# Patient Record
Sex: FEMALE | Race: WHITE | NOT HISPANIC OR LATINO | Employment: UNEMPLOYED | URBAN - METROPOLITAN AREA
[De-identification: names, ages, dates, MRNs, and addresses within clinical notes are randomized per-mention and may not be internally consistent; named-entity substitution may affect disease eponyms.]

---

## 2021-07-24 ENCOUNTER — APPOINTMENT (EMERGENCY)
Dept: CT IMAGING | Facility: HOSPITAL | Age: 56
DRG: 281 | End: 2021-07-24
Payer: COMMERCIAL

## 2021-07-24 ENCOUNTER — HOSPITAL ENCOUNTER (INPATIENT)
Facility: HOSPITAL | Age: 56
LOS: 5 days | Discharge: HOME | DRG: 281 | End: 2021-07-29
Attending: EMERGENCY MEDICINE | Admitting: INTERNAL MEDICINE
Payer: COMMERCIAL

## 2021-07-24 DIAGNOSIS — G89.3 CANCER ASSOCIATED PAIN: ICD-10-CM

## 2021-07-24 DIAGNOSIS — C79.9 METASTATIC NEOPLASM (HCC): ICD-10-CM

## 2021-07-24 DIAGNOSIS — J90 PLEURAL EFFUSION: ICD-10-CM

## 2021-07-24 DIAGNOSIS — R05.9 COUGH: Primary | ICD-10-CM

## 2021-07-24 DIAGNOSIS — R06.02 SHORTNESS OF BREATH: ICD-10-CM

## 2021-07-24 PROBLEM — Z72.0 TOBACCO ABUSE: Status: ACTIVE | Noted: 2021-07-24

## 2021-07-24 LAB
ANION GAP SERPL CALCULATED.3IONS-SCNC: 10 MMOL/L (ref 4–13)
ATRIAL RATE: 84 BPM
BASOPHILS # BLD AUTO: 0.06 THOUSANDS/ΜL (ref 0–0.1)
BASOPHILS NFR BLD AUTO: 1 % (ref 0–1)
BUN SERPL-MCNC: 17 MG/DL (ref 5–25)
CALCIUM SERPL-MCNC: 9 MG/DL (ref 8.3–10.1)
CHLORIDE SERPL-SCNC: 102 MMOL/L (ref 100–108)
CO2 SERPL-SCNC: 25 MMOL/L (ref 21–32)
CREAT SERPL-MCNC: 1.02 MG/DL (ref 0.6–1.3)
D DIMER PPP FEU-MCNC: 0.79 UG/ML FEU
EOSINOPHIL # BLD AUTO: 0.07 THOUSAND/ΜL (ref 0–0.61)
EOSINOPHIL NFR BLD AUTO: 1 % (ref 0–6)
ERYTHROCYTE [DISTWIDTH] IN BLOOD BY AUTOMATED COUNT: 13.7 % (ref 11.6–15.1)
GFR SERPL CREATININE-BSD FRML MDRD: 63 ML/MIN/1.73SQ M
GLUCOSE SERPL-MCNC: 103 MG/DL (ref 65–140)
HCT VFR BLD AUTO: 46.8 % (ref 34.8–46.1)
HGB BLD-MCNC: 16.2 G/DL (ref 11.5–15.4)
IMM GRANULOCYTES # BLD AUTO: 0.05 THOUSAND/UL (ref 0–0.2)
IMM GRANULOCYTES NFR BLD AUTO: 0 % (ref 0–2)
LYMPHOCYTES # BLD AUTO: 0.49 THOUSANDS/ΜL (ref 0.6–4.47)
LYMPHOCYTES NFR BLD AUTO: 4 % (ref 14–44)
MCH RBC QN AUTO: 31.6 PG (ref 26.8–34.3)
MCHC RBC AUTO-ENTMCNC: 34.6 G/DL (ref 31.4–37.4)
MCV RBC AUTO: 91 FL (ref 82–98)
MONOCYTES # BLD AUTO: 0.6 THOUSAND/ΜL (ref 0.17–1.22)
MONOCYTES NFR BLD AUTO: 5 % (ref 4–12)
NEUTROPHILS # BLD AUTO: 10.02 THOUSANDS/ΜL (ref 1.85–7.62)
NEUTS SEG NFR BLD AUTO: 89 % (ref 43–75)
NRBC BLD AUTO-RTO: 0 /100 WBCS
NT-PROBNP SERPL-MCNC: 31 PG/ML
P AXIS: 76 DEGREES
PLATELET # BLD AUTO: 222 THOUSANDS/UL (ref 149–390)
PLATELET # BLD AUTO: 281 THOUSANDS/UL (ref 149–390)
PMV BLD AUTO: 12.1 FL (ref 8.9–12.7)
PMV BLD AUTO: 12.6 FL (ref 8.9–12.7)
POTASSIUM SERPL-SCNC: 4.3 MMOL/L (ref 3.5–5.3)
PR INTERVAL: 156 MS
QRS AXIS: 92 DEGREES
QRSD INTERVAL: 60 MS
QT INTERVAL: 368 MS
QTC INTERVAL: 434 MS
RBC # BLD AUTO: 5.12 MILLION/UL (ref 3.81–5.12)
SODIUM SERPL-SCNC: 137 MMOL/L (ref 136–145)
T WAVE AXIS: 86 DEGREES
TROPONIN I SERPL-MCNC: <0.02 NG/ML
VENTRICULAR RATE: 84 BPM
WBC # BLD AUTO: 11.29 THOUSAND/UL (ref 4.31–10.16)

## 2021-07-24 PROCEDURE — 36415 COLL VENOUS BLD VENIPUNCTURE: CPT | Performed by: EMERGENCY MEDICINE

## 2021-07-24 PROCEDURE — 80048 BASIC METABOLIC PNL TOTAL CA: CPT | Performed by: EMERGENCY MEDICINE

## 2021-07-24 PROCEDURE — 99285 EMERGENCY DEPT VISIT HI MDM: CPT

## 2021-07-24 PROCEDURE — 94640 AIRWAY INHALATION TREATMENT: CPT

## 2021-07-24 PROCEDURE — G1004 CDSM NDSC: HCPCS

## 2021-07-24 PROCEDURE — 85379 FIBRIN DEGRADATION QUANT: CPT | Performed by: EMERGENCY MEDICINE

## 2021-07-24 PROCEDURE — 94664 DEMO&/EVAL PT USE INHALER: CPT

## 2021-07-24 PROCEDURE — 93010 ELECTROCARDIOGRAM REPORT: CPT | Performed by: INTERNAL MEDICINE

## 2021-07-24 PROCEDURE — 85049 AUTOMATED PLATELET COUNT: CPT | Performed by: FAMILY MEDICINE

## 2021-07-24 PROCEDURE — 93005 ELECTROCARDIOGRAM TRACING: CPT

## 2021-07-24 PROCEDURE — 83880 ASSAY OF NATRIURETIC PEPTIDE: CPT | Performed by: EMERGENCY MEDICINE

## 2021-07-24 PROCEDURE — 85025 COMPLETE CBC W/AUTO DIFF WBC: CPT | Performed by: EMERGENCY MEDICINE

## 2021-07-24 PROCEDURE — 71275 CT ANGIOGRAPHY CHEST: CPT

## 2021-07-24 PROCEDURE — 99223 1ST HOSP IP/OBS HIGH 75: CPT | Performed by: FAMILY MEDICINE

## 2021-07-24 PROCEDURE — 99285 EMERGENCY DEPT VISIT HI MDM: CPT | Performed by: EMERGENCY MEDICINE

## 2021-07-24 PROCEDURE — 84484 ASSAY OF TROPONIN QUANT: CPT | Performed by: EMERGENCY MEDICINE

## 2021-07-24 RX ORDER — IPRATROPIUM BROMIDE AND ALBUTEROL SULFATE 2.5; .5 MG/3ML; MG/3ML
3 SOLUTION RESPIRATORY (INHALATION) ONCE
Status: COMPLETED | OUTPATIENT
Start: 2021-07-24 | End: 2021-07-24

## 2021-07-24 RX ORDER — ALBUTEROL SULFATE 90 UG/1
2 AEROSOL, METERED RESPIRATORY (INHALATION) EVERY 4 HOURS PRN
Status: DISCONTINUED | OUTPATIENT
Start: 2021-07-24 | End: 2021-07-29 | Stop reason: HOSPADM

## 2021-07-24 RX ORDER — ONDANSETRON 2 MG/ML
4 INJECTION INTRAMUSCULAR; INTRAVENOUS EVERY 6 HOURS PRN
Status: DISCONTINUED | OUTPATIENT
Start: 2021-07-24 | End: 2021-07-29 | Stop reason: HOSPADM

## 2021-07-24 RX ORDER — ACETAMINOPHEN 325 MG/1
650 TABLET ORAL EVERY 6 HOURS PRN
Status: DISCONTINUED | OUTPATIENT
Start: 2021-07-24 | End: 2021-07-29 | Stop reason: HOSPADM

## 2021-07-24 RX ORDER — GUAIFENESIN/DEXTROMETHORPHAN 100-10MG/5
10 SYRUP ORAL EVERY 4 HOURS PRN
Status: DISCONTINUED | OUTPATIENT
Start: 2021-07-24 | End: 2021-07-29 | Stop reason: HOSPADM

## 2021-07-24 RX ORDER — KETOROLAC TROMETHAMINE 30 MG/ML
15 INJECTION, SOLUTION INTRAMUSCULAR; INTRAVENOUS ONCE
Status: COMPLETED | OUTPATIENT
Start: 2021-07-24 | End: 2021-07-24

## 2021-07-24 RX ORDER — BENZONATATE 100 MG/1
200 CAPSULE ORAL 3 TIMES DAILY PRN
Status: DISCONTINUED | OUTPATIENT
Start: 2021-07-24 | End: 2021-07-29 | Stop reason: HOSPADM

## 2021-07-24 RX ORDER — SODIUM CHLORIDE 9 MG/ML
75 INJECTION, SOLUTION INTRAVENOUS CONTINUOUS
Status: DISCONTINUED | OUTPATIENT
Start: 2021-07-24 | End: 2021-07-25

## 2021-07-24 RX ADMIN — GUAIFENESIN AND DEXTROMETHORPHAN 10 ML: 100; 10 SYRUP ORAL at 19:56

## 2021-07-24 RX ADMIN — SODIUM CHLORIDE 75 ML/HR: 0.9 INJECTION, SOLUTION INTRAVENOUS at 15:49

## 2021-07-24 RX ADMIN — KETOROLAC TROMETHAMINE 15 MG: 30 INJECTION, SOLUTION INTRAMUSCULAR at 14:23

## 2021-07-24 RX ADMIN — IPRATROPIUM BROMIDE AND ALBUTEROL SULFATE 3 ML: 2.5; .5 SOLUTION RESPIRATORY (INHALATION) at 09:59

## 2021-07-24 RX ADMIN — IOHEXOL 85 ML: 350 INJECTION, SOLUTION INTRAVENOUS at 11:29

## 2021-07-24 RX ADMIN — ACETAMINOPHEN 650 MG: 325 TABLET, FILM COATED ORAL at 17:20

## 2021-07-24 RX ADMIN — ENOXAPARIN SODIUM 40 MG: 40 INJECTION SUBCUTANEOUS at 17:20

## 2021-07-24 NOTE — H&P
Sergio 1965, 50 y o  female MRN: [id number]  Unit/Bed#: ED 26 Encounter: [id number]  Primary Care Provider: No primary care provider on file  Date and time admitted to hospital: 7/24/2021  8:57 AM    Tobacco abuse  Assessment & Plan  Offered nicotine patch  Shortness of breath  Assessment & Plan  Secondary to the above and the pleural effusion  May benefit form a thoracentesis    * Cough  Assessment & Plan  Patient with cough and shortness of breath  Patient found to have abnormal findings on the CT scan  Probable liver metastasis as well  We discussed this with the patient and the family at the bedside  Likely will require thoracentesis on Monday for the pleural effusion secondary to being symptomatic  Patient may also need IR consultation for biopsy versus pulmonary for bronchoscopy  Will consult pulmonology and decide from there  Patient's last mammogram was about 2 years ago  VTE Prophylaxis: Enoxaparin (Lovenox)  / sequential compression device   Code Status:  Full code  POLST: POLST is not applicable to this patient  Discussion with family:     Anticipated Length of Stay:  Patient will be admitted on an Inpatient basis with an anticipated length of stay of  greater than 2 midnights  Justification for Hospital Stay:  Patient is going to require greater than 2 midnights secondary to being short of breath with concern for metastatic lung cancer in need of a thoracentesis and pulmonology evaluation and possible bronchoscopy    Total Time for Visit, including Counseling / Coordination of Care: 45 minutes  Greater than 50% of this total time spent on direct patient counseling and coordination of care  Chief Complaint:   Shortness of breath and cough    History of Present Illness:    Angela Smith is a 50 y o  female who presents with shortness of breath and cough    The patient has been having shortness of breath and cough for the past couple weeks  She finished a course of azithromycin  Yesterday she got a prescription for Augmentin  After she took her 1st dose she had a coughing fit     Patient has little bit of weight loss  The patient has some shortness of breath which is worse when she is lying down  Patient has been coughing quite a bit as well  Patient has been having some night sweats  She attributed some of her symptoms with night sweats and chills to possible menopause  Patient has cut down smoking the past couple of days    Review of Systems:    Review of Systems   Constitutional: Positive for activity change, appetite change and chills  Respiratory: Positive for cough and shortness of breath  Neurological: Positive for weakness  All other systems reviewed and are negative  Past Medical and Surgical History:     History reviewed  No pertinent past medical history  Past Surgical History:   Procedure Laterality Date    HIP SURGERY         Meds/Allergies:    Prior to Admission medications    Not on File     I have reviewed home medications with patient personally  Allergies:    Allergies   Allergen Reactions    Codeine Anaphylaxis    Sulfa Antibiotics Hives       Social History:     Marital Status: /Civil Union     Patient Pre-hospital Living Situation:  Lives with her   Patient Pre-hospital Level of Mobility:  Independent  Patient Pre-hospital Diet Restrictions:  None  Substance Use History:   Social History     Substance and Sexual Activity   Alcohol Use Not Currently     Social History     Tobacco Use   Smoking Status Current Every Day Smoker    Packs/day: 0 25    Types: Cigarettes   Smokeless Tobacco Never Used     Social History     Substance and Sexual Activity   Drug Use Not Currently       Family History:    None    Physical Exam:     Vitals:   Blood Pressure: 130/72 (07/24/21 1200)  Pulse: 69 (07/24/21 1200)  Temperature: (!) 97 4 °F (36 3 °C) (07/24/21 0858)  Temp Source: Oral (07/24/21 4283)  Respirations: 21 (07/24/21 1200)  Height: 5' 4" (162 6 cm) (07/24/21 0858)  Weight - Scale: 71 2 kg (157 lb) (07/24/21 0858)  SpO2: 96 % (07/24/21 1200)    Physical Exam    (   General Appearance:    Alert, cooperative, no distress, appears stated age   Head:    Normocephalic, without obvious abnormality, atraumatic   Eyes:    PERRL, conjunctiva/corneas clear, EOM's intact,             Nose:   Nares normal, septum midline, mucosa normal   Throat:   Lips, mucosa, and tongue normal; teeth and gums normal   Neck:   Supple, symmetrical, no adenopathy;        thyroid:  No enlargement/tenderness/nodules; no carotid    bruit or JVD   Back:     Symmetric, no curvature, ROM normal, no CVA tenderness   Lungs:   Decreased breath sounds at the bases       Heart:    Regular rate and rhythm, S1 and S2 normal, no murmur, rub    or gallop   Abdomen:     Soft, non-tender, bowel sounds active all four quadrants,     no masses, no organomegaly           Extremities:   Extremities normal, atraumatic, no cyanosis or edema   Pulses:   2+ and symmetric all extremities   Skin:   Skin color, texture, turgor normal, no rashes or lesions   Lymph nodes:   Cervical, supraclavicular, and axillary nodes normal   Neurologic:   CNII-XII intact  Normal strength, sensation and reflexes       throughout       Additional Data:     Lab Results: I have personally reviewed pertinent reports  Results from last 7 days   Lab Units 07/24/21  1000   WBC Thousand/uL 11 29*   HEMOGLOBIN g/dL 16 2*   HEMATOCRIT % 46 8*   PLATELETS Thousands/uL 281   NEUTROS PCT % 89*   LYMPHS PCT % 4*   MONOS PCT % 5   EOS PCT % 1     Results from last 7 days   Lab Units 07/24/21  0945   SODIUM mmol/L 137   POTASSIUM mmol/L 4 3   CHLORIDE mmol/L 102   CO2 mmol/L 25   BUN mg/dL 17   CREATININE mg/dL 1 02   ANION GAP mmol/L 10   CALCIUM mg/dL 9 0   GLUCOSE RANDOM mg/dL 103                       Imaging: I have personally reviewed pertinent reports        CTA ED chest PE study   Final Result by Demarco Boland MD (07/24 1207)      No pulmonary embolus  Septal thickening and bronchial wall thickening in the right lung due to lymphangitic spread of tumor, question lung primary or metastatic breast cancer  Multiple hepatic metastases and lytic sternal manubrial metastasis  Large right pleural effusion and small pericardial effusion, likely malignant  A few small bilateral indeterminate lung nodules  I personally discussed this study with Morris Harris on 7/24/2021 at 12:06 PM                             Workstation performed: [id number]             EKG, Pathology, and Other Studies Reviewed on Admission:   · CT scan reviewed    Allscripts / Epic Records Reviewed: Yes     ** Please Note: This note has been constructed using a voice recognition system   **

## 2021-07-24 NOTE — RESPIRATORY THERAPY NOTE
RT Protocol Note  Chloe Gonsalez 50 y o  female MRN: [id number]  Unit/Bed#: -01 Encounter: [id number]    Assessment    Principal Problem:    Cough  Active Problems:    Shortness of breath    Tobacco abuse      Home Pulmonary Medications:  Albuterol mdi prn  Home Devices/Therapy:  (albuterol mdi prn)    History reviewed  No pertinent past medical history  Social History     Socioeconomic History    Marital status: /Civil Union     Spouse name: None    Number of children: None    Years of education: None    Highest education level: None   Occupational History    None   Tobacco Use    Smoking status: Current Every Day Smoker     Packs/day: 0 25     Types: Cigarettes    Smokeless tobacco: Never Used   Vaping Use    Vaping Use: Never used   Substance and Sexual Activity    Alcohol use: Not Currently    Drug use: Not Currently    Sexual activity: Not Currently   Other Topics Concern    None   Social History Narrative    None     Social Determinants of Health     Financial Resource Strain:     Difficulty of Paying Living Expenses:    Food Insecurity:     Worried About Running Out of Food in the Last Year:     Ran Out of Food in the Last Year:    Transportation Needs:     Lack of Transportation (Medical):      Lack of Transportation (Non-Medical):    Physical Activity:     Days of Exercise per Week:     Minutes of Exercise per Session:    Stress:     Feeling of Stress :    Social Connections:     Frequency of Communication with Friends and Family:     Frequency of Social Gatherings with Friends and Family:     Attends [employer] Services:     Active Member of Clubs or Organizations:     Attends Club or Organization Meetings:     Marital Status:    Intimate Partner Violence:     Fear of Current or Ex-Partner:     Emotionally Abused:     Physically Abused:     Sexually Abused:        Subjective         Objective    Physical Exam:   Assessment Type: Pre-treatment  General Appearance: Awake, Alert  Respiratory Pattern: Normal  Chest Assessment: Chest expansion symmetrical  Bilateral Breath Sounds: Clear, Diminished  Cough: Non-productive  O2 Device: room air    Vitals:  Blood pressure (!) 154/102, pulse 78, temperature 97 8 °F (36 6 °C), resp  rate 21, height 5' 4" (1 626 m), weight 71 2 kg (157 lb), SpO2 95 %  Imaging and other studies: I have personally reviewed pertinent reports        O2 Device: room air     Plan    Respiratory Plan: Home Bronchodilator Patient pathway        Resp Comments: patient not in distress , takes prn inhaler at home, patient does not have home oxygen

## 2021-07-24 NOTE — ED PROVIDER NOTES
History  Chief Complaint   Patient presents with    Cough     Patient presents to ED with co cough that worsen last night  Patient recently dx with bronchitis and has started her 2nd round of antibiotics  History provided by:  Patient  Cough  Cough characteristics:  Dry and hacking  Sputum characteristics:  Clear  Severity:  Moderate  Onset quality:  Gradual  Duration:  2 weeks  Timing:  Constant  Progression:  Unchanged  Chronicity:  New  Smoker: yes    Context: upper respiratory infection (Went to urgent care and was treated for bronchitis with zpack and prednisone  now started on augmentin by PCP and not getting better)    Relieved by:  Nothing  Worsened by:  Nothing  Ineffective treatments:  Beta-agonist inhaler  Associated symptoms: chest pain and shortness of breath    Associated symptoms: no fever        None       History reviewed  No pertinent past medical history  Past Surgical History:   Procedure Laterality Date    HIP SURGERY         History reviewed  No pertinent family history  I have reviewed and agree with the history as documented  E-Cigarette/Vaping    E-Cigarette Use Never User      E-Cigarette/Vaping Substances     Social History     Tobacco Use    Smoking status: Current Every Day Smoker     Packs/day: 0 25     Types: Cigarettes    Smokeless tobacco: Never Used   Vaping Use    Vaping Use: Never used   Substance Use Topics    Alcohol use: Not Currently    Drug use: Not Currently       Review of Systems   Constitutional: Negative for fever  Respiratory: Positive for cough and shortness of breath  Cardiovascular: Positive for chest pain  All other systems reviewed and are negative  Physical Exam  Physical Exam  Vitals and nursing note reviewed  Constitutional:       General: She is not in acute distress  Appearance: Normal appearance  She is well-developed  She is not diaphoretic  HENT:      Head: Normocephalic and atraumatic        Nose: Nose normal  Eyes:      Conjunctiva/sclera: Conjunctivae normal    Cardiovascular:      Rate and Rhythm: Normal rate and regular rhythm  Heart sounds: Normal heart sounds  Pulmonary:      [place]: Pulmonary effort is normal  No respiratory distress  Breath sounds: Normal breath sounds  Abdominal:      General: There is no distension  Palpations: Abdomen is soft  Tenderness: There is no abdominal tenderness  Musculoskeletal:         General: Normal range of motion  Cervical back: Normal range of motion and neck supple  Skin:     General: Skin is warm and dry  Neurological:      Mental Status: She is alert and oriented to person, place, and time     Psychiatric:         Mood and Affect: Mood normal          Vital Signs  ED Triage Vitals   Temperature Pulse Respirations Blood Pressure SpO2   07/24/21 0858 07/24/21 0858 07/24/21 0858 07/24/21 0858 07/24/21 0858   (!) 97 4 °F (36 3 °C) 104 20 155/99 96 %      Temp Source Heart Rate Source Patient Position - Orthostatic VS BP Location FiO2 (%)   07/24/21 0858 07/24/21 0858 07/24/21 0858 07/24/21 0858 --   Oral Monitor Sitting Right arm       Pain Score       07/24/21 1423       7           Vitals:    07/24/21 1030 07/24/21 1200 07/24/21 1458 07/24/21 1506   BP: 134/80 130/72 (!) 154/102    Pulse: 70 69 88 78   Patient Position - Orthostatic VS: Sitting Sitting           Visual Acuity      ED Medications  Medications   benzonatate (TESSALON PERLES) capsule 200 mg (has no administration in time range)   dextromethorphan-guaiFENesin (ROBITUSSIN DM) oral syrup 10 mL (has no administration in time range)   sodium chloride 0 9 % infusion (75 mL/hr Intravenous New Bag 7/24/21 1549)   ondansetron (ZOFRAN) injection 4 mg (has no administration in time range)   acetaminophen (TYLENOL) tablet 650 mg (650 mg Oral Given 7/24/21 1720)   enoxaparin (LOVENOX) subcutaneous injection 40 mg (40 mg Subcutaneous Given 7/24/21 1720)   influenza vaccine, recombinant, quadrivalent (FLUBLOK) IM injection 0 5 mL (0 5 mL Intramuscular Not Given 7/24/21 1717)   ipratropium-albuterol (DUO-NEB) 0 5-2 5 mg/3 mL inhalation solution 3 mL (3 mL Nebulization Given 7/24/21 0959)   iohexol (OMNIPAQUE) 350 MG/ML injection (SINGLE-DOSE) 85 mL (85 mL Intravenous Given 7/24/21 1129)   ketorolac (TORADOL) injection 15 mg (15 mg Intravenous Given 7/24/21 1423)       Diagnostic Studies  Results Reviewed     Procedure Component Value Units Date/Time    D-Dimer [[id number]]  (Abnormal) Collected: 07/24/21 1004    Lab Status: Final result Specimen: Blood from Arm, Left Updated: 07/24/21 1029     D-Dimer, Quant 0 79 ug/ml FEU     Basic metabolic panel [[id number]] Collected: 07/24/21 0945    Lab Status: Final result Specimen: Blood from Arm, Left Updated: 07/24/21 1015     Sodium 137 mmol/L      Potassium 4 3 mmol/L      Chloride 102 mmol/L      CO2 25 mmol/L      ANION GAP 10 mmol/L      BUN 17 mg/dL      Creatinine 1 02 mg/dL      Glucose 103 mg/dL      Calcium 9 0 mg/dL      eGFR 63 ml/min/1 73sq m     Narrative:      [facility] guidelines for Chronic Kidney Disease (CKD):     Stage 1 with normal or high GFR (GFR > 90 mL/min/1 73 square meters)    Stage 2 Mild CKD (GFR = 60-89 mL/min/1 73 square meters)    Stage 3A Moderate CKD (GFR = 45-59 mL/min/1 73 square meters)    Stage 3B Moderate CKD (GFR = 30-44 mL/min/1 73 square meters)    Stage 4 Severe CKD (GFR = 15-29 mL/min/1 73 square meters)    Stage 5 End Stage CKD (GFR <15 mL/min/1 73 square meters)  Note: GFR calculation is accurate only with a steady state creatinine    NT-BNP PRO [[id number]]  (Normal) Collected: 07/24/21 0945    Lab Status: Final result Specimen: Blood from Arm, Left Updated: 07/24/21 1015     NT-proBNP 31 pg/mL     Troponin I [[id number]]  (Normal) Collected: 07/24/21 0945    Lab Status: Final result Specimen: Blood from Arm, Left Updated: 07/24/21 1012     Troponin I <0 02 ng/mL     CBC and differential [[id number]]  (Abnormal) Collected: 07/24/21 1000    Lab Status: Final result Specimen: Blood from Arm, Left Updated: 07/24/21 1011     WBC 11 29 Thousand/uL      RBC 5 12 Million/uL      Hemoglobin 16 2 g/dL      Hematocrit 46 8 %      MCV 91 fL      MCH 31 6 pg      MCHC 34 6 g/dL      RDW 13 7 %      MPV 12 6 fL      Platelets 894 Thousands/uL      nRBC 0 /100 WBCs      Neutrophils Relative 89 %      Immat GRANS % 0 %      Lymphocytes Relative 4 %      Monocytes Relative 5 %      Eosinophils Relative 1 %      Basophils Relative 1 %      Neutrophils Absolute 10 02 Thousands/µL      Immature Grans Absolute 0 05 Thousand/uL      Lymphocytes Absolute 0 49 Thousands/µL      Monocytes Absolute 0 60 Thousand/µL      Eosinophils Absolute 0 07 Thousand/µL      Basophils Absolute 0 06 Thousands/µL                  CTA ED chest PE study   Final Result by Sam Ramirez MD (07/24 1207)      No pulmonary embolus  Septal thickening and bronchial wall thickening in the right lung due to lymphangitic spread of tumor, question lung primary or metastatic breast cancer  Multiple hepatic metastases and lytic sternal manubrial metastasis  Large right pleural effusion and small pericardial effusion, likely malignant  A few small bilateral indeterminate lung nodules        I personally discussed this study with Lelia Espinosa on 7/24/2021 at 12:06 PM                             Workstation performed: [id number]                    Procedures  Procedures         ED Course             HEART Risk Score      Most Recent Value   Heart Score Risk Calculator   History  0 Filed at: 07/24/2021 1751   ECG  0 Filed at: 07/24/2021 1751   Age  1 Filed at: 07/24/2021 1751   Risk Factors  1 Filed at: 07/24/2021 1751   Troponin  0 Filed at: 07/24/2021 1751   HEART Score  2 Filed at: 07/24/2021 1751              PERC Rule for PE      Most Recent Value   PERC Rule for PE   Age >=50  1 Filed at: 07/24/2021 0935   HR >=100  0 Filed at: 07/24/2021 0935   O2 Sat on room air < 95%  0 Filed at: 07/24/2021 0935   History of PE or DVT  0 Filed at: 07/24/2021 2017   Recent trauma or surgery  0 Filed at: 07/24/2021 0935   Hemoptysis  0 Filed at: 07/24/2021 0935   Exogenous estrogen  0 Filed at: 07/24/2021 0935   Unilateral leg swelling  0 Filed at: 07/24/2021 0935   PERC Rule for PE Results  1 Filed at: 07/24/2021 0935              SBIRT 20yo+      Most Recent Value   SBIRT (25 yo +)   In order to provide better care to our patients, we are screening all of our patients for alcohol and drug use  Would it be okay to ask you these screening questions? Yes Filed at: 07/24/2021 0907   Initial Alcohol Screen: US AUDIT-C    1  How often do you have a drink containing alcohol?  0 Filed at: 07/24/2021 0907   2  How many drinks containing alcohol do you have on a typical day you are drinking? 0 Filed at: 07/24/2021 0907   3b  FEMALE Any Age, or MALE 65+: How often do you have 4 or more drinks on one occassion? 0 Filed at: 07/24/2021 5144   Audit-C Score  0 Filed at: 07/24/2021 4783   MIRIAN: How many times in the past year have you    Used an illegal drug or used a prescription medication for non-medical reasons?   Never Filed at: 07/24/2021 0907                    MDM  Number of Diagnoses or Management Options  Cough: new and requires workup  Pleural effusion: new and requires workup     Amount and/or Complexity of Data Reviewed  Clinical lab tests: ordered and reviewed  Tests in the radiology section of CPT®: ordered and reviewed    Risk of Complications, Morbidity, and/or Mortality  Presenting problems: high    Patient Progress  Patient progress: stable      Disposition  Final diagnoses:   Cough   Pleural effusion     Time reflects when diagnosis was documented in both MDM as applicable and the Disposition within this note     Time User Action Codes Description Comment    7/24/2021  1:08 PM Mandy Potter Add [R05] Cough     7/24/2021  1:09 PM Fidel CEDENO Add [J90] Pleural effusion     7/24/2021  2:14 PM Bia Carranza Add [R06 02] Shortness of breath       ED Disposition     ED Disposition Condition Date/Time Comment    Admit Stable Sat Jul 24, 2021  1:10 PM Case was discussed with Maribel and the patient's admission status was agreed to be Admission Status: inpatient status to the service of Dr Bhupinder Guaman   Follow-up Information    None         There are no discharge medications for this patient  No discharge procedures on file      PDMP Review     None          ED Provider  Electronically Signed by           Nathalie Bird MD  07/24/21 3289

## 2021-07-24 NOTE — ASSESSMENT & PLAN NOTE
Patient with cough and shortness of breath  Patient found to have abnormal findings on the CT scan  Probable liver metastasis as well  We discussed this with the patient and the family at the bedside  Likely will require thoracentesis on Monday for the pleural effusion secondary to being symptomatic  Patient may also need IR consultation for biopsy versus pulmonary for bronchoscopy  Will consult pulmonology and decide from there  Patient's last mammogram was about 2 years ago

## 2021-07-25 PROBLEM — R06.02 SHORTNESS OF BREATH: Status: RESOLVED | Noted: 2021-07-24 | Resolved: 2021-07-25

## 2021-07-25 PROBLEM — J90 PLEURAL EFFUSION: Status: ACTIVE | Noted: 2021-07-24

## 2021-07-25 PROBLEM — C79.9 METASTATIC NEOPLASM (HCC): Status: ACTIVE | Noted: 2021-07-25

## 2021-07-25 LAB
ALBUMIN SERPL BCP-MCNC: 2.8 G/DL (ref 3.5–5)
ALP SERPL-CCNC: 126 U/L (ref 46–116)
ALT SERPL W P-5'-P-CCNC: 18 U/L (ref 12–78)
ANION GAP SERPL CALCULATED.3IONS-SCNC: 11 MMOL/L (ref 4–13)
AST SERPL W P-5'-P-CCNC: 23 U/L (ref 5–45)
BILIRUB DIRECT SERPL-MCNC: 0.12 MG/DL (ref 0–0.2)
BILIRUB SERPL-MCNC: 0.42 MG/DL (ref 0.2–1)
BUN SERPL-MCNC: 11 MG/DL (ref 5–25)
CALCIUM SERPL-MCNC: 8.1 MG/DL (ref 8.3–10.1)
CHLORIDE SERPL-SCNC: 108 MMOL/L (ref 100–108)
CO2 SERPL-SCNC: 21 MMOL/L (ref 21–32)
CREAT SERPL-MCNC: 0.99 MG/DL (ref 0.6–1.3)
ERYTHROCYTE [DISTWIDTH] IN BLOOD BY AUTOMATED COUNT: 13.7 % (ref 11.6–15.1)
GFR SERPL CREATININE-BSD FRML MDRD: 65 ML/MIN/1.73SQ M
GLUCOSE SERPL-MCNC: 112 MG/DL (ref 65–140)
HCT VFR BLD AUTO: 40.7 % (ref 34.8–46.1)
HGB BLD-MCNC: 13.4 G/DL (ref 11.5–15.4)
INR PPP: 0.99 (ref 0.84–1.19)
LIPASE SERPL-CCNC: 136 U/L (ref 73–393)
MCH RBC QN AUTO: 30.5 PG (ref 26.8–34.3)
MCHC RBC AUTO-ENTMCNC: 32.9 G/DL (ref 31.4–37.4)
MCV RBC AUTO: 93 FL (ref 82–98)
PLATELET # BLD AUTO: 204 THOUSANDS/UL (ref 149–390)
PMV BLD AUTO: 12.2 FL (ref 8.9–12.7)
POTASSIUM SERPL-SCNC: 3.6 MMOL/L (ref 3.5–5.3)
PROT SERPL-MCNC: 6.2 G/DL (ref 6.4–8.2)
PROTHROMBIN TIME: 13.3 SECONDS (ref 11.6–14.5)
RBC # BLD AUTO: 4.4 MILLION/UL (ref 3.81–5.12)
SODIUM SERPL-SCNC: 140 MMOL/L (ref 136–145)
WBC # BLD AUTO: 5.43 THOUSAND/UL (ref 4.31–10.16)

## 2021-07-25 PROCEDURE — 99233 SBSQ HOSP IP/OBS HIGH 50: CPT | Performed by: INTERNAL MEDICINE

## 2021-07-25 PROCEDURE — 80048 BASIC METABOLIC PNL TOTAL CA: CPT | Performed by: FAMILY MEDICINE

## 2021-07-25 PROCEDURE — 83690 ASSAY OF LIPASE: CPT | Performed by: INTERNAL MEDICINE

## 2021-07-25 PROCEDURE — 0FB03ZX EXCISION OF LIVER, PERCUTANEOUS APPROACH, DIAGNOSTIC: ICD-10-PCS | Performed by: RADIOLOGY

## 2021-07-25 PROCEDURE — 99254 IP/OBS CNSLTJ NEW/EST MOD 60: CPT | Performed by: PHYSICIAN ASSISTANT

## 2021-07-25 PROCEDURE — 0W993ZX DRAINAGE OF RIGHT PLEURAL CAVITY, PERCUTANEOUS APPROACH, DIAGNOSTIC: ICD-10-PCS | Performed by: RADIOLOGY

## 2021-07-25 PROCEDURE — 85027 COMPLETE CBC AUTOMATED: CPT | Performed by: FAMILY MEDICINE

## 2021-07-25 PROCEDURE — 80076 HEPATIC FUNCTION PANEL: CPT | Performed by: INTERNAL MEDICINE

## 2021-07-25 PROCEDURE — 94664 DEMO&/EVAL PT USE INHALER: CPT

## 2021-07-25 PROCEDURE — 85610 PROTHROMBIN TIME: CPT | Performed by: RADIOLOGY

## 2021-07-25 RX ORDER — LISINOPRIL 5 MG/1
5 TABLET ORAL DAILY
Status: DISCONTINUED | OUTPATIENT
Start: 2021-07-25 | End: 2021-07-28

## 2021-07-25 RX ORDER — LANOLIN ALCOHOL/MO/W.PET/CERES
6 CREAM (GRAM) TOPICAL
Status: DISCONTINUED | OUTPATIENT
Start: 2021-07-25 | End: 2021-07-29 | Stop reason: HOSPADM

## 2021-07-25 RX ORDER — DIPHENHYDRAMINE HCL 25 MG
50 TABLET ORAL
Status: DISCONTINUED | OUTPATIENT
Start: 2021-07-25 | End: 2021-07-29 | Stop reason: HOSPADM

## 2021-07-25 RX ORDER — HYDRALAZINE HYDROCHLORIDE 20 MG/ML
5 INJECTION INTRAMUSCULAR; INTRAVENOUS EVERY 6 HOURS PRN
Status: DISCONTINUED | OUTPATIENT
Start: 2021-07-25 | End: 2021-07-29 | Stop reason: HOSPADM

## 2021-07-25 RX ORDER — KETOROLAC TROMETHAMINE 30 MG/ML
15 INJECTION, SOLUTION INTRAMUSCULAR; INTRAVENOUS EVERY 6 HOURS PRN
Status: DISCONTINUED | OUTPATIENT
Start: 2021-07-25 | End: 2021-07-26

## 2021-07-25 RX ORDER — PANTOPRAZOLE SODIUM 40 MG/1
40 TABLET, DELAYED RELEASE ORAL
Status: DISCONTINUED | OUTPATIENT
Start: 2021-07-25 | End: 2021-07-29 | Stop reason: HOSPADM

## 2021-07-25 RX ORDER — CALCIUM CARBONATE 200(500)MG
500 TABLET,CHEWABLE ORAL 3 TIMES DAILY PRN
Status: DISCONTINUED | OUTPATIENT
Start: 2021-07-25 | End: 2021-07-29 | Stop reason: HOSPADM

## 2021-07-25 RX ADMIN — GUAIFENESIN AND DEXTROMETHORPHAN 10 ML: 100; 10 SYRUP ORAL at 11:19

## 2021-07-25 RX ADMIN — LISINOPRIL 5 MG: 5 TABLET ORAL at 16:34

## 2021-07-25 RX ADMIN — ENOXAPARIN SODIUM 40 MG: 40 INJECTION SUBCUTANEOUS at 08:33

## 2021-07-25 RX ADMIN — PANTOPRAZOLE SODIUM 40 MG: 40 TABLET, DELAYED RELEASE ORAL at 16:34

## 2021-07-25 RX ADMIN — KETOROLAC TROMETHAMINE 15 MG: 30 INJECTION, SOLUTION INTRAMUSCULAR at 16:34

## 2021-07-25 RX ADMIN — BENZONATATE 200 MG: 100 CAPSULE ORAL at 23:32

## 2021-07-25 RX ADMIN — DIPHENHYDRAMINE HYDROCHLORIDE 50 MG: 25 TABLET ORAL at 23:32

## 2021-07-25 RX ADMIN — ANTACID TABLETS 500 MG: 500 TABLET, CHEWABLE ORAL at 23:32

## 2021-07-25 RX ADMIN — GUAIFENESIN AND DEXTROMETHORPHAN 10 ML: 100; 10 SYRUP ORAL at 03:46

## 2021-07-25 RX ADMIN — ACETAMINOPHEN 650 MG: 325 TABLET, FILM COATED ORAL at 03:46

## 2021-07-25 RX ADMIN — ACETAMINOPHEN 650 MG: 325 TABLET, FILM COATED ORAL at 15:22

## 2021-07-25 RX ADMIN — BENZONATATE 200 MG: 100 CAPSULE ORAL at 16:36

## 2021-07-25 RX ADMIN — MELATONIN 6 MG: 3 TAB ORAL at 21:58

## 2021-07-25 NOTE — CASE MANAGEMENT
CM attempted to meet with patient at bedside to complete CM Assessment however another provider was with patient at this time  CM will continue to follow to assist with discharge coordination

## 2021-07-25 NOTE — ASSESSMENT & PLAN NOTE
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  Last mammogram was in March 2019   -Normal    CTA scan 7/25 shows septal thickening and bronchial wall thickening in the right lung due to lymphangitic spread of tumor or metastatic breast cancer  Multiple hepatic metastasis and lytic sternal manubrial metastasis  Large right pleural effusion and small pericardial effusion likely malignant  A few small bilateral lung nodules    Outpatient chest x-ray on 07/10  showed right diaphragm elevation however with no obvious pleural effusion    -  Follow pulmonology and IR consult for biopsy  -  Consult Heme-Onc after biopsy  is  back

## 2021-07-25 NOTE — ASSESSMENT & PLAN NOTE
Patient has large pleural effusions  on the right    -  Pulmonary on board  -  IR  on board  -  Thoracocentesis scheduled for tomorrow  -  Follow BF analysis

## 2021-07-25 NOTE — CASE MANAGEMENT
LOS: 1  RISK OF UNPLANNED READMISSION SCORE: 9  30 DAY READMISSION: NO  BUNDLE: NO    CM met with patient at bedside  Patient is alert and oriented  CM name and role reviewed  Patient lives at home with her , son and brother in a 1 story home with 4 ONELIA with handrail  Patient is independent with ADL's  Patient owns a cane but does not currently use  Patient has no HX of VNA or STR  Patient has RX coverage and uses the [facility] in [place] with no issues getting or affording her medications  Patient denies any HX of any mental health or substance abuse issues  Patient's PCP is Jose Mota MD   Patient does not have a documented POA but reports her  would make decisions if she was ever unable to  No AD/LW and is not interested in information at this time  Michelle Everett does not work she collects SSD and is on disability  Patient drives and will have a ride home from family at DC  Discussed with patient plan of care and discharge planning, when asked to update friends or family members patient politely declined  No CM DC needs were discussed or identified at this time  CM reviewed discharge planning process including the following: identifying caregivers at home, preference for d/c planning needs, availability of treatment team to discuss questions or concerns patient and/or family may have regarding diagnosis, plan of care, old or new medications and discharge planning  CM will continue to follow for care coordination and update assessment as necessary

## 2021-07-25 NOTE — QUICK NOTE
Patient was complaining of RUQ pain, 6/10 radiating to right flank and back    Epigastric and RUQ tenderness noted on PE, Delong positive   LFT , lipase, RUQ US ordered

## 2021-07-25 NOTE — CONSULTS
Consultation - Pulmonary Medicine   Alana Bangura 50 y o  female MRN: [id number]  Unit/Bed#: -01 Encounter: [id number]      Assessment:  1  Shortness of breath  2  Right sided pleural effusion  3  Liver lesions  4  History of breast cancer  5  Tobacco abuse    Plan:   Patient presented with cough and shortness of breath  CTA on admission shows no PE, shows septal thickening and bronchial wall thickening in the right lung due to lymphangitic spread of tumor  Multiple hepatic Mets and lytic sternal manubrial Mets  Also a large right pleural effusion and small pericardial effusion, few small bilateral indeterminate lung nodules    Patient with history of right sided breast cancer in 2012  Findings likely related to recurrence/new breast cancer  No discrete lung nodule and minimal smoking history to suggest primary lung cancer  Would plan for thoracentesis for diagnostic and therapeutic purposes as well as a liver biopsy with IR  She would like to establish with Oncology here at [place] [address], had been following at [facility] but this is far to travel  Smoking cessation - patient had been smoking sometimes less than 1/2 PPD, has smoked on and off over the years  Will continue to follow  History of Present Illness   Physician Requesting Consult: Alejandra Pereira MD  Reason for Consult / Principal Problem: Cough, shortness of breath, pleural effusion  Hx and PE limited by: None  HPI: Alana Bangura is a 50y o  year old female current smoker less than half a pack per day with past medical history of breast cancer in 2012 who presents with complaint of worsening shortness of breath and cough over the last few weeks  She was initially treated with antibiotics at urgent care, symptoms not improving her PCP gave her additional antibiotics  She began to have a coughing fit and felt she was not getting any better and decided to come to the ED    She has noted over the past several months some slight worsening shortness of breath/dyspnea exertion  She has a history of breast cancer in 2012  She underwent lumpectomy as well as radiation  She was on tamoxifen  She does have a significant family history of breast cancer including her sister, mother and grandmother  Her BRCA testing is reportedly negative  She does report that her sister also have recurrence of her cancer at about the 10 year kavon as well  Inpatient consult to Pulmonology  Consult performed by: Lucrecia Duncan PA-C  Consult ordered by: Oriana Gomez MD          Review of Systems   Constitutional: Positive for fatigue  HENT: Negative  Respiratory: Positive for cough and shortness of breath  Cardiovascular: Negative  Gastrointestinal: Negative  Genitourinary: Negative  Musculoskeletal: Negative  Skin: Negative  Allergic/Immunologic: Negative  Neurological: Negative  Psychiatric/Behavioral: Negative  Historical Information   History reviewed  No pertinent past medical history    Past Surgical History:   Procedure Laterality Date    HIP SURGERY       Social History   Social History     Substance and Sexual Activity   Alcohol Use Not Currently     Social History     Substance and Sexual Activity   Drug Use Not Currently     E-Cigarette/Vaping    E-Cigarette Use Never User      E-Cigarette/Vaping Substances     Social History     Tobacco Use   Smoking Status Current Every Day Smoker    Packs/day: 0 25    Types: Cigarettes   Smokeless Tobacco Never Used     Occupational History:     Family History:   Family History   Problem Relation Age of Onset   [facility] Breast cancer Mother    [facility] Breast cancer Sister     Breast cancer Maternal Grandmother        Meds/Allergies   all current active meds have been reviewed, pertinent pulmonary meds have been reviewed and current meds:   Current Facility-Administered Medications   Medication Dose Route Frequency    acetaminophen (TYLENOL) tablet 650 mg  650 mg Oral Q6H PRN    albuterol (PROVENTIL HFA,VENTOLIN HFA) inhaler 2 puff  2 puff Inhalation Q4H PRN    benzonatate (TESSALON PERLES) capsule 200 mg  200 mg Oral TID PRN    dextromethorphan-guaiFENesin (ROBITUSSIN DM) oral syrup 10 mL  10 mL Oral Q4H PRN    enoxaparin (LOVENOX) subcutaneous injection 40 mg  40 mg Subcutaneous Daily    influenza vaccine, recombinant, quadrivalent (FLUBLOK) IM injection 0 5 mL  0 5 mL Intramuscular Once    ondansetron (ZOFRAN) injection 4 mg  4 mg Intravenous Q6H PRN    sodium chloride 0 9 % infusion  75 mL/hr Intravenous Continuous       Allergies   Allergen Reactions    Codeine Anaphylaxis    Sulfa Antibiotics Hives       Objective   Vitals: Blood pressure 139/95, pulse 79, temperature 97 7 °F (36 5 °C), resp  rate 17, height 5' 4" (1 626 m), weight 71 2 kg (157 lb), SpO2 93 %  ,Body mass index is 26 95 kg/m²  No intake or output data in the 24 hours ending 07/25/21 1131  Invasive Devices     Peripheral Intravenous Line            Peripheral IV 07/24/21 Left Antecubital 1 day                Physical Exam  Vitals reviewed  Constitutional:       Appearance: Normal appearance  HENT:      Head: Normocephalic and atraumatic  Eyes:      Conjunctiva/sclera: Conjunctivae normal    Cardiovascular:      Rate and Rhythm: Normal rate and regular rhythm  Pulmonary:      Effort: Pulmonary effort is normal  No respiratory distress  Breath sounds: Examination of the right-lower field reveals decreased breath sounds  Decreased breath sounds present  No wheezing, rhonchi or rales  Abdominal:      General: Abdomen is flat  There is no distension  Musculoskeletal:         General: Normal range of motion  Cervical back: Normal range of motion  Right lower leg: No edema  Left lower leg: No edema  Skin:     General: Skin is warm and dry  Neurological:      Mental Status: She is alert and oriented to person, place, and time     Psychiatric:         Mood and Affect: Mood normal  Behavior: Behavior normal          Lab Results:   I have personally reviewed pertinent lab results  , CBC:   Lab Results   Component Value Date    WBC 5 43 07/25/2021    HGB 13 4 07/25/2021    HCT 40 7 07/25/2021    MCV 93 07/25/2021     07/25/2021    MCH 30 5 07/25/2021    MCHC 32 9 07/25/2021    RDW 13 7 07/25/2021    MPV 12 2 07/25/2021   , CMP:   Lab Results   Component Value Date    SODIUM 140 07/25/2021    K 3 6 07/25/2021     07/25/2021    CO2 21 07/25/2021    BUN 11 07/25/2021    CREATININE 0 99 07/25/2021    CALCIUM 8 1 (L) 07/25/2021    EGFR 65 07/25/2021     Imaging Studies: I have personally reviewed pertinent reports  and I have personally reviewed pertinent films in PACS  EKG, Pathology, and Other Studies: I have personally reviewed pertinent reports      VTE Prophylaxis: Sequential compression device (Venodyne)  and Enoxaparin (Lovenox)    Code Status: Level 1 - Full Code  Advance Directive and Living Will:      Power of :    POLST:

## 2021-07-25 NOTE — PROGRESS NOTES
[facility]  Progress Note Elane Dose 1965, 50 y o  female MRN: [id number]  Unit/Bed#: -01 Encounter: [id number]  Primary Care Provider: No primary care provider on file  Date and time admitted to hospital: 7/24/2021  8:57 AM    Metastatic neoplasm [facility])  Assessment & Plan  Patient with a history of right sided breath cancer in 2012 status post lumpectomy,  axillary lymph node dissection and local radiation  Patient reports family history of breast cancer in her sister at age of 48 and her mother  Lost follow-up with [facility]  Last mammogram was in March 2019   -Normal    CTA scan 7/25 shows septal thickening and bronchial wall thickening in the right lung due to lymphangitic spread of tumor or metastatic breast cancer  Multiple hepatic metastasis and lytic sternal manubrial metastasis  Large right pleural effusion and small pericardial effusion likely malignant  A few small bilateral lung nodules  Outpatient chest x-ray on 07/10 was recent right diaphragm elevation however with no obvious pleural effusion    -  Follow pulmonology and IR consult for biopsy  -  Consult Heme-Onc after biopsy  is  back      Tobacco abuse  Assessment & Plan  Offered nicotine patch  Pleural effusion  Assessment & Plan   Patient has large pleural effusions  on the right    -  Pulmonary on board  -  IR  on board  -  Thoracocentesis scheduled for tomorrow  -  Follow BF analysis    * Cough  Assessment & Plan    Continues with intermittent cough  -  Continue  Tessalon Perles        VTE Pharmacologic Prophylaxis:   VTE Score: 4 Moderate Risk (Score 3-4) - Pharmacological DVT Prophylaxis Contraindicated  Sequential Compression Devices Ordered  Mechanical VTE Prophylaxis in Place: Yes    Patient Centered Rounds: I have performed bedside rounds with nursing staff today      Discussions with Specialists or Other Care Team Provider:pulmonology    Education and Discussions with Family / Patient:  Patient herself    Current Length of Stay: 1 day(s)    Current Patient Status: Inpatient     Discharge Plan / Estimated Discharge Date:  Kan Grijalva is following this patient on consult  They are not yet medically stable for discharge  Code Status: Level 1 - Full Code      Subjective:   Examined patient at bedside  Patient reports cough  She denies chest pain, shortness of breath, palpitations, lightheadedness, abdominal pain, nausea, vomiting  Patient is aware of   Diagnostic findings she is agreeable with  workupplan  Objective:     Vitals:   Temp (24hrs), Av °F (36 7 °C), Min:97 7 °F (36 5 °C), Max:98 5 °F (36 9 °C)    Temp:  [97 7 °F (36 5 °C)-98 5 °F (36 9 °C)] 97 7 °F (36 5 °C)  HR:  [68-88] 79  Resp:  [17-21] 17  BP: (110-154)/() 139/95  SpO2:  [93 %-96 %] 93 %  Body mass index is 26 95 kg/m²  Input and Output Summary (last 24 hours):     No intake or output data in the 24 hours ending 21 1155    Physical Exam:     Physical Exam  Vitals and nursing note reviewed  Constitutional:       General: She is not in acute distress  Appearance: She is well-developed  She is not ill-appearing or toxic-appearing  HENT:      Head: Normocephalic and atraumatic  Nose: Nose normal    Eyes:      Extraocular Movements: Extraocular movements intact  Conjunctiva/sclera: Conjunctivae normal    Neck:      Vascular: No carotid bruit  Cardiovascular:      Rate and Rhythm: Normal rate and regular rhythm  Heart sounds: No murmur heard  Pulmonary:      Effort: Pulmonary effort is normal  No respiratory distress  Breath sounds: Normal breath sounds  Abdominal:      Palpations: Abdomen is soft  Tenderness: There is no abdominal tenderness  There is no right CVA tenderness, left CVA tenderness or rebound  Musculoskeletal:      Cervical back: Neck supple  No rigidity or tenderness  Right lower leg: No edema  Left lower leg: No edema     Skin: General: Skin is warm and dry  Findings: No rash  Neurological:      Mental Status: She is alert and oriented to person, place, and time  Additional Data:     Labs:  Results from last 7 days   Lab Units 07/25/21  0448 07/24/21  1000   WBC Thousand/uL 5 43 11 29*   HEMOGLOBIN g/dL 13 4 16 2*   HEMATOCRIT % 40 7 46 8*   PLATELETS Thousands/uL 204 281   NEUTROS PCT %  --  89*   LYMPHS PCT %  --  4*   MONOS PCT %  --  5   EOS PCT %  --  1     Results from last 7 days   Lab Units 07/25/21  0448   SODIUM mmol/L 140   POTASSIUM mmol/L 3 6   CHLORIDE mmol/L 108   CO2 mmol/L 21   BUN mg/dL 11   CREATININE mg/dL 0 99   ANION GAP mmol/L 11   CALCIUM mg/dL 8 1*   GLUCOSE RANDOM mg/dL 112                       Imaging: Reviewed radiology reports from this admission including: chest CT scan  Personally reviewed the following imaging: chest CT scan    Recent Cultures (last 7 days):           Lines/Drains:  Invasive Devices     Peripheral Intravenous Line            Peripheral IV 07/24/21 Left Antecubital 1 day                Telemetry:        Last 24 Hours Medication List:   Current Facility-Administered Medications   Medication Dose Route Frequency Provider Last Rate    acetaminophen  650 mg Oral Q6H PRN Phil Plata MD      albuterol  2 puff Inhalation Q4H PRN Lane López MD      benzonatate  200 mg Oral TID PRN Phil Plata MD      dextromethorphan-guaiFENesin  10 mL Oral Q4H PRN Phil Plata MD      enoxaparin  40 mg Subcutaneous Daily Phil Plata MD      influenza vaccine  0 5 mL Intramuscular Once Lane López MD      ondansetron  4 mg Intravenous Q6H PRN Phil Plata MD      sodium chloride  75 mL/hr Intravenous Continuous Phil Plata MD 75 mL/hr (07/24/21 7332)        Today, Patient Was Seen By: Kulwinder Padilla MD    ** Please Note: This note has been constructed using a voice recognition system   **

## 2021-07-25 NOTE — UTILIZATION REVIEW
Initial Clinical Review    Admission: Date/Time/Statement:   Admission Orders (From admission, onward)     Ordered        07/24/21 1310  Inpatient Admission  Once                   Orders Placed This Encounter   Procedures    Inpatient Admission     Standing Status:   Standing     Number of Occurrences:   1     Order Specific Question:   Level of Care     Answer:   Med Surg [16]     Order Specific Question:   Estimated length of stay     Answer:   More than 2 Midnights     Order Specific Question:   Certification     Answer:   I certify that inpatient services are medically necessary for this patient for a duration of greater than two midnights  See H&P and MD Progress Notes for additional information about the patient's course of treatment  ED Arrival Information     Expected Arrival Acuity    - 7/24/2021 08:52 Urgent         Means of arrival Escorted by Service Admission type    [facility] Urgent         Arrival complaint    SOB        Chief Complaint   Patient presents with    Cough     Patient presents to ED with co cough that worsen last night  Patient recently dx with bronchitis and has started her 2nd round of antibiotics  Initial Presentation: 51 yo female to ED from home w/ SOB and cough for the past couple weeks   Finished a course of azithromycin   yest got prescription for Augmentin   After 1st dose had a coughing fit   Little bit of weight loss  Night sweats   CT findings abnormal w/ probable liver mets as well   Will likely need thoracentesis on Monday , consult IR , consult pulm   Date: 7/25  Day 2: Plan for thoracentesis tomorrow , cont w/ intermittent cough ,c ont tessalon shayy Sanabria and IR consult for bx , consult heme-onc after bx is back        ED Triage Vitals   Temperature Pulse Respirations Blood Pressure SpO2   07/24/21 0858 07/24/21 0858 07/24/21 0858 07/24/21 0858 07/24/21 0858   (!) 97 4 °F (36 3 °C) 104 20 155/99 96 %      Temp Source Heart Rate Source Patient Position - Orthostatic VS BP Location FiO2 (%)   07/24/21 0858 07/24/21 0858 07/24/21 0858 07/24/21 0858 --   Oral Monitor Sitting Right arm       Pain Score       07/24/21 1423       7          Wt Readings from Last 1 Encounters:   07/24/21 71 2 kg (157 lb)     Additional Vital Signs:   07/25/21 08:13:12  --  79  --  139/95  110  93 %  --  --   07/25/21 07:43:25  97 7 °F (36 5 °C)  68  --  --  --  94 %  --  --   07/24/21 22:55:12  98 5 °F (36 9 °C)  88  17  110/73  85  93 %  --  Lying   07/24/21 1956  --  --  --  --  --  93 %  None (Room air)  --   07/24/21 1803  --  --  --  --  --  95 %  --  --   07/24/21 1507  --  --  --  --  --  94 %  None (Room air)  --   07/24/21 15:06:30  97 8 °F (36 6 °C)  78  --  --  --  95 %  --  --   07/24/21 14:58:26  --  88  --  154/102Abnormal   119  96 %  --  --   07/24/21 1200  --  69  21  130/72  96  96 %  None (Room air)  Sitting   07/24/21 1030  --  70  21  134/80  104  97 %  None (Room air)  Sitting   07/24/21 0915  --  78  16  146/80  105  96 %  None (Room air)         Pertinent Labs/Diagnostic Test Results:   7/24 CTA chest   Septal thickening and bronchial wall thickening in the right lung due to lymphangitic spread of tumor, question lung primary or metastatic breast cancer        Multiple hepatic metastases and lytic sternal manubrial metastasis        Large right pleural effusion and small pericardial effusion, likely malignant        A few small bilateral indeterminate lung nodules          7/24 EKG NSR     Results from last 7 days   Lab Units 07/25/21  0448 07/24/21  1532 07/24/21  1000   WBC Thousand/uL 5 43  --  11 29*   HEMOGLOBIN g/dL 13 4  --  16 2*   HEMATOCRIT % 40 7  --  46 8*   PLATELETS Thousands/uL 204 222 281   NEUTROS ABS Thousands/µL  --   --  10 02*     Results from last 7 days   Lab Units 07/25/21  0448 07/24/21  0945   SODIUM mmol/L 140 137   POTASSIUM mmol/L 3 6 4 3   CHLORIDE mmol/L 108 102   CO2 mmol/L 21 25   ANION GAP mmol/L 11 10 BUN mg/dL 11 17   CREATININE mg/dL 0 99 1 02   EGFR ml/min/1 73sq m 65 63   CALCIUM mg/dL 8 1* 9 0     Results from last 7 days   Lab Units 07/25/21  0448 07/24/21  0945   GLUCOSE RANDOM mg/dL 112 103     Results from last 7 days   Lab Units 07/24/21  0945   TROPONIN I ng/mL <0 02     Results from last 7 days   Lab Units 07/24/21  1004   D-DIMER QUANTITATIVE ug/ml FEU 0 79*       Results from last 7 days   Lab Units 07/24/21  0945   NT-PRO BNP pg/mL 31     ED Treatment:   Medication Administration from 07/24/2021 0852 to 07/24/2021 1440       Date/Time Order Dose Route Action     07/24/2021 0959 ipratropium-albuterol (DUO-NEB) 0 5-2 5 mg/3 mL inhalation solution 3 mL 3 mL Nebulization Given     07/24/2021 1423 ketorolac (TORADOL) injection 15 mg 15 mg Intravenous Given        History reviewed  No pertinent past medical history  Present on Admission:   Tobacco abuse      Admitting Diagnosis: Shortness of breath [R06 02]  Cough [R05]  SOB (shortness of breath) [R06 02]  Pleural effusion [J90]  Age/Sex: 50 y o  female  Admission Orders:  Scheduled Medications:  enoxaparin, 40 mg, Subcutaneous, Daily  influenza vaccine, 0 5 mL, Intramuscular, Once      Continuous IV Infusions:  sodium chloride, 75 mL/hr, Intravenous, Continuous      PRN Meds:  acetaminophen, 650 mg, Oral, Q6H PRN  albuterol, 2 puff, Inhalation, Q4H PRN  benzonatate, 200 mg, Oral, TID PRN  dextromethorphan-guaiFENesin, 10 mL, Oral, Q4H PRN  ondansetron, 4 mg, Intravenous, Q6H PRN        IP CONSULT TO PULMONOLOGY    Network Utilization Review Department  ATTENTION: Please call with any questions or concerns to [phone] and carefully listen to the prompts so that you are directed to the right person  All voicemails are confidential   Pedro Luis Fernandeze all requests for admission clinical reviews, approved or denied determinations and any other requests to dedicated fax number below belonging to the campus where the patient is receiving treatment   List of dedicated fax numbers for the Facilities:  [address] [place] [address] DENIALS (Administrative/Medical Necessity) [phone]   [address] (Maternity/NICU/Pediatrics) [phone] [address] Natty Agarwal 4339 [id number] [place] [address] [address] Mary Jane Cyr [address] [place] [address] [phone]

## 2021-07-26 ENCOUNTER — APPOINTMENT (INPATIENT)
Dept: CT IMAGING | Facility: HOSPITAL | Age: 56
DRG: 281 | End: 2021-07-26
Payer: COMMERCIAL

## 2021-07-26 ENCOUNTER — APPOINTMENT (INPATIENT)
Dept: ULTRASOUND IMAGING | Facility: HOSPITAL | Age: 56
DRG: 281 | End: 2021-07-26
Payer: COMMERCIAL

## 2021-07-26 ENCOUNTER — APPOINTMENT (INPATIENT)
Dept: INTERVENTIONAL RADIOLOGY/VASCULAR | Facility: HOSPITAL | Age: 56
DRG: 281 | End: 2021-07-26
Payer: COMMERCIAL

## 2021-07-26 ENCOUNTER — APPOINTMENT (INPATIENT)
Dept: INTERVENTIONAL RADIOLOGY/VASCULAR | Facility: HOSPITAL | Age: 56
DRG: 281 | End: 2021-07-26
Attending: RADIOLOGY
Payer: COMMERCIAL

## 2021-07-26 PROBLEM — I10 HYPERTENSION: Status: ACTIVE | Noted: 2021-07-26

## 2021-07-26 LAB
APPEARANCE FLD: ABNORMAL
BASOPHILS # BLD AUTO: 0.04 THOUSANDS/ΜL (ref 0–0.1)
BASOPHILS NFR BLD AUTO: 1 % (ref 0–1)
COLOR FLD: YELLOW
EOSINOPHIL # BLD AUTO: 0.2 THOUSAND/ΜL (ref 0–0.61)
EOSINOPHIL NFR BLD AUTO: 3 % (ref 0–6)
ERYTHROCYTE [DISTWIDTH] IN BLOOD BY AUTOMATED COUNT: 13.8 % (ref 11.6–15.1)
GLUCOSE FLD-MCNC: 69 MG/DL
HCT VFR BLD AUTO: 38.4 % (ref 34.8–46.1)
HGB BLD-MCNC: 12.7 G/DL (ref 11.5–15.4)
IMM GRANULOCYTES # BLD AUTO: 0.03 THOUSAND/UL (ref 0–0.2)
IMM GRANULOCYTES NFR BLD AUTO: 1 % (ref 0–2)
LDH FLD L TO P-CCNC: 314 U/L
LYMPHOCYTES # BLD AUTO: 0.75 THOUSANDS/ΜL (ref 0.6–4.47)
LYMPHOCYTES NFR BLD AUTO: 12 % (ref 14–44)
LYMPHOCYTES NFR BLD AUTO: 68 %
MCH RBC QN AUTO: 30.6 PG (ref 26.8–34.3)
MCHC RBC AUTO-ENTMCNC: 33.1 G/DL (ref 31.4–37.4)
MCV RBC AUTO: 93 FL (ref 82–98)
MONO+MESO NFR FLD MANUAL: 12 %
MONOCYTES # BLD AUTO: 0.48 THOUSAND/ΜL (ref 0.17–1.22)
MONOCYTES NFR BLD AUTO: 12 %
MONOCYTES NFR BLD AUTO: 8 % (ref 4–12)
NEUTROPHILS # BLD AUTO: 4.65 THOUSANDS/ΜL (ref 1.85–7.62)
NEUTS SEG NFR BLD AUTO: 75 % (ref 43–75)
NEUTS SEG NFR BLD AUTO: 8 %
NRBC BLD AUTO-RTO: 0 /100 WBCS
PH BODY FLUID: 7.6
PLATELET # BLD AUTO: 211 THOUSANDS/UL (ref 149–390)
PMV BLD AUTO: 11.9 FL (ref 8.9–12.7)
PROT FLD-MCNC: 3.8 G/DL
RBC # BLD AUTO: 4.15 MILLION/UL (ref 3.81–5.12)
SITE: ABNORMAL
TOTAL CELLS COUNTED SPEC: 100
WBC # BLD AUTO: 6.15 THOUSAND/UL (ref 4.31–10.16)
WBC # FLD MANUAL: 1264 /UL

## 2021-07-26 PROCEDURE — 99232 SBSQ HOSP IP/OBS MODERATE 35: CPT | Performed by: INTERNAL MEDICINE

## 2021-07-26 PROCEDURE — 87205 SMEAR GRAM STAIN: CPT | Performed by: INTERNAL MEDICINE

## 2021-07-26 PROCEDURE — 88361 TUMOR IMMUNOHISTOCHEM/COMPUT: CPT | Performed by: PATHOLOGY

## 2021-07-26 PROCEDURE — 88305 TISSUE EXAM BY PATHOLOGIST: CPT | Performed by: PATHOLOGY

## 2021-07-26 PROCEDURE — G1004 CDSM NDSC: HCPCS

## 2021-07-26 PROCEDURE — 88342 IMHCHEM/IMCYTCHM 1ST ANTB: CPT | Performed by: PATHOLOGY

## 2021-07-26 PROCEDURE — 94664 DEMO&/EVAL PT USE INHALER: CPT

## 2021-07-26 PROCEDURE — 99233 SBSQ HOSP IP/OBS HIGH 50: CPT | Performed by: INTERNAL MEDICINE

## 2021-07-26 PROCEDURE — 88112 CYTOPATH CELL ENHANCE TECH: CPT | Performed by: PATHOLOGY

## 2021-07-26 PROCEDURE — 76705 ECHO EXAM OF ABDOMEN: CPT

## 2021-07-26 PROCEDURE — 89051 BODY FLUID CELL COUNT: CPT | Performed by: INTERNAL MEDICINE

## 2021-07-26 PROCEDURE — 83986 ASSAY PH BODY FLUID NOS: CPT | Performed by: INTERNAL MEDICINE

## 2021-07-26 PROCEDURE — 88363 XM ARCHIVE TISSUE MOLEC ANAL: CPT | Performed by: PATHOLOGY

## 2021-07-26 PROCEDURE — 99254 IP/OBS CNSLTJ NEW/EST MOD 60: CPT | Performed by: INTERNAL MEDICINE

## 2021-07-26 PROCEDURE — 88333 PATH CONSLTJ SURG CYTO XM 1: CPT | Performed by: PATHOLOGY

## 2021-07-26 PROCEDURE — 74177 CT ABD & PELVIS W/CONTRAST: CPT

## 2021-07-26 PROCEDURE — 88341 IMHCHEM/IMCYTCHM EA ADD ANTB: CPT | Performed by: PATHOLOGY

## 2021-07-26 PROCEDURE — 76942 ECHO GUIDE FOR BIOPSY: CPT | Performed by: RADIOLOGY

## 2021-07-26 PROCEDURE — 47000 NEEDLE BIOPSY OF LIVER PERQ: CPT

## 2021-07-26 PROCEDURE — 99152 MOD SED SAME PHYS/QHP 5/>YRS: CPT | Performed by: RADIOLOGY

## 2021-07-26 PROCEDURE — 99152 MOD SED SAME PHYS/QHP 5/>YRS: CPT

## 2021-07-26 PROCEDURE — 88307 TISSUE EXAM BY PATHOLOGIST: CPT | Performed by: PATHOLOGY

## 2021-07-26 PROCEDURE — 84157 ASSAY OF PROTEIN OTHER: CPT | Performed by: INTERNAL MEDICINE

## 2021-07-26 PROCEDURE — 82945 GLUCOSE OTHER FLUID: CPT | Performed by: INTERNAL MEDICINE

## 2021-07-26 PROCEDURE — 85025 COMPLETE CBC W/AUTO DIFF WBC: CPT | Performed by: INTERNAL MEDICINE

## 2021-07-26 PROCEDURE — 32555 ASPIRATE PLEURA W/ IMAGING: CPT | Performed by: RADIOLOGY

## 2021-07-26 PROCEDURE — 47000 NEEDLE BIOPSY OF LIVER PERQ: CPT | Performed by: RADIOLOGY

## 2021-07-26 PROCEDURE — 83615 LACTATE (LD) (LDH) ENZYME: CPT | Performed by: INTERNAL MEDICINE

## 2021-07-26 PROCEDURE — 32555 ASPIRATE PLEURA W/ IMAGING: CPT

## 2021-07-26 PROCEDURE — 87070 CULTURE OTHR SPECIMN AEROBIC: CPT | Performed by: INTERNAL MEDICINE

## 2021-07-26 RX ORDER — KETOROLAC TROMETHAMINE 30 MG/ML
15 INJECTION, SOLUTION INTRAMUSCULAR; INTRAVENOUS EVERY 6 HOURS PRN
Status: DISPENSED | OUTPATIENT
Start: 2021-07-26 | End: 2021-07-27

## 2021-07-26 RX ORDER — OXYCODONE HYDROCHLORIDE 10 MG/1
10 TABLET ORAL EVERY 4 HOURS PRN
Status: DISCONTINUED | OUTPATIENT
Start: 2021-07-26 | End: 2021-07-29 | Stop reason: HOSPADM

## 2021-07-26 RX ORDER — FENTANYL CITRATE 50 UG/ML
INJECTION, SOLUTION INTRAMUSCULAR; INTRAVENOUS CODE/TRAUMA/SEDATION MEDICATION
Status: COMPLETED | OUTPATIENT
Start: 2021-07-26 | End: 2021-07-26

## 2021-07-26 RX ORDER — LIDOCAINE WITH 8.4% SOD BICARB 0.9%(10ML)
SYRINGE (ML) INJECTION CODE/TRAUMA/SEDATION MEDICATION
Status: COMPLETED | OUTPATIENT
Start: 2021-07-26 | End: 2021-07-26

## 2021-07-26 RX ORDER — MIDAZOLAM HYDROCHLORIDE 2 MG/2ML
INJECTION, SOLUTION INTRAMUSCULAR; INTRAVENOUS CODE/TRAUMA/SEDATION MEDICATION
Status: COMPLETED | OUTPATIENT
Start: 2021-07-26 | End: 2021-07-26

## 2021-07-26 RX ORDER — OXYCODONE HYDROCHLORIDE 5 MG/1
5 TABLET ORAL EVERY 4 HOURS PRN
Status: DISCONTINUED | OUTPATIENT
Start: 2021-07-26 | End: 2021-07-29 | Stop reason: HOSPADM

## 2021-07-26 RX ADMIN — GUAIFENESIN AND DEXTROMETHORPHAN 10 ML: 100; 10 SYRUP ORAL at 13:42

## 2021-07-26 RX ADMIN — MIDAZOLAM HYDROCHLORIDE 1 MG: 1 INJECTION, SOLUTION INTRAMUSCULAR; INTRAVENOUS at 09:02

## 2021-07-26 RX ADMIN — BENZONATATE 200 MG: 100 CAPSULE ORAL at 18:44

## 2021-07-26 RX ADMIN — KETOROLAC TROMETHAMINE 15 MG: 30 INJECTION, SOLUTION INTRAMUSCULAR at 13:23

## 2021-07-26 RX ADMIN — IOHEXOL 50 ML: 240 INJECTION, SOLUTION INTRATHECAL; INTRAVASCULAR; INTRAVENOUS; ORAL at 20:00

## 2021-07-26 RX ADMIN — OXYCODONE HYDROCHLORIDE 10 MG: 10 TABLET ORAL at 23:17

## 2021-07-26 RX ADMIN — LISINOPRIL 5 MG: 5 TABLET ORAL at 13:23

## 2021-07-26 RX ADMIN — IOHEXOL 100 ML: 350 INJECTION, SOLUTION INTRAVENOUS at 20:00

## 2021-07-26 RX ADMIN — MELATONIN 6 MG: 3 TAB ORAL at 23:17

## 2021-07-26 RX ADMIN — BENZONATATE 200 MG: 100 CAPSULE ORAL at 13:42

## 2021-07-26 RX ADMIN — OXYCODONE HYDROCHLORIDE 10 MG: 10 TABLET ORAL at 18:44

## 2021-07-26 RX ADMIN — PANTOPRAZOLE SODIUM 40 MG: 40 TABLET, DELAYED RELEASE ORAL at 05:29

## 2021-07-26 RX ADMIN — Medication 10 ML: at 09:02

## 2021-07-26 RX ADMIN — Medication 10 ML: at 08:42

## 2021-07-26 RX ADMIN — MORPHINE SULFATE 1 MG: 2 INJECTION, SOLUTION INTRAMUSCULAR; INTRAVENOUS at 12:28

## 2021-07-26 RX ADMIN — FENTANYL CITRATE 50 MCG: 50 INJECTION, SOLUTION INTRAMUSCULAR; INTRAVENOUS at 09:02

## 2021-07-26 RX ADMIN — GUAIFENESIN AND DEXTROMETHORPHAN 10 ML: 100; 10 SYRUP ORAL at 18:44

## 2021-07-26 NOTE — ASSESSMENT & PLAN NOTE
Patient with a history of right sided breath cancer in 2012 status post lumpectomy,  axillary lymph node dissection and local radiation  Patient reports family history of breast cancer in her sister at age of 48 and her mother  Lost follow-up with [facility]  Last mammogram was in March 2019   -Normal    CTA scan 7/25 shows septal thickening and bronchial wall thickening in the right lung due to lymphangitic spread of tumor or metastatic breast cancer  Multiple hepatic metastasis and lytic sternal manubrial metastasis  Large right pleural effusion and small pericardial effusion likely malignant  A few small bilateral lung nodules  Outpatient chest x-ray on 07/10  showed right diaphragm elevation however with no obvious pleural effusion    Follow pulmonology and IR consult for biopsy  Status post thoracentesis and 1200 cc fluid removed  Follow-up cytology  Also status post liver biopsy today  Follow-up the biopsy result  Patient complaining about pain  Allergic to codeine however she can not tolerate oxycodone and morphine  Will start on morphine p r n     Consult palliative care for pain management  Also consult Oncology to establish the care    Discussed with patient and  bedside in details

## 2021-07-26 NOTE — UTILIZATION REVIEW
Inpatient Admission Authorization Request   NOTIFICATION OF INPATIENT ADMISSION/INPATIENT AUTHORIZATION REQUEST   SERVICING FACILITY:   [address] [place] [address]  Tax ID: [id number]  NPI: [id number]  Place of Service: Inpatient [facility]  [address]  Place of Service Code: 24     ATTENDING PROVIDER:  Attending Name and NPI#: Mike Kashif, Kuldip Smith Darlinecrescencio [[id number]]  Address: [address] [place] [address]  Phone: [phone]     UTILIZATION REVIEW CONTACT:  Grace Raygoza, Utilization   Network Utilization Review Department  Phone: [phone]  Fax [phone]  Email: [email or web address]     PHYSICIAN ADVISORY SERVICES:  FOR [facility] REVIEW - MEDICAL NECESSITY DENIAL  Phone: [phone]  Fax: [phone]  Email: [email or web address]     TYPE OF REQUEST:  Inpatient Status     ADMISSION INFORMATION:  ADMISSION DATE/TIME: 7/24/21  1:10 PM  PATIENT DIAGNOSIS CODE/DESCRIPTION:  Shortness of breath [R06 02]  Cough [R05]  SOB (shortness of breath) [R06 02]  Pleural effusion [J90]  DISCHARGE DATE/TIME: No discharge date for patient encounter  DISCHARGE DISPOSITION (IF DISCHARGED): Final discharge disposition not confirmed     IMPORTANT INFORMATION:  Please contact the Grace Raygoza directly with any questions or concerns regarding this request  Department voicemails are confidential     Send requests for admission clinical reviews, concurrent reviews, approvals, and administrative denials due to lack of clinical to fax [phone]

## 2021-07-26 NOTE — CONSULTS
Medical Oncology/Hematology Consult Note  Josh Cedillo, female, 50 y o , 1965,  /-[birth year], [id number]     Reason for admission: SOB, cough x few weeks  Reason for consultation: Metastatic Neoplasm       ASSESSMENT AND PLAN:     1  Metastatic Neoplasm of unclear primary   Presented with SOB, cough x past few weeks   7/24/21: CTA  showed no definite primary tumor but with septal thickening throughout the right lung and bronchial wall thickening due to lymphangitic spread of tumor  Indeterminate lung nodules measuring 5mm in right upper lobe, right middle lobe and 7mm in left upper lobe and left lower lobe   Large right pleural effusion, small pericardial effusion   Multiple liver mets measuring up to 4cm   Lytic mets to sternal manubrium    7/25/21: CBC WNL, hepatic functions: AST 23, ALT 18, Alk phos 126  BMP unremarkable   7/26/21: S/P IR thoracentesis (1 2L fluid) & IR biopsy liver mass    · Recommendations:  · Will follow results from thoracentesis & liver mass  · Will order CMP   · Will order CT a/p  · She mentioned having more headaches in last few months  Will order MRI of brain  · Will obtain labs from [facility] prior to follow up with [facility] in outpatient setting  · Will message [facility] to arrange outpatient follow up    2  History of right sided breast cancer    Diagnosed in 2012, S/P R lumpectomy & SLNB + RT     Did not need adjuvant chemotherapy   Completed Tamoxifen x5 years    Management was completed at [facility]  Limited documents from care everywhere      Last mammogram was 3/2019   CT scan c/a/p from 7/18/2019 mentioned mildly prominent retroperitoneal lymph nodes which were being watched    Lost to follow up after losing insurance [id number]      3  Family History of Breast Cancer    · MGM, mother, sister all had breast cancer   · Per patient, she is BRCA negative & no one in family is BRCA positive     Patient understands and is in agreement with this plan  Thank you for the opportunity to participate in this patient's care  Interval History: Patient with  Steve Pro  She feels fatigued, has mild hot flashes she attributes to menopause  Has had insomnia last few weeks as well as headache which has worsened last few months  No AMS  SOB still present, but mildly better after thoracentesis  No lumps of bumps she noticed, fevers, sudden weight loss  Appetite is good  She has been smoking on/off since 14years old  Currently smokes 1-2 cigarettes a day  Has never had a colonoscopy  Not uptodate on cancer screenings  ECO    History of present illness: 50year old female with PMH of breast cancer presenting for SOB, Cough x past few weeks  Finished course of azithromycin without relief  Patient is an active smoker as above  CT as above showed metastatic neoplasm findings  Review of Systems:   Review of Systems   Constitutional: Positive for activity change, chills, diaphoresis and fatigue  Negative for appetite change, fever and unexpected weight change  HENT: Negative for mouth sores and nosebleeds  Eyes: Negative for visual disturbance  Respiratory: Negative for apnea, cough, choking, chest tightness, shortness of breath, wheezing and stridor  Cardiovascular: Negative for chest pain, palpitations and leg swelling  Gastrointestinal: Positive for abdominal pain  Negative for anal bleeding, blood in stool, constipation, diarrhea, nausea and vomiting  Endocrine: Positive for cold intolerance and heat intolerance  Genitourinary: Negative for hematuria, menstrual problem and vaginal bleeding  Musculoskeletal: Negative for arthralgias  Skin: Negative for color change, pallor and rash  Neurological: Negative for dizziness, syncope, light-headedness and headaches  Hematological: Negative for adenopathy  Does not bruise/bleed easily  Psychiatric/Behavioral: Negative for sleep disturbance           PHYSICAL EXAM:    BP 133/66   Pulse 64   Temp 98 3 °F (36 8 °C)   Resp 16   Ht 5' 4" (1 626 m)   Wt 71 2 kg (157 lb)   SpO2 98%   BMI 26 95 kg/m²     Physical Exam  Constitutional:       General: She is not in acute distress  Appearance: Normal appearance  She is ill-appearing and diaphoretic  She is not toxic-appearing  HENT:      Head: Normocephalic and atraumatic  Eyes:      General: No scleral icterus  Extraocular Movements: Extraocular movements intact  Conjunctiva/sclera: Conjunctivae normal       Pupils: Pupils are equal, round, and reactive to light  Cardiovascular:      Rate and Rhythm: Normal rate and regular rhythm  Heart sounds: Normal heart sounds  No murmur heard  Pulmonary:      Effort: Pulmonary effort is normal  No respiratory distress  Breath sounds: Normal breath sounds  No stridor  No wheezing, rhonchi or rales  Chest:      Chest wall: No mass, lacerations, deformity (scar present), swelling, tenderness, crepitus or edema  Breasts:         Right: Normal  No swelling, bleeding, inverted nipple, mass, nipple discharge, skin change or tenderness  Left: Normal  No swelling, bleeding, inverted nipple, mass, nipple discharge, skin change or tenderness  Abdominal:      Palpations: Abdomen is soft  Tenderness: There is no abdominal tenderness  Musculoskeletal:         General: No tenderness  Normal range of motion  Cervical back: Normal range of motion and neck supple  Right lower leg: No edema  Left lower leg: No edema  Lymphadenopathy:      Cervical: No cervical adenopathy  Upper Body:      Right upper body: No supraclavicular, axillary or pectoral adenopathy  Left upper body: No supraclavicular, axillary or pectoral adenopathy  Skin:     General: Skin is warm  Coloration: Skin is not jaundiced or pale  Findings: No bruising, erythema or rash  Neurological:      General: No focal deficit present        Mental Status: She is alert and oriented to person, place, and time  Mental status is at baseline  Cranial Nerves: No cranial nerve deficit  Motor: No weakness  Psychiatric:         Mood and Affect: Mood normal          Behavior: Behavior normal          Thought Content:  Thought content normal          Judgment: Judgment normal          LABS:     Recent Results (from the past 48 hour(s))   Platelet count    Collection Time: 07/24/21  3:32 PM   Result Value Ref Range    Platelets 569 776 - 199 Thousands/uL    MPV 12 1 8 9 - 12 7 fL   Basic metabolic panel    Collection Time: 07/25/21  4:48 AM   Result Value Ref Range    Sodium 140 136 - 145 mmol/L    Potassium 3 6 3 5 - 5 3 mmol/L    Chloride 108 100 - 108 mmol/L    CO2 21 21 - 32 mmol/L    ANION GAP 11 4 - 13 mmol/L    BUN 11 5 - 25 mg/dL    Creatinine 0 99 0 60 - 1 30 mg/dL    Glucose 112 65 - 140 mg/dL    Calcium 8 1 (L) 8 3 - 10 1 mg/dL    eGFR 65 ml/min/1 73sq m   CBC (With Platelets)    Collection Time: 07/25/21  4:48 AM   Result Value Ref Range    WBC 5 43 4 31 - 10 16 Thousand/uL    RBC 4 40 3 81 - 5 12 Million/uL    Hemoglobin 13 4 11 5 - 15 4 g/dL    Hematocrit 40 7 34 8 - 46 1 %    MCV 93 82 - 98 fL    MCH 30 5 26 8 - 34 3 pg    MCHC 32 9 31 4 - 37 4 g/dL    RDW 13 7 11 6 - 15 1 %    Platelets 865 987 - 365 Thousands/uL    MPV 12 2 8 9 - 12 7 fL   Hepatic function panel    Collection Time: 07/25/21  4:48 AM   Result Value Ref Range    Total Bilirubin 0 42 0 20 - 1 00 mg/dL    Bilirubin, Direct 0 12 0 00 - 0 20 mg/dL    Alkaline Phosphatase 126 (H) 46 - 116 U/L    AST 23 5 - 45 U/L    ALT 18 12 - 78 U/L    Total Protein 6 2 (L) 6 4 - 8 2 g/dL    Albumin 2 8 (L) 3 5 - 5 0 g/dL   Lipase    Collection Time: 07/25/21  4:48 AM   Result Value Ref Range    Lipase 136 73 - 393 u/L   Protime-INR    Collection Time: 07/25/21  8:53 PM   Result Value Ref Range    Protime 13 3 11 6 - 14 5 seconds    INR 0 99 0 84 - 1 19   CBC and differential    Collection Time: 07/26/21 5: 23 AM   Result Value Ref Range    WBC 6 15 4 31 - 10 16 Thousand/uL    RBC 4 15 3 81 - 5 12 Million/uL    Hemoglobin 12 7 11 5 - 15 4 g/dL    Hematocrit 38 4 34 8 - 46 1 %    MCV 93 82 - 98 fL    MCH 30 6 26 8 - 34 3 pg    MCHC 33 1 31 4 - 37 4 g/dL    RDW 13 8 11 6 - 15 1 %    MPV 11 9 8 9 - 12 7 fL    Platelets 212 854 - 619 Thousands/uL    nRBC 0 /100 WBCs    Neutrophils Relative 75 43 - 75 %    Immat GRANS % 1 0 - 2 %    Lymphocytes Relative 12 (L) 14 - 44 %    Monocytes Relative 8 4 - 12 %    Eosinophils Relative 3 0 - 6 %    Basophils Relative 1 0 - 1 %    Neutrophils Absolute 4 65 1 85 - 7 62 Thousands/µL    Immature Grans Absolute 0 03 0 00 - 0 20 Thousand/uL    Lymphocytes Absolute 0 75 0 60 - 4 47 Thousands/µL    Monocytes Absolute 0 48 0 17 - 1 22 Thousand/µL    Eosinophils Absolute 0 20 0 00 - 0 61 Thousand/µL    Basophils Absolute 0 04 0 00 - 0 10 Thousands/µL   Body fluid white cell count with differential    Collection Time: 07/26/21  9:09 AM   Result Value Ref Range    Site Pleural, Right     Color, Fluid Yellow Clear, Colorless,Yellow    Clarity, Fluid Hazy (A) Clear    WBC, Fluid 1,264 /ul   Body Fluid Diff    Collection Time: 07/26/21  9:09 AM   Result Value Ref Range    Total Counted 100     Neutrophils % (Fluid) 8 %    Lymphs % (Fluid) 68 %    Mesothelial % (Fluid) 12 %    Monocytes % (Fluid) 12 %       IR IN-Patient Thoracentesis    Result Date: 7/26/2021  Narrative: PROCEDURE: Ultrasound-guided right thoracentesis Procedural Personnel Attending physician(s): Dr Celso Dodge Pre-procedure diagnosis: Breast cancer Post-procedure diagnosis: Same Indication: Patient with history of breast cancer found to have right pleural effusion Complications: No immediate complications  Impression: Ultrasound-guided thoracentesis with drainage of 1200 mL of joanne pleural fluid   Plan: Resume care by clinical team  _______________________________________________________________ PROCEDURE SUMMARY: - Limited thoracic ultrasound - Ultrasound-guided right thoracentesis PROCEDURE DETAILS: Pre-procedure Consent: Informed consent for the procedure including risks, benefits and alternatives was obtained and time-out was performed prior to the procedure  Preparation: The site was prepared and draped using maximal sterile barrier technique including cutaneous antisepsis  Limited thoracic ultrasound Limited thoracic ultrasound was performed using a curved transducer  A safe window for thoracentesis was identified  Right hemithorax findings: Moderate pleural effusion Thoracentesis Local anesthesia was administered  The pleural space was accessed under real-time ultrasound guidance  and fluid return confirmed position  The fluid was drained  The catheter was removed, and a sterile bandage was applied  Catheter placed: 5F Juan Jose Post-drainage hemithorax findings: No visible pleural effusion Additional Details Specimens removed: Pleural fluid Estimated blood loss (mL): None Standardized report: [email or web address] Attestation Signer name: [facility] I attest that I was present for the entire procedure  I reviewed the stored images and agree with the report as written  Workstation performed: [id number]     IR biopsy liver mass    Result Date: 7/26/2021  Narrative: PROCEDURE: Ultrasound-guided liver biopsy Procedural Personnel Attending physician(s): Dr Justa Piedra Pre-procedure diagnosis: Breast cancer Post-procedure diagnosis: Same Indication: Patient with history of breast cancer found to have multiple liver lesions concerning for metastatic disease Complications: No immediate complications  Impression: Ultrasound-guided biopsy of liver lesion near vanessa hepatis  Plan: Specimen(s) sent for evaluation   _______________________________________________________________ PROCEDURE SUMMARY: - Percutaneous US-guided liver biopsy PROCEDURE DETAILS: Pre-procedure Reference imaging for biopsy target: CTA chest 7/24/2021 Consent: Informed consent for the procedure including risks, benefits and alternatives was obtained and time-out was performed prior to the procedure  Preparation: The site was prepared and draped using maximal sterile barrier technique including cutaneous antisepsis  Anesthesia/sedation Level of anesthesia/sedation: Moderate sedation (conscious sedation) Anesthesia/sedation administered by: IR nurse under attending supervision with continuous monitoring of the patient's level of consciousness and physiologic status Total intra-service sedation time (minutes): 50 Imaging prior to biopsy The patient was positioned supine  Initial ultrasound was performed  Biopsy target: - Maximal diameter (cm): 1 8 - Location: Liver near vanessa hepatis Biopsy Local anesthesia was administered  Under US guidance, the biopsy needle was advanced to the target and biopsy was performed  Coaxial needle: 17 gauge Core needle biopsy device: [employer] Core needle size: 18 gauge Number of core specimens: 4 On-site biopsy touch preparation: Yes  Preliminary assessment of sample adequacy: Adequate Needle removal The biopsy needle was removed and a sterile dressing was applied  Tract embolization: D-Stat hemostatic slurry Imaging following biopsy Immediate post-biopsy ultrasound was performed  Post-biopsy imaging findings: No hematoma Additional Details Specimens removed: 4 core needle samples placed into formalin Estimated blood loss (mL): 1 Standardized report: [email or web address] Attestation Signer name: [facility] I attest that I was present for the entire procedure  I reviewed the stored images and agree with the report as written  Workstation performed: Henrry Fowler abdomen limited    Result Date: 7/26/2021  Narrative: RIGHT UPPER QUADRANT ULTRASOUND INDICATION:    abdominal pain, positive Delong's sign  COMPARISON:  None   TECHNIQUE:   Real-time ultrasound of the right upper quadrant was performed with a curvilinear transducer with both volumetric sweeps and still imaging techniques  FINDINGS: PANCREAS:  Visualized portions of the pancreas are within normal limits  AORTA AND IVC:  Visualized portions are normal for patient age  LIVER: Size:  Within normal range  The liver measures 15 8 cm in the midclavicular line  Contour:  Surface contour is smooth  Parenchyma:  Echogenicity and echotexture are within normal limits  4 3 x 3 6 x 4 1 cm hypoechoic mass is seen within the left lobe of the liver  3 3 x 2 5 x 3 0 cm subcapsular right lobe lesion is noted  2 3 x 1 8 x 1 7 cm right lobe lesion is present  Lateral right lobe 3 4 x 4 0 x 3 7 cm lesion is present  Lesion within the dome of the right lobe measuring 2 9 x 2 3 x 3 2 cm is noted  Limited imaging of the main portal vein shows it to be patent and hepatopetal   BILIARY: No gallbladder findings  No intrahepatic biliary dilatation  CBD measures 5 mm  No choledocholithiasis  KIDNEY: Right kidney measures 10 2 x 4 4 x 4 3 cm  Subcentimeter simple cyst noted, right kidney is otherwise unremarkable  ASCITES:   None  Impression: Multiple hepatic metastases are identified, see results of recent biopsy  Workstation performed: [id number]     CTA ED chest PE study    Result Date: 7/24/2021  Narrative: CTA - CHEST WITH IV CONTRAST - PULMONARY ANGIOGRAM INDICATION:   "PE suspected, intermediate prob, positive Cough, dyspnea, ? prior right infiltrate elevated ddimer   " Recently diagnosed with bronchitis, started 2nd round of antibiotics  Current every day smoker  History of breast cancer  COMPARISON: Chest radiograph from 9/20/2014  TECHNIQUE: CT angiogram timed for optimal opacification of the pulmonary arteries  Axial, sagittal, and coronal 2D reformats created from source data  Coronal 3D MIP postprocessing on the acquisition scanner  Radiation dose length product (DLP):  298 mGy-cm   Radiation dose exposure minimized using iterative reconstruction and automated exposure control   IV Contrast:  85 mL of iohexol (OMNIPAQUE)  FINDINGS: PULMONARY ARTERIES:  No pulmonary embolus  LUNGS:  No definite primary tumor but with septal thickening throughout the right lung and bronchial wall thickening due to lymphangitic spread of tumor  Indeterminate lung nodules measuring 5 mm in the right upper lobe (3/36) and right middle lobe (3/67), and 7 mm in the left upper lobe (3/38), and left lower lobe (3/70)  AIRWAYS: No significant filling defects  PLEURA:  Large right pleural effusion  HEART/GREAT VESSELS:  Normal heart size  Trace pericardial effusion  MEDIASTINUM AND KIA:  Unremarkable  CHEST WALL AND LOWER NECK: Right axillary lymph node dissection  UPPER ABDOMEN:  Multiple liver metastases, measuring up to 4 cm  OSSEOUS STRUCTURES:  Lytic metastasis to the sternal manubrium (3/38; 603/100)  Impression: No pulmonary embolus  Septal thickening and bronchial wall thickening in the right lung due to lymphangitic spread of tumor, question lung primary or metastatic breast cancer  Multiple hepatic metastases and lytic sternal manubrial metastasis  Large right pleural effusion and small pericardial effusion, likely malignant  A few small bilateral indeterminate lung nodules   I personally discussed this study with Joelle Rodriguez on 7/24/2021 at 12:06 PM   Workstation performed: [id number]         HISTORY:    Past Medical History:   Diagnosis Date    Malignant neoplasm of right female breast [facility])        Past Surgical History:   Procedure Laterality Date    HIP SURGERY      IR BIOPSY LIVER MASS  7/26/2021    IR THORACENTESIS  7/26/2021       Family History   Problem Relation Age of Onset    Breast cancer Mother     Breast cancer Sister     Breast cancer Maternal Grandmother        Social History     Socioeconomic History    Marital status: /Civil Union     Spouse name: None    Number of children: None    Years of education: None    Highest education level: None   Occupational History    None   Tobacco Use    Smoking status: Current Every Day Smoker     Packs/day: 0 25     Types: Cigarettes    Smokeless tobacco: Never Used   Vaping Use    Vaping Use: Never used   Substance and Sexual Activity    Alcohol use: Not Currently    Drug use: Not Currently    Sexual activity: Not Currently   Other Topics Concern    None   Social History Narrative    None     Social Determinants of Health     Financial Resource Strain:     Difficulty of Paying Living Expenses:    Food Insecurity:     Worried About Running Out of Food in the Last Year:     Ran Out of Food in the Last Year:    Transportation Needs:     Lack of Transportation (Medical):      Lack of Transportation (Non-Medical):    Physical Activity:     Days of Exercise per Week:     Minutes of Exercise per Session:    Stress:     Feeling of Stress :    Social Connections:     Frequency of Communication with Friends and Family:     Frequency of Social Gatherings with Friends and Family:     Attends Confucianism Services:     Active Member of Clubs or Organizations:     Attends Club or Organization Meetings:     Marital Status:    Intimate Partner Violence:     Fear of Current or Ex-Partner:     Emotionally Abused:     Physically Abused:     Sexually Abused:          Current Facility-Administered Medications:     acetaminophen (TYLENOL) tablet 650 mg, 650 mg, Oral, Q6H PRN, Omer Kowalski MD, 650 mg at 07/25/21 1522    albuterol (PROVENTIL HFA,VENTOLIN HFA) inhaler 2 puff, 2 puff, Inhalation, Q4H PRN, Lane López MD    benzonatate (TESSALON PERLES) capsule 200 mg, 200 mg, Oral, TID PRN, Omer Kowalski MD, 200 mg at 07/25/21 2332    calcium carbonate (TUMS) chewable tablet 500 mg, 500 mg, Oral, TID PRN, DEBRA Cruz, 500 mg at 07/25/21 2332    dextromethorphan-guaiFENesin (ROBITUSSIN DM) oral syrup 10 mL, 10 mL, Oral, Q4H PRN, Omer Kowalski MD, 10 mL at 07/25/21 1119    diphenhydrAMINE (BENADRYL) tablet 50 mg, 50 mg, Oral, HS PRN, Seretha Linker Clotilde Barbara, 50 mg at 07/25/21 2332    enoxaparin (LOVENOX) subcutaneous injection 40 mg, 40 mg, Subcutaneous, Daily, Sheron Amin MD, 40 mg at 07/25/21 2003    hydrALAZINE (APRESOLINE) injection 5 mg, 5 mg, Intravenous, Q6H PRN, Tyree Moncada MD    influenza vaccine, recombinant, quadrivalent (FLUBLOK) IM injection 0 5 mL, 0 5 mL, Intramuscular, Once, Lane López MD    ketorolac (TORADOL) injection 15 mg, 15 mg, Intravenous, Q6H PRN, Tyree Moncada MD    lisinopril (ZESTRIL) tablet 5 mg, 5 mg, Oral, Daily, Tyree Moncada MD, 5 mg at 07/25/21 1634    melatonin tablet 6 mg, 6 mg, Oral, HS, Lord Rocha, RICHIENP, 6 mg at 07/25/21 2158    morphine injection 1 mg, 1 mg, Intravenous, Q4H PRN, Tyree Moncada MD    ondansetron (ZOFRAN) injection 4 mg, 4 mg, Intravenous, Q6H PRN, Sheron Amin MD    pantoprazole (PROTONIX) EC tablet 40 mg, 40 mg, Oral, Early Morning, Tyree Moncada MD, 40 mg at 07/26/21 5689    No medications prior to admission  Allergies   Allergen Reactions    Codeine Anaphylaxis    Sulfa Antibiotics Hives       Labs and pertinent reports reviewed  This note has been generated by voice recognition software system  Therefore, there may be spelling, grammar, and or syntax errors  Please contact if questions arise

## 2021-07-26 NOTE — BRIEF OP NOTE (RAD/CATH)
INTERVENTIONAL RADIOLOGY PROCEDURE NOTE    Date: 7/26/2021    Procedure:   1  Right thoracentesis  2  Liver lesion biopsy    Preoperative diagnosis:   1  Cough    2  Pleural effusion    3  Shortness of breath         Postoperative diagnosis: Same  Surgeon: Claudean Alderman, MD     Assistant: None  No qualified resident was available  Blood loss: 1 mL    Specimens:   1  1200 mL joanne pleural effusion removed with sample sent to lab  2  4 core needle samples from liver lesion near vanessa hepatis sent to lab  Findings:   1  Successful right thoracentesis  2  Successful liver lesion biopsy near vanessa hepatis  Complications: None immediate      Anesthesia: conscious sedation and local

## 2021-07-26 NOTE — CONSULTS
Consultation - Palliative and Supportive Care   Serge Li 50 y o  female [id number]    Assessment:  Metastatic neoplasm  Shortness of breath  Right sided pleural effusion  Liver lesions  HTN  Cough  Pain  Difficulty sleeping  H/O breast cancer (2012)  Current everyday smoker  Goals of care counseling    Goals:  Level 1 code status  · Disease focused care  Will continue discussions regarding [address] as patient's clinical presentation evolves  · Has been followed by [facility] in the past however plans to establish a relatioship with [facility] now  Plan:  Symptom management:  Difficulty sleeping  · Melatonin 6mg PO once daily    Pain    · Acetaminophen 650mg Q6h PRN  · Oxycodone 5mg q4h PRN/Ketorolac 15mg Q6h for moderate pain  · Oxycodone 10mg q4h PRN for severe pain  · Morphine increase to 2mg Q4h PRN for breakthrough pain  · Tesslon pearles, mucinex for cough  · Monitor for constipation  ·        Social support:   Time spent providing supportive listening   Patient is finding comfort with family              I have reviewed the patient's controlled substance dispensing history in the Prescription Drug Monitoring Program in compliance with the [facility] regulations before prescribing any controlled substances  Last refills  No opioid or benzo refills in [place] over past year  Decisional apparatus:  Patient is competent on my exam today  If competence is lost, patient's substitute decision maker would default to spouse [place]  Advance Directive / Living Will / POLST: None on file    We appreciate the invitation to be involved in this patient's care  We will continue to follow throughout this hospitalization  Please do not hesitate to reach our on call provider through our clinic answering service at  should you have acute symptom control concerns      Sandy Akhtar, MSN, CRNP, [facility]  Palliative and Supportive Care  Clinic/Answering Service: [phone]  You can find me on TigerConnect! IDENTIFICATION:  Inpatient consult to Palliative Care  Consult performed by: Rajinder Rod MD  Consult ordered by: Kar Henley MD        Physician Requesting Consult: Manolo Partida MD  Reason for Consult / Principal Problem: GOC counseling and SM secondary to metastatic disease and pain management  History of Present Illness:  Enedina Lujan is a 50 y o  female who presents with a palliative diagnosis of suspected recurrent breast cancer versus new diagnosis of lung cancer  Work up pending  She was originally diagnosed with breast cancer in 2012 at which time she underwent to lumpectomy, radiation and was on tamoxifen  Her last mammogram was in 2019and was negative  She was brought into the ED at [facility] on 7/24/21 secondary to cough and shortness and breath  CTA on admission revealed septal thickening and bronchial wall thickening in the right lung due to lymphangitic spread of tumor, multiple hepatic lesions and lytic sternal manubrial mets  She also has a large right pleural effusion and small pericardial effusion, few small bilateral indeterminate lung nodules  Biopsy and thoracentesis completed today  Palliative Medicine has been consulted to assist with symptom management during this admission  Patient reports combination of morphine, toradol has "taken the edge off" but still endorses abdominal pain  Primarily in the site of her biopsy but also around her abdomen  Reviewed her pain hx in detail which includes chronic pain for which she was on oxycontin/oxycodone until about 2 years ago when she moved to [place] state  She denies constipation  Denies nausea  Reports poor sleep  Review of Systems   Constitutional: Positive for malaise/fatigue  Negative for decreased appetite  Cardiovascular: Negative for chest pain  Respiratory: Positive for cough  Musculoskeletal: Positive for back pain  Gastrointestinal: Positive for abdominal pain   Negative for constipation, diarrhea, nausea and vomiting  Neurological: Positive for weakness  Psychiatric/Behavioral: The patient has insomnia  All other systems reviewed and are negative  All other systems are negative    Past Medical History:   Diagnosis Date    Malignant neoplasm of right female breast [facility])      Past Surgical History:   Procedure Laterality Date    HIP SURGERY      IR BIOPSY LIVER MASS  7/26/2021    IR THORACENTESIS  7/26/2021     Social History     Socioeconomic History    Marital status: /Civil Union     Spouse name: Not on file    Number of children: Not on file    Years of education: Not on file    Highest education level: Not on file   Occupational History    Not on file   Tobacco Use    Smoking status: Current Every Day Smoker     Packs/day: 0 25     Types: Cigarettes    Smokeless tobacco: Never Used   Vaping Use    Vaping Use: Never used   Substance and Sexual Activity    Alcohol use: Not Currently    Drug use: Not Currently    Sexual activity: Not Currently   Other Topics Concern    Not on file   Social History Narrative    Not on file     Social Determinants of Health     Financial Resource Strain:     Difficulty of Paying Living Expenses:    Food Insecurity:     Worried About Running Out of Food in the Last Year:     Ran Out of Food in the Last Year:    Transportation Needs:     Lack of Transportation (Medical):      Lack of Transportation (Non-Medical):    Physical Activity:     Days of Exercise per Week:     Minutes of Exercise per Session:    Stress:     Feeling of Stress :    Social Connections:     Frequency of Communication with Friends and Family:     Frequency of Social Gatherings with Friends and Family:     Attends [employer] Services:     Active Member of Clubs or Organizations:     Attends Club or Organization Meetings:     Marital Status:    Intimate Partner Violence:     Fear of Current or Ex-Partner:     Emotionally Abused:     Physically Abused:     Sexually Abused:      Family History   Problem Relation Age of Onset   Ileana Day Breast cancer Mother     Breast cancer Sister     Breast cancer Maternal Grandmother      Medications:  all current active meds have been reviewed    Allergies   Allergen Reactions    Codeine Anaphylaxis    Sulfa Antibiotics Hives       Objective:  /66   Pulse 64   Temp 98 3 °F (36 8 °C)   Resp 16   Ht 5' 4" (1 626 m)   Wt 71 2 kg (157 lb)   SpO2 98%   BMI 26 95 kg/m²   Physical Exam:  Constitutional: Appears well-developed and well-nourished  In no acute distress  Head: Normocephalic and atraumatic  Eyes: EOM are normal  No ocular discharge  No scleral icterus  Neck: no visible adenopathy or masses  Respiratory: Effort normal  No stridor  No respiratory distress  Gastrointestinal: No abdominal distension  Musculoskeletal: No edema  Neurological: Alert, oriented and appropriately conversant  Skin: Dry, no diaphoresis  Psychiatric: Displays a normal mood and affect  Behavior, judgement and thought content appear normal      Lab Results:   I have personally reviewed pertinent labs  , CBC:   Lab Results   Component Value Date    WBC 6 15 07/26/2021    HGB 12 7 07/26/2021    HCT 38 4 07/26/2021    MCV 93 07/26/2021     07/26/2021    MCH 30 6 07/26/2021    MCHC 33 1 07/26/2021    RDW 13 8 07/26/2021    MPV 11 9 07/26/2021    NRBC 0 07/26/2021     Counseling / Coordination of Care  Total floor / unit time spent today 40+ minutes  Greater than 50% of total time was spent with the patient and / or family counseling and / or coordination of care  A description of the counseling / coordination of care: chart review, provided medical updates, determined goals of care, discussed palliative care and symptom management, discussed hospice care, determined competency and POA/HCA, determined social/family support, provided psychosocial support  Reviewed with SLIM, ICU team, RN and ELIDIA Gamez Ramon Villareal MD  Palliative & Supportive Care

## 2021-07-26 NOTE — PROGRESS NOTES
Progress Note - Pulmonary   Ofe Jorgensen 50 y o  female MRN: [id number]  Unit/Bed#: -01 Encounter: [id number]    Assessment:  1  Shortness of breath  2  Right sided pleural effusion  3  Liver lesions  4  History of breast cancer  5  Tobacco abuse    Plan:  Patient presented with cough and shortness of breath, found to have large right-sided pleural effusion, liver mets and lytic sternal manubrial mets as well as bronchial wall thickening in the right lung  Patient with history of breast cancer in 2012 status post lumpectomy and radiation  Suspect findings are recurrence of her breast cancer versus less likely a lung primary    She is status post thoracentesis this morning of1 2 L fluid as well as liver biopsy  Feeling sore and tired from the biopsy  Will need to follow up pathology results  She will need to establish with Oncology, would like to establish here at [facility]  Had been following at [facility]  Smoking cessation - has been an on and off smoker, smokes less than 1/2 PPD  Anticipate discharge home in next 24 hours, will need Oncology follow up, follow up for pathology results  Subjective:   Patient lying in bed, just returned from IR  Feeling sore from biopsy and tired from sedation  Cannot tell yet if any improvement in her breathing  Objective:     Vitals: Blood pressure 133/66, pulse 64, temperature 98 3 °F (36 8 °C), resp  rate 16, height 5' 4" (1 626 m), weight 71 2 kg (157 lb), SpO2 98 %  ,Body mass index is 26 95 kg/m²        Intake/Output Summary (Last 24 hours) at 7/26/2021 1206  Last data filed at 7/26/2021 0846  Gross per 24 hour   Intake 610 ml   Output 1200 ml   Net -590 ml       Invasive Devices     Peripheral Intravenous Line            Peripheral IV 07/24/21 Left Antecubital 2 days                Physical Exam: /66   Pulse 64   Temp 98 3 °F (36 8 °C)   Resp 16   Ht 5' 4" (1 626 m)   Wt 71 2 kg (157 lb)   SpO2 98%   BMI 26 95 kg/m²   General appearance: alert and oriented, in no acute distress  Head: Normocephalic, without obvious abnormality, atraumatic  Eyes: negative findings: conjunctivae and sclerae normal  Lungs: Diminished right base  Heart: regular rate and rhythm and S1, S2 normal  Abdomen: normal findings: soft, non-tender  Extremities: No edema  Skin: Warm and dry  Neurologic: Mental status: Alert, oriented, thought content appropriate     Labs: I have personally reviewed pertinent lab results  , CBC:   Lab Results   Component Value Date    WBC 6 15 07/26/2021    HGB 12 7 07/26/2021    HCT 38 4 07/26/2021    MCV 93 07/26/2021     07/26/2021    MCH 30 6 07/26/2021    MCHC 33 1 07/26/2021    RDW 13 8 07/26/2021    MPV 11 9 07/26/2021    NRBC 0 07/26/2021   , CMP: No results found for: SODIUM, K, CL, CO2, ANIONGAP, BUN, CREATININE, GLUCOSE, CALCIUM, AST, ALT, ALKPHOS, PROT, BILITOT, EGFR  Imaging and other studies: I have personally reviewed pertinent reports     and I have personally reviewed pertinent films in PACS

## 2021-07-26 NOTE — PLAN OF CARE
Problem: PAIN - ADULT  Goal: Verbalizes/displays adequate comfort level or baseline comfort level  Description: Interventions:  - Encourage patient to monitor pain and request assistance  - Assess pain using appropriate pain scale  - Administer analgesics based on type and severity of pain and evaluate response  - Implement non-pharmacological measures as appropriate and evaluate response  - Consider cultural and social influences on pain and pain management  - Notify physician/advanced practitioner if interventions unsuccessful or patient reports new pain  Outcome: Progressing     Problem: INFECTION - ADULT  Goal: Absence or prevention of progression during hospitalization  Description: INTERVENTIONS:  - Assess and monitor for signs and symptoms of infection  - Monitor lab/diagnostic results  - Monitor all insertion sites, i e  indwelling lines, tubes, and drains  - Monitor endotracheal if appropriate and nasal secretions for changes in amount and color  - [place] appropriate cooling/warming therapies per order  - Administer medications as ordered  - Instruct and encourage patient and family to use good hand hygiene technique  - Identify and instruct in appropriate isolation precautions for identified infection/condition  Outcome: Progressing  Goal: Absence of fever/infection during neutropenic period  Description: INTERVENTIONS:  - Monitor WBC    Outcome: Progressing     Problem: SAFETY ADULT  Goal: Patient will remain free of falls  Description: INTERVENTIONS:  - Educate patient/family on patient safety including physical limitations  - Instruct patient to call for assistance with activity   - Consult OT/PT to assist with strengthening/mobility   - Keep Call bell within reach  - Keep bed low and locked with side rails adjusted as appropriate  - Keep care items and personal belongings within reach  - Initiate and maintain comfort rounds  - Make Fall Risk Sign visible to staff  - Offer Toileting every  Hours, in advance of need  - Initiate/Maintain alarm  - Obtain necessary fall risk management equipment:   - Apply yellow socks and bracelet for high fall risk patients  - Consider moving patient to room near nurses station  Outcome: Progressing  Goal: Maintain or return to baseline ADL function  Description: INTERVENTIONS:  -  Assess patient's ability to carry out ADLs; assess patient's baseline for ADL function and identify physical deficits which impact ability to perform ADLs (bathing, care of mouth/teeth, toileting, grooming, dressing, etc )  - Assess/evaluate cause of self-care deficits   - Assess range of motion  - Assess patient's mobility; develop plan if impaired  - Assess patient's need for assistive devices and provide as appropriate  - Encourage maximum independence but intervene and supervise when necessary  - Involve family in performance of ADLs  - Assess for home care needs following discharge   - Consider OT consult to assist with ADL evaluation and planning for discharge  - Provide patient education as appropriate  Outcome: Progressing  Goal: Maintains/Returns to pre admission functional level  Description: INTERVENTIONS:  - Perform BMAT or MOVE assessment daily    - Set and communicate daily mobility goal to care team and patient/family/caregiver  - Collaborate with rehabilitation services on mobility goals if consulted  - Perform Range of Motion  times a day  - Reposition patient every  hours    - Dangle patient  times a day  - Stand patient  times a day  - Ambulate patient  times a day  - Out of bed to chair  times a day   - Out of bed for meals times a day  - Out of bed for toileting  - Record patient progress and toleration of activity level   Outcome: Progressing     Problem: DISCHARGE PLANNING  Goal: Discharge to home or other facility with appropriate resources  Description: INTERVENTIONS:  - Identify barriers to discharge w/patient and caregiver  - Arrange for needed discharge resources and transportation as appropriate  - Identify discharge learning needs (meds, wound care, etc )  - Arrange for interpretive services to assist at discharge as needed  - Refer to Case Management Department for coordinating discharge planning if the patient needs post-hospital services based on physician/advanced practitioner order or complex needs related to functional status, cognitive ability, or social support system  Outcome: Progressing     Problem: Knowledge Deficit  Goal: Patient/family/caregiver demonstrates understanding of disease process, treatment plan, medications, and discharge instructions  Description: Complete learning assessment and assess knowledge base    Interventions:  - Provide teaching at level of understanding  - Provide teaching via preferred learning methods  Outcome: Progressing

## 2021-07-26 NOTE — TELEMEDICINE
e-Consult (IPC)  - Interventional Radiology  Sarah Calderon 50 y o  female MRN: [id number]  Unit/Bed#: -01 Encounter: [id number]    IR has been consulted to evaluate the patient, determine the appropriate procedure, and whether or not a procedure can and should be performed regarding the care of Sarah Calderon  IP Consult to IR  Consult performed by: Yung Osuna MD  Consult ordered by: Billie Gimenez MD        07/25/21      Assessment/Recommendation:   Consult for biopsy  Hx of breast cancer  Presented with cough and SOB found to have right effusion with evidence of metastatic dz on PE CTA  Abdomen not fully imaged  US abd ordered for RUQ pain pending  Likely will have thoracentesis tomorrow  Biopsy to be scheduled when IR schedule permits either as IP or OP  Total time spent in review of data, discussion with requesting provider and rendering advice was 15min  Thank you for allowing Interventional Radiology to participate in the care of Sarah Calderon  Please don't hesitate to call or TigerText us with any questions       Angel Garcia MD

## 2021-07-26 NOTE — SOCIAL WORK
Palliative LSW saw patient at the bedside today  LSW appreciates the opportunity to provide patient/family with inpatient emotional support and guidance while patient continues to receive medical attention from the medical team     Topics discussed: Introduction of Palliative Care to both patient and spouse, Jazmine Peña discussed the importance of continuity of care post d/c  Reported that patient is in a stress free environment currently but when she returns home, increased stressors  Reported that they have two children, Ciarra Tovar 12 yr old with Behavioral issues and ADHD and 23 yr old Alexandra who has Down's Syndrome  They reported that Alexandra was receiving waiver services in the past and had a TSS and BSC in the [place] area  Reported that housing has been unstable the last 2 years, were living in hotels, currently now staying with patient's brother  Discussed AD, LSW will provide 5 Wishes and offered to assist in completion or to answer any questions patient may have  Spouse reported that he and the rest of the family would benefit from counseling services  Requested that resources be emailed to them [email or web address]    Patient does report pain in stomach area and site where the biopsy took place  Denies nausea, vomiting, diarrhea, constipation  Does report restlessness and anxiety which at times keeps her awake  Patient was recently started on cymbalta 20mg from PCP but it hasn't been continued in hospital  Patient/spouse reported that patient had past success with this medication  Patient has previously been on alprazolam and diazepam low dose which was helpful for her  Spouse reported that he is both a medical professional and mental health profession but didn't elaborate role  Spouse was intubated in [place] with COVID and wasn't expected to survive but is here  Spouse reported distrust of [facility] and would like to take things slowly  Areas that need follow-up: Update provided to Dr Barrera Lynn regarding symptoms  LSW will look up list of therapists under patient/family health insurance  LSW will provide copy of 5 wishes  Resources given: Contact information for Palliative Care provided and left on dry erase board  Others present:  Spouse, Zara Lynn       LSW will continue to follow as requested by the medical team, patient, or family

## 2021-07-26 NOTE — PROGRESS NOTES
[facility]  Progress Note Macy Owen 1965, 50 y o  female MRN: [id number]  Unit/Bed#: -01 Encounter: [id number]  Primary Care Provider: No primary care provider on file  Date and time admitted to hospital: 7/24/2021  8:57 AM    Hypertension  Assessment & Plan  Started on low-dose lisinopril  Monitor    Metastatic neoplasm [facility])  Assessment & Plan  Patient with a history of right sided breath cancer in 2012 status post lumpectomy,  axillary lymph node dissection and local radiation  Patient reports family history of breast cancer in her sister at age of 48 and her mother  Lost follow-up with [email or web address]  Last mammogram was in March 2019   -Normal    CTA scan 7/25 shows septal thickening and bronchial wall thickening in the right lung due to lymphangitic spread of tumor or metastatic breast cancer  Multiple hepatic metastasis and lytic sternal manubrial metastasis  Large right pleural effusion and small pericardial effusion likely malignant  A few small bilateral lung nodules  Outpatient chest x-ray on 07/10  showed right diaphragm elevation however with no obvious pleural effusion    Follow pulmonology and IR consult for biopsy  Status post thoracentesis and 1200 cc fluid removed  Follow-up cytology  Also status post liver biopsy today  Follow-up the biopsy result  Patient complaining about pain  Allergic to codeine however she can not tolerate oxycodone and morphine  Will start on morphine p r n     Consult palliative care for pain management  Also consult Oncology to establish the care  Discussed with patient and  bedside in details        Tobacco abuse  Assessment & Plan  Offered nicotine patch  Pleural effusion  Assessment & Plan  Patient has large malignant pleural effusions on the right  Pulmonary on board  Status post IR thoracentesis and 1200 cc fluid removed    Follow fluid study and cytology    * Cough  Assessment & Plan  Secondary to above  Status post thoracentesis    VTE Pharmacologic Prophylaxis:   Pharmacologic: Enoxaparin (Lovenox)  Mechanical VTE Prophylaxis in Place: Yes    Patient Centered Rounds: I have performed bedside rounds with nursing staff today  Discussions with Specialists or Other Care Team Provider:  Case Management, palliative care    Education and Discussions with Family / Patient:  Patient    Time Spent for Care: More than 50% of total time spent on counseling and coordination of care as described above  Current Length of Stay: 2 day(s)    Current Patient Status: Inpatient   Certification Statement: The patient will continue to require additional inpatient hospital stay due to See above    Discharge Plan:  24 hours    Code Status: Level 1 - Full Code      Subjective:   I have seen and examined the patient bedside this morning  Patient had liver biopsy and thoracentesis this morning  Complaining about pain  Afebrile  Saturating well on room air    Objective:     Vitals:   Temp (24hrs), Av 2 °F (36 8 °C), Min:98 °F (36 7 °C), Max:98 3 °F (36 8 °C)    Temp:  [98 °F (36 7 °C)-98 3 °F (36 8 °C)] 98 3 °F (36 8 °C)  HR:  [61-84] 64  Resp:  [16-20] 16  BP: (121-166)/() 133/66  SpO2:  [94 %-99 %] 98 %  Body mass index is 26 95 kg/m²  Input and Output Summary (last 24 hours):        Intake/Output Summary (Last 24 hours) at 2021 1307  Last data filed at 2021 0846  Gross per 24 hour   Intake 610 ml   Output 1200 ml   Net -590 ml       Physical Exam:     Physical Exam  General- Awake, alert and oriented x 3, looks in pain  HEENT- Normocephalic, atraumatic  CVS- Normal S1/ S2, Regular rate and rhythm  Respiratory system- right sided decreased air entry  Abdomen- Soft, Non distended, tenderness present on palpation, Bowel sound- present  CNS- No acute focal neurologic deficit noted    Additional Data:     Labs:    Results from last 7 days   Lab Units 21  0523   WBC Thousand/uL 6 15 HEMOGLOBIN g/dL 12 7   HEMATOCRIT % 38 4   PLATELETS Thousands/uL 211   NEUTROS PCT % 75   LYMPHS PCT % 12*   MONOS PCT % 8   EOS PCT % 3     Results from last 7 days   Lab Units 07/25/21  0448   SODIUM mmol/L 140   POTASSIUM mmol/L 3 6   CHLORIDE mmol/L 108   CO2 mmol/L 21   BUN mg/dL 11   CREATININE mg/dL 0 99   ANION GAP mmol/L 11   CALCIUM mg/dL 8 1*   ALBUMIN g/dL 2 8*   TOTAL BILIRUBIN mg/dL 0 42   ALK PHOS U/L 126*   ALT U/L 18   AST U/L 23   GLUCOSE RANDOM mg/dL 112     Results from last 7 days   Lab Units 07/25/21  2053   INR  0 99                       * I Have Reviewed All Lab Data Listed Above  * Additional Pertinent Lab Tests Reviewed: All Labs Within Last 24 Hours Reviewed    Imaging:    Imaging Reports Reviewed Today Include:   Us abdomen-  IMPRESSION:     Multiple hepatic metastases are identified, see results of recent biopsy        Imaging Personally Reviewed by Myself Includes:      Recent Cultures (last 7 days):           Last 24 Hours Medication List:   Current Facility-Administered Medications   Medication Dose Route Frequency Provider Last Rate    acetaminophen  650 mg Oral Q6H PRN Laurent Cross MD      albuterol  2 puff Inhalation Q4H PRN Carolyn Olivo MD      benzonatate  200 mg Oral TID PRN Laurent Cross MD      calcium carbonate  500 mg Oral TID PRN DEBRA Fajardo      dextromethorphan-guaiFENesin  10 mL Oral Q4H PRN Laurent Cross MD      diphenhydrAMINE  50 mg Oral HS PRN DEBRA Fajardo      enoxaparin  40 mg Subcutaneous Daily Luarent Cross MD      hydrALAZINE  5 mg Intravenous Q6H PRN Marylou Almazan MD      influenza vaccine  0 5 mL Intramuscular Once Lane López MD      ketorolac  15 mg Intravenous Q6H PRN Marylou Almazan MD      lisinopril  5 mg Oral Daily Marylou Almazan MD      melatonin  6 mg Oral HS DEBRA Fajardo      morphine injection  1 mg Intravenous Q4H PRN Marylou Almazan MD      ondansetron  4 mg Intravenous Q6H PRN Sarah Rod MD      pantoprazole  40 mg Oral Early Morning Beto Gamez MD          Today, Patient Was Seen By: Denisha Blank MD    ** Please Note: Dictation voice to text software may have been used in the creation of this document   **

## 2021-07-26 NOTE — ASSESSMENT & PLAN NOTE
Patient has large malignant pleural effusions on the right  Pulmonary on board  Status post IR thoracentesis and 1200 cc fluid removed    Follow fluid study and cytology

## 2021-07-27 ENCOUNTER — APPOINTMENT (INPATIENT)
Dept: MRI IMAGING | Facility: HOSPITAL | Age: 56
DRG: 281 | End: 2021-07-27
Payer: COMMERCIAL

## 2021-07-27 LAB
ALBUMIN SERPL BCP-MCNC: 2.6 G/DL (ref 3.5–5)
ALP SERPL-CCNC: 113 U/L (ref 46–116)
ALT SERPL W P-5'-P-CCNC: 21 U/L (ref 12–78)
ANION GAP SERPL CALCULATED.3IONS-SCNC: 9 MMOL/L (ref 4–13)
AST SERPL W P-5'-P-CCNC: 25 U/L (ref 5–45)
BASOPHILS # BLD AUTO: 0.05 THOUSANDS/ΜL (ref 0–0.1)
BASOPHILS NFR BLD AUTO: 1 % (ref 0–1)
BILIRUB SERPL-MCNC: 0.28 MG/DL (ref 0.2–1)
BUN SERPL-MCNC: 11 MG/DL (ref 5–25)
CALCIUM ALBUM COR SERPL-MCNC: 9.4 MG/DL (ref 8.3–10.1)
CALCIUM SERPL-MCNC: 8.3 MG/DL (ref 8.3–10.1)
CHLORIDE SERPL-SCNC: 104 MMOL/L (ref 100–108)
CO2 SERPL-SCNC: 24 MMOL/L (ref 21–32)
CREAT SERPL-MCNC: 0.92 MG/DL (ref 0.6–1.3)
EOSINOPHIL # BLD AUTO: 0.19 THOUSAND/ΜL (ref 0–0.61)
EOSINOPHIL NFR BLD AUTO: 3 % (ref 0–6)
ERYTHROCYTE [DISTWIDTH] IN BLOOD BY AUTOMATED COUNT: 13.9 % (ref 11.6–15.1)
GFR SERPL CREATININE-BSD FRML MDRD: 71 ML/MIN/1.73SQ M
GLUCOSE SERPL-MCNC: 98 MG/DL (ref 65–140)
HCT VFR BLD AUTO: 36.4 % (ref 34.8–46.1)
HGB BLD-MCNC: 12.1 G/DL (ref 11.5–15.4)
IMM GRANULOCYTES # BLD AUTO: 0.02 THOUSAND/UL (ref 0–0.2)
IMM GRANULOCYTES NFR BLD AUTO: 0 % (ref 0–2)
LDH SERPL-CCNC: 198 U/L (ref 81–234)
LYMPHOCYTES # BLD AUTO: 0.84 THOUSANDS/ΜL (ref 0.6–4.47)
LYMPHOCYTES NFR BLD AUTO: 12 % (ref 14–44)
MCH RBC QN AUTO: 30.6 PG (ref 26.8–34.3)
MCHC RBC AUTO-ENTMCNC: 33.2 G/DL (ref 31.4–37.4)
MCV RBC AUTO: 92 FL (ref 82–98)
MONOCYTES # BLD AUTO: 0.59 THOUSAND/ΜL (ref 0.17–1.22)
MONOCYTES NFR BLD AUTO: 8 % (ref 4–12)
NEUTROPHILS # BLD AUTO: 5.45 THOUSANDS/ΜL (ref 1.85–7.62)
NEUTS SEG NFR BLD AUTO: 76 % (ref 43–75)
NRBC BLD AUTO-RTO: 0 /100 WBCS
PLATELET # BLD AUTO: 207 THOUSANDS/UL (ref 149–390)
PMV BLD AUTO: 12.8 FL (ref 8.9–12.7)
POTASSIUM SERPL-SCNC: 3.7 MMOL/L (ref 3.5–5.3)
PROT SERPL-MCNC: 5.8 G/DL (ref 6.4–8.2)
RBC # BLD AUTO: 3.96 MILLION/UL (ref 3.81–5.12)
SODIUM SERPL-SCNC: 137 MMOL/L (ref 136–145)
WBC # BLD AUTO: 7.14 THOUSAND/UL (ref 4.31–10.16)

## 2021-07-27 PROCEDURE — A9585 GADOBUTROL INJECTION: HCPCS | Performed by: INTERNAL MEDICINE

## 2021-07-27 PROCEDURE — 99232 SBSQ HOSP IP/OBS MODERATE 35: CPT | Performed by: STUDENT IN AN ORGANIZED HEALTH CARE EDUCATION/TRAINING PROGRAM

## 2021-07-27 PROCEDURE — 85025 COMPLETE CBC W/AUTO DIFF WBC: CPT | Performed by: INTERNAL MEDICINE

## 2021-07-27 PROCEDURE — 94664 DEMO&/EVAL PT USE INHALER: CPT

## 2021-07-27 PROCEDURE — 94760 N-INVAS EAR/PLS OXIMETRY 1: CPT

## 2021-07-27 PROCEDURE — 99232 SBSQ HOSP IP/OBS MODERATE 35: CPT | Performed by: INTERNAL MEDICINE

## 2021-07-27 PROCEDURE — G1004 CDSM NDSC: HCPCS

## 2021-07-27 PROCEDURE — 83615 LACTATE (LD) (LDH) ENZYME: CPT | Performed by: INTERNAL MEDICINE

## 2021-07-27 PROCEDURE — 70553 MRI BRAIN STEM W/O & W/DYE: CPT

## 2021-07-27 PROCEDURE — 80053 COMPREHEN METABOLIC PANEL: CPT | Performed by: INTERNAL MEDICINE

## 2021-07-27 RX ORDER — OXYCODONE HYDROCHLORIDE 10 MG/1
10 TABLET ORAL EVERY 6 HOURS PRN
Qty: 30 TABLET | Refills: 0 | Status: SHIPPED | OUTPATIENT
Start: 2021-07-27 | End: 2021-08-06

## 2021-07-27 RX ADMIN — PANTOPRAZOLE SODIUM 40 MG: 40 TABLET, DELAYED RELEASE ORAL at 05:26

## 2021-07-27 RX ADMIN — GUAIFENESIN AND DEXTROMETHORPHAN 10 ML: 100; 10 SYRUP ORAL at 21:20

## 2021-07-27 RX ADMIN — ENOXAPARIN SODIUM 40 MG: 40 INJECTION SUBCUTANEOUS at 10:18

## 2021-07-27 RX ADMIN — KETOROLAC TROMETHAMINE 15 MG: 30 INJECTION, SOLUTION INTRAMUSCULAR at 13:06

## 2021-07-27 RX ADMIN — GADOBUTROL 7 ML: 604.72 INJECTION INTRAVENOUS at 14:33

## 2021-07-27 RX ADMIN — BENZONATATE 200 MG: 100 CAPSULE ORAL at 13:05

## 2021-07-27 RX ADMIN — MELATONIN 6 MG: 3 TAB ORAL at 22:13

## 2021-07-27 RX ADMIN — OXYCODONE HYDROCHLORIDE 10 MG: 10 TABLET ORAL at 19:17

## 2021-07-27 RX ADMIN — BENZONATATE 200 MG: 100 CAPSULE ORAL at 22:11

## 2021-07-27 RX ADMIN — MORPHINE SULFATE 2 MG: 2 INJECTION, SOLUTION INTRAMUSCULAR; INTRAVENOUS at 02:14

## 2021-07-27 NOTE — PROGRESS NOTES
Progress Note - Pulmonary   Jose Braun 50 y o  female MRN: [id number]  Unit/Bed#: -01 Encounter: [id number]    Assessment:  Shortness of breath  Right-sided pleural effusion  Liver lesions  History of breast cancer  Tobacco abuse    Plan:  Patient is stable on room air at rest however would recommend desaturation screen prior to discharge to ensure there are no supplemental oxygen needs  Patient initially presented with cough and shortness of breath found to have large right-sided effusion status post IR thoracentesis  Pleural fluid is consistent with exudate  Cytology pending  Suspect likely secondary to recurrence of breast cancer  Discussed with patient should she have recurrence of the effusion would likely benefit from asept catheter placement  Palliative Care following  Complete smoking cessation is recommended  Will need close Oncology follow-up    Subjective:   Patient says that her breathing is better following the procedure  She is still having some cough  Objective:     Vitals: Blood pressure 105/70, pulse 92, temperature 98 2 °F (36 8 °C), resp  rate 16, height 5' 4" (1 626 m), weight 71 2 kg (157 lb), SpO2 94 %  ,Body mass index is 26 95 kg/m²  Intake/Output Summary (Last 24 hours) at 7/27/2021 1219  Last data filed at 7/27/2021 0300  Gross per 24 hour   Intake 720 ml   Output --   Net 720 ml       Invasive Devices     Peripheral Intravenous Line            Peripheral IV 07/24/21 Left Antecubital 3 days                Physical Exam:   General appearance: Alert and oriented, in no acute distress  Head: Normocephalic, without obvious abnormality, atraumatic  Eyes: EOMI  No discharge bilaterally  No scleral icterus     Neck: Supple, symmetrical, trachea midline  Lungs:  Decreased breath sounds on the right  Heart: Regular rate and rhythm, S1, S2 normal, no murmur  Abdomen:  No appreciable distension or tenderness  Extremities:  No edema or tenderness  Skin: Warm and dry  Neurologic: No acute focal deficits are noted    Labs: I have personally reviewed pertinent lab results  Imaging and other studies: I have personally reviewed pertinent reports

## 2021-07-27 NOTE — ASSESSMENT & PLAN NOTE
· Patient has large malignant pleural effusions on the right     · Status post thoracentesis with 1200 cc fluid removed  · Fluid studies do appear to be indicated of exudative origin  · Follow-up fluid cytology

## 2021-07-27 NOTE — PROGRESS NOTES
[address] [facility]  Progress Note Hayden Donnelly 1965, 50 y o  female MRN: [id number]  Unit/Bed#: -01 Encounter: [id number]  Primary Care Provider: No primary care provider on file  Date and time admitted to hospital: 7/24/2021  8:57 AM    * Metastatic neoplasm [facility])  Assessment & Plan  · Patient with a history of right sided breast cancer in 2012 status post lumpectomy, axillary lymph node dissection and local radiation  · Patient reports family history of breast cancer in her sister at age of 48 and her mother  · Lost follow-up with [facility]  Last mammogram was in March 2019   -Normal    · CTA scan 7/25 shows septal thickening and bronchial wall thickening in the right lung due to lymphangitic spread of tumor or metastatic breast cancer  Multiple hepatic metastasis and lytic sternal manubrial metastasis  Large right pleural effusion and small pericardial effusion likely malignant  A few small bilateral lung nodules  · Outpatient chest x-ray on 07/10  showed right diaphragm elevation however with no obvious pleural effusion  · Status post right thoracentesis and liver biopsy on 07/26/2021-1200 mL of fluid removed; fluid studies consistent with exudative effusion likely secondary to malignancy    · Follow-up fluid cytology and liver biopsy  · Continuing current pain medication regimen with oxycodone Q 4 p r n  For moderate and severe pain; morphine 2 mg Q 4 p r n  For breakthrough  · Palliative on board; outpatient follow-up at discharge  · Outpatient follow-up with [facility]  · Outpatient follow-up with pulmonology  · Patient deferred any phone call to spouse        Pleural effusion  Assessment & Plan  · Patient has large malignant pleural effusions on the right  · Status post thoracentesis with 1200 cc fluid removed  · Fluid studies do appear to be indicated of exudative origin  · Follow-up fluid cytology    Cough  Assessment & Plan  Secondary to above    Status post thoracentesis on 2021    Hypertension  Assessment & Plan  Started on low-dose lisinopril 5 mg OD      Tobacco abuse  Assessment & Plan  Offered nicotine patch  VTE Pharmacologic Prophylaxis:   VTE Score: 4 Moderate Risk (Score 3-4) - Pharmacological DVT Prophylaxis Ordered: Enoxaparin (Lovenox)  Mechanical VTE Prophylaxis in Place: Yes    Patient Centered Rounds: I have performed bedside rounds with nursing staff today  Discussions with Specialists or Other Care Team Provider:  Nurse, palliative    Education and Discussions with Family / Patient: Patient declined call to   Alerted by nurse, the patient's spouse reached out to nurse stating that he was concerned his wife was receiving inadequate care  Attempted to address this with patient herself, patient denied any complaints regarding her care, reports that she is unsure of why her  would call with a complaint  Patient also refusing any calls to her spouse, states that she would contact him herself  Current Length of Stay: 3 day(s)    Current Patient Status: Inpatient     Discharge Plan / Estimated Discharge Date: Anticipate discharge tomorrow to home  Code Status: Level 1 - Full Code      Subjective:   Patient was seen and examined by me at bedside  Communicated clearly  No particular overnight event reported  Hemodynamically stable and afebrile  Patient states improvement in in her dyspnea  Denies any reoccurrence of her presenting respiratory distress  Denies any overnight fevers, chills  Has no new complaints or concerns  Objective:     Vitals:   Temp (24hrs), Av 2 °F (36 8 °C), Min:98 2 °F (36 8 °C), Max:98 2 °F (36 8 °C)    Temp:  [98 2 °F (36 8 °C)] 98 2 °F (36 8 °C)  HR:  [73-92] 92  BP: (105-120)/(70-76) 105/70  SpO2:  [92 %-95 %] 94 %  Body mass index is 26 95 kg/m²  Input and Output Summary (last 24 hours):        Intake/Output Summary (Last 24 hours) at 2021 1106  Last data filed at 7/27/2021 0300  Gross per 24 hour   Intake 720 ml   Output --   Net 720 ml       Physical Exam:     Physical Exam  Constitutional:       General: She is not in acute distress  Appearance: Normal appearance  HENT:      Head: Normocephalic and atraumatic  Eyes:      General: No scleral icterus  Right eye: No discharge  Left eye: No discharge  Cardiovascular:      Rate and Rhythm: Normal rate and regular rhythm  Pulses: Normal pulses  Heart sounds: Normal heart sounds  Pulmonary:      [place]: Pulmonary effort is normal  No respiratory distress  Breath sounds: Rhonchi present  No wheezing or rales  Abdominal:      General: There is no distension  Palpations: Abdomen is soft  Musculoskeletal:         General: No swelling or tenderness  Normal range of motion  Cervical back: Normal range of motion  No muscular tenderness  Skin:     General: Skin is warm and dry  Neurological:      Mental Status: She is alert and oriented to person, place, and time  Mental status is at baseline     Psychiatric:         Mood and Affect: Mood normal          Behavior: Behavior normal           Additional Data:     Labs:  Results from last 7 days   Lab Units 07/27/21  0521   WBC Thousand/uL 7 14   HEMOGLOBIN g/dL 12 1   HEMATOCRIT % 36 4   PLATELETS Thousands/uL 207   NEUTROS PCT % 76*   LYMPHS PCT % 12*   MONOS PCT % 8   EOS PCT % 3     Results from last 7 days   Lab Units 07/27/21  0521   SODIUM mmol/L 137   POTASSIUM mmol/L 3 7   CHLORIDE mmol/L 104   CO2 mmol/L 24   BUN mg/dL 11   CREATININE mg/dL 0 92   ANION GAP mmol/L 9   CALCIUM mg/dL 8 3   ALBUMIN g/dL 2 6*   TOTAL BILIRUBIN mg/dL 0 28   ALK PHOS U/L 113   ALT U/L 21   AST U/L 25   GLUCOSE RANDOM mg/dL 98     Results from last 7 days   Lab Units 07/25/21  2053   INR  0 99                   Imaging: Reviewed radiology reports from this admission including: abdominal/pelvic CT scan    Recent Cultures (last 7 days): Results from last 7 days   Lab Units 07/26/21  0909   GRAM STAIN RESULT  2+ Polys  No bacteria seen       Lines/Drains:  Invasive Devices     Peripheral Intravenous Line            Peripheral IV 07/24/21 Left Antecubital 3 days                Telemetry:        Last 24 Hours Medication List:   Current Facility-Administered Medications   Medication Dose Route Frequency Provider Last Rate    acetaminophen  650 mg Oral Q6H PRN Lelia Hernandez MD      albuterol  2 puff Inhalation Q4H PRN Sherri Oreilly MD      benzonatate  200 mg Oral TID PRN Lelia Hernandez MD      calcium carbonate  500 mg Oral TID PRN [place] [facility], CRNP      dextromethorphan-guaiFENesin  10 mL Oral Q4H PRN Lelia Hernandez MD      diphenhydrAMINE  50 mg Oral HS PRN [place] [facility], CRNP      enoxaparin  40 mg Subcutaneous Daily Lelia Hernandez MD      hydrALAZINE  5 mg Intravenous Q6H PRN Naveed Garcia MD      influenza vaccine  0 5 mL Intramuscular Once Lane López MD      ketorolac  15 mg Intravenous Q6H PRN Naveed Garcia MD      lisinopril  5 mg Oral Daily Naveed Garcia MD      melatonin  6 mg Oral HS [place] [facility], CRNP      morphine injection  2 mg Intravenous Q4H PRN Rossi Glasgow MD      ondansetron  4 mg Intravenous Q6H PRN Lelia Hernandez MD     [employer] oxyCODONE  10 mg Oral Q4H PRN Rossi Glasgow MD      oxyCODONE  5 mg Oral Q4H PRN Rossi Glasgow MD      pantoprazole  40 mg Oral Early Morning Naveed Garcia MD          Today, Patient Was Seen By: Rissa Gaitan MD    ** Please Note: This note has been constructed using a voice recognition system   **

## 2021-07-27 NOTE — PROGRESS NOTES
Progress note - Palliative and Supportive Care   [facility] Sofia 50 y o  female [id number]    Patient Active Problem List   Diagnosis    Cough    Pleural effusion    Tobacco abuse    Metastatic neoplasm ([address] )    Hypertension     Active issues specifically addressed today include:   Metastatic cancer  Cough  Cancer-associated pain      Plan:  1  Symptom management -    -  Continue oxycodone 5mg/10mg for moderate/severe pain PRN   -  IV morphine for breakthrough   -  Patient expresses previous use of flexeril, mobic  Consider NSAID therapy or possibly even steroids in the future as an adjunct  - Will provide d/c script of 30 oxycodone to [facility] [place]  Patient will follow up with Palliative medicine in 2-3 weeks  Discussed medication safety including use of a lockbox  2  Goals - treatment focused  Interval history:       Patient endorses good relief from oxycodone 10mg, less relief from morphine  Slept reasonably well  States her mood is "ok "  No nausea  Still having cough  Appetite intact         MEDICATIONS / ALLERGIES:     all current active meds have been reviewed and current meds:   Current Facility-Administered Medications   Medication Dose Route Frequency    acetaminophen (TYLENOL) tablet 650 mg  650 mg Oral Q6H PRN    albuterol (PROVENTIL HFA,VENTOLIN HFA) inhaler 2 puff  2 puff Inhalation Q4H PRN    benzonatate (TESSALON PERLES) capsule 200 mg  200 mg Oral TID PRN    calcium carbonate (TUMS) chewable tablet 500 mg  500 mg Oral TID PRN    dextromethorphan-guaiFENesin (ROBITUSSIN DM) oral syrup 10 mL  10 mL Oral Q4H PRN    diphenhydrAMINE (BENADRYL) tablet 50 mg  50 mg Oral HS PRN    enoxaparin (LOVENOX) subcutaneous injection 40 mg  40 mg Subcutaneous Daily    hydrALAZINE (APRESOLINE) injection 5 mg  5 mg Intravenous Q6H PRN    influenza vaccine, recombinant, quadrivalent (FLUBLOK) IM injection 0 5 mL  0 5 mL Intramuscular Once    ketorolac (TORADOL) injection 15 mg  15 mg Intravenous Q6H PRN    lisinopril (ZESTRIL) tablet 5 mg  5 mg Oral Daily    melatonin tablet 6 mg  6 mg Oral HS    morphine injection 2 mg  2 mg Intravenous Q4H PRN    ondansetron (ZOFRAN) injection 4 mg  4 mg Intravenous Q6H PRN    oxyCODONE (ROXICODONE) immediate release tablet 10 mg  10 mg Oral Q4H PRN    oxyCODONE (ROXICODONE) IR tablet 5 mg  5 mg Oral Q4H PRN    pantoprazole (PROTONIX) EC tablet 40 mg  40 mg Oral Early Morning       Allergies   Allergen Reactions    Codeine Anaphylaxis    Sulfa Antibiotics Hives       OBJECTIVE:    Physical Exam  Physical Exam  Vitals and nursing note reviewed  Constitutional:       General: She is not in acute distress  Appearance: She is not toxic-appearing or diaphoretic  HENT:      Head: Atraumatic  Right Ear: External ear normal       Left Ear: External ear normal       Nose: Nose normal    Eyes:      General:         Right eye: No discharge  Left eye: No discharge  Cardiovascular:      Rate and Rhythm: Normal rate  Pulmonary:      [place]: No respiratory distress  Abdominal:      General: There is no distension  Skin:     General: Skin is dry  Neurological:      General: No focal deficit present  Mental Status: She is alert  Cranial Nerves: No cranial nerve deficit  Psychiatric:         Mood and Affect: Mood normal          Behavior: Behavior normal          Lab Results:   I have personally reviewed pertinent labs  , CBC:   Lab Results   Component Value Date    WBC 7 14 07/27/2021    HGB 12 1 07/27/2021    HCT 36 4 07/27/2021    MCV 92 07/27/2021     07/27/2021    MCH 30 6 07/27/2021    MCHC 33 2 07/27/2021    RDW 13 9 07/27/2021    MPV 12 8 (H) 07/27/2021    NRBC 0 07/27/2021   , CMP:   Lab Results   Component Value Date    SODIUM 137 07/27/2021    K 3 7 07/27/2021     07/27/2021    CO2 24 07/27/2021    BUN 11 07/27/2021    CREATININE 0 92 07/27/2021    CALCIUM 8 3 07/27/2021    AST 25 07/27/2021    ALT 21 07/27/2021    ALKPHOS 113 07/27/2021    EGFR 71 07/27/2021   , BMP:  Lab Results   Component Value Date    SODIUM 137 07/27/2021    K 3 7 07/27/2021     07/27/2021    CO2 24 07/27/2021    BUN 11 07/27/2021    CREATININE 0 92 07/27/2021    GLUC 98 07/27/2021    CALCIUM 8 3 07/27/2021    AGAP 9 07/27/2021    EGFR 71 07/27/2021         Counseling / Coordination of Care    Total floor / unit time spent today 25+ minutes  Greater than 50% of total time was spent with the patient and / or family counseling and / or coordination of care   A description of the counseling / coordination of care: patient assessment, med review, opioid mgmt, d/c opioid prescriptions, support, goals, symptiom mgmt

## 2021-07-27 NOTE — ASSESSMENT & PLAN NOTE
· Patient with a history of right sided breast cancer in 2012 status post lumpectomy, axillary lymph node dissection and local radiation  · Patient reports family history of breast cancer in her sister at age of 48 and her mother  · Lost follow-up with [facility]  Last mammogram was in March 2019   -Normal    · CTA scan 7/25 shows septal thickening and bronchial wall thickening in the right lung due to lymphangitic spread of tumor or metastatic breast cancer  Multiple hepatic metastasis and lytic sternal manubrial metastasis  Large right pleural effusion and small pericardial effusion likely malignant  A few small bilateral lung nodules  · Outpatient chest x-ray on 07/10  showed right diaphragm elevation however with no obvious pleural effusion  · Status post right thoracentesis and liver biopsy on 07/26/2021-1200 mL of fluid removed; fluid studies consistent with exudative effusion likely secondary to malignancy    · Follow-up fluid cytology and liver biopsy  · Continuing current pain medication regimen with oxycodone Q 4 p r n  For moderate and severe pain; morphine 2 mg Q 4 p r n   For breakthrough  · Palliative on board; outpatient follow-up at discharge  · Outpatient follow-up with [facility]  · Outpatient follow-up with pulmonology  · Patient deferred any phone call to spouse

## 2021-07-28 ENCOUNTER — TELEPHONE (OUTPATIENT)
Dept: PULMONOLOGY | Facility: CLINIC | Age: 56
End: 2021-07-28

## 2021-07-28 ENCOUNTER — APPOINTMENT (INPATIENT)
Dept: RADIOLOGY | Facility: HOSPITAL | Age: 56
DRG: 281 | End: 2021-07-28
Payer: COMMERCIAL

## 2021-07-28 PROBLEM — J90 PLEURAL EFFUSION: Status: RESOLVED | Noted: 2021-07-24 | Resolved: 2021-07-28

## 2021-07-28 PROCEDURE — 99232 SBSQ HOSP IP/OBS MODERATE 35: CPT | Performed by: STUDENT IN AN ORGANIZED HEALTH CARE EDUCATION/TRAINING PROGRAM

## 2021-07-28 PROCEDURE — 99232 SBSQ HOSP IP/OBS MODERATE 35: CPT | Performed by: INTERNAL MEDICINE

## 2021-07-28 PROCEDURE — 71045 X-RAY EXAM CHEST 1 VIEW: CPT

## 2021-07-28 RX ADMIN — MELATONIN 6 MG: 3 TAB ORAL at 23:03

## 2021-07-28 RX ADMIN — BENZONATATE 200 MG: 100 CAPSULE ORAL at 23:03

## 2021-07-28 RX ADMIN — PANTOPRAZOLE SODIUM 40 MG: 40 TABLET, DELAYED RELEASE ORAL at 09:07

## 2021-07-28 RX ADMIN — BENZONATATE 200 MG: 100 CAPSULE ORAL at 08:59

## 2021-07-28 RX ADMIN — ENOXAPARIN SODIUM 40 MG: 40 INJECTION SUBCUTANEOUS at 09:00

## 2021-07-28 RX ADMIN — GUAIFENESIN AND DEXTROMETHORPHAN 10 ML: 100; 10 SYRUP ORAL at 23:03

## 2021-07-28 RX ADMIN — ALBUTEROL SULFATE 2 PUFF: 90 AEROSOL, METERED RESPIRATORY (INHALATION) at 09:28

## 2021-07-28 RX ADMIN — OXYCODONE HYDROCHLORIDE 5 MG: 5 TABLET ORAL at 09:27

## 2021-07-28 NOTE — PROGRESS NOTES
Patient ambulated in room for 3 minutes with this nurse                           1 min                       2 min                    3 min      O2         94-95% RA             91-92% RA            92-93% RA    HR          120                           114                         106

## 2021-07-28 NOTE — ASSESSMENT & PLAN NOTE
Patient has large malignant pleural effusions on the right     · Status post thoracentesis with 1200 cc fluid removed, fluid studies indicate exudative  · Follow-up fluid cytology Detail Level: Zone

## 2021-07-28 NOTE — PROGRESS NOTES
[address] [facility]  Progress Note Jeri Brian 1965, 50 y o  female MRN: [id number]  Unit/Bed#: -01 Encounter: [id number]  Primary Care Provider: No primary care provider on file  Date and time admitted to hospital: 7/24/2021  8:57 AM    * Metastatic neoplasm [facility])  Assessment & Plan  · Patient with a history of right sided breast cancer in 2012 status post lumpectomy, axillary lymph node dissection and local radiation  · Patient reports family history of breast cancer in her sister at age of 48 and her mother  · Lost follow-up with [facility]  Last mammogram was in March 2019   -Normal    · CTA scan 7/25 shows septal thickening and bronchial wall thickening in the right lung due to lymphangitic spread of tumor or metastatic breast cancer  Multiple hepatic metastasis and lytic sternal manubrial metastasis  Large right pleural effusion and small pericardial effusion likely malignant  A few small bilateral lung nodules  · Outpatient chest x-ray on 07/10  showed right diaphragm elevation however with no obvious pleural effusion  · Status post right thoracentesis and liver biopsy on 07/26/2021-1200 mL of fluid removed; fluid studies consistent with exudative effusion likely secondary to malignancy  · Repeat x-ray on 07/28-appearing to show small right-sided pleural effusion, patient also complaining of dyspnea and requiring 2 L via nasal cannula    · Liver biopsy-preliminary result of non-small cell carcinoma, follow-up official result  · Follow-up fluid cytology  · Continuing current pain medication regimen with oxycodone Q 4 p r n  For moderate and severe pain; morphine 2 mg Q 4 p r n   For breakthrough  · Nursing O2 eval while ambulating; home O2 eval by respiratory based upon results  · Palliative on board; outpatient follow-up at discharge  · Outpatient follow-up with Heme-Onc  · Outpatient follow-up with pulmonology  · Patient deferred any phone call to spouse        Pleural effusion-resolved as of 2021  Assessment & Plan  · Patient has large malignant pleural effusions on the right  · Status post thoracentesis with 1200 cc fluid removed  · Fluid studies do appear to be indicated of exudative origin  · Follow-up fluid cytology    Cough  Assessment & Plan  Secondary to above  Status post thoracentesis on 2021    Hypertension  Assessment & Plan  · Patient's blood pressures have been on the softer side with SBP below 751, diastolic in 68P    · Discontinuing lisinopril    Tobacco abuse  Assessment & Plan  Offered nicotine patch  VTE Pharmacologic Prophylaxis:   VTE Score: 4 Moderate Risk (Score 3-4) - Pharmacological DVT Prophylaxis Ordered: Enoxaparin (Lovenox)  Mechanical VTE Prophylaxis in Place: Yes    Patient Centered Rounds: I have performed bedside rounds with nursing staff today  Discussions with Specialists or Other Care Team Provider:  Nurse, palliative    Education and Discussions with Family / Patient: Patient declined call to   Current Length of Stay: 4 day(s)    Current Patient Status: Inpatient     Discharge Plan / Estimated Discharge Date: Anticipate discharge tomorrow to home  Code Status: Level 1 - Full Code      Subjective:   Patient was seen and examined by me at bedside  Communicated clearly  No particular overnight event reported  Hemodynamically stable and afebrile  Patient states increasing dyspnea as compared to yesterday  Now requiring 2 L of O2 via nasal cannula, with saturations in mid 90s  Continues to have productive cough, associated with musculoskeletal pain while coughing  Denies any fever overnight           Objective:     Vitals:   Temp (24hrs), Av 3 °F (36 8 °C), Min:97 9 °F (36 6 °C), Max:98 8 °F (37 1 °C)    Temp:  [97 9 °F (36 6 °C)-98 8 °F (37 1 °C)] 98 8 °F (37 1 °C)  HR:  [] 103  Resp:  [20] 20  BP: (96-98)/(62-64) 98/64  SpO2:  [92 %-96 %] 96 %  Body mass index is 26 95 kg/m²  Input and Output Summary (last 24 hours): Intake/Output Summary (Last 24 hours) at 7/28/2021 1401  Last data filed at 7/28/2021 0909  Gross per 24 hour   Intake 560 ml   Output --   Net 560 ml       Physical Exam:     Physical Exam  Constitutional:       General: She is not in acute distress  Appearance: Normal appearance  HENT:      Head: Normocephalic and atraumatic  Eyes:      General: No scleral icterus  Right eye: No discharge  Left eye: No discharge  Cardiovascular:      Rate and Rhythm: Normal rate and regular rhythm  Pulses: Normal pulses  Heart sounds: Normal heart sounds  Pulmonary:      Effort: Pulmonary effort is normal  No respiratory distress  Breath sounds: Rhonchi present  No wheezing or rales  Abdominal:      General: There is no distension  Palpations: Abdomen is soft  Musculoskeletal:         General: No swelling or tenderness  Normal range of motion  Cervical back: Normal range of motion  No muscular tenderness  Skin:     General: Skin is warm and dry  Neurological:      Mental Status: She is alert and oriented to person, place, and time  Mental status is at baseline     Psychiatric:         Mood and Affect: Mood normal          Behavior: Behavior normal           Additional Data:     Labs:  Results from last 7 days   Lab Units 07/27/21  0521   WBC Thousand/uL 7 14   HEMOGLOBIN g/dL 12 1   HEMATOCRIT % 36 4   PLATELETS Thousands/uL 207   NEUTROS PCT % 76*   LYMPHS PCT % 12*   MONOS PCT % 8   EOS PCT % 3     Results from last 7 days   Lab Units 07/27/21  0521   SODIUM mmol/L 137   POTASSIUM mmol/L 3 7   CHLORIDE mmol/L 104   CO2 mmol/L 24   BUN mg/dL 11   CREATININE mg/dL 0 92   ANION GAP mmol/L 9   CALCIUM mg/dL 8 3   ALBUMIN g/dL 2 6*   TOTAL BILIRUBIN mg/dL 0 28   ALK PHOS U/L 113   ALT U/L 21   AST U/L 25   GLUCOSE RANDOM mg/dL 98     Results from last 7 days   Lab Units 07/25/21  2053   INR  0 99 Imaging: Personally reviewed the following imaging: chest xray    Recent Cultures (last 7 days):     Results from last 7 days   Lab Units 07/26/21  0909   GRAM STAIN RESULT  2+ Polys  No bacteria seen   BODY FLUID CULTURE, STERILE  No growth       Lines/Drains:  Invasive Devices     Peripheral Intravenous Line            Peripheral IV 07/24/21 Left Antecubital 4 days                Telemetry:        Last 24 Hours Medication List:   Current Facility-Administered Medications   Medication Dose Route Frequency Provider Last Rate    acetaminophen  650 mg Oral Q6H PRN Jeff Gil MD      albuterol  2 puff Inhalation Q4H PRN Lane López MD      benzonatate  200 mg Oral TID PRN Jeff Gil MD      calcium carbonate  500 mg Oral TID PRN Tab Jackson, CRNP      dextromethorphan-guaiFENesin  10 mL Oral Q4H PRN Jeff Gil MD      diphenhydrAMINE  50 mg Oral HS PRN Tab Jackson, CRNP      enoxaparin  40 mg Subcutaneous Daily Jeff Gil MD      hydrALAZINE  5 mg Intravenous Q6H PRN Omer Thomas MD      influenza vaccine  0 5 mL Intramuscular Once Laen López MD      melatonin  6 mg Oral HS Tab Jackson, CRNP      morphine injection  2 mg Intravenous Q4H PRN Sha Nolan MD      ondansetron  4 mg Intravenous Q6H PRN Jeff Gil MD     Dillon Roles oxyCODONE  10 mg Oral Q4H PRN Sha Nolan MD      oxyCODONE  5 mg Oral Q4H PRN Sha Nolan MD      pantoprazole  40 mg Oral Early Morning Omer Thomas MD          Today, Patient Was Seen By: Elizabeth Davila MD    ** Please Note: This note has been constructed using a voice recognition system   **

## 2021-07-28 NOTE — ASSESSMENT & PLAN NOTE
· Non-small cell carcinoma  · Patient with a history of right sided breast cancer in 2012 status post lumpectomy, axillary lymph node dissection and local radiation  · Active smoker  · CTA scan 7/25 shows septal thickening and bronchial wall thickening in the right lung due to lymphangitic spread of tumor or metastatic breast cancer  Multiple hepatic metastasis and lytic sternal manubrial metastasis  Large right pleural effusion and small pericardial effusion likely malignant  A few small bilateral lung nodules  · Status post right thoracentesis and liver biopsy on 07/26/2021-1200 mL of fluid removed; fluid studies consistent with exudative effusion likely secondary to malignancy;  Liver biopsy-preliminary result of non-small cell carcinoma  · Repeat x-ray on 07/28-appearing to show small right-sided pleural effusion, however patient maintaining O2 saturations on room air at rest and during ambulation    · Per nursing O2 eval-no desaturations during ambulation; patient will not require home O2 at this time  · Palliative on board; outpatient follow-up at discharge  · Outpatient follow-up with Heme-Onc, also for results of fluids cytology  · Outpatient follow-up with pulmonology; patient also being prescribed standing order of outpatient x-ray chest  · Patient deferred any phone call to spouse

## 2021-07-28 NOTE — CASE MANAGEMENT
Per SLIM patient requires inpatient due appearing to show small right-sided pleural effusion, patient also complaining of dyspnea and requiring 2 L via nasal cannula  Patient's discharge plan is home and family to transport  Patient will have outpatient services

## 2021-07-28 NOTE — PROGRESS NOTES
Progress Note - Pulmonary   Sarah Calderon 50 y o  female MRN: [id number]  Unit/Bed#: -01 Encounter: [id number]    Assessment/Plan:  · Shortness of breath  · Right-sided pleural effusion  · Liver lesions  · History of breast cancer  · Tobacco abuse    Home O2 eval pending  Patient initially presented with cough and shortness of breath found to have large right-sided effusion status post IR thoracentesis  Pleural fluid is consistent with exudate  Cytology still pending  Suspect likely secondary to recurrence of breast cancer  Discussed with patient should she have recurrence of the effusion would likely benefit from asept catheter placement  CXR this AM does show small effusion, as long as O2 sats are stable would hold off on further interventions if possible (until cyto comes back)   Palliative Care following  Complete smoking cessation is recommended      Will need close Oncology follow-up    Discussed with [facility]    Subjective:   Patient says she is still coughing  Breathing is about the same  12 point review of systems otherwise negative  Objective:     Vitals: Blood pressure 98/64, pulse 103, temperature 98 8 °F (37 1 °C), temperature source Oral, resp  rate 20, height 5' 4" (1 626 m), weight 71 2 kg (157 lb), SpO2 96 %  ,Body mass index is 26 95 kg/m²  Intake/Output Summary (Last 24 hours) at 7/28/2021 1010  Last data filed at 7/28/2021 9214  Gross per 24 hour   Intake 560 ml   Output --   Net 560 ml       Invasive Devices     Peripheral Intravenous Line            Peripheral IV 07/24/21 Left Antecubital 4 days                Physical Exam:   General appearance: Alert and oriented, in no acute distress  Head: Normocephalic, without obvious abnormality, atraumatic  Eyes: EOMI  No discharge bilaterally  No scleral icterus     Neck: Supple, symmetrical, trachea midline  Lungs: Decreased on the R  Heart: Regular rate and rhythm, S1, S2 normal, no murmur  Abdomen:  No appreciable distension or tenderness  Extremities: No LE edema  Skin: Warm and dry  Neurologic: No acute focal deficits are noted    Labs: I have personally reviewed pertinent lab results  Imaging and other studies: I have personally reviewed pertinent reports     and I have personally reviewed pertinent films in PACS

## 2021-07-29 VITALS
RESPIRATION RATE: 16 BRPM | SYSTOLIC BLOOD PRESSURE: 121 MMHG | WEIGHT: 157 LBS | HEIGHT: 64 IN | DIASTOLIC BLOOD PRESSURE: 72 MMHG | HEART RATE: 71 BPM | TEMPERATURE: 98.6 F | OXYGEN SATURATION: 94 % | BODY MASS INDEX: 26.8 KG/M2

## 2021-07-29 LAB
BACTERIA SPEC BFLD CULT: NO GROWTH
BASOPHILS # BLD AUTO: 0.06 THOUSANDS/ΜL (ref 0–0.1)
BASOPHILS NFR BLD AUTO: 1 % (ref 0–1)
EOSINOPHIL # BLD AUTO: 0.21 THOUSAND/ΜL (ref 0–0.61)
EOSINOPHIL NFR BLD AUTO: 4 % (ref 0–6)
ERYTHROCYTE [DISTWIDTH] IN BLOOD BY AUTOMATED COUNT: 14 % (ref 11.6–15.1)
GRAM STN SPEC: NORMAL
GRAM STN SPEC: NORMAL
HCT VFR BLD AUTO: 36.7 % (ref 34.8–46.1)
HGB BLD-MCNC: 12.1 G/DL (ref 11.5–15.4)
IMM GRANULOCYTES # BLD AUTO: 0.02 THOUSAND/UL (ref 0–0.2)
IMM GRANULOCYTES NFR BLD AUTO: 0 % (ref 0–2)
LYMPHOCYTES # BLD AUTO: 0.81 THOUSANDS/ΜL (ref 0.6–4.47)
LYMPHOCYTES NFR BLD AUTO: 16 % (ref 14–44)
MCH RBC QN AUTO: 30.6 PG (ref 26.8–34.3)
MCHC RBC AUTO-ENTMCNC: 33 G/DL (ref 31.4–37.4)
MCV RBC AUTO: 93 FL (ref 82–98)
MONOCYTES # BLD AUTO: 0.44 THOUSAND/ΜL (ref 0.17–1.22)
MONOCYTES NFR BLD AUTO: 9 % (ref 4–12)
NEUTROPHILS # BLD AUTO: 3.65 THOUSANDS/ΜL (ref 1.85–7.62)
NEUTS SEG NFR BLD AUTO: 70 % (ref 43–75)
NRBC BLD AUTO-RTO: 0 /100 WBCS
PLATELET # BLD AUTO: 199 THOUSANDS/UL (ref 149–390)
PMV BLD AUTO: 12.1 FL (ref 8.9–12.7)
RBC # BLD AUTO: 3.95 MILLION/UL (ref 3.81–5.12)
WBC # BLD AUTO: 5.19 THOUSAND/UL (ref 4.31–10.16)

## 2021-07-29 PROCEDURE — 99232 SBSQ HOSP IP/OBS MODERATE 35: CPT | Performed by: INTERNAL MEDICINE

## 2021-07-29 PROCEDURE — 99239 HOSP IP/OBS DSCHRG MGMT >30: CPT | Performed by: STUDENT IN AN ORGANIZED HEALTH CARE EDUCATION/TRAINING PROGRAM

## 2021-07-29 PROCEDURE — 85025 COMPLETE CBC W/AUTO DIFF WBC: CPT | Performed by: INTERNAL MEDICINE

## 2021-07-29 RX ORDER — CALCIUM CARBONATE 200(500)MG
500 TABLET,CHEWABLE ORAL 3 TIMES DAILY PRN
Qty: 30 TABLET | Refills: 0 | Status: SHIPPED | OUTPATIENT
Start: 2021-07-29

## 2021-07-29 RX ORDER — LANOLIN ALCOHOL/MO/W.PET/CERES
6 CREAM (GRAM) TOPICAL
Qty: 15 TABLET | Refills: 0 | Status: SHIPPED | OUTPATIENT
Start: 2021-07-29 | End: 2021-10-12 | Stop reason: SDUPTHER

## 2021-07-29 RX ORDER — GUAIFENESIN/DEXTROMETHORPHAN 100-10MG/5
10 SYRUP ORAL EVERY 4 HOURS PRN
Qty: 236 ML | Refills: 0 | Status: SHIPPED | OUTPATIENT
Start: 2021-07-29

## 2021-07-29 RX ORDER — BENZONATATE 200 MG/1
200 CAPSULE ORAL 3 TIMES DAILY PRN
Qty: 20 CAPSULE | Refills: 0 | Status: SHIPPED | OUTPATIENT
Start: 2021-07-29 | End: 2021-11-23 | Stop reason: SDUPTHER

## 2021-07-29 RX ORDER — ACETAMINOPHEN 325 MG/1
650 TABLET ORAL EVERY 6 HOURS PRN
Qty: 30 TABLET | Refills: 0 | Status: SHIPPED | OUTPATIENT
Start: 2021-07-29 | End: 2022-04-07 | Stop reason: SDUPTHER

## 2021-07-29 RX ORDER — ALBUTEROL SULFATE 90 UG/1
2 AEROSOL, METERED RESPIRATORY (INHALATION) EVERY 4 HOURS PRN
Qty: 8 G | Refills: 0 | Status: SHIPPED | OUTPATIENT
Start: 2021-07-29

## 2021-07-29 RX ADMIN — OXYCODONE HYDROCHLORIDE 10 MG: 10 TABLET ORAL at 00:02

## 2021-07-29 RX ADMIN — PANTOPRAZOLE SODIUM 40 MG: 40 TABLET, DELAYED RELEASE ORAL at 06:40

## 2021-07-29 RX ADMIN — ENOXAPARIN SODIUM 40 MG: 40 INJECTION SUBCUTANEOUS at 08:48

## 2021-07-29 NOTE — CASE MANAGEMENT
Informed by Domenic Burkitt that patient is ready for discharge  Chart reviewed Pulse Ox 96%   No needs noted

## 2021-07-29 NOTE — PROGRESS NOTES
Progress Note - Pulmonary   Fadumo Form 50 y o  female MRN: [id number]  Unit/Bed#: -01 Encounter: [id number]    Assessment:  · Shortness of breath  · Right-sided pleural effusion  · Liver lesions  · History of breast cancer  · Tobacco abuse    Plan:  She does not require any home oxygen  Breathing has improved  Patient initially presented with cough and shortness of breath found to have large right-sided effusion status post IR thoracentesis   Pleural fluid is consistent with exudate   Cytology still pending  Also had liver biopsy which is positive for non small cell cancer  Discussed with patient in detail there is high chance of recurrence of the effusion, educated her regarding signs and symptoms that would suggest this  If she starts having increased dyspnea/cough she will either go to the ER or call our office and go for CXR  Palliative Care following  Complete smoking cessation is recommended      Will need close Oncology follow-up  Also will arrange for follow-up in our pulmonary office next week  Patient will call to schedule if she does not receive a call from our office before she leaves today      Discussed with FARHAN & Dr Merritt Eddy    Subjective:   She has is her shortness of breath has improved  She is also coughing less today  She feels ready to go home  Objective:     Vitals: Blood pressure 121/72, pulse 71, temperature 98 6 °F (37 °C), resp  rate 16, height 5' 4" (1 626 m), weight 71 2 kg (157 lb), SpO2 94 %  ,Body mass index is 26 95 kg/m²  Intake/Output Summary (Last 24 hours) at 7/29/2021 0957  Last data filed at 7/29/2021 9663  Gross per 24 hour   Intake 1200 ml   Output --   Net 1200 ml       Invasive Devices     Peripheral Intravenous Line            Peripheral IV 07/24/21 Left Antecubital 5 days                Physical Exam:   General appearance: Alert and oriented, in no acute distress  Head: Normocephalic, without obvious abnormality, atraumatic  Eyes: EOMI   No discharge bilaterally  No scleral icterus  Neck: Supple, symmetrical, trachea midline  Lungs:  Decreased on right side  Heart: Regular rate and rhythm, S1, S2 normal, no murmur  Abdomen:  No appreciable distension or tenderness  Extremities:  No edema or tenderness  Skin: Warm and dry  Neurologic: No acute focal deficits are noted    Labs: I have personally reviewed pertinent lab results  Imaging and other studies: I have personally reviewed pertinent reports     and I have personally reviewed pertinent films in PACS

## 2021-07-29 NOTE — DISCHARGE SUMMARY
[facility]  Discharge- Sarah Prey 1965, 50 y o  female MRN: [id number]  Unit/Bed#: -01 Encounter: [id number]  Primary Care Provider: No primary care provider on file  Date and time admitted to hospital: 7/24/2021  8:57 AM    * Metastatic neoplasm [facility])  Assessment & Plan  · Non-small cell carcinoma  · Patient with a history of right sided breast cancer in 2012 status post lumpectomy, axillary lymph node dissection and local radiation  · Active smoker  · CTA scan 7/25 shows septal thickening and bronchial wall thickening in the right lung due to lymphangitic spread of tumor or metastatic breast cancer  Multiple hepatic metastasis and lytic sternal manubrial metastasis  Large right pleural effusion and small pericardial effusion likely malignant  A few small bilateral lung nodules  · Status post right thoracentesis and liver biopsy on 07/26/2021-1200 mL of fluid removed; fluid studies consistent with exudative effusion likely secondary to malignancy; Liver biopsy-preliminary result of non-small cell carcinoma  · Repeat x-ray on 07/28-appearing to show small right-sided pleural effusion, however patient maintaining O2 saturations on room air at rest and during ambulation    · Per nursing O2 eval-no desaturations during ambulation; patient will not require home O2 at this time  · Palliative on board; outpatient follow-up at discharge  · Outpatient follow-up with Heme-Onc, also for results of fluids cytology  · Outpatient follow-up with pulmonology; patient also being prescribed standing order of outpatient x-ray chest  · Patient deferred any phone call to spouse        Pleural effusion-resolved as of 7/28/2021  Assessment & Plan   Patient has large malignant pleural effusions on the right  · Status post thoracentesis with 1200 cc fluid removed, fluid studies indicate exudative  · Follow-up fluid cytology    Cough  Assessment & Plan  Secondary to above    Status post admission  No inpatient recommendations from heme Onc, will follow patient as outpatient  Patient had no drop in O2 saturations while ambulating, and will not require home O2 at discharge  At discharge, she was advised of the following: Outpatient follow-up with pulmonology, Heme-Onc, palliative, internal Medicine  Significant Findings, Care, Treatment and Services Provided:  Thoracentesis, liver biopsy    Complications:  None    Condition at Discharge: fair         Discharge instructions/Information to patient and family:   See after visit summary for information provided to patient and family  Provisions for Follow-Up Care:  See after visit summary for information related to follow-up care and any pertinent home health orders  PCP: No primary care provider on file  Disposition: Home    Planned Readmission: No    Discharge Statement   I spent 35 minutes discharging the patient  This time was spent on the day of discharge  I had direct contact with the patient on the day of discharge  Additional documentation is required if more than 30 minutes were spent on discharge  Discharge Medications:  See after visit summary for reconciled discharge medications provided to patient and family

## 2021-07-29 NOTE — DISCHARGE INSTR - AVS FIRST PAGE
Recommended close follow-up with primary care provider within 1 week of discharge  Recommended close follow-up with Hematology Oncology and Pulmonology

## 2021-07-29 NOTE — CASE MANAGEMENT
TCM appointment made for [facility] Internal Medicine ( new patient appt) for 7/30/21 @340 pm  Added to AVS and nursing made aware

## 2021-07-30 ENCOUNTER — TELEPHONE (OUTPATIENT)
Dept: PALLIATIVE MEDICINE | Facility: CLINIC | Age: 56
End: 2021-07-30

## 2021-07-30 ENCOUNTER — TELEPHONE (OUTPATIENT)
Dept: SURGICAL ONCOLOGY | Facility: CLINIC | Age: 56
End: 2021-07-30

## 2021-07-30 ENCOUNTER — OFFICE VISIT (OUTPATIENT)
Dept: INTERNAL MEDICINE CLINIC | Facility: CLINIC | Age: 56
End: 2021-07-30
Payer: COMMERCIAL

## 2021-07-30 VITALS
TEMPERATURE: 97.7 F | DIASTOLIC BLOOD PRESSURE: 80 MMHG | HEART RATE: 113 BPM | OXYGEN SATURATION: 95 % | HEIGHT: 64 IN | BODY MASS INDEX: 27.49 KG/M2 | SYSTOLIC BLOOD PRESSURE: 124 MMHG | WEIGHT: 161 LBS

## 2021-07-30 DIAGNOSIS — C78.7 MALIGNANT NEOPLASM METASTATIC TO LIVER (HCC): Primary | ICD-10-CM

## 2021-07-30 DIAGNOSIS — Z13.6 ENCOUNTER FOR LIPID SCREENING FOR CARDIOVASCULAR DISEASE: ICD-10-CM

## 2021-07-30 DIAGNOSIS — Z12.11 SCREENING FOR COLON CANCER: ICD-10-CM

## 2021-07-30 DIAGNOSIS — E55.9 VITAMIN D DEFICIENCY: ICD-10-CM

## 2021-07-30 DIAGNOSIS — Z12.31 ENCOUNTER FOR SCREENING MAMMOGRAM FOR MALIGNANT NEOPLASM OF BREAST: ICD-10-CM

## 2021-07-30 DIAGNOSIS — R39.81 FUNCTIONAL URINARY INCONTINENCE: ICD-10-CM

## 2021-07-30 DIAGNOSIS — Z72.0 TOBACCO ABUSE: ICD-10-CM

## 2021-07-30 DIAGNOSIS — Z13.220 ENCOUNTER FOR LIPID SCREENING FOR CARDIOVASCULAR DISEASE: ICD-10-CM

## 2021-07-30 DIAGNOSIS — R53.0 NEOPLASTIC MALIGNANT RELATED FATIGUE: ICD-10-CM

## 2021-07-30 DIAGNOSIS — I10 ESSENTIAL HYPERTENSION: ICD-10-CM

## 2021-07-30 PROCEDURE — 99214 OFFICE O/P EST MOD 30 MIN: CPT | Performed by: INTERNAL MEDICINE

## 2021-07-30 PROCEDURE — 1111F DSCHRG MED/CURRENT MED MERGE: CPT | Performed by: INTERNAL MEDICINE

## 2021-07-30 PROCEDURE — 3725F SCREEN DEPRESSION PERFORMED: CPT | Performed by: INTERNAL MEDICINE

## 2021-07-30 RX ORDER — ERGOCALCIFEROL 1.25 MG/1
50000 CAPSULE ORAL WEEKLY
Qty: 4 CAPSULE | Refills: 2 | Status: SHIPPED | OUTPATIENT
Start: 2021-07-30 | End: 2021-08-25 | Stop reason: SDUPTHER

## 2021-07-30 RX ORDER — OXYCODONE HYDROCHLORIDE AND ACETAMINOPHEN 5; 325 MG/1; MG/1
2 TABLET ORAL EVERY 4 HOURS PRN
Qty: 14 TABLET | Refills: 0 | Status: SHIPPED | OUTPATIENT
Start: 2021-07-30 | End: 2021-08-16 | Stop reason: ALTCHOICE

## 2021-07-30 RX ORDER — OXYBUTYNIN CHLORIDE 5 MG/1
10 TABLET, EXTENDED RELEASE ORAL DAILY
Qty: 90 TABLET | Refills: 3 | Status: SHIPPED | OUTPATIENT
Start: 2021-07-30 | End: 2021-08-02

## 2021-07-30 NOTE — TELEPHONE ENCOUNTER
New Patient Encounter    New Patient Intake Form   Patient Details:  Pearl Espino  1965  [id number]    Background Information:  [address] starts by opening a telephone encounter and gathering the following information   Who is calling to schedule? If not self, relationship to patient? Rodrick Maldonado   Referring Provider Theresa mccullough   What is the diagnosis? Metastatic breast cancer   Is this Cancer or Non-Cancer? Cancer   Is this diagnosis confirmed? Yes   When was the diagnosis? 7/2021   Is there a confirmed diagnosis from a biopsy/tissue reviewed by pathology? Yes   Were outside slides requested? No   Is patient aware of diagnosis? Yes   Is there a personal history and what kind? Yes   Is there a family history and what kind? Breast cancer   Reason for visit? New Diagnosis   Have you had any imaging or labs done? If so: when, where? yes  [facility]   Are records in [employer]? yes   If patient has a prior history of cancer were old records obtained? No called [employer] and they had nothing on file from 2012  Was the patient told to bring a disk? No   Does the patient smoke or Vape? Did Not Speak to Patient / Office Scheduled   If yes, how many packs or cartridges per day? Scheduling Information:   Preferred [place]:  [facility]     Are there any dates/time the patient cannot be seen? Miscellaneous:    After completing the above information, please route to Financial Counselor and the appropriate Nurse Navigator for review

## 2021-07-30 NOTE — TELEPHONE ENCOUNTER
This patient is on hfu list  I left her a message to call office to schedule appt in [place] for [facility]

## 2021-07-30 NOTE — PROGRESS NOTES
BMI Counseling: Body mass index is 27 64 kg/m²  The BMI is above normal  Nutrition recommendations include encouraging healthy choices of fruits and vegetables  BMI Counseling: Body mass index is 27 64 kg/m²  Follow-up plan was not completed due to patient receiving palliative care  Depression Screening and Follow-up Plan: Patient's depression screening was positive with a PHQ-2 score of 1  Clincally patient does not have depression  No treatment is required  Assessment/Plan:    Patient is here to establish care with [facility] HTN, breast cancer s/p CT/TR and 5 years of tamoxifen  She was recently hospitalized for SOB and found to have large pleural effusion with fluid analysis revealing malignancy; imaging revealing multiple hepatic lesions  She will be following up with hem/on next week and palliative care the week after  attempting to schedule an appointment with pulmonary  Preventative screenings ordered today including Mammo and colonoscopy  Will f/u in 6 weeks  Labs reviewed and ordered at today's visit  Incontinence pads to be ordered  No problem-specific Assessment & Plan notes found for this encounter  Diagnoses and all orders for this visit:    Malignant neoplasm metastatic to liver ([facility] [address] )  -     CBC and differential; Future  -     Comprehensive metabolic panel; Future  -     oxyCODONE-acetaminophen (PERCOCET) 5-325 mg per tablet; Take 2 tablets by mouth every 4 (four) hours as needed for moderate painMax Daily Amount: 12 tablets    Essential hypertension    Tobacco abuse    Encounter for screening mammogram for malignant neoplasm of breast  -     Mammo screening bilateral w 3d & cad; Future    Screening for colon cancer  -     Ambulatory referral to Gastroenterology; Future    Functional urinary incontinence  -     oxybutynin (DITROPAN-XL) 5 mg 24 hr tablet; Take 2 tablets (10 mg total) by mouth daily    Vitamin D deficiency  -     ergocalciferol (VITAMIN D2) 50,000 units;  Take 1 capsule (50,000 Units total) by mouth once a week for 12 doses    Neoplastic malignant related fatigue  -     TSH, 3rd generation with Free T4 reflex; Future    Encounter for lipid screening for cardiovascular disease  -     Lipid panel; Future    Other orders  -     BLACK COHOSH PO; Take by mouth          Subjective:      Patient ID: Marbella Hernandez is a 50 y o  female  Patient is a 50year old female with past medical history of right-sided invasive breast cancer diagnosed in 2012 status post lumpectomy, adjuvant radiation and chemotherapy with 5 year of tamoxifen, hypertension, tobacco use who is here to establish care  She was recently admitted to the hospital after presenting with c/o cough, shortness of breath  CTA report noted with septal thickening and bronchial wall thickening the right lung due to lymphogenic spread of tumor or metastatic breast cancer, multiple hepatic metastatic and lytic sternal manubrial metastases, large right pleural effusion and small pericardial effusion likely malignant  S/P IR guided thoracentesis and liver biopsy revealing non-small cell carcinoma  While hospitalized Gabriella had seen Palliative care and Hem/Onc  She has appointments set up for both disciplines next week  Attempting appointment with pulmonary  The following portions of the patient's history were reviewed and updated as appropriate:   She  has a past medical history of Malignant neoplasm of right female breast ([address] )  She   Patient Active Problem List    Diagnosis Date Noted    Hypertension 07/26/2021    Metastatic neoplasm ([address] ) 07/25/2021    Cough 07/24/2021    Tobacco abuse 07/24/2021     She  has a past surgical history that includes Hip surgery; IR thoracentesis (7/26/2021); and IR biopsy liver mass (7/26/2021)  Her family history includes Breast cancer in her maternal grandmother, mother, and sister  She  reports that she has been smoking cigarettes   She has been smoking about 0 25 packs per day  She has never used smokeless tobacco  She reports previous alcohol use  She reports previous drug use  Current Outpatient Medications   Medication Sig Dispense Refill    acetaminophen (TYLENOL) 325 mg tablet Take 2 tablets (650 mg total) by mouth every 6 (six) hours as needed for mild pain 30 tablet 0    albuterol (PROVENTIL HFA,VENTOLIN HFA) 90 mcg/act inhaler Inhale 2 puffs every 4 (four) hours as needed for wheezing 8 g 0    benzonatate (TESSALON) 200 MG capsule Take 1 capsule (200 mg total) by mouth 3 (three) times a day as needed for cough 20 capsule 0    BLACK COHOSH PO Take by mouth      calcium carbonate (TUMS) 500 mg chewable tablet Chew 1 tablet (500 mg total) 3 (three) times a day as needed for indigestion or heartburn 30 tablet 0    dextromethorphan-guaiFENesin (ROBITUSSIN DM)  mg/5 mL syrup Take 10 mL by mouth every 4 (four) hours as needed for cough 236 mL 0    oxyCODONE (ROXICODONE) 10 MG TABS Take 1 tablet (10 mg total) by mouth every 6 (six) hours as needed for severe pain for up to 10 daysMax Daily Amount: 40 mg 30 tablet 0    diphenhydrAMINE (BENADRYL) 50 MG tablet Take 1 tablet (50 mg total) by mouth daily at bedtime as needed for itching or sleep (Patient not taking: Reported on 7/30/2021) 30 tablet 0    ergocalciferol (VITAMIN D2) 50,000 units Take 1 capsule (50,000 Units total) by mouth once a week for 12 doses 4 capsule 2    melatonin 3 mg Take 2 tablets (6 mg total) by mouth daily at bedtime (Patient not taking: Reported on 7/30/2021) 15 tablet 0    oxybutynin (DITROPAN-XL) 5 mg 24 hr tablet Take 2 tablets (10 mg total) by mouth daily 90 tablet 3    oxyCODONE-acetaminophen (PERCOCET) 5-325 mg per tablet Take 2 tablets by mouth every 4 (four) hours as needed for moderate painMax Daily Amount: 12 tablets 14 tablet 0     No current facility-administered medications for this visit       Current Outpatient Medications on File Prior to Visit   Medication Sig    acetaminophen (TYLENOL) 325 mg tablet Take 2 tablets (650 mg total) by mouth every 6 (six) hours as needed for mild pain    albuterol (PROVENTIL HFA,VENTOLIN HFA) 90 mcg/act inhaler Inhale 2 puffs every 4 (four) hours as needed for wheezing    benzonatate (TESSALON) 200 MG capsule Take 1 capsule (200 mg total) by mouth 3 (three) times a day as needed for cough    BLACK COHOSH PO Take by mouth    calcium carbonate (TUMS) 500 mg chewable tablet Chew 1 tablet (500 mg total) 3 (three) times a day as needed for indigestion or heartburn    dextromethorphan-guaiFENesin (ROBITUSSIN DM)  mg/5 mL syrup Take 10 mL by mouth every 4 (four) hours as needed for cough    oxyCODONE (ROXICODONE) 10 MG TABS Take 1 tablet (10 mg total) by mouth every 6 (six) hours as needed for severe pain for up to 10 daysMax Daily Amount: 40 mg    diphenhydrAMINE (BENADRYL) 50 MG tablet Take 1 tablet (50 mg total) by mouth daily at bedtime as needed for itching or sleep (Patient not taking: Reported on 7/30/2021)    melatonin 3 mg Take 2 tablets (6 mg total) by mouth daily at bedtime (Patient not taking: Reported on 7/30/2021)     Current Facility-Administered Medications on File Prior to Visit   Medication    [DISCONTINUED] acetaminophen (TYLENOL) tablet 650 mg    [DISCONTINUED] albuterol (PROVENTIL HFA,VENTOLIN HFA) inhaler 2 puff    [DISCONTINUED] benzonatate (TESSALON PERLES) capsule 200 mg    [DISCONTINUED] calcium carbonate (TUMS) chewable tablet 500 mg    [DISCONTINUED] dextromethorphan-guaiFENesin (ROBITUSSIN DM) oral syrup 10 mL    [DISCONTINUED] diphenhydrAMINE (BENADRYL) tablet 50 mg    [DISCONTINUED] enoxaparin (LOVENOX) subcutaneous injection 40 mg    [DISCONTINUED] hydrALAZINE (APRESOLINE) injection 5 mg    [DISCONTINUED] influenza vaccine, recombinant, quadrivalent (FLUBLOK) IM injection 0 5 mL    [DISCONTINUED] melatonin tablet 6 mg    [DISCONTINUED] morphine injection 2 mg    [DISCONTINUED] ondansetron [facility] injection 4 mg    [DISCONTINUED] oxyCODONE (ROXICODONE) immediate release tablet 10 mg    [DISCONTINUED] oxyCODONE (ROXICODONE) IR tablet 5 mg    [DISCONTINUED] pantoprazole (PROTONIX) EC tablet 40 mg     She is allergic to codeine and sulfa antibiotics       Review of Systems   Constitutional: Positive for appetite change and fatigue  Respiratory: Positive for cough, chest tightness and shortness of breath  Cardiovascular: Positive for chest pain  Genitourinary:        Urinary incontinence     All other systems reviewed and are negative  Objective:      /80 (BP Location: Left arm, Patient Position: Sitting, Cuff Size: Adult)   Pulse (!) 113   Temp 97 7 °F (36 5 °C) (Temporal)   Ht 5' 4" (1 626 m)   Wt 73 kg (161 lb)   SpO2 95%   BMI 27 64 kg/m²          Physical Exam  Vitals and nursing note reviewed  Constitutional:       Appearance: Normal appearance  She is overweight  HENT:      Head: Normocephalic and atraumatic  Mouth/Throat:      Mouth: Mucous membranes are moist    Neck:      Vascular: No carotid bruit  Cardiovascular:      Rate and Rhythm: Normal rate and regular rhythm  Heart sounds: Normal heart sounds  Pulmonary:      Effort: Pulmonary effort is normal       Breath sounds: Normal breath sounds  Abdominal:      General: Abdomen is flat  Bowel sounds are normal       Palpations: Abdomen is soft  Tenderness: There is no abdominal tenderness  Musculoskeletal:         General: Normal range of motion  Cervical back: Normal range of motion  Right lower leg: No edema  Left lower leg: No edema  Skin:     General: Skin is warm and dry  Neurological:      General: No focal deficit present  Mental Status: She is alert and oriented to person, place, and time  Psychiatric:         Mood and Affect: Mood normal          Behavior: Behavior normal          Thought Content:  Thought content normal          Judgment: Judgment normal

## 2021-07-31 ENCOUNTER — TELEPHONE (OUTPATIENT)
Dept: OTHER | Facility: OTHER | Age: 56
End: 2021-07-31

## 2021-08-02 ENCOUNTER — TELEPHONE (OUTPATIENT)
Dept: GASTROENTEROLOGY | Facility: CLINIC | Age: 56
End: 2021-08-02

## 2021-08-02 ENCOUNTER — LAB (OUTPATIENT)
Dept: LAB | Facility: CLINIC | Age: 56
End: 2021-08-02
Payer: COMMERCIAL

## 2021-08-02 ENCOUNTER — TELEPHONE (OUTPATIENT)
Dept: PULMONOLOGY | Facility: CLINIC | Age: 56
End: 2021-08-02

## 2021-08-02 DIAGNOSIS — C78.7 MALIGNANT NEOPLASM METASTATIC TO LIVER (HCC): ICD-10-CM

## 2021-08-02 DIAGNOSIS — Z13.6 ENCOUNTER FOR LIPID SCREENING FOR CARDIOVASCULAR DISEASE: ICD-10-CM

## 2021-08-02 DIAGNOSIS — R53.0 NEOPLASTIC MALIGNANT RELATED FATIGUE: ICD-10-CM

## 2021-08-02 DIAGNOSIS — Z13.220 ENCOUNTER FOR LIPID SCREENING FOR CARDIOVASCULAR DISEASE: ICD-10-CM

## 2021-08-02 LAB
ALBUMIN SERPL BCP-MCNC: 3.1 G/DL (ref 3.5–5)
ALP SERPL-CCNC: 143 U/L (ref 46–116)
ALT SERPL W P-5'-P-CCNC: 29 U/L (ref 12–78)
ANION GAP SERPL CALCULATED.3IONS-SCNC: 7 MMOL/L (ref 4–13)
AST SERPL W P-5'-P-CCNC: 32 U/L (ref 5–45)
BASOPHILS # BLD AUTO: 0.07 THOUSANDS/ΜL (ref 0–0.1)
BASOPHILS NFR BLD AUTO: 1 % (ref 0–1)
BILIRUB SERPL-MCNC: 0.51 MG/DL (ref 0.2–1)
BUN SERPL-MCNC: 11 MG/DL (ref 5–25)
CALCIUM ALBUM COR SERPL-MCNC: 9.7 MG/DL (ref 8.3–10.1)
CALCIUM SERPL-MCNC: 9 MG/DL (ref 8.3–10.1)
CHLORIDE SERPL-SCNC: 106 MMOL/L (ref 100–108)
CHOLEST SERPL-MCNC: 236 MG/DL (ref 50–200)
CO2 SERPL-SCNC: 25 MMOL/L (ref 21–32)
CREAT SERPL-MCNC: 0.84 MG/DL (ref 0.6–1.3)
EOSINOPHIL # BLD AUTO: 0.19 THOUSAND/ΜL (ref 0–0.61)
EOSINOPHIL NFR BLD AUTO: 4 % (ref 0–6)
ERYTHROCYTE [DISTWIDTH] IN BLOOD BY AUTOMATED COUNT: 14.3 % (ref 11.6–15.1)
GFR SERPL CREATININE-BSD FRML MDRD: 80 ML/MIN/1.73SQ M
GLUCOSE P FAST SERPL-MCNC: 94 MG/DL (ref 65–99)
HCT VFR BLD AUTO: 45.3 % (ref 34.8–46.1)
HDLC SERPL-MCNC: 38 MG/DL
HGB BLD-MCNC: 14.6 G/DL (ref 11.5–15.4)
IMM GRANULOCYTES # BLD AUTO: 0.02 THOUSAND/UL (ref 0–0.2)
IMM GRANULOCYTES NFR BLD AUTO: 0 % (ref 0–2)
LDLC SERPL CALC-MCNC: 163 MG/DL (ref 0–100)
LYMPHOCYTES # BLD AUTO: 0.71 THOUSANDS/ΜL (ref 0.6–4.47)
LYMPHOCYTES NFR BLD AUTO: 13 % (ref 14–44)
MCH RBC QN AUTO: 30.7 PG (ref 26.8–34.3)
MCHC RBC AUTO-ENTMCNC: 32.2 G/DL (ref 31.4–37.4)
MCV RBC AUTO: 95 FL (ref 82–98)
MONOCYTES # BLD AUTO: 0.43 THOUSAND/ΜL (ref 0.17–1.22)
MONOCYTES NFR BLD AUTO: 8 % (ref 4–12)
NEUTROPHILS # BLD AUTO: 3.93 THOUSANDS/ΜL (ref 1.85–7.62)
NEUTS SEG NFR BLD AUTO: 74 % (ref 43–75)
NONHDLC SERPL-MCNC: 198 MG/DL
NRBC BLD AUTO-RTO: 0 /100 WBCS
PLATELET # BLD AUTO: 263 THOUSANDS/UL (ref 149–390)
PMV BLD AUTO: 12.1 FL (ref 8.9–12.7)
POTASSIUM SERPL-SCNC: 4 MMOL/L (ref 3.5–5.3)
PROT SERPL-MCNC: 6.9 G/DL (ref 6.4–8.2)
RBC # BLD AUTO: 4.75 MILLION/UL (ref 3.81–5.12)
SODIUM SERPL-SCNC: 138 MMOL/L (ref 136–145)
TRIGL SERPL-MCNC: 174 MG/DL
TSH SERPL DL<=0.05 MIU/L-ACNC: 3.05 UIU/ML (ref 0.36–3.74)
WBC # BLD AUTO: 5.35 THOUSAND/UL (ref 4.31–10.16)

## 2021-08-02 PROCEDURE — 36415 COLL VENOUS BLD VENIPUNCTURE: CPT

## 2021-08-02 PROCEDURE — 85025 COMPLETE CBC W/AUTO DIFF WBC: CPT

## 2021-08-02 PROCEDURE — 80053 COMPREHEN METABOLIC PANEL: CPT

## 2021-08-02 PROCEDURE — 80061 LIPID PANEL: CPT

## 2021-08-02 PROCEDURE — 84443 ASSAY THYROID STIM HORMONE: CPT

## 2021-08-02 RX ORDER — OXYBUTYNIN CHLORIDE 5 MG/1
TABLET, EXTENDED RELEASE ORAL
Qty: 90 TABLET | Refills: 3 | Status: SHIPPED | OUTPATIENT
Start: 2021-08-02 | End: 2021-08-03

## 2021-08-02 NOTE — TELEPHONE ENCOUNTER
Daljit Navarro patient - [facility] for patient to RTRN call to [phone], option 1 to attempt to schedule OV    Thx

## 2021-08-02 NOTE — TELEPHONE ENCOUNTER
Called pt and advised that she needs to go get an xray before her appt on 8/10  She will go to [facility] end of week

## 2021-08-02 NOTE — TELEPHONE ENCOUNTER
----- Message from Miguel Null MD sent at 7/30/2021 11:08 AM EDT -----  yes  ----- Message -----  From: Erika Torres  Sent: 7/28/2021   2:02 PM EDT  To: Miguel Null MD, Vasile Morelos,     Are you ok to squeeze this patient in next week at a 8:20 spot? Please advise  Thank you    ----- Message -----  From: Anabell Dougherty PA-C  Sent: 7/28/2021   1:29 PM EDT  To: Vasile Hunt    Can one of you please schedule patient for hospital follow-up next week?  Thank you

## 2021-08-03 DIAGNOSIS — R39.81 FUNCTIONAL URINARY INCONTINENCE: ICD-10-CM

## 2021-08-03 RX ORDER — OXYBUTYNIN CHLORIDE 5 MG/1
TABLET, EXTENDED RELEASE ORAL
Qty: 90 TABLET | Refills: 3 | Status: SHIPPED | OUTPATIENT
Start: 2021-08-03 | End: 2021-08-03 | Stop reason: SDUPTHER

## 2021-08-03 RX ORDER — OXYBUTYNIN CHLORIDE 5 MG/1
5 TABLET, EXTENDED RELEASE ORAL DAILY
Qty: 90 TABLET | Refills: 3 | Status: SHIPPED | OUTPATIENT
Start: 2021-08-03

## 2021-08-04 ENCOUNTER — TELEPHONE (OUTPATIENT)
Dept: INTERNAL MEDICINE CLINIC | Facility: CLINIC | Age: 56
End: 2021-08-04

## 2021-08-04 NOTE — TELEPHONE ENCOUNTER
----- Message from Golden Quinteros MD sent at 8/4/2021 10:00 AM EDT -----  Referral placed for gyn  ----- Message -----  From: Rossy Alvarez  Sent: 8/4/2021   9:32 AM EDT  To: Golden Quinteros MD    Patient aware and agreed with the plan   Additionally she would like a referral for a GYN, she had a pap smear and came back abnormal ty

## 2021-08-05 ENCOUNTER — OFFICE VISIT (OUTPATIENT)
Dept: HEMATOLOGY ONCOLOGY | Facility: CLINIC | Age: 56
End: 2021-08-05
Payer: COMMERCIAL

## 2021-08-05 VITALS
BODY MASS INDEX: 27.31 KG/M2 | DIASTOLIC BLOOD PRESSURE: 74 MMHG | SYSTOLIC BLOOD PRESSURE: 108 MMHG | RESPIRATION RATE: 16 BRPM | HEIGHT: 64 IN | WEIGHT: 160 LBS | HEART RATE: 102 BPM | OXYGEN SATURATION: 96 % | TEMPERATURE: 98.8 F

## 2021-08-05 DIAGNOSIS — Z17.0 MALIGNANT NEOPLASM OF BREAST IN FEMALE, ESTROGEN RECEPTOR POSITIVE, UNSPECIFIED LATERALITY, UNSPECIFIED SITE OF BREAST (HCC): Primary | ICD-10-CM

## 2021-08-05 DIAGNOSIS — C78.7 MALIGNANT NEOPLASM METASTATIC TO LIVER (HCC): ICD-10-CM

## 2021-08-05 DIAGNOSIS — C50.919 MALIGNANT NEOPLASM OF BREAST IN FEMALE, ESTROGEN RECEPTOR POSITIVE, UNSPECIFIED LATERALITY, UNSPECIFIED SITE OF BREAST (HCC): Primary | ICD-10-CM

## 2021-08-05 PROCEDURE — 1036F TOBACCO NON-USER: CPT | Performed by: INTERNAL MEDICINE

## 2021-08-05 PROCEDURE — 99215 OFFICE O/P EST HI 40 MIN: CPT | Performed by: INTERNAL MEDICINE

## 2021-08-05 NOTE — PROGRESS NOTES
Hematology/Oncology Consult Note    Date of Service: 8/5/2021    [facility] [address] [address] [phone]    Reason for Consultation:   Metastatic right-sided breast cancer, ER and IA positive, HER2 negative  Cancer Stage: stage IV    Referral Physician: Pedro Griffin NP    Oncology/Hematology History:   History of right-sided breast cancer, initially diagnosed in 2012, status post lumpectomy and axillary lymph node dissection, status post adjuvant radiation therapy  Patient did not receive any adjuvant endocrine therapy or chemotherapy   July 24, 2021, patient was admitted to hospital because of shortness of breath and cough   July 24, 2021 CT chest PE protocol showed septal thickening throughout the right lung and bronchial wall thickening due to lymphangitic spread of tumor  Indeterminate lung nodules measuring 5 millimeter in the right upper lobe, right middle lobe and 7 millimeter in the left upper lobe and left lower lobe  Large right pleural effusion  Multiple liver metastatic disease measuring up to 4 centimeter  Lytic metastatic to sternal manubrium   July 26, 2021, ultrasound abdomen shows multiple hepatic metastatic disease  Status post thoracentesis with 1200 mL of joanne fluid removed  Cytology showed adenoma carcinoma, ER and IA positive  40-50%, HER2 negative   July 26, 2021 biopsy of the lung liver showed metastatic breast cancer, ER and % positive, HER2 negative  CT abdomen pelvis with contrast showed extensive hepatic metastatic disease   July 27, 2021 MRI brain showed no evidence of intracranial metastatic disease       Assessment and Recommendations:     I personally reviewed the lab results,  the image studies,  pathology, other specialty/physicians consult notes and recommendations, and outside medical records   I had a lengthy discussion with the patient and shared the work-up findings  We discussed the diagnosis and management plan as below  1   Metastatic breast cancer, with liver, bone, lymph node and pleura involvement  ER and IA positive, HER2 negative  I will get a bone scan to finish the   Staging workup  Last period was 4-5  Months ago  Patient will benefit from endocrine therapy and CDK4/6 inhibitor until disease progression as per NCCN guideline  Patient will start Darzalex 3 6 mg monthly+ letrozole 1 mg once daily and Abemeciclib  150mg bid and disease progression  2  Bony metastatic disease  A follow-up bone scan result  Likely patient will start Xgeva monthly for bone health  3   history of right breast cancer, status post lumpectomy and axillary lymph node dissection, status post adjuvant radiation therapy  4  Right-sided pleural effusion, status post thoracentesis, cytology positive for adenocarcinoma, ER/IA positive, HER2 negative  Will continue to treat the underlying disease  Thoracentesis as needed  5  Follow-up: Return to clinic in 2-3 weeks  Thank you very much for your consultation and making us part of this nice patient's care  I will continue to follow closely with you  Please contact me with any additional questions  Disclaimer: This document was prepared using [employer] Fluency Direct technology  If a word or phrase is confusing, or does not make sense, this is likely due to recognition error which was not discovered during the providers review  If you believe an error has occurred, please contact me through [employer] service for roula? cation  HPI:   Yoana Wolf is a 50 y o  female with a past medical history of  right-sided breast cancer, ER and IA positive, HER2 negative, initially diagnosed in 2012, status post lumpectomy and axillary lymph node dissection, status post adjuvant radiation therapy  Patient did not receive any adjuvant endocrine therapy or chemotherapy as per patient  Patient has been doing fine  She was admitted to  Hospital in July 24, 2021 because of shortness of breath and cough  CT scan showed septal thickening throughout the right lung and bronchial wall thickening,  Suspicious for lymphangitic spread of tumor  Indeterminate small lung nodules in the right lung and large right pleural effusion  There are multiple liver metastatic disease measuring up to 4 cm  Lytic metastatic to sternal manubrium is also noticed  Status post thoracentesis  Cytology showed adenocarcinoma with breast origin  ER and NM positive, HER2 negative  Biopsy from the liver showed a strongly positive ER and NM and HER2 negative adenocarcinoma  MRI brain negative for intracranial metastatic disease  The patient is referred to Medical Oncology for management of metastatic breast cancer  Patient feels better  Her shortness of breath significantly improved after thoracentesis  Patient denied fever or chills  No cough or hemoptysis  Denies exertional chest pain or diaphoresis  No palpitation  No headache or blurred vision  Patient denies GI or  symptoms  Appetite is fair  No significant weight loss or weight gain  The patient denies bleeding anywhere      Review of system:  12-point review of system was performed, pertinent positive and negative were detailed as above    Past Medical History:   Diagnosis Date    History of radiation exposure     Malignant neoplasm of right female breast ([facility] [address] [facility] 2012    right       Past Surgical History:   Procedure Laterality Date    BREAST CYST EXCISION      BREAST LUMPECTOMY Right 2012    HIP SURGERY      IR BIOPSY LIVER MASS  7/26/2021    IR THORACENTESIS  7/26/2021       Family History   Problem Relation Age of Onset    Breast cancer Mother         in her 63's    Breast cancer Sister         inher 45s and 48    Colon cancer Maternal Grandmother     No Known Problems Paternal Grandmother     Breast cancer Other     No Known Problems Paternal Aunt        Social History     Socioeconomic History    Marital status: /Civil Union     Spouse name: Not on file    Number of children: Not on file    Years of education: Not on file    Highest education level: Not on file   Occupational History    Not on file   Tobacco Use    Smoking status: Former Smoker     Packs/day: 0 25     Types: Cigarettes    Smokeless tobacco: Never Used   Vaping Use    Vaping Use: Never used   Substance and Sexual Activity    Alcohol use: Not Currently    Drug use: Not Currently    Sexual activity: Not Currently   Other Topics Concern    Not on file   Social History Narrative    Not on file     Social Determinants of Health     Financial Resource Strain:     Difficulty of Paying Living Expenses:    Food Insecurity:     Worried About Running Out of Food in the Last Year:     [address] in the Last Year:    Transportation Needs:     Lack of Transportation (Medical):  Lack of Transportation (Non-Medical):    Physical Activity:     Days of Exercise per Week:     Minutes of Exercise per Session:    Stress:     Feeling of Stress :    Social Connections:     Frequency of Communication with Friends and Family:     Frequency of Social Gatherings with Friends and Family:     Attends Sikh Services:     Active Member of Clubs or Organizations:     Attends Club or Organization Meetings:     Marital Status:    Intimate Partner Violence:     Fear of Current or Ex-Partner:     Emotionally Abused:     Physically Abused:     Sexually Abused:         Allergies   Allergen Reactions    Codeine Anaphylaxis    Sulfa Antibiotics Hives       Current Outpatient Medications   Medication Sig Dispense Refill    acetaminophen (TYLENOL) 325 mg tablet Take 2 tablets (650 mg total) by mouth every 6 (six) hours as needed for mild pain 30 tablet 0    albuterol (PROVENTIL HFA,VENTOLIN HFA) 90 mcg/act inhaler Inhale 2 puffs every 4 (four) hours as needed for wheezing 8 g 0    benzonatate (TESSALON) 200 MG capsule Take 1 capsule (200 mg total) by mouth 3 (three) times a day as needed for cough 20 capsule 0    BLACK COHOSH PO Take by mouth      calcium carbonate (TUMS) 500 mg chewable tablet Chew 1 tablet (500 mg total) 3 (three) times a day as needed for indigestion or heartburn 30 tablet 0    ergocalciferol (VITAMIN D2) 50,000 units Take 1 capsule (50,000 Units total) by mouth once a week for 12 doses 4 capsule 2    Incontinence Supply Disposable ([facility] health Incontinence Pads) MISC Use 150 pad 3 (three) times a day 150 each 3    oxybutynin (DITROPAN-XL) 5 mg 24 hr tablet Take 1 tablet (5 mg total) by mouth daily Take 1 tablet daily 90 tablet 3    oxyCODONE-acetaminophen (PERCOCET) 5-325 mg per tablet Take 2 tablets by mouth every 4 (four) hours as needed for moderate painMax Daily Amount: 12 tablets 14 tablet 0    dextromethorphan-guaiFENesin (ROBITUSSIN DM)  mg/5 mL syrup Take 10 mL by mouth every 4 (four) hours as needed for cough (Patient not taking: Reported on 8/5/2021) 236 mL 0    diphenhydrAMINE (BENADRYL) 50 MG tablet Take 1 tablet (50 mg total) by mouth daily at bedtime as needed for itching or sleep (Patient not taking: Reported on 7/30/2021) 30 tablet 0    melatonin 3 mg Take 2 tablets (6 mg total) by mouth daily at bedtime (Patient not taking: Reported on 7/30/2021) 15 tablet 0    oxyCODONE (ROXICODONE) 10 MG TABS Take 1 tablet (10 mg total) by mouth every 6 (six) hours as needed for severe pain for up to 10 daysMax Daily Amount: 40 mg (Patient not taking: Reported on 8/5/2021) 30 tablet 0     No current facility-administered medications for this visit  Objective:     24 Hour Vitals Assessment:     Vitals:    08/05/21 1309   BP: 108/74   Pulse: 102   Resp: 16   Temp: 98 8 °F (37 1 °C)   SpO2: 96%       PHYSICIAN EXAM:    General: Appearance: alert, cooperative, no distress  HEENT: Normocephalic, atraumatic  No scleral icterus  conjunctivae clear  PERRL, EOM's intact  No sinus drainage or tenderness  Chest: No tenderness  Breasts:  Bilateral breast symmetrical  No skin change  No nipple retraction or abnormal discharge  No palpable mass  No palpable axillary LN enlargement  Lungs: Clear to auscultation bilaterally, Respirations unlabored  Cardiac: Regular rate and rhythm, S1and S2 are normal, no murmur, click, rubs or gallops  Abdomen: Soft, non-tender, non-distended  Bowel sounds are normal  No masses, no organomegaly  Pelvic: deferred  Extremities:  No edema, cyanosis, clubbing  Skin: Skin color, turgor are normal  No rashes  Lymphatics: no palpable adenopathy  Neurologic: Alert and oriented X 3, Cranial nerve 2-12 grosely intact  Normal strength and sensation  DATA REVIEW:    Pathology Result:    Final Diagnosis   Date Value Ref Range Status   07/26/2021   Final    A  B  Thoracentesis, Right (ThinPrep and cell block preparations):  Positive for malignancy  Metastatic carcinoma, compatible with breast primary  -  Immunohistochemical stains performed with appropriate controls show the tumor cells to be positive for Matthew-EP4, MOC31, BARRY-3 and ER with scattered background mesothelial cells staining for WT1 and calretinin, supporting the diagnosis  Satisfactory for evaluation  07/26/2021   Final    A  Liver (core biopsy):     - Poorly-differentiated carcinoma, most compatible with metastasis from the patient's reported breast primary  Comment:  ER / AL / Her-2 pending  Comment: This is an appended report  These results have been appended to a previously preliminary verified report  Image Results:   Image result are reviewed and documented in Hematology/Oncology history    XR chest portable  Narrative: CHEST     INDICATION:   r/o recurrence of Pleural effusion; s/p 1200 ml removal via thoracentesis      COMPARISON:  7/26/2021 ultrasound; 9/20/2014 chest x-ray    EXAM PERFORMED/VIEWS:  XR CHEST PORTABLE  Single view    FINDINGS:  Diminished small right pleural effusion status post thoracentesis  Subsegmental atelectasis or infiltrate right lung base    Cardiomediastinal silhouette appears unremarkable  No pneumothorax    Osseous structures appear within normal limits for patient age  Impression: Diminished small right pleural effusion status post thoracentesis    Subsegmental atelectasis/infiltrate right lung base    Workstation performed: [id number]      LABS:  Lab data are reviewed and documented in HemOnc history  No results found for this or any previous visit (from the past 72 hour(s))      By:  Key Muñoz MD, 8/6/2021, 2:02 PM                                  Primary Care Physician:  Clyde Renteria MD

## 2021-08-06 ENCOUNTER — HOSPITAL ENCOUNTER (OUTPATIENT)
Dept: MAMMOGRAPHY | Facility: HOSPITAL | Age: 56
Discharge: HOME | End: 2021-08-06
Payer: COMMERCIAL

## 2021-08-06 VITALS — WEIGHT: 160 LBS | HEIGHT: 64 IN | BODY MASS INDEX: 27.31 KG/M2

## 2021-08-06 DIAGNOSIS — Z12.31 ENCOUNTER FOR SCREENING MAMMOGRAM FOR MALIGNANT NEOPLASM OF BREAST: ICD-10-CM

## 2021-08-06 PROCEDURE — 77067 SCR MAMMO BI INCL CAD: CPT

## 2021-08-06 PROCEDURE — 77063 BREAST TOMOSYNTHESIS BI: CPT

## 2021-08-09 ENCOUNTER — DOCUMENTATION (OUTPATIENT)
Dept: HEMATOLOGY ONCOLOGY | Facility: CLINIC | Age: 56
End: 2021-08-09

## 2021-08-09 ENCOUNTER — HOSPITAL ENCOUNTER (OUTPATIENT)
Dept: RADIOLOGY | Facility: HOSPITAL | Age: 56
Discharge: HOME | End: 2021-08-09
Payer: COMMERCIAL

## 2021-08-09 DIAGNOSIS — J90 PLEURAL EFFUSION: ICD-10-CM

## 2021-08-09 PROCEDURE — 71046 X-RAY EXAM CHEST 2 VIEWS: CPT

## 2021-08-09 NOTE — PROGRESS NOTES
Received request from clinical for patient to start on Letrozole 2 5 mg daily   Abemaciclib 150mg twice a day     Help Desk- [phone]    ID- [id number]  BIN: [id number] / N: [id number]  Group- [id number]      Auth has been submitted and pending via cover my meds    (Key: [id number])  (Letrozole) THIS HAS BEEN APPROVED        (Key: [id number])  (Verzenio)    This has also been approved for 12 months from 8/10/2021-8/10/2022

## 2021-08-10 ENCOUNTER — TELEPHONE (OUTPATIENT)
Dept: GASTROENTEROLOGY | Facility: CLINIC | Age: 56
End: 2021-08-10

## 2021-08-10 ENCOUNTER — OFFICE VISIT (OUTPATIENT)
Dept: PULMONOLOGY | Facility: CLINIC | Age: 56
End: 2021-08-10
Payer: COMMERCIAL

## 2021-08-10 VITALS
WEIGHT: 160 LBS | OXYGEN SATURATION: 94 % | HEART RATE: 76 BPM | TEMPERATURE: 97.5 F | DIASTOLIC BLOOD PRESSURE: 68 MMHG | BODY MASS INDEX: 27.31 KG/M2 | SYSTOLIC BLOOD PRESSURE: 102 MMHG | HEIGHT: 64 IN

## 2021-08-10 DIAGNOSIS — C50.919 MALIGNANT NEOPLASM OF BREAST IN FEMALE, ESTROGEN RECEPTOR POSITIVE, UNSPECIFIED LATERALITY, UNSPECIFIED SITE OF BREAST (HCC): ICD-10-CM

## 2021-08-10 DIAGNOSIS — Z17.0 MALIGNANT NEOPLASM OF BREAST IN FEMALE, ESTROGEN RECEPTOR POSITIVE, UNSPECIFIED LATERALITY, UNSPECIFIED SITE OF BREAST (HCC): ICD-10-CM

## 2021-08-10 DIAGNOSIS — J91.0 MALIGNANT PLEURAL EFFUSION: Primary | ICD-10-CM

## 2021-08-10 PROCEDURE — 99214 OFFICE O/P EST MOD 30 MIN: CPT | Performed by: PHYSICIAN ASSISTANT

## 2021-08-10 PROCEDURE — 3008F BODY MASS INDEX DOCD: CPT | Performed by: INTERNAL MEDICINE

## 2021-08-10 NOTE — TELEPHONE ENCOUNTER
Dr Ruby Kirkland - patient called referred to have colon screening  Called lmom for patient to call the office

## 2021-08-10 NOTE — PROGRESS NOTES
Assessment/Plan:   Diagnoses and all orders for this visit:    Malignant pleural effusion  -     Ambulatory referral to Interventional Radiology; Future    Malignant neoplasm of breast in female, estrogen receptor positive, unspecified laterality, unspecified site of breast [facility])       Patient is here today for hospital follow-up  Recent hospitalization for increasing shortness of and cough done to have large right-sided pleural effusion, lymphangitis spread cancer, liver metastases  Lung biopsy and cytology from pleural fluid were both consistent with her prior breast cancer  She is due to begin chemotherapy tomorrow  She has had increasing shortness of breath and coughing fits likely related to reaccumulation of right-sided pleural fluid  Discussed with her placement a PleurX catheter given rapid accumulation and worsening symptoms  Catheter would be less risky than repeated thoracentesis given risk of pneumothorax  Will put order in for IR consult for PleurX catheter  Her  is willing to learn how to drain the catheter himself  She will follow up with us in 4 weeks or sooner if necessary  Return in about 4 weeks (around 9/7/2021)  All questions are answered to the patient's satisfaction and understanding  She verbalizes understanding  She is encouraged to call with any further questions or concerns  Portions of the record may have been created with voice recognition software  Occasional wrong word or "sound a like" substitutions may have occurred due to the inherent limitations of voice recognition software  Read the chart carefully and recognize, using context, where substitutions have occurred  Electronically Signed by Xavi Lr PA-C    ______________________________________________________________________    Chief Complaint: No chief complaint on file        Patient ID: Marisel Alan is a 50 y o  y o  female has a past medical history of History of radiation exposure and Malignant neoplasm of right female breast ([facility] [address] ) (2012)  8/10/2021  Patient presents today for follow-up visit  Patient is a 51-year-old female with past medical history of breast cancer initially diagnosed in 2012 status post lumpectomy and radiation as well as tamoxifen  She was recently hospitalized with shortness of breath and cough  She was found to have a pleural effusion as well as liver mets,  Lymphangitic spread of cancer  She had a thoracentesis done and also underwent liver biopsy  She is here today for hospital follow-up  She did initially feel better with the thoracentesis  Has noted over the last several days that her shortness of breath has returned and has been having increasing coughing fits as well  Also complaining of pleuritic pain  She begins chemotherapy tomorrow  Review of Systems   Constitutional: Positive for fatigue  HENT: Negative  Respiratory: Positive for cough and shortness of breath  Cardiovascular: Negative  Gastrointestinal: Positive for abdominal pain  Genitourinary: Negative  Musculoskeletal: Positive for back pain  Skin: Negative  Allergic/Immunologic: Negative  Neurological: Negative  Psychiatric/Behavioral: Negative  Smoking history: She reports that she has quit smoking  Her smoking use included cigarettes  She smoked 0 25 packs per day   She has never used smokeless tobacco     The following portions of the patient's history were reviewed and updated as appropriate: allergies, current medications, past family history, past medical history, past social history, past surgical history and problem list     Immunization History   Administered Date(s) Administered    Influenza, seasonal, injectable 11/19/2013     Current Outpatient Medications   Medication Sig Dispense Refill    acetaminophen (TYLENOL) 325 mg tablet Take 2 tablets (650 mg total) by mouth every 6 (six) hours as needed for mild pain 30 tablet 0    albuterol (PROVENTIL HFA,VENTOLIN HFA) 90 mcg/act inhaler Inhale 2 puffs every 4 (four) hours as needed for wheezing 8 g 0    benzonatate (TESSALON) 200 MG capsule Take 1 capsule (200 mg total) by mouth 3 (three) times a day as needed for cough 20 capsule 0    BLACK COHOSH PO Take by mouth      calcium carbonate (TUMS) 500 mg chewable tablet Chew 1 tablet (500 mg total) 3 (three) times a day as needed for indigestion or heartburn 30 tablet 0    ergocalciferol (VITAMIN D2) 50,000 units Take 1 capsule (50,000 Units total) by mouth once a week for 12 doses 4 capsule 2    Incontinence Supply Disposable ([employer] health Incontinence Pads) MISC Use 150 pad 3 (three) times a day 150 each 3    melatonin 3 mg Take 2 tablets (6 mg total) by mouth daily at bedtime 15 tablet 0    oxybutynin (DITROPAN-XL) 5 mg 24 hr tablet Take 1 tablet (5 mg total) by mouth daily Take 1 tablet daily 90 tablet 3    oxyCODONE-acetaminophen (PERCOCET) 5-325 mg per tablet Take 2 tablets by mouth every 4 (four) hours as needed for moderate painMax Daily Amount: 12 tablets 14 tablet 0    dextromethorphan-guaiFENesin (ROBITUSSIN DM)  mg/5 mL syrup Take 10 mL by mouth every 4 (four) hours as needed for cough (Patient not taking: Reported on 8/5/2021) 236 mL 0    diphenhydrAMINE (BENADRYL) 50 MG tablet Take 1 tablet (50 mg total) by mouth daily at bedtime as needed for itching or sleep (Patient not taking: Reported on 7/30/2021) 30 tablet 0     No current facility-administered medications for this visit  Allergies: Codeine and Sulfa antibiotics    Objective:  Vitals:    08/10/21 1308   BP: 102/68   Pulse: 76   Temp: 97 5 °F (36 4 °C)   SpO2: 94%   Weight: 72 6 kg (160 lb)   Height: 5' 4" (1 626 m)   Oxygen Therapy  SpO2: 94 %    Wt Readings from Last 3 Encounters:   08/10/21 72 6 kg (160 lb)   08/06/21 72 6 kg (160 lb)   08/05/21 72 6 kg (160 lb)     Body mass index is 27 46 kg/m²  Physical Exam  Vitals reviewed     Constitutional: General: She is not in acute distress  Appearance: Normal appearance  She is not ill-appearing  HENT:      Head: Normocephalic and atraumatic  Mouth/Throat:      Pharynx: Oropharynx is clear  Eyes:      Conjunctiva/sclera: Conjunctivae normal       Pupils: Pupils are equal, round, and reactive to light  Cardiovascular:      Rate and Rhythm: Normal rate and regular rhythm  Pulmonary:      Effort: Pulmonary effort is normal  No respiratory distress  Breath sounds: Examination of the right-lower field reveals decreased breath sounds  Decreased breath sounds present  No wheezing, rhonchi or rales  Abdominal:      General: Abdomen is flat  There is no distension  Palpations: Abdomen is soft  Musculoskeletal:         General: Normal range of motion  Cervical back: Normal range of motion  Right lower leg: No edema  Left lower leg: No edema  Skin:     General: Skin is warm and dry  Neurological:      Mental Status: She is alert and oriented to person, place, and time  Psychiatric:         Mood and Affect: Mood normal          Behavior: Behavior normal          Lab Review:   Lab Results   Component Value Date     04/02/2014    K 4 0 08/02/2021    K 3 7 04/02/2014     08/02/2021     04/02/2014    CO2 25 08/02/2021    CO2 29 04/02/2014    BUN 11 08/02/2021    BUN 12 04/02/2014    CREATININE 0 84 08/02/2021    CREATININE 0 80 04/02/2014    GLUCOSE 82 04/02/2014    CALCIUM 9 0 08/02/2021    CALCIUM 8 4 04/02/2014     Lab Results   Component Value Date    WBC 5 35 08/02/2021    HGB 14 6 08/02/2021    HCT 45 3 08/02/2021    MCV 95 08/02/2021     08/02/2021       Diagnostics:  I have personally reviewed pertinent reports     and I have personally reviewed pertinent films in PACS    Reviewed hospital imaging  Office Spirometry Results:     ESS:    XR chest portable    Result Date: 7/28/2021  Narrative: CHEST INDICATION:   r/o recurrence of Pleural effusion; s/p 1200 ml removal via thoracentesis  COMPARISON:  7/26/2021 ultrasound; 9/20/2014 chest x-ray EXAM PERFORMED/VIEWS:  XR CHEST PORTABLE Single view FINDINGS: Diminished small right pleural effusion status post thoracentesis Subsegmental atelectasis or infiltrate right lung base Cardiomediastinal silhouette appears unremarkable  No pneumothorax Osseous structures appear within normal limits for patient age  Impression: Diminished small right pleural effusion status post thoracentesis Subsegmental atelectasis/infiltrate right lung base Workstation performed: [id number]     MRI brain w wo contrast    Result Date: 7/27/2021  Narrative: MRI BRAIN WITH AND WITHOUT CONTRAST INDICATION: above  Headaches, metastatic disease COMPARISON:  None  TECHNIQUE: Sagittal T1, axial T2, axial FLAIR, axial T1, axial [place], axial diffusion  Sagittal, axial T1 postcontrast   Axial bravo postcontrast with coronal reconstructions  IV Contrast:  7 mL of Gadobutrol injection (SINGLE-DOSE)  IMAGE QUALITY:   Diagnostic  FINDINGS: BRAIN PARENCHYMA:  There is no discrete mass, mass effect or midline shift  There is no intracranial hemorrhage  Normal posterior fossa  Diffusion imaging is unremarkable  There are no white matter changes in the cerebral hemispheres  Postcontrast imaging of the brain demonstrates no abnormal enhancement  VENTRICLES:  Normal for the patient's age  SELLA AND PITUITARY GLAND:  Normal  ORBITS:  Normal  PARANASAL SINUSES:  Normal  VASCULATURE:  Evaluation of the major intracranial vasculature demonstrates appropriate flow voids  CALVARIUM AND SKULL BASE:  Moderately advanced left TMJ arthrosis  EXTRACRANIAL SOFT TISSUES:  Normal      Impression: No indication of metastatic disease   Workstation performed: [id number]     IR IN-Patient Thoracentesis    Result Date: 7/26/2021  Narrative: PROCEDURE: Ultrasound-guided right thoracentesis Procedural Personnel Attending physician(s): Dr Faustino Lynn Pre-procedure diagnosis: Breast cancer Post-procedure diagnosis: Same Indication: Patient with history of breast cancer found to have right pleural effusion Complications: No immediate complications  Impression: Ultrasound-guided thoracentesis with drainage of 1200 mL of joanne pleural fluid  Plan: Resume care by clinical team  _______________________________________________________________ PROCEDURE SUMMARY: - Limited thoracic ultrasound - Ultrasound-guided right thoracentesis PROCEDURE DETAILS: Pre-procedure Consent: Informed consent for the procedure including risks, benefits and alternatives was obtained and time-out was performed prior to the procedure  Preparation: The site was prepared and draped using maximal sterile barrier technique including cutaneous antisepsis  Limited thoracic ultrasound Limited thoracic ultrasound was performed using a curved transducer  A safe window for thoracentesis was identified  Right hemithorax findings: Moderate pleural effusion Thoracentesis Local anesthesia was administered  The pleural space was accessed under real-time ultrasound guidance  and fluid return confirmed position  The fluid was drained  The catheter was removed, and a sterile bandage was applied  Catheter placed: 5F Juan Jose Post-drainage hemithorax findings: No visible pleural effusion Additional Details Specimens removed: Pleural fluid Estimated blood loss (mL): None Standardized report: [email or web address] Attestation Signer name: [facility] I attest that I was present for the entire procedure  I reviewed the stored images and agree with the report as written   Workstation performed: [id number]     IR biopsy liver mass    Result Date: 7/26/2021  Narrative: PROCEDURE: Ultrasound-guided liver biopsy Procedural Personnel Attending physician(s): Dr Mary Kate Kearney Pre-procedure diagnosis: Breast cancer Post-procedure diagnosis: Same Indication: Patient with history of breast cancer found to have multiple liver lesions concerning for metastatic disease Complications: No immediate complications  Impression: Ultrasound-guided biopsy of liver lesion near vanessa hepatis  Plan: Specimen(s) sent for evaluation  _______________________________________________________________ PROCEDURE SUMMARY: - Percutaneous US-guided liver biopsy PROCEDURE DETAILS: Pre-procedure Reference imaging for biopsy target: CTA chest 7/24/2021 Consent: Informed consent for the procedure including risks, benefits and alternatives was obtained and time-out was performed prior to the procedure  Preparation: The site was prepared and draped using maximal sterile barrier technique including cutaneous antisepsis  Anesthesia/sedation Level of anesthesia/sedation: Moderate sedation (conscious sedation) Anesthesia/sedation administered by: IR nurse under attending supervision with continuous monitoring of the patient's level of consciousness and physiologic status Total intra-service sedation time (minutes): 50 Imaging prior to biopsy The patient was positioned supine  Initial ultrasound was performed  Biopsy target: - Maximal diameter (cm): 1 8 - Location: Liver near vanessa hepatis Biopsy Local anesthesia was administered  Under US guidance, the biopsy needle was advanced to the target and biopsy was performed  Coaxial needle: 17 gauge Core needle biopsy device: Argon BioPince Core needle size: 18 gauge Number of core specimens: 4 On-site biopsy touch preparation: Yes  Preliminary assessment of sample adequacy: Adequate Needle removal The biopsy needle was removed and a sterile dressing was applied  Tract embolization: D-Stat hemostatic slurry Imaging following biopsy Immediate post-biopsy ultrasound was performed  Post-biopsy imaging findings: No hematoma Additional Details Specimens removed: 4 core needle samples placed into formalin Estimated blood loss (mL): 1 Standardized report: [email or web address] Attestation Signer name: [facility] I attest that I was present for the entire procedure   I reviewed the stored images and agree with the report as written  Workstation performed: Debby Lackey abdomen limited    Result Date: 7/26/2021  Narrative: RIGHT UPPER QUADRANT ULTRASOUND INDICATION:    abdominal pain, positive Delong's sign  COMPARISON:  None  TECHNIQUE:   Real-time ultrasound of the right upper quadrant was performed with a curvilinear transducer with both volumetric sweeps and still imaging techniques  FINDINGS: PANCREAS:  Visualized portions of the pancreas are within normal limits  AORTA AND IVC:  Visualized portions are normal for patient age  LIVER: Size:  Within normal range  The liver measures 15 8 cm in the midclavicular line  Contour:  Surface contour is smooth  Parenchyma:  Echogenicity and echotexture are within normal limits  4 3 x 3 6 x 4 1 cm hypoechoic mass is seen within the left lobe of the liver  3 3 x 2 5 x 3 0 cm subcapsular right lobe lesion is noted  2 3 x 1 8 x 1 7 cm right lobe lesion is present  Lateral right lobe 3 4 x 4 0 x 3 7 cm lesion is present  Lesion within the dome of the right lobe measuring 2 9 x 2 3 x 3 2 cm is noted  Limited imaging of the main portal vein shows it to be patent and hepatopetal   BILIARY: No gallbladder findings  No intrahepatic biliary dilatation  CBD measures 5 mm  No choledocholithiasis  KIDNEY: Right kidney measures 10 2 x 4 4 x 4 3 cm  Subcentimeter simple cyst noted, right kidney is otherwise unremarkable  ASCITES:   None  Impression: Multiple hepatic metastases are identified, see results of recent biopsy  Workstation performed: [id number]     CTA ED chest PE study    Result Date: 7/24/2021  Narrative: CTA - CHEST WITH IV CONTRAST - PULMONARY ANGIOGRAM INDICATION:   "PE suspected, intermediate prob, positive Cough, dyspnea, ? prior right infiltrate elevated ddimer   " Recently diagnosed with bronchitis, started 2nd round of antibiotics  Current every day smoker  History of breast cancer   COMPARISON: Chest radiograph from 9/20/2014  TECHNIQUE: CT angiogram timed for optimal opacification of the pulmonary arteries  Axial, sagittal, and coronal 2D reformats created from source data  Coronal 3D MIP postprocessing on the acquisition scanner  Radiation dose length product (DLP):  298 mGy-cm   Radiation dose exposure minimized using iterative reconstruction and automated exposure control  IV Contrast:  85 mL of iohexol (OMNIPAQUE)  FINDINGS: PULMONARY ARTERIES:  No pulmonary embolus  LUNGS:  No definite primary tumor but with septal thickening throughout the right lung and bronchial wall thickening due to lymphangitic spread of tumor  Indeterminate lung nodules measuring 5 mm in the right upper lobe (3/36) and right middle lobe (3/67), and 7 mm in the left upper lobe (3/38), and left lower lobe (3/70)  AIRWAYS: No significant filling defects  PLEURA:  Large right pleural effusion  HEART/GREAT VESSELS:  Normal heart size  Trace pericardial effusion  MEDIASTINUM AND KIA:  Unremarkable  CHEST WALL AND LOWER NECK: Right axillary lymph node dissection  UPPER ABDOMEN:  Multiple liver metastases, measuring up to 4 cm  OSSEOUS STRUCTURES:  Lytic metastasis to the sternal manubrium (3/38; 603/100)  Impression: No pulmonary embolus  Septal thickening and bronchial wall thickening in the right lung due to lymphangitic spread of tumor, question lung primary or metastatic breast cancer  Multiple hepatic metastases and lytic sternal manubrial metastasis  Large right pleural effusion and small pericardial effusion, likely malignant  A few small bilateral indeterminate lung nodules  I personally discussed this study with Max Butts on 7/24/2021 at 12:06 PM   Workstation performed: [id number]     CT abdomen pelvis w contrast    Result Date: 7/27/2021  Narrative: CT ABDOMEN AND PELVIS WITH IV CONTRAST INDICATION:   Recent finding neoplasm  COMPARISON:  7/26/2021 TECHNIQUE:  CT examination of the abdomen and pelvis was performed   Axial, sagittal, and coronal 2D reformatted images were created from the source data and submitted for interpretation  Radiation dose length product (DLP) for this visit:  656 33 mGy-cm   This examination, like all CT scans performed in the [facility], was performed utilizing techniques to minimize radiation dose exposure, including the use of iterative  reconstruction and automated exposure control  IV Contrast:  100 mL of iohexol (OMNIPAQUE) 50 mL of iohexol (OMNIPAQUE) Enteric Contrast:  Enteric contrast was not administered  FINDINGS: ABDOMEN LOWER CHEST:  Small right pleural effusion is present  LIVER/BILIARY TREE:  Fairly widespread hepatic metastatic disease is identified, largest lesion is within the left lobe measuring 5 0 x 3 3 cm series 2 image 22  Multiple smaller lesions are present  GALLBLADDER:  No calcified gallstones  No pericholecystic inflammatory change  SPLEEN:  Unremarkable  PANCREAS:  Unremarkable  ADRENAL GLANDS:  Unremarkable  KIDNEYS/URETERS:  No hydronephrosis or urinary tract calculus  One or more sharply circumscribed subcentimeter renal hypodensities are present, too small to accurately characterize, and statistically most likely benign findings  According to recent literature (Radiology 2019) no further workup of these findings is recommended  STOMACH AND BOWEL:  Unremarkable  APPENDIX:  A normal appendix was visualized  ABDOMINOPELVIC CAVITY:  No ascites  No pneumoperitoneum  Subcentimeter retroperitoneal lymph nodes are present  VESSELS:  Unremarkable for patient's age  PELVIS REPRODUCTIVE ORGANS:  Unremarkable for patient's age  URINARY BLADDER:  Unremarkable  ABDOMINAL WALL/INGUINAL REGIONS:  Unremarkable  OSSEOUS STRUCTURES:  No acute fracture or destructive osseous lesion  Impression: Extensive hepatic metastatic disease   Workstation performed: [id number]

## 2021-08-11 ENCOUNTER — APPOINTMENT (OUTPATIENT)
Dept: LAB | Facility: CLINIC | Age: 56
End: 2021-08-11
Payer: COMMERCIAL

## 2021-08-11 ENCOUNTER — HOSPITAL ENCOUNTER (OUTPATIENT)
Dept: NUCLEAR MEDICINE | Facility: HOSPITAL | Age: 56
Discharge: HOME | End: 2021-08-11
Attending: INTERNAL MEDICINE
Payer: COMMERCIAL

## 2021-08-11 DIAGNOSIS — Z17.0 MALIGNANT NEOPLASM OF BREAST IN FEMALE, ESTROGEN RECEPTOR POSITIVE, UNSPECIFIED LATERALITY, UNSPECIFIED SITE OF BREAST (HCC): ICD-10-CM

## 2021-08-11 DIAGNOSIS — C50.919 MALIGNANT NEOPLASM OF BREAST IN FEMALE, ESTROGEN RECEPTOR POSITIVE, UNSPECIFIED LATERALITY, UNSPECIFIED SITE OF BREAST (HCC): ICD-10-CM

## 2021-08-11 DIAGNOSIS — Z17.0 MALIGNANT NEOPLASM OF BREAST IN FEMALE, ESTROGEN RECEPTOR POSITIVE, UNSPECIFIED LATERALITY, UNSPECIFIED SITE OF BREAST (HCC): Primary | ICD-10-CM

## 2021-08-11 DIAGNOSIS — C50.919 MALIGNANT NEOPLASM OF BREAST IN FEMALE, ESTROGEN RECEPTOR POSITIVE, UNSPECIFIED LATERALITY, UNSPECIFIED SITE OF BREAST (HCC): Primary | ICD-10-CM

## 2021-08-11 LAB
ALBUMIN SERPL BCP-MCNC: 3.3 G/DL (ref 3.5–5)
ALP SERPL-CCNC: 159 U/L (ref 46–116)
ALT SERPL W P-5'-P-CCNC: 17 U/L (ref 12–78)
ANION GAP SERPL CALCULATED.3IONS-SCNC: 12 MMOL/L (ref 4–13)
AST SERPL W P-5'-P-CCNC: 35 U/L (ref 5–45)
BASOPHILS # BLD AUTO: 0.07 THOUSANDS/ΜL (ref 0–0.1)
BASOPHILS NFR BLD AUTO: 1 % (ref 0–1)
BILIRUB SERPL-MCNC: 0.44 MG/DL (ref 0.2–1)
BUN SERPL-MCNC: 9 MG/DL (ref 5–25)
CALCIUM ALBUM COR SERPL-MCNC: 9.7 MG/DL (ref 8.3–10.1)
CALCIUM SERPL-MCNC: 9.1 MG/DL (ref 8.3–10.1)
CHLORIDE SERPL-SCNC: 104 MMOL/L (ref 100–108)
CO2 SERPL-SCNC: 23 MMOL/L (ref 21–32)
CREAT SERPL-MCNC: 0.97 MG/DL (ref 0.6–1.3)
EOSINOPHIL # BLD AUTO: 0.16 THOUSAND/ΜL (ref 0–0.61)
EOSINOPHIL NFR BLD AUTO: 3 % (ref 0–6)
ERYTHROCYTE [DISTWIDTH] IN BLOOD BY AUTOMATED COUNT: 14.2 % (ref 11.6–15.1)
GFR SERPL CREATININE-BSD FRML MDRD: 67 ML/MIN/1.73SQ M
GLUCOSE P FAST SERPL-MCNC: 96 MG/DL (ref 65–99)
HCT VFR BLD AUTO: 44.6 % (ref 34.8–46.1)
HGB BLD-MCNC: 14.5 G/DL (ref 11.5–15.4)
IMM GRANULOCYTES # BLD AUTO: 0.01 THOUSAND/UL (ref 0–0.2)
IMM GRANULOCYTES NFR BLD AUTO: 0 % (ref 0–2)
LYMPHOCYTES # BLD AUTO: 0.78 THOUSANDS/ΜL (ref 0.6–4.47)
LYMPHOCYTES NFR BLD AUTO: 15 % (ref 14–44)
MCH RBC QN AUTO: 30.8 PG (ref 26.8–34.3)
MCHC RBC AUTO-ENTMCNC: 32.5 G/DL (ref 31.4–37.4)
MCV RBC AUTO: 95 FL (ref 82–98)
MONOCYTES # BLD AUTO: 0.45 THOUSAND/ΜL (ref 0.17–1.22)
MONOCYTES NFR BLD AUTO: 9 % (ref 4–12)
NEUTROPHILS # BLD AUTO: 3.62 THOUSANDS/ΜL (ref 1.85–7.62)
NEUTS SEG NFR BLD AUTO: 72 % (ref 43–75)
NRBC BLD AUTO-RTO: 0 /100 WBCS
PLATELET # BLD AUTO: 340 THOUSANDS/UL (ref 149–390)
PMV BLD AUTO: 11.9 FL (ref 8.9–12.7)
POTASSIUM SERPL-SCNC: 4 MMOL/L (ref 3.5–5.3)
PROT SERPL-MCNC: 7.4 G/DL (ref 6.4–8.2)
RBC # BLD AUTO: 4.71 MILLION/UL (ref 3.81–5.12)
SODIUM SERPL-SCNC: 139 MMOL/L (ref 136–145)
WBC # BLD AUTO: 5.09 THOUSAND/UL (ref 4.31–10.16)

## 2021-08-11 PROCEDURE — 36415 COLL VENOUS BLD VENIPUNCTURE: CPT

## 2021-08-11 PROCEDURE — A9503 TC99M MEDRONATE: HCPCS

## 2021-08-11 PROCEDURE — 80053 COMPREHEN METABOLIC PANEL: CPT

## 2021-08-11 PROCEDURE — G1004 CDSM NDSC: HCPCS

## 2021-08-11 PROCEDURE — 85025 COMPLETE CBC W/AUTO DIFF WBC: CPT

## 2021-08-11 PROCEDURE — 78306 BONE IMAGING WHOLE BODY: CPT

## 2021-08-11 RX ORDER — LETROZOLE 2.5 MG/1
2.5 TABLET, FILM COATED ORAL DAILY
Qty: 90 TABLET | Refills: 1 | Status: SHIPPED | OUTPATIENT
Start: 2021-08-11 | End: 2022-02-15

## 2021-08-12 ENCOUNTER — HOSPITAL ENCOUNTER (OUTPATIENT)
Dept: INFUSION CENTER | Facility: CLINIC | Age: 56
Discharge: HOME | End: 2021-08-12
Payer: COMMERCIAL

## 2021-08-12 ENCOUNTER — TELEPHONE (OUTPATIENT)
Dept: PULMONOLOGY | Facility: CLINIC | Age: 56
End: 2021-08-12

## 2021-08-12 ENCOUNTER — DOCUMENTATION (OUTPATIENT)
Dept: PULMONOLOGY | Facility: CLINIC | Age: 56
End: 2021-08-12

## 2021-08-12 DIAGNOSIS — Z17.0 MALIGNANT NEOPLASM OF BREAST IN FEMALE, ESTROGEN RECEPTOR POSITIVE, UNSPECIFIED LATERALITY, UNSPECIFIED SITE OF BREAST (HCC): ICD-10-CM

## 2021-08-12 DIAGNOSIS — C78.7 MALIGNANT NEOPLASM METASTATIC TO LIVER (HCC): Primary | ICD-10-CM

## 2021-08-12 DIAGNOSIS — C50.919 MALIGNANT NEOPLASM OF BREAST IN FEMALE, ESTROGEN RECEPTOR POSITIVE, UNSPECIFIED LATERALITY, UNSPECIFIED SITE OF BREAST (HCC): ICD-10-CM

## 2021-08-12 PROCEDURE — 96402 CHEMO HORMON ANTINEOPL SQ/IM: CPT

## 2021-08-12 RX ADMIN — GOSERELIN ACETATE 3.6 MG: 3.6 IMPLANT SUBCUTANEOUS at 13:43

## 2021-08-12 NOTE — TELEPHONE ENCOUNTER
----- Message from Soy Nick PA-C sent at 8/11/2021  3:20 PM EDT -----  Can you follow up with IR to see if they are going to call her to set up Pleurx? I do not yet see an appt in Epic for her  Thanks

## 2021-08-12 NOTE — PROGRESS NOTES
Pt presents for Zoladex injection  Clarified Dr Axel Denis note with Rodrick Crane RN that pt is to receive Zoladex monthly as ordered

## 2021-08-12 NOTE — PROGRESS NOTES
Zoladex injection administered as ordered in pt's LLQ without complication  AVS provided  Pt discharged in stable condition

## 2021-08-14 ENCOUNTER — NURSE TRIAGE (OUTPATIENT)
Dept: OTHER | Facility: OTHER | Age: 56
End: 2021-08-14

## 2021-08-14 NOTE — TELEPHONE ENCOUNTER
Forwarded info to on call palliative via TC  Provider will call  directly  Patient currently stable and not having dyspnea

## 2021-08-14 NOTE — TELEPHONE ENCOUNTER
Regarding: Lung Cancer, Pain  ----- Message from Amber Chowdhury sent at 8/14/2021 12:46 PM EDT -----  :" My wife has Lung Cancer, She is having Pain and her Right Lung has been filling with Fluid   I need to talk to a Doctor for her care "

## 2021-08-14 NOTE — TELEPHONE ENCOUNTER
Spoke w/  who feels patient's R lung is filling back up with fluid; they spoke w/ Pulmonology and the plan is to move up drainage from catheter  They were able to  the oxycodone prescription yesterday and the medication is helping her w/o adverse effects  Patient will try to keep planned [facility] appointment on Monday 8/16/21 at 3:20pm     Family aware of when to present to ED

## 2021-08-16 ENCOUNTER — HOSPITAL ENCOUNTER (OUTPATIENT)
Facility: HOSPITAL | Age: 56
Setting detail: OBSERVATION
Discharge: HOME HEALTH | End: 2021-08-19
Attending: EMERGENCY MEDICINE | Admitting: STUDENT IN AN ORGANIZED HEALTH CARE EDUCATION/TRAINING PROGRAM
Payer: COMMERCIAL

## 2021-08-16 ENCOUNTER — APPOINTMENT (EMERGENCY)
Dept: RADIOLOGY | Facility: HOSPITAL | Age: 56
End: 2021-08-16
Payer: COMMERCIAL

## 2021-08-16 ENCOUNTER — TELEPHONE (OUTPATIENT)
Dept: PULMONOLOGY | Facility: CLINIC | Age: 56
End: 2021-08-16

## 2021-08-16 ENCOUNTER — OFFICE VISIT (OUTPATIENT)
Dept: PALLIATIVE MEDICINE | Facility: CLINIC | Age: 56
End: 2021-08-16
Payer: COMMERCIAL

## 2021-08-16 VITALS
HEART RATE: 84 BPM | OXYGEN SATURATION: 96 % | RESPIRATION RATE: 20 BRPM | BODY MASS INDEX: 27.12 KG/M2 | DIASTOLIC BLOOD PRESSURE: 70 MMHG | SYSTOLIC BLOOD PRESSURE: 110 MMHG | TEMPERATURE: 98.1 F | WEIGHT: 158 LBS

## 2021-08-16 DIAGNOSIS — Z51.5 PALLIATIVE CARE PATIENT: ICD-10-CM

## 2021-08-16 DIAGNOSIS — K59.00 CONSTIPATION: ICD-10-CM

## 2021-08-16 DIAGNOSIS — J91.0 MALIGNANT PLEURAL EFFUSION: ICD-10-CM

## 2021-08-16 DIAGNOSIS — C50.919 MALIGNANT NEOPLASM OF BREAST IN FEMALE, ESTROGEN RECEPTOR POSITIVE, UNSPECIFIED LATERALITY, UNSPECIFIED SITE OF BREAST (HCC): Primary | ICD-10-CM

## 2021-08-16 DIAGNOSIS — C78.7 MALIGNANT NEOPLASM METASTATIC TO LIVER (HCC): ICD-10-CM

## 2021-08-16 DIAGNOSIS — R06.02 SHORTNESS OF BREATH: Primary | ICD-10-CM

## 2021-08-16 DIAGNOSIS — J90 PLEURAL EFFUSION: ICD-10-CM

## 2021-08-16 DIAGNOSIS — Z17.0 MALIGNANT NEOPLASM OF BREAST IN FEMALE, ESTROGEN RECEPTOR POSITIVE, UNSPECIFIED LATERALITY, UNSPECIFIED SITE OF BREAST (HCC): Primary | ICD-10-CM

## 2021-08-16 PROBLEM — C50.911: Status: ACTIVE | Noted: 2021-08-16

## 2021-08-16 LAB
ALBUMIN SERPL BCP-MCNC: 3.5 G/DL (ref 3.5–5)
ALP SERPL-CCNC: 148 U/L (ref 46–116)
ALT SERPL W P-5'-P-CCNC: 21 U/L (ref 12–78)
ANION GAP SERPL CALCULATED.3IONS-SCNC: 9 MMOL/L (ref 4–13)
AST SERPL W P-5'-P-CCNC: 28 U/L (ref 5–45)
BASOPHILS # BLD AUTO: 0.08 THOUSANDS/ΜL (ref 0–0.1)
BASOPHILS NFR BLD AUTO: 1 % (ref 0–1)
BILIRUB SERPL-MCNC: 0.34 MG/DL (ref 0.2–1)
BUN SERPL-MCNC: 11 MG/DL (ref 5–25)
CALCIUM SERPL-MCNC: 9.3 MG/DL (ref 8.3–10.1)
CHLORIDE SERPL-SCNC: 102 MMOL/L (ref 100–108)
CO2 SERPL-SCNC: 24 MMOL/L (ref 21–32)
CREAT SERPL-MCNC: 1.01 MG/DL (ref 0.6–1.3)
EOSINOPHIL # BLD AUTO: 0.13 THOUSAND/ΜL (ref 0–0.61)
EOSINOPHIL NFR BLD AUTO: 2 % (ref 0–6)
ERYTHROCYTE [DISTWIDTH] IN BLOOD BY AUTOMATED COUNT: 14.4 % (ref 11.6–15.1)
GFR SERPL CREATININE-BSD FRML MDRD: 64 ML/MIN/1.73SQ M
GLUCOSE SERPL-MCNC: 95 MG/DL (ref 65–140)
HCT VFR BLD AUTO: 47.6 % (ref 34.8–46.1)
HGB BLD-MCNC: 14.6 G/DL (ref 11.5–15.4)
IMM GRANULOCYTES # BLD AUTO: 0.02 THOUSAND/UL (ref 0–0.2)
IMM GRANULOCYTES NFR BLD AUTO: 0 % (ref 0–2)
LYMPHOCYTES # BLD AUTO: 0.8 THOUSANDS/ΜL (ref 0.6–4.47)
LYMPHOCYTES NFR BLD AUTO: 14 % (ref 14–44)
MCH RBC QN AUTO: 29.9 PG (ref 26.8–34.3)
MCHC RBC AUTO-ENTMCNC: 30.7 G/DL (ref 31.4–37.4)
MCV RBC AUTO: 98 FL (ref 82–98)
MONOCYTES # BLD AUTO: 0.57 THOUSAND/ΜL (ref 0.17–1.22)
MONOCYTES NFR BLD AUTO: 10 % (ref 4–12)
NEUTROPHILS # BLD AUTO: 4.26 THOUSANDS/ΜL (ref 1.85–7.62)
NEUTS SEG NFR BLD AUTO: 73 % (ref 43–75)
NRBC BLD AUTO-RTO: 0 /100 WBCS
PLATELET # BLD AUTO: 272 THOUSANDS/UL (ref 149–390)
PMV BLD AUTO: 12 FL (ref 8.9–12.7)
POTASSIUM SERPL-SCNC: 4.3 MMOL/L (ref 3.5–5.3)
PROT SERPL-MCNC: 7.4 G/DL (ref 6.4–8.2)
RBC # BLD AUTO: 4.88 MILLION/UL (ref 3.81–5.12)
SODIUM SERPL-SCNC: 135 MMOL/L (ref 136–145)
TROPONIN I SERPL-MCNC: <0.02 NG/ML
WBC # BLD AUTO: 5.86 THOUSAND/UL (ref 4.31–10.16)

## 2021-08-16 PROCEDURE — 93005 ELECTROCARDIOGRAM TRACING: CPT

## 2021-08-16 PROCEDURE — 85025 COMPLETE CBC W/AUTO DIFF WBC: CPT | Performed by: EMERGENCY MEDICINE

## 2021-08-16 PROCEDURE — 36415 COLL VENOUS BLD VENIPUNCTURE: CPT

## 2021-08-16 PROCEDURE — 99285 EMERGENCY DEPT VISIT HI MDM: CPT | Performed by: EMERGENCY MEDICINE

## 2021-08-16 PROCEDURE — 71045 X-RAY EXAM CHEST 1 VIEW: CPT

## 2021-08-16 PROCEDURE — 99215 OFFICE O/P EST HI 40 MIN: CPT | Performed by: STUDENT IN AN ORGANIZED HEALTH CARE EDUCATION/TRAINING PROGRAM

## 2021-08-16 PROCEDURE — 99285 EMERGENCY DEPT VISIT HI MDM: CPT

## 2021-08-16 PROCEDURE — 84484 ASSAY OF TROPONIN QUANT: CPT | Performed by: EMERGENCY MEDICINE

## 2021-08-16 PROCEDURE — 99220 PR INITIAL OBSERVATION CARE/DAY 70 MINUTES: CPT | Performed by: PHYSICIAN ASSISTANT

## 2021-08-16 PROCEDURE — 80053 COMPREHEN METABOLIC PANEL: CPT | Performed by: EMERGENCY MEDICINE

## 2021-08-16 RX ORDER — OXYCODONE HYDROCHLORIDE 5 MG/1
5 TABLET ORAL EVERY 4 HOURS PRN
Status: DISCONTINUED | OUTPATIENT
Start: 2021-08-16 | End: 2021-08-19 | Stop reason: HOSPADM

## 2021-08-16 RX ORDER — LETROZOLE 2.5 MG/1
2.5 TABLET, FILM COATED ORAL DAILY
Status: DISCONTINUED | OUTPATIENT
Start: 2021-08-17 | End: 2021-08-17

## 2021-08-16 RX ORDER — HYDROCODONE POLISTIREX AND CHLORPHENIRAMINE POLISTIREX 10; 8 MG/5ML; MG/5ML
5 SUSPENSION, EXTENDED RELEASE ORAL EVERY 12 HOURS SCHEDULED
Status: DISCONTINUED | OUTPATIENT
Start: 2021-08-16 | End: 2021-08-19 | Stop reason: HOSPADM

## 2021-08-16 RX ORDER — ALBUTEROL SULFATE 90 UG/1
2 AEROSOL, METERED RESPIRATORY (INHALATION) EVERY 4 HOURS PRN
Status: DISCONTINUED | OUTPATIENT
Start: 2021-08-16 | End: 2021-08-19 | Stop reason: HOSPADM

## 2021-08-16 RX ORDER — GUAIFENESIN/DEXTROMETHORPHAN 100-10MG/5
10 SYRUP ORAL EVERY 4 HOURS PRN
Status: DISCONTINUED | OUTPATIENT
Start: 2021-08-16 | End: 2021-08-19 | Stop reason: HOSPADM

## 2021-08-16 RX ORDER — ACETAMINOPHEN 325 MG/1
975 TABLET ORAL EVERY 6 HOURS PRN
Status: DISCONTINUED | OUTPATIENT
Start: 2021-08-16 | End: 2021-08-19 | Stop reason: HOSPADM

## 2021-08-16 RX ORDER — HYDROMORPHONE HCL/PF 1 MG/ML
0.5 SYRINGE (ML) INJECTION
Status: DISCONTINUED | OUTPATIENT
Start: 2021-08-16 | End: 2021-08-17

## 2021-08-16 RX ORDER — OXYCODONE HYDROCHLORIDE 10 MG/1
10 TABLET ORAL EVERY 4 HOURS PRN
Status: DISCONTINUED | OUTPATIENT
Start: 2021-08-16 | End: 2021-08-19 | Stop reason: HOSPADM

## 2021-08-16 RX ORDER — ONDANSETRON 2 MG/ML
4 INJECTION INTRAMUSCULAR; INTRAVENOUS EVERY 6 HOURS PRN
Status: DISCONTINUED | OUTPATIENT
Start: 2021-08-16 | End: 2021-08-19 | Stop reason: HOSPADM

## 2021-08-16 RX ORDER — LANOLIN ALCOHOL/MO/W.PET/CERES
6 CREAM (GRAM) TOPICAL
Status: DISCONTINUED | OUTPATIENT
Start: 2021-08-16 | End: 2021-08-19 | Stop reason: HOSPADM

## 2021-08-16 RX ORDER — HYDROMORPHONE HCL/PF 1 MG/ML
1 SYRINGE (ML) INJECTION ONCE
Status: COMPLETED | OUTPATIENT
Start: 2021-08-16 | End: 2021-08-16

## 2021-08-16 RX ORDER — OXYBUTYNIN CHLORIDE 5 MG/1
5 TABLET, EXTENDED RELEASE ORAL DAILY
Status: DISCONTINUED | OUTPATIENT
Start: 2021-08-17 | End: 2021-08-19 | Stop reason: HOSPADM

## 2021-08-16 RX ORDER — CALCIUM CARBONATE 200(500)MG
1000 TABLET,CHEWABLE ORAL DAILY PRN
Status: DISCONTINUED | OUTPATIENT
Start: 2021-08-16 | End: 2021-08-19 | Stop reason: HOSPADM

## 2021-08-16 RX ADMIN — HYDROCODONE POLISTIREX AND CHLORPHENIRAMINE POLISTIREX 5 ML: 10; 8 SUSPENSION, EXTENDED RELEASE ORAL at 20:46

## 2021-08-16 RX ADMIN — ALBUTEROL SULFATE 2 PUFF: 90 AEROSOL, METERED RESPIRATORY (INHALATION) at 23:44

## 2021-08-16 RX ADMIN — CALCIUM CARBONATE (ANTACID) CHEW TAB 500 MG 1000 MG: 500 CHEW TAB at 23:05

## 2021-08-16 RX ADMIN — OXYCODONE HYDROCHLORIDE 10 MG: 10 TABLET ORAL at 23:05

## 2021-08-16 RX ADMIN — GUAIFENESIN AND DEXTROMETHORPHAN 10 ML: 100; 10 SYRUP ORAL at 23:36

## 2021-08-16 RX ADMIN — MELATONIN TAB 3 MG 6 MG: 3 TAB at 23:05

## 2021-08-16 RX ADMIN — HYDROMORPHONE HYDROCHLORIDE 1 MG: 1 INJECTION, SOLUTION INTRAMUSCULAR; INTRAVENOUS; SUBCUTANEOUS at 20:14

## 2021-08-16 NOTE — ED PROVIDER NOTES
History  Chief Complaint   Patient presents with    Shortness of Breath     Pt reports feeling SOB x 1 5week  Pt has lung, liver, and bone cancer  History provided by:  Patient   used: No    Shortness of Breath  Severity:  Moderate  Onset quality:  Gradual  Duration:  2 weeks  Timing:  Constant  Progression:  Worsening  Chronicity:  Recurrent  Relieved by:  Nothing  Worsened by:  Exertion  Ineffective treatments:  None tried  Associated symptoms: no abdominal pain, no chest pain, no cough, no ear pain, no fever, no rash, no sore throat and no vomiting        Prior to Admission Medications   Prescriptions Last Dose Informant Patient Reported? Taking?    Abemaciclib 150 MG TABS   No No   Sig: Take 150 mg by mouth 2 (two) times a day   BLACK COHOSH PO  Self Yes No   Sig: Take by mouth   Incontinence Supply Disposable (ITM Software Incontinence Pads) MISC  Self No No   Sig: Use 150 pad 3 (three) times a day   acetaminophen (TYLENOL) 325 mg tablet  Self No No   Sig: Take 2 tablets (650 mg total) by mouth every 6 (six) hours as needed for mild pain   albuterol (PROVENTIL HFA,VENTOLIN HFA) 90 mcg/act inhaler  Self No No   Sig: Inhale 2 puffs every 4 (four) hours as needed for wheezing   benzonatate (TESSALON) 200 MG capsule  Self No No   Sig: Take 1 capsule (200 mg total) by mouth 3 (three) times a day as needed for cough   calcium carbonate (TUMS) 500 mg chewable tablet  Self No No   Sig: Chew 1 tablet (500 mg total) 3 (three) times a day as needed for indigestion or heartburn   dextromethorphan-guaiFENesin (ROBITUSSIN DM)  mg/5 mL syrup  Self No No   Sig: Take 10 mL by mouth every 4 (four) hours as needed for cough   diphenhydrAMINE (BENADRYL) 50 MG tablet  Self No No   Sig: Take 1 tablet (50 mg total) by mouth daily at bedtime as needed for itching or sleep   ergocalciferol (VITAMIN D2) 50,000 units  Self No No   Sig: Take 1 capsule (50,000 Units total) by mouth once a week for 12 doses letrozole (FEMARA) 2 5 mg tablet   No No   Sig: Take 1 tablet (2 5 mg total) by mouth daily   melatonin 3 mg  Self No No   Sig: Take 2 tablets (6 mg total) by mouth daily at bedtime   oxybutynin (DITROPAN-XL) 5 mg 24 hr tablet  Self No No   Sig: Take 1 tablet (5 mg total) by mouth daily Take 1 tablet daily      Facility-Administered Medications: None       Past Medical History:   Diagnosis Date    History of radiation exposure     Malignant neoplasm of right female breast ([facility] [address] ) 2012    right       Past Surgical History:   Procedure Laterality Date    BREAST CYST EXCISION      BREAST LUMPECTOMY Right 2012    HIP SURGERY      IR BIOPSY LIVER MASS  7/26/2021    IR THORACENTESIS  7/26/2021       Family History   Problem Relation Age of Onset    Breast cancer Mother         in her 63's    Breast cancer Sister         inher 45s and 48    Colon cancer Maternal Grandmother     No Known Problems Paternal Grandmother     Breast cancer Other     No Known Problems Paternal Aunt      I have reviewed and agree with the history as documented  E-Cigarette/Vaping    E-Cigarette Use Never User      E-Cigarette/Vaping Substances    Nicotine No     THC No     CBD No     Flavoring No      Social History     Tobacco Use    Smoking status: Former Smoker     Packs/day: 0 25     Types: Cigarettes    Smokeless tobacco: Never Used   Vaping Use    Vaping Use: Never used   Substance Use Topics    Alcohol use: Not Currently    Drug use: Not Currently       Review of Systems   Constitutional: Negative for chills and fever  HENT: Negative for ear pain and sore throat  Eyes: Negative for pain and visual disturbance  Respiratory: Positive for shortness of breath  Negative for cough  Cardiovascular: Negative for chest pain and palpitations  Gastrointestinal: Negative for abdominal pain and vomiting  Genitourinary: Negative for dysuria and hematuria  Musculoskeletal: Negative for arthralgias and back pain  Skin: Negative for color change and rash  Neurological: Negative for seizures and syncope  All other systems reviewed and are negative  Physical Exam  Physical Exam  Vitals and nursing note reviewed  Constitutional:       General: She is not in acute distress  Appearance: She is well-developed  HENT:      Head: Normocephalic and atraumatic  Eyes:      Conjunctiva/sclera: Conjunctivae normal    Cardiovascular:      Rate and Rhythm: Normal rate and regular rhythm  Heart sounds: No murmur heard  Pulmonary:      Effort: Pulmonary effort is normal  No respiratory distress  Breath sounds: Normal breath sounds  Abdominal:      Palpations: Abdomen is soft  Tenderness: There is no abdominal tenderness  Musculoskeletal:      Cervical back: Neck supple  Skin:     General: Skin is warm and dry  Capillary Refill: Capillary refill takes less than 2 seconds  Neurological:      General: No focal deficit present  Mental Status: She is alert and oriented to person, place, and time           Vital Signs  ED Triage Vitals [08/16/21 1714]   Temperature Pulse Respirations Blood Pressure SpO2   98 1 °F (36 7 °C) 65 20 132/91 96 %      Temp src Heart Rate Source Patient Position - Orthostatic VS BP Location FiO2 (%)   -- Monitor -- Left arm --      Pain Score       --           Vitals:    08/16/21 1714   BP: 132/91   Pulse: 65         Visual Acuity      ED Medications  Medications - No data to display    Diagnostic Studies  Results Reviewed     Procedure Component Value Units Date/Time    Comprehensive metabolic panel [[id number]]  (Abnormal) Collected: 08/16/21 1725    Lab Status: Final result Specimen: Blood from Arm, Right Updated: 08/16/21 1746     Sodium 135 mmol/L      Potassium 4 3 mmol/L      Chloride 102 mmol/L      CO2 24 mmol/L      ANION GAP 9 mmol/L      BUN 11 mg/dL      Creatinine 1 01 mg/dL      Glucose 95 mg/dL      Calcium 9 3 mg/dL      AST 28 U/L      ALT 21 U/L Alkaline Phosphatase 148 U/L      Total Protein 7 4 g/dL      Albumin 3 5 g/dL      Total Bilirubin 0 34 mg/dL      eGFR 64 ml/min/1 73sq m     Narrative:      [place] guidelines for Chronic Kidney Disease (CKD):     Stage 1 with normal or high GFR (GFR > 90 mL/min/1 73 square meters)    Stage 2 Mild CKD (GFR = 60-89 mL/min/1 73 square meters)    Stage 3A Moderate CKD (GFR = 45-59 mL/min/1 73 square meters)    Stage 3B Moderate CKD (GFR = 30-44 mL/min/1 73 square meters)    Stage 4 Severe CKD (GFR = 15-29 mL/min/1 73 square meters)    Stage 5 End Stage CKD (GFR <15 mL/min/1 73 square meters)  Note: GFR calculation is accurate only with a steady state creatinine    Troponin I [[id number]] Updated: 08/16/21 1734    Lab Status: No result Specimen: Blood from Arm, Right     CBC and differential [[id number]]  (Abnormal) Collected: 08/16/21 1725    Lab Status: Final result Specimen: Blood from Arm, Right Updated: 08/16/21 1732     WBC 5 86 Thousand/uL      RBC 4 88 Million/uL      Hemoglobin 14 6 g/dL      Hematocrit 47 6 %      MCV 98 fL      MCH 29 9 pg      MCHC 30 7 g/dL      RDW 14 4 %      MPV 12 0 fL      Platelets 585 Thousands/uL      nRBC 0 /100 WBCs      Neutrophils Relative 73 %      Immat GRANS % 0 %      Lymphocytes Relative 14 %      Monocytes Relative 10 %      Eosinophils Relative 2 %      Basophils Relative 1 %      Neutrophils Absolute 4 26 Thousands/µL      Immature Grans Absolute 0 02 Thousand/uL      Lymphocytes Absolute 0 80 Thousands/µL      Monocytes Absolute 0 57 Thousand/µL      Eosinophils Absolute 0 13 Thousand/µL      Basophils Absolute 0 08 Thousands/µL                  XR chest 1 view portable    (Results Pending)              Procedures  Procedures         ED Course                                           MDM  Number of Diagnoses or Management Options  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  Shortness of breath: new and requires workup  Diagnosis management comments: 79-year-old female with history of metastatic breast cancer presents for gradually worsening shortness of breath over the past 1 to 2 weeks  Interval chest x-rays since her most recent admission do show a left-sided pleural effusion that is somewhat worse than previous  Discussed patient's case with pulmonology in Interventional Radiology  We will plan to admit the patient for observation for IR consult and likely thoracentesis or PleurX placement tomorrow  Patient is currently stable at rest in the emergency department  Amount and/or Complexity of Data Reviewed  Clinical lab tests: reviewed and ordered  Tests in the radiology section of CPT®: reviewed and ordered  Review and summarize past medical records: yes  Discuss the patient with other providers: yes  Independent visualization of images, tracings, or specimens: yes        Disposition  Final diagnoses:   None     ED Disposition     None      Follow-up Information    None         Patient's Medications   Discharge Prescriptions    No medications on file     No discharge procedures on file      PDMP Review       Value Time User    PDMP Reviewed  Yes 8/16/2021  5:29 PM Emma Marley MD          ED Provider  Electronically Signed by           Brandi Blackburn MD  08/16/21 1487

## 2021-08-16 NOTE — TELEPHONE ENCOUNTER
Called IR dept to fup on ref for pt to see IR, they stated pt had psc appt today and it will be contingent based on that a;ppt

## 2021-08-16 NOTE — PROGRESS NOTES
Outpatient Follow-Up - Palliative and Supportive Care   Grupo Harding 50 y o  female [id number]    Assessment & Plan  Problem List Items Addressed This Visit        Respiratory    Pleural effusion       Other    Metastatic neoplasm [facility])    Relevant Orders    Ambulatory referral to Nutrition Services for Oncology    Malignant neoplasm of breast in female, estrogen receptor positive ([facility] [address] ) - Primary    Relevant Orders    Ambulatory referral to Nutrition Services for Oncology    Palliative care patient    Relevant Orders    Ambulatory referral to Nutrition Services for Oncology          #symptom management  #cancer-related pain   - continue APAP 650 mg PO Q6H PRN   - max daily 4000 mg   - oxy-IR 10 mg PO Q4H PRN   - just filled two days ago, no refill today, awaiting patient to notify of remaining Rx   - recent fill of oxy/APAP 5/325 on 07/27 x 14 tabs, completed therapy    #R pleural effusion + worsening dyspnea + worsening associated pain   - previous OP plan for IR consult for PleurX catheter placement   - patient to go to ED for evaluation   - symptomatic management v discussion with Pulm/IR for PleurX catheter   - attempted to call ED to notify, unable to get connected    #decreased appetite   - referral to oncology Nutrition placed    #waiver-related questions   - LSW support provided; see documentation    #goals of care   - not addressed during today's visit, focus on symptomatic dyspnea    #psychosocial support   - emotional support provided      [facility] [address] [employer] Follow pending evaluation today  Controlled Substance [place]    [place] or [place]  reviewed: No red flags were identified; safe to proceed with prescription  Alana Arguello     PDMP Review       Value Time User    PDMP Reviewed  Yes 8/16/2021  3:34 PM Fidel Christine MD        Prescriptions  Total Prescriptions: 2    Total Private Pay: 1    Fill Date ID   Written Drug Qty Days Prescriber Rx # Pharmacy Refill   Daily Dose* Pymt Type      08/13/2021  1 07/27/2021  Oxycodone Hcl 10 MG Tablet  30 00  7 [facility]   [id number]   Pen (7435)   0  64 29 MME  [place]   [place]   07/30/2021  1   07/30/2021  Oxycodone-Acetaminophen 5-325  14 00  1 [facility]   [id number]   Pen (0276)   0  105 00 MME  [place]         Medications adjusted this encounter:  Requested Prescriptions      No prescriptions requested or ordered in this encounter     Orders Placed This Encounter   Procedures    Ambulatory referral to Nutrition Services for Oncology     Medications Discontinued During This Encounter   Medication Reason    oxyCODONE-acetaminophen (PERCOCET) 5-325 mg per tablet Therapy completed         Luis E Godinez was seen today for symptoms and planning cares related to above illnesses  I have reviewed the patient's controlled substance dispensing history in the Prescription Drug Monitoring Program in compliance with the [facility] regulations before prescribing any controlled substances  They are invited to continue to follow with us  If there are questions or concerns, please contact us through our clinic/answering service 24 hours a day, seven days a week  Vishal Oro MD  [facility]  [phone]      Visit Information    Accompanied By: Spouse    Source of History: Self, Spouse, Medical record    History Limitations: None      History of Present Illness    Luis E Godinez is a 50 y o  female who presents in follow up of symptoms related to metastatic breast CA, recurrent pleural effusions  Pertinent issues include: symptom management, pain, neoplasm related, breathlessness    Patient reports worsening dyspnea  Awaiting follow up discussion with IR and Pulm for OP evaluation for PleurX catheter  Patient feels that increased WOB, decreased exercise tolerance, increasing associated pain, and persistent cough are attributed to known R-sided pleural effusion  Denies fever/chills  No LOC   Symptoms worse in the AM after awakening [mostly supine overnight]  Use of oxy-IR 10 mg with moderate relief initially, now minimal relief; use of 1-3 tabs/day  Denies nausea, vomiting  Decreased appetite, request for meal replacement therapy  Agreeable to referral to Oncology Nutrition  1-2 lb weight loss over the past month  Spouse also brought up questions regarding waiver program to identify self as Aide  Request for medical equipment [BP cuff, stethoscope, pulse ox, and gloves]  Patient reports that previous use of marijuana caused "anaphylaxis"    Past medical, surgical, social, and family histories are reviewed and pertinent updates are made  Review of Systems   Constitutional: Positive for decreased appetite, malaise/fatigue and weight loss  Negative for fever  HENT: Negative for congestion  Eyes: Negative for visual disturbance  Cardiovascular: Positive for dyspnea on exertion  Negative for syncope  Respiratory: Positive for cough and shortness of breath  Musculoskeletal: Negative for falls  R chest wall pain   Gastrointestinal: Negative for abdominal pain, nausea and vomiting  Genitourinary: Negative for frequency  Neurological: Negative for headaches  Psychiatric/Behavioral: Negative for hallucinations  All other systems reviewed and are negative  Vital Signs    /70 (BP Location: Left arm, Cuff Size: Standard)   Pulse 84   Temp 98 1 °F (36 7 °C) (Temporal)   Resp 20   Wt 71 7 kg (158 lb)   SpO2 96%   BMI 27 12 kg/m²     Physical Exam and Objective Data  Physical Exam  Vitals and nursing note reviewed  Constitutional:       General: She is awake  Appearance: She is not diaphoretic  Comments: Sitting up in chair in NAD  Non-toxic appearing   HENT:      Head: Normocephalic and atraumatic  Right Ear: External ear normal       Left Ear: External ear normal       Nose: No rhinorrhea  Eyes:      Comments: No gaze preference   Cardiovascular:      Rate and Rhythm: Normal rate     Pulmonary: Effort: No tachypnea, accessory muscle usage or respiratory distress  Comments: Occasional episodes of increased WOB  Abdominal:      Palpations: Abdomen is not rigid  Musculoskeletal:      Cervical back: Normal range of motion  Neurological:      General: No focal deficit present  Mental Status: She is alert and oriented to person, place, and time  Psychiatric:         Attention and Perception: Attention normal          Mood and Affect: Mood and affect normal          Speech: Speech normal          Cognition and Memory: Cognition and memory normal            Radiology and Laboratory:  I personally reviewed and interpreted the following results:    Most Recent COVID-19 Results:  Lab Results   Component Value Date/Time    SARSCOV2 Positive (A) 12/20/2020 12:25 PM       Most Recent Lab Work:  Lab Results   Component Value Date/Time    SODIUM 139 08/11/2021 10:27 AM    K 4 0 08/11/2021 10:27 AM    K 3 7 04/02/2014 01:15 PM    BUN 9 08/11/2021 10:27 AM    BUN 12 04/02/2014 01:15 PM    CREATININE 0 97 08/11/2021 10:27 AM    CREATININE 0 80 04/02/2014 01:15 PM    GLUC 98 07/27/2021 05:21 AM     Lab Results   Component Value Date/Time    AST 35 08/11/2021 10:27 AM    AST 23 04/02/2014 01:15 PM    ALT 17 08/11/2021 10:27 AM    ALT 15 04/02/2014 01:15 PM    ALB 3 3 (L) 08/11/2021 10:27 AM    ALB 3 4 (L) 04/02/2014 01:15 PM     Lab Results   Component Value Date/Time    HGB 14 5 08/11/2021 10:27 AM    WBC 5 09 08/11/2021 10:27 AM     08/11/2021 10:27 AM    INR 0 99 07/25/2021 08:53 PM       Most Recent Imaging [last 30 days]:  Procedure: XR chest portable    Result Date: 7/28/2021  Narrative: CHEST INDICATION:   r/o recurrence of Pleural effusion; s/p 1200 ml removal via thoracentesis   COMPARISON:  7/26/2021 ultrasound; 9/20/2014 chest x-ray EXAM PERFORMED/VIEWS:  XR CHEST PORTABLE Single view FINDINGS: Diminished small right pleural effusion status post thoracentesis Subsegmental atelectasis or infiltrate right lung base Cardiomediastinal silhouette appears unremarkable  No pneumothorax Osseous structures appear within normal limits for patient age  Impression: Diminished small right pleural effusion status post thoracentesis Subsegmental atelectasis/infiltrate right lung base Workstation performed: [id number]     Procedure: MRI brain w wo contrast    Result Date: 7/27/2021  Narrative: MRI BRAIN WITH AND WITHOUT CONTRAST INDICATION: above  Headaches, metastatic disease COMPARISON:  None  TECHNIQUE: Sagittal T1, axial T2, axial FLAIR, axial T1, axial [place], axial diffusion  Sagittal, axial T1 postcontrast   Axial bravo postcontrast with coronal reconstructions  IV Contrast:  7 mL of Gadobutrol injection (SINGLE-DOSE)  IMAGE QUALITY:   Diagnostic  FINDINGS: BRAIN PARENCHYMA:  There is no discrete mass, mass effect or midline shift  There is no intracranial hemorrhage  Normal posterior fossa  Diffusion imaging is unremarkable  There are no white matter changes in the cerebral hemispheres  Postcontrast imaging of the brain demonstrates no abnormal enhancement  VENTRICLES:  Normal for the patient's age  SELLA AND PITUITARY GLAND:  Normal  ORBITS:  Normal  PARANASAL SINUSES:  Normal  VASCULATURE:  Evaluation of the major intracranial vasculature demonstrates appropriate flow voids  CALVARIUM AND SKULL BASE:  Moderately advanced left TMJ arthrosis  EXTRACRANIAL SOFT TISSUES:  Normal      Impression: No indication of metastatic disease  Workstation performed: [id number]     Procedure: IR IN-Patient Thoracentesis    Result Date: 7/26/2021  Narrative: PROCEDURE: Ultrasound-guided right thoracentesis Procedural Personnel Attending physician(s): Dr Yenifer Duncan Pre-procedure diagnosis: Breast cancer Post-procedure diagnosis: Same Indication: Patient with history of breast cancer found to have right pleural effusion Complications: No immediate complications       Impression: Ultrasound-guided thoracentesis with drainage of 1200 mL of joanne pleural fluid  Plan: Resume care by clinical team  _______________________________________________________________ PROCEDURE SUMMARY: - Limited thoracic ultrasound - Ultrasound-guided right thoracentesis PROCEDURE DETAILS: Pre-procedure Consent: Informed consent for the procedure including risks, benefits and alternatives was obtained and time-out was performed prior to the procedure  Preparation: The site was prepared and draped using maximal sterile barrier technique including cutaneous antisepsis  Limited thoracic ultrasound Limited thoracic ultrasound was performed using a curved transducer  A safe window for thoracentesis was identified  Right hemithorax findings: Moderate pleural effusion Thoracentesis Local anesthesia was administered  The pleural space was accessed under real-time ultrasound guidance  and fluid return confirmed position  The fluid was drained  The catheter was removed, and a sterile bandage was applied  Catheter placed: 5F Juan Jose Post-drainage hemithorax findings: No visible pleural effusion Additional Details Specimens removed: Pleural fluid Estimated blood loss (mL): None Standardized report: [email or web address] Attestation Signer name: [facility] I attest that I was present for the entire procedure  I reviewed the stored images and agree with the report as written  Workstation performed: [id number]     Procedure: IR biopsy liver mass    Result Date: 7/26/2021  Narrative: PROCEDURE: Ultrasound-guided liver biopsy Procedural Personnel Attending physician(s): Dr Jian Perry Pre-procedure diagnosis: Breast cancer Post-procedure diagnosis: Same Indication: Patient with history of breast cancer found to have multiple liver lesions concerning for metastatic disease Complications: No immediate complications  Impression: Ultrasound-guided biopsy of liver lesion near vanessa hepatis  Plan: Specimen(s) sent for evaluation  _______________________________________________________________ PROCEDURE SUMMARY: - Percutaneous US-guided liver biopsy PROCEDURE DETAILS: Pre-procedure Reference imaging for biopsy target: CTA chest 7/24/2021 Consent: Informed consent for the procedure including risks, benefits and alternatives was obtained and time-out was performed prior to the procedure  Preparation: The site was prepared and draped using maximal sterile barrier technique including cutaneous antisepsis  Anesthesia/sedation Level of anesthesia/sedation: Moderate sedation (conscious sedation) Anesthesia/sedation administered by: IR nurse under attending supervision with continuous monitoring of the patient's level of consciousness and physiologic status Total intra-service sedation time (minutes): 50 Imaging prior to biopsy The patient was positioned supine  Initial ultrasound was performed  Biopsy target: - Maximal diameter (cm): 1 8 - Location: Liver near vanessa hepatis Biopsy Local anesthesia was administered  Under US guidance, the biopsy needle was advanced to the target and biopsy was performed  Coaxial needle: 17 gauge Core needle biopsy device: Eyelationince Core needle size: 18 gauge Number of core specimens: 4 On-site biopsy touch preparation: Yes  Preliminary assessment of sample adequacy: Adequate Needle removal The biopsy needle was removed and a sterile dressing was applied  Tract embolization: D-Stat hemostatic slurry Imaging following biopsy Immediate post-biopsy ultrasound was performed  Post-biopsy imaging findings: No hematoma Additional Details Specimens removed: 4 core needle samples placed into formalin Estimated blood loss (mL): 1 Standardized report: [email or web address] Attestation Signer name: [facility] I attest that I was present for the entire procedure  I reviewed the stored images and agree with the report as written   Workstation performed: [id number]     Procedure: US abdomen limited    Result Date: 7/26/2021  Narrative: RIGHT UPPER QUADRANT ULTRASOUND INDICATION:    abdominal pain, positive Delong's sign  COMPARISON:  None  TECHNIQUE:   Real-time ultrasound of the right upper quadrant was performed with a curvilinear transducer with both volumetric sweeps and still imaging techniques  FINDINGS: PANCREAS:  Visualized portions of the pancreas are within normal limits  AORTA AND IVC:  Visualized portions are normal for patient age  LIVER: Size:  Within normal range  The liver measures 15 8 cm in the midclavicular line  Contour:  Surface contour is smooth  Parenchyma:  Echogenicity and echotexture are within normal limits  4 3 x 3 6 x 4 1 cm hypoechoic mass is seen within the left lobe of the liver  3 3 x 2 5 x 3 0 cm subcapsular right lobe lesion is noted  2 3 x 1 8 x 1 7 cm right lobe lesion is present  Lateral right lobe 3 4 x 4 0 x 3 7 cm lesion is present  Lesion within the dome of the right lobe measuring 2 9 x 2 3 x 3 2 cm is noted  Limited imaging of the main portal vein shows it to be patent and hepatopetal   BILIARY: No gallbladder findings  No intrahepatic biliary dilatation  CBD measures 5 mm  No choledocholithiasis  KIDNEY: Right kidney measures 10 2 x 4 4 x 4 3 cm  Subcentimeter simple cyst noted, right kidney is otherwise unremarkable  ASCITES:   None  Impression: Multiple hepatic metastases are identified, see results of recent biopsy  Workstation performed: [id number]     Procedure: CTA ED chest PE study    Result Date: 7/24/2021  Narrative: CTA - CHEST WITH IV CONTRAST - PULMONARY ANGIOGRAM INDICATION:   "PE suspected, intermediate prob, positive Cough, dyspnea, ? prior right infiltrate elevated ddimer   " Recently diagnosed with bronchitis, started 2nd round of antibiotics  Current every day smoker  History of breast cancer  COMPARISON: Chest radiograph from 9/20/2014  TECHNIQUE: CT angiogram timed for optimal opacification of the pulmonary arteries    Axial, sagittal, and coronal 2D reformats created from source data  Coronal 3D MIP postprocessing on the acquisition scanner  Radiation dose length product (DLP):  298 mGy-cm   Radiation dose exposure minimized using iterative reconstruction and automated exposure control  IV Contrast:  85 mL of iohexol (OMNIPAQUE)  FINDINGS: PULMONARY ARTERIES:  No pulmonary embolus  LUNGS:  No definite primary tumor but with septal thickening throughout the right lung and bronchial wall thickening due to lymphangitic spread of tumor  Indeterminate lung nodules measuring 5 mm in the right upper lobe (3/36) and right middle lobe (3/67), and 7 mm in the left upper lobe (3/38), and left lower lobe (3/70)  AIRWAYS: No significant filling defects  PLEURA:  Large right pleural effusion  HEART/GREAT VESSELS:  Normal heart size  Trace pericardial effusion  MEDIASTINUM AND KIA:  Unremarkable  CHEST WALL AND LOWER NECK: Right axillary lymph node dissection  UPPER ABDOMEN:  Multiple liver metastases, measuring up to 4 cm  OSSEOUS STRUCTURES:  Lytic metastasis to the sternal manubrium (3/38; 603/100)  Impression: No pulmonary embolus  Septal thickening and bronchial wall thickening in the right lung due to lymphangitic spread of tumor, question lung primary or metastatic breast cancer  Multiple hepatic metastases and lytic sternal manubrial metastasis  Large right pleural effusion and small pericardial effusion, likely malignant  A few small bilateral indeterminate lung nodules  I personally discussed this study with Kaitlin Macias on 7/24/2021 at 12:06 PM   Workstation performed: [id number]     Procedure: CT abdomen pelvis w contrast    Result Date: 7/27/2021  Narrative: CT ABDOMEN AND PELVIS WITH IV CONTRAST INDICATION:   Recent finding neoplasm  COMPARISON:  7/26/2021 TECHNIQUE:  CT examination of the abdomen and pelvis was performed  Axial, sagittal, and coronal 2D reformatted images were created from the source data and submitted for interpretation   Radiation dose length product (DLP) for this visit:  656 33 mGy-cm   This examination, like all CT scans performed in the [facility], was performed utilizing techniques to minimize radiation dose exposure, including the use of iterative  reconstruction and automated exposure control  IV Contrast:  100 mL of iohexol (OMNIPAQUE) 50 mL of iohexol (OMNIPAQUE) Enteric Contrast:  Enteric contrast was not administered  FINDINGS: ABDOMEN LOWER CHEST:  Small right pleural effusion is present  LIVER/BILIARY TREE:  Fairly widespread hepatic metastatic disease is identified, largest lesion is within the left lobe measuring 5 0 x 3 3 cm series 2 image 22  Multiple smaller lesions are present  GALLBLADDER:  No calcified gallstones  No pericholecystic inflammatory change  SPLEEN:  Unremarkable  PANCREAS:  Unremarkable  ADRENAL GLANDS:  Unremarkable  KIDNEYS/URETERS:  No hydronephrosis or urinary tract calculus  One or more sharply circumscribed subcentimeter renal hypodensities are present, too small to accurately characterize, and statistically most likely benign findings  According to recent literature (Radiology 2019) no further workup of these findings is recommended  STOMACH AND BOWEL:  Unremarkable  APPENDIX:  A normal appendix was visualized  ABDOMINOPELVIC CAVITY:  No ascites  No pneumoperitoneum  Subcentimeter retroperitoneal lymph nodes are present  VESSELS:  Unremarkable for patient's age  PELVIS REPRODUCTIVE ORGANS:  Unremarkable for patient's age  URINARY BLADDER:  Unremarkable  ABDOMINAL WALL/INGUINAL REGIONS:  Unremarkable  OSSEOUS STRUCTURES:  No acute fracture or destructive osseous lesion  Impression: Extensive hepatic metastatic disease   Workstation performed: [id number]       60 minutes was spent face to face with Milla Arboleda and her spouse with greater than 50% of the time spent in counseling or coordination of care including discussions of chart review, symptoms assessment, emotional support  LSW support provided  Evaluation for worsening dyspnea with patient wish to go to ED for further evaluation  Additional time was also spent in discussing coronavirus vaccine indications, availability, and logistical challenges  All of the patient's or agent's questions were answered during this discussion

## 2021-08-16 NOTE — PATIENT INSTRUCTIONS
It was a pleasure to see you again today  Thank you for coming in  · Patient to head to the Emergency Department for evaluation for thoracentesis v PleurX catheter placement for symptomatic relief in the setting of recurrent pleural effusion  · Call us for refills on medications that we supply, as needed  · If something changes and you need to come in sooner, please call our office  PRESCRIPTION REFILL REMINDER:  All medication refills should be requested prior to [facility] on Friday  Any refill requests after noon on Friday would be addressed the following Monday  Please protect yourself from the novel Coronavirus (COVID-19)! The numbers of cases of Coronavirus are spiking in [address]  This is not a more dangerous virus, but a sign that more people in a community are spreading the virus  Please check the local disease reports near you if you consider travelling this summer  We do not advise travel to any community or State with a rising viral caseload   Wash your hands! Soap and water, or hand  with at least 60% alcohol, are both effective at killing the virus   Wear a mask! This will help protect others from any virus particles you might spread  Your mouth and nose BOTH need to be covered   Keep the distance! Keep 6 feet of distance from others people, even if they seem healthy  Keeping distance protects you from the other person's virus spread   Get a vaccine! [employer] and [employer] are approved for emergency use in the [place]  These vaccines have been shown to be 90+% effective at preventing severe infections when combined with masking, hand-washing, and distancing    As of 1/26/2021, the following priority groups may get either vaccine:  o nursing home residents and staff  o front-line health workers  o ALL persons over age 72 (ages 76 and up can be seen at [facility])  o ANY person over age 12 that has a qualifying risk factor, such as diabetes, lung disease, or obesity    Please use  the following website  to check your eligibility in [place]:   [employer]    If you are under age 72, but still qualify, you may get a shot from many other locations outside [address] [place], [place] [address]vd: [facility], [place] [employer] [place] [facility], [place]    We are recommending that ALL our patients get two shots of either vaccine, as early as it is available to them  Please keep an eye on the [employer], [employer], or call 0-550-WNKUAWU to see when you will become eligible  If you are eligible by the criteria above, please ask our team to get you a shot! Numbers of Coronavirus cases are spiking in many [place] States  This is not a more dangerous virus, but a sign that more people in a community are spreading the virus  Please check the local disease reports near you if you consider travelling  As of 1/25/21, we do NOT advise travel outside the [facility] or [place])  Check out [employer] for Julio data that are updated daily:   [employer] salma   Global Epidemics from [facility] (OUTPATIENT CAMP[facility]), will give you dcftpy-zp-xnlter information on virus cases:   https://globalepidemics  org/

## 2021-08-16 NOTE — TELEPHONE ENCOUNTER
----- Message from Nakia Drew PA-C sent at 8/16/2021  3:04 PM EDT -----  Pleural fluid still present  No significant change  Can you please help patient get scheduled with IR?  Thank you

## 2021-08-17 ENCOUNTER — APPOINTMENT (OUTPATIENT)
Dept: INTERVENTIONAL RADIOLOGY/VASCULAR | Facility: HOSPITAL | Age: 56
End: 2021-08-17
Attending: RADIOLOGY
Payer: COMMERCIAL

## 2021-08-17 LAB
ANION GAP SERPL CALCULATED.3IONS-SCNC: 12 MMOL/L (ref 4–13)
ATRIAL RATE: 60 BPM
ATRIAL RATE: 69 BPM
BUN SERPL-MCNC: 12 MG/DL (ref 5–25)
CALCIUM SERPL-MCNC: 9.2 MG/DL (ref 8.3–10.1)
CHLORIDE SERPL-SCNC: 103 MMOL/L (ref 100–108)
CO2 SERPL-SCNC: 22 MMOL/L (ref 21–32)
CREAT SERPL-MCNC: 0.94 MG/DL (ref 0.6–1.3)
ERYTHROCYTE [DISTWIDTH] IN BLOOD BY AUTOMATED COUNT: 14.1 % (ref 11.6–15.1)
GFR SERPL CREATININE-BSD FRML MDRD: 69 ML/MIN/1.73SQ M
GLUCOSE P FAST SERPL-MCNC: 97 MG/DL (ref 65–99)
GLUCOSE SERPL-MCNC: 97 MG/DL (ref 65–140)
HCT VFR BLD AUTO: 40.2 % (ref 34.8–46.1)
HGB BLD-MCNC: 13.1 G/DL (ref 11.5–15.4)
INR PPP: 1.07 (ref 0.84–1.19)
MAGNESIUM SERPL-MCNC: 2 MG/DL (ref 1.6–2.6)
MCH RBC QN AUTO: 30.3 PG (ref 26.8–34.3)
MCHC RBC AUTO-ENTMCNC: 32.6 G/DL (ref 31.4–37.4)
MCV RBC AUTO: 93 FL (ref 82–98)
P AXIS: 52 DEGREES
P AXIS: 57 DEGREES
PLATELET # BLD AUTO: 272 THOUSANDS/UL (ref 149–390)
PMV BLD AUTO: 11.8 FL (ref 8.9–12.7)
POTASSIUM SERPL-SCNC: 4 MMOL/L (ref 3.5–5.3)
PR INTERVAL: 152 MS
PR INTERVAL: 162 MS
PROTHROMBIN TIME: 13.4 SECONDS (ref 11.6–14.5)
QRS AXIS: 57 DEGREES
QRS AXIS: 87 DEGREES
QRSD INTERVAL: 70 MS
QRSD INTERVAL: 80 MS
QT INTERVAL: 384 MS
QT INTERVAL: 416 MS
QTC INTERVAL: 411 MS
QTC INTERVAL: 416 MS
RBC # BLD AUTO: 4.33 MILLION/UL (ref 3.81–5.12)
SODIUM SERPL-SCNC: 137 MMOL/L (ref 136–145)
T WAVE AXIS: 63 DEGREES
T WAVE AXIS: 77 DEGREES
VENTRICULAR RATE: 60 BPM
VENTRICULAR RATE: 69 BPM
WBC # BLD AUTO: 5.26 THOUSAND/UL (ref 4.31–10.16)

## 2021-08-17 PROCEDURE — 93010 ELECTROCARDIOGRAM REPORT: CPT | Performed by: INTERNAL MEDICINE

## 2021-08-17 PROCEDURE — 85027 COMPLETE CBC AUTOMATED: CPT | Performed by: PHYSICIAN ASSISTANT

## 2021-08-17 PROCEDURE — NC001 PR NO CHARGE: Performed by: RADIOLOGY

## 2021-08-17 PROCEDURE — 76942 ECHO GUIDE FOR BIOPSY: CPT

## 2021-08-17 PROCEDURE — 99152 MOD SED SAME PHYS/QHP 5/>YRS: CPT

## 2021-08-17 PROCEDURE — 75989 ABSCESS DRAINAGE UNDER X-RAY: CPT | Performed by: RADIOLOGY

## 2021-08-17 PROCEDURE — 80048 BASIC METABOLIC PNL TOTAL CA: CPT | Performed by: PHYSICIAN ASSISTANT

## 2021-08-17 PROCEDURE — 99152 MOD SED SAME PHYS/QHP 5/>YRS: CPT | Performed by: RADIOLOGY

## 2021-08-17 PROCEDURE — 99225 PR SBSQ OBSERVATION CARE/DAY 25 MINUTES: CPT | Performed by: INTERNAL MEDICINE

## 2021-08-17 PROCEDURE — 32550 INSERT PLEURAL CATH: CPT

## 2021-08-17 PROCEDURE — 83735 ASSAY OF MAGNESIUM: CPT | Performed by: PHYSICIAN ASSISTANT

## 2021-08-17 PROCEDURE — 93005 ELECTROCARDIOGRAM TRACING: CPT

## 2021-08-17 PROCEDURE — 32550 INSERT PLEURAL CATH: CPT | Performed by: RADIOLOGY

## 2021-08-17 PROCEDURE — 85610 PROTHROMBIN TIME: CPT | Performed by: PHYSICIAN ASSISTANT

## 2021-08-17 PROCEDURE — C1729 CATH, DRAINAGE: HCPCS

## 2021-08-17 RX ORDER — HYDROMORPHONE HCL/PF 1 MG/ML
1 SYRINGE (ML) INJECTION
Status: DISCONTINUED | OUTPATIENT
Start: 2021-08-17 | End: 2021-08-19 | Stop reason: HOSPADM

## 2021-08-17 RX ORDER — FENTANYL CITRATE 50 UG/ML
INJECTION, SOLUTION INTRAMUSCULAR; INTRAVENOUS CODE/TRAUMA/SEDATION MEDICATION
Status: COMPLETED | OUTPATIENT
Start: 2021-08-17 | End: 2021-08-17

## 2021-08-17 RX ORDER — LIDOCAINE HYDROCHLORIDE AND EPINEPHRINE 10; 10 MG/ML; UG/ML
INJECTION, SOLUTION INFILTRATION; PERINEURAL CODE/TRAUMA/SEDATION MEDICATION
Status: COMPLETED | OUTPATIENT
Start: 2021-08-17 | End: 2021-08-17

## 2021-08-17 RX ORDER — MIDAZOLAM HYDROCHLORIDE 2 MG/2ML
INJECTION, SOLUTION INTRAMUSCULAR; INTRAVENOUS CODE/TRAUMA/SEDATION MEDICATION
Status: COMPLETED | OUTPATIENT
Start: 2021-08-17 | End: 2021-08-17

## 2021-08-17 RX ORDER — HYDROMORPHONE HCL/PF 1 MG/ML
0.5 SYRINGE (ML) INJECTION ONCE
Status: COMPLETED | OUTPATIENT
Start: 2021-08-17 | End: 2021-08-17

## 2021-08-17 RX ADMIN — FENTANYL CITRATE 50 MCG: 50 INJECTION, SOLUTION INTRAMUSCULAR; INTRAVENOUS at 10:25

## 2021-08-17 RX ADMIN — HYDROCODONE POLISTIREX AND CHLORPHENIRAMINE POLISTIREX 5 ML: 10; 8 SUSPENSION, EXTENDED RELEASE ORAL at 09:35

## 2021-08-17 RX ADMIN — MIDAZOLAM HYDROCHLORIDE 0.5 MG: 1 INJECTION, SOLUTION INTRAMUSCULAR; INTRAVENOUS at 10:25

## 2021-08-17 RX ADMIN — HYDROMORPHONE HYDROCHLORIDE 0.5 MG: 1 INJECTION, SOLUTION INTRAMUSCULAR; INTRAVENOUS; SUBCUTANEOUS at 00:05

## 2021-08-17 RX ADMIN — OXYBUTYNIN CHLORIDE 5 MG: 5 TABLET, EXTENDED RELEASE ORAL at 09:34

## 2021-08-17 RX ADMIN — LIDOCAINE HYDROCHLORIDE,EPINEPHRINE BITARTRATE 10 ML: 10; .01 INJECTION, SOLUTION INFILTRATION; PERINEURAL at 10:33

## 2021-08-17 RX ADMIN — OXYCODONE HYDROCHLORIDE 10 MG: 10 TABLET ORAL at 16:15

## 2021-08-17 RX ADMIN — HYDROMORPHONE HYDROCHLORIDE 0.5 MG: 1 INJECTION, SOLUTION INTRAMUSCULAR; INTRAVENOUS; SUBCUTANEOUS at 12:11

## 2021-08-17 RX ADMIN — HYDROMORPHONE HYDROCHLORIDE 1 MG: 1 INJECTION, SOLUTION INTRAMUSCULAR; INTRAVENOUS; SUBCUTANEOUS at 19:25

## 2021-08-17 RX ADMIN — MIDAZOLAM HYDROCHLORIDE 0.5 MG: 1 INJECTION, SOLUTION INTRAMUSCULAR; INTRAVENOUS at 10:29

## 2021-08-17 RX ADMIN — OXYCODONE HYDROCHLORIDE 10 MG: 10 TABLET ORAL at 22:32

## 2021-08-17 RX ADMIN — OXYCODONE HYDROCHLORIDE 10 MG: 10 TABLET ORAL at 09:34

## 2021-08-17 RX ADMIN — MELATONIN TAB 3 MG 6 MG: 3 TAB at 21:43

## 2021-08-17 RX ADMIN — HYDROCODONE POLISTIREX AND CHLORPHENIRAMINE POLISTIREX 5 ML: 10; 8 SUSPENSION, EXTENDED RELEASE ORAL at 21:43

## 2021-08-17 RX ADMIN — CALCIUM CARBONATE (ANTACID) CHEW TAB 500 MG 1000 MG: 500 CHEW TAB at 19:30

## 2021-08-17 RX ADMIN — FENTANYL CITRATE 25 MCG: 50 INJECTION, SOLUTION INTRAMUSCULAR; INTRAVENOUS at 10:33

## 2021-08-17 RX ADMIN — LIDOCAINE HYDROCHLORIDE,EPINEPHRINE BITARTRATE 10 ML: 10; .01 INJECTION, SOLUTION INFILTRATION; PERINEURAL at 10:29

## 2021-08-17 RX ADMIN — HYDROMORPHONE HYDROCHLORIDE 0.5 MG: 1 INJECTION, SOLUTION INTRAMUSCULAR; INTRAVENOUS; SUBCUTANEOUS at 10:58

## 2021-08-17 NOTE — CASE MANAGEMENT
Case Management Assessment & Discharge Planning Note    Patient name Marbella Hernandez  Location /-30 MRN [id number]  : 1965 Date 2021       Current Admission Date: 2021  Current Admission Diagnosis:  Malignant pleural effusion   Patient Active Problem List   Diagnosis    Cough    Pleural effusion    Tobacco abuse    Metastatic neoplasm ([facility] )    Hypertension    Malignant neoplasm of breast in female, estrogen receptor positive ([facility] )    Palliative care patient    Malignant pleural effusion    Primary malignant neoplasm of right breast with metastasis to other site [facility])    Previous Admission - Discharge Date:21   LOS (days): 0  Geometric Mean LOS (GMLOS) (days):   Days to GMLOS: Previous Discharge Diagnosis:  There are no discharge diagnoses documented for the most recent discharge  Risk of Unplanned Readmission Score  Predictive Model Details   No score data available for Risk of Unplanned Readmission        BUNDLE:      Reason for Referral:    OBJECTIVE:  Pt is a 50y o  year old /Civil Union, white or  [1], female with Alevism preference of Cheondo[place] admitted on 2021  5:54 PM  Pt is admitted to [place] [id number] at [address] [place] [address] with complaints of Malignant pleural effusion     Current admission status: Observation       Preferred Pharmacy:   [facility]/pharmacy #[id number]- [place], [place] - [facility] [address]  Phone: [phone] Fax: [phone]    [address]  [address] [facility] [address]  Phone: [phone] Fax: [phone]    [facility], [id number] [place] [address] [address] [place] [address]  Phone: [phone] Fax: [phone]    [id number] [address] [place] [address]  [address] [place] [address]  Phone: [phone] Fax: [phone]    Primary Care Provider: Todd George MD    Primary Insurance: Ritchie Mejias  Secondary Insurance:     ASSESSMENT:  [address] [facility] Representative - Spouse   Primary Phone: [phone] [facility]  Home Phone: [phone]               Advance Directives  Does patient have a [address]?: No (Patient identified her  as her HCR and her next of kin )  Was patient offered paperwork?: No (Patient refused paperwork )  Does patient currently have a Health Care decision maker?: Yes, please see Health Care Proxy section  Does patient have Advance Directives?: No  Was patient offered paperwork?: No (Patient refused paperwork )    [facility]: [facility]    Readmission Root Cause  30 Day Readmission: No    Patient Information  Mental Status: Alert  During Assessment patient was accompanied by: Not accompanied during assessment  Assessment information provided by[de-identified] Patient  Primary Caregiver: Self  Support Systems: Spouse/significant other, Other- Comment Ex  Voodoo, community, neighbor, etc  ([address])  What city do you live in[place] [address] entry access options   Select all that apply : Stairs  Number of steps to enter home : 4 (with railings; patient is able to navigate stairs with no difficulty )  Type of Current Residence: [facility]  Living Arrangements: Lives w/ Spouse/significant other, Other (Comment) (lives with children)  Is patient a ?: No    Activities of Daily Living Prior to Admission  Functional Status: Independent  Completes ADLs independently?: Yes  Ambulates independently?: Yes  Does patient use assisted devices?: No  Does patient currently own DME?: Yes  What DME does the patient currently own?: Straight Cane  Does patient have a history of Outpatient Therapy (PT/OT)?: No  Does the patient have a history of Short-Term Rehab?: No  Does patient currently have [facility]?: No    Patient Information Continued  Income Source: SSI/SSD  Does patient have prescription coverage?: Yes (Patient denied any barriers to obtaining or affording prescriptions )  Does patient receive dialysis treatments?: No  Does patient have a history of substance abuse?: No  Does patient have a history of Mental Health Diagnosis?: No    Means of Transportation  Means of Transport to Appts[de-identified] Family transport  In the past 12 months, has lack of transportation kept you from medical appointments or from getting medications?: No  In the past 12 months, has lack of transportation kept you from meetings, work, or from getting things needed for daily living?: No  Was application for public transport provided?: No (Patient has transportation )    DISCHARGE DETAILS:    Discharge planning discussed with[de-identified] Patient  Freedom of Choice: Yes (CM reviewed [address] recommendation  Patient agreeable and denied any preferences )    Requested [address]         Is the patient interested in [address] at discharge?: Yes  [address] requested[de-identified] [address] Name[de-identified] Other (please enter name in comment) (Referrals sent )  [facility] Aime Denis Provider[de-identified] PCP  Home Health Services Needed[de-identified] Post-Op Care and Assessment, Wound/Ostomy Care (new Pleurx drainage system)  Homebound Criteria Met[de-identified] Requires the Assistance of Another Person for Safe Ambulation or to Leave the Home, Uses an Assist Device (i e  cane, walker, etc)  Supporting Clincal Findings[de-identified] Fatigues Easliy in [employer], Limited Endurance    DME Referral Provided  Referral made for DME?: No    Other Referral/Resources/Interventions Provided:  Referrals Provided[de-identified] Other (Specify) (In basket message sent to Mendy for OP SW from oncology )    We would like to be able to fill any required prescriptions on discharge at our [facility] and have them delivered to you at discharge in your room    Would you like to participate in this program? : No - Declined    Discharge Destination Plan[de-identified] Home (Home with [facility])  Transportation at Discharge?: Yes  Type of Transport: Automobile (Patient's  will provide transportation at discharge )

## 2021-08-17 NOTE — ASSESSMENT & PLAN NOTE
· Reports increasing SOB, cough and right-sided chest pain over the past 4-5 days  Was recently admitted from 7/24-7/29/21 for SOB and was found to have right-sided pleural effusion  Thoracentesis was performed on 7/26/21 and pleural fluid revealed adenoma carcinoma  Er and MS (+)  HER2 (-)  (+) hx of right breast cancer in 2012 s/p lumpectomy and radiation  · Following with Dr Marlee Norris (oncology) as outpt  Received initial dose of Darzalex on 8/12/21 at [facility] center  States just filled Abamaciclib and Letrozole but has not started yet  Will initiate in am   · Follows with Dr Mariluz Green (palliative care) as outpt for symptom management  Attempted to get outpt thoracentesis and PleurX placement, but sent to ED to expedite 2/2 worsening symptoms  · CXR revealed right sided pleural effusion  · No respiratory distress noted  O2 sat 97% on RA in ED  · IR-PleurX placement today and thoracentesis  · Continue home dose oxy IR 10mg PRN  · IV dilaudid PRN breakthrough pain

## 2021-08-17 NOTE — DISCHARGE INSTRUCTIONS
How to Care for Your Chest or Abdominal Catheter                                    Post ASEPT Catheter Placement                 WHAT YOU NEED TO KNOW:                 A chest catheter helps remove fluid from your chest   This may decrease symptoms such as shortness of breath, pain,  One end of the catheter sits inside your  chest  The other end sits outside of your body  Your healthcare provider will show you or your caregiver how to drain fluid through the catheter  Your supplies will be shipped to your home  DISCHARGE INSTRUCTIONS:   How often you should drain fluid:   After the initial insertion drain every other day x 3 days then for comfort of SOB) Resume your normal diet  Small sips of flat soda will help with nausea  Resume taking your medications  You may have some initial  discomfort at the insertion site  You can take your normal pain medication  Marcos Mulligan  Patients Contact Interventional Radiology at [phone] PATIENTS: Contact Interventional Radiology at [phone])       [facility] PATIENTS: Contact Interventional Radiology at [phone]) if any of the following occur:                              Contact your healthcare provider if:   · Your symptoms, such as pain or shortness of breath, do not get better after you drain fluid  · You have redness, pain, or pus where the catheter is located  · You have a fever  · Persistent nausea or vomiting  · You cannot drain fluid  · You see a change in the color of fluid that you drain  · You see fluid leaking from around your catheter  · You drain foul-smelling fluid or bloody fluid from your catheter  · You see a hole or tear in your catheter and need to use the emergency clip  · You have questions or concerns about your condition or care    ·   How often you should drain fluid:  Your healthcare provider will tell you how often to drain fluid  He may tell you to follow a schedule, such as draining once a day or once every other day  He may instead tell you to drain fluid when you have symptoms such as SOB pain  Before you drain fluid from your catheter:   · Wash your hands  Remove all rings  Use soap and water or an alcohol-based hand rub  This will help prevent an infection  · Gather your supplies  Get a drainage kit and place it on a firm, clean surface  Drainage kits contain either a 500 mL or 1000 mL bottle and a procedure kit  Your healthcare provider will tell you which bottle to use  · Gently remove the old bandage  Do not pull on the drain when you remove the bandage  Throw the bandage away  · Wash your hands a second time  Use soap and water or an alcohol-based hand rub  · Open the drainage kit and remove the procedure kit  Remove the bandage and place it on your clean surface  Leave the bottle with drainage tubing in the package  Remove the procedure kit and place it on your clean surface with the folded side facing up  Carefully unfold the corners of the procedure kit  Do not touch anything inside of the procedure kit  Everything inside of the procedure kit is sterile  · Prepare the drainage tubing and bottle  Uncoil the drainage tubing that is connected to the bottle  Do not touch the end of the drainage tubing  Do not remove the cover from the end of the drainage tubing  Lay the drainage tubing on the procedure kit  · Put on gloves  Both gloves fit either hand   each glove up by the folded part and put them on  Do not touch any part of the outside of the gloves with your bare hand  Do not let them touch anything that is not sterile  · Open the smaller packages inside of the procedure kit  Open the package that contains the new catheter cap  Let the cap gently fall onto the procedure kit  Tear open the alcohol pads, but do not remove them from their pouches  Squeeze or roll the clamp closed on the drainage tubing  If the clamp rolls, roll it towards the karo    · Clean the end of the catheter  Use 1 alcohol pad from the procedure kit  Wipe around the end of the catheter in circles  Do not put anything into the end of the catheter to clean it  This could damage the catheter  How to drain fluid from your catheter:   · Connect the end of your catheter to the drainage tubing  Remove the cover from the end of the drainage tubing  Hold the end of your catheter in one hand and the drainage tubing in your other hand  Insert the drainage tubing into your catheter until you hear or feel a click  Remove the lock clip on the bottle  Twist and pull gently to remove     · Open the clamp on the drainage tubing  This will start the flow of fluid  · Drain as much fluid as directed by your doctor  To make the fluid drain slower, roll the clamp toward the bottle or gently squeeze the clamp  To make the fluid drain faster, slide the clamp away from the bottle or slowly release the clamp  It is normal to feel pain or cough when fluid is drained  To decrease pain or coughing, slow down the flow of fluid  You can also close the clamp and take a break  If your symptoms do not get better when you stop draining, do not continue to drain fluid  · Change the bottle when it is full  If you need to use a second bottle, close the clamp on the drainage tubing  This will stop fluid from draining  Hold the end of your catheter  Pull out the end of the drainage tubing from your catheter  Do not let the end of your catheter touch anything  Remove the cap on the end of the new drainage tubing  Insert the drainage tubing into your catheter  Remove the support clip on the bottle  Push down on the bottle plunger  Open the clamp on the drainage tubing  Continue to remove as much fluid as directed  · Close the clamp on the drainage tubing to stop fluid from draining    Check that the clamp is completely closed  · Pull out the end of the drainage tubing from your catheter  Do not let the end of your catheter touch anything  Clean the end of your catheter with a new alcohol pad  This will help prevent infection  After you drain fluid from your catheter:   · Clean the skin around your catheter with a new alcohol pad  Start closest to where the catheter is inserted in your skin  Move the alcohol pad in circles away from your catheter  · Place the foam pad around your catheter  The foam pad should have a small cut in it  This cut lets you fit the foam pad around your catheter  · Loop the end of the catheter  Place the looped catheter on top of the foam pad  Hold it on top of the foam pad  Place the gauze from your procedure kit over the catheter  Make sure the catheter is completely covered by the gauze  · Remove your gloves  This will make it easier to handle the clear sticky bandage  · Place the sticky bandage over the gauze  There are 3 layers you need to remove from the bandage  Remove the larger white layer from the bandage  Place the bandage over your gauze so the gauze is in the center of the bandage  Hold one corner of the bandage and remove the larger clear layer of the bandage  Remove the last white layer of the bandage  Press gently on all corners of the bandage to help it stick to your skin  Write down the amount of fluid you drained  There are measurements on the side of the bottle  Also write down the color of the fluid, the date, and the time  Bring this record with you to your follow-up visits  Other important information:   · If your catheter comes out, cover the opening in your skin with sterile gauze and a bandage  Use the sterile gauze and bandage from your drainage kit  Do not take a bath or swim in hot tubs or pools  This can cause an infection  · You can shower or take a sponge bath   Make sure your bandage covers your catheter before you bathe  The edges of the bandage should make contact with your skin on all sides  This will prevent water from getting into your drain  If your bandage gets wet, remove the bandage  Clean your skin around the catheter and replace the bandage as directed  Follow up with your healthcare provider as directed:  Write down your questions so you remember to ask them during your visits  © 2017 [employer] [employer] is for End User's use only and may not be sold, redistributed or otherwise used for commercial purposes  All illustrations and images included in CareNotes® are the copyrighted property of [employer]  or [employer]  The above information is an  only  It is not intended as medical advice for individual conditions or treatments  Talk to your doctor, nurse or pharmacist before following any medical regimen to see if it is safe and effective for you  How to Care for Your Chest or Abdominal Catheter                                    Post ASEPT Catheter Placement                 WHAT YOU NEED TO KNOW:                 A chest catheter helps remove fluid from your chest   This may decrease symptoms such as shortness of breath, pain,  One end of the catheter sits inside your  chest  The other end sits outside of your body  Your healthcare provider will show you or your caregiver how to drain fluid through the catheter  Your supplies will be shipped to your home  DISCHARGE INSTRUCTIONS:   How often you should drain fluid:   After the initial insertion drain every other day x 3 days then for comfort of SOB) Resume your normal diet  Small sips of flat soda will help with nausea  Resume taking your medications  You may have some initial  discomfort at the insertion site  You can take your normal pain medication       Ibeth Patiño Patients Contact Interventional Radiology at [phone] PATIENTS: Contact Interventional Radiology at [phone])       [facility] PATIENTS: Contact Interventional Radiology at [phone]) if any of the following occur:                              Contact your healthcare provider if:   · Your symptoms, such as pain or shortness of breath, do not get better after you drain fluid  · You have redness, pain, or pus where the catheter is located  · You have a fever  · Persistent nausea or vomiting  · You cannot drain fluid  · You see a change in the color of fluid that you drain  · You see fluid leaking from around your catheter  · You drain foul-smelling fluid or bloody fluid from your catheter  · You see a hole or tear in your catheter and need to use the emergency clip  · You have questions or concerns about your condition or care  ·   How often you should drain fluid:  Your healthcare provider will tell you how often to drain fluid  He may tell you to follow a schedule, such as draining once a day or once every other day  He may instead tell you to drain fluid when you have symptoms such as SOB pain  Before you drain fluid from your catheter:   · Wash your hands  Remove all rings  Use soap and water or an alcohol-based hand rub  This will help prevent an infection  · Gather your supplies  Get a drainage kit and place it on a firm, clean surface  Drainage kits contain either a 500 mL or 1000 mL bottle and a procedure kit  Your healthcare provider will tell you which bottle to use  · Gently remove the old bandage  Do not pull on the drain when you remove the bandage  Throw the bandage away  · Wash your hands a second time  Use soap and water or an alcohol-based hand rub  · Open the drainage kit and remove the procedure kit  Remove the bandage and place it on your clean surface  Leave the bottle with drainage tubing in the package   Remove the procedure kit and place it on your clean surface with the folded side facing up  Carefully unfold the corners of the procedure kit  Do not touch anything inside of the procedure kit  Everything inside of the procedure kit is sterile  · Prepare the drainage tubing and bottle  Uncoil the drainage tubing that is connected to the bottle  Do not touch the end of the drainage tubing  Do not remove the cover from the end of the drainage tubing  Lay the drainage tubing on the procedure kit  · Put on gloves  Both gloves fit either hand   each glove up by the folded part and put them on  Do not touch any part of the outside of the gloves with your bare hand  Do not let them touch anything that is not sterile  · Open the smaller packages inside of the procedure kit  Open the package that contains the new catheter cap  Let the cap gently fall onto the procedure kit  Tear open the alcohol pads, but do not remove them from their pouches  Squeeze or roll the clamp closed on the drainage tubing  If the clamp rolls, roll it towards the karo    · Clean the end of the catheter  Use 1 alcohol pad from the procedure kit  Wipe around the end of the catheter in circles  Do not put anything into the end of the catheter to clean it  This could damage the catheter  How to drain fluid from your catheter:   · Connect the end of your catheter to the drainage tubing  Remove the cover from the end of the drainage tubing  Hold the end of your catheter in one hand and the drainage tubing in your other hand  Insert the drainage tubing into your catheter until you hear or feel a click  Remove the lock clip on the bottle  Twist and pull gently to remove     · Open the clamp on the drainage tubing  This will start the flow of fluid  · Drain as much fluid as directed by your doctor  To make the fluid drain slower, roll the clamp toward the bottle or gently squeeze the clamp   To make the fluid drain faster, slide the clamp away from the bottle or slowly release the clamp  It is normal to feel pain or cough when fluid is drained  To decrease pain or coughing, slow down the flow of fluid  You can also close the clamp and take a break  If your symptoms do not get better when you stop draining, do not continue to drain fluid  · Change the bottle when it is full  If you need to use a second bottle, close the clamp on the drainage tubing  This will stop fluid from draining  Hold the end of your catheter  Pull out the end of the drainage tubing from your catheter  Do not let the end of your catheter touch anything  Remove the cap on the end of the new drainage tubing  Insert the drainage tubing into your catheter  Remove the support clip on the bottle  Push down on the bottle plunger  Open the clamp on the drainage tubing  Continue to remove as much fluid as directed  · Close the clamp on the drainage tubing to stop fluid from draining  Check that the clamp is completely closed  · Pull out the end of the drainage tubing from your catheter  Do not let the end of your catheter touch anything  Clean the end of your catheter with a new alcohol pad  This will help prevent infection  After you drain fluid from your catheter:   · Clean the skin around your catheter with a new alcohol pad  Start closest to where the catheter is inserted in your skin  Move the alcohol pad in circles away from your catheter  · Place the foam pad around your catheter  The foam pad should have a small cut in it  This cut lets you fit the foam pad around your catheter  · Loop the end of the catheter  Place the looped catheter on top of the foam pad  Hold it on top of the foam pad  Place the gauze from your procedure kit over the catheter  Make sure the catheter is completely covered by the gauze  · Remove your gloves  This will make it easier to handle the clear sticky bandage  · Place the sticky bandage over the gauze    There are 3 layers you need to remove from the bandage  Remove the larger white layer from the bandage  Place the bandage over your gauze so the gauze is in the center of the bandage  Hold one corner of the bandage and remove the larger clear layer of the bandage  Remove the last white layer of the bandage  Press gently on all corners of the bandage to help it stick to your skin  Write down the amount of fluid you drained  There are measurements on the side of the bottle  Also write down the color of the fluid, the date, and the time  Bring this record with you to your follow-up visits  Other important information:   · If your catheter comes out, cover the opening in your skin with sterile gauze and a bandage  Use the sterile gauze and bandage from your drainage kit  Do not take a bath or swim in hot tubs or pools  This can cause an infection  · You can shower or take a sponge bath  Make sure your bandage covers your catheter before you bathe  The edges of the bandage should make contact with your skin on all sides  This will prevent water from getting into your drain  If your bandage gets wet, remove the bandage  Clean your skin around the catheter and replace the bandage as directed  Follow up with your healthcare provider as directed:  Write down your questions so you remember to ask them during your visits  © 2017 [employer] is for End User's use only and may not be sold, redistributed or otherwise used for commercial purposes  All illustrations and images included in CareNotes® are the copyrighted property of [employer]  or [employer]  The above information is an  only  It is not intended as medical advice for individual conditions or treatments  Talk to your doctor, nurse or pharmacist before following any medical regimen to see if it is safe and effective for you

## 2021-08-17 NOTE — H&P
Sergio 1965, 50 y o  female MRN: [id number]  Unit/Bed#: -01 Encounter: [id number]  Primary Care Provider: Clyde Renteria MD   Date and time admitted to hospital: 8/16/2021  5:54 PM    * Malignant pleural effusion  Assessment & Plan  · Reports increasing SOB, cough and right-sided chest pain over the past 4-5 days  Was recently admitted from 7/24-7/29/21 for SOB and was found to have right-sided pleural effusion  Thoracentesis was performed on 7/26/21 and pleural fluid revealed adenoma carcinoma  Er and [place] (+)  HER2 (-)  (+) hx of right breast cancer in 2012 s/p lumpectomy and radiation  · Following with Dr Cortez Mohs (oncology) as outpt  Received initial dose of Darzalex on 8/12/21 at infusion center  States just filled Abamaciclib and Letrozole but has not started yet  Will initiate in am   · Follows with Dr Amrik Solano (palliative care) as outpt for symptom management  Attempted to get outpt thoracentesis and PleurX placement, but sent to ED to expedite 2/2 worsening symptoms  · CXR revealed right sided pleural effusion  · No respiratory distress noted  O2 sat 97% on RA in ED  · Consult IR for therapeutic thoracentesis and PleurX placement  · Continue home dose oxy IR 10mg PRN  · IV dilaudid PRN breakthrough pain  Primary malignant neoplasm of right breast with metastasis to other site [facility])  Assessment & Plan  · Primary breast cancer with liver, bone, lymph and pleura involvement per oncology notes  · See AP above    Palliative care patient  Assessment & Plan  · Follows with Dr Amrik Solano as outpt for cancer related pain and symptom management  Sent to ED to expedite therapeutic thoracentesis and PleurX placement as above  · Continue home dose Oxy IR with IV dilaudid PRN for breakthrough pain        VTE Prophylaxis: Pharmacologic VTE Prophylaxis contraindicated due to pre-procedure  / sequential compression device   Code Status: Level 1 Full Code  POLST: POLST form is not discussed and not completed at this time  Discussion with family:  at bedside    Anticipated Length of Stay:  Patient will be admitted on an Observation basis with an anticipated length of stay of  less than 2 midnights  Justification for Hospital Stay: See AP above    Total Time for Visit, including Counseling / Coordination of Care: 45 minutes  Greater than 50% of this total time spent on direct patient counseling and coordination of care  Chief Complaint:   Cough, chest pain and SOB    History of Present Illness:    Angela Smith is a 50 y o  female who presents with increasing SOB, cough and right-sided chest pain over the past 4-5 days  Was recently admitted from 7/24-7/29/21 for SOB and was found to have right-sided pleural effusion  Thoracentesis was performed on 7/26/21 and pleural fluid revealed adenoma carcinoma  [place] and [place] (+)  HER2 (-)  (+) hx of right breast cancer in 2012 s/p lumpectomy and radiation  Following with Dr Fredy Carey (oncology) as outpt  Received initial dose of Darzalex on 8/12/21 at [facility] center  States just filled Abamaciclib and Letrozole but has not started yet  Will initiate in am   Follows with Dr Casey Nunez (palliative care) as outpt for symptom management  Attempted to get outpt thoracentesis and PleurX placement, but sent to ED to expedite 2/2 worsening symptoms  Review of Systems:    Review of Systems   Constitutional: Positive for appetite change (decreased)  Negative for chills and fever  HENT: Negative for congestion, ear pain, sinus pressure and sore throat  Eyes: Negative for visual disturbance  Respiratory: Positive for cough and shortness of breath  Cardiovascular: Positive for chest pain  Gastrointestinal: Positive for abdominal pain and diarrhea (now resolved)  Negative for constipation, nausea and vomiting  Genitourinary: Negative for difficulty urinating, dysuria, frequency and urgency     Musculoskeletal: Negative for neck pain and neck stiffness  Allergic/Immunologic: Positive for immunocompromised state  Neurological: Negative for syncope, light-headedness and headaches  All other systems reviewed and are negative  Past Medical and Surgical History:     Past Medical History:   Diagnosis Date    History of radiation exposure     Malignant neoplasm of right female breast ([address] ) 2012    right       Past Surgical History:   Procedure Laterality Date    BREAST CYST EXCISION      BREAST LUMPECTOMY Right 2012    HIP SURGERY      IR BIOPSY LIVER MASS  7/26/2021    IR THORACENTESIS  7/26/2021       Meds/Allergies:    Prior to Admission medications    Medication Sig Start Date End Date Taking?  Authorizing Provider   Abemaciclib 150 MG TABS Take 150 mg by mouth 2 (two) times a day 8/13/21   Ina Perez MD   acetaminophen (TYLENOL) 325 mg tablet Take 2 tablets (650 mg total) by mouth every 6 (six) hours as needed for mild pain 7/29/21   Fang Paniagua MD   albuterol (PROVENTIL HFA,VENTOLIN HFA) 90 mcg/act inhaler Inhale 2 puffs every 4 (four) hours as needed for wheezing 7/29/21   Fang Paniagua MD   benzonatate (TESSALON) 200 MG capsule Take 1 capsule (200 mg total) by mouth 3 (three) times a day as needed for cough 7/29/21   Fang Paniagua MD   BLACK COHOSH PO Take by mouth    Historical Provider, MD   calcium carbonate (TUMS) 500 mg chewable tablet Chew 1 tablet (500 mg total) 3 (three) times a day as needed for indigestion or heartburn 7/29/21   Fang Paniagua MD   dextromethorphan-guaiFENesin (ROBITUSSIN DM)  mg/5 mL syrup Take 10 mL by mouth every 4 (four) hours as needed for cough 7/29/21   Nitish CORRAL MD   diphenhydrAMINE (BENADRYL) 50 MG tablet Take 1 tablet (50 mg total) by mouth daily at bedtime as needed for itching or sleep 7/29/21   Fang Paniagua MD   ergocalciferol (VITAMIN D2) 50,000 units Take 1 capsule (50,000 Units total) by mouth once a week for 12 doses 7/30/21 10/16/21  Lewis CEDENO Elsi Martin MD   Incontinence Supply Disposable ([facility] Incontinence Pads) MISC Use 150 pad 3 (three) times a day 8/3/21   Popeye Chapman MD   letrozole [facility]) 2 5 mg tablet Take 1 tablet (2 5 mg total) by mouth daily 8/11/21   Dung Navas MD   melatonin 3 mg Take 2 tablets (6 mg total) by mouth daily at bedtime 7/29/21   Aurora Saunders MD   oxybutynin (DITROPAN-XL) 5 mg 24 hr tablet Take 1 tablet (5 mg total) by mouth daily Take 1 tablet daily 8/3/21   Popeye Chapman MD   oxyCODONE-acetaminophen (PERCOCET) 5-325 mg per tablet Take 2 tablets by mouth every 4 (four) hours as needed for moderate painMax Daily Amount: 12 tablets 7/30/21 8/16/21  Popeye Chapman MD     I have reviewed home medications with patient personally  Allergies:    Allergies   Allergen Reactions    Codeine Anaphylaxis    Sulfa Antibiotics Hives       Social History:     Marital Status: /Civil Union   Patient Pre-hospital Living Situation: Lives w   Patient Pre-hospital Level of Mobility: Ambulatory w cane  Patient Pre-hospital Diet Restrictions: None  Substance Use History:   Social History     Substance and Sexual Activity   Alcohol Use Not Currently     Social History     Tobacco Use   Smoking Status Former Smoker    Packs/day: 0 25    Types: Cigarettes   Smokeless Tobacco Never Used     Social History     Substance and Sexual Activity   Drug Use Not Currently       Family History:    Family History   Problem Relation Age of Onset    Breast cancer Mother         in her 63's    Breast cancer Sister         inher 45s and 48    Colon cancer Maternal Grandmother     No Known Problems Paternal Grandmother     Breast cancer Other     No Known Problems Paternal Aunt        Physical Exam:     Vitals:   Blood Pressure: (!) 142/102 (08/16/21 2049)  Pulse: 78 (08/16/21 2049)  Temperature: 97 6 °F (36 4 °C) (08/16/21 2049)  Respirations: 20 (08/16/21 2049)  Weight - Scale: 71 6 kg (157 lb 13 6 oz) (08/16/21 1714)  SpO2: 96 % (08/16/21 2049)    Physical Exam  Vitals reviewed  Constitutional:       General: She is not in acute distress  HENT:      Head: Normocephalic and atraumatic  Mouth/Throat:      Comments: deferred  Eyes:      Extraocular Movements: Extraocular movements intact  Cardiovascular:      Rate and Rhythm: Normal rate  Pulses: Normal pulses  Heart sounds: Normal heart sounds  No murmur heard  No friction rub  No gallop  Pulmonary:      Effort: Pulmonary effort is normal  No respiratory distress  Breath sounds: Normal breath sounds  No wheezing or rhonchi  Comments: Diminished BLL  Abdominal:      Palpations: Abdomen is soft  Tenderness: There is no abdominal tenderness  There is no guarding or rebound  Musculoskeletal:         General: No swelling or tenderness  Normal range of motion  Cervical back: Normal range of motion and neck supple  Skin:     General: Skin is warm and dry  Neurological:      General: No focal deficit present  Mental Status: She is alert and oriented to person, place, and time  Psychiatric:         Mood and Affect: Mood normal          Behavior: Behavior normal          Thought Content: Thought content normal          Additional Data:     Lab Results: I have personally reviewed pertinent reports        Results from last 7 days   Lab Units 08/16/21  1725   WBC Thousand/uL 5 86   HEMOGLOBIN g/dL 14 6   HEMATOCRIT % 47 6*   PLATELETS Thousands/uL 272   NEUTROS PCT % 73   LYMPHS PCT % 14   MONOS PCT % 10   EOS PCT % 2     Results from last 7 days   Lab Units 08/16/21  1725   SODIUM mmol/L 135*   POTASSIUM mmol/L 4 3   CHLORIDE mmol/L 102   CO2 mmol/L 24   BUN mg/dL 11   CREATININE mg/dL 1 01   ANION GAP mmol/L 9   CALCIUM mg/dL 9 3   ALBUMIN g/dL 3 5   TOTAL BILIRUBIN mg/dL 0 34   ALK PHOS U/L 148*   ALT U/L 21   AST U/L 28   GLUCOSE RANDOM mg/dL 95                       Imaging: I have personally reviewed pertinent films in PACS    XR chest 1 view portable    (Results Pending)   IR IN-Patient Thoracentesis    (Results Pending)       EKG, Pathology, and Other Studies Reviewed on Admission:   · EKG:     Allscripts / Epic Records Reviewed: Yes     ** Please Note: This note has been constructed using a voice recognition system   **

## 2021-08-17 NOTE — ASSESSMENT & PLAN NOTE
· Follows with  [facility] [place] as outpt for cancer related pain and symptom management  Sent to ED to expedite therapeutic thoracentesis and PleurX placement as above  · Continue home dose Oxy IR with IV dilaudid PRN for breakthrough pain

## 2021-08-17 NOTE — PLAN OF CARE
Problem: DISCHARGE PLANNING  Goal: Discharge to home or other facility with appropriate resources  Description: INTERVENTIONS:  - Identify barriers to discharge w/patient and caregiver  - Arrange for needed discharge resources and transportation as appropriate  - Identify discharge learning needs (meds, wound care, etc )  - Arrange for interpretive services to assist at discharge as needed  - Refer to Case Management Department for coordinating discharge planning if the patient needs post-hospital services based on physician/advanced practitioner order or complex needs related to functional status, cognitive ability, or social support system  Outcome: Progressing     Problem: Knowledge Deficit  Goal: Patient/family/caregiver demonstrates understanding of disease process, treatment plan, medications, and discharge instructions  Description: Complete learning assessment and assess knowledge base    Interventions:  - Provide teaching at level of understanding  - Provide teaching via preferred learning methods  Outcome: Progressing     Problem: PAIN - ADULT  Goal: Verbalizes/displays adequate comfort level or baseline comfort level  Description: Interventions:  - Encourage patient to monitor pain and request assistance  - Assess pain using appropriate pain scale  - Administer analgesics based on type and severity of pain and evaluate response  - Implement non-pharmacological measures as appropriate and evaluate response  - Consider cultural and social influences on pain and pain management  - Notify physician/advanced practitioner if interventions unsuccessful or patient reports new pain  Outcome: Not Progressing

## 2021-08-17 NOTE — ASSESSMENT & PLAN NOTE
· Primary breast cancer with liver, bone, lymph and pleura involvement per oncology notes    · See AP above

## 2021-08-17 NOTE — UTILIZATION REVIEW
Initial Clinical Review    Admission: Date/Time/Statement:   Admission Orders (From admission, onward)     Ordered        08/16/21 1922  Place in Observation  Once                   Orders Placed This Encounter   Procedures    Place in Observation     Standing Status:   Standing     Number of Occurrences:   1     Order Specific Question:   Level of Care     Answer:   Med Surg [16]     ED Arrival Information     Expected Arrival Acuity    - 8/16/2021 16:58 Emergent         Means of arrival Escorted by Service Admission type    [facility] Emergency         Arrival complaint    SOB        Chief Complaint   Patient presents with    Shortness of Breath     Pt reports feeling SOB x 1 5week  Pt has lung, liver, and bone cancer  Initial Presentation: 51 yo female to ED from home w/ inc SOB , cough and R sided CP over the past 4-5 days   Recent admit 7/24-7/29 found to have R sided pleural effusion   Thoracentesis done revealing adenoma carcinoma  PMHX of breast ca 2012  Attempted to get outpt thoracentesis and PleurX placement, but sent to ED to expedite 2/2 worsening symptoms  Admitted OBS status plan to consult IR for therapeutic thoracentesis and PleurX placement, cont pain mngt   8/17 IR Consult   Plan for tunneled pleural drainage catheter placement today  Keep NPO     8/17 IR OP Note   Right tunneled pleural drainage catheter placement  1400 mL joanne pleural effusion removed at end of case     8/17 IM Note   IR-PleurX placement today and thoracentesis   Cont oxy IR 10 mg prn , IV dilaudid breakthrough pain      ED Triage Vitals   Temperature Pulse Respirations Blood Pressure SpO2   08/16/21 1714 08/16/21 1714 08/16/21 1714 08/16/21 1714 08/16/21 1714   98 1 °F (36 7 °C) 65 20 132/91 96 %      Temp src Heart Rate Source Patient Position - Orthostatic VS BP Location FiO2 (%)   -- 08/16/21 1714 08/16/21 1845 08/16/21 1714 --    Monitor Lying Left arm       Pain Score       08/16/21 2014 Worst Possible Pain          Wt Readings from Last 1 Encounters:   08/16/21 71 6 kg (157 lb 13 6 oz)     Additional Vital Signs:   08/17/21 07:36:52  98 4 °F (36 9 °C)  80  18  103/67  79  91 %  --  --   08/16/21 23:31:57  97 9 °F (36 6 °C)  73  --  113/63  80  92 %  --  --   08/16/21 2100  --  --  --  --  --  93 %  None (Room air)  --   08/16/21 20:49:47  97 6 °F (36 4 °C)  78  20  142/102Abnormal   115  96 %  --  --   08/16/21 1845  --  64  23Abnormal   143/90  112  97 %  None (Room air         Pertinent Labs/Diagnostic Test Results:   8/16 PCXR Persistent small right pleural effusion  Results from last 7 days   Lab Units 08/17/21  0614 08/16/21  1725 08/11/21  1027   WBC Thousand/uL 5 26 5 86 5 09   HEMOGLOBIN g/dL 13 1 14 6 14 5   HEMATOCRIT % 40 2 47 6* 44 6   PLATELETS Thousands/uL 272 272 340   NEUTROS ABS Thousands/µL  --  4 26 3 62     Results from last 7 days   Lab Units 08/17/21  0614 08/16/21  1725 08/11/21  1027   SODIUM mmol/L 137 135* 139   POTASSIUM mmol/L 4 0 4 3 4 0   CHLORIDE mmol/L 103 102 104   CO2 mmol/L 22 24 23   ANION GAP mmol/L 12 9 12   BUN mg/dL 12 11 9   CREATININE mg/dL 0 94 1 01 0 97   EGFR ml/min/1 73sq m 69 64 67   CALCIUM mg/dL 9 2 9 3 9 1   MAGNESIUM mg/dL 2 0  --   --      Results from last 7 days   Lab Units 08/16/21  1725 08/11/21  1027   AST U/L 28 35   ALT U/L 21 17   ALK PHOS U/L 148* 159*   TOTAL PROTEIN g/dL 7 4 7 4   ALBUMIN g/dL 3 5 3 3*   TOTAL BILIRUBIN mg/dL 0 34 0 44     Results from last 7 days   Lab Units 08/17/21  0614 08/16/21  1725   GLUCOSE RANDOM mg/dL 97 95     Results from last 7 days   Lab Units 08/16/21  1839   TROPONIN I ng/mL <0 02     Results from last 7 days   Lab Units 08/17/21  0614   PROTIME seconds 13 4   INR  1 07   ED Treatment:   Medication Administration from 08/16/2021 1658 to 08/16/2021 2039       Date/Time Order Dose Route Action     08/16/2021 2014 HYDROmorphone (DILAUDID) injection 1 mg 1 mg Intravenous Given        Past Medical History:   Diagnosis Date    History of radiation exposure     Malignant neoplasm of right female breast ([address] ) 2012    right     Present on Admission:   Malignant pleural effusion   Primary malignant neoplasm of right breast with metastasis to other site [facility])      Admitting Diagnosis: Shortness of breath [R06 02]  SOB (shortness of breath) [R06 02]  Malignant pleural effusion [J91 0]  Age/Sex: 50 y o  female  Admission Orders:  Scheduled Medications:  Abemaciclib, 150 mg, Oral, BID  hydrocodone-chlorpheniramine polistirex, 5 mL, Oral, Q12H [facility]  letrozole, 2 5 mg, Oral, Daily  oxybutynin, 5 mg, Oral, Daily      Continuous IV Infusions:     PRN Meds:  acetaminophen, 975 mg, Oral, Q6H PRN  albuterol, 2 puff, Inhalation, Q4H PRN  calcium carbonate, 1,000 mg, Oral, Daily PRN  dextromethorphan-guaiFENesin, 10 mL, Oral, Q4H PRN  HYDROmorphone, 0 5 mg, Intravenous, Q3H PRN  8/17  x1  melatonin, 6 mg, Oral, HS PRN  ondansetron, 4 mg, Intravenous, Q6H PRN  oxyCODONE, 10 mg, Oral, Q4H PRN  oxyCODONE, 5 mg, Oral, Q4H PRN    NPO       Network Utilization Review Department  ATTENTION: Please call with any questions or concerns to [phone] and carefully listen to the prompts so that you are directed to the right person  All voicemails are confidential   Dustin Jarvis all requests for admission clinical reviews, approved or denied determinations and any other requests to dedicated fax number below belonging to the campus where the patient is receiving treatment   List of dedicated fax numbers for the Facilities:  [address] DENIALS (Administrative/Medical Necessity) [phone]   [address] (Maternity/NICU/Pediatrics) [phone]   [address] Natty Agarwal 1277 [phone] [address] [address] [address] [phone]

## 2021-08-17 NOTE — CASE MANAGEMENT
Case Management Progress Note    Patient name Priyank Ferrer  Location /-94 MRN [id number]  : 1965 Date 2021       LOS (days): 0  Geometric Mean LOS (GMLOS) (days):   Days to GMLOS:        BUNDLE:      OBJECTIVE:  Pt is a 50y o  year old /Civil Union, white or  [1], female with [facility] preference of [facility] admitted on 2021  5:54 PM  Pt is admitted to [facility] [id number] at [address] [place] [address] with complaints of Malignant pleural effusion   Current admission status: Observation  Preferred Pharmacy:   [facility]pharmacy #[id number]- [place], [place] - [facility] [address]  Phone: [phone] Fax: [phone]    [address], [address]  [address] [place] [address],[address] & [address] [facility] [address]  Phone: [phone] Fax: [phone]    [facility], [address] [place] [address] [address] [place] [place] [address]  Phone: [phone] Fax: [phone]    [facility] ([facility] #[id number]  [place], [place] - [address] [place] [address]  Phone: [phone] Fax: [phone]    Primary Care Provider: Sariah Renteria MD    Primary Insurance: Daysi Noguera  Secondary Insurance:     PROGRESS NOTE:    CM attempted to meet with patient at bedside to complete general assessment and discharge planning  Patient not present in room  Per RN, patient went down to the IR for an ultrasound  CM to come back later

## 2021-08-17 NOTE — PROGRESS NOTES
[facility]  Progress Note Viveca Diver 1965, 50 y o  female MRN: [id number]  Unit/Bed#: -01 Encounter: [id number]  Primary Care Provider: Roosevelt Eller MD   Date and time admitted to hospital: 8/16/2021  5:54 PM    * Malignant pleural effusion  Assessment & Plan  · Reports increasing SOB, cough and right-sided chest pain over the past 4-5 days  Was recently admitted from 7/24-7/29/21 for SOB and was found to have right-sided pleural effusion  Thoracentesis was performed on 7/26/21 and pleural fluid revealed adenoma carcinoma  Er and [place] (+)  HER2 (-)  (+) hx of right breast cancer in 2012 s/p lumpectomy and radiation  · Following with Dr Edu Cruz (oncology) as outpt  Received initial dose of Darzalex on 8/12/21 at [facility] center  States just filled Abamaciclib and Letrozole but has not started yet  Will initiate in am   · Follows with Dr Leeann Bustamante (palliative care) as outpt for symptom management  Attempted to get outpt thoracentesis and PleurX placement, but sent to ED to expedite 2/2 worsening symptoms  · CXR revealed right sided pleural effusion  · No respiratory distress noted  O2 sat 97% on RA in ED  · IR-PleurX placement today and thoracentesis  · Continue home dose oxy IR 10mg PRN  · IV dilaudid PRN breakthrough pain  Primary malignant neoplasm of right breast with metastasis to other site [facility])  Assessment & Plan  · Primary breast cancer with liver, bone, lymph and pleura involvement per oncology notes  · See AP above    Palliative care patient  Assessment & Plan  · Follows with Dr Leeann Bustamante as outpt for cancer related pain and symptom management  Sent to ED to expedite therapeutic thoracentesis and PleurX placement as above  · Continue home dose Oxy IR with IV dilaudid PRN for breakthrough pain  VTE Pharmacologic Prophylaxis: VTE Score: 4 Moderate Risk (Score 3-4) - Pharmacological DVT Prophylaxis Contraindicated   Sequential Compression Devices Ordered  Patient Centered Rounds: I performed bedside rounds with nursing staff today  Discussions with Specialists or Other Care Team Provider: IR    Education and Discussions with Family / Patient: Updated  () at bedside  Time Spent for Care: 30 minutes  More than 50% of total time spent on counseling and coordination of care as described above  Current Length of Stay: 0 day(s)  Current Patient Status: Observation   Certification Statement: The patient will continue to require additional inpatient hospital stay due to Pain control  Discharge Plan: Anticipate discharge tomorrow to home  Code Status: Level 1 - Full Code    Subjective:   Patient in slight discomfort on examination secondary to PleurX catheter being placed  Patient had no other complaints on exam    Objective:     Vitals:   Temp (24hrs), Av °F (36 7 °C), Min:97 6 °F (36 4 °C), Max:98 4 °F (36 9 °C)    Temp:  [97 6 °F (36 4 °C)-98 4 °F (36 9 °C)] 97 8 °F (36 6 °C)  HR:  [64-84] 68  Resp:  [16-23] 19  BP: ()/() 98/65  SpO2:  [91 %-98 %] 96 %  Body mass index is 27 09 kg/m²  Input and Output Summary (last 24 hours): Intake/Output Summary (Last 24 hours) at 2021 1436  Last data filed at 2021 1041  Gross per 24 hour   Intake 0 ml   Output 2800 ml   Net -2800 ml       Physical Exam:   Physical Exam  Vitals and nursing note reviewed  Constitutional:       General: She is not in acute distress  Appearance: She is well-developed  HENT:      Head: Normocephalic and atraumatic  Eyes:      General: No scleral icterus  Conjunctiva/sclera: Conjunctivae normal    Cardiovascular:      Rate and Rhythm: Normal rate and regular rhythm  Heart sounds: Normal heart sounds  No murmur heard  No friction rub  No gallop  Pulmonary:      Effort: Pulmonary effort is normal  No respiratory distress  Breath sounds: Normal breath sounds  No wheezing or rales        Comments: PleurX catheter in place  Abdominal:      General: Bowel sounds are normal  There is no distension  Palpations: Abdomen is soft  Tenderness: There is no abdominal tenderness  Musculoskeletal:         General: Normal range of motion  Skin:     General: Skin is warm  Findings: No rash  Neurological:      Mental Status: She is alert and oriented to person, place, and time          Additional Data:     Labs:  Results from last 7 days   Lab Units 08/17/21  0614 08/16/21  1725   WBC Thousand/uL 5 26 5 86   HEMOGLOBIN g/dL 13 1 14 6   HEMATOCRIT % 40 2 47 6*   PLATELETS Thousands/uL 272 272   NEUTROS PCT %  --  73   LYMPHS PCT %  --  14   MONOS PCT %  --  10   EOS PCT %  --  2     Results from last 7 days   Lab Units 08/17/21  0614 08/16/21  1725   SODIUM mmol/L 137 135*   POTASSIUM mmol/L 4 0 4 3   CHLORIDE mmol/L 103 102   CO2 mmol/L 22 24   BUN mg/dL 12 11   CREATININE mg/dL 0 94 1 01   ANION GAP mmol/L 12 9   CALCIUM mg/dL 9 2 9 3   ALBUMIN g/dL  --  3 5   TOTAL BILIRUBIN mg/dL  --  0 34   ALK PHOS U/L  --  148*   ALT U/L  --  21   AST U/L  --  28   GLUCOSE RANDOM mg/dL 97 95     Results from last 7 days   Lab Units 08/17/21  0614   INR  1 07                   Lines/Drains:  Invasive Devices     Peripheral Intravenous Line            Peripheral IV 08/16/21 Distal;Left;Upper;Ventral (anterior) Arm <1 day          Drain            Closed/Suction Drain Right Back Other (Comment) <1 day                      Imaging: Reviewed radiology reports from this admission including: chest xray    Recent Cultures (last 7 days):         Last 24 Hours Medication List:   Current Facility-Administered Medications   Medication Dose Route Frequency Provider Last Rate    Abemaciclib  150 mg Oral BID Richard Soler PA-C      acetaminophen  975 mg Oral Q6H PRN Richard Soler PA-C      albuterol  2 puff Inhalation Q4H PRN Richard Soler PA-C      calcium carbonate  1,000 mg Oral Daily PRN Richard Soler PA-C      dextromethorphan-guaiFENesin  10 mL Oral Q4H PRN Chema Riley PA-C      hydrocodone-chlorpheniramine polistirex  5 mL Oral Q12H [facility] [place], [place]      HYDROmorphone  0 5 mg Intravenous Q3H PRN Chema Riley PA-C      letrozole  2 5 mg Oral Daily [facility] [place]      melatonin  6 mg Oral HS PRN Chema Riley PA-C      ondansetron  4 mg Intravenous Q6H PRN Chema Riley, YESENIA      oxybutynin  5 mg Oral Daily [place], [place]      oxyCODONE  10 mg Oral Q4H PRN Chema Riley, YESENIA      oxyCODONE  5 mg Oral Q4H PRN Chema Riley PA-C          Today, Patient Was Seen By: Robin Carrero DO    **Please Note: This note may have been constructed using a voice recognition system  **

## 2021-08-17 NOTE — CONSULTS
e-Consult (IPC)  - Interventional Radiology  Kole Horta 50 y o  female MRN: [id number]  Unit/Bed#: -01 Encounter: [id number]    IR has been consulted to evaluate the patient, determine the appropriate procedure, and whether or not a procedure can and should be performed regarding the care of Kole Horta  We were consulted by Hospitalist concerning patient's recurrent malignant pleural effusion, and to possibly perform a pleural drainage catheter placement if medically appropriate for the patient  Consults  08/17/21      Assessment/Recommendation:   82-year-old female with metastatic breast cancer and recurrent malignant right pleural effusion presenting with worsening shortness of breath  Chest x-ray shows recurrent right pleural effusion  Patient prefers tunneled pleural drainage catheter so that repeat thoracentesis can be avoided  - Plan for tunneled pleural drainage catheter placement today  - please keep patient NPO      Total time spent in review of data, discussion with requesting provider and rendering advice was 10 min  Thank you for allowing Interventional Radiology to participate in the care of Kole Horta  Please don't hesitate to call or TigerText us with any questions       Fatou Edwards MD

## 2021-08-17 NOTE — ASSESSMENT & PLAN NOTE
· Reports increasing SOB, cough and right-sided chest pain over the past 4-5 days  Was recently admitted from 7/24-7/29/21 for SOB and was found to have right-sided pleural effusion  Thoracentesis was performed on 7/26/21 and pleural fluid revealed adenoma carcinoma  Er and [place] (+)  HER2 (-)  (+) hx of right breast cancer in 2012 s/p lumpectomy and radiation  · Following with Dr Emma Antoine (oncology) as outpt  Received initial dose of Darzalex on 8/12/21 at [facility] center  States just filled Abamaciclib and Letrozole but has not started yet  Will initiate in am   · Follows with Dr Nichelle Uribe (palliative care) as outpt for symptom management  Attempted to get outpt thoracentesis and PleurX placement, but sent to ED to expedite 2/2 worsening symptoms  · CXR revealed right sided pleural effusion  · No respiratory distress noted  O2 sat 97% on RA in ED  · Consult IR for therapeutic thoracentesis and PleurX placement  · Continue home dose oxy IR 10mg PRN  · IV dilaudid PRN breakthrough pain

## 2021-08-17 NOTE — BRIEF OP NOTE (RAD/CATH)
INTERVENTIONAL RADIOLOGY PROCEDURE NOTE    Date: 8/17/2021    Procedure: Right tunneled pleural drainage catheter placement    Preoperative diagnosis:   1  Shortness of breath    2  Malignant pleural effusion         Postoperative diagnosis: Same  Surgeon: Artur Bourgeois MD     Assistant: None  No qualified resident was available  Blood loss: 1 mL    Specimens: 1400 mL joanne pleural effusion removed at end of case     Findings: Successful right tunneled pleural drainage catheter placement  Complications: None immediate      Anesthesia: conscious sedation and local

## 2021-08-17 NOTE — ASSESSMENT & PLAN NOTE
· Follows with Dr Lucrecia Contreras as outpt for cancer related pain and symptom management  Sent to ED to expedite therapeutic thoracentesis and PleurX placement as above  · Continue home dose Oxy IR with IV dilaudid PRN for breakthrough pain

## 2021-08-18 PROCEDURE — 99225 PR SBSQ OBSERVATION CARE/DAY 25 MINUTES: CPT | Performed by: INTERNAL MEDICINE

## 2021-08-18 RX ORDER — DOCUSATE SODIUM 100 MG/1
100 CAPSULE, LIQUID FILLED ORAL 2 TIMES DAILY
Status: DISCONTINUED | OUTPATIENT
Start: 2021-08-18 | End: 2021-08-19 | Stop reason: HOSPADM

## 2021-08-18 RX ADMIN — HYDROMORPHONE HYDROCHLORIDE 1 MG: 1 INJECTION, SOLUTION INTRAMUSCULAR; INTRAVENOUS; SUBCUTANEOUS at 07:44

## 2021-08-18 RX ADMIN — MELATONIN TAB 3 MG 6 MG: 3 TAB at 20:02

## 2021-08-18 RX ADMIN — HYDROMORPHONE HYDROCHLORIDE 1 MG: 1 INJECTION, SOLUTION INTRAMUSCULAR; INTRAVENOUS; SUBCUTANEOUS at 20:02

## 2021-08-18 RX ADMIN — HYDROCODONE POLISTIREX AND CHLORPHENIRAMINE POLISTIREX 5 ML: 10; 8 SUSPENSION, EXTENDED RELEASE ORAL at 20:02

## 2021-08-18 RX ADMIN — OXYBUTYNIN CHLORIDE 5 MG: 5 TABLET, EXTENDED RELEASE ORAL at 09:36

## 2021-08-18 RX ADMIN — OXYCODONE HYDROCHLORIDE 5 MG: 5 TABLET ORAL at 15:19

## 2021-08-18 RX ADMIN — OXYCODONE HYDROCHLORIDE 10 MG: 10 TABLET ORAL at 09:36

## 2021-08-18 RX ADMIN — DOCUSATE SODIUM 100 MG: 100 CAPSULE, LIQUID FILLED ORAL at 17:11

## 2021-08-18 RX ADMIN — CALCIUM CARBONATE (ANTACID) CHEW TAB 500 MG 1000 MG: 500 CHEW TAB at 15:19

## 2021-08-18 RX ADMIN — DOCUSATE SODIUM 100 MG: 100 CAPSULE, LIQUID FILLED ORAL at 09:37

## 2021-08-18 RX ADMIN — HYDROCODONE POLISTIREX AND CHLORPHENIRAMINE POLISTIREX 5 ML: 10; 8 SUSPENSION, EXTENDED RELEASE ORAL at 09:36

## 2021-08-18 NOTE — PROGRESS NOTES
[address] [facility]  Progress Note Chuyita Lot 1965, 50 y o  female MRN: [id number]  Unit/Bed#: -01 Encounter: [id number]  Primary Care Provider: Prosper Toussaint MD   Date and time admitted to hospital: 8/16/2021  5:54 PM    * Malignant pleural effusion  Assessment & Plan  · Reports increasing SOB, cough and right-sided chest pain over the past 4-5 days  Was recently admitted from 7/24-7/29/21 for SOB and was found to have right-sided pleural effusion  Thoracentesis was performed on 7/26/21 and pleural fluid revealed adenoma carcinoma  Er and [place] (+)  HER2 (-)  (+) hx of right breast cancer in 2012 s/p lumpectomy and radiation  · Following with Dr Gigi Mackey (oncology) as outpt  Received initial dose of Darzalex on 8/12/21 at [facility] center  States just filled Abamaciclib and Letrozole but has not started yet  Will initiate in am   · Follows with Dr Juan Harding (palliative care) as outpt for symptom management  Attempted to get outpt thoracentesis and PleurX placement, but sent to ED to expedite 2/2 worsening symptoms  · CXR revealed right sided pleural effusion  · IR-PleurX placement today and thoracentesis  · Continue home dose oxy IR 10mg PRN  · IV dilaudid PRN breakthrough pain  · Pain improved today compared to yesterday but still present and patient is still requiring IV pain meds    Primary malignant neoplasm of right breast with metastasis to other site [facility])  Assessment & Plan  · Primary breast cancer with liver, bone, lymph and pleura involvement per oncology notes  · See AP above    Palliative care patient  Assessment & Plan  · Follows with Dr Juan Harding as outpt for cancer related pain and symptom management  Sent to ED to expedite therapeutic thoracentesis and PleurX placement as above  · Continue home dose Oxy IR with IV dilaudid PRN for breakthrough pain            VTE Pharmacologic Prophylaxis: VTE Score: 4 Moderate Risk (Score 3-4) - Pharmacological DVT Prophylaxis Contraindicated  Sequential Compression Devices Ordered  Patient Centered Rounds: I performed bedside rounds with nursing staff today  Discussions with Specialists or Other Care Team Provider: none    Education and Discussions with Family / Patient: Updated  () at bedside  Time Spent for Care: 30 minutes  More than 50% of total time spent on counseling and coordination of care as described above  Current Length of Stay: 0 day(s)  Current Patient Status: Observation   Certification Statement: The patient will continue to require additional inpatient hospital stay due to Uncontrolled pain  Discharge Plan: Anticipate discharge tomorrow to home  Code Status: Level 1 - Full Code    Subjective:   Patient resting comfortably on exam   Patient states her pain has improved today but still present and still requiring IV pain meds  Patient had no other complaints on exam    Objective:     Vitals:   Temp (24hrs), Av 9 °F (36 6 °C), Min:97 5 °F (36 4 °C), Max:98 4 °F (36 9 °C)    Temp:  [97 5 °F (36 4 °C)-98 4 °F (36 9 °C)] 98 4 °F (36 9 °C)  HR:  [72-82] 79  Resp:  [17-18] 17  BP: (116-122)/(72-77) 121/76  SpO2:  [90 %-92 %] 91 %  Body mass index is 27 09 kg/m²  Input and Output Summary (last 24 hours): Intake/Output Summary (Last 24 hours) at 2021 1426  Last data filed at 2021 1300  Gross per 24 hour   Intake 480 ml   Output --   Net 480 ml       Physical Exam:   Physical Exam  Vitals and nursing note reviewed  Constitutional:       General: She is not in acute distress  Appearance: She is well-developed  HENT:      Head: Normocephalic and atraumatic  Eyes:      General: No scleral icterus  Conjunctiva/sclera: Conjunctivae normal    Cardiovascular:      Rate and Rhythm: Normal rate and regular rhythm  Heart sounds: Normal heart sounds  No murmur heard  No friction rub  No gallop      Pulmonary:      Effort: Pulmonary effort is normal  No respiratory distress  Breath sounds: Normal breath sounds  No wheezing or rales  Comments: Right chest wall pain  PleurX in place and area shows no erythema or warmth  Abdominal:      General: Bowel sounds are normal  There is no distension  Palpations: Abdomen is soft  Tenderness: There is no abdominal tenderness  Musculoskeletal:         General: Normal range of motion  Skin:     General: Skin is warm  Findings: No rash  Neurological:      Mental Status: She is alert and oriented to person, place, and time  Additional Data:     Labs:  Results from last 7 days   Lab Units 08/17/21  0614 08/16/21  1725   WBC Thousand/uL 5 26 5 86   HEMOGLOBIN g/dL 13 1 14 6   HEMATOCRIT % 40 2 47 6*   PLATELETS Thousands/uL 272 272   NEUTROS PCT %  --  73   LYMPHS PCT %  --  14   MONOS PCT %  --  10   EOS PCT %  --  2     Results from last 7 days   Lab Units 08/17/21  0614 08/16/21  1725   SODIUM mmol/L 137 135*   POTASSIUM mmol/L 4 0 4 3   CHLORIDE mmol/L 103 102   CO2 mmol/L 22 24   BUN mg/dL 12 11   CREATININE mg/dL 0 94 1 01   ANION GAP mmol/L 12 9   CALCIUM mg/dL 9 2 9 3   ALBUMIN g/dL  --  3 5   TOTAL BILIRUBIN mg/dL  --  0 34   ALK PHOS U/L  --  148*   ALT U/L  --  21   AST U/L  --  28   GLUCOSE RANDOM mg/dL 97 95     Results from last 7 days   Lab Units 08/17/21  0614   INR  1 07                   Lines/Drains:  Invasive Devices     Peripheral Intravenous Line            Peripheral IV 08/16/21 Distal;Left;Upper;Ventral (anterior) Arm 1 day          Drain            Closed/Suction Drain Right Back Other (Comment) 1 day                      Imaging: No pertinent imaging reviewed      Recent Cultures (last 7 days):         Last 24 Hours Medication List:   Current Facility-Administered Medications   Medication Dose Route Frequency Provider Last Rate    acetaminophen  975 mg Oral Q6H PRN Ramos Singh PA-C      albuterol  2 puff Inhalation Q4H PRN Ramos Singh PA-C      calcium carbonate 1,000 mg Oral Daily PRN Regina Beltran      dextromethorphan-guaiFENesin  10 mL Oral Q4H PRN Mally Andrade PA-C      docusate sodium  100 mg Oral BID Satish Srivastava DO      hydrocodone-chlorpheniramine polistirex  5 mL Oral Q12H [address] Regina Beltran      HYDROmorphone  1 mg Intravenous Q3H PRN Satish Srivastava DO      melatonin  6 mg Oral HS PRN Mally Andrade PA-C      ondansetron  4 mg Intravenous Q6H PRN Mally Andrade PA-C      oxybutynin  5 mg Oral Daily Regina Beltran      oxyCODONE  10 mg Oral Q4H PRN Mlaly Andrade PA-C      oxyCODONE  5 mg Oral Q4H PRN Mally Andrade PA-C          Today, Patient Was Seen By: Satish Srivastava DO    **Please Note: This note may have been constructed using a voice recognition system  **

## 2021-08-18 NOTE — ASSESSMENT & PLAN NOTE
· Reports increasing SOB, cough and right-sided chest pain over the past 4-5 days  Was recently admitted from 7/24-7/29/21 for SOB and was found to have right-sided pleural effusion  Thoracentesis was performed on 7/26/21 and pleural fluid revealed adenoma carcinoma  Er and [place] (+)  HER2 (-)  (+) hx of right breast cancer in 2012 s/p lumpectomy and radiation  · Following with Dr Basil Garcia (oncology) as outpt  Received initial dose of Darzalex on 8/12/21 at [facility]  States just filled Abamaciclib and Letrozole but has not started yet  Will initiate in am   · Follows with Dr King Castillo (palliative care) as outpt for symptom management  Attempted to get outpt thoracentesis and PleurX placement, but sent to ED to expedite 2/2 worsening symptoms  · CXR revealed right sided pleural effusion  · IR-PleurX placement today and thoracentesis  · Continue home dose oxy IR 10mg PRN  · IV dilaudid PRN breakthrough pain    · Pain improved today compared to yesterday but still present and patient is still requiring IV pain meds

## 2021-08-18 NOTE — UTILIZATION REVIEW
Continued Stay Review    Date: 8/18                    Current Patient Class: OBS   Current Level of Care: MS     HPI:55 y o  female initially admitted on 8/16 for malignant pleural effusion , Pleurex catheter placed and s/p thoracentesis     Assessment/Plan: slight discomfort on examination secondary to PleurX catheter being placed      Vital Signs:   08/18/21 07:25:58  98 4 °F (36 9 °C)  79  17  121/76  91  91 %          Pertinent Labs/Diagnostic Results:     Results from last 7 days   Lab Units 08/17/21  0614 08/16/21  1725 08/11/21  1027   WBC Thousand/uL 5 26 5 86 5 09   HEMOGLOBIN g/dL 13 1 14 6 14 5   HEMATOCRIT % 40 2 47 6* 44 6   PLATELETS Thousands/uL 272 272 340   NEUTROS ABS Thousands/µL  --  4 26 3 62     Results from last 7 days   Lab Units 08/17/21  0614 08/16/21  1725 08/11/21  1027   SODIUM mmol/L 137 135* 139   POTASSIUM mmol/L 4 0 4 3 4 0   CHLORIDE mmol/L 103 102 104   CO2 mmol/L 22 24 23   ANION GAP mmol/L 12 9 12   BUN mg/dL 12 11 9   CREATININE mg/dL 0 94 1 01 0 97   EGFR ml/min/1 73sq m 69 64 67   CALCIUM mg/dL 9 2 9 3 9 1   MAGNESIUM mg/dL 2 0  --   --      Results from last 7 days   Lab Units 08/16/21  1725 08/11/21  1027   AST U/L 28 35   ALT U/L 21 17   ALK PHOS U/L 148* 159*   TOTAL PROTEIN g/dL 7 4 7 4   ALBUMIN g/dL 3 5 3 3*   TOTAL BILIRUBIN mg/dL 0 34 0 44     Results from last 7 days   Lab Units 08/17/21  0614 08/16/21  1725   GLUCOSE RANDOM mg/dL 97 95       Results from last 7 days   Lab Units 08/16/21  1839   TROPONIN I ng/mL <0 02     Results from last 7 days   Lab Units 08/17/21  0614   PROTIME seconds 13 4   INR  1 07   Medications:   Scheduled Medications:  hydrocodone-chlorpheniramine polistirex, 5 mL, Oral, Q12H [facility]OLDO  oxybutynin, 5 mg, Oral, Daily      Continuous IV Infusions:     PRN Meds:  acetaminophen, 975 mg, Oral, Q6H PRN  albuterol, 2 puff, Inhalation, Q4H PRN  calcium carbonate, 1,000 mg, Oral, Daily PRN  dextromethorphan-guaiFENesin, 10 mL, Oral, Q4H PRN  HYDROmorphone, 1 mg, Intravenous, Q3H PRN  melatonin, 6 mg, Oral, HS PRN  ondansetron, 4 mg, Intravenous, Q6H PRN  oxyCODONE, 10 mg, Oral, Q4H PRN  oxyCODONE, 5 mg, Oral, Q4H PRN        Discharge Plan: D     Network Utilization Review Department  ATTENTION: Please call with any questions or concerns to [phone] and carefully listen to the prompts so that you are directed to the right person  All voicemails are confidential   Darryn Doe all requests for admission clinical reviews, approved or denied determinations and any other requests to dedicated fax number below belonging to the campus where the patient is receiving treatment   List of dedicated fax numbers for the Facilities:  [address] DENIALS (Administrative/Medical Necessity) [phone]   [address] (Maternity/NICU/Pediatrics) [phone]   [address] [place] [address] [address] [place] [address] [address] [phone]

## 2021-08-18 NOTE — CASE MANAGEMENT
Case Management Discharge Planning Note    Patient name Ofe Jorgensen  Location /-73 MRN [id number]  : 1965 Date 2021       Current Admission Date: 2021  Current Admission Diagnosis:  Malignant pleural effusion   Patient Active Problem List   Diagnosis    Cough    Pleural effusion    Tobacco abuse    [facility]oplasm ([facility] )    Hypertension    Malignant neoplasm of breast in female, estrogen receptor positive ([facility] )    Palliative care patient    Malignant pleural effusion    Primary malignant neoplasm of right breast with metastasis to other site [facility])    Previous Admission - Discharge Date:21   LOS (days): 0  Geometric Mean LOS (GMLOS) (days):   Days to GMLOS: Previous Discharge Diagnosis:  There are no discharge diagnoses documented for the most recent discharge  Risk of Unplanned Readmission Score  Predictive Model Details   No score data available for Risk of Unplanned Readmission        BUNDLE:      OBJECTIVE:  Pt is a 50y o  year old /Civil Union, white or  [1], female with Protestant preference of Baptism admitted on 2021  5:54 PM  Pt is admitted to [place] [id number] at [address] [place] [address] with complaints of Malignant pleural effusion     Current admission status: Observation  Preferred Pharmacy:   [facility]pharmacy #[id number]- [place], [place] - [place] [address]  Phone: [phone] Fax: [phone]    [address]  [address] [place] [id number]  Phone: [phone] Fax: [phone]    [facility], [address] [address] [place] [address]  Phone: [phone] Fax: [phone]    [facility] ([facility]) #[id number] - [place], [place] - [address]  [address] [place] [address]  Phone: [phone] Fax: [phone]    Primary Care Provider: Kathleen Tate MD    Primary Insurance: Ree Bishop  Secondary Insurance:     DISCHARGE DETAILS:    Discharge planning discussed with[de-identified] Patient  Freedom of Choice: Yes (Per [facility], [facility] is the only accepting agency due to insurance and location )    [address]         Is the patient interested in Jennifer Stauffer at discharge?: Yes  [address] requested[de-identified] [address] Name[de-identified] Other (please enter name in comment) ([address] [facility] H[facility])  [address] Provider[de-identified] PCP  Home Health Services Needed[de-identified] Post-Op Care and Assessment, Wound/Ostomy Care (new pleurx drainage system)  Homebound Criteria Met[de-identified] Uses an Assist Device (i e  cane, walker, etc), Requires the Assistance of Another Person for Safe Ambulation or to Leave the Home  Supporting Clincal Findings[de-identified] Fatigues Easliy in [employer], Limited Endurance    DME Referral Provided  Referral made for DME?: No    Other Referral/Resources/Interventions Provided:  Referrals Provided[de-identified] Other (Specify) (In basket message sent to [facility] for OP SW from oncology )    We would like to be able to fill any required prescriptions on discharge at our [address] [place] [address] and have them delivered to you at discharge in your room    Would you like to participate in this program? : No - Declined    Discharge Destination Plan[de-identified] Home ([place] [address])  Transportation at Discharge?: Yes  Type of Transport: Automobile (Patient's  will provide transportation at discharge )

## 2021-08-18 NOTE — ASSESSMENT & PLAN NOTE
· Follows with Dr Lawyer Goldman as outpt for cancer related pain and symptom management  Sent to ED to expedite therapeutic thoracentesis and PleurX placement as above  · Continue home dose Oxy IR with IV dilaudid PRN for breakthrough pain

## 2021-08-19 ENCOUNTER — TRANSITIONAL CARE MANAGEMENT (OUTPATIENT)
Dept: INTERNAL MEDICINE CLINIC | Facility: CLINIC | Age: 56
End: 2021-08-19

## 2021-08-19 VITALS
RESPIRATION RATE: 16 BRPM | OXYGEN SATURATION: 93 % | SYSTOLIC BLOOD PRESSURE: 129 MMHG | TEMPERATURE: 98.6 F | HEIGHT: 64 IN | WEIGHT: 157.85 LBS | DIASTOLIC BLOOD PRESSURE: 67 MMHG | HEART RATE: 64 BPM | BODY MASS INDEX: 26.95 KG/M2

## 2021-08-19 PROBLEM — K59.00 CONSTIPATION: Status: ACTIVE | Noted: 2021-08-19

## 2021-08-19 PROCEDURE — 99217 PR OBSERVATION CARE DISCHARGE MANAGEMENT: CPT | Performed by: INTERNAL MEDICINE

## 2021-08-19 RX ORDER — DOCUSATE SODIUM 100 MG/1
100 CAPSULE, LIQUID FILLED ORAL 2 TIMES DAILY
Qty: 30 CAPSULE | Refills: 0 | Status: SHIPPED | OUTPATIENT
Start: 2021-08-19 | End: 2021-09-14

## 2021-08-19 RX ADMIN — HYDROCODONE POLISTIREX AND CHLORPHENIRAMINE POLISTIREX 5 ML: 10; 8 SUSPENSION, EXTENDED RELEASE ORAL at 08:54

## 2021-08-19 RX ADMIN — OXYBUTYNIN CHLORIDE 5 MG: 5 TABLET, EXTENDED RELEASE ORAL at 08:54

## 2021-08-19 RX ADMIN — DOCUSATE SODIUM 100 MG: 100 CAPSULE, LIQUID FILLED ORAL at 08:54

## 2021-08-19 RX ADMIN — OXYCODONE HYDROCHLORIDE 5 MG: 5 TABLET ORAL at 08:54

## 2021-08-19 NOTE — DISCHARGE SUMMARY
[facility]  Discharge- Gianna Patel 1965, 50 y o  female MRN: [id number]  Unit/Bed#: -01 Encounter: [id number]  Primary Care Provider: Lewis Bo MD   Date and time admitted to hospital: 8/16/2021  5:54 PM    * Malignant pleural effusion  Assessment & Plan  · Reports increasing SOB, cough and right-sided chest pain over the past 4-5 days  Was recently admitted from 7/24-7/29/21 for SOB and was found to have right-sided pleural effusion  Thoracentesis was performed on 7/26/21 and pleural fluid revealed adenoma carcinoma  Er and CT (+)  HER2 (-)  (+) hx of right breast cancer in 2012 s/p lumpectomy and radiation  · Following with Dr Cherie Blake (oncology) as outpt  Received initial dose of Darzalex on 8/12/21 at infusion center  · Follows with Dr Bobbi Harris (palliative care) as outpt for symptom management  · CXR revealed right sided pleural effusion  · IR- PleurX catheter placement and thoracentesis  · Patient has oxycodone 10 mg IR at home which she receives through palliative care  · Home health set up for PleurX catheter    Primary malignant neoplasm of right breast with metastasis to other site [facility])  Assessment & Plan  · Primary breast cancer with liver, bone, lymph and pleura involvement per oncology notes  · See AP above    Palliative care patient  Assessment & Plan  · Follows with Dr Bobbi Harris as outpt for cancer related pain and symptom management     · Continue home dose Oxy IR on discharge       Medical Problems     Resolved Problems  Date Reviewed: 8/16/2021    None              Discharging Physician / Practitioner: Heather Martinez DO  PCP: Lewis Bo MD  Admission Date:   Admission Orders (From admission, onward)     Ordered        08/16/21 1922  Place in Observation  Once                   Discharge Date: 08/19/21    Consultations During Hospital Stay:  Interventional radiology    Procedures Performed:   · PleurX catheter placement and thoracentesis    Significant Findings / Test Results:   · none    Incidental Findings:   · none     Test Results Pending at Discharge (will require follow up):   · none     Outpatient Tests Requested:  · none    Complications:  none    Reason for Admission:  Shortness of breath    Hospital Course:   Jaylin Hernandez is a 50 y o  female patient who originally presented to the hospital on 8/16/2021 due to shortness of breath  Patient was scheduled to get a PleurX catheter but was brought to the hospital prior to outpatient appointment given increasing shortness of breath  Once getting PleurX catheter placed patient shortness of breath improved, but she was kept in the hospital secondary to pain management  Upon examination this morning patient states that pain is markedly improved and she is eager to go home today  Patient is stable for discharge  Patient is receiving pain medications through palliative care and home health services are set up for PleurX catheter  Please see above list of diagnoses and related plan for additional information  Condition at Discharge: stable    Discharge Day Visit / Exam:   Subjective:  Patient resting comfortably without any complaints  Patient states that pain is better controlled today and would like to go home  Vitals: Blood Pressure: 129/67 (08/19/21 0738)  Pulse: 64 (08/19/21 0738)  Temperature: 98 6 °F (37 °C) (08/19/21 0738)  Respirations: 16 (08/19/21 0738)  Height: 5' 4" (162 6 cm) (08/17/21 0100)  Weight - Scale: 71 6 kg (157 lb 13 6 oz) (08/16/21 1714)  SpO2: 93 % (08/19/21 0738)  Exam:   Physical Exam  Vitals and nursing note reviewed  Constitutional:       General: She is not in acute distress  Appearance: She is well-developed  HENT:      Head: Normocephalic and atraumatic  Eyes:      General: No scleral icterus  Conjunctiva/sclera: Conjunctivae normal    Cardiovascular:      Rate and Rhythm: Normal rate and regular rhythm        Heart sounds: Normal heart sounds  No murmur heard  No friction rub  No gallop  Pulmonary:      [place]: Pulmonary effort is normal  No respiratory distress  Breath sounds: Normal breath sounds  No wheezing or rales  Comments: PleurX catheter area shows no signs of erythema, warmth or purulence  Abdominal:      General: Bowel sounds are normal  There is no distension  Palpations: Abdomen is soft  Tenderness: There is no abdominal tenderness  Musculoskeletal:         General: Normal range of motion  Skin:     General: Skin is warm  Findings: No rash  Neurological:      Mental Status: She is alert and oriented to person, place, and time  Discussion with Family: Updated  () at bedside  Discharge instructions/Information to patient and family:   See after visit summary for information provided to patient and family  Provisions for Follow-Up Care:  See after visit summary for information related to follow-up care and any pertinent home health orders  Disposition:   Home with [facility] (Reminder: Complete face to face encounter)    Planned Readmission: no     Discharge Statement:  I spent 35 minutes discharging the patient  This time was spent on the day of discharge  I had direct contact with the patient on the day of discharge  Greater than 50% of the total time was spent examining patient, answering all patient questions, arranging and discussing plan of care with patient as well as directly providing post-discharge instructions  Additional time then spent on discharge activities  Discharge Medications:  See after visit summary for reconciled discharge medications provided to patient and/or family        **Please Note: This note may have been constructed using a voice recognition system**

## 2021-08-19 NOTE — ASSESSMENT & PLAN NOTE
· Reports increasing SOB, cough and right-sided chest pain over the past 4-5 days  Was recently admitted from 7/24-7/29/21 for SOB and was found to have right-sided pleural effusion  Thoracentesis was performed on 7/26/21 and pleural fluid revealed adenoma carcinoma  [place] and [place] (+)  HER2 (-)  (+) hx of right breast cancer in 2012 s/p lumpectomy and radiation  · Following with Dr Yakelin Rivero (oncology) as outpt  Received initial dose of Darzalex on 8/12/21 at [facility] center  · Follows with Dr Lawyer Goldman (palliative care) as outpt for symptom management    · CXR revealed right sided pleural effusion  · IR- PleurX catheter placement and thoracentesis  · Patient has oxycodone 10 mg IR at home which she receives through palliative care  · Home health set up for PleurX catheter

## 2021-08-19 NOTE — ASSESSMENT & PLAN NOTE
· Follows with Dr Addi Rivera as outpt for cancer related pain and symptom management     · Continue home dose Oxy IR on discharge

## 2021-08-23 ENCOUNTER — TELEPHONE (OUTPATIENT)
Dept: PALLIATIVE MEDICINE | Facility: CLINIC | Age: 56
End: 2021-08-23

## 2021-08-23 DIAGNOSIS — Z17.0 MALIGNANT NEOPLASM OF BREAST IN FEMALE, ESTROGEN RECEPTOR POSITIVE, UNSPECIFIED LATERALITY, UNSPECIFIED SITE OF BREAST (HCC): Primary | ICD-10-CM

## 2021-08-23 DIAGNOSIS — C50.919 MALIGNANT NEOPLASM OF BREAST IN FEMALE, ESTROGEN RECEPTOR POSITIVE, UNSPECIFIED LATERALITY, UNSPECIFIED SITE OF BREAST (HCC): Primary | ICD-10-CM

## 2021-08-23 DIAGNOSIS — G89.3 CANCER RELATED PAIN: ICD-10-CM

## 2021-08-23 RX ORDER — OXYCODONE HYDROCHLORIDE 10 MG/1
10 TABLET ORAL EVERY 6 HOURS PRN
Qty: 30 TABLET | Refills: 0 | Status: SHIPPED | OUTPATIENT
Start: 2021-08-23 | End: 2021-08-25 | Stop reason: CLARIF

## 2021-08-23 NOTE — TELEPHONE ENCOUNTER
Primary palliative medicine provider: Dr Antonio Reynolds    Medication requested:oxycodone 10mg IR  If for pain, how has the patient been taking their pain medicine? Attempted to call back to gain this information but unable to leave message  Last appointment: 8/16/21    Next scheduled appointment: Titi Sal desk staff patient will need a hospital follow up appt  scheduled  PDMP review:    08/13/2021 1 07/27/2021   OXYCODONE HCL 10 MG TABLET  30 0 7 [place] INT   [id number]  PENNS [phone] MME [employer] Ins [place]      Noted patient was recently D/C from hospital and D/C summary states patient has oxycodone at home, so was not sent home with a script

## 2021-08-25 ENCOUNTER — TELEPHONE (OUTPATIENT)
Dept: PALLIATIVE MEDICINE | Facility: CLINIC | Age: 56
End: 2021-08-25

## 2021-08-25 ENCOUNTER — TELEPHONE (OUTPATIENT)
Dept: PALLIATIVE MEDICINE | Facility: HOSPITAL | Age: 56
End: 2021-08-25

## 2021-08-25 DIAGNOSIS — E55.9 VITAMIN D DEFICIENCY: ICD-10-CM

## 2021-08-25 RX ORDER — ERGOCALCIFEROL 1.25 MG/1
50000 CAPSULE ORAL WEEKLY
Qty: 4 CAPSULE | Refills: 2 | Status: SHIPPED | OUTPATIENT
Start: 2021-08-25 | End: 2022-02-10

## 2021-08-25 RX ORDER — OXYCODONE HYDROCHLORIDE 5 MG/1
TABLET ORAL
Qty: 60 TABLET | Refills: 0 | Status: SHIPPED | OUTPATIENT
Start: 2021-08-25 | End: 2021-09-16 | Stop reason: CLARIF

## 2021-08-25 NOTE — TELEPHONE ENCOUNTER
Received call from [facility] ([phone])  [facility] received order for a blood pressure cuff and pulse oximeter  She states blood pressure cuff is not covered under patient's insurance  Patient's diagnosis does not state hypertension  A new note would need to state patient has hypertension and needs to track blood pressure daily  Pulse oximeter is not covered under medicaid  [facility] suggested patient purchase items over counter at a pharmacy or [employer] (powering [employer])

## 2021-08-25 NOTE — TELEPHONE ENCOUNTER
Pharmacist reached out to office stating that there is no oxycodone 10mg in stock and dosen't  know when they will receive it , per pharmacist patient's  was very upset and is asking for provider to please send oxycodone 5 mg that are in stock for patient to be able to take two tablets  Please advise

## 2021-08-25 NOTE — TELEPHONE ENCOUNTER
[facility] LSW placed call to [employer] to follow up on referral sent over yesterday   Was informed that they hadn't received it, new fax # provided, [phone]     Information refaxed

## 2021-08-28 ENCOUNTER — TELEPHONE (OUTPATIENT)
Dept: OTHER | Facility: OTHER | Age: 56
End: 2021-08-28

## 2021-08-28 NOTE — TELEPHONE ENCOUNTER
Patient has diarrhea on set 24 hours ago, it has not stopped, she also has nausea, abdominal discomfort, vomiting and no appetite  Hot and cold flashes   She is on chemo and they had some questions before giving medication     Paged Dr Gil Outhouse

## 2021-08-30 ENCOUNTER — OFFICE VISIT (OUTPATIENT)
Dept: HEMATOLOGY ONCOLOGY | Facility: CLINIC | Age: 56
End: 2021-08-30
Payer: COMMERCIAL

## 2021-08-30 VITALS
HEIGHT: 64 IN | RESPIRATION RATE: 16 BRPM | HEART RATE: 100 BPM | TEMPERATURE: 96.3 F | DIASTOLIC BLOOD PRESSURE: 68 MMHG | OXYGEN SATURATION: 92 % | WEIGHT: 146 LBS | SYSTOLIC BLOOD PRESSURE: 90 MMHG | BODY MASS INDEX: 24.92 KG/M2

## 2021-08-30 DIAGNOSIS — Z17.0 MALIGNANT NEOPLASM OF BREAST IN FEMALE, ESTROGEN RECEPTOR POSITIVE, UNSPECIFIED LATERALITY, UNSPECIFIED SITE OF BREAST (HCC): Primary | ICD-10-CM

## 2021-08-30 DIAGNOSIS — C50.919 MALIGNANT NEOPLASM OF BREAST IN FEMALE, ESTROGEN RECEPTOR POSITIVE, UNSPECIFIED LATERALITY, UNSPECIFIED SITE OF BREAST (HCC): Primary | ICD-10-CM

## 2021-08-30 DIAGNOSIS — C79.51 BONE METASTASES (HCC): ICD-10-CM

## 2021-08-30 DIAGNOSIS — E86.0 DEHYDRATION: ICD-10-CM

## 2021-08-30 PROCEDURE — 99214 OFFICE O/P EST MOD 30 MIN: CPT | Performed by: INTERNAL MEDICINE

## 2021-08-30 RX ORDER — DIPHENOXYLATE HYDROCHLORIDE AND ATROPINE SULFATE 2.5; .025 MG/1; MG/1
1 TABLET ORAL 4 TIMES DAILY PRN
Qty: 30 TABLET | Refills: 0 | Status: SHIPPED | OUTPATIENT
Start: 2021-08-30

## 2021-08-30 NOTE — PROGRESS NOTES
Hematology/Oncology Progress Note    Date of Service: 8/30/2021    [facility] [place] [place]   [address] [address]    Hem/Onc Problem List:   1  Metastatic right-sided breast cancer, ER and PA positive, HER2 negative  2  Bone metastatic disease  3  History of right-sided pleural effusion  Chief Complaint:    Routine follow-up for management of stage IV breast cancer    Assessment/Plan:     I personally reviewed the lab results, image studies results and other specialties/physicians consult notes and recommendations  I shared the findings with patient, discussed the diagnosis and management plan as below  1   Metastatic breast cancer, with liver, bone, lymph node and pleura involvement  ER and PA positive, HER2 negative  Bone scan showed bony metastatic disease  Last period was 4-5  Months ago  Patient started monthly Darzalex, daily letrozole and CDK4/6 inhibitor Abemeciclib last week  Unfortunately, patient developed significant watery diarrhea from Abemaciclib  I will change Abemaciclib to Ibrance 125 mg daily for 3 weeks on,  followed by 1 week off until disease progression  I will monitor the side effect  Patient will hold Abemaciclib until the [facility] is available  2  Bony metastatic disease  Bone scan showed bony metastatic disease  Patient will start Xgeva monthly for bone health  3  Watery diarrhea due to Abemaciclib  Patient feels extremely tired and fatigued  I will check CBC and CMP  Give patient IV fluid D5 normal saline x 1 L   4   History of right breast cancer, status post lumpectomy and axillary lymph node dissection, status post adjuvant radiation therapy  5  Right-sided pleural effusion, status post thoracentesis, cytology positive for adenocarcinoma, ER/PA positive, HER2 negative  Status post PleurX catheter placement  Patient drained 300 cc fluid yesterday    Will continue to treat the underlying disease  6  Follow-up: Return to clinic in 4 weeks  Disclaimer: This document was prepared using [employer] technology  If a word or phrase is confusing, or does not make sense, this is likely due to recognition error which was not discovered during the providers review  If you believe an error has occurred, please Contact me through [employer] service for roula? cation  AJCC 8th Edition Cancer Stage :      Cancer Staging  No matching staging information was found for the patient  Hematology/Oncology History:   · History of right-sided breast cancer, initially diagnosed in 2012, status post lumpectomy and axillary lymph node dissection, status post adjuvant radiation therapy  Patient did not receive any adjuvant endocrine therapy or chemotherapy  · July 24, 2021, patient was admitted to hospital because of shortness of breath and cough  · July 24, 2021 CT chest PE protocol showed septal thickening throughout the right lung and bronchial wall thickening due to lymphangitic spread of tumor  Indeterminate lung nodules measuring 5 millimeter in the right upper lobe, right middle lobe and 7 millimeter in the left upper lobe and left lower lobe  Large right pleural effusion  Multiple liver metastatic disease measuring up to 4 centimeter  Lytic metastatic to sternal manubrium  · July 26, 2021, ultrasound abdomen shows multiple hepatic metastatic disease  Status post thoracentesis with 1200 mL of joanne fluid removed  Cytology showed adenoma carcinoma, ER and CA positive  40-50%, HER2 negative  · July 26, 2021 biopsy of the lung liver showed metastatic breast cancer, ER and % positive, HER2 negative  CT abdomen pelvis with contrast showed extensive hepatic metastatic disease  · July 27, 2021 MRI brain showed no evidence of intracranial metastatic disease  · August 6, 2021, mammogram showed no mammographic evidence of malignancy    · August 11, 2021 bone scan showed bony metastatic disease involving left iliac crest medially and adjacent sacrum  · August 12, 2021, Zoladex was given  Letrozole started  · August 16, 2022, patient started Abemaciclib  · August 30, 2021, DC abemaciclib because of diarrhea  Start Ibrance 125 mg daily 3 weeks on 1 week off  History of Present Illiness:   Toribio Black is a 50 y o  female with the above-noted HemOnc history who is here for routine follow-up  Patient with metastatic breast cancer with bony metastatic disease  Zoladex and letrozole started on August 11-12, 2021  Abmaciclib started last week  Unfortunately, patient developed significant watery diarrhea  Imodium helped some, but still has significant watery diarrhea  It is associated with abdomen discomfort  No fever or chills  No chest pain or shortness breast   Patient had the pleural catheter placed for malignant pleural effusion  About 300 cc fluid was removed yesterday  Otherwise patient feels extremely tired and fatigued  She feels dehydrated  No bleeding anywhere  ROS: A 12-point of review of systems is obtained and other than the above is noncontributory  Objective:   VITALS:   BP 90/68 (BP Location: Left arm, Patient Position: Sitting, Cuff Size: Large)   Pulse 100   Temp (!) 96 3 °F (35 7 °C) (Tympanic)   Resp 16   Ht 5' 4" (1 626 m)   Wt 66 2 kg (146 lb)   SpO2 92%   BMI 25 06 kg/m²     Physical EXAM:  General:  Alert, cooperative, no distress, appears stated age  Head:  Normocephalic, without obvious abnormality  Eyes:  Conjunctivae/corneas clear  PERRL, EOMs intact  No evidence of conjunctivitis     Throat: Lips, mucosa, and tongue normal  No lesions in the oropharynx   Neck: Supple, symmetrical, trachea midline, no adenopathy    Lungs:   Clear to auscultation bilaterally  Respiratory effort easy, nonlabored    Heart:  Regular rate and rhythm, S1, S2 normal, no murmur, click, rub or the gallop     Abdomen:   Soft, non-tender,nondistended  Bowel sounds normal  No masses,  No organomegaly  Extremities: Extremities normal, atraumatic, no cyanosis or edema  No axillary or inguinal adenopathy   Skin: Skin color, texture, turgor normal  No rashes or lesions    Neurologic: A&Ox4   No focal neuro deficits       Allergies   Allergen Reactions    Codeine Anaphylaxis    Sulfa Antibiotics Hives       Past Medical History:   Diagnosis Date    History of radiation exposure     Malignant neoplasm of right female breast ([address] ) 2012    right       Past Surgical History:   Procedure Laterality Date    BREAST CYST EXCISION      BREAST LUMPECTOMY Right 2012    HIP SURGERY      IR BIOPSY LIVER MASS  7/26/2021    IR PLEURAL EFFUSION LONG-TERM CATHETER PLACEMENT  8/17/2021    IR THORACENTESIS  7/26/2021       Family History   Problem Relation Age of Onset    Breast cancer Mother         in her 63's    Breast cancer Sister         inher 45s and 48    Colon cancer Maternal Grandmother     No Known Problems Paternal Grandmother     Breast cancer Other     No Known Problems Paternal Aunt        Social History     Socioeconomic History    Marital status: /Civil Union     Spouse name: Not on file    Number of children: Not on file    Years of education: Not on file    Highest education level: Not on file   Occupational History    Not on file   Tobacco Use    Smoking status: Former Smoker     Packs/day: 0 25     Types: Cigarettes    Smokeless tobacco: Never Used   Vaping Use    Vaping Use: Never used   Substance and Sexual Activity    Alcohol use: Not Currently    Drug use: Not Currently    Sexual activity: Not Currently   Other Topics Concern    Not on file   Social History Narrative    Not on file     Social Determinants of Health     Financial Resource Strain:     Difficulty of Paying Living Expenses:    Food Insecurity:     Worried About Running Out of Food in the Last Year:     [address] in the Last Year:    Transportation Needs: No Transportation Needs    Lack of Transportation (Medical): No    Lack of Transportation (Non-Medical):  No   Physical Activity:     Days of Exercise per Week:     Minutes of Exercise per Session:    Stress:     Feeling of Stress :    Social Connections:     Frequency of Communication with Friends and Family:     Frequency of Social Gatherings with Friends and Family:     Attends Oriental orthodox Services:     Active Member of Clubs or Organizations:     Attends Club or Organization Meetings:     Marital Status:    Intimate Partner Violence:     Fear of Current or Ex-Partner:     Emotionally Abused:     Physically Abused:     Sexually Abused:        Current Outpatient Medications   Medication Sig Dispense Refill    Abemaciclib 150 MG TABS Take 150 mg by mouth 2 (two) times a day 60 tablet 3    acetaminophen (TYLENOL) 325 mg tablet Take 2 tablets (650 mg total) by mouth every 6 (six) hours as needed for mild pain 30 tablet 0    albuterol (PROVENTIL HFA,VENTOLIN HFA) 90 mcg/act inhaler Inhale 2 puffs every 4 (four) hours as needed for wheezing 8 g 0    benzonatate (TESSALON) 200 MG capsule Take 1 capsule (200 mg total) by mouth 3 (three) times a day as needed for cough 20 capsule 0    BLACK COHOSH PO Take by mouth      calcium carbonate (TUMS) 500 mg chewable tablet Chew 1 tablet (500 mg total) 3 (three) times a day as needed for indigestion or heartburn 30 tablet 0    dextromethorphan-guaiFENesin (ROBITUSSIN DM)  mg/5 mL syrup Take 10 mL by mouth every 4 (four) hours as needed for cough 236 mL 0    diphenhydrAMINE (BENADRYL) 50 MG tablet Take 1 tablet (50 mg total) by mouth daily at bedtime as needed for itching or sleep 30 tablet 0    docusate sodium (COLACE) 100 mg capsule Take 1 capsule (100 mg total) by mouth 2 (two) times a day 30 capsule 0    ergocalciferol (VITAMIN D2) 50,000 units Take 1 capsule (50,000 Units total) by mouth once a week for 12 doses 4 capsule 2    Incontinence Supply Disposable (SAPS health Incontinence Pads) MISC Use 150 pad 3 (three) times a day 150 each 3    letrozole (FEMARA) 2 5 mg tablet Take 1 tablet (2 5 mg total) by mouth daily 90 tablet 1    melatonin 3 mg Take 2 tablets (6 mg total) by mouth daily at bedtime 15 tablet 0    oxybutynin (DITROPAN-XL) 5 mg 24 hr tablet Take 1 tablet (5 mg total) by mouth daily Take 1 tablet daily 90 tablet 3    oxyCODONE (ROXICODONE) 5 mg immediate release tablet Take 5-10 mg [1-2 tabs] PO Q6H PRN for moderate/severe pain  Max Daily 40 mg  60 tablet 0     No current facility-administered medications for this visit  (Not in a hospital admission)      DATA REVIEW:    Pathology Result:    Final Diagnosis   Date Value Ref Range Status   07/26/2021   Final    A  B  Thoracentesis, Right (ThinPrep and cell block preparations):  Positive for malignancy  Metastatic carcinoma, compatible with breast primary  -  Immunohistochemical stains performed with appropriate controls show the tumor cells to be positive for Matthew-EP4, MOC31, BARRY-3 and ER with scattered background mesothelial cells staining for WT1 and calretinin, supporting the diagnosis  Satisfactory for evaluation  07/26/2021   Final    A  Liver (core biopsy):     - Poorly-differentiated carcinoma, most compatible with metastasis from the patient's reported breast primary  Comment:  ER / CO / Her-2 pending  Comment: This is an appended report  These results have been appended to a previously preliminary verified report  Image Results: They are reviewed and documented in Hematology/Oncology history    MRI lumbar spine wo contrast  1 2 [id number]        LABS:  Lab data are reviewed and documented in HemOnc history  No results found for this or any previous visit (from the past 48 hour(s))            Shanon Núñez MD  8/30/2021, 2:28 PM

## 2021-08-31 ENCOUNTER — HOSPITAL ENCOUNTER (OUTPATIENT)
Dept: INFUSION CENTER | Facility: CLINIC | Age: 56
Discharge: HOME | End: 2021-08-31
Payer: COMMERCIAL

## 2021-08-31 ENCOUNTER — DOCUMENTATION (OUTPATIENT)
Dept: HEMATOLOGY ONCOLOGY | Facility: CLINIC | Age: 56
End: 2021-08-31

## 2021-08-31 VITALS
RESPIRATION RATE: 16 BRPM | SYSTOLIC BLOOD PRESSURE: 98 MMHG | HEART RATE: 85 BPM | TEMPERATURE: 96.2 F | DIASTOLIC BLOOD PRESSURE: 78 MMHG

## 2021-08-31 DIAGNOSIS — E86.0 DEHYDRATION: Primary | ICD-10-CM

## 2021-08-31 PROCEDURE — 96360 HYDRATION IV INFUSION INIT: CPT

## 2021-08-31 RX ADMIN — DEXTROSE 1000 ML: 5 SOLUTION INTRAVENOUS at 10:21

## 2021-08-31 NOTE — PROGRESS NOTES
Pt presents for IV hydration offering no complaints  Pt tolerated entire infusion without incident  PIV removed,, pt declined AVS and was discharged in stable condition

## 2021-08-31 NOTE — PROGRESS NOTES
Received request from clinical for patient to start on Ibrance 125 mg for 3 weeks on and 1 week off    Auth has been submitted and pending via cover my meds (Key: [id number])     Help Desk- [phone]     ID- [id number]  Dorian Runner: [id number] / PCN: [id number]  Group- [id number]

## 2021-08-31 NOTE — PROGRESS NOTES
Time out completed via phone with Elena Forde is not authorized by insurance  Pt will receive hydration only today  Zometa per insurance preference will be added & administered with scheduled zoladex appt

## 2021-09-01 ENCOUNTER — TELEPHONE (OUTPATIENT)
Dept: PALLIATIVE MEDICINE | Facility: CLINIC | Age: 56
End: 2021-09-01

## 2021-09-01 DIAGNOSIS — Z17.0 MALIGNANT NEOPLASM OF BREAST IN FEMALE, ESTROGEN RECEPTOR POSITIVE, UNSPECIFIED LATERALITY, UNSPECIFIED SITE OF BREAST (HCC): Primary | ICD-10-CM

## 2021-09-01 DIAGNOSIS — C50.919 MALIGNANT NEOPLASM OF BREAST IN FEMALE, ESTROGEN RECEPTOR POSITIVE, UNSPECIFIED LATERALITY, UNSPECIFIED SITE OF BREAST (HCC): Primary | ICD-10-CM

## 2021-09-01 NOTE — TELEPHONE ENCOUNTER
Patient's office called office stating " we have problems at home, more like mental health, we need counseling can some one help us, I'm not home but I will like a call about where I can get this services  "       Please advise

## 2021-09-01 NOTE — TELEPHONE ENCOUNTER
Attempted call to patient, goes straight to voicemail but voicemail is full and LSW not able to leave a message

## 2021-09-02 ENCOUNTER — APPOINTMENT (OUTPATIENT)
Dept: LAB | Facility: CLINIC | Age: 56
End: 2021-09-02
Payer: COMMERCIAL

## 2021-09-02 ENCOUNTER — PATIENT OUTREACH (OUTPATIENT)
Dept: CASE MANAGEMENT | Facility: HOSPITAL | Age: 56
End: 2021-09-02

## 2021-09-02 DIAGNOSIS — C50.919 MALIGNANT NEOPLASM OF BREAST IN FEMALE, ESTROGEN RECEPTOR POSITIVE, UNSPECIFIED LATERALITY, UNSPECIFIED SITE OF BREAST (HCC): ICD-10-CM

## 2021-09-02 DIAGNOSIS — Z17.0 MALIGNANT NEOPLASM OF BREAST IN FEMALE, ESTROGEN RECEPTOR POSITIVE, UNSPECIFIED LATERALITY, UNSPECIFIED SITE OF BREAST (HCC): ICD-10-CM

## 2021-09-02 LAB
ALBUMIN SERPL BCP-MCNC: 3.1 G/DL (ref 3.5–5)
ALP SERPL-CCNC: 128 U/L (ref 46–116)
ALT SERPL W P-5'-P-CCNC: 14 U/L (ref 12–78)
ANION GAP SERPL CALCULATED.3IONS-SCNC: 6 MMOL/L (ref 4–13)
AST SERPL W P-5'-P-CCNC: 22 U/L (ref 5–45)
BASOPHILS # BLD AUTO: 0.04 THOUSANDS/ΜL (ref 0–0.1)
BASOPHILS NFR BLD AUTO: 1 % (ref 0–1)
BILIRUB SERPL-MCNC: 0.39 MG/DL (ref 0.2–1)
BUN SERPL-MCNC: 17 MG/DL (ref 5–25)
CALCIUM ALBUM COR SERPL-MCNC: 10 MG/DL (ref 8.3–10.1)
CALCIUM SERPL-MCNC: 9.3 MG/DL (ref 8.3–10.1)
CHLORIDE SERPL-SCNC: 103 MMOL/L (ref 100–108)
CO2 SERPL-SCNC: 25 MMOL/L (ref 21–32)
CREAT SERPL-MCNC: 1.27 MG/DL (ref 0.6–1.3)
EOSINOPHIL # BLD AUTO: 0.12 THOUSAND/ΜL (ref 0–0.61)
EOSINOPHIL NFR BLD AUTO: 4 % (ref 0–6)
ERYTHROCYTE [DISTWIDTH] IN BLOOD BY AUTOMATED COUNT: 14.2 % (ref 11.6–15.1)
GFR SERPL CREATININE-BSD FRML MDRD: 48 ML/MIN/1.73SQ M
GLUCOSE SERPL-MCNC: 77 MG/DL (ref 65–140)
HCT VFR BLD AUTO: 43.4 % (ref 34.8–46.1)
HGB BLD-MCNC: 13.8 G/DL (ref 11.5–15.4)
IMM GRANULOCYTES # BLD AUTO: 0.01 THOUSAND/UL (ref 0–0.2)
IMM GRANULOCYTES NFR BLD AUTO: 0 % (ref 0–2)
LYMPHOCYTES # BLD AUTO: 0.68 THOUSANDS/ΜL (ref 0.6–4.47)
LYMPHOCYTES NFR BLD AUTO: 23 % (ref 14–44)
MCH RBC QN AUTO: 30 PG (ref 26.8–34.3)
MCHC RBC AUTO-ENTMCNC: 31.8 G/DL (ref 31.4–37.4)
MCV RBC AUTO: 94 FL (ref 82–98)
MONOCYTES # BLD AUTO: 0.17 THOUSAND/ΜL (ref 0.17–1.22)
MONOCYTES NFR BLD AUTO: 6 % (ref 4–12)
NEUTROPHILS # BLD AUTO: 1.98 THOUSANDS/ΜL (ref 1.85–7.62)
NEUTS SEG NFR BLD AUTO: 66 % (ref 43–75)
NRBC BLD AUTO-RTO: 0 /100 WBCS
PLATELET # BLD AUTO: 148 THOUSANDS/UL (ref 149–390)
PMV BLD AUTO: 13 FL (ref 8.9–12.7)
POTASSIUM SERPL-SCNC: 3.7 MMOL/L (ref 3.5–5.3)
PROT SERPL-MCNC: 6.8 G/DL (ref 6.4–8.2)
RBC # BLD AUTO: 4.6 MILLION/UL (ref 3.81–5.12)
SODIUM SERPL-SCNC: 134 MMOL/L (ref 136–145)
WBC # BLD AUTO: 3 THOUSAND/UL (ref 4.31–10.16)

## 2021-09-02 PROCEDURE — 36415 COLL VENOUS BLD VENIPUNCTURE: CPT

## 2021-09-02 PROCEDURE — 80053 COMPREHEN METABOLIC PANEL: CPT

## 2021-09-02 PROCEDURE — 85025 COMPLETE CBC W/AUTO DIFF WBC: CPT

## 2021-09-10 ENCOUNTER — HOSPITAL ENCOUNTER (OUTPATIENT)
Dept: INFUSION CENTER | Facility: CLINIC | Age: 56
Discharge: HOME | End: 2021-09-10
Payer: COMMERCIAL

## 2021-09-10 VITALS
TEMPERATURE: 96.7 F | SYSTOLIC BLOOD PRESSURE: 112 MMHG | HEART RATE: 58 BPM | WEIGHT: 153 LBS | DIASTOLIC BLOOD PRESSURE: 73 MMHG | RESPIRATION RATE: 17 BRPM | BODY MASS INDEX: 26.12 KG/M2 | HEIGHT: 64 IN

## 2021-09-10 DIAGNOSIS — C79.51 BONE METASTASES (HCC): ICD-10-CM

## 2021-09-10 DIAGNOSIS — C50.919 MALIGNANT NEOPLASM OF BREAST IN FEMALE, ESTROGEN RECEPTOR POSITIVE, UNSPECIFIED LATERALITY, UNSPECIFIED SITE OF BREAST (HCC): Primary | ICD-10-CM

## 2021-09-10 DIAGNOSIS — C78.7 MALIGNANT NEOPLASM METASTATIC TO LIVER (HCC): Primary | ICD-10-CM

## 2021-09-10 DIAGNOSIS — Z17.0 MALIGNANT NEOPLASM OF BREAST IN FEMALE, ESTROGEN RECEPTOR POSITIVE, UNSPECIFIED LATERALITY, UNSPECIFIED SITE OF BREAST (HCC): Primary | ICD-10-CM

## 2021-09-10 DIAGNOSIS — Z17.0 MALIGNANT NEOPLASM OF BREAST IN FEMALE, ESTROGEN RECEPTOR POSITIVE, UNSPECIFIED LATERALITY, UNSPECIFIED SITE OF BREAST (HCC): ICD-10-CM

## 2021-09-10 DIAGNOSIS — C50.919 MALIGNANT NEOPLASM OF BREAST IN FEMALE, ESTROGEN RECEPTOR POSITIVE, UNSPECIFIED LATERALITY, UNSPECIFIED SITE OF BREAST (HCC): ICD-10-CM

## 2021-09-10 LAB
ALBUMIN SERPL BCP-MCNC: 2.9 G/DL (ref 3.5–5)
ALP SERPL-CCNC: 108 U/L (ref 46–116)
ALT SERPL W P-5'-P-CCNC: 17 U/L (ref 12–78)
ANION GAP SERPL CALCULATED.3IONS-SCNC: 9 MMOL/L (ref 4–13)
AST SERPL W P-5'-P-CCNC: 27 U/L (ref 5–45)
BILIRUB SERPL-MCNC: 0.28 MG/DL (ref 0.2–1)
BUN SERPL-MCNC: 14 MG/DL (ref 5–25)
CALCIUM ALBUM COR SERPL-MCNC: 9.4 MG/DL (ref 8.3–10.1)
CALCIUM SERPL-MCNC: 8.5 MG/DL (ref 8.3–10.1)
CHLORIDE SERPL-SCNC: 107 MMOL/L (ref 100–108)
CO2 SERPL-SCNC: 26 MMOL/L (ref 21–32)
CREAT SERPL-MCNC: 1.01 MG/DL (ref 0.6–1.3)
GFR SERPL CREATININE-BSD FRML MDRD: 64 ML/MIN/1.73SQ M
GLUCOSE P FAST SERPL-MCNC: 84 MG/DL (ref 65–99)
GLUCOSE SERPL-MCNC: 84 MG/DL (ref 65–140)
POTASSIUM SERPL-SCNC: 4 MMOL/L (ref 3.5–5.3)
PROT SERPL-MCNC: 6.5 G/DL (ref 6.4–8.2)
SODIUM SERPL-SCNC: 142 MMOL/L (ref 136–145)

## 2021-09-10 PROCEDURE — 96365 THER/PROPH/DIAG IV INF INIT: CPT

## 2021-09-10 PROCEDURE — 96402 CHEMO HORMON ANTINEOPL SQ/IM: CPT

## 2021-09-10 PROCEDURE — 80053 COMPREHEN METABOLIC PANEL: CPT | Performed by: INTERNAL MEDICINE

## 2021-09-10 RX ORDER — SODIUM CHLORIDE 9 MG/ML
20 INJECTION, SOLUTION INTRAVENOUS ONCE
Status: COMPLETED | OUTPATIENT
Start: 2021-09-10 | End: 2021-09-10

## 2021-09-10 RX ORDER — SODIUM CHLORIDE 9 MG/ML
20 INJECTION, SOLUTION INTRAVENOUS ONCE
Status: CANCELLED | OUTPATIENT
Start: 2021-12-03

## 2021-09-10 RX ADMIN — SODIUM CHLORIDE 20 ML/HR: 9 INJECTION, SOLUTION INTRAVENOUS at 12:40

## 2021-09-10 RX ADMIN — ZOLEDRONIC ACID 3.5 MG: 0.8 INJECTION, SOLUTION, CONCENTRATE INTRAVENOUS at 12:41

## 2021-09-10 RX ADMIN — GOSERELIN ACETATE 3.6 MG: 3.6 IMPLANT SUBCUTANEOUS at 12:10

## 2021-09-10 NOTE — PROGRESS NOTES
Patient presents for Zometa infusion and Zoladex injection offering no complaints, patient tolerated treatment without incident  Zoladex injection placed in left abdomen  AVS declined  Next appointment reviewed

## 2021-09-14 ENCOUNTER — TELEPHONE (OUTPATIENT)
Dept: OTHER | Facility: OTHER | Age: 56
End: 2021-09-14

## 2021-09-14 ENCOUNTER — PATIENT OUTREACH (OUTPATIENT)
Dept: CASE MANAGEMENT | Facility: HOSPITAL | Age: 56
End: 2021-09-14

## 2021-09-14 ENCOUNTER — OFFICE VISIT (OUTPATIENT)
Dept: INTERNAL MEDICINE CLINIC | Facility: CLINIC | Age: 56
End: 2021-09-14
Payer: COMMERCIAL

## 2021-09-14 VITALS
HEART RATE: 98 BPM | BODY MASS INDEX: 26.12 KG/M2 | HEIGHT: 64 IN | WEIGHT: 153 LBS | DIASTOLIC BLOOD PRESSURE: 66 MMHG | TEMPERATURE: 97.8 F | RESPIRATION RATE: 16 BRPM | SYSTOLIC BLOOD PRESSURE: 108 MMHG | OXYGEN SATURATION: 96 %

## 2021-09-14 DIAGNOSIS — C50.919 MALIGNANT NEOPLASM OF BREAST IN FEMALE, ESTROGEN RECEPTOR POSITIVE, UNSPECIFIED LATERALITY, UNSPECIFIED SITE OF BREAST (HCC): Primary | ICD-10-CM

## 2021-09-14 DIAGNOSIS — F32.A ANXIETY AND DEPRESSION: ICD-10-CM

## 2021-09-14 DIAGNOSIS — C79.51 BONE METASTASES (HCC): ICD-10-CM

## 2021-09-14 DIAGNOSIS — Z17.0 MALIGNANT NEOPLASM OF BREAST IN FEMALE, ESTROGEN RECEPTOR POSITIVE, UNSPECIFIED LATERALITY, UNSPECIFIED SITE OF BREAST (HCC): Primary | ICD-10-CM

## 2021-09-14 DIAGNOSIS — F41.9 ANXIETY AND DEPRESSION: ICD-10-CM

## 2021-09-14 PROCEDURE — 99214 OFFICE O/P EST MOD 30 MIN: CPT | Performed by: INTERNAL MEDICINE

## 2021-09-14 RX ORDER — DOCUSATE SODIUM 100 MG/1
1 CAPSULE, LIQUID FILLED ORAL 2 TIMES DAILY
COMMUNITY
Start: 2013-11-12 | End: 2021-09-14

## 2021-09-14 RX ORDER — FOLIC ACID 20 MG
1 CAPSULE ORAL
COMMUNITY
End: 2021-09-14

## 2021-09-14 RX ORDER — IBUPROFEN 200 MG
1200 CAPSULE ORAL
COMMUNITY
End: 2021-10-15 | Stop reason: ALTCHOICE

## 2021-09-14 RX ORDER — FLUTICASONE PROPIONATE 50 MCG
SPRAY, SUSPENSION (ML) NASAL
COMMUNITY
Start: 2021-07-10 | End: 2021-09-14

## 2021-09-14 RX ORDER — DULOXETIN HYDROCHLORIDE 30 MG/1
30 CAPSULE, DELAYED RELEASE ORAL DAILY
COMMUNITY
Start: 2021-07-16 | End: 2021-09-14 | Stop reason: SDUPTHER

## 2021-09-14 RX ORDER — OXYBUTYNIN CHLORIDE 5 MG/1
TABLET ORAL
COMMUNITY
Start: 2013-11-12 | End: 2021-10-15 | Stop reason: ALTCHOICE

## 2021-09-14 RX ORDER — DULOXETIN HYDROCHLORIDE 30 MG/1
30 CAPSULE, DELAYED RELEASE ORAL DAILY
Qty: 90 CAPSULE | Refills: 3 | Status: SHIPPED | OUTPATIENT
Start: 2021-09-14

## 2021-09-14 RX ORDER — ZINC GLUCONATE 50 MG
50 TABLET ORAL
COMMUNITY
End: 2021-09-14

## 2021-09-14 RX ORDER — DEXTROMETHORPHAN HYDROBROMIDE AND PROMETHAZINE HYDROCHLORIDE 15; 6.25 MG/5ML; MG/5ML
SYRUP ORAL
COMMUNITY
Start: 2021-07-10 | End: 2021-09-16

## 2021-09-14 RX ORDER — PREDNISONE 20 MG/1
TABLET ORAL
COMMUNITY
Start: 2021-07-10 | End: 2021-10-15 | Stop reason: ALTCHOICE

## 2021-09-14 RX ORDER — ALBUTEROL SULFATE 90 UG/1
2 AEROSOL, METERED RESPIRATORY (INHALATION) EVERY 6 HOURS PRN
COMMUNITY
Start: 2021-07-10 | End: 2021-10-15 | Stop reason: ALTCHOICE

## 2021-09-14 RX ORDER — CHOLECALCIFEROL (VITAMIN D3) 1250 MCG
CAPSULE ORAL
COMMUNITY
Start: 2021-07-23 | End: 2021-10-15 | Stop reason: ALTCHOICE

## 2021-09-14 RX ORDER — DIPHENOXYLATE HYDROCHLORIDE AND ATROPINE SULFATE 2.5; .025 MG/1; MG/1
1 TABLET ORAL
COMMUNITY

## 2021-09-14 RX ORDER — TAMOXIFEN CITRATE 20 MG/1
1 TABLET ORAL DAILY
COMMUNITY
Start: 2013-11-12 | End: 2021-10-15

## 2021-09-14 RX ORDER — DEXTROMETHORPHAN HYDROBROMIDE AND PROMETHAZINE HYDROCHLORIDE 15; 6.25 MG/5ML; MG/5ML
5 SYRUP ORAL 4 TIMES DAILY PRN
COMMUNITY
Start: 2021-07-10 | End: 2021-09-16

## 2021-09-14 RX ORDER — DULOXETIN HYDROCHLORIDE 60 MG/1
1 CAPSULE, DELAYED RELEASE ORAL DAILY
COMMUNITY
Start: 2013-11-12 | End: 2021-09-14

## 2021-09-14 RX ORDER — FLUTICASONE PROPIONATE 50 MCG
2 SPRAY, SUSPENSION (ML) NASAL DAILY
COMMUNITY
Start: 2021-07-10 | End: 2022-07-10

## 2021-09-14 RX ORDER — MELOXICAM 7.5 MG/1
TABLET ORAL
COMMUNITY
Start: 2013-11-12 | End: 2021-10-15

## 2021-09-14 NOTE — PROGRESS NOTES
BMI Counseling: Body mass index is 26 26 kg/m²  The BMI is above normal  Nutrition recommendations include encouraging healthy choices of fruits and vegetables  Rationale for BMI follow-up plan is due to patient being overweight or obese  Depression Screening and Follow-up Plan:   Continue regular follow-up with their mental health provider who is managing their mental health condition(s)  Assessment/Plan:    Alessia Davila she is here for her 2 month follow-up  She is currently in severe pain related to her right-sided breast cancer with bony Mets  She states that the pain is continuous  She has experienced pain to her bilateral hips  She has a prescription for oxycodone but does not like to take it because she does not want to get addicted  Pain is okay right now she has   taken oxycodone in the morning  She states the pain usually gets worse around 3-4 p m  Callum Meehan She will be seeing palliative care this Thursday  She does have visiting nurse come to her apartment for drainage of her PleurX catheter   Which was placed for recurrent pleural effusion  She did reveal during our visit that she is having some family trouble  She was able to speak with the  from Hematology during this visit  She states that her  is an alcoholic and there has been a lot of friction at the home  She does feel safe but she is already overwhelmed with her current diagnoses and will be asking him to leave the home if he cannot stop drinking  She reports that he just got out   Of detoxing  We will follow-up in 4 weeks  Will restart her Cymbalta  No problem-specific Assessment & Plan notes found for this encounter  Diagnoses and all orders for this visit:    Malignant neoplasm of breast in female, estrogen receptor positive, unspecified laterality, unspecified site of breast ([facility] [address] )    Bone metastases ([facility] [address] )    Other orders  -     Calcium 600 MG tablet;  Take 1,200 mg by mouth (Patient not taking: Reported on 9/14/2021)  -     Cholecalciferol (Vitamin D3) 1 25 MG (34195 UT) CAPS; TAKE 1 CAPSULE BY MOUTH WEEKLY FOR 12 WEEKS FOR VITAMIN D DEFICIENCY  -     DULoxetine (CYMBALTA) 30 mg delayed release capsule; Take 30 mg by mouth daily (Patient not taking: Reported on 9/14/2021)  -     DULoxetine (CYMBALTA) 60 mg delayed release capsule; Take 1 capsule by mouth daily (Patient not taking: Reported on 9/14/2021)  -     fluticasone (FLONASE) 50 mcg/act nasal spray; 2 sprays into each nostril daily  -     fluticasone (FLONASE) 50 mcg/act nasal spray; SPRAY 2 SPRAYS INTO EACH NOSTRIL EVERY DAY (Patient not taking: Reported on 0/35/8903)  -     Folic Acid 20 MG CAPS; Take 1 capsule by mouth (Patient not taking: Reported on 9/14/2021)  -     Magnesium 100 MG CAPS; Take 100 mg by mouth (Patient not taking: Reported on 9/14/2021)  -     meloxicam (MOBIC) 7 5 mg tablet; Take by mouth (Patient not taking: Reported on 9/14/2021)  -     multivitamin (THERAGRAN) TABS; Take 1 tablet by mouth  -     predniSONE 20 mg tablet; TAKE 1 TABLET BY MOUTH TWICE A DAY FOR 5 DAYS (Patient not taking: Reported on 9/14/2021)  -     promethazine-dextromethorphan (PHENERGAN-DM) 6 25-15 mg/5 mL oral syrup; Take 5 mL by mouth 4 (four) times a day as needed (Patient not taking: Reported on 9/14/2021)  -     promethazine-dextromethorphan (PHENERGAN-DM) 6 25-15 mg/5 mL oral syrup; TAKE 5 ML BY MOUTH 4 (FOUR) TIMES A DAY AS NEEDED FOR COUGH  (Patient not taking: Reported on 9/14/2021)  -     tamoxifen (NOLVADEX) 20 mg tablet; Take 1 tablet by mouth daily (Patient not taking: Reported on 9/14/2021)  -     zinc gluconate 50 mg tablet; Take 50 mg by mouth (Patient not taking: Reported on 9/14/2021)  -     albuterol (PROVENTIL HFA,VENTOLIN HFA) 90 mcg/act inhaler; Inhale 2 puffs every 6 (six) hours as needed (Patient not taking: Reported on 9/14/2021)  -     docusate sodium (Colace) 100 mg capsule;  Take 1 capsule by mouth 2 (two) times a day (Patient not taking: Reported on 9/14/2021)  -     oxybutynin (DITROPAN) 5 mg tablet; Take by mouth (Patient not taking: Reported on 9/14/2021)          Subjective:      Patient ID: Grace Lovelace is a 50 y o  female  Patient is here for follow-up  She is revealing today that she is going through a lot of family problems right now  She is waiting to get in contact with   The  from Hematology  She will be seeing palliative care on Thursday  The following portions of the patient's history were reviewed and updated as appropriate:   She  has a past medical history of History of radiation exposure and Malignant neoplasm of right female breast ([facility] [address] ) (2012)  She   Patient Active Problem List    Diagnosis Date Noted    Dehydration 08/30/2021    Bone metastases ([facility] [address] ) 08/30/2021    Constipation 08/19/2021    Palliative care patient 08/16/2021    Malignant pleural effusion 08/16/2021    Primary malignant neoplasm of right breast with metastasis to other site [facility]) 08/16/2021    Malignant neoplasm of breast in female, estrogen receptor positive ([facility] [address] ) 08/05/2021    Hypertension 07/26/2021    Metastatic neoplasm ([facility] [address] ) 07/25/2021    Cough 07/24/2021    Pleural effusion 07/24/2021    Tobacco abuse 07/24/2021     She  has a past surgical history that includes Hip surgery; IR thoracentesis (7/26/2021); IR biopsy liver mass (7/26/2021); Breast cyst excision; Breast lumpectomy (Right, 2012); and IR pleural effusion long-term catheter placement (8/17/2021)  Her family history includes Breast cancer in her mother, other, and sister; Colon cancer in her maternal grandmother; No Known Problems in her paternal aunt and paternal grandmother  She  reports that she has quit smoking  Her smoking use included cigarettes  She smoked 0 25 packs per day  She has never used smokeless tobacco  She reports previous alcohol use  She reports previous drug use    Current Outpatient Medications   Medication Sig Dispense Refill    acetaminophen (TYLENOL) 325 mg tablet Take 2 tablets (650 mg total) by mouth every 6 (six) hours as needed for mild pain 30 tablet 0    albuterol (PROVENTIL HFA,VENTOLIN HFA) 90 mcg/act inhaler Inhale 2 puffs every 4 (four) hours as needed for wheezing 8 g 0    calcium carbonate (TUMS) 500 mg chewable tablet Chew 1 tablet (500 mg total) 3 (three) times a day as needed for indigestion or heartburn 30 tablet 0    Cholecalciferol (Vitamin D3) 1 25 MG (20384 UT) CAPS TAKE 1 CAPSULE BY MOUTH WEEKLY FOR 12 WEEKS FOR VITAMIN D DEFICIENCY      dextromethorphan-guaiFENesin (ROBITUSSIN DM)  mg/5 mL syrup Take 10 mL by mouth every 4 (four) hours as needed for cough 236 mL 0    diphenhydrAMINE (BENADRYL) 50 MG tablet Take 1 tablet (50 mg total) by mouth daily at bedtime as needed for itching or sleep 30 tablet 0    diphenoxylate-atropine (LOMOTIL) 2 5-0 025 mg per tablet Take 1 tablet by mouth 4 (four) times a day as needed for diarrhea 30 tablet 0    fluticasone (FLONASE) 50 mcg/act nasal spray 2 sprays into each nostril daily      Incontinence Supply Disposable (BemDireto Incontinence Pads) MISC Use 150 pad 3 (three) times a day 150 each 3    letrozole (FEMARA) 2 5 mg tablet Take 1 tablet (2 5 mg total) by mouth daily 90 tablet 1    melatonin 3 mg Take 2 tablets (6 mg total) by mouth daily at bedtime 15 tablet 0    multivitamin (THERAGRAN) TABS Take 1 tablet by mouth      oxybutynin (DITROPAN-XL) 5 mg 24 hr tablet Take 1 tablet (5 mg total) by mouth daily Take 1 tablet daily 90 tablet 3    oxyCODONE (ROXICODONE) 5 mg immediate release tablet Take 5-10 mg [1-2 tabs] PO Q6H PRN for moderate/severe pain  Max Daily 40 mg  60 tablet 0    palbociclib (Ibrance) 125 MG tablet Take 1 tablet by mouth daily for 21 days followed by 7 days off  Repeat every 28 days   21 tablet 0    albuterol (PROVENTIL HFA,VENTOLIN HFA) 90 mcg/act inhaler Inhale 2 puffs every 6 (six) hours as needed (Patient not taking: Reported on 9/14/2021)      benzonatate (TESSALON) 200 MG capsule Take 1 capsule (200 mg total) by mouth 3 (three) times a day as needed for cough (Patient not taking: Reported on 9/14/2021) 20 capsule 0    BLACK COHOSH PO Take by mouth (Patient not taking: Reported on 9/14/2021)      Calcium 600 MG tablet Take 1,200 mg by mouth (Patient not taking: Reported on 9/14/2021)      docusate sodium (COLACE) 100 mg capsule Take 1 capsule (100 mg total) by mouth 2 (two) times a day (Patient not taking: Reported on 9/14/2021) 30 capsule 0    docusate sodium (Colace) 100 mg capsule Take 1 capsule by mouth 2 (two) times a day (Patient not taking: Reported on 9/14/2021)      DULoxetine (CYMBALTA) 30 mg delayed release capsule Take 30 mg by mouth daily (Patient not taking: Reported on 9/14/2021)      DULoxetine (CYMBALTA) 60 mg delayed release capsule Take 1 capsule by mouth daily (Patient not taking: Reported on 9/14/2021)      ergocalciferol (VITAMIN D2) 50,000 units Take 1 capsule (50,000 Units total) by mouth once a week for 12 doses (Patient not taking: Reported on 9/14/2021) 4 capsule 2    fluticasone (FLONASE) 50 mcg/act nasal spray SPRAY 2 SPRAYS INTO EACH NOSTRIL EVERY DAY (Patient not taking: Reported on 4/63/0115)      Folic Acid 20 MG CAPS Take 1 capsule by mouth (Patient not taking: Reported on 9/14/2021)      Magnesium 100 MG CAPS Take 100 mg by mouth (Patient not taking: Reported on 9/14/2021)      meloxicam (MOBIC) 7 5 mg tablet Take by mouth (Patient not taking: Reported on 9/14/2021)      oxybutynin (DITROPAN) 5 mg tablet Take by mouth (Patient not taking: Reported on 9/14/2021)      predniSONE 20 mg tablet TAKE 1 TABLET BY MOUTH TWICE A DAY FOR 5 DAYS (Patient not taking: Reported on 9/14/2021)      promethazine-dextromethorphan (PHENERGAN-DM) 6 25-15 mg/5 mL oral syrup Take 5 mL by mouth 4 (four) times a day as needed (Patient not taking: Reported on 9/14/2021)      promethazine-dextromethorphan (PHENERGAN-DM) 6 25-15 mg/5 mL oral syrup TAKE 5 ML BY MOUTH 4 (FOUR) TIMES A DAY AS NEEDED FOR COUGH  (Patient not taking: Reported on 9/14/2021)      tamoxifen (NOLVADEX) 20 mg tablet Take 1 tablet by mouth daily (Patient not taking: Reported on 9/14/2021)      zinc gluconate 50 mg tablet Take 50 mg by mouth (Patient not taking: Reported on 9/14/2021)       No current facility-administered medications for this visit       Current Outpatient Medications on File Prior to Visit   Medication Sig    acetaminophen (TYLENOL) 325 mg tablet Take 2 tablets (650 mg total) by mouth every 6 (six) hours as needed for mild pain    albuterol (PROVENTIL HFA,VENTOLIN HFA) 90 mcg/act inhaler Inhale 2 puffs every 4 (four) hours as needed for wheezing    calcium carbonate (TUMS) 500 mg chewable tablet Chew 1 tablet (500 mg total) 3 (three) times a day as needed for indigestion or heartburn    Cholecalciferol (Vitamin D3) 1 25 MG (87668 UT) CAPS TAKE 1 CAPSULE BY MOUTH WEEKLY FOR 12 WEEKS FOR VITAMIN D DEFICIENCY    dextromethorphan-guaiFENesin (ROBITUSSIN DM)  mg/5 mL syrup Take 10 mL by mouth every 4 (four) hours as needed for cough    diphenhydrAMINE (BENADRYL) 50 MG tablet Take 1 tablet (50 mg total) by mouth daily at bedtime as needed for itching or sleep    diphenoxylate-atropine (LOMOTIL) 2 5-0 025 mg per tablet Take 1 tablet by mouth 4 (four) times a day as needed for diarrhea    fluticasone (FLONASE) 50 mcg/act nasal spray 2 sprays into each nostril daily    Incontinence Supply Disposable (TapulousS AMEC Incontinence Pads) MISC Use 150 pad 3 (three) times a day    letrozole (FEMARA) 2 5 mg tablet Take 1 tablet (2 5 mg total) by mouth daily    melatonin 3 mg Take 2 tablets (6 mg total) by mouth daily at bedtime    multivitamin (THERAGRAN) TABS Take 1 tablet by mouth    oxybutynin (DITROPAN-XL) 5 mg 24 hr tablet Take 1 tablet (5 mg total) by mouth daily Take 1 tablet daily    oxyCODONE (ROXICODONE) 5 mg immediate release tablet Take 5-10 mg [1-2 tabs] PO Q6H PRN for moderate/severe pain  Max Daily 40 mg   palbociclib (Ibrance) 125 MG tablet Take 1 tablet by mouth daily for 21 days followed by 7 days off  Repeat every 28 days      albuterol (PROVENTIL HFA,VENTOLIN HFA) 90 mcg/act inhaler Inhale 2 puffs every 6 (six) hours as needed (Patient not taking: Reported on 9/14/2021)    benzonatate (TESSALON) 200 MG capsule Take 1 capsule (200 mg total) by mouth 3 (three) times a day as needed for cough (Patient not taking: Reported on 9/14/2021)    BLACK COHOSH PO Take by mouth (Patient not taking: Reported on 9/14/2021)    Calcium 600 MG tablet Take 1,200 mg by mouth (Patient not taking: Reported on 9/14/2021)    docusate sodium (COLACE) 100 mg capsule Take 1 capsule (100 mg total) by mouth 2 (two) times a day (Patient not taking: Reported on 9/14/2021)    docusate sodium (Colace) 100 mg capsule Take 1 capsule by mouth 2 (two) times a day (Patient not taking: Reported on 9/14/2021)    DULoxetine (CYMBALTA) 30 mg delayed release capsule Take 30 mg by mouth daily (Patient not taking: Reported on 9/14/2021)    DULoxetine (CYMBALTA) 60 mg delayed release capsule Take 1 capsule by mouth daily (Patient not taking: Reported on 9/14/2021)    ergocalciferol (VITAMIN D2) 50,000 units Take 1 capsule (50,000 Units total) by mouth once a week for 12 doses (Patient not taking: Reported on 9/14/2021)    fluticasone (FLONASE) 50 mcg/act nasal spray SPRAY 2 SPRAYS INTO EACH NOSTRIL EVERY DAY (Patient not taking: Reported on 1/38/1453)    Folic Acid 20 MG CAPS Take 1 capsule by mouth (Patient not taking: Reported on 9/14/2021)    Magnesium 100 MG CAPS Take 100 mg by mouth (Patient not taking: Reported on 9/14/2021)    meloxicam (MOBIC) 7 5 mg tablet Take by mouth (Patient not taking: Reported on 9/14/2021)    oxybutynin (DITROPAN) 5 mg tablet Take by mouth (Patient not taking: Reported on 9/14/2021)    predniSONE 20 mg tablet TAKE 1 TABLET BY MOUTH TWICE A DAY FOR 5 DAYS (Patient not taking: Reported on 9/14/2021)    promethazine-dextromethorphan (PHENERGAN-DM) 6 25-15 mg/5 mL oral syrup Take 5 mL by mouth 4 (four) times a day as needed (Patient not taking: Reported on 9/14/2021)    promethazine-dextromethorphan (PHENERGAN-DM) 6 25-15 mg/5 mL oral syrup TAKE 5 ML BY MOUTH 4 (FOUR) TIMES A DAY AS NEEDED FOR COUGH  (Patient not taking: Reported on 9/14/2021)    tamoxifen (NOLVADEX) 20 mg tablet Take 1 tablet by mouth daily (Patient not taking: Reported on 9/14/2021)    zinc gluconate 50 mg tablet Take 50 mg by mouth (Patient not taking: Reported on 9/14/2021)     No current facility-administered medications on file prior to visit  She is allergic to codeine, morphine, and sulfa antibiotics       Review of Systems   Constitutional: Positive for fatigue  Musculoskeletal: Positive for arthralgias and back pain  Bilateral hip pain   Psychiatric/Behavioral: Positive for decreased concentration, dysphoric mood and sleep disturbance  The patient is nervous/anxious  All other systems reviewed and are negative  Objective:      Pulse 98   Temp 97 8 °F (36 6 °C) (Tympanic)   Resp 16   Ht 5' 4" (1 626 m)   Wt 69 4 kg (153 lb)   SpO2 96%   BMI 26 26 kg/m²          Physical Exam  Vitals and nursing note reviewed  Constitutional:       Appearance: Normal appearance  HENT:      Head: Normocephalic and atraumatic  Cardiovascular:      Rate and Rhythm: Normal rate  Pulmonary:      Effort: Pulmonary effort is normal    Musculoskeletal:         General: Normal range of motion  Cervical back: Normal range of motion  Right lower leg: Edema present  Skin:     General: Skin is warm and dry  Neurological:      General: No focal deficit present  Mental Status: She is alert and oriented to person, place, and time  Mental status is at baseline     Psychiatric:         Mood and Affect: Mood is anxious and depressed  Affect is tearful  Behavior: Behavior normal          Thought Content:  Thought content normal          Judgment: Judgment normal

## 2021-09-14 NOTE — PROGRESS NOTES
Pt returned my call this morning and shared that she is going through some hard times at home right now, so she was appreciative of the outreach  She explained that her  is a long time alcoholic, he recently completed a detox program, however he came home and immediately started to drink again  According to her, this leads to him starting arguments with her and their 12year old son gets involved to defend her, which just causes her to be the referee  On top of that, pt has a 23year old son who has Down's syndrome and is non verbal, she is his caregiver and he communicates with her via sign language, which her  has not learned  So this leaves her entirely responsible for him and his needs  Pt is also dealing with metastatic cancer, managing all of her appointments, and dealing with managing her pain  This has all left her understandably upset and anxious  She says that she knows she cannot count on her  at this point to be helpful or supportive to her, however this makes her angry as she says that he has been to treatment for his drinking numerous times and has broken every promise that he has made about getting sober  At this point, she says that he is insisting that they do family counseling, however she feels that counseling is pointless if he's still drinking  She shared that he is on probation for a DUI and has had numerous legal issues because of his drinking, and that last night he called the police to the home after an argument with their son  At this point, pt is living with her brother  She says that this is a safe environment for her and her children, and her brother is supportive of her  She agrees that she needs counseling regardless, and agrees that the family counseling will be helpful even if her  does not participate, as she and her sons need to talk things out    She says that she is planning on calling his  today to see how last night's police visit impacts anything, and expects that her  has already reached out to him per the requirements of his probation  I suggested as well that she call [facility], to talk about counseling and assistance moving forward if she decides not to return home  She agrees that this is a good idea and says that she will reach out today  We agreed to reconnect on Thursday to see how these conversations went and how I can best help her moving forward  She was appreciative of the time spent talking and the support today  [facility] will remain available to pt moving forward for support and assistance as needed

## 2021-09-14 NOTE — TELEPHONE ENCOUNTER
Ryanne Mccollum from [employer] is calling to leave a message for Dr Fadi Gavin for tomorrow regarding the patient  She was offered to be paged out to the on call but says it is nor emergent and declined  Her concern is the pt has a pluerax catheter in the rt lung that drained 450 ml fluid that is pink tinge  With some of the fluid remaining in the tubing she wants a call back tomorrow regarding to follow up  She says the caregivers are aware when it is emergent or not

## 2021-09-16 ENCOUNTER — OFFICE VISIT (OUTPATIENT)
Dept: PALLIATIVE MEDICINE | Facility: CLINIC | Age: 56
End: 2021-09-16
Payer: COMMERCIAL

## 2021-09-16 VITALS
BODY MASS INDEX: 26.02 KG/M2 | HEART RATE: 100 BPM | TEMPERATURE: 98.1 F | WEIGHT: 151.6 LBS | RESPIRATION RATE: 20 BRPM | OXYGEN SATURATION: 97 % | DIASTOLIC BLOOD PRESSURE: 68 MMHG | SYSTOLIC BLOOD PRESSURE: 120 MMHG

## 2021-09-16 DIAGNOSIS — J91.0 MALIGNANT PLEURAL EFFUSION: ICD-10-CM

## 2021-09-16 DIAGNOSIS — C50.911 PRIMARY MALIGNANT NEOPLASM OF RIGHT BREAST WITH METASTASIS TO OTHER SITE (HCC): Primary | ICD-10-CM

## 2021-09-16 DIAGNOSIS — R11.0 NAUSEA: ICD-10-CM

## 2021-09-16 DIAGNOSIS — C79.51 BONE METASTASES (HCC): ICD-10-CM

## 2021-09-16 DIAGNOSIS — Z51.5 PALLIATIVE CARE PATIENT: ICD-10-CM

## 2021-09-16 DIAGNOSIS — K59.00 CONSTIPATION, UNSPECIFIED CONSTIPATION TYPE: ICD-10-CM

## 2021-09-16 DIAGNOSIS — G89.3 CANCER RELATED PAIN: ICD-10-CM

## 2021-09-16 PROCEDURE — 99214 OFFICE O/P EST MOD 30 MIN: CPT | Performed by: STUDENT IN AN ORGANIZED HEALTH CARE EDUCATION/TRAINING PROGRAM

## 2021-09-16 RX ORDER — HYDROMORPHONE HYDROCHLORIDE 2 MG/1
TABLET ORAL
Qty: 42 TABLET | Refills: 0 | Status: SHIPPED | OUTPATIENT
Start: 2021-09-16 | End: 2021-10-12 | Stop reason: SDUPTHER

## 2021-09-16 RX ORDER — METOCLOPRAMIDE 10 MG/1
10 TABLET ORAL 4 TIMES DAILY
Qty: 28 TABLET | Refills: 0 | Status: SHIPPED | OUTPATIENT
Start: 2021-09-16

## 2021-09-16 RX ORDER — ONDANSETRON 4 MG/1
4 TABLET, FILM COATED ORAL EVERY 8 HOURS PRN
Qty: 20 TABLET | Refills: 0 | Status: SHIPPED | OUTPATIENT
Start: 2021-09-16

## 2021-09-16 NOTE — PROGRESS NOTES
Outpatient Follow-Up - Palliative and Supportive Care   Sarah Calderon 50 y o  female [id number]    Assessment & Plan  Problem List Items Addressed This Visit        Respiratory    Malignant pleural effusion       Musculoskeletal and Integument    Bone metastases (HCC)    Relevant Medications    HYDROmorphone (DILAUDID) 2 mg tablet       Other    Palliative care patient    Relevant Medications    HYDROmorphone (DILAUDID) 2 mg tablet    metoclopramide (REGLAN) 10 mg tablet    ondansetron (ZOFRAN) 4 mg tablet    Primary malignant neoplasm of right breast with metastasis to other site [facility]) - Primary    Relevant Medications    HYDROmorphone (DILAUDID) 2 mg tablet    metoclopramide (REGLAN) 10 mg tablet    ondansetron (ZOFRAN) 4 mg tablet    Constipation      Other Visit Diagnoses     Cancer related pain        Relevant Medications    HYDROmorphone (DILAUDID) 2 mg tablet    Nausea        Relevant Medications    metoclopramide (REGLAN) 10 mg tablet    ondansetron (ZOFRAN) 4 mg tablet        #symptom management  #cancer-related pain   - continue APAP 650 mg PO Q6H PRN              - max daily 4000 mg   - opioid rotation hydromorphone 2-4 mg PO Q4H PRN   - patient recently taking oxy-IR 10 mg Q8H PRN [OME/dose 15 mg] with dose reduction, hydromorphone [OME 8 mg, if needed, may take hydromorphone 4 mg -> OME 16 mg]   - documented allergy to morphine/codeine, patient states previously tolerated IV hydromorphone without reaction   - trial Rx of 7-day script to ensure low cost trial, family on fixed income; if efficacious, plan to call on Monday to complete script for the month   - discontinue oxycodone    #nausea   - trial metoclopramide 10 mg PO QID   - described symptoms of slow transit, eating smaller volumes of food, occasional abdominal cramping   - start ondansetron 4 mg PO Q8H PRN      #decreased appetite   - follow up with dietary    #recent diarrhea   - one dose of lomotil with resolution of diarrhea   - may require bowel regimen to prevent OIC    #goals of care   - visiting RN three times weekly for Pleur-X drainage   - held Abemaciclib due to significant diarrhea   - plan to continue Ibrance therapy   - plan to continue Xgeva monthly    #psychosocial support   - emotional support provided   - Abi David accompanied today's appointment      Next [facility] follow up in 4 weeks  Controlled Substance Review    [place] or [place]  reviewed: No red flags were identified; safe to proceed with prescription  [place] PDMP Review       Value Time User    PDMP Reviewed  Yes (Pended)  9/16/2021  8:06 AM Emma Marley MD        Prescriptions  Total Prescriptions: 4    Total Private Pay: 1    Fill Date ID   Written Drug Qty Days Prescriber Rx # Pharmacy Refill   Daily Dose* Pymt Type      08/30/2021  1   08/30/2021  Diphenoxylate-Atrop 2 5-0 025  30 00  8 We Zha   [id number]   Pen (6820)   0  0 00 [place]   08/25/2021  1   08/25/2021  Oxycodone Hcl 5 MG Tablet  60 00  7 Ch Smi   [id number]   Pen (6820)   0  64 29 [place]   08/13/2021  1   07/27/2021  Oxycodone Hcl 10 MG Tablet  30 00  7 Emma Int   [id number]   Pen (6820)   0  64 29 [place]   07/30/2021  1   07/30/2021  Oxycodone-Acetaminophen 5-325  14 00  1 Th Afa   [id number]   Pen (6820)   0  105 00 MME  [place]       Medications adjusted this encounter:  Requested Prescriptions     Signed Prescriptions Disp Refills    HYDROmorphone (DILAUDID) 2 mg tablet 42 tablet 0     Sig: Take 2-4 mg [1-2 tabs] PO Q4H PRN  Max Daily 24 mg     metoclopramide (REGLAN) 10 mg tablet 28 tablet 0     Sig: Take 1 tablet (10 mg total) by mouth 4 (four) times a day    ondansetron (ZOFRAN) 4 mg tablet 20 tablet 0     Sig: Take 1 tablet (4 mg total) by mouth every 8 (eight) hours as needed for nausea or vomiting     No orders of the defined types were placed in this encounter      Medications Discontinued During This Encounter   Medication Reason    promethazine-dextromethorphan (PHENERGAN-DM) 6 25-15 mg/5 mL oral syrup     promethazine-dextromethorphan (PHENERGAN-DM) 6 25-15 mg/5 mL oral syrup     oxyCODONE (ROXICODONE) 5 mg immediate release tablet Formulary change         Kimberlyn Connolly was seen today for symptoms and planning cares related to above illnesses  I have reviewed the patient's controlled substance dispensing history in the Prescription Drug Monitoring Program in compliance with the [facility] regulations before prescribing any controlled substances  They are invited to continue to follow with us  If there are questions or concerns, please contact us through our clinic/answering service 24 hours a day, seven days a week  Prieto Orellana MD  [facility] Palliative and Supportive Care        Visit Information    Accompanied By: Spouse    Source of History: Self, Spouse, Medical record    History Limitations: None      History of Present Illness    Kimberlyn Connolly is a 50 y o  female who presents in follow up of symptoms related to metastatic breast cancer, recurrent malignant pleural effusions  Pertinent issues include: symptom management, pain, neoplasm related, depression or anxiety, assessment of goals of care  Patient reports uncontrolled pain, constant, noticed worsening after recent Ibrance initiation, since onset, subsiding  Use of oxy-IR 10 mg [2-5 mg tabs] no longer efficacious  Use of 6 tabs/day, but notes that no relief with any administration of medication over the past 1-2 weeks  Also reports nausea without vomiting, a/w intermittent abdominal cramping  Eats small volume PO intake, multiple snacks throughout day, variable eating pattern day-to-day  7 lb unintentional weight loss over the past 1 month  Sleeping 7-9 hours/night  Recent significant diarrhea reported after Abemacicib therapy, with discontinuation  Use of lomotil x 1 with resolution  Reports 1-2 BM/week now   Reports diffuse bone pain s/p Xgeva dose last week, starting to improve  Past medical, surgical, social, and family histories are reviewed and pertinent updates are made  Review of Systems   Constitutional: Positive for decreased appetite, malaise/fatigue and weight loss  Negative for chills and fever  HENT: Negative for congestion  Eyes: Negative for visual disturbance  Cardiovascular: Negative for chest pain  Respiratory: Negative for shortness of breath  Musculoskeletal: Positive for joint pain  Gastrointestinal: Positive for nausea  Negative for abdominal pain, constipation, diarrhea and vomiting  Genitourinary: Negative for frequency  Neurological: Positive for difficulty with concentration  Negative for headaches  Psychiatric/Behavioral: The patient does not have insomnia  All other systems reviewed and are negative  Vital Signs    /68 (BP Location: Left arm, Cuff Size: Standard)   Pulse 100   Temp 98 1 °F (36 7 °C) (Temporal)   Resp 20   Wt 68 8 kg (151 lb 9 6 oz)   SpO2 97%   BMI 26 02 kg/m²     Physical Exam and Objective Data  Physical Exam  Vitals and nursing note reviewed  Constitutional:       General: She is awake  Appearance: She is not diaphoretic  Comments: Chronically ill appearing in NAD  Non-toxic appearing   HENT:      Head: Normocephalic and atraumatic  Right Ear: External ear normal       Left Ear: External ear normal       Nose: No rhinorrhea  Eyes:      Comments: No gaze preference   Pulmonary:      Effort: No tachypnea, accessory muscle usage or respiratory distress  Comments: Completes full sentences without difficulty  Musculoskeletal:      Cervical back: Normal range of motion  Neurological:      General: No focal deficit present  Mental Status: She is alert and oriented to person, place, and time     Psychiatric:         Attention and Perception: Attention normal          Mood and Affect: Mood and affect normal          Speech: Speech normal  Cognition and Memory: Cognition and memory normal            Radiology and Laboratory:  I personally reviewed and interpreted the following results:    Most Recent COVID-19 Results:  Lab Results   Component Value Date/Time    SARSCOV2 Positive (A) 12/20/2020 12:25 PM       Most Recent Lab Work:  Lab Results   Component Value Date/Time    SODIUM 142 09/10/2021 11:18 AM    K 4 0 09/10/2021 11:18 AM    K 3 7 04/02/2014 01:15 PM    BUN 14 09/10/2021 11:18 AM    BUN 12 04/02/2014 01:15 PM    CREATININE 1 01 09/10/2021 11:18 AM    CREATININE 0 80 04/02/2014 01:15 PM    GLUC 84 09/10/2021 11:18 AM     Lab Results   Component Value Date/Time    AST 27 09/10/2021 11:18 AM    AST 23 04/02/2014 01:15 PM    ALT 17 09/10/2021 11:18 AM    ALT 15 04/02/2014 01:15 PM    ALB 2 9 (L) 09/10/2021 11:18 AM    ALB 3 4 (L) 04/02/2014 01:15 PM     Lab Results   Component Value Date/Time    HGB 13 8 09/02/2021 11:30 AM    WBC 3 00 (L) 09/02/2021 11:30 AM     (L) 09/02/2021 11:30 AM    INR 1 07 08/17/2021 06:14 AM       Most Recent Imaging [last 30 days]:  Procedure: XR chest 1 view portable    Result Date: 8/17/2021  Narrative: CHEST INDICATION:   sob  COMPARISON:  Chest x-ray 8/9/2021 EXAM PERFORMED/VIEWS:  XR CHEST PORTABLE  Image: 1 FINDINGS:  Cardiomediastinal silhouette appears unremarkable  There remains small right pleural effusion with right basilar atelectasis  Scarring of right mid lung  Left lung is clear without pleural effusion  No pneumothorax  Osseous structures appear within normal limits for patient age  Impression: Persistent small right pleural effusion   Workstation performed: [id number]     Procedure: IR pleural effusion long-term catheter placement    Result Date: 8/17/2021  Narrative: PROCEDURE: Ultrasound-guided right tunneled pleural drainage catheter placement Procedural Personnel Attending physician(s): Dr Kait Quiñonez Pre-procedure diagnosis: Malignant pleural effusion Post-procedure diagnosis: Same Indication: Patient with history of metastatic breast cancer and recurrent malignant right pleural effusion presents for tunneled pleural drainage catheter placement  Complications: No immediate complications  Impression: 1  Right tunneled pleural drainage catheter placement  2   1400 mL of joanne pleural fluid was removed through the catheter at the end of case  Plan: Resume care by clinical team  _______________________________________________________________ PROCEDURE SUMMARY: - Limited thoracic ultrasound - Right tunneled pleural drainage catheter placement under ultrasound guidance PROCEDURE DETAILS: Pre-procedure Consent: Informed consent for the procedure including risks, benefits and alternatives was obtained and time-out was performed prior to the procedure  Preparation: The site was prepared and draped using maximal sterile barrier technique including cutaneous antisepsis  Anesthesia/sedation Level of anesthesia/sedation: Moderate sedation (conscious sedation) Anesthesia/sedation administered by: IR nurse under attending supervision with continuous monitoring of the patient's level of consciousness and physiologic status Total intra-service sedation time (minutes): 15 Limited thoracic ultrasound Limited thoracic ultrasound was performed using a curved transducer  A safe window for tunneled pleural drainage catheter was identified  Right hemithorax findings: Moderate pleural effusion Right tunneled pleural drainage catheter placement Local anesthesia was administered  The right pleural space was accessed using 19-gauge needle  A 0 035 inch wire was advanced through the needle to maintain access  Pleural drainage catheter was tunneled in the subcutaneous tissues and brought out at the access site  The access was dilated using serial dilators and a peel-away sheath placed  Pleural drainage catheter was advanced through the peel-away sheath    Proper positioning of the catheter was verified using ultrasound  The access site was closed using 3-0 Vicryl suture and skin adhesive  Catheter was secured to skin using 2-0 Prolene suture  1400 mL joanne pleural fluid was removed through the catheter at the end of case  Additional Details Specimens removed: 1400 mL of joanne pleural fluid Estimated blood loss (mL): 1 Standardized report: [email or web address] Attestation Signer name: [facility] I attest that I was present for the entire procedure  I reviewed the stored images and agree with the report as written  Workstation performed: [id number]       40 minutes was spent face to face with Jaylin Hernandez and her spouse with greater than 50% of the time spent in counseling or coordination of care including discussions of chart review, symptoms assessment, emotional support  Opioid rotation  Nausea regimen adjustment  PDMP Reviewed  All of the patient's or agent's questions were answered during this discussion

## 2021-09-16 NOTE — TELEPHONE ENCOUNTER
Spoke to the patient, she states she has does not have any shortness of breath currently, she is being drained 300-500 ml each time , 3 times a week, mild pink fluid yesterday drained 450, will get CXR and follow up

## 2021-09-17 DIAGNOSIS — Z17.0 MALIGNANT NEOPLASM OF BREAST IN FEMALE, ESTROGEN RECEPTOR POSITIVE, UNSPECIFIED LATERALITY, UNSPECIFIED SITE OF BREAST (HCC): ICD-10-CM

## 2021-09-17 DIAGNOSIS — C50.919 MALIGNANT NEOPLASM OF BREAST IN FEMALE, ESTROGEN RECEPTOR POSITIVE, UNSPECIFIED LATERALITY, UNSPECIFIED SITE OF BREAST (HCC): ICD-10-CM

## 2021-09-17 NOTE — TELEPHONE ENCOUNTER
List of mental health provider's created and provided to [facility] Provider to give during visit

## 2021-09-20 ENCOUNTER — TELEPHONE (OUTPATIENT)
Dept: PALLIATIVE MEDICINE | Facility: CLINIC | Age: 56
End: 2021-09-20

## 2021-09-20 NOTE — TELEPHONE ENCOUNTER
Pt's spouse, Mica Claros, called office concerned about pt's wellbeing  He states she is experiencing cold and flu like symptoms and believes she should be evaluated by a Medical Provider  I advised spouse to inform pt that such symptoms should be evaluated by her PCP or pt may choose to present to Urgent Care or Emergency Department, if symptoms become unbearable  Spouse states he did try to advise pt to do so, but she declined  He says he "can't make her do anything" and he 'loves his wife so much and is very concerned for her'  He also shared some concerns about difficulty within the family  Spouse states family is "arguing, fighting, and not doing well with her illness" and would like if a Palliative Care  can call back and assist    Mica Claros says he is calling independent of Pt  Pt isn't aware he is calling, asking for help

## 2021-09-27 ENCOUNTER — OFFICE VISIT (OUTPATIENT)
Dept: HEMATOLOGY ONCOLOGY | Facility: CLINIC | Age: 56
End: 2021-09-27
Payer: COMMERCIAL

## 2021-09-27 VITALS
HEIGHT: 64 IN | SYSTOLIC BLOOD PRESSURE: 110 MMHG | WEIGHT: 148.5 LBS | RESPIRATION RATE: 16 BRPM | DIASTOLIC BLOOD PRESSURE: 82 MMHG | HEART RATE: 87 BPM | BODY MASS INDEX: 25.35 KG/M2 | OXYGEN SATURATION: 97 %

## 2021-09-27 DIAGNOSIS — C50.919 MALIGNANT NEOPLASM OF BREAST IN FEMALE, ESTROGEN RECEPTOR POSITIVE, UNSPECIFIED LATERALITY, UNSPECIFIED SITE OF BREAST (HCC): ICD-10-CM

## 2021-09-27 DIAGNOSIS — Z17.0 MALIGNANT NEOPLASM OF BREAST IN FEMALE, ESTROGEN RECEPTOR POSITIVE, UNSPECIFIED LATERALITY, UNSPECIFIED SITE OF BREAST (HCC): ICD-10-CM

## 2021-09-27 DIAGNOSIS — C79.51 BONE METASTASES (HCC): Primary | ICD-10-CM

## 2021-09-27 PROCEDURE — 99214 OFFICE O/P EST MOD 30 MIN: CPT | Performed by: INTERNAL MEDICINE

## 2021-09-27 NOTE — PROGRESS NOTES
Hematology/Oncology Progress Note    Date of Service: 9/27/2021    [address] [facility]   [address] [address]    Hem/Onc Problem List:   1  Metastatic right-sided breast cancer, ER and [place] positive, HER2 negative  2  Bone metastatic disease  3  History of right-sided pleural effusion  Chief Complaint:    Routine follow-up for management of stage IV breast cancer    Assessment/Plan:     I personally reviewed the lab results, image studies results and other specialties/physicians consult notes and recommendations  I shared the findings with patient, discussed the diagnosis and management plan as below  1   Metastatic breast cancer, with liver, bone, lymph node and pleura involvement  ER and [place] positive, HER2 negative  Bone scan showed bony metastatic disease  Patient started monthly Darzalex, daily letrozole and CDK4/6 inhibitor Abemeciclib  Unfortunately, patient developed significant watery diarrhea from Abemaciclib  Abemaciclib was changed to Ibrance 125 mg daily for 3 weeks on,  followed by 1 week off until disease progression on August 30, 2021  Patient did very well  Clinically, patient feels great  Patient will continue current management plan  Repeat CT scan chest abdomen pelvis and bone scan for restaging in 2 months  Labs prior next visit  2  Bony metastatic disease  Bone scan showed bony metastatic disease  Patient will continue Zometa every 3 months  3  Watery diarrhea due to Abemaciclib  Resolved after Abemaciclib stopped  4   History of right breast cancer, status post lumpectomy and axillary lymph node dissection, status post adjuvant radiation therapy  5  Right-sided pleural effusion, status post thoracentesis, cytology positive for adenocarcinoma, ER/NJ positive, HER2 negative  Status post PleurX catheter placement  Patient drained 50 cc fluid today    Will continue to treat the underlying disease  6  Follow-up: Return to clinic in 2 months  Disclaimer: This document was prepared using [employer] technology  If a word or phrase is confusing, or does not make sense, this is likely due to recognition error which was not discovered during the providers review  If you believe an error has occurred, please Contact me through [employer] service for roula? cation  AJCC 8th Edition Cancer Stage :      Cancer Staging  No matching staging information was found for the patient  Hematology/Oncology History:   · History of right-sided breast cancer, initially diagnosed in 2012, status post lumpectomy and axillary lymph node dissection, status post adjuvant radiation therapy  Patient did not receive any adjuvant endocrine therapy or chemotherapy  · July 24, 2021, patient was admitted to hospital because of shortness of breath and cough  · July 24, 2021 CT chest PE protocol showed septal thickening throughout the right lung and bronchial wall thickening due to lymphangitic spread of tumor  Indeterminate lung nodules measuring 5 millimeter in the right upper lobe, right middle lobe and 7 millimeter in the left upper lobe and left lower lobe  Large right pleural effusion  Multiple liver metastatic disease measuring up to 4 centimeter  Lytic metastatic to sternal manubrium  · July 26, 2021, ultrasound abdomen shows multiple hepatic metastatic disease  Status post thoracentesis with 1200 mL of joanne fluid removed  Cytology showed adenoma carcinoma, ER and [place] positive  40-50%, HER2 negative  · July 26, 2021 biopsy of the lung liver showed metastatic breast cancer, ER and % positive, HER2 negative  CT abdomen pelvis with contrast showed extensive hepatic metastatic disease  · July 27, 2021 MRI brain showed no evidence of intracranial metastatic disease  · August 6, 2021, mammogram showed no mammographic evidence of malignancy    · August 11, 2021 bone scan showed bony metastatic disease involving left iliac crest medially and adjacent sacrum  · August 12, 2021, Zoladex was given  Letrozole started  · August 16, 2022, patient started Abemaciclib  · August 30, 2021, DC abemaciclib because of diarrhea  Start Ibrance 125 mg daily 3 weeks on 1 week off  History of Present Illiness:   Sarah Calderon is a 50 y o  female with the above-noted HemOnc history who is here for routine follow-up  Patient with metastatic breast cancer with bony metastatic disease  Zoladex and letrozole started on August 11-12, 2021  Abmaciclib was changed to [employer] because of significant watery diarrhea  Patient feels fine  No fever or chills  No chest pain or shortness breast   Patient had the pleural catheter placed for malignant pleural effusion  About 300 cc fluid was removed yesterday  Otherwise patient feels extremely tired and fatigued  She feels dehydrated  No bleeding anywhere  ROS: A 12-point of review of systems is obtained and other than the above is noncontributory  Objective:   VITALS:   /82 (BP Location: Left arm, Cuff Size: Standard)   Pulse 87   Resp 16   Ht 5' 4 17" (1 63 m)   Wt 67 4 kg (148 lb 8 oz)   SpO2 97%   BMI 25 36 kg/m²     Physical EXAM:  General:  Alert, cooperative, no distress, appears stated age  Head:  Normocephalic, without obvious abnormality  Eyes:  Conjunctivae/corneas clear  PERRL, EOMs intact  No evidence of conjunctivitis     Throat: Lips, mucosa, and tongue normal  No lesions in the oropharynx   Neck: Supple, symmetrical, trachea midline, no adenopathy    Lungs:   Clear to auscultation bilaterally  Respiratory effort easy, nonlabored    Heart:  Regular rate and rhythm, S1, S2 normal, no murmur, click, rub or the gallop  Abdomen:   Soft, non-tender,nondistended  Bowel sounds normal  No masses,  No organomegaly  Extremities: Extremities normal, atraumatic, no cyanosis or edema   No axillary or inguinal adenopathy   Skin: Skin color, texture, turgor normal  No rashes or lesions    Neurologic: A&Ox4  No focal neuro deficits       Allergies   Allergen Reactions    Codeine Anaphylaxis    Morphine GI Intolerance, Itching and Vomiting    Sulfa Antibiotics Hives and Itching       Past Medical History:   Diagnosis Date    History of radiation exposure     Malignant neoplasm of right female breast ([address] ) 2012    right       Past Surgical History:   Procedure Laterality Date    BREAST CYST EXCISION      BREAST LUMPECTOMY Right 2012    HIP SURGERY      IR BIOPSY LIVER MASS  7/26/2021    IR PLEURAL EFFUSION LONG-TERM CATHETER PLACEMENT  8/17/2021    IR THORACENTESIS  7/26/2021       Family History   Problem Relation Age of Onset    Breast cancer Mother         in her 63's    Breast cancer Sister         inher 45s and 48    Colon cancer Maternal Grandmother     No Known Problems Paternal Grandmother     Breast cancer Other     No Known Problems Paternal Aunt        Social History     Socioeconomic History    Marital status: /Civil Union     Spouse name: Not on file    Number of children: Not on file    Years of education: Not on file    Highest education level: Not on file   Occupational History    Not on file   Tobacco Use    Smoking status: Former Smoker     Packs/day: 0 25     Types: Cigarettes    Smokeless tobacco: Never Used   Vaping Use    Vaping Use: Never used   Substance and Sexual Activity    Alcohol use: Not Currently    Drug use: Not Currently    Sexual activity: Not Currently   Other Topics Concern    Not on file   Social History Narrative    Not on file     Social Determinants of Health     Financial Resource Strain:     Difficulty of Paying Living Expenses:    Food Insecurity:     Worried About Running Out of Food in the Last Year:     [address] in the Last Year:    Transportation Needs: No Transportation Needs    Lack of Transportation (Medical):  No    Lack of Transportation (Non-Medical): No   Physical Activity:     Days of Exercise per Week:     Minutes of Exercise per Session:    Stress:     Feeling of Stress :    Social Connections:     Frequency of Communication with Friends and Family:     Frequency of Social Gatherings with Friends and Family:     Attends Hinduism Services:     Active Member of Clubs or Organizations:     Attends Club or Organization Meetings:     Marital Status:    Intimate Partner Violence:     Fear of Current or Ex-Partner:     Emotionally Abused:     Physically Abused:     Sexually Abused:        Current Outpatient Medications   Medication Sig Dispense Refill    acetaminophen (TYLENOL) 325 mg tablet Take 2 tablets (650 mg total) by mouth every 6 (six) hours as needed for mild pain 30 tablet 0    albuterol (PROVENTIL HFA,VENTOLIN HFA) 90 mcg/act inhaler Inhale 2 puffs every 4 (four) hours as needed for wheezing 8 g 0    calcium carbonate (TUMS) 500 mg chewable tablet Chew 1 tablet (500 mg total) 3 (three) times a day as needed for indigestion or heartburn 30 tablet 0    Cholecalciferol (Vitamin D3) 1 25 MG (05540 UT) CAPS TAKE 1 CAPSULE BY MOUTH WEEKLY FOR 12 WEEKS FOR VITAMIN D DEFICIENCY      dextromethorphan-guaiFENesin (ROBITUSSIN DM)  mg/5 mL syrup Take 10 mL by mouth every 4 (four) hours as needed for cough 236 mL 0    diphenhydrAMINE (BENADRYL) 50 MG tablet Take 1 tablet (50 mg total) by mouth daily at bedtime as needed for itching or sleep 30 tablet 0    diphenoxylate-atropine (LOMOTIL) 2 5-0 025 mg per tablet Take 1 tablet by mouth 4 (four) times a day as needed for diarrhea 30 tablet 0    DULoxetine (CYMBALTA) 30 mg delayed release capsule Take 1 capsule (30 mg total) by mouth daily 90 capsule 3    fluticasone (FLONASE) 50 mcg/act nasal spray 2 sprays into each nostril daily      HYDROmorphone (DILAUDID) 2 mg tablet Take 2-4 mg [1-2 tabs] PO Q4H PRN   Max Daily 24 mg  42 tablet 0    Incontinence Supply Disposable ([facility] Incontinence Pads) MISC Use 150 pad 3 (three) times a day 150 each 3    letrozole (FEMARA) 2 5 mg tablet Take 1 tablet (2 5 mg total) by mouth daily 90 tablet 1    melatonin 3 mg Take 2 tablets (6 mg total) by mouth daily at bedtime 15 tablet 0    multivitamin (THERAGRAN) TABS Take 1 tablet by mouth      ondansetron (ZOFRAN) 4 mg tablet Take 1 tablet (4 mg total) by mouth every 8 (eight) hours as needed for nausea or vomiting 20 tablet 0    oxybutynin (DITROPAN) 5 mg tablet Take by mouth       oxybutynin (DITROPAN-XL) 5 mg 24 hr tablet Take 1 tablet (5 mg total) by mouth daily Take 1 tablet daily 90 tablet 3    palbociclib (Ibrance) 125 MG tablet TAKE 1 TABLET BY MOUTH 1 TIME A DAY ON DAYS 1 TO 21 OF A 28 DAY CYCLE 21 tablet 2    albuterol (PROVENTIL HFA,VENTOLIN HFA) 90 mcg/act inhaler Inhale 2 puffs every 6 (six) hours as needed (Patient not taking: Reported on 9/14/2021)      benzonatate (TESSALON) 200 MG capsule Take 1 capsule (200 mg total) by mouth 3 (three) times a day as needed for cough (Patient not taking: Reported on 9/14/2021) 20 capsule 0    BLACK COHOSH PO Take by mouth (Patient not taking: Reported on 9/14/2021)      Calcium 600 MG tablet Take 1,200 mg by mouth (Patient not taking: Reported on 9/14/2021)      ergocalciferol (VITAMIN D2) 50,000 units Take 1 capsule (50,000 Units total) by mouth once a week for 12 doses (Patient not taking: Reported on 9/14/2021) 4 capsule 2    meloxicam (MOBIC) 7 5 mg tablet Take by mouth (Patient not taking: Reported on 9/14/2021)      metoclopramide (REGLAN) 10 mg tablet Take 1 tablet (10 mg total) by mouth 4 (four) times a day (Patient not taking: Reported on 9/27/2021) 28 tablet 0    predniSONE 20 mg tablet TAKE 1 TABLET BY MOUTH TWICE A DAY FOR 5 DAYS (Patient not taking: Reported on 9/14/2021)      tamoxifen (NOLVADEX) 20 mg tablet Take 1 tablet by mouth daily (Patient not taking: Reported on 9/14/2021)       No current facility-administered medications for this visit  (Not in a hospital admission)      DATA REVIEW:    Pathology Result:    Final Diagnosis   Date Value Ref Range Status   07/26/2021   Final    A  B  Thoracentesis, Right (ThinPrep and cell block preparations):  Positive for malignancy  Metastatic carcinoma, compatible with breast primary  -  Immunohistochemical stains performed with appropriate controls show the tumor cells to be positive for Matthew-EP4, MOC31, BARRY-3 and ER with scattered background mesothelial cells staining for WT1 and calretinin, supporting the diagnosis  Satisfactory for evaluation  07/26/2021   Final    A  Liver (core biopsy):     - Poorly-differentiated carcinoma, most compatible with metastasis from the patient's reported breast primary  Comment:  ER / [place] / Her-2 pending  Comment: This is an appended report  These results have been appended to a previously preliminary verified report  Image Results: They are reviewed and documented in Hematology/Oncology history    MRI lumbar spine wo contrast  1 2 [id number] 2        LABS:  Lab data are reviewed and documented in HemOnc history  No results found for this or any previous visit (from the past 48 hour(s))            Shanon Núñez MD  9/27/2021, 2:27 PM

## 2021-10-07 ENCOUNTER — HOSPITAL ENCOUNTER (OUTPATIENT)
Dept: INFUSION CENTER | Facility: CLINIC | Age: 56
Discharge: HOME | End: 2021-10-07
Payer: COMMERCIAL

## 2021-10-07 VITALS — TEMPERATURE: 96.2 F

## 2021-10-07 DIAGNOSIS — C78.7 MALIGNANT NEOPLASM METASTATIC TO LIVER (HCC): Primary | ICD-10-CM

## 2021-10-07 DIAGNOSIS — C50.919 MALIGNANT NEOPLASM OF BREAST IN FEMALE, ESTROGEN RECEPTOR POSITIVE, UNSPECIFIED LATERALITY, UNSPECIFIED SITE OF BREAST (HCC): ICD-10-CM

## 2021-10-07 DIAGNOSIS — Z17.0 MALIGNANT NEOPLASM OF BREAST IN FEMALE, ESTROGEN RECEPTOR POSITIVE, UNSPECIFIED LATERALITY, UNSPECIFIED SITE OF BREAST (HCC): ICD-10-CM

## 2021-10-07 PROCEDURE — 96402 CHEMO HORMON ANTINEOPL SQ/IM: CPT

## 2021-10-07 RX ADMIN — GOSERELIN ACETATE 3.6 MG: 3.6 IMPLANT SUBCUTANEOUS at 10:21

## 2021-10-11 ENCOUNTER — TELEPHONE (OUTPATIENT)
Dept: HEMATOLOGY ONCOLOGY | Facility: CLINIC | Age: 56
End: 2021-10-11

## 2021-10-11 DIAGNOSIS — Z17.0 MALIGNANT NEOPLASM OF BREAST IN FEMALE, ESTROGEN RECEPTOR POSITIVE, UNSPECIFIED LATERALITY, UNSPECIFIED SITE OF BREAST (HCC): ICD-10-CM

## 2021-10-11 DIAGNOSIS — C50.919 MALIGNANT NEOPLASM OF BREAST IN FEMALE, ESTROGEN RECEPTOR POSITIVE, UNSPECIFIED LATERALITY, UNSPECIFIED SITE OF BREAST (HCC): ICD-10-CM

## 2021-10-12 ENCOUNTER — SOCIAL WORK (OUTPATIENT)
Dept: PALLIATIVE MEDICINE | Facility: CLINIC | Age: 56
End: 2021-10-12
Payer: COMMERCIAL

## 2021-10-12 ENCOUNTER — OFFICE VISIT (OUTPATIENT)
Dept: PALLIATIVE MEDICINE | Facility: CLINIC | Age: 56
End: 2021-10-12
Payer: COMMERCIAL

## 2021-10-12 VITALS
BODY MASS INDEX: 25.27 KG/M2 | TEMPERATURE: 98 F | SYSTOLIC BLOOD PRESSURE: 120 MMHG | DIASTOLIC BLOOD PRESSURE: 60 MMHG | RESPIRATION RATE: 20 BRPM | HEART RATE: 77 BPM | OXYGEN SATURATION: 98 % | WEIGHT: 148 LBS

## 2021-10-12 DIAGNOSIS — Z71.89 COUNSELING AND COORDINATION OF CARE: Primary | ICD-10-CM

## 2021-10-12 DIAGNOSIS — G89.3 CANCER ASSOCIATED PAIN: ICD-10-CM

## 2021-10-12 DIAGNOSIS — C79.9 METASTATIC MALIGNANT NEOPLASM, UNSPECIFIED SITE (HCC): ICD-10-CM

## 2021-10-12 DIAGNOSIS — C50.911 PRIMARY MALIGNANT NEOPLASM OF RIGHT BREAST WITH METASTASIS TO OTHER SITE (HCC): ICD-10-CM

## 2021-10-12 DIAGNOSIS — J91.0 MALIGNANT PLEURAL EFFUSION: ICD-10-CM

## 2021-10-12 DIAGNOSIS — C79.51 BONE METASTASES (HCC): Primary | ICD-10-CM

## 2021-10-12 DIAGNOSIS — G89.3 CANCER RELATED PAIN: ICD-10-CM

## 2021-10-12 DIAGNOSIS — Z51.5 PALLIATIVE CARE PATIENT: ICD-10-CM

## 2021-10-12 PROCEDURE — 99214 OFFICE O/P EST MOD 30 MIN: CPT | Performed by: NURSE PRACTITIONER

## 2021-10-12 RX ORDER — HYDROMORPHONE HYDROCHLORIDE 2 MG/1
TABLET ORAL
Qty: 45 TABLET | Refills: 0 | Status: SHIPPED | OUTPATIENT
Start: 2021-10-12 | End: 2021-10-28 | Stop reason: SDUPTHER

## 2021-10-12 RX ORDER — LANOLIN ALCOHOL/MO/W.PET/CERES
6 CREAM (GRAM) TOPICAL
Qty: 15 TABLET | Refills: 0 | Status: SHIPPED | OUTPATIENT
Start: 2021-10-12 | End: 2022-07-12 | Stop reason: SDUPTHER

## 2021-10-15 ENCOUNTER — OFFICE VISIT (OUTPATIENT)
Dept: INTERNAL MEDICINE CLINIC | Facility: CLINIC | Age: 56
End: 2021-10-15
Payer: COMMERCIAL

## 2021-10-15 ENCOUNTER — TELEPHONE (OUTPATIENT)
Dept: HEMATOLOGY ONCOLOGY | Facility: CLINIC | Age: 56
End: 2021-10-15

## 2021-10-15 VITALS
BODY MASS INDEX: 25.1 KG/M2 | WEIGHT: 147 LBS | SYSTOLIC BLOOD PRESSURE: 120 MMHG | HEART RATE: 84 BPM | TEMPERATURE: 97.2 F | HEIGHT: 64 IN | OXYGEN SATURATION: 96 % | DIASTOLIC BLOOD PRESSURE: 72 MMHG

## 2021-10-15 DIAGNOSIS — Z51.5 PALLIATIVE CARE PATIENT: ICD-10-CM

## 2021-10-15 DIAGNOSIS — Z23 NEED FOR INFLUENZA VACCINATION: ICD-10-CM

## 2021-10-15 DIAGNOSIS — C79.9 METASTATIC MALIGNANT NEOPLASM, UNSPECIFIED SITE (HCC): ICD-10-CM

## 2021-10-15 DIAGNOSIS — Z23 NEED FOR PNEUMOCOCCAL VACCINATION: ICD-10-CM

## 2021-10-15 DIAGNOSIS — C79.51 BONE METASTASES (HCC): ICD-10-CM

## 2021-10-15 DIAGNOSIS — R07.81 RIB PAIN ON RIGHT SIDE: Primary | ICD-10-CM

## 2021-10-15 PROCEDURE — 90472 IMMUNIZATION ADMIN EACH ADD: CPT | Performed by: INTERNAL MEDICINE

## 2021-10-15 PROCEDURE — 99214 OFFICE O/P EST MOD 30 MIN: CPT | Performed by: INTERNAL MEDICINE

## 2021-10-15 PROCEDURE — 90682 RIV4 VACC RECOMBINANT DNA IM: CPT | Performed by: INTERNAL MEDICINE

## 2021-10-15 PROCEDURE — 90471 IMMUNIZATION ADMIN: CPT | Performed by: INTERNAL MEDICINE

## 2021-10-15 PROCEDURE — 90670 PCV13 VACCINE IM: CPT | Performed by: INTERNAL MEDICINE

## 2021-10-19 ENCOUNTER — HOSPITAL ENCOUNTER (OUTPATIENT)
Dept: RADIOLOGY | Facility: HOSPITAL | Age: 56
Discharge: HOME | End: 2021-10-19
Attending: INTERNAL MEDICINE
Payer: COMMERCIAL

## 2021-10-19 DIAGNOSIS — R07.81 RIB PAIN ON RIGHT SIDE: ICD-10-CM

## 2021-10-19 DIAGNOSIS — C79.51 BONE METASTASES (HCC): ICD-10-CM

## 2021-10-19 PROCEDURE — 71110 X-RAY EXAM RIBS BIL 3 VIEWS: CPT

## 2021-10-20 ENCOUNTER — TELEPHONE (OUTPATIENT)
Dept: PALLIATIVE MEDICINE | Facility: CLINIC | Age: 56
End: 2021-10-20

## 2021-10-28 DIAGNOSIS — C79.51 BONE METASTASES (HCC): ICD-10-CM

## 2021-10-28 DIAGNOSIS — C50.911 PRIMARY MALIGNANT NEOPLASM OF RIGHT BREAST WITH METASTASIS TO OTHER SITE (HCC): ICD-10-CM

## 2021-10-28 DIAGNOSIS — G89.3 CANCER RELATED PAIN: ICD-10-CM

## 2021-10-28 DIAGNOSIS — Z51.5 PALLIATIVE CARE PATIENT: ICD-10-CM

## 2021-10-28 RX ORDER — HYDROMORPHONE HYDROCHLORIDE 2 MG/1
TABLET ORAL
Qty: 120 TABLET | Refills: 0 | Status: SHIPPED | OUTPATIENT
Start: 2021-10-28 | End: 2021-11-23 | Stop reason: SDUPTHER

## 2021-11-04 ENCOUNTER — HOSPITAL ENCOUNTER (OUTPATIENT)
Dept: INFUSION CENTER | Facility: CLINIC | Age: 56
Discharge: HOME | End: 2021-11-04
Payer: COMMERCIAL

## 2021-11-04 DIAGNOSIS — Z17.0 MALIGNANT NEOPLASM OF BREAST IN FEMALE, ESTROGEN RECEPTOR POSITIVE, UNSPECIFIED LATERALITY, UNSPECIFIED SITE OF BREAST (HCC): ICD-10-CM

## 2021-11-04 DIAGNOSIS — C78.7 MALIGNANT NEOPLASM METASTATIC TO LIVER (HCC): Primary | ICD-10-CM

## 2021-11-04 DIAGNOSIS — C50.919 MALIGNANT NEOPLASM OF BREAST IN FEMALE, ESTROGEN RECEPTOR POSITIVE, UNSPECIFIED LATERALITY, UNSPECIFIED SITE OF BREAST (HCC): ICD-10-CM

## 2021-11-04 PROCEDURE — 96402 CHEMO HORMON ANTINEOPL SQ/IM: CPT

## 2021-11-04 RX ADMIN — GOSERELIN ACETATE 3.6 MG: 3.6 IMPLANT SUBCUTANEOUS at 10:22

## 2021-11-08 ENCOUNTER — TELEPHONE (OUTPATIENT)
Dept: HEMATOLOGY ONCOLOGY | Facility: CLINIC | Age: 56
End: 2021-11-08

## 2021-11-08 DIAGNOSIS — Z17.0 MALIGNANT NEOPLASM OF BREAST IN FEMALE, ESTROGEN RECEPTOR POSITIVE, UNSPECIFIED LATERALITY, UNSPECIFIED SITE OF BREAST (HCC): ICD-10-CM

## 2021-11-08 DIAGNOSIS — C50.919 MALIGNANT NEOPLASM OF BREAST IN FEMALE, ESTROGEN RECEPTOR POSITIVE, UNSPECIFIED LATERALITY, UNSPECIFIED SITE OF BREAST (HCC): ICD-10-CM

## 2021-11-10 ENCOUNTER — TELEMEDICINE (OUTPATIENT)
Dept: INTERNAL MEDICINE CLINIC | Facility: CLINIC | Age: 56
End: 2021-11-10
Payer: COMMERCIAL

## 2021-11-10 DIAGNOSIS — J06.9 UPPER RESPIRATORY TRACT INFECTION, UNSPECIFIED TYPE: Primary | ICD-10-CM

## 2021-11-10 PROCEDURE — 99214 OFFICE O/P EST MOD 30 MIN: CPT | Performed by: INTERNAL MEDICINE

## 2021-11-11 ENCOUNTER — OFFICE VISIT (OUTPATIENT)
Dept: URGENT CARE | Facility: CLINIC | Age: 56
End: 2021-11-11
Payer: COMMERCIAL

## 2021-11-11 VITALS — HEART RATE: 85 BPM | OXYGEN SATURATION: 98 % | TEMPERATURE: 98.5 F

## 2021-11-11 DIAGNOSIS — H10.32 ACUTE CONJUNCTIVITIS OF LEFT EYE, UNSPECIFIED ACUTE CONJUNCTIVITIS TYPE: Primary | ICD-10-CM

## 2021-11-11 DIAGNOSIS — R09.81 NASAL CONGESTION: ICD-10-CM

## 2021-11-11 DIAGNOSIS — B34.9 VIRAL ILLNESS: ICD-10-CM

## 2021-11-11 PROCEDURE — U0005 INFEC AGEN DETEC AMPLI PROBE: HCPCS | Performed by: INTERNAL MEDICINE

## 2021-11-11 PROCEDURE — 99213 OFFICE O/P EST LOW 20 MIN: CPT | Performed by: PHYSICIAN ASSISTANT

## 2021-11-11 PROCEDURE — U0003 INFECTIOUS AGENT DETECTION BY NUCLEIC ACID (DNA OR RNA); SEVERE ACUTE RESPIRATORY SYNDROME CORONAVIRUS 2 (SARS-COV-2) (CORONAVIRUS DISEASE [COVID-19]), AMPLIFIED PROBE TECHNIQUE, MAKING USE OF HIGH THROUGHPUT TECHNOLOGIES AS DESCRIBED BY CMS-2020-01-R: HCPCS | Performed by: INTERNAL MEDICINE

## 2021-11-11 RX ORDER — TOBRAMYCIN 3 MG/ML
1 SOLUTION/ DROPS OPHTHALMIC
Qty: 5 ML | Refills: 0 | Status: SHIPPED | OUTPATIENT
Start: 2021-11-11 | End: 2022-02-10

## 2021-11-12 LAB — SARS-COV-2 RNA RESP QL NAA+PROBE: NEGATIVE

## 2021-11-19 ENCOUNTER — HOSPITAL ENCOUNTER (OUTPATIENT)
Dept: CT IMAGING | Facility: HOSPITAL | Age: 56
Discharge: HOME | End: 2021-11-19
Attending: INTERNAL MEDICINE
Payer: COMMERCIAL

## 2021-11-19 ENCOUNTER — HOSPITAL ENCOUNTER (OUTPATIENT)
Dept: NUCLEAR MEDICINE | Facility: HOSPITAL | Age: 56
Discharge: HOME | End: 2021-11-19
Attending: INTERNAL MEDICINE
Payer: COMMERCIAL

## 2021-11-19 DIAGNOSIS — C79.51 BONE METASTASES (HCC): ICD-10-CM

## 2021-11-19 DIAGNOSIS — Z17.0 MALIGNANT NEOPLASM OF BREAST IN FEMALE, ESTROGEN RECEPTOR POSITIVE, UNSPECIFIED LATERALITY, UNSPECIFIED SITE OF BREAST (HCC): ICD-10-CM

## 2021-11-19 DIAGNOSIS — C50.919 MALIGNANT NEOPLASM OF BREAST IN FEMALE, ESTROGEN RECEPTOR POSITIVE, UNSPECIFIED LATERALITY, UNSPECIFIED SITE OF BREAST (HCC): ICD-10-CM

## 2021-11-19 PROCEDURE — 78306 BONE IMAGING WHOLE BODY: CPT

## 2021-11-19 PROCEDURE — G1004 CDSM NDSC: HCPCS

## 2021-11-19 PROCEDURE — 74177 CT ABD & PELVIS W/CONTRAST: CPT

## 2021-11-19 PROCEDURE — A9503 TC99M MEDRONATE: HCPCS

## 2021-11-19 PROCEDURE — 71260 CT THORAX DX C+: CPT

## 2021-11-19 RX ADMIN — IOHEXOL 100 ML: 350 INJECTION, SOLUTION INTRAVENOUS at 12:45

## 2021-11-23 ENCOUNTER — OFFICE VISIT (OUTPATIENT)
Dept: PALLIATIVE MEDICINE | Facility: CLINIC | Age: 56
End: 2021-11-23
Payer: COMMERCIAL

## 2021-11-23 VITALS
DIASTOLIC BLOOD PRESSURE: 70 MMHG | SYSTOLIC BLOOD PRESSURE: 110 MMHG | WEIGHT: 149 LBS | BODY MASS INDEX: 25.58 KG/M2 | HEART RATE: 64 BPM | RESPIRATION RATE: 20 BRPM | TEMPERATURE: 98.2 F | OXYGEN SATURATION: 99 %

## 2021-11-23 DIAGNOSIS — C50.911 PRIMARY MALIGNANT NEOPLASM OF RIGHT BREAST WITH METASTASIS TO OTHER SITE (HCC): Primary | ICD-10-CM

## 2021-11-23 DIAGNOSIS — K59.00 CONSTIPATION, UNSPECIFIED CONSTIPATION TYPE: ICD-10-CM

## 2021-11-23 DIAGNOSIS — Z51.5 PALLIATIVE CARE PATIENT: ICD-10-CM

## 2021-11-23 DIAGNOSIS — C79.9 METASTATIC MALIGNANT NEOPLASM, UNSPECIFIED SITE (HCC): ICD-10-CM

## 2021-11-23 DIAGNOSIS — C79.51 BONE METASTASES (HCC): ICD-10-CM

## 2021-11-23 DIAGNOSIS — G89.3 CANCER RELATED PAIN: ICD-10-CM

## 2021-11-23 DIAGNOSIS — J91.0 MALIGNANT PLEURAL EFFUSION: ICD-10-CM

## 2021-11-23 DIAGNOSIS — R05.9 COUGH: ICD-10-CM

## 2021-11-23 PROCEDURE — 99214 OFFICE O/P EST MOD 30 MIN: CPT | Performed by: STUDENT IN AN ORGANIZED HEALTH CARE EDUCATION/TRAINING PROGRAM

## 2021-11-23 RX ORDER — BENZONATATE 200 MG/1
200 CAPSULE ORAL 3 TIMES DAILY PRN
Qty: 20 CAPSULE | Refills: 0 | Status: SHIPPED | OUTPATIENT
Start: 2021-11-23

## 2021-11-23 RX ORDER — HYDROMORPHONE HYDROCHLORIDE 2 MG/1
TABLET ORAL
Qty: 120 TABLET | Refills: 0 | Status: SHIPPED | OUTPATIENT
Start: 2021-11-23 | End: 2022-01-20 | Stop reason: SDUPTHER

## 2021-11-26 DIAGNOSIS — I82.90 THROMBOSIS: Primary | ICD-10-CM

## 2021-11-26 DIAGNOSIS — J91.0 MALIGNANT PLEURAL EFFUSION: ICD-10-CM

## 2021-11-26 DIAGNOSIS — C50.911 PRIMARY MALIGNANT NEOPLASM OF RIGHT BREAST WITH METASTASIS TO OTHER SITE (HCC): ICD-10-CM

## 2021-11-30 ENCOUNTER — TELEPHONE (OUTPATIENT)
Dept: HEMATOLOGY ONCOLOGY | Facility: CLINIC | Age: 56
End: 2021-11-30

## 2021-12-02 ENCOUNTER — TELEPHONE (OUTPATIENT)
Dept: HEMATOLOGY ONCOLOGY | Facility: CLINIC | Age: 56
End: 2021-12-02

## 2021-12-02 ENCOUNTER — APPOINTMENT (OUTPATIENT)
Dept: LAB | Facility: HOSPITAL | Age: 56
End: 2021-12-02
Payer: COMMERCIAL

## 2021-12-02 ENCOUNTER — HOSPITAL ENCOUNTER (OUTPATIENT)
Dept: INFUSION CENTER | Facility: CLINIC | Age: 56
Discharge: HOME | End: 2021-12-02
Payer: COMMERCIAL

## 2021-12-02 DIAGNOSIS — C50.919 MALIGNANT NEOPLASM OF BREAST IN FEMALE, ESTROGEN RECEPTOR POSITIVE, UNSPECIFIED LATERALITY, UNSPECIFIED SITE OF BREAST (HCC): Primary | ICD-10-CM

## 2021-12-02 DIAGNOSIS — Z17.0 MALIGNANT NEOPLASM OF BREAST IN FEMALE, ESTROGEN RECEPTOR POSITIVE, UNSPECIFIED LATERALITY, UNSPECIFIED SITE OF BREAST (HCC): ICD-10-CM

## 2021-12-02 DIAGNOSIS — C78.7 MALIGNANT NEOPLASM METASTATIC TO LIVER (HCC): Primary | ICD-10-CM

## 2021-12-02 DIAGNOSIS — C50.919 MALIGNANT NEOPLASM OF BREAST IN FEMALE, ESTROGEN RECEPTOR POSITIVE, UNSPECIFIED LATERALITY, UNSPECIFIED SITE OF BREAST (HCC): ICD-10-CM

## 2021-12-02 DIAGNOSIS — Z17.0 MALIGNANT NEOPLASM OF BREAST IN FEMALE, ESTROGEN RECEPTOR POSITIVE, UNSPECIFIED LATERALITY, UNSPECIFIED SITE OF BREAST (HCC): Primary | ICD-10-CM

## 2021-12-02 DIAGNOSIS — C78.7 MALIGNANT NEOPLASM METASTATIC TO LIVER (HCC): ICD-10-CM

## 2021-12-02 DIAGNOSIS — E86.0 DEHYDRATION: ICD-10-CM

## 2021-12-02 DIAGNOSIS — C79.51 BONE METASTASES (HCC): ICD-10-CM

## 2021-12-02 LAB
ALBUMIN SERPL BCP-MCNC: 3.4 G/DL (ref 3.5–5)
ALP SERPL-CCNC: 47 U/L (ref 46–116)
ALT SERPL W P-5'-P-CCNC: 17 U/L (ref 12–78)
ANION GAP SERPL CALCULATED.3IONS-SCNC: 11 MMOL/L (ref 4–13)
AST SERPL W P-5'-P-CCNC: 19 U/L (ref 5–45)
BASOPHILS # BLD AUTO: 0.05 THOUSANDS/ΜL (ref 0–0.1)
BASOPHILS NFR BLD AUTO: 2 % (ref 0–1)
BILIRUB SERPL-MCNC: 0.36 MG/DL (ref 0.2–1)
BUN SERPL-MCNC: 16 MG/DL (ref 5–25)
CALCIUM ALBUM COR SERPL-MCNC: 9.1 MG/DL (ref 8.3–10.1)
CALCIUM SERPL-MCNC: 8.6 MG/DL (ref 8.3–10.1)
CHLORIDE SERPL-SCNC: 107 MMOL/L (ref 100–108)
CO2 SERPL-SCNC: 24 MMOL/L (ref 21–32)
CREAT SERPL-MCNC: 0.96 MG/DL (ref 0.6–1.3)
EOSINOPHIL # BLD AUTO: 0.03 THOUSAND/ΜL (ref 0–0.61)
EOSINOPHIL NFR BLD AUTO: 1 % (ref 0–6)
ERYTHROCYTE [DISTWIDTH] IN BLOOD BY AUTOMATED COUNT: 20.6 % (ref 11.6–15.1)
GFR SERPL CREATININE-BSD FRML MDRD: 67 ML/MIN/1.73SQ M
GLUCOSE SERPL-MCNC: 86 MG/DL (ref 65–140)
HCT VFR BLD AUTO: 37.3 % (ref 34.8–46.1)
HGB BLD-MCNC: 12.6 G/DL (ref 11.5–15.4)
IMM GRANULOCYTES # BLD AUTO: 0.01 THOUSAND/UL (ref 0–0.2)
IMM GRANULOCYTES NFR BLD AUTO: 1 % (ref 0–2)
LYMPHOCYTES # BLD AUTO: 0.46 THOUSANDS/ΜL (ref 0.6–4.47)
LYMPHOCYTES NFR BLD AUTO: 21 % (ref 14–44)
MCH RBC QN AUTO: 34.7 PG (ref 26.8–34.3)
MCHC RBC AUTO-ENTMCNC: 33.8 G/DL (ref 31.4–37.4)
MCV RBC AUTO: 103 FL (ref 82–98)
MONOCYTES # BLD AUTO: 0.12 THOUSAND/ΜL (ref 0.17–1.22)
MONOCYTES NFR BLD AUTO: 5 % (ref 4–12)
NEUTROPHILS # BLD AUTO: 1.55 THOUSANDS/ΜL (ref 1.85–7.62)
NEUTS SEG NFR BLD AUTO: 70 % (ref 43–75)
NRBC BLD AUTO-RTO: 0 /100 WBCS
PLATELET # BLD AUTO: 274 THOUSANDS/UL (ref 149–390)
PMV BLD AUTO: 11.5 FL (ref 8.9–12.7)
POTASSIUM SERPL-SCNC: 4.3 MMOL/L (ref 3.5–5.3)
PROT SERPL-MCNC: 6.6 G/DL (ref 6.4–8.2)
RBC # BLD AUTO: 3.63 MILLION/UL (ref 3.81–5.12)
SODIUM SERPL-SCNC: 142 MMOL/L (ref 136–145)
WBC # BLD AUTO: 2.22 THOUSAND/UL (ref 4.31–10.16)

## 2021-12-02 PROCEDURE — 85025 COMPLETE CBC W/AUTO DIFF WBC: CPT

## 2021-12-02 PROCEDURE — 80053 COMPREHEN METABOLIC PANEL: CPT

## 2021-12-02 PROCEDURE — 36415 COLL VENOUS BLD VENIPUNCTURE: CPT

## 2021-12-03 ENCOUNTER — HOSPITAL ENCOUNTER (OUTPATIENT)
Dept: INFUSION CENTER | Facility: CLINIC | Age: 56
Discharge: HOME | End: 2021-12-03
Payer: COMMERCIAL

## 2021-12-03 VITALS
BODY MASS INDEX: 24.99 KG/M2 | TEMPERATURE: 97.3 F | HEIGHT: 64 IN | RESPIRATION RATE: 18 BRPM | SYSTOLIC BLOOD PRESSURE: 108 MMHG | WEIGHT: 146.39 LBS | HEART RATE: 70 BPM | DIASTOLIC BLOOD PRESSURE: 61 MMHG

## 2021-12-03 DIAGNOSIS — Z17.0 MALIGNANT NEOPLASM OF BREAST IN FEMALE, ESTROGEN RECEPTOR POSITIVE, UNSPECIFIED LATERALITY, UNSPECIFIED SITE OF BREAST (HCC): ICD-10-CM

## 2021-12-03 DIAGNOSIS — C78.7 MALIGNANT NEOPLASM METASTATIC TO LIVER (HCC): ICD-10-CM

## 2021-12-03 DIAGNOSIS — C50.919 MALIGNANT NEOPLASM OF BREAST IN FEMALE, ESTROGEN RECEPTOR POSITIVE, UNSPECIFIED LATERALITY, UNSPECIFIED SITE OF BREAST (HCC): ICD-10-CM

## 2021-12-03 DIAGNOSIS — C79.51 BONE METASTASES (HCC): Primary | ICD-10-CM

## 2021-12-03 PROCEDURE — 96402 CHEMO HORMON ANTINEOPL SQ/IM: CPT

## 2021-12-03 PROCEDURE — 96365 THER/PROPH/DIAG IV INF INIT: CPT

## 2021-12-03 RX ORDER — SODIUM CHLORIDE 9 MG/ML
20 INJECTION, SOLUTION INTRAVENOUS ONCE
Status: COMPLETED | OUTPATIENT
Start: 2021-12-03 | End: 2021-12-03

## 2021-12-03 RX ORDER — SODIUM CHLORIDE 9 MG/ML
20 INJECTION, SOLUTION INTRAVENOUS ONCE
Status: CANCELLED | OUTPATIENT
Start: 2022-02-25

## 2021-12-03 RX ADMIN — SODIUM CHLORIDE 20 ML/HR: 9 INJECTION, SOLUTION INTRAVENOUS at 13:29

## 2021-12-03 RX ADMIN — GOSERELIN ACETATE 3.6 MG: 3.6 IMPLANT SUBCUTANEOUS at 13:42

## 2021-12-03 RX ADMIN — ZOLEDRONIC ACID 3.5 MG: 4 INJECTION, SOLUTION, CONCENTRATE INTRAVENOUS at 13:42

## 2021-12-06 ENCOUNTER — OFFICE VISIT (OUTPATIENT)
Dept: HEMATOLOGY ONCOLOGY | Facility: CLINIC | Age: 56
End: 2021-12-06
Payer: COMMERCIAL

## 2021-12-06 VITALS
RESPIRATION RATE: 16 BRPM | HEIGHT: 64 IN | TEMPERATURE: 98.5 F | SYSTOLIC BLOOD PRESSURE: 98 MMHG | HEART RATE: 84 BPM | OXYGEN SATURATION: 98 % | WEIGHT: 149 LBS | DIASTOLIC BLOOD PRESSURE: 68 MMHG | BODY MASS INDEX: 25.44 KG/M2

## 2021-12-06 DIAGNOSIS — C50.912 MALIGNANT NEOPLASM OF LEFT BREAST IN FEMALE, ESTROGEN RECEPTOR POSITIVE, UNSPECIFIED SITE OF BREAST (HCC): ICD-10-CM

## 2021-12-06 DIAGNOSIS — Z17.0 MALIGNANT NEOPLASM OF LEFT BREAST IN FEMALE, ESTROGEN RECEPTOR POSITIVE, UNSPECIFIED SITE OF BREAST (HCC): ICD-10-CM

## 2021-12-06 DIAGNOSIS — C79.51 BONE METASTASES (HCC): Primary | ICD-10-CM

## 2021-12-06 PROCEDURE — 99215 OFFICE O/P EST HI 40 MIN: CPT | Performed by: INTERNAL MEDICINE

## 2021-12-20 ENCOUNTER — OFFICE VISIT (OUTPATIENT)
Dept: PALLIATIVE MEDICINE | Facility: CLINIC | Age: 56
End: 2021-12-20
Payer: COMMERCIAL

## 2021-12-20 VITALS
WEIGHT: 146.6 LBS | DIASTOLIC BLOOD PRESSURE: 60 MMHG | OXYGEN SATURATION: 99 % | HEART RATE: 68 BPM | RESPIRATION RATE: 20 BRPM | BODY MASS INDEX: 25.16 KG/M2 | TEMPERATURE: 98.3 F | SYSTOLIC BLOOD PRESSURE: 100 MMHG

## 2021-12-20 DIAGNOSIS — G89.3 CANCER RELATED PAIN: ICD-10-CM

## 2021-12-20 DIAGNOSIS — C79.51 BONE METASTASES (HCC): ICD-10-CM

## 2021-12-20 DIAGNOSIS — Z17.0 MALIGNANT NEOPLASM OF LEFT BREAST IN FEMALE, ESTROGEN RECEPTOR POSITIVE, UNSPECIFIED SITE OF BREAST (HCC): Primary | ICD-10-CM

## 2021-12-20 DIAGNOSIS — C79.9 METASTATIC MALIGNANT NEOPLASM, UNSPECIFIED SITE (HCC): ICD-10-CM

## 2021-12-20 DIAGNOSIS — C50.912 MALIGNANT NEOPLASM OF LEFT BREAST IN FEMALE, ESTROGEN RECEPTOR POSITIVE, UNSPECIFIED SITE OF BREAST (HCC): Primary | ICD-10-CM

## 2021-12-20 DIAGNOSIS — Z51.5 PALLIATIVE CARE PATIENT: ICD-10-CM

## 2021-12-20 DIAGNOSIS — J91.0 MALIGNANT PLEURAL EFFUSION: ICD-10-CM

## 2021-12-20 DIAGNOSIS — C50.911 PRIMARY MALIGNANT NEOPLASM OF RIGHT BREAST WITH METASTASIS TO OTHER SITE (HCC): ICD-10-CM

## 2021-12-20 PROCEDURE — 99214 OFFICE O/P EST MOD 30 MIN: CPT | Performed by: STUDENT IN AN ORGANIZED HEALTH CARE EDUCATION/TRAINING PROGRAM

## 2021-12-30 ENCOUNTER — HOSPITAL ENCOUNTER (OUTPATIENT)
Dept: INFUSION CENTER | Facility: CLINIC | Age: 56
Discharge: HOME | End: 2021-12-30
Payer: COMMERCIAL

## 2021-12-30 DIAGNOSIS — Z17.0 MALIGNANT NEOPLASM OF BREAST IN FEMALE, ESTROGEN RECEPTOR POSITIVE, UNSPECIFIED LATERALITY, UNSPECIFIED SITE OF BREAST (HCC): ICD-10-CM

## 2021-12-30 DIAGNOSIS — C50.919 MALIGNANT NEOPLASM OF BREAST IN FEMALE, ESTROGEN RECEPTOR POSITIVE, UNSPECIFIED LATERALITY, UNSPECIFIED SITE OF BREAST (HCC): ICD-10-CM

## 2021-12-30 DIAGNOSIS — C78.7 MALIGNANT NEOPLASM METASTATIC TO LIVER (HCC): Primary | ICD-10-CM

## 2021-12-30 PROCEDURE — 96402 CHEMO HORMON ANTINEOPL SQ/IM: CPT

## 2021-12-30 RX ADMIN — GOSERELIN ACETATE 3.6 MG: 3.6 IMPLANT SUBCUTANEOUS at 10:36

## 2021-12-30 NOTE — PROGRESS NOTES
Pt presents for Zoladex injection  Pt offers no complaints  Injection placed in RLQ of abd, tolerated well  Double checked with Sulma Fitzgerald RN  AVS printed  Next appointment reviewed

## 2022-01-03 ENCOUNTER — TELEPHONE (OUTPATIENT)
Dept: HEMATOLOGY ONCOLOGY | Facility: CLINIC | Age: 57
End: 2022-01-03

## 2022-01-03 DIAGNOSIS — Z17.0 MALIGNANT NEOPLASM OF BREAST IN FEMALE, ESTROGEN RECEPTOR POSITIVE, UNSPECIFIED LATERALITY, UNSPECIFIED SITE OF BREAST (HCC): ICD-10-CM

## 2022-01-03 DIAGNOSIS — I82.90 THROMBOSIS: ICD-10-CM

## 2022-01-03 DIAGNOSIS — C50.919 MALIGNANT NEOPLASM OF BREAST IN FEMALE, ESTROGEN RECEPTOR POSITIVE, UNSPECIFIED LATERALITY, UNSPECIFIED SITE OF BREAST (HCC): ICD-10-CM

## 2022-01-03 NOTE — TELEPHONE ENCOUNTER
Patient would like refills called in for her Ibrance & her Xarelto called into the [facility]  Pharmacy in [place] - only has 2 days left

## 2022-01-03 NOTE — TELEPHONE ENCOUNTER
Spoke with patient, Xarelto 20mg will be [facility] to [facility] [place]  [facility] Ibrance as we gave 2 refills last month  She will call them

## 2022-01-20 DIAGNOSIS — C50.911 PRIMARY MALIGNANT NEOPLASM OF RIGHT BREAST WITH METASTASIS TO OTHER SITE (HCC): ICD-10-CM

## 2022-01-20 DIAGNOSIS — C79.51 BONE METASTASES (HCC): ICD-10-CM

## 2022-01-20 DIAGNOSIS — G89.3 CANCER RELATED PAIN: ICD-10-CM

## 2022-01-20 DIAGNOSIS — Z51.5 PALLIATIVE CARE PATIENT: ICD-10-CM

## 2022-01-20 RX ORDER — HYDROMORPHONE HYDROCHLORIDE 2 MG/1
TABLET ORAL
Qty: 120 TABLET | Refills: 0 | Status: SHIPPED | OUTPATIENT
Start: 2022-01-20 | End: 2022-03-10 | Stop reason: SDUPTHER

## 2022-01-20 NOTE — TELEPHONE ENCOUNTER
Primary palliative medicine provider: Dr Jelly Null    Medication requested:Dilaudid 2mg    If for pain, how has the patient been taking their pain medicine?      Last appointment:12/20/21    Next scheduled appointment:2/8/22    PDMP review:    11/23/2021 1 11/23/2021 HYDROMORPHONE 2 MG TABLET 120 0 10 [facility] [phone] PENNS ([id number]) 0 [phone] 0 MME [employer] Ins [place]

## 2022-01-28 ENCOUNTER — HOSPITAL ENCOUNTER (OUTPATIENT)
Dept: INFUSION CENTER | Facility: CLINIC | Age: 57
Discharge: HOME | End: 2022-01-28
Payer: COMMERCIAL

## 2022-01-28 VITALS — TEMPERATURE: 97.2 F

## 2022-01-28 DIAGNOSIS — C78.7 MALIGNANT NEOPLASM METASTATIC TO LIVER (HCC): Primary | ICD-10-CM

## 2022-01-28 DIAGNOSIS — Z17.0 MALIGNANT NEOPLASM OF BREAST IN FEMALE, ESTROGEN RECEPTOR POSITIVE, UNSPECIFIED LATERALITY, UNSPECIFIED SITE OF BREAST (HCC): ICD-10-CM

## 2022-01-28 DIAGNOSIS — C50.919 MALIGNANT NEOPLASM OF BREAST IN FEMALE, ESTROGEN RECEPTOR POSITIVE, UNSPECIFIED LATERALITY, UNSPECIFIED SITE OF BREAST (HCC): ICD-10-CM

## 2022-01-28 PROCEDURE — 96402 CHEMO HORMON ANTINEOPL SQ/IM: CPT

## 2022-01-28 RX ADMIN — GOSERELIN ACETATE 3.6 MG: 3.6 IMPLANT SUBCUTANEOUS at 10:10

## 2022-01-28 NOTE — PROGRESS NOTES
Pt to clinic for zoladex, offers no complaints today, spoke with Frandy Aguirre RN regarding most recent note by Dr Phillips Heads and confirmed pt is to continue receiving zoladex as ordered and pt is not receiving darzalex, pt tolerated injection in LLQ of abdomen without complications, aware of next appointment, avs declined

## 2022-01-30 ENCOUNTER — TELEPHONE (OUTPATIENT)
Dept: HEMATOLOGY ONCOLOGY | Facility: CLINIC | Age: 57
End: 2022-01-30

## 2022-01-30 NOTE — TELEPHONE ENCOUNTER
Spoke to [place] and informed her that her appt on 2/24 with Dr Mark Shine needs to be R/S due to him being on vacation  I R/S'd her appt to 3/18 at 8am with YESENIA Hassan at the [facility] location  Gabriella stated this appt change was fine

## 2022-02-01 ENCOUNTER — TELEPHONE (OUTPATIENT)
Dept: HEMATOLOGY ONCOLOGY | Facility: CLINIC | Age: 57
End: 2022-02-01

## 2022-02-01 DIAGNOSIS — C50.912 MALIGNANT NEOPLASM OF LEFT BREAST IN FEMALE, ESTROGEN RECEPTOR POSITIVE, UNSPECIFIED SITE OF BREAST (HCC): Primary | ICD-10-CM

## 2022-02-01 DIAGNOSIS — Z17.0 MALIGNANT NEOPLASM OF LEFT BREAST IN FEMALE, ESTROGEN RECEPTOR POSITIVE, UNSPECIFIED SITE OF BREAST (HCC): Primary | ICD-10-CM

## 2022-02-01 NOTE — TELEPHONE ENCOUNTER
Phoned pt to let her know that I routed a refill for the Ibrance to Dr Zain Rao, pt states she said she was a pt of Dr Angela Chaparro

## 2022-02-01 NOTE — TELEPHONE ENCOUNTER
Medication Refill     Who is Calling  Patient   Medication palbociclib (IBRANCE) 125 MG tablet [[id number]]   How many pills left 5   Preferred Pharmacy / Address [place] [address], [address] [facility]    Who is your Physician?  Dr  Finas Hudson   Call back number [phone]   Relevant Information

## 2022-02-08 ENCOUNTER — OFFICE VISIT (OUTPATIENT)
Dept: PALLIATIVE MEDICINE | Facility: CLINIC | Age: 57
End: 2022-02-08
Payer: COMMERCIAL

## 2022-02-08 VITALS
OXYGEN SATURATION: 97 % | RESPIRATION RATE: 22 BRPM | WEIGHT: 151 LBS | DIASTOLIC BLOOD PRESSURE: 64 MMHG | TEMPERATURE: 97.7 F | SYSTOLIC BLOOD PRESSURE: 100 MMHG | BODY MASS INDEX: 25.92 KG/M2 | HEART RATE: 77 BPM

## 2022-02-08 DIAGNOSIS — C50.911 PRIMARY MALIGNANT NEOPLASM OF RIGHT BREAST WITH METASTASIS TO OTHER SITE (HCC): Primary | ICD-10-CM

## 2022-02-08 DIAGNOSIS — Z51.5 PALLIATIVE CARE PATIENT: ICD-10-CM

## 2022-02-08 DIAGNOSIS — J91.0 MALIGNANT PLEURAL EFFUSION: ICD-10-CM

## 2022-02-08 DIAGNOSIS — K12.30 MUCOSITIS: ICD-10-CM

## 2022-02-08 DIAGNOSIS — C79.51 BONE METASTASES (HCC): ICD-10-CM

## 2022-02-08 DIAGNOSIS — K59.00 CONSTIPATION, UNSPECIFIED CONSTIPATION TYPE: ICD-10-CM

## 2022-02-08 DIAGNOSIS — C79.9 METASTATIC MALIGNANT NEOPLASM, UNSPECIFIED SITE (HCC): ICD-10-CM

## 2022-02-08 DIAGNOSIS — G89.3 CANCER RELATED PAIN: ICD-10-CM

## 2022-02-08 PROCEDURE — 99214 OFFICE O/P EST MOD 30 MIN: CPT | Performed by: STUDENT IN AN ORGANIZED HEALTH CARE EDUCATION/TRAINING PROGRAM

## 2022-02-08 NOTE — PROGRESS NOTES
Outpatient Follow-Up - Palliative and Supportive Care   Gaby Rider 49 y o  female [id number]    Assessment & Plan  Problem List Items Addressed This Visit        Respiratory    Malignant pleural effusion    Relevant Medications    al mag oxide-diphenhydramine-lidocaine viscous (MAGIC MOUTHWASH) 1:1:1 suspension       Musculoskeletal and Integument    Bone metastases (HCC)       Other    Metastatic neoplasm (HCC)    Palliative care patient    Relevant Medications    al mag oxide-diphenhydramine-lidocaine viscous (MAGIC MOUTHWASH) 1:1:1 suspension    Lidocaine Viscous HCl (XYLOCAINE) 2 % mucosal solution    Primary malignant neoplasm of right breast with metastasis to other site [facility]) - Primary    Relevant Medications    al mag oxide-diphenhydramine-lidocaine viscous (MAGIC MOUTHWASH) 1:1:1 suspension    Lidocaine Viscous HCl (XYLOCAINE) 2 % mucosal solution    Constipation    Cancer related pain      Other Visit Diagnoses     Mucositis        Relevant Medications    al mag oxide-diphenhydramine-lidocaine viscous (MAGIC MOUTHWASH) 1:1:1 suspension    Lidocaine Viscous HCl (XYLOCAINE) 2 % mucosal solution        #symptom management  #cancer-related pain   - continue APAP 650 mg PO Q6H PRN              - max daily 4000 mg   - continue hydromorphone 2-4 mg PO Q4H PRN              - use of 4-5 tabs/day   - continue duloxetine 30 mg PO QDaily    Adequate pain management on current regimen  Question regarding use of extended release opioids, as patient previously managed with oxy-ER in the past  Discussed appropriate indications and safe/proper management, patient agreeable to current plan and does not wish to pursue extended release opioids      #mouth pain   - trial magic mouthwash   - also sent Rx for viscous lidocaine if insurance does not cover magic mouthwash     #nausea   - continue metoclopramide 10 mg PO QID PRN   - continue ondansetron 4 mg PO Q8H PRN     #insomnia   - continue melatonin 6 mg PO QHS     #OIC - continue home bowel regimen     #recurrent malignant pleural effusions   - drains 350-450 cc every 3-4 days    #goals of care   - treatment focused care with no limitations at this time    #psychosocial support   - emotional support provided   - Lala Thompson accompanied today's appointment      Next [address] Follow up in 4 weeks  Controlled Substance Review    [place] or [place]  reviewed: No red flags were identified; safe to proceed with prescription  [place] PDMP Review       Value Time User    PDMP Reviewed  Yes (P)  2/8/2022  1:57 PM Henry Pantoja MD          Medications adjusted this encounter:  Requested Prescriptions     Signed Prescriptions Disp Refills    al mag oxide-diphenhydramine-lidocaine viscous (MAGIC MOUTHWASH) 1:1:1 suspension 180 mL 0     Sig: Swish and spit 10 mL every 4 (four) hours as needed for mucositis    Lidocaine Viscous HCl (XYLOCAINE) 2 % mucosal solution 100 mL 0     Sig: Swish and spit 15 mL 4 (four) times a day as needed for mouth pain or discomfort     No orders of the defined types were placed in this encounter  There are no discontinued medications  Kristopher Gray was seen today for symptoms and planning cares related to above illnesses  I have reviewed the patient's controlled substance dispensing history in the Prescription Drug Monitoring Program in compliance with the [facility] regulations before prescribing any controlled substances  They are invited to continue to follow with us  If there are questions or concerns, please contact us through our clinic/answering service 24 hours a day, seven days a week      Henry Pantoja MD  [facility] Palliative and Supportive Care  [phone]      Visit Information    Accompanied By: Spouse    Source of History: Self, Spouse, Medical record    History Limitations: None      History of Present Illness    Kristopher Gray is a 49 y o  female who presents in follow up of symptoms related to metastatic breast cancer, recurrent malignant pleural effusions s/p PleurX catheter placement  Pertinent issues include: symptom management, pain, neoplasm related, assessment of goals of care, advance care planning  Patient reports overall doing well  Reports adequate pain management on current regimen, pain continues to localize to R flank/R breast region, aggravated with twisting motion, reports as a "bone pain", use of PO hydromorphone 2 mg x 4-5 tabs/day, onset 60 minutes, lasting 3-4 hours  Denies nausea, vomiting  Appetite improved with recent weight gain  BM every other day, use of bowel regimen  Adequate nightly sleep, obtaining 8-10 hours continuous  Reports onset of oral sores with use of Ibrance therapy, on week off notices improvement of pain  Requesting magic mouthwash to manage pain during 3 weeks on  Past medical, surgical, social, and family histories are reviewed and pertinent updates are made  Review of Systems   Constitutional: Positive for malaise/fatigue  Negative for chills, decreased appetite, fever and weight loss  HENT: Negative for congestion  Mouth sores   Eyes: Negative for visual disturbance  Cardiovascular: Negative for chest pain  Respiratory: Negative for shortness of breath  Musculoskeletal: Negative for falls and neck pain  Gastrointestinal: Positive for constipation  Negative for abdominal pain, nausea and vomiting  Genitourinary: Positive for flank pain  Neurological: Negative for headaches  Psychiatric/Behavioral: The patient does not have insomnia  All other systems reviewed and are negative  Vital Signs    /64 (BP Location: Left arm)   Pulse 77   Temp 97 7 °F (36 5 °C) (Skin)   Resp 22   Wt 68 5 kg (151 lb)   SpO2 97%   BMI 25 92 kg/m²     Physical Exam and Objective Data  Physical Exam  Vitals and nursing note reviewed  Constitutional:       General: She is awake  Appearance: She is not diaphoretic        Comments: Sitting up comfortably in NAD  Non-toxic appearing   HENT:      Head: Normocephalic and atraumatic  Right Ear: External ear normal       Left Ear: External ear normal       Nose: No rhinorrhea  Eyes:      Comments: No gaze preference   Cardiovascular:      Rate and Rhythm: Normal rate  Pulmonary:      [place]: No tachypnea, accessory muscle usage or respiratory distress  Comments: Completes full sentences without difficulty  Musculoskeletal:      Cervical back: Normal range of motion  Neurological:      General: No focal deficit present  Mental Status: She is alert and oriented to person, place, and time  Psychiatric:         Attention and Perception: Attention normal          Mood and Affect: Mood and affect normal          Speech: Speech normal          Cognition and Memory: Cognition and memory normal            Radiology and Laboratory:  I personally reviewed and interpreted the following results:    Most Recent COVID-19 Results:  Lab Results   Component Value Date/Time    SARSCOV2 Negative 11/11/2021 03:42 PM    SARSCOV2 Positive (A) 12/20/2020 12:25 PM       Most Recent Lab Work:  Lab Results   Component Value Date/Time    SODIUM 142 12/02/2021 11:53 AM    K 4 3 12/02/2021 11:53 AM    K 3 7 04/02/2014 01:15 PM    BUN 16 12/02/2021 11:53 AM    BUN 12 04/02/2014 01:15 PM    CREATININE 0 96 12/02/2021 11:53 AM    CREATININE 0 80 04/02/2014 01:15 PM    GLUC 86 12/02/2021 11:53 AM     Lab Results   Component Value Date/Time    AST 19 12/02/2021 11:53 AM    AST 23 04/02/2014 01:15 PM    ALT 17 12/02/2021 11:53 AM    ALT 15 04/02/2014 01:15 PM    ALB 3 4 (L) 12/02/2021 11:53 AM    ALB 3 4 (L) 04/02/2014 01:15 PM     Lab Results   Component Value Date/Time    HGB 12 6 12/02/2021 11:53 AM    WBC 2 22 (L) 12/02/2021 11:53 AM     12/02/2021 11:53 AM    INR 1 07 08/17/2021 06:14 AM       Most Recent Imaging [last 30 days]:  No results found      35 minutes was spent face to face with Vanna Landaverde and her spouse with greater than 50% of the time spent in counseling or coordination of care including discussions of provided medical updates, discussed palliative care, determined goals of care, determined social/family support, discussed plans of care, discussed symptom management, provided psychosocial support  Mucositis management plan  PDMP Reviewed  All of the patient's or agent's questions were answered during this discussion

## 2022-02-09 RX ORDER — LIDOCAINE HYDROCHLORIDE 20 MG/ML
15 SOLUTION OROPHARYNGEAL 4 TIMES DAILY PRN
Qty: 100 ML | Refills: 0 | Status: SHIPPED | OUTPATIENT
Start: 2022-02-09 | End: 2022-02-10

## 2022-02-10 ENCOUNTER — TELEPHONE (OUTPATIENT)
Dept: PALLIATIVE MEDICINE | Facility: CLINIC | Age: 57
End: 2022-02-10

## 2022-02-10 ENCOUNTER — TELEPHONE (OUTPATIENT)
Dept: HEMATOLOGY ONCOLOGY | Facility: CLINIC | Age: 57
End: 2022-02-10

## 2022-02-10 ENCOUNTER — TELEMEDICINE (OUTPATIENT)
Dept: INTERNAL MEDICINE CLINIC | Facility: CLINIC | Age: 57
End: 2022-02-10
Payer: COMMERCIAL

## 2022-02-10 VITALS — BODY MASS INDEX: 25.78 KG/M2 | HEIGHT: 64 IN | WEIGHT: 151 LBS

## 2022-02-10 DIAGNOSIS — J02.9 SORE THROAT: ICD-10-CM

## 2022-02-10 DIAGNOSIS — Z20.822 EXPOSURE TO CONFIRMED CASE OF COVID-19: Primary | ICD-10-CM

## 2022-02-10 PROCEDURE — 99214 OFFICE O/P EST MOD 30 MIN: CPT | Performed by: INTERNAL MEDICINE

## 2022-02-10 NOTE — TELEPHONE ENCOUNTER
Patients son, who lives in the same house, has tested positive for COVID  Patient currently has some cold-like symptoms  She has a virtual appt with her PCP today  She is scheduled for bone scan and CT scan tomorrow  Will cancel these appts and reschedule after pt feels better

## 2022-02-10 NOTE — TELEPHONE ENCOUNTER
Returned call to patient per request patient reports exposure to COVID 19 Her son hs tested positive Reports a stuffy nose and a cough no fever or other symptoms of illness   Agrees to call her PCP She also will call the radiology department as she is scheduled for a CT  Scan tomorrow at [facility]

## 2022-02-10 NOTE — PROGRESS NOTES
COVID-19 Outpatient Progress Note    Assessment/Plan:    Problem List Items Addressed This Visit     None      Visit Diagnoses     Exposure to confirmed case of COVID-19    -  Primary    Relevant Orders    COVID Only- Office Collect    Sore throat        Relevant Orders    COVID Only- Office Collect         Disposition:     Recommended patient to come to the office to test for COVID-19  I have spent 10 minutes directly with the patient  Greater than 50% of this time was spent in counseling/coordination of care regarding: instructions for management  Encounter provider Silviano Weeks MD    Provider located at [address] [place] [address]  [address] includes the [facility]  [facility] [address]  [address] [facility] [address] [phone]    Recent Visits  No visits were found meeting these conditions  Showing recent visits within past 7 days and meeting all other requirements  Today's Visits  Date Type Provider Dept   02/10/22 Sujata Dugan MD [facility] [address] today's visits and meeting all other requirements  Future Appointments  No visits were found meeting these conditions  Showing future appointments within next 150 days and meeting all other requirements     This virtual check-in was done via [address] and patient was informed that this is a secure, HIPAA-compliant platform  She agrees to proceed  Patient agrees to participate in a virtual check in via telephone or video visit instead of presenting to the office to address urgent/immediate medical needs  Patient is aware this is a billable service  After connecting through [facility], the patient was identified by name and date of birth  Odalys Lopez was informed that this was a telemedicine visit and that the exam was being conducted confidentially over secure lines  My office door was closed  No one else was in the room   Odalys Lopez acknowledged consent and understanding of privacy and security of the telemedicine visit  I informed the patient that I have reviewed her record in Epic and presented the opportunity for her to ask any questions regarding the visit today  The patient agreed to participate  Verification of patient location:  Patient is located in the following state in which I hold an active license: [place]    Subjective:   Patricia Patel is a 49 y o  female who is concerned about COVID-19  Patient's symptoms include nasal congestion and sore throat       - Date of symptom onset: 2/7/2022      COVID-19 vaccination status: Partially vaccinated    Exposure:   Contact with a person who is under investigation (PUI) for or who is positive for COVID-19 within the last 14 days?: Yes    Hospitalized recently for fever and/or lower respiratory symptoms?: No      Currently a healthcare worker that is involved in direct patient care?: No      Works in a special setting where the risk of COVID-19 transmission may be high? (this may include long-term care, correctional and snf facilities; homeless shelters; assisted-living facilities and group homes ): No      Resident in a special setting where the risk of COVID-19 transmission may be high? (this may include long-term care, correctional and snf facilities; homeless shelters; assisted-living facilities and group homes ): No      Lab Results   Component Value Date    SARSCOV2 Negative 11/11/2021    SARSCOV2 Positive (A) 12/20/2020     Past Medical History:   Diagnosis Date    History of radiation exposure     Malignant neoplasm of right female breast ([facility] [address] ) 2012    right     Past Surgical History:   Procedure Laterality Date    BREAST CYST EXCISION      BREAST LUMPECTOMY Right 2012    HIP SURGERY      IR BIOPSY LIVER MASS  7/26/2021    IR PLEURAL EFFUSION LONG-TERM CATHETER PLACEMENT  8/17/2021    IR THORACENTESIS  7/26/2021     Current Outpatient Medications   Medication Sig Dispense Refill    acetaminophen (TYLENOL) 325 mg tablet Take 2 tablets (650 mg total) by mouth every 6 (six) hours as needed for mild pain 30 tablet 0    albuterol (PROVENTIL HFA,VENTOLIN HFA) 90 mcg/act inhaler Inhale 2 puffs every 4 (four) hours as needed for wheezing 8 g 0    benzonatate (TESSALON) 200 MG capsule Take 1 capsule (200 mg total) by mouth 3 (three) times a day as needed for cough 20 capsule 0    calcium carbonate (TUMS) 500 mg chewable tablet Chew 1 tablet (500 mg total) 3 (three) times a day as needed for indigestion or heartburn 30 tablet 0    dextromethorphan-guaiFENesin (ROBITUSSIN DM)  mg/5 mL syrup Take 10 mL by mouth every 4 (four) hours as needed for cough 236 mL 0    diphenhydrAMINE (BENADRYL) 50 MG tablet Take 1 tablet (50 mg total) by mouth daily at bedtime as needed for itching or sleep 30 tablet 0    diphenoxylate-atropine (LOMOTIL) 2 5-0 025 mg per tablet Take 1 tablet by mouth 4 (four) times a day as needed for diarrhea 30 tablet 0    DULoxetine (CYMBALTA) 30 mg delayed release capsule Take 1 capsule (30 mg total) by mouth daily 90 capsule 3    fluticasone (FLONASE) 50 mcg/act nasal spray 2 sprays into each nostril daily      HYDROmorphone (DILAUDID) 2 mg tablet Take 2-4 mg [1-2 tabs] PO Q4H PRN   Max Daily 24 mg  120 tablet 0    letrozole (FEMARA) 2 5 mg tablet Take 1 tablet (2 5 mg total) by mouth daily 90 tablet 1    melatonin 3 mg Take 2 tablets (6 mg total) by mouth daily at bedtime 15 tablet 0    metoclopramide (REGLAN) 10 mg tablet Take 1 tablet (10 mg total) by mouth 4 (four) times a day 28 tablet 0    multivitamin (THERAGRAN) TABS Take 1 tablet by mouth      ondansetron (ZOFRAN) 4 mg tablet Take 1 tablet (4 mg total) by mouth every 8 (eight) hours as needed for nausea or vomiting 20 tablet 0    oxybutynin (DITROPAN-XL) 5 mg 24 hr tablet Take 1 tablet (5 mg total) by mouth daily Take 1 tablet daily 90 tablet 3    palbociclib (IBRANCE) 125 MG tablet Take 1 tablet (125 mg total) by mouth daily 21 tablet 11    rivaroxaban (Xarelto) 20 mg tablet Take 1 tablet (20 mg total) by mouth daily with breakfast 30 tablet 2     No current facility-administered medications for this visit  Allergies   Allergen Reactions    Codeine Anaphylaxis    Morphine GI Intolerance, Itching and Vomiting    Sulfa Antibiotics Hives and Itching       Review of Systems   HENT: Positive for congestion, sneezing and sore throat  All other systems reviewed and are negative  Objective:    Vitals:    02/10/22 1559   Weight: 68 5 kg (151 lb)   Height: 5' 4" (1 626 m)       Physical Exam  Vitals and nursing note reviewed  Constitutional:       Appearance: Normal appearance  Pulmonary:      Effort: Pulmonary effort is normal    Neurological:      Mental Status: She is alert and oriented to person, place, and time  Psychiatric:         Mood and Affect: Mood normal          VIRTUAL VISIT DISCLAIMER    Gabriella Rosario verbally agrees to participate in [facility]  Pt is aware that [facility] could be limited without vital signs or the ability to perform a full hands-on physical Camella Isidro understands she or the provider may request at any time to terminate the video visit and request the patient to seek care or treatment in person

## 2022-02-11 ENCOUNTER — CLINICAL SUPPORT (OUTPATIENT)
Dept: INTERNAL MEDICINE CLINIC | Facility: CLINIC | Age: 57
End: 2022-02-11

## 2022-02-11 DIAGNOSIS — Z20.822 EXPOSURE TO CONFIRMED CASE OF COVID-19: Primary | ICD-10-CM

## 2022-02-11 PROCEDURE — U0005 INFEC AGEN DETEC AMPLI PROBE: HCPCS | Performed by: INTERNAL MEDICINE

## 2022-02-11 PROCEDURE — U0003 INFECTIOUS AGENT DETECTION BY NUCLEIC ACID (DNA OR RNA); SEVERE ACUTE RESPIRATORY SYNDROME CORONAVIRUS 2 (SARS-COV-2) (CORONAVIRUS DISEASE [COVID-19]), AMPLIFIED PROBE TECHNIQUE, MAKING USE OF HIGH THROUGHPUT TECHNOLOGIES AS DESCRIBED BY CMS-2020-01-R: HCPCS | Performed by: INTERNAL MEDICINE

## 2022-02-12 LAB — SARS-COV-2 RNA RESP QL NAA+PROBE: POSITIVE

## 2022-02-14 DIAGNOSIS — C50.919 MALIGNANT NEOPLASM OF BREAST IN FEMALE, ESTROGEN RECEPTOR POSITIVE, UNSPECIFIED LATERALITY, UNSPECIFIED SITE OF BREAST (HCC): ICD-10-CM

## 2022-02-14 DIAGNOSIS — Z17.0 MALIGNANT NEOPLASM OF BREAST IN FEMALE, ESTROGEN RECEPTOR POSITIVE, UNSPECIFIED LATERALITY, UNSPECIFIED SITE OF BREAST (HCC): ICD-10-CM

## 2022-02-15 DIAGNOSIS — C78.7 MALIGNANT NEOPLASM METASTATIC TO LIVER (HCC): ICD-10-CM

## 2022-02-15 DIAGNOSIS — Z17.0 MALIGNANT NEOPLASM OF BREAST IN FEMALE, ESTROGEN RECEPTOR POSITIVE, UNSPECIFIED LATERALITY, UNSPECIFIED SITE OF BREAST (HCC): Primary | ICD-10-CM

## 2022-02-15 DIAGNOSIS — C50.919 MALIGNANT NEOPLASM OF BREAST IN FEMALE, ESTROGEN RECEPTOR POSITIVE, UNSPECIFIED LATERALITY, UNSPECIFIED SITE OF BREAST (HCC): Primary | ICD-10-CM

## 2022-02-15 RX ORDER — LETROZOLE 2.5 MG/1
TABLET, FILM COATED ORAL
Qty: 90 TABLET | Refills: 2 | Status: SHIPPED | OUTPATIENT
Start: 2022-02-15 | End: 2022-05-16

## 2022-02-17 DIAGNOSIS — C50.919 MALIGNANT NEOPLASM OF BREAST IN FEMALE, ESTROGEN RECEPTOR POSITIVE, UNSPECIFIED LATERALITY, UNSPECIFIED SITE OF BREAST (HCC): Primary | ICD-10-CM

## 2022-02-17 DIAGNOSIS — Z17.0 MALIGNANT NEOPLASM OF BREAST IN FEMALE, ESTROGEN RECEPTOR POSITIVE, UNSPECIFIED LATERALITY, UNSPECIFIED SITE OF BREAST (HCC): Primary | ICD-10-CM

## 2022-02-17 DIAGNOSIS — C78.7 MALIGNANT NEOPLASM METASTATIC TO LIVER (HCC): ICD-10-CM

## 2022-02-18 DIAGNOSIS — C50.919 MALIGNANT NEOPLASM OF BREAST IN FEMALE, ESTROGEN RECEPTOR POSITIVE, UNSPECIFIED LATERALITY, UNSPECIFIED SITE OF BREAST (HCC): Primary | ICD-10-CM

## 2022-02-18 DIAGNOSIS — C78.7 MALIGNANT NEOPLASM METASTATIC TO LIVER (HCC): ICD-10-CM

## 2022-02-18 DIAGNOSIS — Z17.0 MALIGNANT NEOPLASM OF BREAST IN FEMALE, ESTROGEN RECEPTOR POSITIVE, UNSPECIFIED LATERALITY, UNSPECIFIED SITE OF BREAST (HCC): Primary | ICD-10-CM

## 2022-02-21 DIAGNOSIS — C50.919 MALIGNANT NEOPLASM OF BREAST IN FEMALE, ESTROGEN RECEPTOR POSITIVE, UNSPECIFIED LATERALITY, UNSPECIFIED SITE OF BREAST (HCC): Primary | ICD-10-CM

## 2022-02-21 DIAGNOSIS — C78.7 MALIGNANT NEOPLASM METASTATIC TO LIVER (HCC): ICD-10-CM

## 2022-02-21 DIAGNOSIS — Z17.0 MALIGNANT NEOPLASM OF BREAST IN FEMALE, ESTROGEN RECEPTOR POSITIVE, UNSPECIFIED LATERALITY, UNSPECIFIED SITE OF BREAST (HCC): Primary | ICD-10-CM

## 2022-02-22 ENCOUNTER — NURSE TRIAGE (OUTPATIENT)
Dept: OTHER | Facility: OTHER | Age: 57
End: 2022-02-22

## 2022-02-22 ENCOUNTER — OFFICE VISIT (OUTPATIENT)
Dept: URGENT CARE | Facility: CLINIC | Age: 57
End: 2022-02-22
Payer: COMMERCIAL

## 2022-02-22 ENCOUNTER — APPOINTMENT (OUTPATIENT)
Dept: RADIOLOGY | Facility: CLINIC | Age: 57
End: 2022-02-22
Payer: COMMERCIAL

## 2022-02-22 VITALS
RESPIRATION RATE: 18 BRPM | OXYGEN SATURATION: 97 % | TEMPERATURE: 97.8 F | WEIGHT: 151 LBS | HEIGHT: 64 IN | BODY MASS INDEX: 25.78 KG/M2 | HEART RATE: 67 BPM | SYSTOLIC BLOOD PRESSURE: 110 MMHG | DIASTOLIC BLOOD PRESSURE: 72 MMHG

## 2022-02-22 DIAGNOSIS — U07.1 COVID-19: ICD-10-CM

## 2022-02-22 DIAGNOSIS — R06.02 SHORTNESS OF BREATH: ICD-10-CM

## 2022-02-22 DIAGNOSIS — R07.9 CHEST PAIN, UNSPECIFIED TYPE: ICD-10-CM

## 2022-02-22 DIAGNOSIS — R06.02 SHORTNESS OF BREATH: Primary | ICD-10-CM

## 2022-02-22 LAB
ATRIAL RATE: 64 BPM
P AXIS: 73 DEGREES
PR INTERVAL: 180 MS
QRS AXIS: 84 DEGREES
QRSD INTERVAL: 64 MS
QT INTERVAL: 392 MS
QTC INTERVAL: 404 MS
T WAVE AXIS: 78 DEGREES
VENTRICULAR RATE: 64 BPM

## 2022-02-22 PROCEDURE — 93010 ELECTROCARDIOGRAM REPORT: CPT

## 2022-02-22 PROCEDURE — 71046 X-RAY EXAM CHEST 2 VIEWS: CPT

## 2022-02-22 PROCEDURE — 99214 OFFICE O/P EST MOD 30 MIN: CPT

## 2022-02-22 PROCEDURE — 93005 ELECTROCARDIOGRAM TRACING: CPT

## 2022-02-22 NOTE — TELEPHONE ENCOUNTER
Regarding: Pain while breathing/chest pain   ----- Message from Raoul Ribiero RN sent at 2/22/2022  4:35 PM EST -----  " I have a cath in my lung I drain it on a weekly basis, but having a new onset of pain while breathing and some chest pain more then my normal"     "this happen after +covid test last week"   " drain today fluid looks the same"   "last couple of days more pain 1-10 its like a 5-6 "

## 2022-02-22 NOTE — TELEPHONE ENCOUNTER
Patient is calling with complaints of shortness of breath, as well chest pain  Patient states that she believes this is from [facility]  She describes episodes where she is lightheaded and dizzy and almost passes out as well as episodes of sharp stabbing pain  Patient is refusing to be seen in ED she is suggesting she would rather go to an urgent care

## 2022-02-22 NOTE — PROGRESS NOTES
[address]      NAME: Vishal Medina is a 49 y o  female  : 1965    MRN: [id number]  DATE: 2022  TIME: 7:04 PM    Assessment and Plan   Shortness of breath [R06 02]  1  Shortness of breath  XR chest pa & lateral   2  Chest pain, unspecified type  ECG 12 lead     -Chest Xray obtained which has no acute cardiopulmonary abnormality my read  Compared to previous Xrays on file  Awaiting radiologist final read  -EKG complete due to chest pain/lung pain location  1-2 mm ST elevation in aVF lead and lead II, unchanged from previous EKGs loaded in epic     -Discussed with pt that further work-up will be unable to be performed in the urgent care setting - a chest CT cannot be done or labs  Recommended ER visit for these test, but pt does not want to go at this time  She prefers to follow up with providers in the morning regarding  Educated that if symptoms worsen she is to present to the emergency room for evaluation    -Educate that these symptoms could be the result of long COVID but that she needs to ensure they are not related to her cancer    -For pain pt reports that she is okay with her current at home prn medications, she does not want to take more medication because she has to drive  and son  Patient Instructions     -Continue supportive care  -If you get more short of breath, take breaks so you do not get dizzy  If you get dizzy sit down right away  Please proceed to ER if symptoms worsen - especially if shortness of breath does not resolve or you remain dizzy  Chief Complaint     Chief Complaint   Patient presents with    Shortness of Breath     SOB, pain in chest, R plurex cath  COVID positive 22  pain when breathing  lightheaded, faint, dizzy x a few days  History of Present Illness       Reports shortness of breath, even at rest since COVID +22   While attempting to do chores such as the dishes would have increased shortness of breath and even feel faint and dizzy at times  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 in place  She drains it about every 3-4 days, for about 400-500 ml  She drained it today after 4 days and only got about 200 ml out  Having increasing pain to her lower lung area, always having pain on the right side but now also pain to the left side which is new  Follows up with heme-onc and pulmonary regarding issues  They wanted her to have a chest xray given symptoms  Is due for bone scan and CT scan of lungs which have been on hold due to COVID + diagnosis  She plans to touch base with both doctors tomorrow regarding those symptoms  She does have pain medication - hydromorphone that she has been taking for the pain  Review of Systems   Review of Systems   Constitutional: Negative for chills  HENT: Positive for sore throat  Negative for congestion  Respiratory: Positive for cough, chest tightness and shortness of breath  Negative for choking, wheezing and stridor  Cardiovascular: Positive for chest pain  Gastrointestinal: Negative for abdominal pain, constipation, diarrhea, nausea and vomiting  Genitourinary: Negative for dysuria  Musculoskeletal: Negative for back pain  Neurological: Positive for dizziness and light-headedness  Negative for headaches  Psychiatric/Behavioral: Negative for confusion           Current Medications       Current Outpatient Medications:     acetaminophen (TYLENOL) 325 mg tablet, Take 2 tablets (650 mg total) by mouth every 6 (six) hours as needed for mild pain, Disp: 30 tablet, Rfl: 0    albuterol (PROVENTIL HFA,VENTOLIN HFA) 90 mcg/act inhaler, Inhale 2 puffs every 4 (four) hours as needed for wheezing, Disp: 8 g, Rfl: 0    benzonatate (TESSALON) 200 MG capsule, Take 1 capsule (200 mg total) by mouth 3 (three) times a day as needed for cough, Disp: 20 capsule, Rfl: 0    calcium carbonate (TUMS) 500 mg chewable tablet, Chew 1 tablet (500 mg total) 3 (three) times a day as needed for indigestion or heartburn, Disp: 30 tablet, Rfl: 0    dextromethorphan-guaiFENesin (ROBITUSSIN DM)  mg/5 mL syrup, Take 10 mL by mouth every 4 (four) hours as needed for cough, Disp: 236 mL, Rfl: 0    diphenhydrAMINE (BENADRYL) 50 MG tablet, Take 1 tablet (50 mg total) by mouth daily at bedtime as needed for itching or sleep, Disp: 30 tablet, Rfl: 0    diphenoxylate-atropine (LOMOTIL) 2 5-0 025 mg per tablet, Take 1 tablet by mouth 4 (four) times a day as needed for diarrhea, Disp: 30 tablet, Rfl: 0    DULoxetine (CYMBALTA) 30 mg delayed release capsule, Take 1 capsule (30 mg total) by mouth daily, Disp: 90 capsule, Rfl: 3    fluticasone (FLONASE) 50 mcg/act nasal spray, 2 sprays into each nostril daily, Disp: , Rfl:     HYDROmorphone (DILAUDID) 2 mg tablet, Take 2-4 mg [1-2 tabs] PO Q4H PRN   Max Daily 24 mg , Disp: 120 tablet, Rfl: 0    letrozole (FEMARA) 2 5 mg tablet, TAKE 1 TABLET BY MOUTH EVERY DAY, Disp: 90 tablet, Rfl: 2    melatonin 3 mg, Take 2 tablets (6 mg total) by mouth daily at bedtime, Disp: 15 tablet, Rfl: 0    metoclopramide (REGLAN) 10 mg tablet, Take 1 tablet (10 mg total) by mouth 4 (four) times a day, Disp: 28 tablet, Rfl: 0    multivitamin (THERAGRAN) TABS, Take 1 tablet by mouth, Disp: , Rfl:     ondansetron (ZOFRAN) 4 mg tablet, Take 1 tablet (4 mg total) by mouth every 8 (eight) hours as needed for nausea or vomiting, Disp: 20 tablet, Rfl: 0    oxybutynin (DITROPAN-XL) 5 mg 24 hr tablet, Take 1 tablet (5 mg total) by mouth daily Take 1 tablet daily, Disp: 90 tablet, Rfl: 3    palbociclib (IBRANCE) 125 MG tablet, Take 1 tablet (125 mg total) by mouth daily, Disp: 21 tablet, Rfl: 11    rivaroxaban (Xarelto) 20 mg tablet, Take 1 tablet (20 mg total) by mouth daily with breakfast, Disp: 30 tablet, Rfl: 2    Current Allergies     Allergies as of 02/22/2022 - Reviewed 02/22/2022   Allergen Reaction Noted    Codeine Anaphylaxis 11/12/2013    Morphine GI Intolerance, Itching, and Vomiting 07/16/2015    Sulfa antibiotics Hives and Itching 07/16/2015            The following portions of the patient's history were reviewed and updated as appropriate: allergies, current medications, past family history, past medical history, past social history, past surgical history and problem list      Past Medical History:   Diagnosis Date    History of radiation exposure     Malignant neoplasm of right female breast ([address] ) 2012    right       Past Surgical History:   Procedure Laterality Date    BREAST CYST EXCISION      BREAST LUMPECTOMY Right 2012    HIP SURGERY      IR BIOPSY LIVER MASS  7/26/2021    IR PLEURAL EFFUSION LONG-TERM CATHETER PLACEMENT  8/17/2021    IR THORACENTESIS  7/26/2021       Family History   Problem Relation Age of Onset    Breast cancer Mother         in her 63's    Breast cancer Sister         inher 45s and 48    Colon cancer Maternal Grandmother     No Known Problems Paternal Grandmother     Breast cancer Other     No Known Problems Paternal Aunt          Medications have been verified  Objective   /72 (BP Location: Left arm, Patient Position: Sitting)   Pulse 67   Temp 97 8 °F (36 6 °C) (Temporal)   Resp 18   Ht 5' 4" (1 626 m)   Wt 68 5 kg (151 lb)   SpO2 97%   BMI 25 92 kg/m²        Physical Exam     Physical Exam  Vitals reviewed  HENT:      Right Ear: Tympanic membrane, ear canal and external ear normal       Left Ear: Tympanic membrane, ear canal and external ear normal       Mouth/Throat:      Mouth: Mucous membranes are moist    Cardiovascular:      Rate and Rhythm: Normal rate and regular rhythm  Pulses: Normal pulses  Heart sounds: Normal heart sounds  No murmur heard  Pulmonary:      Effort: Pulmonary effort is normal  No respiratory distress  Breath sounds: Normal breath sounds  No wheezing, rhonchi or rales  Chest:      Chest wall: Tenderness present     Abdominal:      General: Bowel sounds are normal       Palpations: Abdomen is soft  Musculoskeletal:         General: Normal range of motion  Right lower leg: No edema  Left lower leg: No edema  Skin:     General: Skin is warm and dry  Capillary Refill: Capillary refill takes less than 2 seconds  Neurological:      Mental Status: She is alert and oriented to person, place, and time     Psychiatric:         Mood and Affect: Mood normal          Behavior: Behavior normal

## 2022-02-22 NOTE — TELEPHONE ENCOUNTER
Reason for Disposition   Difficulty breathing    Answer Assessment - Initial Assessment Questions  1  LOCATION: "Where does it hurt?"        Right side and left side  2  RADIATION: "Does the pain go anywhere else?" (e g , into neck, jaw, arms, back)      No  3  ONSET: "When did the chest pain begin?" (Minutes, hours or days)       Started after covid positive test last week  4  PATTERN "Does the pain come and go, or has it been constant since it started?"  "Does it get worse with exertion?"       Pain comes randomly, but also happens with movement  5  DURATION: "How long does it last" (e g , seconds, minutes, hours)      Constant   6  SEVERITY: "How bad is the pain?"  (e g , Scale 1-10; mild, moderate, or severe)     - MILD (1-3): doesn't interfere with normal activities      - MODERATE (4-7): interferes with normal activities or awakens from sleep     - SEVERE (8-10): excruciating pain, unable to do any normal activities        6  7  CARDIAC RISK FACTORS: "Do you have any history of heart problems or risk factors for heart disease?" (e g , angina, prior heart attack; diabetes, high blood pressure, high cholesterol, smoker, or strong family history of heart disease)      No  8  PULMONARY RISK FACTORS: "Do you have any history of lung disease?"  (e g , blood clots in lung, asthma, emphysema, birth control pills)      Has a drain in lung  9  CAUSE: "What do you think is causing the chest pain?"      Deep lung pain, thinks its from COVID  10  OTHER SYMPTOMS: "Do you have any other symptoms?" (e g , dizziness, nausea, vomiting, sweating, fever, difficulty breathing, cough)        Dizziness, shortness of breath  11   PREGNANCY: "Is there any chance you are pregnant?" "When was your last menstrual period?"        No    Protocols used: CHEST PAIN-ADULT-

## 2022-02-23 NOTE — PATIENT INSTRUCTIONS
-Continue supportive care  -If you get more short of breath, take breaks so you do not get dizzy  If you get dizzy sit down right away  Please proceed to ER if symptoms worsen - especially if shortness of breath does not resolve or you remain dizzy  Long COVID   AMBULATORY CARE:   Elston Mohs  is a term used to describe ongoing effects of COVID-19 infection  Signs and symptoms are considered long COVID if they begin or continue at least 4 weeks after the infection  Experts believe the immune system in some people overreacts to the virus that causes COVID-19  The immune system attacks the virus, but it also attacks healthy nerves, blood vessels, and organs  It is not yet known how long symptoms could continue  Information about COVID-19 and the long-term effects it causes is still being learned  Anyone who had COVID-19 can develop long COVID, even if symptoms were mild or never developed at all  Long COVID may also be called post-COVID syndrome or post-acute sequelae of SARS-CoV-2 (PASC)  Signs and symptoms of long COVID  may happen sometimes or all the time  Some may get better and then return or worsen later  Some may be worse with physical or mental activities   You may have any of the following:  · Fatigue (feeling mentally and physically tired), trouble being as physically active as before, or trouble sleeping    · Headaches, dizziness, or brain fog (trouble concentrating, or being forgetful or confused)    · Chest pain, a fast or pounding heartbeat, a cough, or shortness of breath    · Joint or muscle pain    · Changes from feeling hot to feeling cold that happen often    · Depression, anxiety, or mood swings    · Not being able to smell or taste anything    · A skin rash, especially on your toes    · A fever that does not get better with medicine or returns several times    · Blood clots    · Hallucinations or paranoia    Call your local emergency number (911 in the [address] [facility]) if:   · You have any of the following signs of a stroke:      ? Numbness or drooping on one side of your face     ? Weakness in an arm or leg    ? Confusion or difficulty speaking    ? Dizziness, a severe headache, or vision loss       · You have any of the following signs of a heart attack:      ? Squeezing, pressure, or pain in your chest    ? You may  also have any of the following:     § Discomfort or pain in your back, neck, jaw, stomach, or arm    § Shortness of breath    § Nausea or vomiting    § Lightheadedness or a sudden cold sweat    · You have a seizure (and do not have a known seizure disorder)  · You are confused  · You have sudden trouble breathing, or you cough up blood  · Your legs start to feel numb, or you have trouble moving them  Seek care immediately if:   · Your arm or leg feels warm, tender, and painful  It may look swollen and red  · You feel short of breath even at rest     · You have newly weak muscles  · You have vision or hearing changes  · You have a fever of 103°F (39 4°C) or higher  Call your doctor or specialist if:   · You have a low fever that continues even with medicine  · You have questions or concerns about your condition or care  Treatment:  No specific treatment is available for long COVID  Your healthcare provider may recommend any of the following, depending on your symptoms:  · NSAIDs , such as ibuprofen, help decrease swelling, pain, and fever  This medicine is available with or without a doctor's order  NSAIDs can cause stomach bleeding or kidney problems in certain people  If you take blood thinner medicine, always ask your healthcare provider if NSAIDs are safe for you  Always read the medicine label and follow directions  · Acetaminophen  decreases pain and fever  It is available without a doctor's order  Ask how much to take and how often to take it  Follow directions   Read the labels of all other medicines you are using to see if they also contain acetaminophen, or ask your doctor or pharmacist  Acetaminophen can cause liver damage if not taken correctly  Do not use more than 4 grams (4,000 milligrams) total of acetaminophen in one day  · Antibiotics  help prevent or treat a bacterial infection  · Deep breathing exercises  can help improve your oxygen levels  · A sniff board  may help if you lost the ability to smell  The board has several oils on it  You breathe in deeply through your nose to help your sense of smell return  Loss of smell is usually a temporary effect of long COVID  The sniff board may help the sense come back faster  · Taste stimulation or retraining  may help if you lost the ability to taste  You will be given strong flavors to help your sense of taste return  Manage your symptoms:   · Rest as needed  Take naps and change your schedule to fit your energy level  You may need to take 5 to 10 minute rest periods every hour or more  Try to go to bed and wake up at the same times every day  · Manage any other health conditions you have  Some health conditions can cause symptoms that are similar to COVID-19 or that can make other symptoms worse  For example, an asthma or COPD exacerbation can cause breathing problems that may worsen long COVID breathing problems  · Eat a variety of healthy foods  Healthy foods include vegetables, fruit, lean meat, poultry, fish, low-fat dairy, nuts, whole-grain breads, and cooked beans  Talk to your healthcare provider if you have a loss of appetite or any problems eating  Your provider or a dietitian can help you create healthy meals and snacks  · Be as physically active as you are able to be  Light physical activity, such as walking, can help with many symptoms  For example, activity can help your lungs work better, improve your sleep, and relieve depression or anxiety  Start with light activity most days of the week  You can work up to more activity as you feel better   Talk to your healthcare provider if you do not feel able to get any physical activity  Your provider may recommend that you work with a physical or occupational therapist  A physical therapist can help you work up to more activity  An occupational therapist can help you plan activities around your symptoms  · Keep a record of your activities and symptoms each day  This record will help you learn when you have the most energy  You will also be able to follow your progress  Bring this record with you to your follow-up visits  · Get support  Ask your healthcare provider about support groups in your area  A support group is a place to talk with others who also have long COVID  You can talk about your feelings and get advice for managing symptoms  Talk therapy with a mental health professional can also help you and your family members manage the stress of long COVID  · Limit or do not drink alcohol  Ask your healthcare provider if it is okay for you to drink any alcohol  He or she can help you set limits for the number of drinks you have in 24 hours and in a week  A drink of alcohol is 12 ounces of beer, 5 ounces of wine, or 1½ ounces of liquor  · Do not smoke  Nicotine and other chemicals in cigarettes can damage your heart and lungs  Ask your healthcare provider for information if you currently smoke and need help to quit  E-cigarettes or smokeless tobacco still contain nicotine  Talk to your healthcare provider before you use these products  Follow up with your doctor as directed: You may need to come in for ongoing tests or treatment  Your doctor may also refer you to a specialist  The specialist will depend on your symptoms and the affected areas of your body  Write down your questions so you remember to ask them during your visits  © Copyright [employer] 2021 Information is for End User's use only and may not be sold, redistributed or otherwise used for commercial purposes   All illustrations and images included in CareNotes® are the copyrighted property of [employer] M [employer]  or Flores Grijalva  The above information is an  only  It is not intended as medical advice for individual conditions or treatments  Talk to your doctor, nurse or pharmacist before following any medical regimen to see if it is safe and effective for you

## 2022-02-23 NOTE — TELEPHONE ENCOUNTER
Attempted to call pt at [phone] in regards to pt call into office last night  Left general VM to call direct number to review phone call and recommendations

## 2022-02-25 ENCOUNTER — HOSPITAL ENCOUNTER (OUTPATIENT)
Dept: INFUSION CENTER | Facility: CLINIC | Age: 57
Discharge: HOME | End: 2022-02-25

## 2022-02-27 DIAGNOSIS — C78.7 MALIGNANT NEOPLASM METASTATIC TO LIVER (HCC): ICD-10-CM

## 2022-02-27 DIAGNOSIS — C50.919 MALIGNANT NEOPLASM OF BREAST IN FEMALE, ESTROGEN RECEPTOR POSITIVE, UNSPECIFIED LATERALITY, UNSPECIFIED SITE OF BREAST (HCC): Primary | ICD-10-CM

## 2022-02-27 DIAGNOSIS — Z17.0 MALIGNANT NEOPLASM OF BREAST IN FEMALE, ESTROGEN RECEPTOR POSITIVE, UNSPECIFIED LATERALITY, UNSPECIFIED SITE OF BREAST (HCC): Primary | ICD-10-CM

## 2022-02-28 DIAGNOSIS — C50.919 MALIGNANT NEOPLASM OF BREAST IN FEMALE, ESTROGEN RECEPTOR POSITIVE, UNSPECIFIED LATERALITY, UNSPECIFIED SITE OF BREAST (HCC): Primary | ICD-10-CM

## 2022-02-28 DIAGNOSIS — C78.7 MALIGNANT NEOPLASM METASTATIC TO LIVER (HCC): ICD-10-CM

## 2022-02-28 DIAGNOSIS — C79.51 BONE METASTASES (HCC): ICD-10-CM

## 2022-02-28 DIAGNOSIS — Z17.0 MALIGNANT NEOPLASM OF BREAST IN FEMALE, ESTROGEN RECEPTOR POSITIVE, UNSPECIFIED LATERALITY, UNSPECIFIED SITE OF BREAST (HCC): Primary | ICD-10-CM

## 2022-03-01 ENCOUNTER — HOSPITAL ENCOUNTER (OUTPATIENT)
Dept: INFUSION CENTER | Facility: CLINIC | Age: 57
Discharge: HOME | End: 2022-03-01
Payer: COMMERCIAL

## 2022-03-01 ENCOUNTER — APPOINTMENT (OUTPATIENT)
Dept: LAB | Facility: HOSPITAL | Age: 57
End: 2022-03-01
Payer: COMMERCIAL

## 2022-03-01 VITALS
HEART RATE: 62 BPM | HEIGHT: 64 IN | WEIGHT: 146.2 LBS | RESPIRATION RATE: 18 BRPM | TEMPERATURE: 97.4 F | SYSTOLIC BLOOD PRESSURE: 132 MMHG | DIASTOLIC BLOOD PRESSURE: 75 MMHG | BODY MASS INDEX: 24.96 KG/M2

## 2022-03-01 DIAGNOSIS — C50.912 MALIGNANT NEOPLASM OF LEFT BREAST IN FEMALE, ESTROGEN RECEPTOR POSITIVE, UNSPECIFIED SITE OF BREAST (HCC): ICD-10-CM

## 2022-03-01 DIAGNOSIS — Z17.0 MALIGNANT NEOPLASM OF BREAST IN FEMALE, ESTROGEN RECEPTOR POSITIVE, UNSPECIFIED LATERALITY, UNSPECIFIED SITE OF BREAST (HCC): ICD-10-CM

## 2022-03-01 DIAGNOSIS — C78.7 MALIGNANT NEOPLASM METASTATIC TO LIVER (HCC): Primary | ICD-10-CM

## 2022-03-01 DIAGNOSIS — Z17.0 MALIGNANT NEOPLASM OF LEFT BREAST IN FEMALE, ESTROGEN RECEPTOR POSITIVE, UNSPECIFIED SITE OF BREAST (HCC): ICD-10-CM

## 2022-03-01 DIAGNOSIS — C79.51 BONE METASTASES (HCC): ICD-10-CM

## 2022-03-01 DIAGNOSIS — C50.919 MALIGNANT NEOPLASM OF BREAST IN FEMALE, ESTROGEN RECEPTOR POSITIVE, UNSPECIFIED LATERALITY, UNSPECIFIED SITE OF BREAST (HCC): ICD-10-CM

## 2022-03-01 LAB
ALBUMIN SERPL BCP-MCNC: 3.6 G/DL (ref 3.5–5)
ALP SERPL-CCNC: 39 U/L (ref 46–116)
ALT SERPL W P-5'-P-CCNC: 14 U/L (ref 12–78)
ANION GAP SERPL CALCULATED.3IONS-SCNC: 9 MMOL/L (ref 4–13)
AST SERPL W P-5'-P-CCNC: 12 U/L (ref 5–45)
BASOPHILS # BLD MANUAL: 0.02 THOUSAND/UL (ref 0–0.1)
BASOPHILS NFR MAR MANUAL: 1 % (ref 0–1)
BILIRUB SERPL-MCNC: 0.59 MG/DL (ref 0.2–1)
BUN SERPL-MCNC: 20 MG/DL (ref 5–25)
CALCIUM SERPL-MCNC: 8.4 MG/DL (ref 8.3–10.1)
CHLORIDE SERPL-SCNC: 105 MMOL/L (ref 100–108)
CO2 SERPL-SCNC: 25 MMOL/L (ref 21–32)
CREAT SERPL-MCNC: 1.07 MG/DL (ref 0.6–1.3)
EOSINOPHIL # BLD MANUAL: 0.07 THOUSAND/UL (ref 0–0.4)
EOSINOPHIL NFR BLD MANUAL: 4 % (ref 0–6)
ERYTHROCYTE [DISTWIDTH] IN BLOOD BY AUTOMATED COUNT: 13.2 % (ref 11.6–15.1)
GFR SERPL CREATININE-BSD FRML MDRD: 58 ML/MIN/1.73SQ M
GLUCOSE SERPL-MCNC: 98 MG/DL (ref 65–140)
HCT VFR BLD AUTO: 36 % (ref 34.8–46.1)
HGB BLD-MCNC: 12.4 G/DL (ref 11.5–15.4)
LYMPHOCYTES # BLD AUTO: 0.47 THOUSAND/UL (ref 0.6–4.47)
LYMPHOCYTES # BLD AUTO: 26 % (ref 14–44)
MCH RBC QN AUTO: 37.3 PG (ref 26.8–34.3)
MCHC RBC AUTO-ENTMCNC: 34.4 G/DL (ref 31.4–37.4)
MCV RBC AUTO: 108 FL (ref 82–98)
MONOCYTES # BLD AUTO: 0.07 THOUSAND/UL (ref 0–1.22)
MONOCYTES NFR BLD: 4 % (ref 4–12)
NEUTROPHILS # BLD MANUAL: 1.18 THOUSAND/UL (ref 1.85–7.62)
NEUTS BAND NFR BLD MANUAL: 3 % (ref 0–8)
NEUTS SEG NFR BLD AUTO: 62 % (ref 43–75)
PLATELET # BLD AUTO: 223 THOUSANDS/UL (ref 149–390)
PLATELET BLD QL SMEAR: ADEQUATE
PMV BLD AUTO: 11.5 FL (ref 8.9–12.7)
POTASSIUM SERPL-SCNC: 4 MMOL/L (ref 3.5–5.3)
PROT SERPL-MCNC: 6.7 G/DL (ref 6.4–8.2)
RBC # BLD AUTO: 3.32 MILLION/UL (ref 3.81–5.12)
RBC MORPH BLD: NORMAL
SODIUM SERPL-SCNC: 139 MMOL/L (ref 136–145)
WBC # BLD AUTO: 1.82 THOUSAND/UL (ref 4.31–10.16)

## 2022-03-01 PROCEDURE — 36415 COLL VENOUS BLD VENIPUNCTURE: CPT

## 2022-03-01 PROCEDURE — 80053 COMPREHEN METABOLIC PANEL: CPT

## 2022-03-01 PROCEDURE — 96365 THER/PROPH/DIAG IV INF INIT: CPT

## 2022-03-01 PROCEDURE — 85027 COMPLETE CBC AUTOMATED: CPT

## 2022-03-01 PROCEDURE — 96402 CHEMO HORMON ANTINEOPL SQ/IM: CPT

## 2022-03-01 PROCEDURE — 85007 BL SMEAR W/DIFF WBC COUNT: CPT

## 2022-03-01 RX ORDER — SODIUM CHLORIDE 9 MG/ML
20 INJECTION, SOLUTION INTRAVENOUS ONCE
Status: COMPLETED | OUTPATIENT
Start: 2022-03-01 | End: 2022-03-01

## 2022-03-01 RX ORDER — SODIUM CHLORIDE 9 MG/ML
20 INJECTION, SOLUTION INTRAVENOUS ONCE
Status: CANCELLED | OUTPATIENT
Start: 2022-05-24

## 2022-03-01 RX ADMIN — ZOLEDRONIC ACID 3.3 MG: 4 INJECTION, SOLUTION, CONCENTRATE INTRAVENOUS at 15:01

## 2022-03-01 RX ADMIN — GOSERELIN ACETATE 3.6 MG: 3.6 IMPLANT SUBCUTANEOUS at 14:47

## 2022-03-01 RX ADMIN — SODIUM CHLORIDE 20 ML/HR: 9 INJECTION, SOLUTION INTRAVENOUS at 14:46

## 2022-03-01 NOTE — PROGRESS NOTES
Pt presented for zometa infusion offering no complaints Pt was given zoladex in RUQ of paulineen  Pt tolerated entire infusion without incident  PIV was removed, Pt declined AVS and was discharged in stable condition

## 2022-03-01 NOTE — PROGRESS NOTES
Reached out to Giovany BORGES, re: pts critical lab values of WBC and ANC  She will let dr Antonieta Zaragoza know  Also pt is concerned because their office hasn't rescheduled her CT scans since she had to cancel due to [place] recently  Office is aware and will reschedule them for her

## 2022-03-02 ENCOUNTER — TELEPHONE (OUTPATIENT)
Dept: HEMATOLOGY ONCOLOGY | Facility: CLINIC | Age: 57
End: 2022-03-02

## 2022-03-02 NOTE — TELEPHONE ENCOUNTER
Tc to pt in regards to blood work results  Reviewed with provider results okay and pt does not need to reschedule appointment with Malcolm Coronel PA-C on 3/18/22  Pt was able to reschedule the CT scan and the Bone Scan to 3/15/22 and 4/1/22  Pt encouraged to reach out with further questions and concerns

## 2022-03-02 NOTE — TELEPHONE ENCOUNTER
Patient called in regarding her lab results and bone scan  Patient was told her white blood cell count was extremely low and would like to know if she should be sooner than her appointment scheduled on 3/18/22 with Flori Flores  Patient also called to reschedule her bone scan  Patient was transferred over to central scheduling  Patient can be reached back at [phone]

## 2022-03-08 ENCOUNTER — TELEPHONE (OUTPATIENT)
Dept: PALLIATIVE MEDICINE | Facility: CLINIC | Age: 57
End: 2022-03-08

## 2022-03-08 DIAGNOSIS — C79.9 METASTATIC MALIGNANT NEOPLASM, UNSPECIFIED SITE (HCC): ICD-10-CM

## 2022-03-08 DIAGNOSIS — Z51.5 PALLIATIVE CARE PATIENT: ICD-10-CM

## 2022-03-08 DIAGNOSIS — K12.30 MUCOSITIS: ICD-10-CM

## 2022-03-08 NOTE — TELEPHONE ENCOUNTER
Primary palliative medicine provider:   Dr Era Apple    Medication requested:  rashid Bateman Barrier oxide-diphenhydramine-lidocaine viscous (MAGIC MOUTHWASH) 1:1:1 suspension    If for pain, how has the patient been taking their pain medicine? Last appointment:02/08    Next scheduled appointment: 3/10    PDMP review:  Na    PLEASE CALL OR FAX TO [facility]   [address] [facility] [address]  (pt usually goes to [facility])    [address]    [phone]    Unable to obtain this [facility] into profile  Per pharmacist will have all components to fill this compound by this afternoon

## 2022-03-10 ENCOUNTER — OFFICE VISIT (OUTPATIENT)
Dept: PALLIATIVE MEDICINE | Facility: CLINIC | Age: 57
End: 2022-03-10
Payer: COMMERCIAL

## 2022-03-10 VITALS
OXYGEN SATURATION: 97 % | HEART RATE: 67 BPM | DIASTOLIC BLOOD PRESSURE: 64 MMHG | TEMPERATURE: 72.9 F | SYSTOLIC BLOOD PRESSURE: 118 MMHG | BODY MASS INDEX: 25.61 KG/M2 | WEIGHT: 149.8 LBS

## 2022-03-10 DIAGNOSIS — G89.3 CANCER RELATED PAIN: ICD-10-CM

## 2022-03-10 DIAGNOSIS — J91.0 MALIGNANT PLEURAL EFFUSION: ICD-10-CM

## 2022-03-10 DIAGNOSIS — C50.911 PRIMARY MALIGNANT NEOPLASM OF RIGHT BREAST WITH METASTASIS TO OTHER SITE (HCC): Primary | ICD-10-CM

## 2022-03-10 DIAGNOSIS — C79.51 BONE METASTASES (HCC): ICD-10-CM

## 2022-03-10 DIAGNOSIS — Z51.5 PALLIATIVE CARE PATIENT: ICD-10-CM

## 2022-03-10 DIAGNOSIS — K59.00 CONSTIPATION, UNSPECIFIED CONSTIPATION TYPE: ICD-10-CM

## 2022-03-10 DIAGNOSIS — F32.A ANXIETY AND DEPRESSION: ICD-10-CM

## 2022-03-10 DIAGNOSIS — K12.30 MUCOSITIS: ICD-10-CM

## 2022-03-10 DIAGNOSIS — F41.9 ANXIETY AND DEPRESSION: ICD-10-CM

## 2022-03-10 PROCEDURE — 99214 OFFICE O/P EST MOD 30 MIN: CPT | Performed by: STUDENT IN AN ORGANIZED HEALTH CARE EDUCATION/TRAINING PROGRAM

## 2022-03-10 RX ORDER — LIDOCAINE HYDROCHLORIDE 20 MG/ML
10 SOLUTION OROPHARYNGEAL 4 TIMES DAILY PRN
Qty: 200 ML | Refills: 0 | Status: SHIPPED | OUTPATIENT
Start: 2022-03-10

## 2022-03-10 RX ORDER — HYDROMORPHONE HYDROCHLORIDE 2 MG/1
TABLET ORAL
Qty: 120 TABLET | Refills: 0 | Status: SHIPPED | OUTPATIENT
Start: 2022-03-10 | End: 2022-04-07 | Stop reason: SDUPTHER

## 2022-03-10 NOTE — PROGRESS NOTES
Outpatient Follow-Up - Palliative and Supportive Care   Kristopher Gray 49 y o  female [id number]    Assessment & Plan  Problem List Items Addressed This Visit        Respiratory    Malignant pleural effusion       Musculoskeletal and Integument    Bone metastases (HCC)    Relevant Medications    HYDROmorphone (DILAUDID) 2 mg tablet       Other    Palliative care patient    Relevant Medications    HYDROmorphone (DILAUDID) 2 mg tablet    Lidocaine Viscous HCl (XYLOCAINE) 2 % mucosal solution    Primary malignant neoplasm of right breast with metastasis to other site [facility]) - Primary    Relevant Medications    HYDROmorphone (DILAUDID) 2 mg tablet    Lidocaine Viscous HCl (XYLOCAINE) 2 % mucosal solution    Constipation    Cancer related pain    Relevant Medications    HYDROmorphone (DILAUDID) 2 mg tablet      Other Visit Diagnoses     Anxiety and depression        Mucositis        Relevant Medications    Lidocaine Viscous HCl (XYLOCAINE) 2 % mucosal solution        #symptom management  #cancer-related pain   - continue APAP 650 mg PO Q6H PRN              - max daily 4000 mg   - continue hydromorphone 2-4 mg PO Q4H PRN   - continue duloxetine therapy     Adequate pain management on current regimen  Use of PO hydromorphone x 3-4 tabs/day      #anxiety   - continue duloxetine 30 mg PO QDaily   - consider increase to 60 mg PO QDaily for KARRIE   - will coordinate with PCP     #mouth pain   - recent Rx for magic mouthwash, $$$ out-of-pocket per patient's spouse   - Rx sent to pharmacy for viscous lidocaine for symptomatic relief   - discussed 1:1:1 ratio for viscous lidocaine + diphenhydramine + Maalox for magic mouthwash, 10 mL swish/spit     #nausea   - continue metoclopramide 10 mg PO QID PRN   - continue ondansetron 4 mg PO Q8H PRN     #insomnia   - continue melatonin 6 mg PO QHS     #OIC   - continue home bowel regimen     #recurrent malignant pleural effusions   - drains 300-450 cc every 3-4 days    #recent lab work   - reviewed recent lab work: WBC 1 82    #psychosocial support   - emotional support provided   - [place] accompanied today's appointment      Next [address] [facility] Follow up in 4 weeks  Controlled Substance Review    [place] or [place]  reviewed: No red flags were identified; safe to proceed with prescription  [place] PDMP Review       Value Time User    PDMP Reviewed  Yes (P)  3/10/2022  7:30 AM Winsome Delgado MD          Medications adjusted this encounter:  Requested Prescriptions     Signed Prescriptions Disp Refills    HYDROmorphone (DILAUDID) 2 mg tablet 120 tablet 0     Sig: Take 2-4 mg [1-2 tabs] PO Q4H PRN  Max Daily 24 mg   Lidocaine Viscous HCl (XYLOCAINE) 2 % mucosal solution 200 mL 0     Sig: Swish and spit 10 mL 4 (four) times a day as needed for mouth pain or discomfort     No orders of the defined types were placed in this encounter  Medications Discontinued During This Encounter   Medication Reason    HYDROmorphone (DILAUDID) 2 mg tablet Reorder         Vishal Medina was seen today for symptoms and planning cares related to above illnesses  I have reviewed the patient's controlled substance dispensing history in the Prescription Drug Monitoring Program in compliance with the [facility] regulations before prescribing any controlled substances  They are invited to continue to follow with us  If there are questions or concerns, please contact us through our clinic/answering service 24 hours a day, seven days a week  Winsome Delgado MD  [facility] Palliative and Supportive Care        Visit Information    Accompanied By: Spouse    Source of History: Self, Spouse, Medical record    History Limitations: None      History of Present Illness    Vishal Medina is a 49 y o  female who presents in follow up of symptoms related to metastatic breast cancer, recurrent malignant pleural effusions s/p PleurX catheter placement   Pertinent issues include: symptom management, pain, neoplasm related, assessment of goals of care, advance care planning  Patient reports improving after a "tough month", which included COVID infection + social stressors  Otherwise, pain adequately managed on current regimen with use of PO hydromorphone x 3-4 tabs/day + APAP PRN  Pain often awakens patient from sleep, however, if regular use of APAP at night, minimal awakenings  Pain continues to localize at R rib-cage area with intermittent pain at L rib-cage  Denies nausea, vomiting  Appetite intact with some weight gain reported  BM regular, daily, resolved recent diarrhea  Sleep adequate with APAP QHS  Also reports hot flashes throughout the day  Past medical, surgical, social, and family histories are reviewed and pertinent updates are made  Review of Systems   Constitutional: Positive for chills  Negative for decreased appetite, fever, malaise/fatigue and weight loss  Hot flashes   HENT: Negative for congestion  Eyes: Negative for visual disturbance  Cardiovascular: Negative for chest pain  Respiratory: Negative for shortness of breath  Musculoskeletal: Negative for falls and neck pain  Rib cage pain   Gastrointestinal: Negative for abdominal pain, constipation, nausea and vomiting  Genitourinary: Negative for frequency  Neurological: Negative for headaches  Psychiatric/Behavioral: The patient does not have insomnia  All other systems reviewed and are negative  Vital Signs    /64 (BP Location: Left arm, Patient Position: Sitting, Cuff Size: Standard)   Pulse 67   Temp (!) 72 9 °F (22 7 °C) (Temporal)   Wt 67 9 kg (149 lb 12 8 oz)   SpO2 97%   BMI 25 61 kg/m²     Physical Exam and Objective Data  Physical Exam  Vitals and nursing note reviewed  Constitutional:       General: She is awake  Appearance: She is not diaphoretic  Comments: Sitting up comfortably in NAD  Non-toxic appearing   HENT:      Head: Normocephalic and atraumatic  Right Ear: External ear normal       Left Ear: External ear normal       Nose: No rhinorrhea  Eyes:      Comments: No gaze preference   Cardiovascular:      Rate and Rhythm: Normal rate  Pulmonary:      Effort: No tachypnea, accessory muscle usage or respiratory distress  Comments: Completes full sentences without difficulty  Musculoskeletal:      Cervical back: Normal range of motion  Neurological:      General: No focal deficit present  Mental Status: She is alert and oriented to person, place, and time  Psychiatric:         Attention and Perception: Attention normal          Mood and Affect: Mood and affect normal          Speech: Speech normal          Cognition and Memory: Cognition and memory normal            Radiology and Laboratory:  I personally reviewed and interpreted the following results:    Most Recent COVID-19 Results:  Lab Results   Component Value Date/Time    SARSCOV2 Positive (A) 02/11/2022 02:10 PM    SARSCOV2 Negative 11/11/2021 03:42 PM    SARSCOV2 Positive (A) 12/20/2020 12:25 PM       Most Recent Lab Work:  Lab Results   Component Value Date/Time    SODIUM 139 03/01/2022 01:10 PM    K 4 0 03/01/2022 01:10 PM    K 3 7 04/02/2014 01:15 PM    BUN 20 03/01/2022 01:10 PM    BUN 12 04/02/2014 01:15 PM    CREATININE 1 07 03/01/2022 01:10 PM    CREATININE 0 80 04/02/2014 01:15 PM    GLUC 98 03/01/2022 01:10 PM     Lab Results   Component Value Date/Time    AST 12 03/01/2022 01:10 PM    AST 23 04/02/2014 01:15 PM    ALT 14 03/01/2022 01:10 PM    ALT 15 04/02/2014 01:15 PM    ALB 3 6 03/01/2022 01:10 PM    ALB 3 4 (L) 04/02/2014 01:15 PM     Lab Results   Component Value Date/Time    HGB 12 4 03/01/2022 01:10 PM    WBC 1 82 (LL) 03/01/2022 01:10 PM     03/01/2022 01:10 PM    INR 1 07 08/17/2021 06:14 AM       Most Recent Imaging [last 30 days]:  Procedure: XR chest pa & lateral    Result Date: 2/23/2022  Narrative: CHEST INDICATION:   R06 02: Shortness of breath   COMPARISON: Compared with 10/19/2021 EXAM PERFORMED/VIEWS:  XR CHEST PA & LATERAL FINDINGS:  Right lung base Pleurx catheter in place  Cardiomediastinal silhouette appears unremarkable  The lungs are clear  No pneumothorax or pleural effusion  Osseous structures appear within normal limits for patient age  Impression: Right lung base Pleurx catheter in place with no effusion  No acute cardiopulmonary disease  Workstation performed: [id number]       30 minutes was spent face to face with Shreyas Littlejohn and her spouse with greater than 50% of the time spent in counseling or coordination of care including discussions of provided medical updates, discussed palliative care, determined goals of care, determined social/family support, discussed plans of care, discussed symptom management, provided psychosocial support  Medication refills  PDMP Reviewed  All of the patient's or agent's questions were answered during this discussion

## 2022-03-15 ENCOUNTER — HOSPITAL ENCOUNTER (OUTPATIENT)
Dept: NUCLEAR MEDICINE | Facility: HOSPITAL | Age: 57
Discharge: HOME | End: 2022-03-15
Attending: INTERNAL MEDICINE
Payer: COMMERCIAL

## 2022-03-15 DIAGNOSIS — Z17.0 MALIGNANT NEOPLASM OF LEFT BREAST IN FEMALE, ESTROGEN RECEPTOR POSITIVE, UNSPECIFIED SITE OF BREAST (HCC): ICD-10-CM

## 2022-03-15 DIAGNOSIS — C50.912 MALIGNANT NEOPLASM OF LEFT BREAST IN FEMALE, ESTROGEN RECEPTOR POSITIVE, UNSPECIFIED SITE OF BREAST (HCC): ICD-10-CM

## 2022-03-15 DIAGNOSIS — C79.51 BONE METASTASES (HCC): ICD-10-CM

## 2022-03-15 PROCEDURE — G1004 CDSM NDSC: HCPCS

## 2022-03-15 PROCEDURE — 78306 BONE IMAGING WHOLE BODY: CPT

## 2022-03-15 PROCEDURE — A9503 TC99M MEDRONATE: HCPCS

## 2022-03-18 ENCOUNTER — NURSE TRIAGE (OUTPATIENT)
Dept: OTHER | Facility: OTHER | Age: 57
End: 2022-03-18

## 2022-03-18 NOTE — TELEPHONE ENCOUNTER
Patient is not feeling well today so her  called to cancel her appointment  Appointment rescheduled for 3/24/22 at 0900  Patient's  would like a call back if there are any afternoon appointments available in March

## 2022-03-18 NOTE — TELEPHONE ENCOUNTER
Reason for Disposition   Requesting regular office appointment    Protocols used: INFORMATION ONLY CALL - NO TRIAGE-ADULT-AH

## 2022-03-23 NOTE — PROGRESS NOTES
Hematology/Oncology Progress Note    Date of Service: 3/24/2022    [address] [facility]   [address] [address]    Hem/Onc Problem List:   1  Metastatic right-sided breast cancer, ER and NE positive, HER2 negative  2  Bone metastatic disease  3  History of right-sided pleural effusion  Chief Complaint:    Routine follow-up for management of stage IV breast cancer    Assessment/Plan:     I personally reviewed the lab results, image studies results and other specialties/physicians consult notes and recommendations  I shared the findings with patient and family, discussed the diagnosis and management plan as below  1   Metastatic breast cancer, with liver, bone, lymph node and pleura involvement   ER and NE positive, HER2 negative  Bone scan showed bony metastatic disease  Patient started monthly Zoladex, daily letrozole and CDK4/6 inhibitor Abemeciclib  Unfortunately, patient developed significant watery diarrhea from Abemaciclib  Abemaciclib was changed to Ibrance 125 mg daily for 3 weeks on,  followed by 1 week off until disease progression on August 30, 2021  Patient had a bone scan showing stable, improved findings 03/15/22  No new suspicious lesions  Her CT for restaging is scheduled for 04/01/2022  We will follow these imagings  Last labs were acceptable  She appears to be tolerating treatment as above  She will continue to have lab work once every 3-4 weeks  She will follow-up again with Dr Kamila Fernandez in 2-3 months with her next CT, bone scan end of June 2  Bony metastatic disease  Bone scan showed bony metastatic disease  Patient  continues Zometa every 3 months  She also continues calcium, vitamin-D  She is taking vitamin-D 50,000 once weekly  She would like to know if she can decrease this dosage    We will repeat her vitamin D testing to ensure closer to normal   If so, we can decrease vitamin-D to 1000 IU  3  Right-sided pleural effusion, status post thoracentesis, cytology positive for adenocarcinoma, ER/AZ positive, HER2 negative  Status post PleurX catheter placement  Patient drained 325 cc fluid on December 1, 2020     having no complaints regarding catheter  Will continue to treat with Ibrance and see if this lessens pleural effusion output  4  Leukopenia 2nd to [place]  [address] on last lab work was 1180  Grade 2 neutropenia  Will continue current dose of Ibrance at this time  No need for adjustment for now  5  Bone pain secondary to malignancy, stable findings on bone scan  She has some pain on her right rib area consistent with finding of bone met  She currently follows palliative care  She takes Dilaudid 2-4 mg p o  Q 4 hour p r n       Disclaimer: This document was prepared using [employer] technology  If a word or phrase is confusing, or does not make sense, this is likely due to recognition error which was not discovered during the providers review  If you believe an error has occurred, please Contact me through [employer] service for roula? cation  AJCC 8th Edition Cancer Stage :      Cancer Staging  No matching staging information was found for the patient  Hematology/Oncology History:   · History of right-sided breast cancer, initially diagnosed in 2012, status post lumpectomy and axillary lymph node dissection, status post adjuvant radiation therapy    Patient did not receive any adjuvant endocrine therapy or chemotherapy  · July 24, 2021, patient was admitted to hospital because of shortness of breath and cough  CT chest PE protocol showed septal thickening throughout the right lung and bronchial wall thickening due to lymphangitic spread of tumor   Indeterminate lung nodules measuring 5 millimeter in the right upper lobe, right middle lobe and 7 millimeter in the left upper lobe and left lower lobe   Large right pleural effusion   Multiple liver metastatic disease measuring up to 4 centimeter   Lytic metastatic to sternal manubrium  · July 26, 2021, ultrasound abdomen shows multiple hepatic metastatic disease   Status post thoracentesis with 1200 mL of joanne fluid removed   Cytology showed adenoma carcinoma, ER and MN positive  40-50%, HER2 negative  · July 26, 2021 biopsy of the lung liver showed metastatic breast cancer, ER and % positive, HER2 negative   CT abdomen pelvis with contrast showed extensive hepatic metastatic disease  · July 27, 2021 MRI brain showed no evidence of intracranial metastatic disease  · August 6, 2021, mammogram showed no mammographic evidence of malignancy  · August 11, 2021 bone scan showed bony metastatic disease involving left iliac crest medially and adjacent sacrum  · August 12, 2021, Zoladex was given  Letrozole started  · August 16, 2022, patient started Abemaciclib  · August 30, 2021, DC abemaciclib because of diarrhea  Start Ibrance 125 mg daily 3 weeks on 1 week off  · Nov 19, 2021, CT c/a/p showed:      IMPRESSION:     1   Since July 2021, new thromboses within the intrahepatic branches of the portal vein as described above  2   Increased diffuse sclerotic osseous lesions     3   Decreased size of most of the hepatic metastases  4   Indeterminate mottled appearance of the splenic parenchyma   Attention on follow-up is advised  5   Unchanged pulmonary nodules  6   Right pleural effusion has decreased in size since July 24, 2021   The smooth interlobular septal thickening has also decreased, and this change can be attributable to the decreased size of the pleural effusion  7   Mucosal hyperenhancement of the colon   Findings may be treatment-related, and correlation with gastrointestinal symptoms is advised      BONE SCAN:     IMPRESSION:     1   A few areas of increased radiotracer uptake suspicious for osseous metastasis, with findings for minimal progression       · March 15, 2022, Patient had bone scan showing = stable or improving scattered foci radiotracer uptake suspicious for osseous mets  No new osseous foci suspicious for metastasis  CT scan scheduled for 4/1/2022  History of Present Illiness:   Danya Naranjo is a 49 y o  female with the above-noted HemOnc history who is here to follow-up regarding breast cancer management  She is currently on Zoladex INJ Q28 days + Ibrance 125mg once daily 3 weeks on/1 week off  Also, using Zometa 4mg INJ Q 84 days  She also takes calcium and vitamin-D supplementation  Labs 3/1/2022 --> WBC 1 82, Hgb 12 4, , PLT 223K, ANC 1 18, abs lymph 0 47  otherwise unremarkable diff  CMP completely normal  Bone scan normal  CT scan pending (scheduled for 4/1/22)  Patient overall feels okay  She has no new headaches, bone pain, abdominal pain, chest pain, shortness of breath, nausea/vomiting/diarrhea  She does have some mild rib pain which seems consistent with her bone lesions in that area  However, she states that this is well managed by palliative care  Patient feels fine  No fever or chills  No exertional chest pain, diaphoresis or shortness  of breath  No cough and phlegm, no hemoptysis  Patient denied nausea  and vomiting  No abdominal pain  No diarrhea or constipation  No  symptoms  No headache or blurred vision  No seizure activity  Appetite good  No significant weight loss or weight gain  The patient denies bleeding anywhere  + R rib pain, bone pain  ROS: A 12-point of review of systems is obtained and other than the above is noncontributory  Objective:   VITALS:   /62 (BP Location: Left arm, Patient Position: Sitting, Cuff Size: Adult)   Pulse 61   Temp 97 9 °F (36 6 °C)   Resp 16   Ht 5' 4" (1 626 m)   Wt 67 6 kg (149 lb)   SpO2 98%   BMI 25 58 kg/m²     Physical EXAM:  General:  Alert, cooperative, no distress, appears stated age  Head:  Normocephalic, without obvious abnormality, atraumatic  Eyes:  Conjunctivae/corneas clear  PERRL, EOMs intact  No evidence of conjunctivitis     Throat: Lips, mucosa, and tongue normal   No bleeding from mouth  Neck: Supple, symmetrical, trachea midline    Lungs:   Clear to auscultation bilaterally  Respiratory effort easy, nonlabored    Heart:  Regular rate and rhythm, S1, S2 normal, no murmur  Abdomen:   Soft, non-tender,nondistended  Bowel sounds normal  No masses,  No organomegaly  Extremities:  Lymphatics: Extremities normal, atraumatic, no cyanosis or edema  No cervical, axillary or inguinal adenopathy   Skin: Skin color, texture, turgor normal  No rashes  Neurologic: A&Ox4   No focal neuro deficits       Allergies   Allergen Reactions    Codeine Anaphylaxis    Morphine GI Intolerance, Itching and Vomiting    Sulfa Antibiotics Hives and Itching       Past Medical History:   Diagnosis Date    History of radiation exposure     Malignant neoplasm of right female breast ([facility] [address] ) 2012    right       Past Surgical History:   Procedure Laterality Date    BREAST CYST EXCISION      BREAST LUMPECTOMY Right 2012    HIP SURGERY      IR BIOPSY LIVER MASS  7/26/2021    IR PLEURAL EFFUSION LONG-TERM CATHETER PLACEMENT  8/17/2021    IR THORACENTESIS  7/26/2021       Family History   Problem Relation Age of Onset    Breast cancer Mother         in her 63's    Breast cancer Sister         inher 45s and 48    Colon cancer Maternal Grandmother     No Known Problems Paternal Grandmother     Breast cancer Other     No Known Problems Paternal Aunt        Social History     Socioeconomic History    Marital status: /Civil Union     Spouse name: Not on file    Number of children: Not on file    Years of education: Not on file    Highest education level: Not on file   Occupational History    Not on file   Tobacco Use    Smoking status: Former Smoker     Packs/day: 0 25     Types: Cigarettes    Smokeless tobacco: Never Used   Vaping Use    Vaping Use: Never used   Substance and Sexual Activity    Alcohol use: Not Currently    Drug use: Not Currently    Sexual activity: Not Currently   Other Topics Concern    Not on file   Social History Narrative    Not on file     Social Determinants of Health     Financial Resource Strain: Not on file   Food Insecurity: Not on file   Transportation Needs: No Transportation Needs    Lack of Transportation (Medical): No    Lack of Transportation (Non-Medical):  No   Physical Activity: Not on file   Stress: Not on file   Social Connections: Not on file   Intimate Partner Violence: Not on file   Housing Stability: Not on file       Current Outpatient Medications   Medication Sig Dispense Refill    acetaminophen (TYLENOL) 325 mg tablet Take 2 tablets (650 mg total) by mouth every 6 (six) hours as needed for mild pain 30 tablet 0    al mag oxide-diphenhydramine-lidocaine viscous (MAGIC MOUTHWASH) 1:1:1 suspension Swish and spit 10 mL every 4 (four) hours as needed for mouth pain or discomfort 300 mL 0    albuterol (PROVENTIL HFA,VENTOLIN HFA) 90 mcg/act inhaler Inhale 2 puffs every 4 (four) hours as needed for wheezing 8 g 0    benzonatate (TESSALON) 200 MG capsule Take 1 capsule (200 mg total) by mouth 3 (three) times a day as needed for cough 20 capsule 0    calcium carbonate (TUMS) 500 mg chewable tablet Chew 1 tablet (500 mg total) 3 (three) times a day as needed for indigestion or heartburn 30 tablet 0    dextromethorphan-guaiFENesin (ROBITUSSIN DM)  mg/5 mL syrup Take 10 mL by mouth every 4 (four) hours as needed for cough 236 mL 0    diphenhydrAMINE (BENADRYL) 50 MG tablet Take 1 tablet (50 mg total) by mouth daily at bedtime as needed for itching or sleep 30 tablet 0    diphenoxylate-atropine (LOMOTIL) 2 5-0 025 mg per tablet Take 1 tablet by mouth 4 (four) times a day as needed for diarrhea 30 tablet 0    DULoxetine (CYMBALTA) 30 mg delayed release capsule Take 1 capsule (30 mg total) by mouth daily 90 capsule 3    fluticasone (FLONASE) 50 mcg/act nasal spray 2 sprays into each nostril daily      HYDROmorphone (DILAUDID) 2 mg tablet Take 2-4 mg [1-2 tabs] PO Q4H PRN  Max Daily 24 mg  120 tablet 0    letrozole (FEMARA) 2 5 mg tablet TAKE 1 TABLET BY MOUTH EVERY DAY 90 tablet 2    Lidocaine Viscous HCl (XYLOCAINE) 2 % mucosal solution Swish and spit 10 mL 4 (four) times a day as needed for mouth pain or discomfort 200 mL 0    melatonin 3 mg Take 2 tablets (6 mg total) by mouth daily at bedtime 15 tablet 0    metoclopramide (REGLAN) 10 mg tablet Take 1 tablet (10 mg total) by mouth 4 (four) times a day 28 tablet 0    multivitamin (THERAGRAN) TABS Take 1 tablet by mouth      ondansetron (ZOFRAN) 4 mg tablet Take 1 tablet (4 mg total) by mouth every 8 (eight) hours as needed for nausea or vomiting 20 tablet 0    oxybutynin (DITROPAN-XL) 5 mg 24 hr tablet Take 1 tablet (5 mg total) by mouth daily Take 1 tablet daily 90 tablet 3    palbociclib (IBRANCE) 125 MG tablet Take 1 tablet (125 mg total) by mouth daily 21 tablet 11    rivaroxaban (Xarelto) 20 mg tablet Take 1 tablet (20 mg total) by mouth daily with breakfast 30 tablet 2     No current facility-administered medications for this visit  (Not in a hospital admission)      DATA REVIEW:    Pathology Result:    Final Diagnosis   Date Value Ref Range Status   07/26/2021   Final    A  B  Thoracentesis, Right (ThinPrep and cell block preparations):  Positive for malignancy  Metastatic carcinoma, compatible with breast primary  -  Immunohistochemical stains performed with appropriate controls show the tumor cells to be positive for Matthew-EP4, MOC31, BARRY-3 and ER with scattered background mesothelial cells staining for WT1 and calretinin, supporting the diagnosis  Satisfactory for evaluation  07/26/2021   Final    A   Liver (core biopsy):     - Poorly-differentiated carcinoma, most compatible with metastasis from the patient's reported breast primary  Comment:  ER / NE / Her-2 pending  Comment: This is an appended report  These results have been appended to a previously preliminary verified report  Image Results: They are reviewed and documented in Hematology/Oncology history    NM bone scan whole body  Narrative: BONE SCAN  WHOLE BODY    INDICATION: C79 51: Secondary malignant neoplasm of bone  C50 912: Malignant neoplasm of unspecified site of left female breast  Z17 0: Estrogen receptor positive status (ER+)    PREVIOUS FILM CORRELATION:    Bone scan 8/11/2021, CT chest abdomen pelvis 11/19/2021    TECHNIQUE:   This study was performed following the intravenous administration of 26 5 mCi Tc-99m labeled MDP  Delayed, anterior and posterior whole body images were acquired, 2-3 hours after radiopharmaceutical administration  Additional delayed   oblique static images acquired of the bilateral ribs and pelvis  FINDINGS:  Scattered foci of radiotracer activity again suspicious for metastasis  Less conspicuous activity at the left superior iliac crest compared to the prior exam     Persistent small focus of radiotracer uptake at the left manubrium  Stable small focus of radiotracer uptake at a right mid rib posteriorly  Mild soft tissue activity overlying the right hemithorax again likely related to malignant pleural effusion  No new osseous foci suspicious for metastasis  Symmetric renal activity  Impression: 1  Stable or improving scattered foci radiotracer uptake suspicious for osseous metastases  No new osseous foci suspicious for metastasis  Workstation performed: [id number]        LABS:  Lab data are reviewed and documented in HemOnc history  No results found for this or any previous visit (from the past 48 hour(s))            Christopher Marin PA-C  3/24/2022, 2:48 PM

## 2022-03-24 ENCOUNTER — OFFICE VISIT (OUTPATIENT)
Dept: HEMATOLOGY ONCOLOGY | Facility: CLINIC | Age: 57
End: 2022-03-24
Payer: COMMERCIAL

## 2022-03-24 VITALS
HEIGHT: 64 IN | RESPIRATION RATE: 16 BRPM | OXYGEN SATURATION: 98 % | HEART RATE: 61 BPM | WEIGHT: 149 LBS | DIASTOLIC BLOOD PRESSURE: 62 MMHG | TEMPERATURE: 97.9 F | SYSTOLIC BLOOD PRESSURE: 116 MMHG | BODY MASS INDEX: 25.44 KG/M2

## 2022-03-24 DIAGNOSIS — C78.7 MALIGNANT NEOPLASM METASTATIC TO LIVER (HCC): ICD-10-CM

## 2022-03-24 DIAGNOSIS — E55.9 VITAMIN D DEFICIENCY: ICD-10-CM

## 2022-03-24 DIAGNOSIS — C50.919 MALIGNANT NEOPLASM OF BREAST IN FEMALE, ESTROGEN RECEPTOR POSITIVE, UNSPECIFIED LATERALITY, UNSPECIFIED SITE OF BREAST (HCC): Primary | ICD-10-CM

## 2022-03-24 DIAGNOSIS — Z17.0 MALIGNANT NEOPLASM OF BREAST IN FEMALE, ESTROGEN RECEPTOR POSITIVE, UNSPECIFIED LATERALITY, UNSPECIFIED SITE OF BREAST (HCC): Primary | ICD-10-CM

## 2022-03-24 DIAGNOSIS — R07.81 RIB PAIN ON RIGHT SIDE: ICD-10-CM

## 2022-03-24 DIAGNOSIS — C79.51 BONE METASTASES (HCC): ICD-10-CM

## 2022-03-24 PROCEDURE — 99215 OFFICE O/P EST HI 40 MIN: CPT | Performed by: INTERNAL MEDICINE

## 2022-03-29 ENCOUNTER — TELEPHONE (OUTPATIENT)
Dept: HEMATOLOGY ONCOLOGY | Facility: CLINIC | Age: 57
End: 2022-03-29

## 2022-03-29 ENCOUNTER — HOSPITAL ENCOUNTER (OUTPATIENT)
Dept: INFUSION CENTER | Facility: CLINIC | Age: 57
Discharge: HOME | End: 2022-03-29
Payer: COMMERCIAL

## 2022-03-29 ENCOUNTER — APPOINTMENT (OUTPATIENT)
Dept: LAB | Facility: HOSPITAL | Age: 57
End: 2022-03-29
Payer: COMMERCIAL

## 2022-03-29 VITALS
RESPIRATION RATE: 18 BRPM | DIASTOLIC BLOOD PRESSURE: 54 MMHG | SYSTOLIC BLOOD PRESSURE: 108 MMHG | TEMPERATURE: 97.1 F | HEART RATE: 63 BPM

## 2022-03-29 DIAGNOSIS — C50.919 MALIGNANT NEOPLASM OF BREAST IN FEMALE, ESTROGEN RECEPTOR POSITIVE, UNSPECIFIED LATERALITY, UNSPECIFIED SITE OF BREAST (HCC): ICD-10-CM

## 2022-03-29 DIAGNOSIS — E55.9 VITAMIN D DEFICIENCY: ICD-10-CM

## 2022-03-29 DIAGNOSIS — Z17.0 MALIGNANT NEOPLASM OF BREAST IN FEMALE, ESTROGEN RECEPTOR POSITIVE, UNSPECIFIED LATERALITY, UNSPECIFIED SITE OF BREAST (HCC): ICD-10-CM

## 2022-03-29 DIAGNOSIS — C78.7 MALIGNANT NEOPLASM METASTATIC TO LIVER (HCC): Primary | ICD-10-CM

## 2022-03-29 LAB
25(OH)D3 SERPL-MCNC: 35.9 NG/ML (ref 30–100)
ALBUMIN SERPL BCP-MCNC: 3.4 G/DL (ref 3.5–5)
ALP SERPL-CCNC: 50 U/L (ref 46–116)
ALT SERPL W P-5'-P-CCNC: 14 U/L (ref 12–78)
ANION GAP SERPL CALCULATED.3IONS-SCNC: 4 MMOL/L (ref 4–13)
ANISOCYTOSIS BLD QL SMEAR: PRESENT
AST SERPL W P-5'-P-CCNC: 20 U/L (ref 5–45)
BASOPHILS # BLD MANUAL: 0.03 THOUSAND/UL (ref 0–0.1)
BASOPHILS NFR MAR MANUAL: 2 % (ref 0–1)
BILIRUB SERPL-MCNC: 0.26 MG/DL (ref 0.2–1)
BUN SERPL-MCNC: 22 MG/DL (ref 5–25)
CALCIUM ALBUM COR SERPL-MCNC: 9.4 MG/DL (ref 8.3–10.1)
CALCIUM SERPL-MCNC: 8.9 MG/DL (ref 8.3–10.1)
CANCER AG27-29 SERPL-ACNC: 80.8 U/ML (ref 0–42.3)
CHLORIDE SERPL-SCNC: 105 MMOL/L (ref 100–108)
CO2 SERPL-SCNC: 28 MMOL/L (ref 21–32)
CREAT SERPL-MCNC: 1.1 MG/DL (ref 0.6–1.3)
EOSINOPHIL # BLD MANUAL: 0.03 THOUSAND/UL (ref 0–0.4)
EOSINOPHIL NFR BLD MANUAL: 2 % (ref 0–6)
ERYTHROCYTE [DISTWIDTH] IN BLOOD BY AUTOMATED COUNT: 13.6 % (ref 11.6–15.1)
GFR SERPL CREATININE-BSD FRML MDRD: 57 ML/MIN/1.73SQ M
GLUCOSE SERPL-MCNC: 75 MG/DL (ref 65–140)
HCT VFR BLD AUTO: 37.1 % (ref 34.8–46.1)
HGB BLD-MCNC: 12.4 G/DL (ref 11.5–15.4)
LYMPHOCYTES # BLD AUTO: 0.51 THOUSAND/UL (ref 0.6–4.47)
LYMPHOCYTES # BLD AUTO: 33 % (ref 14–44)
MACROCYTES BLD QL AUTO: PRESENT
MCH RBC QN AUTO: 38.3 PG (ref 26.8–34.3)
MCHC RBC AUTO-ENTMCNC: 33.4 G/DL (ref 31.4–37.4)
MCV RBC AUTO: 115 FL (ref 82–98)
MONOCYTES # BLD AUTO: 0.06 THOUSAND/UL (ref 0–1.22)
MONOCYTES NFR BLD: 4 % (ref 4–12)
NEUTROPHILS # BLD MANUAL: 0.89 THOUSAND/UL (ref 1.85–7.62)
NEUTS BAND NFR BLD MANUAL: 2 % (ref 0–8)
NEUTS SEG NFR BLD AUTO: 55 % (ref 43–75)
PLATELET # BLD AUTO: 251 THOUSANDS/UL (ref 149–390)
PLATELET BLD QL SMEAR: ADEQUATE
PMV BLD AUTO: 11.7 FL (ref 8.9–12.7)
POTASSIUM SERPL-SCNC: 4.6 MMOL/L (ref 3.5–5.3)
PROT SERPL-MCNC: 6.8 G/DL (ref 6.4–8.2)
RBC # BLD AUTO: 3.24 MILLION/UL (ref 3.81–5.12)
SODIUM SERPL-SCNC: 137 MMOL/L (ref 136–145)
VARIANT LYMPHS # BLD AUTO: 2 %
WBC # BLD AUTO: 1.56 THOUSAND/UL (ref 4.31–10.16)

## 2022-03-29 PROCEDURE — 86300 IMMUNOASSAY TUMOR CA 15-3: CPT | Performed by: INTERNAL MEDICINE

## 2022-03-29 PROCEDURE — 80053 COMPREHEN METABOLIC PANEL: CPT | Performed by: INTERNAL MEDICINE

## 2022-03-29 PROCEDURE — 85027 COMPLETE CBC AUTOMATED: CPT | Performed by: INTERNAL MEDICINE

## 2022-03-29 PROCEDURE — 82306 VITAMIN D 25 HYDROXY: CPT

## 2022-03-29 PROCEDURE — 96402 CHEMO HORMON ANTINEOPL SQ/IM: CPT

## 2022-03-29 PROCEDURE — 36415 COLL VENOUS BLD VENIPUNCTURE: CPT | Performed by: INTERNAL MEDICINE

## 2022-03-29 PROCEDURE — 85007 BL SMEAR W/DIFF WBC COUNT: CPT | Performed by: INTERNAL MEDICINE

## 2022-03-29 RX ADMIN — GOSERELIN ACETATE 3.6 MG: 3.6 IMPLANT SUBCUTANEOUS at 13:23

## 2022-03-29 NOTE — TELEPHONE ENCOUNTER
Received phone call from [facility] [address] lab in regards to lab value  WBC 1 56    Sent to provider for review

## 2022-03-29 NOTE — PROGRESS NOTES
Pt here today for Zoladex, denies any discomforts  Injection given sub-q in left abdomen, pt tolerated well    Pt aware of next appointment, printed AVS

## 2022-03-30 DIAGNOSIS — Z17.0 MALIGNANT NEOPLASM OF LEFT BREAST IN FEMALE, ESTROGEN RECEPTOR POSITIVE, UNSPECIFIED SITE OF BREAST (HCC): ICD-10-CM

## 2022-03-30 DIAGNOSIS — C50.912 MALIGNANT NEOPLASM OF LEFT BREAST IN FEMALE, ESTROGEN RECEPTOR POSITIVE, UNSPECIFIED SITE OF BREAST (HCC): ICD-10-CM

## 2022-03-30 DIAGNOSIS — I82.90 THROMBOSIS: ICD-10-CM

## 2022-03-30 LAB — CANCER AG15-3 SERPL-ACNC: 49 U/ML (ref 0–25)

## 2022-03-30 NOTE — TELEPHONE ENCOUNTER
Refill for xarelto pending provider signature to be sent to [facility]      Refill for Ibrance pending provider signature to be sent to [facility] specialty pharmacy

## 2022-03-31 DIAGNOSIS — I82.90 THROMBOSIS: Primary | ICD-10-CM

## 2022-04-01 ENCOUNTER — HOSPITAL ENCOUNTER (OUTPATIENT)
Dept: CT IMAGING | Facility: CLINIC | Age: 57
Discharge: HOME | End: 2022-04-01
Payer: COMMERCIAL

## 2022-04-01 ENCOUNTER — DOCUMENTATION (OUTPATIENT)
Dept: HEMATOLOGY ONCOLOGY | Facility: CLINIC | Age: 57
End: 2022-04-01

## 2022-04-01 DIAGNOSIS — Z17.0 MALIGNANT NEOPLASM OF LEFT BREAST IN FEMALE, ESTROGEN RECEPTOR POSITIVE, UNSPECIFIED SITE OF BREAST (HCC): Primary | ICD-10-CM

## 2022-04-01 DIAGNOSIS — C50.912 MALIGNANT NEOPLASM OF LEFT BREAST IN FEMALE, ESTROGEN RECEPTOR POSITIVE, UNSPECIFIED SITE OF BREAST (HCC): ICD-10-CM

## 2022-04-01 DIAGNOSIS — C79.51 BONE METASTASES (HCC): ICD-10-CM

## 2022-04-01 DIAGNOSIS — Z17.0 MALIGNANT NEOPLASM OF LEFT BREAST IN FEMALE, ESTROGEN RECEPTOR POSITIVE, UNSPECIFIED SITE OF BREAST (HCC): ICD-10-CM

## 2022-04-01 DIAGNOSIS — C50.912 MALIGNANT NEOPLASM OF LEFT BREAST IN FEMALE, ESTROGEN RECEPTOR POSITIVE, UNSPECIFIED SITE OF BREAST (HCC): Primary | ICD-10-CM

## 2022-04-01 PROCEDURE — 74177 CT ABD & PELVIS W/CONTRAST: CPT

## 2022-04-01 PROCEDURE — 71260 CT THORAX DX C+: CPT

## 2022-04-01 PROCEDURE — G1004 CDSM NDSC: HCPCS

## 2022-04-01 RX ADMIN — IOHEXOL 100 ML: 350 INJECTION, SOLUTION INTRAVENOUS at 14:52

## 2022-04-01 NOTE — PROGRESS NOTES
Received request from clinical stating they are decreasing patients Ibrance from 125 mg to 100mg   Auth is needed per [facility]- Auth has been submitted and this is pending  (Key: [id number])      Help Desk- [phone]     ID- [id number]  Jayashree Alpha: [id number] / PCN: [id number]  Group- [id number]

## 2022-04-01 NOTE — TELEPHONE ENCOUNTER
Per Kathie Valdes PA-C pt will be decreasing pt Ibrance from 125mg to 100mg for 21days  Prescription pending provider signature  Prescription sent to [facility] in [place] per [facility]

## 2022-04-05 ENCOUNTER — TELEPHONE (OUTPATIENT)
Dept: HEMATOLOGY ONCOLOGY | Facility: CLINIC | Age: 57
End: 2022-04-05

## 2022-04-05 NOTE — TELEPHONE ENCOUNTER
Reached out to patient from [facility] [address] request for Ibrance 100mg shipment  Pt notified and agreeable  Explained to pt and pt spouse blood work and reasoning behind decrease in [place]  Reviewed schedule of appointments and when to call our office  Encouraged to call back with further questions and concerns  Pt reported will call [facility] today

## 2022-04-07 ENCOUNTER — OFFICE VISIT (OUTPATIENT)
Dept: PALLIATIVE MEDICINE | Facility: CLINIC | Age: 57
End: 2022-04-07
Payer: COMMERCIAL

## 2022-04-07 ENCOUNTER — TELEPHONE (OUTPATIENT)
Dept: HEMATOLOGY ONCOLOGY | Facility: CLINIC | Age: 57
End: 2022-04-07

## 2022-04-07 ENCOUNTER — APPOINTMENT (OUTPATIENT)
Dept: LAB | Facility: HOSPITAL | Age: 57
End: 2022-04-07
Payer: COMMERCIAL

## 2022-04-07 VITALS
BODY MASS INDEX: 25.88 KG/M2 | WEIGHT: 150.8 LBS | TEMPERATURE: 98.5 F | DIASTOLIC BLOOD PRESSURE: 70 MMHG | RESPIRATION RATE: 20 BRPM | OXYGEN SATURATION: 97 % | HEART RATE: 70 BPM | SYSTOLIC BLOOD PRESSURE: 120 MMHG

## 2022-04-07 DIAGNOSIS — C50.911 PRIMARY MALIGNANT NEOPLASM OF RIGHT BREAST WITH METASTASIS TO OTHER SITE (HCC): Primary | ICD-10-CM

## 2022-04-07 DIAGNOSIS — G89.3 CANCER RELATED PAIN: ICD-10-CM

## 2022-04-07 DIAGNOSIS — G89.3 CANCER ASSOCIATED PAIN: ICD-10-CM

## 2022-04-07 DIAGNOSIS — J91.0 MALIGNANT PLEURAL EFFUSION: ICD-10-CM

## 2022-04-07 DIAGNOSIS — C79.9 METASTATIC MALIGNANT NEOPLASM, UNSPECIFIED SITE (HCC): ICD-10-CM

## 2022-04-07 DIAGNOSIS — C78.7 MALIGNANT NEOPLASM METASTATIC TO LIVER (HCC): ICD-10-CM

## 2022-04-07 DIAGNOSIS — C50.919 MALIGNANT NEOPLASM OF BREAST IN FEMALE, ESTROGEN RECEPTOR POSITIVE, UNSPECIFIED LATERALITY, UNSPECIFIED SITE OF BREAST (HCC): ICD-10-CM

## 2022-04-07 DIAGNOSIS — Z17.0 MALIGNANT NEOPLASM OF BREAST IN FEMALE, ESTROGEN RECEPTOR POSITIVE, UNSPECIFIED LATERALITY, UNSPECIFIED SITE OF BREAST (HCC): ICD-10-CM

## 2022-04-07 DIAGNOSIS — Z51.5 PALLIATIVE CARE PATIENT: ICD-10-CM

## 2022-04-07 DIAGNOSIS — C79.51 BONE METASTASES (HCC): ICD-10-CM

## 2022-04-07 DIAGNOSIS — K59.00 CONSTIPATION, UNSPECIFIED CONSTIPATION TYPE: ICD-10-CM

## 2022-04-07 LAB
BASOPHILS # BLD AUTO: 0.02 THOUSANDS/ΜL (ref 0–0.1)
BASOPHILS NFR BLD AUTO: 2 % (ref 0–1)
EOSINOPHIL # BLD AUTO: 0.03 THOUSAND/ΜL (ref 0–0.61)
EOSINOPHIL NFR BLD AUTO: 2 % (ref 0–6)
ERYTHROCYTE [DISTWIDTH] IN BLOOD BY AUTOMATED COUNT: 13.8 % (ref 11.6–15.1)
HCT VFR BLD AUTO: 36.6 % (ref 34.8–46.1)
HGB BLD-MCNC: 12.4 G/DL (ref 11.5–15.4)
IMM GRANULOCYTES # BLD AUTO: 0 THOUSAND/UL (ref 0–0.2)
IMM GRANULOCYTES NFR BLD AUTO: 0 % (ref 0–2)
LYMPHOCYTES # BLD AUTO: 0.21 THOUSANDS/ΜL (ref 0.6–4.47)
LYMPHOCYTES NFR BLD AUTO: 16 % (ref 14–44)
MCH RBC QN AUTO: 38.3 PG (ref 26.8–34.3)
MCHC RBC AUTO-ENTMCNC: 33.9 G/DL (ref 31.4–37.4)
MCV RBC AUTO: 113 FL (ref 82–98)
MONOCYTES # BLD AUTO: 0.1 THOUSAND/ΜL (ref 0.17–1.22)
MONOCYTES NFR BLD AUTO: 8 % (ref 4–12)
NEUTROPHILS # BLD AUTO: 0.98 THOUSANDS/ΜL (ref 1.85–7.62)
NEUTS SEG NFR BLD AUTO: 72 % (ref 43–75)
NRBC BLD AUTO-RTO: 0 /100 WBCS
PLATELET # BLD AUTO: 119 THOUSANDS/UL (ref 149–390)
PMV BLD AUTO: 11.5 FL (ref 8.9–12.7)
RBC # BLD AUTO: 3.24 MILLION/UL (ref 3.81–5.12)
WBC # BLD AUTO: 1.34 THOUSAND/UL (ref 4.31–10.16)

## 2022-04-07 PROCEDURE — 85025 COMPLETE CBC W/AUTO DIFF WBC: CPT

## 2022-04-07 PROCEDURE — 99214 OFFICE O/P EST MOD 30 MIN: CPT | Performed by: STUDENT IN AN ORGANIZED HEALTH CARE EDUCATION/TRAINING PROGRAM

## 2022-04-07 PROCEDURE — 36415 COLL VENOUS BLD VENIPUNCTURE: CPT

## 2022-04-07 RX ORDER — ACETAMINOPHEN 325 MG/1
650 TABLET ORAL EVERY 6 HOURS PRN
Qty: 100 TABLET | Refills: 0 | Status: SHIPPED | OUTPATIENT
Start: 2022-04-07

## 2022-04-07 RX ORDER — HYDROMORPHONE HYDROCHLORIDE 2 MG/1
TABLET ORAL
Qty: 120 TABLET | Refills: 0 | Status: SHIPPED | OUTPATIENT
Start: 2022-04-07 | End: 2022-05-06 | Stop reason: SDUPTHER

## 2022-04-07 NOTE — PROGRESS NOTES
Outpatient Follow-Up - Palliative and Supportive Care   Cici Anne 49 y o  female [id number]    Assessment & Plan  Problem List Items Addressed This Visit        Respiratory    Malignant pleural effusion       Musculoskeletal and Integument    Bone metastases (HCC)    Relevant Medications    HYDROmorphone (DILAUDID) 2 mg tablet       Other    Metastatic neoplasm (HCC)    Palliative care patient    Relevant Medications    HYDROmorphone (DILAUDID) 2 mg tablet    Primary malignant neoplasm of right breast with metastasis to other site [facility]) - Primary    Relevant Medications    HYDROmorphone (DILAUDID) 2 mg tablet    Constipation    Cancer related pain    Relevant Medications    HYDROmorphone (DILAUDID) 2 mg tablet      Other Visit Diagnoses     Cancer associated pain        Relevant Medications    acetaminophen (TYLENOL) 325 mg tablet        #symptom management  #cancer-related pain   - continue APAP 650 mg PO Q6H PRN              - max daily 4000 mg   - continue hydromorphone 2-4 mg PO Q4H PRN   - continue duloxetine therapy    #recent symptoms   - fatigue, intermittent hot/cold sweats, increased fatigue, "scratchy" throat, congestion   - patient attributes to recent chemotherapy infusion   - recent sick contacts at home   - prior h/o strep throat, does not feel symptoms are similar   - prior h/o UTI, no current similar symptoms   - afebrile, however, use of APAP PRN   - VS wnl at today's visit   - discussed if symptoms persist, in the setting of low WBC, consider PCP visit for further evaluation; if symptoms worsen, consider ED visit for further evaluation   - at this time, patient wishes to continue to self-monitor symptoms     #anxiety   - continue duloxetine 30 mg PO QDaily     #mouth pain   - continue viscous lidocaine   - discussed 1:1:1 ratio for viscous lidocaine + diphenhydramine + Maalox for magic mouthwash, 10 mL swish/spit     #nausea   - continue metoclopramide 10 mg PO QID PRN   - continue ondansetron 4 mg PO Q8H PRN     #insomnia   - continue melatonin 6 mg PO QHS     #OIC   - continue home bowel regimen     #recurrent malignant pleural effusions   - drains 350 cc every 3-4 days     #recent lab work   - reviewed recent lab work [03/29/2022]: WBC [1 56]; CA 15-3 [49 0], CA 27 29 [80 8]    #goals of care   - treatment focused care with no limitations at this time    #psychosocial support   - emotional support provided   - Kathleen Mujica [phone]      Next [address] Follow up in 4 weeks  Controlled Substance Review    [place]MP or [place]  reviewed: No red flags were identified; safe to proceed with prescription  [place] PDMP Review       Value Time User    PDMP Reviewed  Yes 4/7/2022  9:34 AM Darien Howe MD          Medications adjusted this encounter:  Requested Prescriptions     Signed Prescriptions Disp Refills    acetaminophen (TYLENOL) 325 mg tablet 100 tablet 0     Sig: Take 2 tablets (650 mg total) by mouth every 6 (six) hours as needed for mild pain    HYDROmorphone (DILAUDID) 2 mg tablet 120 tablet 0     Sig: Take 2-4 mg [1-2 tabs] PO Q4H PRN  Max Daily 24 mg  No orders of the defined types were placed in this encounter  Medications Discontinued During This Encounter   Medication Reason    acetaminophen (TYLENOL) 325 mg tablet Reorder    HYDROmorphone (DILAUDID) 2 mg tablet Reorder         Hulan Fairly was seen today for symptoms and planning cares related to above illnesses  I have reviewed the patient's controlled substance dispensing history in the Prescription Drug Monitoring Program in compliance with the [facility] regulations before prescribing any controlled substances  They are invited to continue to follow with us  If there are questions or concerns, please contact us through our clinic/answering service 24 hours a day, seven days a week      Darien Howe MD  [facility] Palliative and Supportive Care        Visit Information    Accompanied By: Spouse    Source of History: Self, Spouse, Medical record    History Limitations: None      History of Present Illness    Vadim Rashid is a 49 y o  female who presents in follow up of symptoms related to metastatic breast cancer, recurrent malignant pleural effusions s/p PleurX catheter placement  Pertinent issues include: symptom management, pain, neoplasm related, advance care planning  Patient reports feeling "worn down" after most recent Ibrance therapy, also reports intermittent hot/cold sweats, increased fatigue, "scratchy" throat, congestion; reports similar symptoms after therapy in the past, current episode lasting longer than usual  No reported fever, checks temperature, however, on APAP PRN daily  Denies urinary sxs  Prior h/o UTI, no current symptoms similar  Denies CP, SOB, cough  Recent sick contacts in house  Increased R breast pain over the past week, use of 5-6 PO hydromorphone tabs/day  Denies nausea, vomiting  Appetite intact, 3 meals/day, recent 3 lb weight gain over the past month  BM every other day, regular  Adequate sleep  Past medical, surgical, social, and family histories are reviewed and pertinent updates are made  Review of Systems   Constitutional: Positive for chills, malaise/fatigue, night sweats and weight gain  Negative for decreased appetite, fever and weight loss  HENT: Positive for congestion  Eyes: Negative for visual disturbance  Cardiovascular: Negative for chest pain, near-syncope and syncope  Respiratory: Negative for cough and shortness of breath  Musculoskeletal: Negative for falls and neck pain  Gastrointestinal: Negative for abdominal pain, constipation, nausea and vomiting  Genitourinary: Negative for dysuria, flank pain, frequency and hematuria  Neurological: Positive for headaches  Psychiatric/Behavioral: The patient does not have insomnia  All other systems reviewed and are negative          Vital Signs    /70 (BP Location: Left arm, Cuff Size: Standard)   Pulse 70   Temp 98 5 °F (36 9 °C) (Temporal)   Resp 20   Wt 68 4 kg (150 lb 12 8 oz)   SpO2 97%   BMI 25 88 kg/m²     Physical Exam and Objective Data  Physical Exam  Vitals and nursing note reviewed  Constitutional:       General: She is awake  Appearance: She is not diaphoretic  Comments: Sitting up comfortably in NAD  Non-toxic appearing   HENT:      Head: Normocephalic and atraumatic  Right Ear: External ear normal       Left Ear: External ear normal       Nose: No rhinorrhea  Eyes:      Comments: No gaze preference   Cardiovascular:      Rate and Rhythm: Normal rate  Pulmonary:      Effort: No tachypnea, accessory muscle usage or respiratory distress  Comments: Completes full sentences without difficulty  Musculoskeletal:      Cervical back: Normal range of motion  Neurological:      General: No focal deficit present  Mental Status: She is alert and oriented to person, place, and time     Psychiatric:         Attention and Perception: Attention normal          Mood and Affect: Mood and affect normal          Speech: Speech normal          Cognition and Memory: Cognition and memory normal            Radiology and Laboratory:  I personally reviewed and interpreted the following results:    Most Recent COVID-19 Results:  Lab Results   Component Value Date/Time    SARSCOV2 Positive (A) 02/11/2022 02:10 PM    SARSCOV2 Negative 11/11/2021 03:42 PM    SARSCOV2 Positive (A) 12/20/2020 12:25 PM       Most Recent Lab Work:  Lab Results   Component Value Date/Time    SODIUM 137 03/29/2022 01:48 PM    K 4 6 03/29/2022 01:48 PM    K 3 7 04/02/2014 01:15 PM    BUN 22 03/29/2022 01:48 PM    BUN 12 04/02/2014 01:15 PM    CREATININE 1 10 03/29/2022 01:48 PM    CREATININE 0 80 04/02/2014 01:15 PM    GLUC 75 03/29/2022 01:48 PM     Lab Results   Component Value Date/Time    AST 20 03/29/2022 01:48 PM    AST 23 04/02/2014 01:15 PM    ALT 14 03/29/2022 01:48 PM    ALT 15 04/02/2014 01:15 PM    ALB 3 4 (L) 03/29/2022 01:48 PM    ALB 3 4 (L) 04/02/2014 01:15 PM     Lab Results   Component Value Date/Time    HGB 12 4 04/07/2022 02:24 PM    WBC 1 34 (LL) 04/07/2022 02:24 PM     (L) 04/07/2022 02:24 PM    INR 1 07 08/17/2021 06:14 AM       Most Recent Imaging [last 30 days]:  Procedure: NM bone scan whole body    Result Date: 3/16/2022  Narrative: BONE SCAN  WHOLE BODY INDICATION: C79 51: Secondary malignant neoplasm of bone C50 912: Malignant neoplasm of unspecified site of left female breast Z17 0: Estrogen receptor positive status (ER+) PREVIOUS FILM CORRELATION:    Bone scan 8/11/2021, CT chest abdomen pelvis 11/19/2021 TECHNIQUE:   This study was performed following the intravenous administration of 26 5 mCi Tc-99m labeled MDP  Delayed, anterior and posterior whole body images were acquired, 2-3 hours after radiopharmaceutical administration  Additional delayed oblique static images acquired of the bilateral ribs and pelvis  FINDINGS: Scattered foci of radiotracer activity again suspicious for metastasis  Less conspicuous activity at the left superior iliac crest compared to the prior exam  Persistent small focus of radiotracer uptake at the left manubrium  Stable small focus of radiotracer uptake at a right mid rib posteriorly  Mild soft tissue activity overlying the right hemithorax again likely related to malignant pleural effusion  No new osseous foci suspicious for metastasis  Symmetric renal activity  Impression: 1  Stable or improving scattered foci radiotracer uptake suspicious for osseous metastases  No new osseous foci suspicious for metastasis    Workstation performed: [id number]       40 minutes was spent face to face with Mayito Denise and her spouse with greater than 50% of the time spent in counseling or coordination of care including discussions of provided medical updates, discussed palliative care, determined goals of care, determined social/family support, discussed plans of care, discussed symptom management, provided psychosocial support  Medication refill  Discussed current symptoms with RTED precautions v further PCP evaluation  PDMP Reviewed  All of the patient's or agent's questions were answered during this discussion

## 2022-04-07 NOTE — TELEPHONE ENCOUNTER
Received phone call from [employer] in regards to pt WBC of 1 34  ANC 0 98  Reviewed with Iman Burch PA-C and reviewed patient treatment plan  Per Iman Burch PA-C pt should be on off week   Pt has appointment with infusion not until 4/26/22

## 2022-04-07 NOTE — TELEPHONE ENCOUNTER
Pt called in [facility] stating she missed a call from the office and was returning the call  Per note from Adelaida Yost PA-C Pt has a Left portal vein Thrombosis and wanted to make sure pt was taking her Xarelto  Pt stated she is  However, I did not give pt her CT Scan results or let her know of the blood clot  Pt is asking for the on call provider to call her as she has some urgent questions she would like to discuss  On call provider contacted

## 2022-04-07 NOTE — TELEPHONE ENCOUNTER
Called patient  No answer x2  Left voice message  Dr Fredo Terrell recently received patient's her CT scan results from 04/01  She has slight progression of disease and also has a progressive left portal vein thrombosis  Patient is supposed to be taking Xarelto 20 mg once daily  Informed patient this over voice message that she must be taking her Xarelto  I will attempt to call patient again tomorrow  I also informed her there is an on-call physician overnight if she has urgent questions  Patient will be seen by Dr Fredo Terrell to review CT scan on 04/11/2022 at 1:40 p m  Sheldon Herbert She understands to call us back if this appointment does not work for her

## 2022-04-08 NOTE — TELEPHONE ENCOUNTER
Called patient and  back  Patient spoke with on-call physician last night  She is taking Xarelto 20 mg once daily  Patient has some new findings on CT scan which will be discussed on Monday with Dr Pauline Baldwin  Patient is on her off week of Ibrance  Currently we have prior authorization still in process for Ibrance 100 mg  We will keep everything on hold until visit on Monday  Patient also has a little bit upper respiratory infection with nasal drainage, stuffiness  She is taking over-the-counter medications such as Nasonex, Tylenol  She has no fevers  However, she understands if she worsens over the weekend to go to urgent care or visit the ED  She appreciated call

## 2022-04-11 ENCOUNTER — TELEPHONE (OUTPATIENT)
Dept: HEMATOLOGY ONCOLOGY | Facility: CLINIC | Age: 57
End: 2022-04-11

## 2022-04-11 ENCOUNTER — OFFICE VISIT (OUTPATIENT)
Dept: HEMATOLOGY ONCOLOGY | Facility: CLINIC | Age: 57
End: 2022-04-11
Payer: COMMERCIAL

## 2022-04-11 VITALS
TEMPERATURE: 97.9 F | SYSTOLIC BLOOD PRESSURE: 122 MMHG | OXYGEN SATURATION: 97 % | HEIGHT: 64 IN | HEART RATE: 88 BPM | DIASTOLIC BLOOD PRESSURE: 68 MMHG | WEIGHT: 150 LBS | RESPIRATION RATE: 18 BRPM | BODY MASS INDEX: 25.61 KG/M2

## 2022-04-11 DIAGNOSIS — C79.51 BONE METASTASES (HCC): Primary | ICD-10-CM

## 2022-04-11 DIAGNOSIS — T45.1X5A CHEMOTHERAPY INDUCED NEUTROPENIA (HCC): ICD-10-CM

## 2022-04-11 DIAGNOSIS — D70.1 CHEMOTHERAPY INDUCED NEUTROPENIA (HCC): ICD-10-CM

## 2022-04-11 DIAGNOSIS — C50.911 PRIMARY MALIGNANT NEOPLASM OF RIGHT BREAST WITH METASTASIS TO OTHER SITE (HCC): ICD-10-CM

## 2022-04-11 DIAGNOSIS — J91.0 MALIGNANT PLEURAL EFFUSION: ICD-10-CM

## 2022-04-11 PROCEDURE — 99214 OFFICE O/P EST MOD 30 MIN: CPT | Performed by: INTERNAL MEDICINE

## 2022-04-11 RX ORDER — SODIUM CHLORIDE 9 MG/ML
20 INJECTION, SOLUTION INTRAVENOUS ONCE
Status: CANCELLED | OUTPATIENT
Start: 2022-06-01

## 2022-04-11 RX ORDER — SODIUM CHLORIDE 9 MG/ML
20 INJECTION, SOLUTION INTRAVENOUS ONCE
Status: CANCELLED | OUTPATIENT
Start: 2022-05-17

## 2022-04-11 RX ORDER — SODIUM CHLORIDE 9 MG/ML
20 INJECTION, SOLUTION INTRAVENOUS ONCE
Status: CANCELLED | OUTPATIENT
Start: 2022-04-19

## 2022-04-11 RX ORDER — SODIUM CHLORIDE 9 MG/ML
20 INJECTION, SOLUTION INTRAVENOUS ONCE
Status: CANCELLED | OUTPATIENT
Start: 2022-05-03

## 2022-04-11 NOTE — PROGRESS NOTES
Hematology/Oncology Progress Note    Date of Service: 4/11/2022    [facility]AT[facility]   [address] [address]    Hem/Onc Problem List:   1  Metastatic right-sided breast cancer, ER and [place] positive, HER2 negative  2  Bone metastatic disease  3  History of right-sided pleural effusion  Chief Complaint:    Routine follow-up for management of stage IV breast cancer    Assessment/Plan:     I personally reviewed the lab results, image studies results and other specialties/physicians consult notes and recommendations  I shared the findings with patient, discussed the diagnosis and management plan as below  1   Metastatic breast cancer, with liver, bone, lymph node and pleura involvement  ER and [place] positive, HER2 negative  Bone scan showed bony metastatic disease  Patient started monthly Darzalex, daily letrozole and CDK4/6 inhibitor Abemeciclib  Unfortunately, patient developed significant watery diarrhea from Abemaciclib  Abemaciclib was changed to Ibrance 125 mg daily for 3 weeks on,  followed by 1 week off until disease progression on August 30, 2021  Ibrance dose was decreased to 100 mg because of neutropenia  CT scan and bone scan showed disease progression  I will discontinue Ibrance and started Eribulin 1 4 milligram/meter subcutaneously day 1 day 8 every 21 day cycle until disease progression  Repeat CT scan chest abdomen pelvis and bone scan for restaging after 2-3 cycles of treatment  Patient will have formal chemo medication  We expect to start chemotherapy in 1-2 weeks after consent is signed  I will send the liver biopsy specimen for OncotypeMap test    2  Bony metastatic disease  Bone scan showed bony metastatic disease  Patient  continues Zometa every 3 months    3   History of right breast cancer, status post lumpectomy and axillary lymph node dissection, status post adjuvant radiation therapy  4  Right-sided pleural effusion, status post thoracentesis, cytology positive for adenocarcinoma, ER/MO positive, HER2 negative  Status post PleurX catheter placement  Patient drained 325 cc fluid on December 1, 2020   Will continue to treat the underlying disease  5  Leukopenia 2nd to [place]  ANC> 1000  [place] will be discontinued from today  6  Follow-up: Return to clinic in 2-3 weeks  Disclaimer: This document was prepared using [employer] technology  If a word or phrase is confusing, or does not make sense, this is likely due to recognition error which was not discovered during the providers review  If you believe an error has occurred, please Contact me through [employer] for roula? cation  AJCC 8th Edition Cancer Stage :      Cancer Staging  No matching staging information was found for the patient  Hematology/Oncology History:   · History of right-sided breast cancer, initially diagnosed in 2012, status post lumpectomy and axillary lymph node dissection, status post adjuvant radiation therapy  Patient did not receive any adjuvant endocrine therapy or chemotherapy  · July 24, 2021, patient was admitted to hospital because of shortness of breath and cough  · July 24, 2021 CT chest PE protocol showed septal thickening throughout the right lung and bronchial wall thickening due to lymphangitic spread of tumor  Indeterminate lung nodules measuring 5 millimeter in the right upper lobe, right middle lobe and 7 millimeter in the left upper lobe and left lower lobe  Large right pleural effusion  Multiple liver metastatic disease measuring up to 4 centimeter  Lytic metastatic to sternal manubrium  · July 26, 2021, ultrasound abdomen shows multiple hepatic metastatic disease  Status post thoracentesis with 1200 mL of joanne fluid removed  Cytology showed adenoma carcinoma, ER and MO positive  40-50%, HER2 negative    · July 26, 2021 biopsy of the lung liver showed metastatic breast cancer, ER and % positive, HER2 negative  CT abdomen pelvis with contrast showed extensive hepatic metastatic disease  · July 27, 2021 MRI brain showed no evidence of intracranial metastatic disease  · August 6, 2021, mammogram showed no mammographic evidence of malignancy  · August 11, 2021 bone scan showed bony metastatic disease involving left iliac crest medially and adjacent sacrum  · August 12, 2021, Zoladex was given  Letrozole started  · August 16, 2022, patient started Abemaciclib  · August 30, 2021, DC abemaciclib because of diarrhea  Start Ibrance 125 mg daily 3 weeks on 1 week off  · Nov 19, 2021, CT c/a/p showed:      IMPRESSION:     1  Since July 2021, new thromboses within the intrahepatic branches of the portal vein as described above  2  Increased diffuse sclerotic osseous lesions  3   Decreased size of most of the hepatic metastases  4   Indeterminate mottled appearance of the splenic parenchyma  Attention on follow-up is advised  5   Unchanged pulmonary nodules  6   Right pleural effusion has decreased in size since July 24, 2021  The smooth interlobular septal thickening has also decreased, and this change can be attributable to the decreased size of the pleural effusion  7   Mucosal hyperenhancement of the colon  Findings may be treatment-related, and correlation with gastrointestinal symptoms is advised  BONE SCAN:     IMPRESSION:     1  A few areas of increased radiotracer uptake suspicious for osseous metastasis, with findings for minimal progression  · March 15, 2022 bone scan: Stable or improving scattered foci radiotracer uptake suspicious for osseous metastases  No new osseous foci suspicious for metastasis  ·  April 1, 2022 CT scan chest abdomen pelvis:  IMPRESSION:     Findings concerning for worsening hepatic metastases as evidenced by increased size and number of lesions    Please see above discussion      Progressive left portal vein thrombosis      The majority of the pulmonary nodules are stable  There is one subpleural nodule in the right upper lobe apex posteriorly, not seen on the prior study      Stable extensive osseous metastases      Multiple tiny hypodense splenic lesions are also stable, indeterminate      Persistent small right pleural effusion with a right pleural catheter in place  History of Present Illiness:   Jeri Parr is a 49 y o  female with the above-noted HemOnc history who is here for routine follow-up  Patient has metastatic breast cancer with bony metastatic disease  Zoladex and letrozole started on August 11-12, 2021  Abmaciclib was changed to W4 because of significant watery diarrhea  Patient did have neutropenia from [employer]  Ibrance dose was decreased to 100 mg  Restaging CT scan and bone scan showed disease progression    Patient had the pleural catheter in place for malignant pleural effusion  Patient takes Zometa for bone health  Patient feels okay  No fever or chills  No chest pain or shortness of breath  No cough and phlegm  No GI  symptoms  Appetite is fair  No significant weight loss or weight gain  No bleeding anywhere  ROS: A 12-point of review of systems is obtained and other than the above is noncontributory  Objective:   VITALS:   /68 (BP Location: Left arm, Patient Position: Sitting, Cuff Size: Adult)   Pulse 88   Temp 97 9 °F (36 6 °C)   Resp 18   Ht 5' 4" (1 626 m)   Wt 68 kg (150 lb)   SpO2 97%   BMI 25 75 kg/m²     Physical EXAM:  General:  Alert, cooperative, no distress, appears stated age  Head:  Normocephalic, without obvious abnormality  Eyes:  Conjunctivae/corneas clear  PERRL  No evidence of conjunctivitis     Throat: Lips, mucosa, and tongue normal  No lesions in the oropharynx   Neck: Supple, symmetrical, trachea midline, no adenopathy    Lungs:   Clear to auscultation bilaterally   Respiratory effort easy, nonlabored Heart:  Regular rate and rhythm, S1, S2 normal, no murmur  Abdomen:   Soft, non-tender,nondistended  Bowel sounds normal  No masses,  No organomegaly  Extremities: Extremities normal, atraumatic, no cyanosis or edema  No axillary or inguinal adenopathy   Skin: Skin color, texture, turgor normal  No rashes or lesions    Neurologic: A&Ox4   No focal neuro deficits       Allergies   Allergen Reactions    Codeine Anaphylaxis    Morphine GI Intolerance, Itching and Vomiting    Sulfa Antibiotics Hives and Itching       Past Medical History:   Diagnosis Date    History of radiation exposure     Malignant neoplasm of right female breast ([facility] [address] ) 2012    right       Past Surgical History:   Procedure Laterality Date    BREAST CYST EXCISION      BREAST LUMPECTOMY Right 2012    HIP SURGERY      IR BIOPSY LIVER MASS  7/26/2021    IR PLEURAL EFFUSION LONG-TERM CATHETER PLACEMENT  8/17/2021    IR THORACENTESIS  7/26/2021       Family History   Problem Relation Age of Onset    Breast cancer Mother         in her 63's    Breast cancer Sister         inher 45s and 48    Colon cancer Maternal Grandmother     No Known Problems Paternal Grandmother     Breast cancer Other     No Known Problems Paternal Aunt        Social History     Socioeconomic History    Marital status: /Civil Union     Spouse name: Not on file    Number of children: Not on file    Years of education: Not on file    Highest education level: Not on file   Occupational History    Not on file   Tobacco Use    Smoking status: Former Smoker     Packs/day: 0 25     Types: Cigarettes    Smokeless tobacco: Never Used   Vaping Use    Vaping Use: Never used   Substance and Sexual Activity    Alcohol use: Not Currently    Drug use: Not Currently    Sexual activity: Not Currently   Other Topics Concern    Not on file   Social History Narrative    Not on file     Social Determinants of Health     Financial Resource Strain: Not on file Food Insecurity: Not on file   Transportation Needs: No Transportation Needs    Lack of Transportation (Medical): No    Lack of Transportation (Non-Medical): No   Physical Activity: Not on file   Stress: Not on file   Social Connections: Not on file   Intimate Partner Violence: Not on file   Housing Stability: Not on file       Current Outpatient Medications   Medication Sig Dispense Refill    acetaminophen (TYLENOL) 325 mg tablet Take 2 tablets (650 mg total) by mouth every 6 (six) hours as needed for mild pain 100 tablet 0    al mag oxide-diphenhydramine-lidocaine viscous (MAGIC MOUTHWASH) 1:1:1 suspension Swish and spit 10 mL every 4 (four) hours as needed for mouth pain or discomfort 300 mL 0    albuterol (PROVENTIL HFA,VENTOLIN HFA) 90 mcg/act inhaler Inhale 2 puffs every 4 (four) hours as needed for wheezing 8 g 0    benzonatate (TESSALON) 200 MG capsule Take 1 capsule (200 mg total) by mouth 3 (three) times a day as needed for cough 20 capsule 0    calcium carbonate (TUMS) 500 mg chewable tablet Chew 1 tablet (500 mg total) 3 (three) times a day as needed for indigestion or heartburn 30 tablet 0    dextromethorphan-guaiFENesin (ROBITUSSIN DM)  mg/5 mL syrup Take 10 mL by mouth every 4 (four) hours as needed for cough 236 mL 0    diphenhydrAMINE (BENADRYL) 50 MG tablet Take 1 tablet (50 mg total) by mouth daily at bedtime as needed for itching or sleep 30 tablet 0    diphenoxylate-atropine (LOMOTIL) 2 5-0 025 mg per tablet Take 1 tablet by mouth 4 (four) times a day as needed for diarrhea 30 tablet 0    DULoxetine (CYMBALTA) 30 mg delayed release capsule Take 1 capsule (30 mg total) by mouth daily 90 capsule 3    fluticasone (FLONASE) 50 mcg/act nasal spray 2 sprays into each nostril daily      HYDROmorphone (DILAUDID) 2 mg tablet Take 2-4 mg [1-2 tabs] PO Q4H PRN   Max Daily 24 mg  120 tablet 0    letrozole (FEMARA) 2 5 mg tablet TAKE 1 TABLET BY MOUTH EVERY DAY 90 tablet 2    Lidocaine Viscous HCl (XYLOCAINE) 2 % mucosal solution Swish and spit 10 mL 4 (four) times a day as needed for mouth pain or discomfort 200 mL 0    melatonin 3 mg Take 2 tablets (6 mg total) by mouth daily at bedtime 15 tablet 0    metoclopramide (REGLAN) 10 mg tablet Take 1 tablet (10 mg total) by mouth 4 (four) times a day 28 tablet 0    multivitamin (THERAGRAN) TABS Take 1 tablet by mouth      ondansetron (ZOFRAN) 4 mg tablet Take 1 tablet (4 mg total) by mouth every 8 (eight) hours as needed for nausea or vomiting 20 tablet 0    oxybutynin (DITROPAN-XL) 5 mg 24 hr tablet Take 1 tablet (5 mg total) by mouth daily Take 1 tablet daily 90 tablet 3    palbociclib (Ibrance) 100 MG tablet Take 1 tablet (100 mg total) by mouth daily 21 tablet 1    rivaroxaban (Xarelto) 20 mg tablet Take 1 tablet (20 mg total) by mouth daily with breakfast 30 tablet 2     No current facility-administered medications for this visit  (Not in a hospital admission)      DATA REVIEW:    Pathology Result:    Final Diagnosis   Date Value Ref Range Status   07/26/2021   Final    A  B  Thoracentesis, Right (ThinPrep and cell block preparations):  Positive for malignancy  Metastatic carcinoma, compatible with breast primary  -  Immunohistochemical stains performed with appropriate controls show the tumor cells to be positive for Matthew-EP4, MOC31, BARRY-3 and ER with scattered background mesothelial cells staining for WT1 and calretinin, supporting the diagnosis  Satisfactory for evaluation  07/26/2021   Final    A  Liver (core biopsy):     - Poorly-differentiated carcinoma, most compatible with metastasis from the patient's reported breast primary  Comment:  ER / KS / Her-2 pending  Comment: This is an appended report  These results have been appended to a previously preliminary verified report  Image Results:   They are reviewed and documented in Hematology/Oncology history    CT chest abdomen pelvis w contrast  Narrative: CT CHEST, ABDOMEN AND PELVIS WITH IV CONTRAST    INDICATION:   C79 51: Secondary malignant neoplasm of bone  C50 912: Malignant neoplasm of unspecified site of left female breast  Z17 0: Estrogen receptor positive status (ER+)  COMPARISON:  Prior CT study dated 11/19/2021 and prior CT scan of the abdomen and pelvis dated 7/26/2021  TECHNIQUE: CT examination of the chest, abdomen and pelvis was performed  In addition to portal venous phase postcontrast scanning through the abdomen and pelvis, delayed phase postcontrast scanning was performed through the upper abdominal viscera  Axial, sagittal, and coronal 2D reformatted images were created from the source data and submitted for interpretation  Radiation dose length product (DLP) for this visit:  1172 mGy-cm   This examination, like all CT scans performed in the [facility], was performed utilizing techniques to minimize radiation dose exposure, including the use of iterative   reconstruction and automated exposure control  IV Contrast:  100 mL of iohexol (OMNIPAQUE)  Enteric Contrast: Enteric contrast was not administered  FINDINGS:    CHEST    LUNGS:  Multiple pulmonary nodules are redemonstrated and stable in size  6 mm pulmonary nodule in the left upper lobe centrally on series 205, image 40   3 mm subpleural nodule superior segment of the left lower lobe series 205, image 49   4 mm pulmonary nodule left lower lobe series 205, image 71   5 mm pulmonary nodule left lower lobe series 205, image 75   3 mm subpleural nodule laterally in the left lower lobe series 205, image 100  These are all stable  Stable 3 mm subpleural nodules in the right upper lobe laterally on series 25, image 47  Stable 6 mm nodule inferior right upper lobe adjacent to the minor fissure on series 205, image 61  Stable 2 mm nodule along the right major fissure on series 205, image 66    Stable 4 mm nodule right middle lobe series 205, image 76  There is a 0 9 x 0 3 cm subpleural nodular density in the right upper lobe posteriorly on series 205, image 34, not seen on the prior study  Minimal patchy atelectasis in the right lower lobe posterior medially  No focal airspace consolidation  No obvious endobronchial lesion  PLEURA:  Persistent small right pleural effusion  Right pleural catheter in place as before  No pneumothorax  HEART/GREAT VESSELS: Heart is unremarkable for patient's age  No thoracic aortic aneurysm  No pericardial effusion  MEDIASTINUM AND KIA:  There are small calcified mediastinal and hilar lymph nodes  No enlarged mediastinal, hilar, or axillary lymph nodes are seen  Surgical clips seen in the right axilla  CHEST WALL AND LOWER NECK:   Scarring and calcification seen in the right medial breast, stable  No thyroid gland enlargement  ABDOMEN    LIVER/BILIARY TREE:  Multiple hepatic metastases are again identified  Some of these have increased in size as well as number of lesions  For example, there is a lesion in the medial left hepatic lobe which now measures 2 9 x 1 6 cm previously   measuring 1 2 x 0 7 cm  A lesion in the right hepatic lobe on series 201, image 63 now measures 1 2 x 0 8 cm previously measuring 0 6 cm x 0 6 cm  The left lateral hepatic lobe lesion has slightly decreased in size now measuring 2 1 x 1 2 cm previously   measuring 2 9 x 2 6 cm  There is thrombosis of the left portal vein as before with tubular thrombosed vessels extending into the left hepatic lobe, slightly further progressed  No biliary ductal dilatation  GALLBLADDER:  No calcified gallstones  No pericholecystic inflammatory change  SPLEEN:  Multiple tiny hypodense splenic lesions are grossly stable in appearance, indeterminate  PANCREAS:  Unremarkable  ADRENAL GLANDS:  Unremarkable  KIDNEYS/URETERS:  Tiny bilateral renal cysts  No hydronephrosis      STOMACH AND BOWEL:  No bowel obstruction  No focal inflammatory process  APPENDIX:  A normal appendix was visualized  ABDOMINOPELVIC CAVITY:  No ascites  No pneumoperitoneum  No lymphadenopathy  VESSELS:  As mentioned above  The abdominal aorta is normal in caliber  PELVIS    REPRODUCTIVE ORGANS:  No gross pelvic masses  URINARY BLADDER:  Unremarkable  ABDOMINAL WALL/INGUINAL REGIONS:  Tiny fat-containing periumbilical hernia  OSSEOUS STRUCTURES:  Numerous sclerotic metastases seen throughout the visualized osseous structures including the thoracolumbar spine and bony pelvis as well as the medial distal clavicles and sternum  These appear grossly stable  Impression: Findings concerning for worsening hepatic metastases as evidenced by increased size and number of lesions  Please see above discussion  Progressive left portal vein thrombosis  The majority of the pulmonary nodules are stable  There is one subpleural nodule in the right upper lobe apex posteriorly, not seen on the prior study  Stable extensive osseous metastases  Multiple tiny hypodense splenic lesions are also stable, indeterminate  Persistent small right pleural effusion with a right pleural catheter in place  Workstation performed: [id number]        LABS:  Lab data are reviewed and documented in HemOnc history  No results found for this or any previous visit (from the past 48 hour(s))            Tramaine Starks MD  4/11/2022, 1:57 PM

## 2022-04-11 NOTE — TELEPHONE ENCOUNTER
Please add new tx to patient's schedule  She prefers afternoons any day of the week  Please let me know of date changes  Thank you!

## 2022-04-12 ENCOUNTER — TELEPHONE (OUTPATIENT)
Dept: HEMATOLOGY ONCOLOGY | Facility: CLINIC | Age: 57
End: 2022-04-12

## 2022-04-12 NOTE — TELEPHONE ENCOUNTER
Patient calling in returning call to [facility]  Contact Katty via teams and I was able to transfer patient to [facility]

## 2022-04-12 NOTE — TELEPHONE ENCOUNTER
Medication Refill     Who is Calling [facility]   Medication  Ibrance 100mg caplet   How many pills left  None   Preferred Pharmacy / Address Juan Carloschrissy Devine ([id number] - [place], [place] - [address]   [address] [place], [address] [place] [address]   Phone:  [phone]  Fax:  [phone]    Who is your Physician?  Chad   Call back number  [phone]   Relevant Information  Prior authorization required

## 2022-04-12 NOTE — TELEPHONE ENCOUNTER
Left detailed message for patient regarding her updated treatment schedule  Advised patient to call back so that I can go over it with her in more detail  Pt can also view all updates in her MyChart

## 2022-04-12 NOTE — TELEPHONE ENCOUNTER
Email sent to Lukasz'sheila Ramirez with [facility] in regards to discontinuation of Ibrance 100mg prescription

## 2022-04-12 NOTE — TELEPHONE ENCOUNTER
Spoke to patient to go over infusion schedule  Went over when patient should have labs drawn and which labs need to be drawn  Patient aware and okay with all appt dates  She will  a printed copy when she comes in for treatment next week

## 2022-04-12 NOTE — TELEPHONE ENCOUNTER
Returned patient's call  Called back to back x's 4  Patient did not answer  Left message for patient to call back  If patient calls back, please transfer the call to me  Thank you!

## 2022-04-12 NOTE — TELEPHONE ENCOUNTER
CALL RETURN FORM   Reason for patient call? Patient called in with questions regarding treatment schedule  Patient's primary oncologist? Saravanan Eugene 112     Name of person the patient was calling for? Joon Age    Any additional information to add, if applicable? Informed patient that the message will be forwarded to the team and someone will get back to them as soon as possible    Did you relay this information to the patient?  Yes

## 2022-04-18 ENCOUNTER — APPOINTMENT (OUTPATIENT)
Dept: LAB | Facility: HOSPITAL | Age: 57
End: 2022-04-18
Payer: COMMERCIAL

## 2022-04-18 DIAGNOSIS — C50.911 PRIMARY MALIGNANT NEOPLASM OF RIGHT BREAST WITH METASTASIS TO OTHER SITE (HCC): ICD-10-CM

## 2022-04-18 DIAGNOSIS — J91.0 MALIGNANT PLEURAL EFFUSION: ICD-10-CM

## 2022-04-18 DIAGNOSIS — C79.51 BONE METASTASES (HCC): ICD-10-CM

## 2022-04-18 LAB
ALBUMIN SERPL BCP-MCNC: 3.4 G/DL (ref 3.5–5)
ALP SERPL-CCNC: 60 U/L (ref 46–116)
ALT SERPL W P-5'-P-CCNC: 21 U/L (ref 12–78)
ANION GAP SERPL CALCULATED.3IONS-SCNC: 7 MMOL/L (ref 4–13)
AST SERPL W P-5'-P-CCNC: 34 U/L (ref 5–45)
BASOPHILS # BLD AUTO: 0.07 THOUSANDS/ΜL (ref 0–0.1)
BASOPHILS NFR BLD AUTO: 2 % (ref 0–1)
BILIRUB SERPL-MCNC: 0.28 MG/DL (ref 0.2–1)
BUN SERPL-MCNC: 12 MG/DL (ref 5–25)
CALCIUM ALBUM COR SERPL-MCNC: 9.1 MG/DL (ref 8.3–10.1)
CALCIUM SERPL-MCNC: 8.6 MG/DL (ref 8.3–10.1)
CHLORIDE SERPL-SCNC: 107 MMOL/L (ref 100–108)
CO2 SERPL-SCNC: 26 MMOL/L (ref 21–32)
CREAT SERPL-MCNC: 0.94 MG/DL (ref 0.6–1.3)
EOSINOPHIL # BLD AUTO: 0.03 THOUSAND/ΜL (ref 0–0.61)
EOSINOPHIL NFR BLD AUTO: 1 % (ref 0–6)
ERYTHROCYTE [DISTWIDTH] IN BLOOD BY AUTOMATED COUNT: 13.9 % (ref 11.6–15.1)
GFR SERPL CREATININE-BSD FRML MDRD: 68 ML/MIN/1.73SQ M
GLUCOSE P FAST SERPL-MCNC: 88 MG/DL (ref 65–99)
HCT VFR BLD AUTO: 37.8 % (ref 34.8–46.1)
HGB BLD-MCNC: 12.9 G/DL (ref 11.5–15.4)
IMM GRANULOCYTES # BLD AUTO: 0.03 THOUSAND/UL (ref 0–0.2)
IMM GRANULOCYTES NFR BLD AUTO: 1 % (ref 0–2)
LYMPHOCYTES # BLD AUTO: 0.46 THOUSANDS/ΜL (ref 0.6–4.47)
LYMPHOCYTES NFR BLD AUTO: 15 % (ref 14–44)
MCH RBC QN AUTO: 37.8 PG (ref 26.8–34.3)
MCHC RBC AUTO-ENTMCNC: 34.1 G/DL (ref 31.4–37.4)
MCV RBC AUTO: 111 FL (ref 82–98)
MONOCYTES # BLD AUTO: 0.49 THOUSAND/ΜL (ref 0.17–1.22)
MONOCYTES NFR BLD AUTO: 16 % (ref 4–12)
NEUTROPHILS # BLD AUTO: 2.09 THOUSANDS/ΜL (ref 1.85–7.62)
NEUTS SEG NFR BLD AUTO: 65 % (ref 43–75)
NRBC BLD AUTO-RTO: 0 /100 WBCS
PLATELET # BLD AUTO: 301 THOUSANDS/UL (ref 149–390)
PMV BLD AUTO: 10.6 FL (ref 8.9–12.7)
POTASSIUM SERPL-SCNC: 4.2 MMOL/L (ref 3.5–5.3)
PROT SERPL-MCNC: 6.8 G/DL (ref 6.4–8.2)
RBC # BLD AUTO: 3.41 MILLION/UL (ref 3.81–5.12)
SODIUM SERPL-SCNC: 140 MMOL/L (ref 136–145)
WBC # BLD AUTO: 3.17 THOUSAND/UL (ref 4.31–10.16)

## 2022-04-18 PROCEDURE — 80053 COMPREHEN METABOLIC PANEL: CPT

## 2022-04-18 PROCEDURE — 85025 COMPLETE CBC W/AUTO DIFF WBC: CPT

## 2022-04-18 PROCEDURE — 36415 COLL VENOUS BLD VENIPUNCTURE: CPT

## 2022-04-19 ENCOUNTER — HOSPITAL ENCOUNTER (OUTPATIENT)
Dept: INFUSION CENTER | Facility: CLINIC | Age: 57
Discharge: HOME | End: 2022-04-19
Payer: COMMERCIAL

## 2022-04-19 VITALS
HEIGHT: 64 IN | SYSTOLIC BLOOD PRESSURE: 107 MMHG | RESPIRATION RATE: 18 BRPM | WEIGHT: 150.8 LBS | HEART RATE: 84 BPM | TEMPERATURE: 97.4 F | BODY MASS INDEX: 25.74 KG/M2 | DIASTOLIC BLOOD PRESSURE: 71 MMHG

## 2022-04-19 DIAGNOSIS — C79.51 BONE METASTASES (HCC): Primary | ICD-10-CM

## 2022-04-19 DIAGNOSIS — C50.911 PRIMARY MALIGNANT NEOPLASM OF RIGHT BREAST WITH METASTASIS TO OTHER SITE (HCC): ICD-10-CM

## 2022-04-19 DIAGNOSIS — J91.0 MALIGNANT PLEURAL EFFUSION: ICD-10-CM

## 2022-04-19 PROCEDURE — 96367 TX/PROPH/DG ADDL SEQ IV INF: CPT

## 2022-04-19 PROCEDURE — 96409 CHEMO IV PUSH SNGL DRUG: CPT

## 2022-04-19 RX ORDER — SODIUM CHLORIDE 9 MG/ML
20 INJECTION, SOLUTION INTRAVENOUS ONCE
Status: COMPLETED | OUTPATIENT
Start: 2022-04-19 | End: 2022-04-19

## 2022-04-19 RX ADMIN — DEXAMETHASONE SODIUM PHOSPHATE 10 MG: 10 INJECTION, SOLUTION INTRAMUSCULAR; INTRAVENOUS at 13:59

## 2022-04-19 RX ADMIN — SODIUM CHLORIDE 20 ML/HR: 9 INJECTION, SOLUTION INTRAVENOUS at 13:56

## 2022-04-19 RX ADMIN — ERIBULIN MESYLATE 2.4 MG: 0.5 INJECTION INTRAVENOUS at 14:42

## 2022-04-19 NOTE — PROGRESS NOTES
Pt to clinic for initial [place] treatment, offers no complaints today, tolerated infusion without complications, aware of next appointment, PIV removed, avs declined

## 2022-04-20 ENCOUNTER — TELEPHONE (OUTPATIENT)
Dept: HEMATOLOGY ONCOLOGY | Facility: CLINIC | Age: 57
End: 2022-04-20

## 2022-04-20 ENCOUNTER — TELEPHONE (OUTPATIENT)
Dept: OTHER | Facility: OTHER | Age: 57
End: 2022-04-20

## 2022-04-20 NOTE — TELEPHONE ENCOUNTER
Returned telephone call to patient in regards to report of abdominal pain and cramping post infusion starting yesterday  Left a general message with call back number to discuss further  Encouraged patient to call back 
Returned telephone call to patient post receiving voicemail from patient  Patient had stated that on call physician had told patient to drink plenty of fluids and patient to take tums  Per patient report, did so and abdominal pain and cramping resolved  Patient stated had no additional questions or concerns at this time  Encouraged to reach out to our office with additional questions or concerns 
general
general

## 2022-04-20 NOTE — TELEPHONE ENCOUNTER
See telephone encounter note  Dr Roberts Daily might want patient to get fluids on 4/20/22  Call out to patient with call back number to further discuss

## 2022-04-20 NOTE — TELEPHONE ENCOUNTER
[phone]/Pt just had her 1st Chemo tx today and she is not doing well  Has lots of stomach pain and cramping and needs a call back to know what to do  Dr Deandre Nieto was paged via TC    Please allow the on-call provider 20-30 mins to respond  If you have not heard from them within that time, please feel free to call back

## 2022-04-22 ENCOUNTER — TELEPHONE (OUTPATIENT)
Dept: OTHER | Facility: OTHER | Age: 57
End: 2022-04-22

## 2022-04-23 NOTE — TELEPHONE ENCOUNTER
Requesting callback at [phone] r/t Severe joint pain, and muscle spasms not relieved with current pain regimen

## 2022-04-25 ENCOUNTER — OFFICE VISIT (OUTPATIENT)
Dept: URGENT CARE | Facility: CLINIC | Age: 57
End: 2022-04-25
Payer: COMMERCIAL

## 2022-04-25 VITALS
BODY MASS INDEX: 26.38 KG/M2 | OXYGEN SATURATION: 98 % | HEART RATE: 90 BPM | DIASTOLIC BLOOD PRESSURE: 90 MMHG | SYSTOLIC BLOOD PRESSURE: 132 MMHG | WEIGHT: 153 LBS

## 2022-04-25 DIAGNOSIS — N39.0 URINARY TRACT INFECTION WITHOUT HEMATURIA, SITE UNSPECIFIED: Primary | ICD-10-CM

## 2022-04-25 DIAGNOSIS — R35.0 URINARY FREQUENCY: ICD-10-CM

## 2022-04-25 LAB
SL AMB  POCT GLUCOSE, UA: ABNORMAL
SL AMB LEUKOCYTE ESTERASE,UA: ABNORMAL
SL AMB POCT BILIRUBIN,UA: ABNORMAL
SL AMB POCT BLOOD,UA: ABNORMAL
SL AMB POCT CLARITY,UA: ABNORMAL
SL AMB POCT COLOR,UA: ABNORMAL
SL AMB POCT KETONES,UA: ABNORMAL
SL AMB POCT NITRITE,UA: ABNORMAL
SL AMB POCT PH,UA: 6.5
SL AMB POCT SPECIFIC GRAVITY,UA: 1.01
SL AMB POCT URINE PROTEIN: ABNORMAL
SL AMB POCT UROBILINOGEN: 0.2

## 2022-04-25 PROCEDURE — 87086 URINE CULTURE/COLONY COUNT: CPT | Performed by: PHYSICIAN ASSISTANT

## 2022-04-25 PROCEDURE — 87077 CULTURE AEROBIC IDENTIFY: CPT | Performed by: PHYSICIAN ASSISTANT

## 2022-04-25 PROCEDURE — 99213 OFFICE O/P EST LOW 20 MIN: CPT | Performed by: PHYSICIAN ASSISTANT

## 2022-04-25 PROCEDURE — 81002 URINALYSIS NONAUTO W/O SCOPE: CPT | Performed by: PHYSICIAN ASSISTANT

## 2022-04-25 PROCEDURE — 87186 SC STD MICRODIL/AGAR DIL: CPT | Performed by: PHYSICIAN ASSISTANT

## 2022-04-25 RX ORDER — CIPROFLOXACIN 500 MG/1
500 TABLET, FILM COATED ORAL EVERY 12 HOURS SCHEDULED
Qty: 14 TABLET | Refills: 0 | Status: SHIPPED | OUTPATIENT
Start: 2022-04-25 | End: 2022-05-02

## 2022-04-25 NOTE — PATIENT INSTRUCTIONS
Call your oncologist and primary care provider on left them know about your symptoms  Follow-up in the next 3-5 days  Sooner if new or worsening symptoms develop  Urinary Tract Infection in Women   WHAT YOU NEED TO KNOW:   A urinary tract infection (UTI) is caused by bacteria that get inside your urinary tract  Most bacteria that enter your urinary tract come out when you urinate  If the bacteria stay in your urinary tract, you may get an infection  Your urinary tract includes your kidneys, ureters, bladder, and urethra  Urine is made in your kidneys, and it flows from the ureters to the bladder  Urine leaves the bladder through the urethra  A UTI is more common in your lower urinary tract, which includes your bladder and urethra  DISCHARGE INSTRUCTIONS:   Return to the emergency department if:   · You are urinating very little or not at all  · You have a high fever with shaking chills  · You have side or back pain that gets worse  Call your doctor if:   · You have a fever  · You do not feel better after 2 days of taking antibiotics  · You are vomiting  · You have questions or concerns about your condition or care  Medicines:   · Antibiotics  help fight a bacterial infection  If you have UTIs often (called recurrent UTIs), you may be given antibiotics to take regularly  You will be given directions for when and how to use antibiotics  The goal is to prevent UTIs but not cause antibiotic resistance by using antibiotics too often  · Medicines  may be given to decrease pain and burning when you urinate  They will also help decrease the feeling that you need to urinate often  These medicines will make your urine orange or red  · Take your medicine as directed  Contact your healthcare provider if you think your medicine is not helping or if you have side effects  Tell him or her if you are allergic to any medicine  Keep a list of the medicines, vitamins, and herbs you take  Include the amounts, and when and why you take them  Bring the list or the pill bottles to follow-up visits  Carry your medicine list with you in case of an emergency  Follow up with your doctor as directed:  Write down your questions so you remember to ask them during your visits  Prevent another UTI:   · Empty your bladder often  Urinate and empty your bladder as soon as you feel the need  Do not hold your urine for long periods of time  · Wipe from front to back after you urinate or have a bowel movement  This will help prevent germs from getting into your urinary tract through your urethra  · Drink liquids as directed  Ask how much liquid to drink each day and which liquids are best for you  You may need to drink more liquids than usual to help flush out the bacteria  Do not drink alcohol, caffeine, or citrus juices  These can irritate your bladder and increase your symptoms  Your healthcare provider may recommend cranberry juice to help prevent a UTI  · Urinate after you have sex  This can help flush out bacteria passed during sex  · Do not douche or use feminine deodorants  These can change the chemical balance in your vagina  · Change sanitary pads or tampons often  This will help prevent germs from getting into your urinary tract  · Talk to your healthcare provider about your birth control method  You may need to change your method if it is increasing your risk for UTIs  · Wear cotton underwear and clothes that are loose  Tight pants and nylon underwear can trap moisture and cause bacteria to grow  · Vaginal estrogen may be recommended  This medicine helps prevent UTIs in women who have gone through menopause or are in tootie-menopause  · Do pelvic muscle exercises often  Pelvic muscle exercises may help you start and stop urinating  Strong pelvic muscles may help you empty your bladder easier   Squeeze these muscles tightly for 5 seconds like you are trying to hold back urine  Then relax for 5 seconds  Gradually work up to squeezing for 10 seconds  Do 3 sets of 15 repetitions a day, or as directed  © Copyright [employer] 2022 Information is for End User's use only and may not be sold, redistributed or otherwise used for commercial purposes  All illustrations and images included in CareNotes® are the copyrighted property of [employer]  or [facility] Sara Ceron   The above information is an  only  It is not intended as medical advice for individual conditions or treatments  Talk to your doctor, nurse or pharmacist before following any medical regimen to see if it is safe and effective for you

## 2022-04-25 NOTE — TELEPHONE ENCOUNTER
Returned telephone call to patient in regards of patient pain and muscle spasms  Spoke with spouse, Ariella Corral in regards to symptoms and reviewed with Dr Angel Milian  Per Dr Angel Milian recommendations to reach out to palliative care for management of pain  Patient and patient spouse verbalized understanding and appreciative of call  Encouraged to call back with further questions or concerns

## 2022-04-25 NOTE — PROGRESS NOTES
[facility]        NAME: Daev Yip is a 49 y o  female  : 1965    MRN: [id number]  DATE: May 2, 2022  TIME: 8:21 AM    Assessment and Plan   Urinary tract infection without hematuria, site unspecified [N39 0]  1  Urinary tract infection without hematuria, site unspecified  POCT urine dip    ciprofloxacin (CIPRO) 500 mg tablet   2  Urinary frequency  Urine culture    ciprofloxacin (CIPRO) 500 mg tablet    Urine culture         Patient Instructions   Patient Instructions     Call your oncologist and primary care provider on left them know about your symptoms  Follow-up in the next 3-5 days  Sooner if new or worsening symptoms develop  Urinary Tract Infection in Women   WHAT YOU NEED TO KNOW:   A urinary tract infection (UTI) is caused by bacteria that get inside your urinary tract  Most bacteria that enter your urinary tract come out when you urinate  If the bacteria stay in your urinary tract, you may get an infection  Your urinary tract includes your kidneys, ureters, bladder, and urethra  Urine is made in your kidneys, and it flows from the ureters to the bladder  Urine leaves the bladder through the urethra  A UTI is more common in your lower urinary tract, which includes your bladder and urethra  DISCHARGE INSTRUCTIONS:   Return to the emergency department if:   · You are urinating very little or not at all  · You have a high fever with shaking chills  · You have side or back pain that gets worse  Call your doctor if:   · You have a fever  · You do not feel better after 2 days of taking antibiotics  · You are vomiting  · You have questions or concerns about your condition or care  Medicines:   · Antibiotics  help fight a bacterial infection  If you have UTIs often (called recurrent UTIs), you may be given antibiotics to take regularly  You will be given directions for when and how to use antibiotics   The goal is to prevent UTIs but not cause antibiotic resistance by using antibiotics too often  · Medicines  may be given to decrease pain and burning when you urinate  They will also help decrease the feeling that you need to urinate often  These medicines will make your urine orange or red  · Take your medicine as directed  Contact your healthcare provider if you think your medicine is not helping or if you have side effects  Tell him or her if you are allergic to any medicine  Keep a list of the medicines, vitamins, and herbs you take  Include the amounts, and when and why you take them  Bring the list or the pill bottles to follow-up visits  Carry your medicine list with you in case of an emergency  Follow up with your doctor as directed:  Write down your questions so you remember to ask them during your visits  Prevent another UTI:   · Empty your bladder often  Urinate and empty your bladder as soon as you feel the need  Do not hold your urine for long periods of time  · Wipe from front to back after you urinate or have a bowel movement  This will help prevent germs from getting into your urinary tract through your urethra  · Drink liquids as directed  Ask how much liquid to drink each day and which liquids are best for you  You may need to drink more liquids than usual to help flush out the bacteria  Do not drink alcohol, caffeine, or citrus juices  These can irritate your bladder and increase your symptoms  Your healthcare provider may recommend cranberry juice to help prevent a UTI  · Urinate after you have sex  This can help flush out bacteria passed during sex  · Do not douche or use feminine deodorants  These can change the chemical balance in your vagina  · Change sanitary pads or tampons often  This will help prevent germs from getting into your urinary tract  · Talk to your healthcare provider about your birth control method  You may need to change your method if it is increasing your risk for UTIs      · Wear cotton underwear and clothes that are loose  Tight pants and nylon underwear can trap moisture and cause bacteria to grow  · Vaginal estrogen may be recommended  This medicine helps prevent UTIs in women who have gone through menopause or are in tootie-menopause  · Do pelvic muscle exercises often  Pelvic muscle exercises may help you start and stop urinating  Strong pelvic muscles may help you empty your bladder easier  Squeeze these muscles tightly for 5 seconds like you are trying to hold back urine  Then relax for 5 seconds  Gradually work up to squeezing for 10 seconds  Do 3 sets of 15 repetitions a day, or as directed  © Copyright [employer] 2022 Information is for End User's use only and may not be sold, redistributed or otherwise used for commercial purposes  All illustrations and images included in CareNotes® are the copyrighted property of [employer]  or Flores Grijalva  The above information is an  only  It is not intended as medical advice for individual conditions or treatments  Talk to your doctor, nurse or pharmacist before following any medical regimen to see if it is safe and effective for you  Follow up with PCP in 3-5 days  Proceed to  ER if symptoms worsen  Chief Complaint     Chief Complaint   Patient presents with    Possible UTI     pt c/o urine frequency, just started today, has chills as well just started chemo last week  History of Present Illness       Patient presents with urinary frequency starting last night  Has had some cramping abdominal pain as well  History of UTIs in the past presenting similarly  Denies fevers, back pains, painful urination  Review of Systems   Review of Systems   Constitutional: Negative for fatigue and fever  Gastrointestinal: Positive for abdominal pain  Genitourinary: Positive for frequency and urgency  Negative for dysuria and flank pain  Musculoskeletal: Negative for back pain     Neurological: Negative for weakness  Psychiatric/Behavioral: Negative for confusion  Current Medications       Current Outpatient Medications:     acetaminophen (TYLENOL) 325 mg tablet, Take 2 tablets (650 mg total) by mouth every 6 (six) hours as needed for mild pain, Disp: 100 tablet, Rfl: 0    al mag oxide-diphenhydramine-lidocaine viscous (MAGIC MOUTHWASH) 1:1:1 suspension, Swish and spit 10 mL every 4 (four) hours as needed for mouth pain or discomfort, Disp: 300 mL, Rfl: 0    albuterol (PROVENTIL HFA,VENTOLIN HFA) 90 mcg/act inhaler, Inhale 2 puffs every 4 (four) hours as needed for wheezing, Disp: 8 g, Rfl: 0    benzonatate (TESSALON) 200 MG capsule, Take 1 capsule (200 mg total) by mouth 3 (three) times a day as needed for cough, Disp: 20 capsule, Rfl: 0    calcium carbonate (TUMS) 500 mg chewable tablet, Chew 1 tablet (500 mg total) 3 (three) times a day as needed for indigestion or heartburn, Disp: 30 tablet, Rfl: 0    ciprofloxacin (CIPRO) 500 mg tablet, Take 1 tablet (500 mg total) by mouth every 12 (twelve) hours for 7 days, Disp: 14 tablet, Rfl: 0    dextromethorphan-guaiFENesin (ROBITUSSIN DM)  mg/5 mL syrup, Take 10 mL by mouth every 4 (four) hours as needed for cough, Disp: 236 mL, Rfl: 0    diphenhydrAMINE (BENADRYL) 50 MG tablet, Take 1 tablet (50 mg total) by mouth daily at bedtime as needed for itching or sleep, Disp: 30 tablet, Rfl: 0    diphenoxylate-atropine (LOMOTIL) 2 5-0 025 mg per tablet, Take 1 tablet by mouth 4 (four) times a day as needed for diarrhea, Disp: 30 tablet, Rfl: 0    DULoxetine (CYMBALTA) 30 mg delayed release capsule, Take 1 capsule (30 mg total) by mouth daily, Disp: 90 capsule, Rfl: 3    fluticasone (FLONASE) 50 mcg/act nasal spray, 2 sprays into each nostril daily, Disp: , Rfl:     HYDROmorphone (DILAUDID) 2 mg tablet, Take 2-4 mg [1-2 tabs] PO Q4H PRN   Max Daily 24 mg , Disp: 120 tablet, Rfl: 0    letrozole (FEMARA) 2 5 mg tablet, TAKE 1 TABLET BY MOUTH EVERY DAY, Disp: 90 tablet, Rfl: 2    Lidocaine Viscous HCl (XYLOCAINE) 2 % mucosal solution, Swish and spit 10 mL 4 (four) times a day as needed for mouth pain or discomfort, Disp: 200 mL, Rfl: 0    melatonin 3 mg, Take 2 tablets (6 mg total) by mouth daily at bedtime, Disp: 15 tablet, Rfl: 0    metoclopramide (REGLAN) 10 mg tablet, Take 1 tablet (10 mg total) by mouth 4 (four) times a day, Disp: 28 tablet, Rfl: 0    multivitamin (THERAGRAN) TABS, Take 1 tablet by mouth, Disp: , Rfl:     ondansetron (ZOFRAN) 4 mg tablet, Take 1 tablet (4 mg total) by mouth every 8 (eight) hours as needed for nausea or vomiting, Disp: 20 tablet, Rfl: 0    oxybutynin (DITROPAN-XL) 5 mg 24 hr tablet, Take 1 tablet (5 mg total) by mouth daily Take 1 tablet daily, Disp: 90 tablet, Rfl: 3    palbociclib (Ibrance) 100 MG tablet, Take 1 tablet (100 mg total) by mouth daily, Disp: 21 tablet, Rfl: 1    rivaroxaban (Xarelto) 20 mg tablet, Take 1 tablet (20 mg total) by mouth daily with breakfast, Disp: 30 tablet, Rfl: 2    Current Allergies     Allergies as of 04/25/2022 - Reviewed 04/25/2022   Allergen Reaction Noted    Codeine Anaphylaxis 11/12/2013    Morphine GI Intolerance, Itching, and Vomiting 07/16/2015    Sulfa antibiotics Hives and Itching 07/16/2015            The following portions of the patient's history were reviewed and updated as appropriate: allergies, current medications, past family history, past medical history, past social history, past surgical history and problem list      Past Medical History:   Diagnosis Date    History of radiation exposure     Malignant neoplasm of right female breast ([facility] [address] ) 2012    right       Past Surgical History:   Procedure Laterality Date    BREAST CYST EXCISION      BREAST LUMPECTOMY Right 2012    HIP SURGERY      IR BIOPSY LIVER MASS  7/26/2021    IR PLEURAL EFFUSION LONG-TERM CATHETER PLACEMENT  8/17/2021    IR THORACENTESIS  7/26/2021       Family History   Problem Relation Age of Onset    Breast cancer Mother         in her 63's    Breast cancer Sister         inher 45s and 48    Colon cancer Maternal Grandmother     No Known Problems Paternal Grandmother     Breast cancer Other     No Known Problems Paternal Aunt          Medications have been verified  Objective   /90   Pulse 90   Wt 69 4 kg (153 lb)   SpO2 98%   BMI 26 38 kg/m²        Physical Exam     Physical Exam  Constitutional:       Appearance: Normal appearance  Abdominal:      General: Abdomen is flat  Bowel sounds are normal       Palpations: Abdomen is soft  Tenderness: There is no abdominal tenderness  There is no guarding or rebound  Neurological:      Mental Status: She is alert     Psychiatric:         Mood and Affect: Mood normal          Behavior: Behavior normal

## 2022-04-26 ENCOUNTER — TELEPHONE (OUTPATIENT)
Dept: HEMATOLOGY ONCOLOGY | Facility: CLINIC | Age: 57
End: 2022-04-26

## 2022-04-26 ENCOUNTER — APPOINTMENT (OUTPATIENT)
Dept: LAB | Facility: HOSPITAL | Age: 57
End: 2022-04-26
Payer: COMMERCIAL

## 2022-04-26 ENCOUNTER — HOSPITAL ENCOUNTER (OUTPATIENT)
Dept: INFUSION CENTER | Facility: CLINIC | Age: 57
Discharge: HOME | End: 2022-04-26
Payer: COMMERCIAL

## 2022-04-26 ENCOUNTER — TELEPHONE (OUTPATIENT)
Dept: HEMATOLOGY ONCOLOGY | Facility: HOSPITAL | Age: 57
End: 2022-04-26

## 2022-04-26 VITALS
BODY MASS INDEX: 25.1 KG/M2 | HEART RATE: 87 BPM | TEMPERATURE: 97.4 F | SYSTOLIC BLOOD PRESSURE: 128 MMHG | WEIGHT: 145.6 LBS | RESPIRATION RATE: 18 BRPM | DIASTOLIC BLOOD PRESSURE: 80 MMHG

## 2022-04-26 DIAGNOSIS — C50.911 PRIMARY MALIGNANT NEOPLASM OF RIGHT BREAST WITH METASTASIS TO OTHER SITE (HCC): ICD-10-CM

## 2022-04-26 DIAGNOSIS — C79.51 BONE METASTASES (HCC): ICD-10-CM

## 2022-04-26 DIAGNOSIS — J91.0 MALIGNANT PLEURAL EFFUSION: ICD-10-CM

## 2022-04-26 DIAGNOSIS — C78.7 MALIGNANT NEOPLASM METASTATIC TO LIVER (HCC): Primary | ICD-10-CM

## 2022-04-26 DIAGNOSIS — C50.919 MALIGNANT NEOPLASM OF BREAST IN FEMALE, ESTROGEN RECEPTOR POSITIVE, UNSPECIFIED LATERALITY, UNSPECIFIED SITE OF BREAST (HCC): ICD-10-CM

## 2022-04-26 DIAGNOSIS — C79.51 BONE METASTASES (HCC): Primary | ICD-10-CM

## 2022-04-26 DIAGNOSIS — Z17.0 MALIGNANT NEOPLASM OF BREAST IN FEMALE, ESTROGEN RECEPTOR POSITIVE, UNSPECIFIED LATERALITY, UNSPECIFIED SITE OF BREAST (HCC): ICD-10-CM

## 2022-04-26 LAB
BASOPHILS # BLD MANUAL: 0.08 THOUSAND/UL (ref 0–0.1)
BASOPHILS NFR MAR MANUAL: 6 % (ref 0–1)
EOSINOPHIL # BLD MANUAL: 0.01 THOUSAND/UL (ref 0–0.4)
EOSINOPHIL NFR BLD MANUAL: 1 % (ref 0–6)
ERYTHROCYTE [DISTWIDTH] IN BLOOD BY AUTOMATED COUNT: 13.5 % (ref 11.6–15.1)
HCT VFR BLD AUTO: 37.7 % (ref 34.8–46.1)
HGB BLD-MCNC: 12.9 G/DL (ref 11.5–15.4)
LYMPHOCYTES # BLD AUTO: 0.54 THOUSAND/UL (ref 0.6–4.47)
LYMPHOCYTES # BLD AUTO: 40 % (ref 14–44)
MCH RBC QN AUTO: 36.8 PG (ref 26.8–34.3)
MCHC RBC AUTO-ENTMCNC: 34.2 G/DL (ref 31.4–37.4)
MCV RBC AUTO: 107 FL (ref 82–98)
METAMYELOCYTES NFR BLD MANUAL: 2 % (ref 0–1)
MONOCYTES # BLD AUTO: 0.14 THOUSAND/UL (ref 0–1.22)
MONOCYTES NFR BLD: 10 % (ref 4–12)
NEUTROPHILS # BLD MANUAL: 0.55 THOUSAND/UL (ref 1.85–7.62)
NEUTS BAND NFR BLD MANUAL: 2 % (ref 0–8)
NEUTS SEG NFR BLD AUTO: 39 % (ref 43–75)
PLATELET # BLD AUTO: 308 THOUSANDS/UL (ref 149–390)
PLATELET BLD QL SMEAR: ADEQUATE
PMV BLD AUTO: 12.3 FL (ref 8.9–12.7)
RBC # BLD AUTO: 3.51 MILLION/UL (ref 3.81–5.12)
RBC MORPH BLD: NORMAL
WBC # BLD AUTO: 1.35 THOUSAND/UL (ref 4.31–10.16)

## 2022-04-26 PROCEDURE — 96402 CHEMO HORMON ANTINEOPL SQ/IM: CPT

## 2022-04-26 PROCEDURE — 85007 BL SMEAR W/DIFF WBC COUNT: CPT

## 2022-04-26 PROCEDURE — 85027 COMPLETE CBC AUTOMATED: CPT

## 2022-04-26 RX ADMIN — GOSERELIN ACETATE 3.6 MG: 3.6 IMPLANT SUBCUTANEOUS at 14:56

## 2022-04-26 NOTE — TELEPHONE ENCOUNTER
Called patient and spoke with patient, rescheduled her f/u appt with different provider, date, time due to provider not being here  Patient confirmed this appt

## 2022-04-26 NOTE — TELEPHONE ENCOUNTER
Returned telephone call to patient spouse in regards to concern of patient taking bactrim with treatment plan scheduled for today 4/26/22  Reviewed with Dr Angie King and notified of continuation of treatment today  Patient spouse verbalized understanding  Recommended boost or ensure to assist with nutrition needs  Patient spouse appreciative of call

## 2022-04-26 NOTE — TELEPHONE ENCOUNTER
Received telephone call from patient in regards to patient being on bactrim for possible UTI  Patient asking if patient able to receive treatment today  Asked if any fevers and patient declined  Reviewed with patient that we will review with Dr Roberts Daily and return phone call  Patient appreciative of call

## 2022-04-26 NOTE — PROGRESS NOTES
Notified from infusion in regards to patient's blood work results that patient had went and got done morning of 4/26/22  Wbc 1 35  anc 0 55    Reviewed with Dr Adithya Lewis and would like patient to only get zoladex today (4/26/22) and to postpone other treatment one week  Infusion RN notified

## 2022-04-26 NOTE — PROGRESS NOTES
David held today due to [address] of 0 55  Per Dr Irene Ascencio, will recheck labs next week and reschedule treatment for then  Zoladex given without complication  No complaints offered  AVS declined

## 2022-04-26 NOTE — TELEPHONE ENCOUNTER
This is for scheduling purposes only  Patient needs today's treatment deferred to next week due to labs being out of parameters

## 2022-04-27 ENCOUNTER — HOSPITAL ENCOUNTER (OUTPATIENT)
Dept: INFUSION CENTER | Facility: CLINIC | Age: 57
End: 2022-04-27

## 2022-04-27 LAB — BACTERIA UR CULT: ABNORMAL

## 2022-05-02 ENCOUNTER — APPOINTMENT (OUTPATIENT)
Dept: LAB | Facility: HOSPITAL | Age: 57
End: 2022-05-02
Payer: COMMERCIAL

## 2022-05-02 ENCOUNTER — TELEPHONE (OUTPATIENT)
Dept: HEMATOLOGY ONCOLOGY | Facility: CLINIC | Age: 57
End: 2022-05-02

## 2022-05-02 DIAGNOSIS — J91.0 MALIGNANT PLEURAL EFFUSION: ICD-10-CM

## 2022-05-02 DIAGNOSIS — C50.911 PRIMARY MALIGNANT NEOPLASM OF RIGHT BREAST WITH METASTASIS TO OTHER SITE (HCC): ICD-10-CM

## 2022-05-02 DIAGNOSIS — C79.51 BONE METASTASES (HCC): ICD-10-CM

## 2022-05-02 NOTE — TELEPHONE ENCOUNTER
Called patient  Patient to be getting blood work done yet today for treatment tomorrow  I attempted to encourage patient to get blood work a couple of days sooner to avoid last minute cancellations  Please keep patient on schedule for treatment for 5/3/22

## 2022-05-02 NOTE — TELEPHONE ENCOUNTER
Spoke to patient to go over updated schedule with her  Patient will  an updated schedule when she comes in for treatment tomorrow

## 2022-05-02 NOTE — TELEPHONE ENCOUNTER
CALL RETURN FORM   Reason for patient call? Patient is returning call from Be Goldman regarding treatment   Patient's primary oncologist? Dr Linden Moy    Name of person the patient was calling for? Pam Young   Any additional information to add, if applicable? Please call [phone]   Informed patient that the message will be forwarded to the team and someone will get back to them as soon as possible    Did you relay this information to the patient?   Yes

## 2022-05-02 NOTE — TELEPHONE ENCOUNTER
Reviewed lab results with Dr Azucena Pérez  Defer treatment one week  Attempted to call patient in regards to cancelling treatment this week and that we will reach out to patient with new appointment day and time  Left call back number in voicemail to further discuss

## 2022-05-03 ENCOUNTER — HOSPITAL ENCOUNTER (OUTPATIENT)
Dept: INFUSION CENTER | Facility: CLINIC | Age: 57
Discharge: HOME | End: 2022-05-03
Payer: COMMERCIAL

## 2022-05-03 ENCOUNTER — HOSPITAL ENCOUNTER (OUTPATIENT)
Dept: INFUSION CENTER | Facility: CLINIC | Age: 57
Discharge: HOME | End: 2022-05-03

## 2022-05-03 VITALS
WEIGHT: 149.8 LBS | DIASTOLIC BLOOD PRESSURE: 65 MMHG | BODY MASS INDEX: 25.57 KG/M2 | TEMPERATURE: 96.8 F | SYSTOLIC BLOOD PRESSURE: 114 MMHG | RESPIRATION RATE: 18 BRPM | HEIGHT: 64 IN | HEART RATE: 77 BPM

## 2022-05-03 DIAGNOSIS — C50.911 PRIMARY MALIGNANT NEOPLASM OF RIGHT BREAST WITH METASTASIS TO OTHER SITE (HCC): ICD-10-CM

## 2022-05-03 DIAGNOSIS — J91.0 MALIGNANT PLEURAL EFFUSION: ICD-10-CM

## 2022-05-03 DIAGNOSIS — Z17.0 MALIGNANT NEOPLASM OF BREAST IN FEMALE, ESTROGEN RECEPTOR POSITIVE, UNSPECIFIED LATERALITY, UNSPECIFIED SITE OF BREAST (HCC): Primary | ICD-10-CM

## 2022-05-03 DIAGNOSIS — C79.51 BONE METASTASES (HCC): Primary | ICD-10-CM

## 2022-05-03 DIAGNOSIS — C50.919 MALIGNANT NEOPLASM OF BREAST IN FEMALE, ESTROGEN RECEPTOR POSITIVE, UNSPECIFIED LATERALITY, UNSPECIFIED SITE OF BREAST (HCC): Primary | ICD-10-CM

## 2022-05-03 DIAGNOSIS — C78.7 MALIGNANT NEOPLASM METASTATIC TO LIVER (HCC): ICD-10-CM

## 2022-05-03 PROCEDURE — 96401 CHEMO ANTI-NEOPL SQ/IM: CPT

## 2022-05-03 PROCEDURE — 96365 THER/PROPH/DIAG IV INF INIT: CPT

## 2022-05-03 RX ORDER — SODIUM CHLORIDE 9 MG/ML
20 INJECTION, SOLUTION INTRAVENOUS ONCE
Status: COMPLETED | OUTPATIENT
Start: 2022-05-03 | End: 2022-05-03

## 2022-05-03 RX ADMIN — DEXAMETHASONE SODIUM PHOSPHATE 10 MG: 10 INJECTION, SOLUTION INTRAMUSCULAR; INTRAVENOUS at 10:25

## 2022-05-03 RX ADMIN — ERIBULIN MESYLATE 2.4 MG: 0.5 INJECTION INTRAVENOUS at 11:08

## 2022-05-03 RX ADMIN — SODIUM CHLORIDE 20 ML/HR: 9 INJECTION, SOLUTION INTRAVENOUS at 10:25

## 2022-05-04 ENCOUNTER — HOSPITAL ENCOUNTER (OUTPATIENT)
Dept: INFUSION CENTER | Facility: CLINIC | Age: 57
End: 2022-05-04

## 2022-05-06 DIAGNOSIS — G89.3 CANCER RELATED PAIN: ICD-10-CM

## 2022-05-06 DIAGNOSIS — C79.51 BONE METASTASES (HCC): ICD-10-CM

## 2022-05-06 DIAGNOSIS — Z51.5 PALLIATIVE CARE PATIENT: ICD-10-CM

## 2022-05-06 DIAGNOSIS — C50.911 PRIMARY MALIGNANT NEOPLASM OF RIGHT BREAST WITH METASTASIS TO OTHER SITE (HCC): ICD-10-CM

## 2022-05-06 RX ORDER — HYDROMORPHONE HYDROCHLORIDE 2 MG/1
TABLET ORAL
Qty: 120 TABLET | Refills: 0 | Status: SHIPPED | OUTPATIENT
Start: 2022-05-06 | End: 2022-05-17 | Stop reason: SDUPTHER

## 2022-05-06 NOTE — TELEPHONE ENCOUNTER
Primary palliative medicine provider:   Dr Danial Berg      Medication requested:  Hydromorphone (dilaudid) 2 mg talbet    If for pain, how has the patient been taking their pain medicine? Last appointment: [id number]    Next scheduled appointment: 05/17/2022    PDMP review:    [id number] 04/11/2022 04/07/2022 HYDROmorphone HCL (Tablet) 120 0 10 2 MG 96 0 [facility]  [place]     1 [id number] 03/10/2022 03/10/2022 HYDROmorphone HCL (Tablet) 120 0 10 2 MG 96 0 [facility]  [place]    1 [id number] 01/20/2022 01/20/2022 HYDROmorphone HCL (Tablet) 120 0 10 2 MG 96 0 [facility]   [place]

## 2022-05-08 ENCOUNTER — TELEPHONE (OUTPATIENT)
Dept: HEMATOLOGY ONCOLOGY | Facility: CLINIC | Age: 57
End: 2022-05-08

## 2022-05-08 ENCOUNTER — TELEPHONE (OUTPATIENT)
Dept: OTHER | Facility: OTHER | Age: 57
End: 2022-05-08

## 2022-05-08 NOTE — TELEPHONE ENCOUNTER
Pt stated needed to have on call paged stated attempted to drain catheter and failed and needs to speak to someone

## 2022-05-08 NOTE — TELEPHONE ENCOUNTER
Her  called stating that when he drained pleural effusion from chest tube, pt immediately noticed chest pain  He only drained 100cc today  She breathing is stable  She has no fever  This may be due to malpositioning of chest tube or possible loculated pleural effusion  Advised him to call Dr Allan Willingham office  He may set up IR evaluation for chest tube positioning  He understood

## 2022-05-09 ENCOUNTER — PREP FOR PROCEDURE (OUTPATIENT)
Dept: INTERVENTIONAL RADIOLOGY/VASCULAR | Facility: CLINIC | Age: 57
End: 2022-05-09

## 2022-05-09 ENCOUNTER — TELEPHONE (OUTPATIENT)
Dept: HEMATOLOGY ONCOLOGY | Facility: CLINIC | Age: 57
End: 2022-05-09

## 2022-05-09 DIAGNOSIS — J91.0 MALIGNANT PLEURAL EFFUSION: Primary | ICD-10-CM

## 2022-05-09 NOTE — TELEPHONE ENCOUNTER
Left message on preferred phone number in chart in regards to IR referral placed to look at catheter to be evaluated  Reviewed that they will give a call back with date and time  Reviewed call back number for further questions and concerns

## 2022-05-09 NOTE — TELEPHONE ENCOUNTER
Returned telephone phone to spouse and left a message in regards to IR Evaluation to be scheduled and that IR would reach out to them also in addition with more information regarding procedure  Left call back number to further discuss

## 2022-05-09 NOTE — TELEPHONE ENCOUNTER
Reviewed with Dr German Miller troutamia that patient and spouse were having with asept catheter  IR order placed for further evaluation and IR number provided for patient and patient spouse to reach out to office

## 2022-05-10 ENCOUNTER — TELEPHONE (OUTPATIENT)
Dept: INTERVENTIONAL RADIOLOGY/VASCULAR | Facility: HOSPITAL | Age: 57
End: 2022-05-10

## 2022-05-10 ENCOUNTER — TELEPHONE (OUTPATIENT)
Dept: PULMONOLOGY | Facility: CLINIC | Age: 57
End: 2022-05-10

## 2022-05-10 DIAGNOSIS — L65.9 ALOPECIA: Primary | ICD-10-CM

## 2022-05-10 NOTE — TELEPHONE ENCOUNTER
Returned telephone call to patient to ensure someone had reached out to patient in regards to IR appointment for 5/11/22  Saw IR note and did confirm with patient  Patient had some concerns about not having a drain in the future to assist with discomfort  Reviewed that it was recommended to call pulmonologist in regards to concerns  Patient verbalized understanding and that patient did call pulmonologist and awaiting call back for further recommendations  Patient asked for an order for wig   Order placed

## 2022-05-10 NOTE — TELEPHONE ENCOUNTER
Patient is calling, she currently does her drainage at home and is about 200 cc - 500 cc each time  The last time they tried to drain she was having terrible pain in her side and they had to stop  Her  got her an appointment with   IR to move her cath in the morning   They wanted her to double check with the office and her Pulmonary Doctor first  Please advise    #[phone]

## 2022-05-11 ENCOUNTER — HOSPITAL ENCOUNTER (OUTPATIENT)
Dept: NON INVASIVE DIAGNOSTICS | Facility: HOSPITAL | Age: 57
Discharge: HOME | End: 2022-05-11
Attending: RADIOLOGY
Payer: COMMERCIAL

## 2022-05-11 VITALS
OXYGEN SATURATION: 99 % | WEIGHT: 146.16 LBS | HEART RATE: 74 BPM | BODY MASS INDEX: 24.95 KG/M2 | RESPIRATION RATE: 16 BRPM | DIASTOLIC BLOOD PRESSURE: 82 MMHG | SYSTOLIC BLOOD PRESSURE: 153 MMHG | HEIGHT: 64 IN | TEMPERATURE: 98.2 F

## 2022-05-11 DIAGNOSIS — J91.0 MALIGNANT PLEURAL EFFUSION: ICD-10-CM

## 2022-05-11 PROCEDURE — 76000 FLUOROSCOPY <1 HR PHYS/QHP: CPT | Performed by: STUDENT IN AN ORGANIZED HEALTH CARE EDUCATION/TRAINING PROGRAM

## 2022-05-11 NOTE — BRIEF OP NOTE (RAD/CATH)
INTERVENTIONAL RADIOLOGY PROCEDURE NOTE    Date: 5/11/2022    Procedure: IR PLEURAL EFFUSION LONG-TERM CATHETER REMOVAL    Preoperative diagnosis:   1  Malignant pleural effusion         Postoperative diagnosis: Same  Surgeon: Yennifer Quiros MD     Assistant: None  No qualified resident was available  Blood loss: 0 ml    Specimens: none  Findings:   Fluoroscopic evaluation of the R pleurx catheter revealed no problem  Catheter aspirated without difficulty  Upon discussion with patient, the patient and her  were instructed to decrease aspiration to about once/week or whenever the patient feels dyspneic as opposed to 2x/week currently  Complications: None immediate      Anesthesia: none

## 2022-05-12 ENCOUNTER — TELEPHONE (OUTPATIENT)
Dept: HEMATOLOGY ONCOLOGY | Facility: CLINIC | Age: 57
End: 2022-05-12

## 2022-05-12 NOTE — TELEPHONE ENCOUNTER
Left message for patient stating Susan Pace will be out of office on 6/30/22 and her appointment will need to be r/s  Office number provided for call back

## 2022-05-17 ENCOUNTER — HOSPITAL ENCOUNTER (OUTPATIENT)
Dept: INFUSION CENTER | Facility: CLINIC | Age: 57
Discharge: HOME | End: 2022-05-17
Payer: COMMERCIAL

## 2022-05-17 ENCOUNTER — TELEPHONE (OUTPATIENT)
Dept: HEMATOLOGY ONCOLOGY | Facility: CLINIC | Age: 57
End: 2022-05-17

## 2022-05-17 ENCOUNTER — APPOINTMENT (OUTPATIENT)
Dept: LAB | Facility: HOSPITAL | Age: 57
End: 2022-05-17
Payer: COMMERCIAL

## 2022-05-17 ENCOUNTER — OFFICE VISIT (OUTPATIENT)
Dept: PALLIATIVE MEDICINE | Facility: CLINIC | Age: 57
End: 2022-05-17
Payer: COMMERCIAL

## 2022-05-17 VITALS
WEIGHT: 146.8 LBS | SYSTOLIC BLOOD PRESSURE: 121 MMHG | RESPIRATION RATE: 16 BRPM | TEMPERATURE: 97.6 F | HEART RATE: 73 BPM | HEIGHT: 64 IN | DIASTOLIC BLOOD PRESSURE: 66 MMHG | BODY MASS INDEX: 25.06 KG/M2

## 2022-05-17 VITALS
TEMPERATURE: 98.6 F | SYSTOLIC BLOOD PRESSURE: 110 MMHG | OXYGEN SATURATION: 96 % | WEIGHT: 147.6 LBS | HEART RATE: 78 BPM | DIASTOLIC BLOOD PRESSURE: 70 MMHG | RESPIRATION RATE: 26 BRPM | BODY MASS INDEX: 25.45 KG/M2

## 2022-05-17 DIAGNOSIS — C78.7 MALIGNANT NEOPLASM METASTATIC TO LIVER (HCC): ICD-10-CM

## 2022-05-17 DIAGNOSIS — C79.51 BONE METASTASES (HCC): Primary | ICD-10-CM

## 2022-05-17 DIAGNOSIS — R10.9 ABDOMINAL CRAMPING: ICD-10-CM

## 2022-05-17 DIAGNOSIS — J91.0 MALIGNANT PLEURAL EFFUSION: ICD-10-CM

## 2022-05-17 DIAGNOSIS — C50.911 PRIMARY MALIGNANT NEOPLASM OF RIGHT BREAST WITH METASTASIS TO OTHER SITE (HCC): ICD-10-CM

## 2022-05-17 DIAGNOSIS — Z51.5 PALLIATIVE CARE PATIENT: ICD-10-CM

## 2022-05-17 DIAGNOSIS — C79.51 BONE METASTASES (HCC): ICD-10-CM

## 2022-05-17 DIAGNOSIS — K59.00 CONSTIPATION, UNSPECIFIED CONSTIPATION TYPE: ICD-10-CM

## 2022-05-17 DIAGNOSIS — Z17.0 MALIGNANT NEOPLASM OF BREAST IN FEMALE, ESTROGEN RECEPTOR POSITIVE, UNSPECIFIED LATERALITY, UNSPECIFIED SITE OF BREAST (HCC): ICD-10-CM

## 2022-05-17 DIAGNOSIS — G89.3 CANCER RELATED PAIN: ICD-10-CM

## 2022-05-17 DIAGNOSIS — C50.911 PRIMARY MALIGNANT NEOPLASM OF RIGHT BREAST WITH METASTASIS TO OTHER SITE (HCC): Primary | ICD-10-CM

## 2022-05-17 DIAGNOSIS — C50.919 MALIGNANT NEOPLASM OF BREAST IN FEMALE, ESTROGEN RECEPTOR POSITIVE, UNSPECIFIED LATERALITY, UNSPECIFIED SITE OF BREAST (HCC): ICD-10-CM

## 2022-05-17 LAB
ALBUMIN SERPL BCP-MCNC: 3.3 G/DL (ref 3.5–5)
ALP SERPL-CCNC: 58 U/L (ref 46–116)
ALT SERPL W P-5'-P-CCNC: 20 U/L (ref 12–78)
ANION GAP SERPL CALCULATED.3IONS-SCNC: 9 MMOL/L (ref 4–13)
AST SERPL W P-5'-P-CCNC: 25 U/L (ref 5–45)
BASOPHILS # BLD AUTO: 0.1 THOUSANDS/ΜL (ref 0–0.1)
BASOPHILS NFR BLD AUTO: 3 % (ref 0–1)
BILIRUB SERPL-MCNC: 0.35 MG/DL (ref 0.2–1)
BUN SERPL-MCNC: 15 MG/DL (ref 5–25)
CALCIUM ALBUM COR SERPL-MCNC: 9.5 MG/DL (ref 8.3–10.1)
CALCIUM SERPL-MCNC: 8.9 MG/DL (ref 8.3–10.1)
CHLORIDE SERPL-SCNC: 107 MMOL/L (ref 100–108)
CO2 SERPL-SCNC: 24 MMOL/L (ref 21–32)
CREAT SERPL-MCNC: 0.89 MG/DL (ref 0.6–1.3)
EOSINOPHIL # BLD AUTO: 0.01 THOUSAND/ΜL (ref 0–0.61)
EOSINOPHIL NFR BLD AUTO: 0 % (ref 0–6)
ERYTHROCYTE [DISTWIDTH] IN BLOOD BY AUTOMATED COUNT: 14.2 % (ref 11.6–15.1)
GFR SERPL CREATININE-BSD FRML MDRD: 73 ML/MIN/1.73SQ M
GLUCOSE P FAST SERPL-MCNC: 109 MG/DL (ref 65–99)
HCT VFR BLD AUTO: 41.1 % (ref 34.8–46.1)
HGB BLD-MCNC: 13.5 G/DL (ref 11.5–15.4)
IMM GRANULOCYTES # BLD AUTO: 0.03 THOUSAND/UL (ref 0–0.2)
IMM GRANULOCYTES NFR BLD AUTO: 1 % (ref 0–2)
LYMPHOCYTES # BLD AUTO: 0.58 THOUSANDS/ΜL (ref 0.6–4.47)
LYMPHOCYTES NFR BLD AUTO: 19 % (ref 14–44)
MCH RBC QN AUTO: 34.8 PG (ref 26.8–34.3)
MCHC RBC AUTO-ENTMCNC: 32.8 G/DL (ref 31.4–37.4)
MCV RBC AUTO: 106 FL (ref 82–98)
MONOCYTES # BLD AUTO: 0.82 THOUSAND/ΜL (ref 0.17–1.22)
MONOCYTES NFR BLD AUTO: 27 % (ref 4–12)
NEUTROPHILS # BLD AUTO: 1.48 THOUSANDS/ΜL (ref 1.85–7.62)
NEUTS SEG NFR BLD AUTO: 50 % (ref 43–75)
NRBC BLD AUTO-RTO: 0 /100 WBCS
PLATELET # BLD AUTO: 269 THOUSANDS/UL (ref 149–390)
PMV BLD AUTO: 12.2 FL (ref 8.9–12.7)
POTASSIUM SERPL-SCNC: 4 MMOL/L (ref 3.5–5.3)
PROT SERPL-MCNC: 7 G/DL (ref 6.4–8.2)
RBC # BLD AUTO: 3.88 MILLION/UL (ref 3.81–5.12)
SODIUM SERPL-SCNC: 140 MMOL/L (ref 136–145)
WBC # BLD AUTO: 3.02 THOUSAND/UL (ref 4.31–10.16)

## 2022-05-17 PROCEDURE — 96401 CHEMO ANTI-NEOPL SQ/IM: CPT

## 2022-05-17 PROCEDURE — 96365 THER/PROPH/DIAG IV INF INIT: CPT

## 2022-05-17 PROCEDURE — 36415 COLL VENOUS BLD VENIPUNCTURE: CPT

## 2022-05-17 PROCEDURE — 80053 COMPREHEN METABOLIC PANEL: CPT

## 2022-05-17 PROCEDURE — 85025 COMPLETE CBC W/AUTO DIFF WBC: CPT

## 2022-05-17 PROCEDURE — 99214 OFFICE O/P EST MOD 30 MIN: CPT | Performed by: STUDENT IN AN ORGANIZED HEALTH CARE EDUCATION/TRAINING PROGRAM

## 2022-05-17 RX ORDER — SODIUM CHLORIDE 9 MG/ML
20 INJECTION, SOLUTION INTRAVENOUS ONCE
Status: COMPLETED | OUTPATIENT
Start: 2022-05-17 | End: 2022-05-17

## 2022-05-17 RX ORDER — HYDROMORPHONE HYDROCHLORIDE 2 MG/1
2-4 TABLET ORAL
Refills: 0
Start: 2022-05-17 | End: 2022-06-06 | Stop reason: SDUPTHER

## 2022-05-17 RX ORDER — SENNA PLUS 8.6 MG/1
1 TABLET ORAL
Qty: 30 TABLET | Refills: 0 | Status: SHIPPED | OUTPATIENT
Start: 2022-05-17

## 2022-05-17 RX ORDER — DICYCLOMINE HYDROCHLORIDE 10 MG/1
10 CAPSULE ORAL EVERY 6 HOURS PRN
Qty: 20 CAPSULE | Refills: 0 | Status: SHIPPED | OUTPATIENT
Start: 2022-05-17

## 2022-05-17 RX ADMIN — SODIUM CHLORIDE 20 ML/HR: 0.9 INJECTION, SOLUTION INTRAVENOUS at 11:03

## 2022-05-17 RX ADMIN — DEXAMETHASONE SODIUM PHOSPHATE 10 MG: 10 INJECTION, SOLUTION INTRAMUSCULAR; INTRAVENOUS at 11:04

## 2022-05-17 RX ADMIN — ERIBULIN MESYLATE 2.4 MG: 0.5 INJECTION INTRAVENOUS at 11:46

## 2022-05-17 NOTE — TELEPHONE ENCOUNTER
Left message for patient stating Vanita Balloon will be out of office on 6/30/22 and her new appointment will be 6/28/22 at 2:30 PM  Instructed patient to call our office if this does not work for her

## 2022-05-17 NOTE — PROGRESS NOTES
Pt here for Halaven IV push  Pt offers no complaints today  Labs reviewed from today and within parameters to treat  Halaven given and tolerated well no adverse reaction noted  Blood returned checked prior and through out push  Pt aware of next appt and labs needed, pt has a print out of AVS   IV site discontinued and wrap placed on

## 2022-05-17 NOTE — PROGRESS NOTES
Outpatient Follow-Up - Palliative and Supportive Care   Jil Cancino 49 y o  female [id number]    Assessment & Plan  Problem List Items Addressed This Visit        Respiratory    Malignant pleural effusion       Musculoskeletal and Integument    Bone metastases (HCC)    Relevant Medications    HYDROmorphone (DILAUDID) 2 mg tablet       Other    Palliative care patient    Relevant Medications    HYDROmorphone (DILAUDID) 2 mg tablet    dicyclomine (BENTYL) 10 mg capsule    Primary malignant neoplasm of right breast with metastasis to other site [facility]) - Primary    Relevant Medications    HYDROmorphone (DILAUDID) 2 mg tablet    dicyclomine (BENTYL) 10 mg capsule    senna (SENOKOT) 8 6 MG tablet    Constipation    Relevant Medications    senna (SENOKOT) 8 6 MG tablet    Cancer related pain    Relevant Medications    HYDROmorphone (DILAUDID) 2 mg tablet      Other Visit Diagnoses     Abdominal cramping        Relevant Medications    dicyclomine (BENTYL) 10 mg capsule        #symptom management  #cancer-related pain   - continue APAP 650 mg PO Q6H PRN              - max daily 4000 mg   - increase frequency hydromorphone 2-4 mg PO Q3H PRN   - continue duloxetine therapy     Again discussed consideration for long-acting opioids, however, both patient and provider agree that focus on optimization of short-acting opioids right now is in alignment with pain management plan  #anxiety   - continue duloxetine 30 mg PO QDaily     #nausea   - continue metoclopramide 10 mg PO QID PRN   - continue ondansetron 4 mg PO Q8H PRN    Resolved  Medications effective PRN       #insomnia   - continue melatonin 6 mg PO QHS     #OIC   - continue home bowel regimen   - continued adequate hydration   - addition of senna to regimen given worsening constipation    #abdominal cramping   - trial dicyclomine 10 mg PO Q6H PRN     #recurrent malignant pleural effusions   - met with IR for PleurX removal, patient decided to maintain catheter for the time being   - decreased drainage from prior months   - 100-200 cc/week    #goals of care   - treatment focused care with no limitations at this time    #psychosocial support   - emotional support provided   - Philomena Ghoshalberto [phone]      Next [facility] Follow up in 4 weeks  Controlled Substance Review    [place] or [place]  reviewed: No red flags were identified; safe to proceed with prescription  Nicolas Mejias PDMP Review       Value Time User    PDMP Reviewed  Yes 5/17/2022 12:02 PM Charmayne Brittle, MD          Medications adjusted this encounter:  Requested Prescriptions     Signed Prescriptions Disp Refills    HYDROmorphone (DILAUDID) 2 mg tablet  0     Sig: Take 1-2 tablets (2-4 mg total) by mouth every 3 (three) hours as needed (moderate/severe cancer-related pain) Take 2-4 mg [1-2 tabs] PO Q4H PRN  Max Daily 24 mg  Max Daily Amount: 32 mg    dicyclomine (BENTYL) 10 mg capsule 20 capsule 0     Sig: Take 1 capsule (10 mg total) by mouth every 6 (six) hours as needed (abdominal cramping)    senna (SENOKOT) 8 6 MG tablet 30 tablet 0     Sig: Take 1 tablet (8 6 mg total) by mouth daily at bedtime as needed for constipation     No orders of the defined types were placed in this encounter  Medications Discontinued During This Encounter   Medication Reason    HYDROmorphone (DILAUDID) 2 mg tablet Reorder         Phani Espinosa was seen today for symptoms and planning cares related to above illnesses  I have reviewed the patient's controlled substance dispensing history in the Prescription Drug Monitoring Program in compliance with the [facility] regulations before prescribing any controlled substances  They are invited to continue to follow with us  If there are questions or concerns, please contact us through our clinic/answering service 24 hours a day, seven days a week      Charmayne Brittle, MD  [facility] Palliative and Supportive [facility]  [phone]      Visit Information    Accompanied By: Jasper Crawford Source of History: Self, Spouse, Medical record    History Limitations: None      History of Present Illness    Sarah Davis is a 49 y o  female who presents in follow up of symptoms related to metastatic breast cancer, recurrent malignant pleural effusions s/p PleurX catheter placement  Pertinent issues include: symptom management, pain, neoplasm related, depression or anxiety, assessment of goals of care  Patient reports increased cancer-related pain levels, mostly in bones around rib cage, worse at night  Use of PO hydromorphone x 6 tabs/day, onset 45-60 minutes, lasting 3 hours  Patient feels that dosing is correct, adjustment to frequency may be helpful  Does not wish to pursue long-acting opioids, given past experience with pain management and prolonged use of opioids [which patient feels were unnecessary at that time]  Denies nausea vomiting  Intermittent abdominal cramping, feels may be related to increased constipation  Last BM 2-3 days ago  Passing flatus  Making adjustments to bowel regimen  Attributes to chemotherapy, previously regular [facility] - now frequency is much longer in between BMs  Appetite is variable, 2-3 meals/day + snacks  Weight stable  Sleep 8-10 hours/night with 1-2 awakenings  Past medical, surgical, social, and family histories are reviewed and pertinent updates are made  Review of Systems   Constitutional: Positive for malaise/fatigue and night sweats  Negative for chills, decreased appetite, fever and weight loss  HENT: Negative for congestion  Eyes: Negative for visual disturbance  Cardiovascular: Negative for chest pain  Respiratory: Negative for shortness of breath  Musculoskeletal: Negative for falls and neck pain  R-sided chest wall pain   Gastrointestinal: Positive for constipation  Negative for abdominal pain, nausea and vomiting  Abdominal cramping   Genitourinary: Negative for frequency  Neurological: Negative for headaches  Psychiatric/Behavioral: The patient does not have insomnia  All other systems reviewed and are negative  Vital Signs    /70 (BP Location: Left arm)   Pulse 78   Temp 98 6 °F (37 °C) (Temporal)   Resp (!) 26   Wt 67 kg (147 lb 9 6 oz)   LMP 05/26/2021   SpO2 96%   BMI 25 45 kg/m²     Physical Exam and Objective Data  Physical Exam  Vitals and nursing note reviewed  Constitutional:       General: She is awake  Appearance: She is not diaphoretic  Comments: Sitting up in NAD  BMI 25 5  Non-toxic appearing   HENT:      Head: Normocephalic and atraumatic  Right Ear: External ear normal       Left Ear: External ear normal       Nose: No rhinorrhea  Eyes:      Comments: No gaze preference   Cardiovascular:      Rate and Rhythm: Normal rate  Pulmonary:      Effort: No tachypnea, accessory muscle usage or respiratory distress  Comments: Completes full sentences without difficulty  Musculoskeletal:      Cervical back: Normal range of motion  Neurological:      General: No focal deficit present  Mental Status: She is alert and oriented to person, place, and time     Psychiatric:         Attention and Perception: Attention normal          Mood and Affect: Mood and affect normal          Speech: Speech normal          Cognition and Memory: Cognition and memory normal            Radiology and Laboratory:  I personally reviewed and interpreted the following results:    Most Recent COVID-19 Results:  Lab Results   Component Value Date/Time    SARSCOV2 Positive (A) 02/11/2022 02:10 PM    SARSCOV2 Negative 11/11/2021 03:42 PM    SARSCOV2 Positive (A) 12/20/2020 12:25 PM       Most Recent Lab Work:  Lab Results   Component Value Date/Time    SODIUM 140 05/17/2022 09:04 AM    K 4 0 05/17/2022 09:04 AM    K 3 7 04/02/2014 01:15 PM    BUN 15 05/17/2022 09:04 AM    BUN 12 04/02/2014 01:15 PM    CREATININE 0 89 05/17/2022 09:04 AM    CREATININE 0 80 04/02/2014 01:15 PM    GLUC 105 05/02/2022 03:26 PM     Lab Results   Component Value Date/Time    AST 25 05/17/2022 09:04 AM    AST 23 04/02/2014 01:15 PM    ALT 20 05/17/2022 09:04 AM    ALT 15 04/02/2014 01:15 PM    ALB 3 3 (L) 05/17/2022 09:04 AM    ALB 3 4 (L) 04/02/2014 01:15 PM     Lab Results   Component Value Date/Time    HGB 13 5 05/17/2022 09:04 AM    WBC 3 02 (L) 05/17/2022 09:04 AM     05/17/2022 09:04 AM    INR 1 07 08/17/2021 06:14 AM       Most Recent Imaging [last 30 days]:  No results found  30 minutes was spent face to face with Jil Cancino and her spouse with greater than 50% of the time spent in counseling or coordination of care including discussions of provided medical updates, discussed palliative care, determined goals of care, determined social/family support, discussed plans of care, discussed symptom management, provided psychosocial support  Opioid regimen adjustment  Bentyl trial for abdominal cramping  Adjustment to bowel regimen  PDMP Reviewed  All of the patient's or agent's questions were answered during this discussion

## 2022-05-21 ENCOUNTER — TELEPHONE (OUTPATIENT)
Dept: OTHER | Facility: OTHER | Age: 57
End: 2022-05-21

## 2022-05-21 NOTE — TELEPHONE ENCOUNTER
Pts  calling stating the pt is a pt of [facility], Dr Sylwia Osman, she had chemo on tuesday and they had a hard time getting a line for IV and infiltrated a vein, states she is symptomatic w/ bruising and swelling at the injection sites, they are concerned and wondering if she has to be seen

## 2022-05-22 ENCOUNTER — OFFICE VISIT (OUTPATIENT)
Dept: URGENT CARE | Facility: MEDICAL CENTER | Age: 57
End: 2022-05-22
Payer: COMMERCIAL

## 2022-05-22 VITALS
RESPIRATION RATE: 20 BRPM | OXYGEN SATURATION: 98 % | BODY MASS INDEX: 26.05 KG/M2 | HEIGHT: 63 IN | HEART RATE: 110 BPM | TEMPERATURE: 97.6 F | WEIGHT: 147 LBS

## 2022-05-22 DIAGNOSIS — L03.114 CELLULITIS OF LEFT ARM: Primary | ICD-10-CM

## 2022-05-22 PROCEDURE — 99213 OFFICE O/P EST LOW 20 MIN: CPT | Performed by: PHYSICIAN ASSISTANT

## 2022-05-22 RX ORDER — CEPHALEXIN 500 MG/1
500 CAPSULE ORAL EVERY 6 HOURS SCHEDULED
Qty: 40 CAPSULE | Refills: 0 | Status: SHIPPED | OUTPATIENT
Start: 2022-05-22 | End: 2022-06-01

## 2022-05-22 NOTE — PROGRESS NOTES
[facility]        NAME: Jose Kim is a 49 y o  female  : 1965    MRN: [id number]  DATE: May 22, 2022  TIME: 6:26 PM    Assessment and Plan   Cellulitis of left arm [L03 114]  1  Cellulitis of left arm  cephalexin (KEFLEX) 500 mg capsule         Patient Instructions   1  Over-the-counter acetaminophen 650 mg every 6-8 hours as needed for pain  2  Increase oral fluids  3  Apply warm compresses to the affected area 3-4 times daily for 15-20 minutes until improved  4  Go to the ER immediately for any worsening symptoms  5  Follow-up by phone with your oncologist tomorrow morning! Chief Complaint     Chief Complaint   Patient presents with    Cellulitis     Patient states 5 days ago she went in for chemo treatment; multiple attempts to obtain IV access in left forearm; patient now notices swelling, pain, erythema around site          History of Present Illness       62-year-old female patient who began with mild left forearm redness and swelling and pain in the area of her infusion site after getting chemo 5 days ago  She states that since then the areas gotten more warm, red, and uncomfortable  She denies any fever, chills, body aches  She denies any subjective red streaking  She denies any drainage from the area  Review of Systems   Review of Systems   Constitutional: Negative for chills and fever  HENT: Negative for ear pain and sore throat  Eyes: Negative for pain and visual disturbance  Respiratory: Negative for cough and shortness of breath  Cardiovascular: Negative for chest pain and palpitations  Gastrointestinal: Negative for abdominal pain and vomiting  Genitourinary: Negative for dysuria and hematuria  Musculoskeletal: Negative for arthralgias and back pain  Skin: Positive for color change and rash  Neurological: Negative for seizures and syncope  All other systems reviewed and are negative          Current Medications       Current Outpatient Medications:     cephalexin (KEFLEX) 500 mg capsule, Take 1 capsule (500 mg total) by mouth every 6 (six) hours for 10 days, Disp: 40 capsule, Rfl: 0    acetaminophen (TYLENOL) 325 mg tablet, Take 2 tablets (650 mg total) by mouth every 6 (six) hours as needed for mild pain, Disp: 100 tablet, Rfl: 0    al mag oxide-diphenhydramine-lidocaine viscous (MAGIC MOUTHWASH) 1:1:1 suspension, Swish and spit 10 mL every 4 (four) hours as needed for mouth pain or discomfort (Patient not taking: Reported on 5/17/2022), Disp: 300 mL, Rfl: 0    albuterol (PROVENTIL HFA,VENTOLIN HFA) 90 mcg/act inhaler, Inhale 2 puffs every 4 (four) hours as needed for wheezing, Disp: 8 g, Rfl: 0    benzonatate (TESSALON) 200 MG capsule, Take 1 capsule (200 mg total) by mouth 3 (three) times a day as needed for cough, Disp: 20 capsule, Rfl: 0    calcium carbonate (TUMS) 500 mg chewable tablet, Chew 1 tablet (500 mg total) 3 (three) times a day as needed for indigestion or heartburn, Disp: 30 tablet, Rfl: 0    CRANIAL PROSTHESIS, RX,, Apply to cranial area as needed for comfort , Disp: 1 each, Rfl: 0    dextromethorphan-guaiFENesin (ROBITUSSIN DM)  mg/5 mL syrup, Take 10 mL by mouth every 4 (four) hours as needed for cough, Disp: 236 mL, Rfl: 0    dicyclomine (BENTYL) 10 mg capsule, Take 1 capsule (10 mg total) by mouth every 6 (six) hours as needed (abdominal cramping), Disp: 20 capsule, Rfl: 0    diphenhydrAMINE (BENADRYL) 50 MG tablet, Take 1 tablet (50 mg total) by mouth daily at bedtime as needed for itching or sleep, Disp: 30 tablet, Rfl: 0    diphenoxylate-atropine (LOMOTIL) 2 5-0 025 mg per tablet, Take 1 tablet by mouth 4 (four) times a day as needed for diarrhea, Disp: 30 tablet, Rfl: 0    DULoxetine (CYMBALTA) 30 mg delayed release capsule, Take 1 capsule (30 mg total) by mouth daily, Disp: 90 capsule, Rfl: 3    fluticasone (FLONASE) 50 mcg/act nasal spray, 2 sprays into each nostril daily, Disp: , Rfl:    HYDROmorphone (DILAUDID) 2 mg tablet, Take 1-2 tablets (2-4 mg total) by mouth every 3 (three) hours as needed (moderate/severe cancer-related pain) Take 2-4 mg [1-2 tabs] PO Q4H PRN  Max Daily 24 mg   Max Daily Amount: 32 mg, Disp: , Rfl: 0    letrozole (FEMARA) 2 5 mg tablet, TAKE 1 TABLET BY MOUTH EVERY DAY, Disp: 90 tablet, Rfl: 2    Lidocaine Viscous HCl (XYLOCAINE) 2 % mucosal solution, Swish and spit 10 mL 4 (four) times a day as needed for mouth pain or discomfort, Disp: 200 mL, Rfl: 0    melatonin 3 mg, Take 2 tablets (6 mg total) by mouth daily at bedtime, Disp: 15 tablet, Rfl: 0    metoclopramide (REGLAN) 10 mg tablet, Take 1 tablet (10 mg total) by mouth 4 (four) times a day, Disp: 28 tablet, Rfl: 0    multivitamin (THERAGRAN) TABS, Take 1 tablet by mouth, Disp: , Rfl:     ondansetron (ZOFRAN) 4 mg tablet, Take 1 tablet (4 mg total) by mouth every 8 (eight) hours as needed for nausea or vomiting, Disp: 20 tablet, Rfl: 0    oxybutynin (DITROPAN-XL) 5 mg 24 hr tablet, Take 1 tablet (5 mg total) by mouth daily Take 1 tablet daily, Disp: 90 tablet, Rfl: 3    palbociclib (Ibrance) 100 MG tablet, Take 1 tablet (100 mg total) by mouth daily (Patient not taking: Reported on 5/17/2022), Disp: 21 tablet, Rfl: 1    rivaroxaban (Xarelto) 20 mg tablet, Take 1 tablet (20 mg total) by mouth daily with breakfast, Disp: 30 tablet, Rfl: 2    senna (SENOKOT) 8 6 MG tablet, Take 1 tablet (8 6 mg total) by mouth daily at bedtime as needed for constipation, Disp: 30 tablet, Rfl: 0    Current Allergies     Allergies as of 05/22/2022 - Reviewed 05/22/2022   Allergen Reaction Noted    Codeine Anaphylaxis 11/12/2013    Morphine GI Intolerance, Itching, and Vomiting 07/16/2015    Sulfa antibiotics Hives and Itching 07/16/2015            The following portions of the patient's history were reviewed and updated as appropriate: allergies, current medications, past family history, past medical history, past social history, past surgical history and problem list      Past Medical History:   Diagnosis Date    Cancer [facility])     History of radiation exposure     Malignant neoplasm of right female breast ([address] ) 2012    right       Past Surgical History:   Procedure Laterality Date    BREAST CYST EXCISION      BREAST LUMPECTOMY Right 2012    HIP SURGERY      IR BIOPSY LIVER MASS  7/26/2021    IR PLEURAL EFFUSION LONG-TERM CATHETER PLACEMENT  8/17/2021    IR PLEURAL EFFUSION LONG-TERM CATHETER REMOVAL  5/11/2022    IR THORACENTESIS  7/26/2021       Family History   Problem Relation Age of Onset    Breast cancer Mother         in her 63's    Breast cancer Sister         inher 45s and 48    Colon cancer Maternal Grandmother     No Known Problems Paternal Grandmother     Breast cancer Other     No Known Problems Paternal Aunt          Medications have been verified  Objective   Pulse (!) 110   Temp 97 6 °F (36 4 °C)   Resp 20   Ht 5' 3" (1 6 m)   Wt 66 7 kg (147 lb)   LMP 05/26/2021   SpO2 98%   BMI 26 04 kg/m²        Physical Exam     Physical Exam  Constitutional:       Appearance: Normal appearance  HENT:      Head: Normocephalic  Nose: Nose normal    Eyes:      Conjunctiva/sclera: Conjunctivae normal       Pupils: Pupils are equal, round, and reactive to light  Cardiovascular:      Rate and Rhythm: Normal rate  Pulmonary:      Effort: Pulmonary effort is normal    Musculoskeletal:         General: Normal range of motion  Cervical back: Normal range of motion  Skin:     Comments: 5-6 cm X 4 cm area of redness, warmth, mild swelling, induration in the left forearm proximally in the area patient's infusion site  No red streaking noted  No open wounds or discharge noted  Neurological:      Mental Status: She is alert

## 2022-05-22 NOTE — PATIENT INSTRUCTIONS
1  Over-the-counter acetaminophen 650 mg every 6-8 hours as needed for pain  2  Increase oral fluids  3  Apply warm compresses to the affected area 3-4 times daily for 15-20 minutes until improved  4  Go to the ER immediately for any worsening symptoms  5  Follow-up by phone with your oncologist tomorrow morning!

## 2022-05-23 NOTE — TELEPHONE ENCOUNTER
Returned telephone call to patient in regards to swelling at the site of post IV site from infusion center  Reviewed s/s to be concerned with  Reviewed that patient should seek emergent medical assistance if patient develops fever, redness and increase in swelling of extremity  Left voicemail with call back number to further discuss  Did recommend sending picture through My Chart

## 2022-05-24 ENCOUNTER — TELEPHONE (OUTPATIENT)
Dept: HEMATOLOGY ONCOLOGY | Facility: CLINIC | Age: 57
End: 2022-05-24

## 2022-05-24 ENCOUNTER — HOSPITAL ENCOUNTER (OUTPATIENT)
Dept: INFUSION CENTER | Facility: CLINIC | Age: 57
Discharge: HOME | End: 2022-05-24
Payer: COMMERCIAL

## 2022-05-24 VITALS
HEART RATE: 81 BPM | HEIGHT: 64 IN | WEIGHT: 145.94 LBS | RESPIRATION RATE: 16 BRPM | BODY MASS INDEX: 24.92 KG/M2 | SYSTOLIC BLOOD PRESSURE: 111 MMHG | TEMPERATURE: 96.8 F | DIASTOLIC BLOOD PRESSURE: 74 MMHG

## 2022-05-24 DIAGNOSIS — C78.7 MALIGNANT NEOPLASM METASTATIC TO LIVER (HCC): ICD-10-CM

## 2022-05-24 DIAGNOSIS — Z17.0 MALIGNANT NEOPLASM OF BREAST IN FEMALE, ESTROGEN RECEPTOR POSITIVE, UNSPECIFIED LATERALITY, UNSPECIFIED SITE OF BREAST (HCC): ICD-10-CM

## 2022-05-24 DIAGNOSIS — C79.51 BONE METASTASES (HCC): Primary | ICD-10-CM

## 2022-05-24 DIAGNOSIS — C50.911 PRIMARY MALIGNANT NEOPLASM OF RIGHT BREAST WITH METASTASIS TO OTHER SITE (HCC): ICD-10-CM

## 2022-05-24 DIAGNOSIS — J91.0 MALIGNANT PLEURAL EFFUSION: ICD-10-CM

## 2022-05-24 DIAGNOSIS — C50.919 MALIGNANT NEOPLASM OF BREAST IN FEMALE, ESTROGEN RECEPTOR POSITIVE, UNSPECIFIED LATERALITY, UNSPECIFIED SITE OF BREAST (HCC): ICD-10-CM

## 2022-05-24 LAB
ALBUMIN SERPL BCP-MCNC: 3.3 G/DL (ref 3.5–5)
ALP SERPL-CCNC: 51 U/L (ref 46–116)
ALT SERPL W P-5'-P-CCNC: 33 U/L (ref 12–78)
ANION GAP SERPL CALCULATED.3IONS-SCNC: 10 MMOL/L (ref 4–13)
AST SERPL W P-5'-P-CCNC: 48 U/L (ref 5–45)
BASOPHILS # BLD MANUAL: 0.27 THOUSAND/UL (ref 0–0.1)
BASOPHILS NFR MAR MANUAL: 14 % (ref 0–1)
BILIRUB SERPL-MCNC: 0.49 MG/DL (ref 0.2–1)
BUN SERPL-MCNC: 8 MG/DL (ref 5–25)
CALCIUM ALBUM COR SERPL-MCNC: 10.1 MG/DL (ref 8.3–10.1)
CALCIUM SERPL-MCNC: 9.5 MG/DL (ref 8.3–10.1)
CHLORIDE SERPL-SCNC: 104 MMOL/L (ref 100–108)
CO2 SERPL-SCNC: 25 MMOL/L (ref 21–32)
CREAT SERPL-MCNC: 0.81 MG/DL (ref 0.6–1.3)
EOSINOPHIL # BLD MANUAL: 0 THOUSAND/UL (ref 0–0.4)
EOSINOPHIL NFR BLD MANUAL: 0 % (ref 0–6)
ERYTHROCYTE [DISTWIDTH] IN BLOOD BY AUTOMATED COUNT: 14.4 % (ref 11.6–15.1)
GFR SERPL CREATININE-BSD FRML MDRD: 82 ML/MIN/1.73SQ M
GLUCOSE SERPL-MCNC: 109 MG/DL (ref 65–140)
HCT VFR BLD AUTO: 37.7 % (ref 34.8–46.1)
HGB BLD-MCNC: 12.5 G/DL (ref 11.5–15.4)
LYMPHOCYTES # BLD AUTO: 0.47 THOUSAND/UL (ref 0.6–4.47)
LYMPHOCYTES # BLD AUTO: 24 % (ref 14–44)
MCH RBC QN AUTO: 34.2 PG (ref 26.8–34.3)
MCHC RBC AUTO-ENTMCNC: 33.2 G/DL (ref 31.4–37.4)
MCV RBC AUTO: 103 FL (ref 82–98)
METAMYELOCYTES NFR BLD MANUAL: 12 % (ref 0–1)
MONOCYTES # BLD AUTO: 0.27 THOUSAND/UL (ref 0–1.22)
MONOCYTES NFR BLD: 14 % (ref 4–12)
NEUTROPHILS # BLD MANUAL: 0.7 THOUSAND/UL (ref 1.85–7.62)
NEUTS BAND NFR BLD MANUAL: 3 % (ref 0–8)
NEUTS SEG NFR BLD AUTO: 33 % (ref 43–75)
PLATELET # BLD AUTO: 221 THOUSANDS/UL (ref 149–390)
PLATELET BLD QL SMEAR: ADEQUATE
PMV BLD AUTO: 12.5 FL (ref 8.9–12.7)
POTASSIUM SERPL-SCNC: 3.5 MMOL/L (ref 3.5–5.3)
PROT SERPL-MCNC: 6.7 G/DL (ref 6.4–8.2)
RBC # BLD AUTO: 3.66 MILLION/UL (ref 3.81–5.12)
SODIUM SERPL-SCNC: 139 MMOL/L (ref 136–145)
WBC # BLD AUTO: 1.95 THOUSAND/UL (ref 4.31–10.16)

## 2022-05-24 PROCEDURE — 85027 COMPLETE CBC AUTOMATED: CPT | Performed by: INTERNAL MEDICINE

## 2022-05-24 PROCEDURE — 80053 COMPREHEN METABOLIC PANEL: CPT | Performed by: INTERNAL MEDICINE

## 2022-05-24 PROCEDURE — 96402 CHEMO HORMON ANTINEOPL SQ/IM: CPT

## 2022-05-24 PROCEDURE — 96365 THER/PROPH/DIAG IV INF INIT: CPT

## 2022-05-24 PROCEDURE — 85007 BL SMEAR W/DIFF WBC COUNT: CPT | Performed by: INTERNAL MEDICINE

## 2022-05-24 RX ORDER — SODIUM CHLORIDE 9 MG/ML
20 INJECTION, SOLUTION INTRAVENOUS ONCE
Status: COMPLETED | OUTPATIENT
Start: 2022-05-24 | End: 2022-05-24

## 2022-05-24 RX ORDER — SODIUM CHLORIDE 9 MG/ML
20 INJECTION, SOLUTION INTRAVENOUS ONCE
Status: CANCELLED | OUTPATIENT
Start: 2022-08-16 | Stop reason: HOSPADM

## 2022-05-24 RX ADMIN — SODIUM CHLORIDE 20 ML/HR: 0.9 INJECTION, SOLUTION INTRAVENOUS at 12:57

## 2022-05-24 RX ADMIN — GOSERELIN ACETATE 3.6 MG: 3.6 IMPLANT SUBCUTANEOUS at 12:55

## 2022-05-24 RX ADMIN — ZOLEDRONIC ACID 4 MG: 0.04 INJECTION, SOLUTION INTRAVENOUS at 13:01

## 2022-05-24 NOTE — PROGRESS NOTES
Pt presents for Zoladex, Zometa and Halaven  Redness, swelling and warmth noted to last weeks IV site  Pt reports taking Keflex for symptoms  Leonid Puentes RN made aware and reports per Eloise Hernandez PA-C ok to treat  Labs drawn peripherally  ANC 0 7 and WBC 1 95, per JONY Alejandre to be deferred 1 week, ok to give Zoladex and Zometa  Pt denies any dental work/dental issues  Treatment tolerated well  AVS printed, next appointment reviewed

## 2022-05-24 NOTE — PROGRESS NOTES
Received notification from Infusion RN in regards to patient not getting labs done prior to treatment  Reviewed plan with Elda Glynn PA-C and that patient is taking Keflex  Recommended by Elda Glynn PA-C that patient get treatment depending on labs  Ok to treat added

## 2022-05-24 NOTE — PROGRESS NOTES
Addedum: notification received by Infusion RN  WBC 1 95  ANC 0 70  Reviewed with Deanna iGles PA-C and per recommendation patient Havalen to be deferred one week  Patient okay to receive zometa and zoladex on 5/24/22    Infusion RN notified  OK to treat with Zometa and Zoladex on 5/24/22 added  Patient does not need appointment for 5/25 for neulasta due to deferment of treatment one week

## 2022-05-24 NOTE — TELEPHONE ENCOUNTER
Please defer treatment by 1 week  Please keep Zometa and Zoladex on 5/24  Please let me know of date change  Thank you!

## 2022-05-24 NOTE — TELEPHONE ENCOUNTER
I did not call patient this is for scheduling purposes only  Reviewed with Iman Burch PA-C and per recommendation patient Havalen to be deferred one week  Patient okay to receive zometa and zoladex on 5/24/22    Infusion RN notified  OK to treat with Zometa and Zoladex on 5/24/22 added  Patient does not need appointment for 5/25 for neulasta due to deferment of treatment one week

## 2022-05-24 NOTE — TELEPHONE ENCOUNTER
Left detailed message for patient regarding her updated infusion schedule  Advised her to call back if she has any questions

## 2022-05-25 ENCOUNTER — HOSPITAL ENCOUNTER (OUTPATIENT)
Dept: INFUSION CENTER | Facility: CLINIC | Age: 57
Discharge: HOME | End: 2022-05-25

## 2022-05-31 ENCOUNTER — APPOINTMENT (OUTPATIENT)
Dept: LAB | Facility: HOSPITAL | Age: 57
End: 2022-05-31
Payer: COMMERCIAL

## 2022-05-31 DIAGNOSIS — C50.919 MALIGNANT NEOPLASM OF BREAST IN FEMALE, ESTROGEN RECEPTOR POSITIVE, UNSPECIFIED LATERALITY, UNSPECIFIED SITE OF BREAST (HCC): ICD-10-CM

## 2022-05-31 DIAGNOSIS — Z17.0 MALIGNANT NEOPLASM OF BREAST IN FEMALE, ESTROGEN RECEPTOR POSITIVE, UNSPECIFIED LATERALITY, UNSPECIFIED SITE OF BREAST (HCC): ICD-10-CM

## 2022-05-31 DIAGNOSIS — C79.51 BONE METASTASES (HCC): ICD-10-CM

## 2022-05-31 LAB
ALBUMIN SERPL BCP-MCNC: 3.2 G/DL (ref 3.5–5)
ALP SERPL-CCNC: 55 U/L (ref 46–116)
ALT SERPL W P-5'-P-CCNC: 25 U/L (ref 12–78)
ANION GAP SERPL CALCULATED.3IONS-SCNC: 10 MMOL/L (ref 4–13)
AST SERPL W P-5'-P-CCNC: 27 U/L (ref 5–45)
BASOPHILS # BLD AUTO: 0.07 THOUSANDS/ΜL (ref 0–0.1)
BASOPHILS NFR BLD AUTO: 2 % (ref 0–1)
BILIRUB SERPL-MCNC: 0.45 MG/DL (ref 0.2–1)
BUN SERPL-MCNC: 13 MG/DL (ref 5–25)
CALCIUM ALBUM COR SERPL-MCNC: 9.2 MG/DL (ref 8.3–10.1)
CALCIUM SERPL-MCNC: 8.6 MG/DL (ref 8.3–10.1)
CHLORIDE SERPL-SCNC: 106 MMOL/L (ref 100–108)
CO2 SERPL-SCNC: 24 MMOL/L (ref 21–32)
CREAT SERPL-MCNC: 0.88 MG/DL (ref 0.6–1.3)
EOSINOPHIL # BLD AUTO: 0.01 THOUSAND/ΜL (ref 0–0.61)
EOSINOPHIL NFR BLD AUTO: 0 % (ref 0–6)
ERYTHROCYTE [DISTWIDTH] IN BLOOD BY AUTOMATED COUNT: 14.6 % (ref 11.6–15.1)
GFR SERPL CREATININE-BSD FRML MDRD: 74 ML/MIN/1.73SQ M
GLUCOSE SERPL-MCNC: 116 MG/DL (ref 65–140)
HCT VFR BLD AUTO: 39.7 % (ref 34.8–46.1)
HGB BLD-MCNC: 13.1 G/DL (ref 11.5–15.4)
IMM GRANULOCYTES # BLD AUTO: 0.01 THOUSAND/UL (ref 0–0.2)
IMM GRANULOCYTES NFR BLD AUTO: 0 % (ref 0–2)
LYMPHOCYTES # BLD AUTO: 0.66 THOUSANDS/ΜL (ref 0.6–4.47)
LYMPHOCYTES NFR BLD AUTO: 21 % (ref 14–44)
MCH RBC QN AUTO: 33.6 PG (ref 26.8–34.3)
MCHC RBC AUTO-ENTMCNC: 33 G/DL (ref 31.4–37.4)
MCV RBC AUTO: 102 FL (ref 82–98)
MONOCYTES # BLD AUTO: 0.51 THOUSAND/ΜL (ref 0.17–1.22)
MONOCYTES NFR BLD AUTO: 16 % (ref 4–12)
NEUTROPHILS # BLD AUTO: 1.85 THOUSANDS/ΜL (ref 1.85–7.62)
NEUTS SEG NFR BLD AUTO: 61 % (ref 43–75)
NRBC BLD AUTO-RTO: 0 /100 WBCS
PLATELET # BLD AUTO: 265 THOUSANDS/UL (ref 149–390)
PMV BLD AUTO: 11.7 FL (ref 8.9–12.7)
POTASSIUM SERPL-SCNC: 3.3 MMOL/L (ref 3.5–5.3)
PROT SERPL-MCNC: 6.6 G/DL (ref 6.4–8.2)
RBC # BLD AUTO: 3.9 MILLION/UL (ref 3.81–5.12)
SODIUM SERPL-SCNC: 140 MMOL/L (ref 136–145)
WBC # BLD AUTO: 3.11 THOUSAND/UL (ref 4.31–10.16)

## 2022-05-31 PROCEDURE — 85025 COMPLETE CBC W/AUTO DIFF WBC: CPT

## 2022-05-31 PROCEDURE — 80053 COMPREHEN METABOLIC PANEL: CPT

## 2022-05-31 RX ORDER — SODIUM CHLORIDE 9 MG/ML
20 INJECTION, SOLUTION INTRAVENOUS ONCE
Status: CANCELLED | OUTPATIENT
Start: 2022-06-29 | Stop reason: HOSPADM

## 2022-05-31 RX ORDER — SODIUM CHLORIDE 9 MG/ML
20 INJECTION, SOLUTION INTRAVENOUS ONCE
Status: CANCELLED | OUTPATIENT
Start: 2022-06-15

## 2022-06-01 ENCOUNTER — HOSPITAL ENCOUNTER (OUTPATIENT)
Dept: INFUSION CENTER | Facility: CLINIC | Age: 57
Discharge: HOME | End: 2022-06-01
Payer: COMMERCIAL

## 2022-06-01 VITALS
HEIGHT: 64 IN | TEMPERATURE: 97.1 F | HEART RATE: 61 BPM | SYSTOLIC BLOOD PRESSURE: 127 MMHG | RESPIRATION RATE: 18 BRPM | BODY MASS INDEX: 25.13 KG/M2 | WEIGHT: 147.2 LBS | DIASTOLIC BLOOD PRESSURE: 69 MMHG

## 2022-06-01 DIAGNOSIS — C79.51 BONE METASTASES (HCC): Primary | ICD-10-CM

## 2022-06-01 DIAGNOSIS — C50.911 PRIMARY MALIGNANT NEOPLASM OF RIGHT BREAST WITH METASTASIS TO OTHER SITE (HCC): ICD-10-CM

## 2022-06-01 DIAGNOSIS — J91.0 MALIGNANT PLEURAL EFFUSION: ICD-10-CM

## 2022-06-01 DIAGNOSIS — E87.6 HYPOKALEMIA: Primary | ICD-10-CM

## 2022-06-01 PROCEDURE — 96409 CHEMO IV PUSH SNGL DRUG: CPT

## 2022-06-01 PROCEDURE — 96367 TX/PROPH/DG ADDL SEQ IV INF: CPT

## 2022-06-01 RX ORDER — SODIUM CHLORIDE 9 MG/ML
20 INJECTION, SOLUTION INTRAVENOUS ONCE
Status: COMPLETED | OUTPATIENT
Start: 2022-06-01 | End: 2022-06-01

## 2022-06-01 RX ORDER — POTASSIUM CHLORIDE 20 MEQ/1
20 TABLET, EXTENDED RELEASE ORAL 2 TIMES DAILY
Qty: 10 TABLET | Refills: 0 | Status: SHIPPED | OUTPATIENT
Start: 2022-06-01 | End: 2022-06-06

## 2022-06-01 RX ADMIN — ERIBULIN MESYLATE 2.4 MG: 0.5 INJECTION INTRAVENOUS at 15:58

## 2022-06-01 RX ADMIN — SODIUM CHLORIDE 20 ML/HR: 0.9 INJECTION, SOLUTION INTRAVENOUS at 15:28

## 2022-06-01 RX ADMIN — DEXAMETHASONE SODIUM PHOSPHATE 10 MG: 10 INJECTION, SOLUTION INTRAMUSCULAR; INTRAVENOUS at 15:28

## 2022-06-01 NOTE — TELEPHONE ENCOUNTER
Per Dr Benito Guadarrama request call out to patient in regards to patient potassium level  Reviewed patient to take potassium Chloride 20 mEq BID for 5 days  Left call back number to further discuss

## 2022-06-01 NOTE — PROGRESS NOTES
Pt presents for Halavan injection offering no compliants  Pt K 3 3, spoke with Marilynn George RN, PO K sent to pharmacy  Pt aware to pickup medications  PIV placed with blood return noted throughout treatment  Pt tolerated treatment without incident  AVS declined  Next appointment reviewed

## 2022-06-02 ENCOUNTER — HOSPITAL ENCOUNTER (OUTPATIENT)
Dept: INFUSION CENTER | Facility: CLINIC | Age: 57
Discharge: HOME | End: 2022-06-02
Payer: COMMERCIAL

## 2022-06-02 DIAGNOSIS — C50.911 PRIMARY MALIGNANT NEOPLASM OF RIGHT BREAST WITH METASTASIS TO OTHER SITE (HCC): ICD-10-CM

## 2022-06-02 DIAGNOSIS — J91.0 MALIGNANT PLEURAL EFFUSION: ICD-10-CM

## 2022-06-02 DIAGNOSIS — C79.51 BONE METASTASES (HCC): Primary | ICD-10-CM

## 2022-06-02 PROCEDURE — 96372 THER/PROPH/DIAG INJ SC/IM: CPT

## 2022-06-02 RX ADMIN — PEGFILGRASTIM-BMEZ 6 MG: 6 INJECTION SUBCUTANEOUS at 16:02

## 2022-06-02 NOTE — PROGRESS NOTES
Pt presents for Ziextenzo injection  Pt offers no complaints  Injection placed in right arm, tolerated well  AVS declined  Next appointment reviewed

## 2022-06-06 DIAGNOSIS — Z51.5 PALLIATIVE CARE PATIENT: ICD-10-CM

## 2022-06-06 DIAGNOSIS — C79.51 BONE METASTASES (HCC): ICD-10-CM

## 2022-06-06 DIAGNOSIS — G89.3 CANCER RELATED PAIN: ICD-10-CM

## 2022-06-06 DIAGNOSIS — C50.911 PRIMARY MALIGNANT NEOPLASM OF RIGHT BREAST WITH METASTASIS TO OTHER SITE (HCC): ICD-10-CM
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
## 2022-06-07 RX ORDER — HYDROMORPHONE HYDROCHLORIDE 2 MG/1
2-4 TABLET ORAL
Qty: 120 TABLET | Refills: 0 | Status: SHIPPED | OUTPATIENT
Start: 2022-06-07 | End: 2022-06-24 | Stop reason: SDUPTHER

## 2022-06-08 DIAGNOSIS — D70.1 CHEMOTHERAPY INDUCED NEUTROPENIA (HCC): Primary | ICD-10-CM

## 2022-06-08 DIAGNOSIS — T45.1X5A CHEMOTHERAPY INDUCED NEUTROPENIA (HCC): Primary | ICD-10-CM

## 2022-06-14 ENCOUNTER — APPOINTMENT (OUTPATIENT)
Dept: LAB | Facility: HOSPITAL | Age: 57
End: 2022-06-14
Payer: COMMERCIAL

## 2022-06-14 DIAGNOSIS — T45.1X5A CHEMOTHERAPY INDUCED NEUTROPENIA (HCC): ICD-10-CM

## 2022-06-14 DIAGNOSIS — C79.51 BONE METASTASES (HCC): ICD-10-CM

## 2022-06-14 DIAGNOSIS — D70.1 CHEMOTHERAPY INDUCED NEUTROPENIA (HCC): ICD-10-CM

## 2022-06-14 DIAGNOSIS — J91.0 MALIGNANT PLEURAL EFFUSION: ICD-10-CM

## 2022-06-14 DIAGNOSIS — C50.911 PRIMARY MALIGNANT NEOPLASM OF RIGHT BREAST WITH METASTASIS TO OTHER SITE (HCC): ICD-10-CM

## 2022-06-14 LAB
ALBUMIN SERPL BCP-MCNC: 3.3 G/DL (ref 3.5–5)
ALP SERPL-CCNC: 85 U/L (ref 46–116)
ALT SERPL W P-5'-P-CCNC: 22 U/L (ref 12–78)
ANION GAP SERPL CALCULATED.3IONS-SCNC: 11 MMOL/L (ref 4–13)
AST SERPL W P-5'-P-CCNC: 22 U/L (ref 5–45)
BASOPHILS # BLD AUTO: 0.04 THOUSANDS/ΜL (ref 0–0.1)
BASOPHILS NFR BLD AUTO: 1 % (ref 0–1)
BILIRUB SERPL-MCNC: 0.41 MG/DL (ref 0.2–1)
BUN SERPL-MCNC: 14 MG/DL (ref 5–25)
CALCIUM ALBUM COR SERPL-MCNC: 9.1 MG/DL (ref 8.3–10.1)
CALCIUM SERPL-MCNC: 8.5 MG/DL (ref 8.3–10.1)
CHLORIDE SERPL-SCNC: 105 MMOL/L (ref 100–108)
CO2 SERPL-SCNC: 24 MMOL/L (ref 21–32)
CREAT SERPL-MCNC: 0.88 MG/DL (ref 0.6–1.3)
EOSINOPHIL # BLD AUTO: 0.01 THOUSAND/ΜL (ref 0–0.61)
EOSINOPHIL NFR BLD AUTO: 0 % (ref 0–6)
ERYTHROCYTE [DISTWIDTH] IN BLOOD BY AUTOMATED COUNT: 15.8 % (ref 11.6–15.1)
GFR SERPL CREATININE-BSD FRML MDRD: 74 ML/MIN/1.73SQ M
GLUCOSE SERPL-MCNC: 105 MG/DL (ref 65–140)
HCT VFR BLD AUTO: 42 % (ref 34.8–46.1)
HGB BLD-MCNC: 13.5 G/DL (ref 11.5–15.4)
IMM GRANULOCYTES # BLD AUTO: 0.07 THOUSAND/UL (ref 0–0.2)
IMM GRANULOCYTES NFR BLD AUTO: 1 % (ref 0–2)
LYMPHOCYTES # BLD AUTO: 0.75 THOUSANDS/ΜL (ref 0.6–4.47)
LYMPHOCYTES NFR BLD AUTO: 9 % (ref 14–44)
MCH RBC QN AUTO: 32.8 PG (ref 26.8–34.3)
MCHC RBC AUTO-ENTMCNC: 32.1 G/DL (ref 31.4–37.4)
MCV RBC AUTO: 102 FL (ref 82–98)
MONOCYTES # BLD AUTO: 0.4 THOUSAND/ΜL (ref 0.17–1.22)
MONOCYTES NFR BLD AUTO: 5 % (ref 4–12)
NEUTROPHILS # BLD AUTO: 6.91 THOUSANDS/ΜL (ref 1.85–7.62)
NEUTS SEG NFR BLD AUTO: 84 % (ref 43–75)
NRBC BLD AUTO-RTO: 0 /100 WBCS
PLATELET # BLD AUTO: 168 THOUSANDS/UL (ref 149–390)
PMV BLD AUTO: 12.4 FL (ref 8.9–12.7)
POTASSIUM SERPL-SCNC: 3.8 MMOL/L (ref 3.5–5.3)
PROT SERPL-MCNC: 6.7 G/DL (ref 6.4–8.2)
RBC # BLD AUTO: 4.11 MILLION/UL (ref 3.81–5.12)
SODIUM SERPL-SCNC: 140 MMOL/L (ref 136–145)
WBC # BLD AUTO: 8.18 THOUSAND/UL (ref 4.31–10.16)

## 2022-06-14 PROCEDURE — 85025 COMPLETE CBC W/AUTO DIFF WBC: CPT

## 2022-06-14 PROCEDURE — 80053 COMPREHEN METABOLIC PANEL: CPT

## 2022-06-14 PROCEDURE — 36415 COLL VENOUS BLD VENIPUNCTURE: CPT

## 2022-06-15 ENCOUNTER — HOSPITAL ENCOUNTER (OUTPATIENT)
Dept: INFUSION CENTER | Facility: CLINIC | Age: 57
Discharge: HOME | End: 2022-06-15
Payer: COMMERCIAL

## 2022-06-15 ENCOUNTER — DOCUMENTATION (OUTPATIENT)
Dept: HEMATOLOGY ONCOLOGY | Facility: CLINIC | Age: 57
End: 2022-06-15

## 2022-06-15 VITALS
RESPIRATION RATE: 16 BRPM | HEART RATE: 83 BPM | DIASTOLIC BLOOD PRESSURE: 58 MMHG | SYSTOLIC BLOOD PRESSURE: 103 MMHG | BODY MASS INDEX: 24.96 KG/M2 | HEIGHT: 64 IN | WEIGHT: 146.2 LBS | TEMPERATURE: 97 F

## 2022-06-15 DIAGNOSIS — D70.1 CHEMOTHERAPY INDUCED NEUTROPENIA (HCC): ICD-10-CM

## 2022-06-15 DIAGNOSIS — C79.51 BONE METASTASES (HCC): Primary | ICD-10-CM

## 2022-06-15 DIAGNOSIS — C50.911 PRIMARY MALIGNANT NEOPLASM OF RIGHT BREAST WITH METASTASIS TO OTHER SITE (HCC): ICD-10-CM

## 2022-06-15 DIAGNOSIS — J91.0 MALIGNANT PLEURAL EFFUSION: ICD-10-CM

## 2022-06-15 DIAGNOSIS — T45.1X5A CHEMOTHERAPY INDUCED NEUTROPENIA (HCC): ICD-10-CM

## 2022-06-15 PROCEDURE — 96365 THER/PROPH/DIAG IV INF INIT: CPT

## 2022-06-15 PROCEDURE — 96401 CHEMO ANTI-NEOPL SQ/IM: CPT

## 2022-06-15 RX ORDER — SODIUM CHLORIDE 9 MG/ML
20 INJECTION, SOLUTION INTRAVENOUS ONCE
Status: COMPLETED | OUTPATIENT
Start: 2022-06-15 | End: 2022-06-15

## 2022-06-15 RX ADMIN — DEXAMETHASONE SODIUM PHOSPHATE 10 MG: 10 INJECTION, SOLUTION INTRAMUSCULAR; INTRAVENOUS at 09:47

## 2022-06-15 RX ADMIN — SODIUM CHLORIDE 20 ML/HR: 0.9 INJECTION, SOLUTION INTRAVENOUS at 09:44

## 2022-06-15 RX ADMIN — ERIBULIN MESYLATE 2.4 MG: 0.5 INJECTION INTRAVENOUS at 10:25

## 2022-06-15 NOTE — PROGRESS NOTES
Pt to clinic for halaven, offers no complaints today, tolerated infusion without complications, aware of next appointment, port de-accessed, avs declined

## 2022-06-16 ENCOUNTER — TELEPHONE (OUTPATIENT)
Dept: HEMATOLOGY ONCOLOGY | Facility: CLINIC | Age: 57
End: 2022-06-16

## 2022-06-16 ENCOUNTER — TELEPHONE (OUTPATIENT)
Dept: PALLIATIVE MEDICINE | Facility: CLINIC | Age: 57
End: 2022-06-16

## 2022-06-16 NOTE — TELEPHONE ENCOUNTER
I spoke with patient and she is having trouble with keeping her pain medications down  Patient took her Dilaudid and antacids early this morning and was not able to keep them down  Patient took her Zofran and reglan at 730-800 am this morning  She feels the burning in her stomach and the sour taste coming and she feels she needs to throw this medication up as well  She is looking for Dr Myles Rowe for advice

## 2022-06-16 NOTE — TELEPHONE ENCOUNTER
Returned telephone call to patient spouse to review s/s patient is experiencing  Left voicemail with call back number to further discuss

## 2022-06-20 ENCOUNTER — TELEPHONE (OUTPATIENT)
Dept: HEMATOLOGY ONCOLOGY | Facility: CLINIC | Age: 57
End: 2022-06-20

## 2022-06-20 ENCOUNTER — OFFICE VISIT (OUTPATIENT)
Dept: URGENT CARE | Facility: CLINIC | Age: 57
End: 2022-06-20
Payer: COMMERCIAL

## 2022-06-20 VITALS
TEMPERATURE: 97.9 F | RESPIRATION RATE: 16 BRPM | WEIGHT: 148 LBS | HEART RATE: 78 BPM | OXYGEN SATURATION: 97 % | BODY MASS INDEX: 25.52 KG/M2

## 2022-06-20 DIAGNOSIS — C78.7 MALIGNANT NEOPLASM METASTATIC TO LIVER (HCC): ICD-10-CM

## 2022-06-20 DIAGNOSIS — L03.114 CELLULITIS OF LEFT FOREARM: Primary | ICD-10-CM

## 2022-06-20 DIAGNOSIS — C50.919 MALIGNANT NEOPLASM OF BREAST IN FEMALE, ESTROGEN RECEPTOR POSITIVE, UNSPECIFIED LATERALITY, UNSPECIFIED SITE OF BREAST (HCC): Primary | ICD-10-CM

## 2022-06-20 DIAGNOSIS — Z17.0 MALIGNANT NEOPLASM OF BREAST IN FEMALE, ESTROGEN RECEPTOR POSITIVE, UNSPECIFIED LATERALITY, UNSPECIFIED SITE OF BREAST (HCC): Primary | ICD-10-CM

## 2022-06-20 PROCEDURE — 99213 OFFICE O/P EST LOW 20 MIN: CPT | Performed by: EMERGENCY MEDICINE

## 2022-06-20 RX ORDER — CEFTRIAXONE 1 G/1
1000 INJECTION, POWDER, FOR SOLUTION INTRAMUSCULAR; INTRAVENOUS EVERY 24 HOURS
Status: DISCONTINUED | OUTPATIENT
Start: 2022-06-20 | End: 2022-06-29

## 2022-06-20 RX ORDER — LIDOCAINE HYDROCHLORIDE 10 MG/ML
2.1 INJECTION, SOLUTION EPIDURAL; INFILTRATION; INTRACAUDAL; PERINEURAL ONCE
Status: COMPLETED | OUTPATIENT
Start: 2022-06-20 | End: 2022-06-20

## 2022-06-20 RX ORDER — CEPHALEXIN 500 MG/1
500 CAPSULE ORAL 4 TIMES DAILY
Qty: 40 CAPSULE | Refills: 0 | Status: SHIPPED | OUTPATIENT
Start: 2022-06-20 | End: 2022-06-29

## 2022-06-20 RX ADMIN — LIDOCAINE HYDROCHLORIDE 2.1 ML: 10 INJECTION, SOLUTION EPIDURAL; INFILTRATION; INTRACAUDAL; PERINEURAL at 16:19

## 2022-06-20 RX ADMIN — CEFTRIAXONE 1000 MG: 1 INJECTION, POWDER, FOR SOLUTION INTRAMUSCULAR; INTRAVENOUS at 15:59

## 2022-06-20 NOTE — TELEPHONE ENCOUNTER
Reached out to patient in regards to symptoms  Patient stated having edema, redness, tenderness and warmth to touch as prior IV site  Patient and patient spouse voiced concern about Infusion RNs placing IVs during visit  Apologized and reviewed will reach out to appropriate personnel  I reviewed s/s with Barry Strange PA-C and recommendation for ED evaluation due to patient spouse report of progression of redness, tenderness, warmth to touch  Patient declined any other s/s  Patient declined any fevers, chest pain or shortness of breath  Reviewed that recommendation would be for ED evaluation  Patient and patient spouse would like for patient to go to Urgent care or [facility] facility for evaluation  Patient asking for treatment to be deferred one week

## 2022-06-20 NOTE — TELEPHONE ENCOUNTER
Patient calling in indicating she's having a reaction from her chemo treatment Patient is experiencing "red burning sensation on her left arm near the IV site " Redness is moving up towards her elbow  Patient states it is painful and hot to the touch  Patient would like a call back as soon as possible, patient would like to be reached on her 's phone [phone]

## 2022-06-20 NOTE — PATIENT INSTRUCTIONS
Apply warm compresses to L forearm 3-4 X / day  F/u with your Cancer doctor TOMORROW - call him  If symptoms get worse, proceed to the ER  F/u with your PCP tomorrow as well  Check with your Cancer Dr  About a "port"; I believe this would probably be beneficial to you if you are a candidate

## 2022-06-20 NOTE — PROGRESS NOTES
[facility]        NAME: Tres Torres is a 49 y o  female  : 1965    MRN: [id number]  DATE: 2022  TIME: 4:17 PM    Assessment and Plan   Cellulitis of left forearm [L03 114]  1  Cellulitis of left forearm  cefTRIAXone (ROCEPHIN) injection 1,000 mg    cephalexin (KEFLEX) 500 mg capsule    lidocaine (PF) (XYLOCAINE-MPF) 1 % injection 2 1 mL         Patient Instructions   Patient Instructions   1  Apply warm compresses to L forearm 3-4 X / day  2  F/u with your Cancer doctor TOMORROW - call him  3  If symptoms get worse, proceed to the ER  4  F/u with your PCP tomorrow as well  5  Check with your Cancer Dr  About a "port"; I believe this would probably be beneficial to you if you are a candidate  Follow up with PCP in 3-5 days  Proceed to  ER if symptoms worsen  Chief Complaint     Chief Complaint   Patient presents with    Rash     Pt reports receiving chemo last Tuesday  Noticed s/s of infection a few days following at the injection site  Left forearm  Pt reports, redness, warmth, tenderness at site  History of Present Illness       30-year-old white female with a history of metastatic breast cancer presents with redness to her left forearm  Patient states she received a chemo treatment last Tuesday about a day or 2 later it started getting red and it has there were  Patient states she recently was treated with antibiotics for a prior cellulitis from the chemo treatment prior to this one  A      Review of Systems   Review of Systems   Constitutional: Negative for chills and fever  HENT: Negative for congestion and rhinorrhea  Eyes: Negative for discharge and visual disturbance  Respiratory: Negative for shortness of breath and wheezing  Cardiovascular: Negative for chest pain and palpitations  Gastrointestinal: Negative for abdominal pain and vomiting  Endocrine: Negative for polydipsia and polyuria     Genitourinary: Negative for dysuria and hematuria  Musculoskeletal: Negative for arthralgias, gait problem and neck stiffness  Skin: Positive for color change  Negative for rash and wound  Neurological: Negative for dizziness and headaches  Psychiatric/Behavioral: Negative for confusion and suicidal ideas           Current Medications       Current Outpatient Medications:     acetaminophen (TYLENOL) 325 mg tablet, Take 2 tablets (650 mg total) by mouth every 6 (six) hours as needed for mild pain, Disp: 100 tablet, Rfl: 0    al mag oxide-diphenhydramine-lidocaine viscous (MAGIC MOUTHWASH) 1:1:1 suspension, Swish and spit 10 mL every 4 (four) hours as needed for mouth pain or discomfort, Disp: 300 mL, Rfl: 0    albuterol (PROVENTIL HFA,VENTOLIN HFA) 90 mcg/act inhaler, Inhale 2 puffs every 4 (four) hours as needed for wheezing, Disp: 8 g, Rfl: 0    benzonatate (TESSALON) 200 MG capsule, Take 1 capsule (200 mg total) by mouth 3 (three) times a day as needed for cough, Disp: 20 capsule, Rfl: 0    calcium carbonate (TUMS) 500 mg chewable tablet, Chew 1 tablet (500 mg total) 3 (three) times a day as needed for indigestion or heartburn, Disp: 30 tablet, Rfl: 0    cephalexin (KEFLEX) 500 mg capsule, Take 1 capsule (500 mg total) by mouth 4 (four) times a day for 10 days, Disp: 40 capsule, Rfl: 0    dextromethorphan-guaiFENesin (ROBITUSSIN DM)  mg/5 mL syrup, Take 10 mL by mouth every 4 (four) hours as needed for cough, Disp: 236 mL, Rfl: 0    dicyclomine (BENTYL) 10 mg capsule, Take 1 capsule (10 mg total) by mouth every 6 (six) hours as needed (abdominal cramping), Disp: 20 capsule, Rfl: 0    diphenhydrAMINE (BENADRYL) 50 MG tablet, Take 1 tablet (50 mg total) by mouth daily at bedtime as needed for itching or sleep, Disp: 30 tablet, Rfl: 0    diphenoxylate-atropine (LOMOTIL) 2 5-0 025 mg per tablet, Take 1 tablet by mouth 4 (four) times a day as needed for diarrhea, Disp: 30 tablet, Rfl: 0    DULoxetine (CYMBALTA) 30 mg delayed release capsule, Take 1 capsule (30 mg total) by mouth daily, Disp: 90 capsule, Rfl: 3    fluticasone (FLONASE) 50 mcg/act nasal spray, 2 sprays into each nostril daily, Disp: , Rfl:     HYDROmorphone (DILAUDID) 2 mg tablet, Take 1-2 tablets (2-4 mg total) by mouth every 3 (three) hours as needed (moderate/severe cancer-related pain) Max Daily Amount: 32 mg, Disp: 120 tablet, Rfl: 0    Lidocaine Viscous HCl (XYLOCAINE) 2 % mucosal solution, Swish and spit 10 mL 4 (four) times a day as needed for mouth pain or discomfort, Disp: 200 mL, Rfl: 0    melatonin 3 mg, Take 2 tablets (6 mg total) by mouth daily at bedtime, Disp: 15 tablet, Rfl: 0    metoclopramide (REGLAN) 10 mg tablet, Take 1 tablet (10 mg total) by mouth 4 (four) times a day, Disp: 28 tablet, Rfl: 0    multivitamin (THERAGRAN) TABS, Take 1 tablet by mouth, Disp: , Rfl:     ondansetron (ZOFRAN) 4 mg tablet, Take 1 tablet (4 mg total) by mouth every 8 (eight) hours as needed for nausea or vomiting, Disp: 20 tablet, Rfl: 0    oxybutynin (DITROPAN-XL) 5 mg 24 hr tablet, Take 1 tablet (5 mg total) by mouth daily Take 1 tablet daily, Disp: 90 tablet, Rfl: 3    rivaroxaban (Xarelto) 20 mg tablet, Take 1 tablet (20 mg total) by mouth daily with breakfast, Disp: 30 tablet, Rfl: 2    senna (SENOKOT) 8 6 MG tablet, Take 1 tablet (8 6 mg total) by mouth daily at bedtime as needed for constipation, Disp: 30 tablet, Rfl: 0    CRANIAL PROSTHESIS, RX,, Apply to cranial area as needed for comfort , Disp: 1 each, Rfl: 0    letrozole (FEMARA) 2 5 mg tablet, TAKE 1 TABLET BY MOUTH EVERY DAY, Disp: 90 tablet, Rfl: 2    palbociclib (Ibrance) 100 MG tablet, Take 1 tablet (100 mg total) by mouth daily (Patient not taking: No sig reported), Disp: 21 tablet, Rfl: 1    potassium chloride (K-DUR,KLOR-CON) 20 mEq tablet, Take 1 tablet (20 mEq total) by mouth 2 (two) times a day for 5 days, Disp: 10 tablet, Rfl: 0    Current Facility-Administered Medications:     cefTRIAXone (ROCEPHIN) injection 1,000 mg, 1,000 mg, Intravenous, Q24H, Ministerio Ratliff DO    lidocaine (PF) (XYLOCAINE-MPF) 1 % injection 2 1 mL, 2 1 mL, Infiltration, Once, Ministerio Ratliff DO    Current Allergies     Allergies as of 06/20/2022 - Reviewed 06/20/2022   Allergen Reaction Noted    Codeine Anaphylaxis 11/12/2013    Morphine GI Intolerance, Itching, and Vomiting 07/16/2015    Sulfa antibiotics Hives and Itching 07/16/2015            The following portions of the patient's history were reviewed and updated as appropriate: allergies, current medications, past family history, past medical history, past social history, past surgical history and problem list      Past Medical History:   Diagnosis Date    Cancer ([facility] [address] )     History of radiation exposure     Malignant neoplasm of right female breast ([facility] [address] ) 2012    right       Past Surgical History:   Procedure Laterality Date    BREAST CYST EXCISION      BREAST LUMPECTOMY Right 2012    HIP SURGERY      IR BIOPSY LIVER MASS  7/26/2021    IR PLEURAL EFFUSION LONG-TERM CATHETER PLACEMENT  8/17/2021    IR PLEURAL EFFUSION LONG-TERM CATHETER REMOVAL  5/11/2022    IR THORACENTESIS  7/26/2021       Family History   Problem Relation Age of Onset    Breast cancer Mother         in her 63's    Breast cancer Sister         inher 45s and 48    Colon cancer Maternal Grandmother     No Known Problems Paternal Grandmother     Breast cancer Other     No Known Problems Paternal Aunt          Medications have been verified  Objective   Pulse 78   Temp 97 9 °F (36 6 °C) (Temporal)   Resp 16   Wt 67 1 kg (148 lb)   LMP 05/26/2021   SpO2 97%   BMI 25 52 kg/m²        Physical Exam     Physical Exam  Vitals and nursing note reviewed  Constitutional:       Appearance: She is ill-appearing  Comments: 50 yo w female who appears pale, presents with redness of her L forearm  HENT:      Head: Normocephalic and atraumatic     Eyes:      Extraocular Movements: Extraocular movements intact  Cardiovascular:      Rate and Rhythm: Normal rate  Pulmonary:      Effort: Pulmonary effort is normal    Musculoskeletal:      Cervical back: Normal range of motion  Skin:     General: Skin is warm and dry  Findings: Erythema present  Comments: There is erythema around the IV stick at the distal forearm approx  2 " in width which radiates proximally approximately 6 inches in length and is warm to touch with some new ecchymotic areas as well  ( see photo below)   Neurological:      Mental Status: She is alert  Psychiatric:         Mood and Affect: Mood normal                  This is patient's 2nd bout of cellulitis due to IV chemo and being a difficult stick  I recommend that she talk with her cancer specialist about obtaining a port for further chemo treatment

## 2022-06-20 NOTE — TELEPHONE ENCOUNTER
Left detailed message for patient regarding updated infusion schedule  Advised patient that we will print an updated schedule when she comes for her office follow up next week  Juan Booth, please change date for Zoladex to 6/29 - Thank you!

## 2022-06-23 ENCOUNTER — TELEPHONE (OUTPATIENT)
Dept: PALLIATIVE MEDICINE | Facility: CLINIC | Age: 57
End: 2022-06-23

## 2022-06-23 NOTE — TELEPHONE ENCOUNTER
[facility] LSW attempted call to patient in order to reschedule upcoming appointment, voicemail is full unable to leave message

## 2022-06-24 DIAGNOSIS — Z51.5 PALLIATIVE CARE PATIENT: ICD-10-CM

## 2022-06-24 DIAGNOSIS — C79.51 BONE METASTASES (HCC): ICD-10-CM

## 2022-06-24 DIAGNOSIS — G89.3 CANCER RELATED PAIN: ICD-10-CM

## 2022-06-24 DIAGNOSIS — C50.911 PRIMARY MALIGNANT NEOPLASM OF RIGHT BREAST WITH METASTASIS TO OTHER SITE (HCC): ICD-10-CM

## 2022-06-24 RX ORDER — HYDROMORPHONE HYDROCHLORIDE 2 MG/1
2-4 TABLET ORAL
Qty: 120 TABLET | Refills: 0 | Status: SHIPPED | OUTPATIENT
Start: 2022-06-24 | End: 2022-07-29 | Stop reason: SDUPTHER

## 2022-06-24 NOTE — TELEPHONE ENCOUNTER
Primary palliative medicine provider: Dajuan Clark    Medication requested:  Dilaudid 2 mg     If for pain, how has the patient been taking their pain medicine?      Last appointment:  5 17    Next scheduled appointment:  7 12    PDMP review:    Last filled on 6 7  120/8

## 2022-06-25 ENCOUNTER — HOSPITAL ENCOUNTER (INPATIENT)
Facility: HOSPITAL | Age: 57
LOS: 4 days | Discharge: HOME | DRG: 660 | End: 2022-06-29
Attending: EMERGENCY MEDICINE | Admitting: INTERNAL MEDICINE
Payer: COMMERCIAL

## 2022-06-25 ENCOUNTER — TELEPHONE (OUTPATIENT)
Dept: OTHER | Facility: OTHER | Age: 57
End: 2022-06-25

## 2022-06-25 ENCOUNTER — APPOINTMENT (EMERGENCY)
Dept: RADIOLOGY | Facility: HOSPITAL | Age: 57
DRG: 660 | End: 2022-06-25
Payer: COMMERCIAL

## 2022-06-25 DIAGNOSIS — R13.10 DYSPHAGIA: ICD-10-CM

## 2022-06-25 DIAGNOSIS — R50.9 FEVER: Primary | ICD-10-CM

## 2022-06-25 DIAGNOSIS — D70.9 FEBRILE NEUTROPENIA (HCC): ICD-10-CM

## 2022-06-25 DIAGNOSIS — E87.1 HYPONATREMIA: ICD-10-CM

## 2022-06-25 DIAGNOSIS — A41.9 SEPSIS (HCC): ICD-10-CM

## 2022-06-25 DIAGNOSIS — G89.3 CANCER RELATED PAIN: ICD-10-CM

## 2022-06-25 DIAGNOSIS — R50.81 FEBRILE NEUTROPENIA (HCC): ICD-10-CM

## 2022-06-25 DIAGNOSIS — K59.00 CONSTIPATION, UNSPECIFIED CONSTIPATION TYPE: ICD-10-CM

## 2022-06-25 PROBLEM — R65.10 SIRS (SYSTEMIC INFLAMMATORY RESPONSE SYNDROME) (HCC): Status: ACTIVE | Noted: 2022-06-25

## 2022-06-25 PROBLEM — E87.6 HYPOKALEMIA: Status: ACTIVE | Noted: 2022-06-25

## 2022-06-25 LAB
2HR DELTA HS TROPONIN: 0 NG/L
4HR DELTA HS TROPONIN: 0 NG/L
ALBUMIN SERPL BCP-MCNC: 3 G/DL (ref 3.5–5)
ALP SERPL-CCNC: 44 U/L (ref 46–116)
ALT SERPL W P-5'-P-CCNC: 19 U/L (ref 12–78)
ANION GAP SERPL CALCULATED.3IONS-SCNC: 11 MMOL/L (ref 4–13)
ANION GAP SERPL CALCULATED.3IONS-SCNC: 14 MMOL/L (ref 4–13)
ANION GAP SERPL CALCULATED.3IONS-SCNC: 6 MMOL/L (ref 4–13)
AST SERPL W P-5'-P-CCNC: 32 U/L (ref 5–45)
ATRIAL RATE: 82 BPM
BACTERIA UR QL AUTO: NORMAL /HPF
BASOPHILS # BLD MANUAL: 0 THOUSAND/UL (ref 0–0.1)
BASOPHILS # BLD MANUAL: 0 THOUSAND/UL (ref 0–0.1)
BASOPHILS NFR MAR MANUAL: 0 % (ref 0–1)
BASOPHILS NFR MAR MANUAL: 0 % (ref 0–1)
BILIRUB SERPL-MCNC: 0.72 MG/DL (ref 0.2–1)
BILIRUB UR QL STRIP: NEGATIVE
BUN SERPL-MCNC: 6 MG/DL (ref 5–25)
BUN SERPL-MCNC: 7 MG/DL (ref 5–25)
BUN SERPL-MCNC: 8 MG/DL (ref 5–25)
CALCIUM ALBUM COR SERPL-MCNC: 9.6 MG/DL (ref 8.3–10.1)
CALCIUM SERPL-MCNC: 8.2 MG/DL (ref 8.3–10.1)
CALCIUM SERPL-MCNC: 8.4 MG/DL (ref 8.3–10.1)
CALCIUM SERPL-MCNC: 8.8 MG/DL (ref 8.3–10.1)
CARDIAC TROPONIN I PNL SERPL HS: 4 NG/L
CHLORIDE SERPL-SCNC: 102 MMOL/L (ref 100–108)
CHLORIDE SERPL-SCNC: 106 MMOL/L (ref 100–108)
CHLORIDE SERPL-SCNC: 98 MMOL/L (ref 100–108)
CHLORIDE UR-SCNC: <10 MMOL/L
CLARITY UR: CLEAR
CO2 SERPL-SCNC: 23 MMOL/L (ref 21–32)
CO2 SERPL-SCNC: 24 MMOL/L (ref 21–32)
CO2 SERPL-SCNC: 24 MMOL/L (ref 21–32)
COLOR UR: YELLOW
CREAT SERPL-MCNC: 0.76 MG/DL (ref 0.6–1.3)
CREAT SERPL-MCNC: 0.84 MG/DL (ref 0.6–1.3)
CREAT SERPL-MCNC: 0.88 MG/DL (ref 0.6–1.3)
EOSINOPHIL # BLD MANUAL: 0 THOUSAND/UL (ref 0–0.4)
EOSINOPHIL # BLD MANUAL: 0 THOUSAND/UL (ref 0–0.4)
EOSINOPHIL NFR BLD MANUAL: 0 % (ref 0–6)
EOSINOPHIL NFR BLD MANUAL: 0 % (ref 0–6)
ERYTHROCYTE [DISTWIDTH] IN BLOOD BY AUTOMATED COUNT: 15.4 % (ref 11.6–15.1)
ERYTHROCYTE [DISTWIDTH] IN BLOOD BY AUTOMATED COUNT: 15.5 % (ref 11.6–15.1)
FLUAV RNA RESP QL NAA+PROBE: NEGATIVE
FLUBV RNA RESP QL NAA+PROBE: NEGATIVE
GFR SERPL CREATININE-BSD FRML MDRD: 74 ML/MIN/1.73SQ M
GFR SERPL CREATININE-BSD FRML MDRD: 79 ML/MIN/1.73SQ M
GFR SERPL CREATININE-BSD FRML MDRD: 89 ML/MIN/1.73SQ M
GLUCOSE SERPL-MCNC: 105 MG/DL (ref 65–140)
GLUCOSE SERPL-MCNC: 113 MG/DL (ref 65–140)
GLUCOSE SERPL-MCNC: 121 MG/DL (ref 65–140)
GLUCOSE UR STRIP-MCNC: NEGATIVE MG/DL
HCT VFR BLD AUTO: 31 % (ref 34.8–46.1)
HCT VFR BLD AUTO: 34.1 % (ref 34.8–46.1)
HGB BLD-MCNC: 10.2 G/DL (ref 11.5–15.4)
HGB BLD-MCNC: 11.8 G/DL (ref 11.5–15.4)
HGB UR QL STRIP.AUTO: ABNORMAL
KETONES UR STRIP-MCNC: NEGATIVE MG/DL
LACTATE SERPL-SCNC: 1 MMOL/L (ref 0.5–2)
LEUKOCYTE ESTERASE UR QL STRIP: NEGATIVE
LYMPHOCYTES # BLD AUTO: 0.46 THOUSAND/UL (ref 0.6–4.47)
LYMPHOCYTES # BLD AUTO: 0.75 THOUSAND/UL (ref 0.6–4.47)
LYMPHOCYTES # BLD AUTO: 36 % (ref 14–44)
LYMPHOCYTES # BLD AUTO: 52 % (ref 14–44)
MCH RBC QN AUTO: 32.3 PG (ref 26.8–34.3)
MCH RBC QN AUTO: 33.4 PG (ref 26.8–34.3)
MCHC RBC AUTO-ENTMCNC: 32.9 G/DL (ref 31.4–37.4)
MCHC RBC AUTO-ENTMCNC: 34.6 G/DL (ref 31.4–37.4)
MCV RBC AUTO: 97 FL (ref 82–98)
MCV RBC AUTO: 98 FL (ref 82–98)
MONOCYTES # BLD AUTO: 0.03 THOUSAND/UL (ref 0–1.22)
MONOCYTES # BLD AUTO: 0.49 THOUSAND/UL (ref 0–1.22)
MONOCYTES NFR BLD: 2 % (ref 4–12)
MONOCYTES NFR BLD: 38 % (ref 4–12)
NEUTROPHILS # BLD MANUAL: 0.32 THOUSAND/UL (ref 1.85–7.62)
NEUTROPHILS # BLD MANUAL: 0.67 THOUSAND/UL (ref 1.85–7.62)
NEUTS SEG NFR BLD AUTO: 25 % (ref 43–75)
NEUTS SEG NFR BLD AUTO: 46 % (ref 43–75)
NITRITE UR QL STRIP: NEGATIVE
NON-SQ EPI CELLS URNS QL MICRO: NORMAL /HPF
OSMOLALITY UR: 86 MMOL/KG
P AXIS: 68 DEGREES
PH UR STRIP.AUTO: 6.5 [PH]
PLATELET # BLD AUTO: 147 THOUSANDS/UL (ref 149–390)
PLATELET # BLD AUTO: 168 THOUSANDS/UL (ref 149–390)
PLATELET BLD QL SMEAR: ABNORMAL
PLATELET BLD QL SMEAR: ADEQUATE
PMV BLD AUTO: 11.7 FL (ref 8.9–12.7)
PMV BLD AUTO: 12 FL (ref 8.9–12.7)
POTASSIUM SERPL-SCNC: 3.3 MMOL/L (ref 3.5–5.3)
POTASSIUM SERPL-SCNC: 3.3 MMOL/L (ref 3.5–5.3)
POTASSIUM SERPL-SCNC: 3.6 MMOL/L (ref 3.5–5.3)
POTASSIUM UR-SCNC: 4.7 MMOL/L
PR INTERVAL: 172 MS
PROCALCITONIN SERPL-MCNC: 0.19 NG/ML
PROT SERPL-MCNC: 6.4 G/DL (ref 6.4–8.2)
PROT UR STRIP-MCNC: NEGATIVE MG/DL
QRS AXIS: 67 DEGREES
QRSD INTERVAL: 72 MS
QT INTERVAL: 374 MS
QTC INTERVAL: 436 MS
RBC # BLD AUTO: 3.16 MILLION/UL (ref 3.81–5.12)
RBC # BLD AUTO: 3.53 MILLION/UL (ref 3.81–5.12)
RBC #/AREA URNS AUTO: NORMAL /HPF
RSV RNA RESP QL NAA+PROBE: NEGATIVE
SARS-COV-2 RNA RESP QL NAA+PROBE: NEGATIVE
SODIUM 24H UR-SCNC: 11 MOL/L
SODIUM SERPL-SCNC: 128 MMOL/L (ref 136–145)
SODIUM SERPL-SCNC: 140 MMOL/L (ref 136–145)
SODIUM SERPL-SCNC: 140 MMOL/L (ref 136–145)
SP GR UR STRIP.AUTO: <=1.005 (ref 1–1.03)
T WAVE AXIS: 55 DEGREES
URATE SERPL-MCNC: 4.1 MG/DL (ref 2–6.8)
UROBILINOGEN UR QL STRIP.AUTO: 0.2 E.U./DL
VARIANT LYMPHS # BLD AUTO: 1 %
VENTRICULAR RATE: 82 BPM
WBC # BLD AUTO: 1.28 THOUSAND/UL (ref 4.31–10.16)
WBC # BLD AUTO: 1.45 THOUSAND/UL (ref 4.31–10.16)
WBC #/AREA URNS AUTO: NORMAL /HPF

## 2022-06-25 PROCEDURE — 96374 THER/PROPH/DIAG INJ IV PUSH: CPT

## 2022-06-25 PROCEDURE — 99254 IP/OBS CNSLTJ NEW/EST MOD 60: CPT | Performed by: INTERNAL MEDICINE

## 2022-06-25 PROCEDURE — 80048 BASIC METABOLIC PNL TOTAL CA: CPT | Performed by: INTERNAL MEDICINE

## 2022-06-25 PROCEDURE — 0241U HB NFCT DS VIR RESP RNA 4 TRGT: CPT | Performed by: EMERGENCY MEDICINE

## 2022-06-25 PROCEDURE — 85027 COMPLETE CBC AUTOMATED: CPT | Performed by: EMERGENCY MEDICINE

## 2022-06-25 PROCEDURE — 99285 EMERGENCY DEPT VISIT HI MDM: CPT

## 2022-06-25 PROCEDURE — 80048 BASIC METABOLIC PNL TOTAL CA: CPT

## 2022-06-25 PROCEDURE — 87040 BLOOD CULTURE FOR BACTERIA: CPT | Performed by: EMERGENCY MEDICINE

## 2022-06-25 PROCEDURE — 84133 ASSAY OF URINE POTASSIUM: CPT | Performed by: INTERNAL MEDICINE

## 2022-06-25 PROCEDURE — 93010 ELECTROCARDIOGRAM REPORT: CPT | Performed by: INTERNAL MEDICINE

## 2022-06-25 PROCEDURE — 84145 PROCALCITONIN (PCT): CPT | Performed by: EMERGENCY MEDICINE

## 2022-06-25 PROCEDURE — 99223 1ST HOSP IP/OBS HIGH 75: CPT | Performed by: INTERNAL MEDICINE

## 2022-06-25 PROCEDURE — 85027 COMPLETE CBC AUTOMATED: CPT

## 2022-06-25 PROCEDURE — 93005 ELECTROCARDIOGRAM TRACING: CPT

## 2022-06-25 PROCEDURE — 83935 ASSAY OF URINE OSMOLALITY: CPT | Performed by: INTERNAL MEDICINE

## 2022-06-25 PROCEDURE — 71046 X-RAY EXAM CHEST 2 VIEWS: CPT

## 2022-06-25 PROCEDURE — 99285 EMERGENCY DEPT VISIT HI MDM: CPT | Performed by: EMERGENCY MEDICINE

## 2022-06-25 PROCEDURE — 84550 ASSAY OF BLOOD/URIC ACID: CPT | Performed by: INTERNAL MEDICINE

## 2022-06-25 PROCEDURE — 85007 BL SMEAR W/DIFF WBC COUNT: CPT

## 2022-06-25 PROCEDURE — 96361 HYDRATE IV INFUSION ADD-ON: CPT

## 2022-06-25 PROCEDURE — 80053 COMPREHEN METABOLIC PANEL: CPT | Performed by: EMERGENCY MEDICINE

## 2022-06-25 PROCEDURE — C9113 INJ PANTOPRAZOLE SODIUM, VIA: HCPCS | Performed by: INTERNAL MEDICINE

## 2022-06-25 PROCEDURE — 84484 ASSAY OF TROPONIN QUANT: CPT | Performed by: EMERGENCY MEDICINE

## 2022-06-25 PROCEDURE — 82436 ASSAY OF URINE CHLORIDE: CPT | Performed by: INTERNAL MEDICINE

## 2022-06-25 PROCEDURE — 85007 BL SMEAR W/DIFF WBC COUNT: CPT | Performed by: EMERGENCY MEDICINE

## 2022-06-25 PROCEDURE — 83605 ASSAY OF LACTIC ACID: CPT | Performed by: EMERGENCY MEDICINE

## 2022-06-25 PROCEDURE — 36415 COLL VENOUS BLD VENIPUNCTURE: CPT | Performed by: EMERGENCY MEDICINE

## 2022-06-25 PROCEDURE — 84484 ASSAY OF TROPONIN QUANT: CPT

## 2022-06-25 PROCEDURE — 84300 ASSAY OF URINE SODIUM: CPT | Performed by: INTERNAL MEDICINE

## 2022-06-25 PROCEDURE — 99255 IP/OBS CONSLTJ NEW/EST HI 80: CPT | Performed by: INTERNAL MEDICINE

## 2022-06-25 PROCEDURE — 81001 URINALYSIS AUTO W/SCOPE: CPT | Performed by: EMERGENCY MEDICINE

## 2022-06-25 RX ORDER — ENOXAPARIN SODIUM 100 MG/ML
40 INJECTION SUBCUTANEOUS DAILY
Status: DISCONTINUED | OUTPATIENT
Start: 2022-06-25 | End: 2022-06-25

## 2022-06-25 RX ORDER — DIPHENHYDRAMINE HCL 25 MG
50 TABLET ORAL
Status: DISCONTINUED | OUTPATIENT
Start: 2022-06-25 | End: 2022-06-29 | Stop reason: HOSPADM

## 2022-06-25 RX ORDER — FLUTICASONE PROPIONATE 50 MCG
2 SPRAY, SUSPENSION (ML) NASAL DAILY
Status: DISCONTINUED | OUTPATIENT
Start: 2022-06-25 | End: 2022-06-29 | Stop reason: HOSPADM

## 2022-06-25 RX ORDER — KETOROLAC TROMETHAMINE 30 MG/ML
15 INJECTION, SOLUTION INTRAMUSCULAR; INTRAVENOUS ONCE
Status: COMPLETED | OUTPATIENT
Start: 2022-06-25 | End: 2022-06-25

## 2022-06-25 RX ORDER — PANTOPRAZOLE SODIUM 40 MG/10ML
40 INJECTION, POWDER, LYOPHILIZED, FOR SOLUTION INTRAVENOUS
Status: DISCONTINUED | OUTPATIENT
Start: 2022-06-25 | End: 2022-06-29 | Stop reason: HOSPADM

## 2022-06-25 RX ORDER — KETOROLAC TROMETHAMINE 30 MG/ML
15 INJECTION, SOLUTION INTRAMUSCULAR; INTRAVENOUS EVERY 6 HOURS PRN
Status: ACTIVE | OUTPATIENT
Start: 2022-06-25 | End: 2022-06-27

## 2022-06-25 RX ORDER — HYDROMORPHONE HYDROCHLORIDE 4 MG/1
4 TABLET ORAL
Status: DISCONTINUED | OUTPATIENT
Start: 2022-06-25 | End: 2022-06-29 | Stop reason: HOSPADM

## 2022-06-25 RX ORDER — ALBUTEROL SULFATE 90 UG/1
2 AEROSOL, METERED RESPIRATORY (INHALATION) EVERY 4 HOURS PRN
Status: DISCONTINUED | OUTPATIENT
Start: 2022-06-25 | End: 2022-06-29 | Stop reason: HOSPADM

## 2022-06-25 RX ORDER — VANCOMYCIN HYDROCHLORIDE 1 G/200ML
15 INJECTION, SOLUTION INTRAVENOUS EVERY 12 HOURS
Status: DISCONTINUED | OUTPATIENT
Start: 2022-06-25 | End: 2022-06-27

## 2022-06-25 RX ORDER — ACETAMINOPHEN 325 MG/1
650 TABLET ORAL EVERY 6 HOURS PRN
Status: DISCONTINUED | OUTPATIENT
Start: 2022-06-25 | End: 2022-06-29 | Stop reason: HOSPADM

## 2022-06-25 RX ORDER — KETOROLAC TROMETHAMINE 30 MG/ML
15 INJECTION, SOLUTION INTRAMUSCULAR; INTRAVENOUS EVERY 6 HOURS PRN
Status: DISCONTINUED | OUTPATIENT
Start: 2022-06-25 | End: 2022-06-25

## 2022-06-25 RX ORDER — SENNOSIDES 8.6 MG
1 TABLET ORAL
Status: DISCONTINUED | OUTPATIENT
Start: 2022-06-25 | End: 2022-06-28

## 2022-06-25 RX ORDER — HYDROMORPHONE HCL IN WATER/PF 6 MG/30 ML
0.2 PATIENT CONTROLLED ANALGESIA SYRINGE INTRAVENOUS EVERY 6 HOURS PRN
Status: DISCONTINUED | OUTPATIENT
Start: 2022-06-25 | End: 2022-06-25

## 2022-06-25 RX ORDER — CALCIUM CARBONATE 200(500)MG
500 TABLET,CHEWABLE ORAL 3 TIMES DAILY PRN
Status: DISCONTINUED | OUTPATIENT
Start: 2022-06-25 | End: 2022-06-29 | Stop reason: HOSPADM

## 2022-06-25 RX ORDER — POTASSIUM CHLORIDE 20 MEQ/1
20 TABLET, EXTENDED RELEASE ORAL 2 TIMES DAILY
Status: DISCONTINUED | OUTPATIENT
Start: 2022-06-25 | End: 2022-06-25

## 2022-06-25 RX ORDER — DULOXETIN HYDROCHLORIDE 30 MG/1
30 CAPSULE, DELAYED RELEASE ORAL DAILY
Status: DISCONTINUED | OUTPATIENT
Start: 2022-06-25 | End: 2022-06-29 | Stop reason: HOSPADM

## 2022-06-25 RX ORDER — ONDANSETRON 2 MG/ML
4 INJECTION INTRAMUSCULAR; INTRAVENOUS EVERY 6 HOURS PRN
Status: DISCONTINUED | OUTPATIENT
Start: 2022-06-25 | End: 2022-06-29 | Stop reason: HOSPADM

## 2022-06-25 RX ORDER — OXYBUTYNIN CHLORIDE 5 MG/1
5 TABLET, EXTENDED RELEASE ORAL DAILY
Status: DISCONTINUED | OUTPATIENT
Start: 2022-06-25 | End: 2022-06-29 | Stop reason: HOSPADM

## 2022-06-25 RX ORDER — POTASSIUM CHLORIDE 20 MEQ/1
40 TABLET, EXTENDED RELEASE ORAL ONCE
Status: COMPLETED | OUTPATIENT
Start: 2022-06-25 | End: 2022-06-25

## 2022-06-25 RX ORDER — DICYCLOMINE HYDROCHLORIDE 10 MG/1
10 CAPSULE ORAL EVERY 6 HOURS PRN
Status: DISCONTINUED | OUTPATIENT
Start: 2022-06-25 | End: 2022-06-29 | Stop reason: HOSPADM

## 2022-06-25 RX ORDER — LETROZOLE 2.5 MG/1
2.5 TABLET, FILM COATED ORAL DAILY
Status: DISCONTINUED | OUTPATIENT
Start: 2022-06-25 | End: 2022-06-29 | Stop reason: HOSPADM

## 2022-06-25 RX ORDER — SODIUM CHLORIDE, SODIUM GLUCONATE, SODIUM ACETATE, POTASSIUM CHLORIDE, MAGNESIUM CHLORIDE, SODIUM PHOSPHATE, DIBASIC, AND POTASSIUM PHOSPHATE .53; .5; .37; .037; .03; .012; .00082 G/100ML; G/100ML; G/100ML; G/100ML; G/100ML; G/100ML; G/100ML
125 INJECTION, SOLUTION INTRAVENOUS CONTINUOUS
Status: DISCONTINUED | OUTPATIENT
Start: 2022-06-25 | End: 2022-06-26

## 2022-06-25 RX ORDER — LANOLIN ALCOHOL/MO/W.PET/CERES
6 CREAM (GRAM) TOPICAL
Status: DISCONTINUED | OUTPATIENT
Start: 2022-06-25 | End: 2022-06-29 | Stop reason: HOSPADM

## 2022-06-25 RX ORDER — HYDROMORPHONE HYDROCHLORIDE 2 MG/1
2 TABLET ORAL
Status: DISCONTINUED | OUTPATIENT
Start: 2022-06-25 | End: 2022-06-29 | Stop reason: HOSPADM

## 2022-06-25 RX ADMIN — VANCOMYCIN HYDROCHLORIDE 1000 MG: 1 INJECTION, SOLUTION INTRAVENOUS at 21:34

## 2022-06-25 RX ADMIN — SODIUM CHLORIDE, SODIUM GLUCONATE, SODIUM ACETATE, POTASSIUM CHLORIDE, MAGNESIUM CHLORIDE, SODIUM PHOSPHATE, DIBASIC, AND POTASSIUM PHOSPHATE 125 ML/HR: .53; .5; .37; .037; .03; .012; .00082 INJECTION, SOLUTION INTRAVENOUS at 06:28

## 2022-06-25 RX ADMIN — CEFTRIAXONE SODIUM 1000 MG: 10 INJECTION, POWDER, FOR SOLUTION INTRAVENOUS at 04:58

## 2022-06-25 RX ADMIN — HYDROMORPHONE HYDROCHLORIDE 4 MG: 4 TABLET ORAL at 08:33

## 2022-06-25 RX ADMIN — DULOXETINE HYDROCHLORIDE 30 MG: 30 CAPSULE, DELAYED RELEASE ORAL at 08:14

## 2022-06-25 RX ADMIN — CEFEPIME HYDROCHLORIDE 2000 MG: 2 INJECTION, POWDER, FOR SOLUTION INTRAVENOUS at 09:47

## 2022-06-25 RX ADMIN — PANTOPRAZOLE SODIUM 40 MG: 40 INJECTION, POWDER, FOR SOLUTION INTRAVENOUS at 13:13

## 2022-06-25 RX ADMIN — TBO-FILGRASTIM 480 MCG: 480 INJECTION, SOLUTION SUBCUTANEOUS at 11:59

## 2022-06-25 RX ADMIN — RIVAROXABAN 20 MG: 20 TABLET, FILM COATED ORAL at 08:15

## 2022-06-25 RX ADMIN — VANCOMYCIN HYDROCHLORIDE 1000 MG: 1 INJECTION, SOLUTION INTRAVENOUS at 11:14

## 2022-06-25 RX ADMIN — HYDROMORPHONE HYDROCHLORIDE 4 MG: 4 TABLET ORAL at 11:59

## 2022-06-25 RX ADMIN — SODIUM CHLORIDE, SODIUM GLUCONATE, SODIUM ACETATE, POTASSIUM CHLORIDE, MAGNESIUM CHLORIDE, SODIUM PHOSPHATE, DIBASIC, AND POTASSIUM PHOSPHATE 125 ML/HR: .53; .5; .37; .037; .03; .012; .00082 INJECTION, SOLUTION INTRAVENOUS at 13:38

## 2022-06-25 RX ADMIN — SODIUM CHLORIDE 1000 ML: 0.9 INJECTION, SOLUTION INTRAVENOUS at 03:23

## 2022-06-25 RX ADMIN — HYDROMORPHONE HYDROCHLORIDE 4 MG: 4 TABLET ORAL at 15:04

## 2022-06-25 RX ADMIN — Medication 6 MG: at 21:34

## 2022-06-25 RX ADMIN — DEXTROSE 500 ML: 5 SOLUTION INTRAVENOUS at 09:46

## 2022-06-25 RX ADMIN — CEFEPIME HYDROCHLORIDE 2000 MG: 2 INJECTION, POWDER, FOR SOLUTION INTRAVENOUS at 17:22

## 2022-06-25 RX ADMIN — OXYBUTYNIN CHLORIDE 5 MG: 5 TABLET, EXTENDED RELEASE ORAL at 08:14

## 2022-06-25 RX ADMIN — CALCIUM CARBONATE (ANTACID) CHEW TAB 500 MG 500 MG: 500 CHEW TAB at 21:41

## 2022-06-25 RX ADMIN — FLUTICASONE PROPIONATE 2 SPRAY: 50 SPRAY, METERED NASAL at 08:15

## 2022-06-25 RX ADMIN — DICYCLOMINE HYDROCHLORIDE 10 MG: 10 CAPSULE ORAL at 21:41

## 2022-06-25 RX ADMIN — LETROZOLE 2.5 MG: 2.5 TABLET, FILM COATED ORAL at 08:14

## 2022-06-25 RX ADMIN — POTASSIUM CHLORIDE 40 MEQ: 1500 TABLET, EXTENDED RELEASE ORAL at 04:53

## 2022-06-25 RX ADMIN — HYDROMORPHONE HYDROCHLORIDE 4 MG: 4 TABLET ORAL at 21:34

## 2022-06-25 RX ADMIN — KETOROLAC TROMETHAMINE 15 MG: 30 INJECTION, SOLUTION INTRAMUSCULAR at 04:00

## 2022-06-25 NOTE — SEPSIS NOTE
Sepsis Note   Marica Schwab 49 y o  female MRN: [id number]  Unit/Bed#: ED 23 Encounter: [id number]       qSOFA     Row Name 06/25/22 0228 06/25/22 0219             Altered mental status GCS < 15 0 --       Respiratory Rate > / =22 -- 0       Systolic BP < / =722 -- 0       Q Sofa Score 0 0                  Initial Sepsis Screening     Row Name 06/25/22 0446                Is the patient's history suggestive of a new or worsening infection? Yes (Proceed)  -VC        Suspected source of infection suspect infection, source unknown  -VC        Are two or more of the following signs & symptoms of infection both present and new to the patient? Yes (Proceed)  -VC        Indicate SIRS criteria Tachycardia > 90 bpm;Leukopenia (WBC < 4000 IJL)  -VC        If the answer is yes to both questions, suspicion of sepsis is present --        If severe sepsis is present AND tissue hypoperfusion perists in the hour after fluid resuscitation or lactate > 4, the patient meets criteria for SEPTIC SHOCK --        Are any of the following organ dysfunction criteria present within 6 hours of suspected infection and SIRS criteria that are NOT considered to be chronic conditions?  No  -VC        Organ dysfunction --        Date of presentation of severe sepsis --        Time of presentation of severe sepsis --        Tissue hypoperfusion persists in the hour after crystalloid fluid administration, evidenced, by either: --        Was hypotension present within one hour of the conclusion of crystalloid fluid administration? --        Date of presentation of septic shock --        Time of presentation of septic shock --              User Key  (r) = Recorded By, (t) = Taken By, (c) = Cosigned By    [address] Name Provider Type    Cora Rider DO Physician

## 2022-06-25 NOTE — SEPSIS NOTE
Sepsis Note   Vishal Medina 49 y o  female MRN: [id number]  Unit/Bed#: ED 23 Encounter: [id number]       qSOFA     Row Name 06/25/22 0228 06/25/22 0219             Altered mental status GCS < 15 0 --       Respiratory Rate > / =22 -- 0       Systolic BP < / =842 -- 0       Q Sofa Score 0 0                  Initial Sepsis Screening     Row Name 06/25/22 0446                Is the patient's history suggestive of a new or worsening infection? Yes (Proceed)  -VC        Suspected source of infection suspect infection, source unknown  -VC        Are two or more of the following signs & symptoms of infection both present and new to the patient? Yes (Proceed)  -VC        Indicate SIRS criteria Tachycardia > 90 bpm;Leukopenia (WBC < 4000 IJL)  -VC        If the answer is yes to both questions, suspicion of sepsis is present --        If severe sepsis is present AND tissue hypoperfusion perists in the hour after fluid resuscitation or lactate > 4, the patient meets criteria for SEPTIC SHOCK --        Are any of the following organ dysfunction criteria present within 6 hours of suspected infection and SIRS criteria that are NOT considered to be chronic conditions?  No  -VC        Organ dysfunction --        Date of presentation of severe sepsis --        Time of presentation of severe sepsis --        Tissue hypoperfusion persists in the hour after crystalloid fluid administration, evidenced, by either: --        Was hypotension present within one hour of the conclusion of crystalloid fluid administration? --        Date of presentation of septic shock --        Time of presentation of septic shock --              User Key  (r) = Recorded By, (t) = Taken By, (c) = Cosigned By    [address] Name Provider Type    Cora Rider DO Physician

## 2022-06-25 NOTE — CONSULTS
Consultation - Infectious Disease   Carry Jayashree 49 y o  female MRN: [id number]  Unit/Bed#: ED 17 Encounter: [id number]      IMPRESSION & RECOMMENDATIONS:   1  Neutropenia with reported fever  Patient currently on chemotherapy for problem 3  She has developed neutropenia in the past   Current   Presented after developing reported fever  Some vague urinary complaints  She also 2 which she was using Keflex  Fortunately no further fevers here  No other localizing complaints  Blood cultures pending  Switch to cefepime 2 g every 8 hours  Would add vancomycin for now given 2  Follow-up pending blood culture  Pharmacy consult for vancomycin monitor  Repeat CBC/chemistry tomorrow  Repeat blood cultures with next fever  Continue to trend fever curve/vitals  Granix as per Oncology  Supportive care as per primary  Additional interventions pending clinical course    2  Localized phlebitis and cellulitis  Patient developed most recently an area of localized phlebitis after chemotherapy  Swelling and cellulitis seemed to have responded to Keflex  Patient however also with low cell counts so uncertain if she is able to mount a response  No obvious areas of fluctuance  Will add vancomycin as above  Follow-up pending blood culture  Monitor site clinically  Continue to trend fever curve/WBC  Recommend discussion of port as outpatient with Oncology    3  Breast cancer on chemotherapy  Patient had prior episode of neutropenia with chemo in the past   Last chemotherapy was on 06/15  Granix as per Oncology  Supportive care as per primary  4  Worsening GERD  Patient reports increased GERD since after chemotherapy  Possibly medication related  Consider also esophageal candidiasis  No oral lesions  Recommend PPI for now  Monitor for response  If symptoms continue will consider fluconazole  Antibiotics as above    Above plan discussed in detail with patient and her visitor at bedside    Above plan discussed in detail with primary service  ID consult service will continue to follow  HISTORY OF PRESENT ILLNESS:  Reason for Consult:  Neutropenia with reported fever    HPI: Shelly Ramires is a 49y o  year old female with breast cancer with metastatic disease to the bone and hypertension  Patient currently receiving chemotherapy via peripheral IV  Last session was on 06/15  She reports that this session was delayed by a week as her cell counts were still low  They were noted to have recovered on   Subsequently after chemotherapy a few days or so after that the patient started to develop what appeared to be a phlebitis and cellulitis at her IV site where chemo was given and so she was given Keflex  She stated that the heat has overall  down at the area but it still feels very sensitive  Patient has a visitor at bedside very much wanted to discuss eventual port placement  Recommended that they discuss this with Oncology as an outpatient  Patient then right before admission developed fevers, chills and malaise  She also reported having urinary urgency and frequency although no dysuria  She had some generalized discomfort in the belly  Reported recent cat scratches on her left elbow but the sites had overall healed well  Patient also mentions GERD  Intake overall remains poor  She therefore has not had a bowel movement in many days  Vitals on admission reviewed with leukopenia along with neutropenia  Hyponatremia was noted  Procalcitonin was negative  Chest x-ray unremarkable  Patient was admitted for closer monitoring and started on antibiotic  She fortunately has remained afebrile  White blood cell count 1 2  Blood cultures pending  COVID testing and flu testing negative  Vitals otherwise stable  Patient reports improvement in the sensation of urgency to go to the bathroom  She offers no other localizing complaints on exam   ANC is only 320  UA without any pyuria    Primary service reached out to Oncology and patient is to be started on Granix  Recent outpatient notes reviewed  No other significant culture data in our system  No other recent data in Care everywhere  We are consulted at this time for further assistance with management  Extensive review of the medical records in epic including review of the notes, radiographs, and laboratory results  REVIEW OF SYSTEMS:  A complete 12 point system-based review of systems is negative other than that noted in the HPI  PAST MEDICAL HISTORY:  Past Medical History:   Diagnosis Date    Cancer [facility])     History of radiation exposure     Malignant neoplasm of right female breast ([address] ) 2012    right     Past Surgical History:   Procedure Laterality Date    BREAST CYST EXCISION      BREAST LUMPECTOMY Right 2012    HIP SURGERY      IR BIOPSY LIVER MASS  2021    IR PLEURAL EFFUSION LONG-TERM CATHETER PLACEMENT  2021    IR PLEURAL EFFUSION LONG-TERM CATHETER REMOVAL  2022    IR THORACENTESIS  2021       FAMILY HISTORY:  Non-contributory    SOCIAL HISTORY:  Social History   Social History     Substance and Sexual Activity   Alcohol Use Not Currently     Social History     Substance and Sexual Activity   Drug Use Not Currently     Social History     Tobacco Use   Smoking Status Former Smoker    Packs/day: 0 25    Types: Cigarettes   Smokeless Tobacco Never Used       ALLERGIES:  Allergies   Allergen Reactions    Codeine Anaphylaxis    Morphine GI Intolerance, Itching and Vomiting    Sulfa Antibiotics Hives and Itching       MEDICATIONS:  All current active medications have been reviewed      PHYSICAL EXAM:  Temp:  [97 8 °F (36 6 °C)-98 1 °F (36 7 °C)] 97 8 °F (36 6 °C)  HR:  [67-98] 67  Resp:  [18] 18  BP: ()/(60-70) 99/60  SpO2:  [95 %-97 %] 97 %  Temp (24hrs), Av °F (36 7 °C), Min:97 8 °F (36 6 °C), Max:98 1 °F (36 7 °C)  Current: Temperature: 97 8 °F (36 6 °C)    Intake/Output Summary (Last 24 hours) at 6/25/2022 3918  Last data filed at 6/25/2022 7171  Gross per 24 hour   Intake 1050 ml   Output 1 ml   Net 1049 ml       General Appearance:  Chronically ill-appearing, nontoxic, and in no distress   Head:  Normocephalic, without obvious abnormality, atraumatic   Eyes:  Conjunctiva pink and sclera anicteric, both eyes   Nose: Nares normal, mucosa normal, no drainage   Throat: Oropharynx moist without lesions   Neck: Supple, symmetrical, no adenopathy, no tenderness/mass/nodules   Back:   Symmetric, no curvature, ROM normal, no CVA tenderness   Lungs:   Clear to auscultation bilaterally, respirations unlabored on room air   Chest Wall:  No tenderness or deformity   Heart:  RRR; no murmur, rub or gallop appreciated   Abdomen:   Soft, non-tender, non-distended, positive bowel sounds    Extremities: No cyanosis, clubbing or edema   Skin: Patient has 2 areas of previous phlebitis on the left forearm  The sites are  to palpation there is no significant warmth  No obvious areas of fluctuance  Lymph nodes: Cervical, supraclavicular nodes normal   Neurologic: Alert and oriented times 3, extremity strength 5/5 and symmetric       LABS, IMAGING, & OTHER STUDIES:  Lab Results:  I have personally reviewed pertinent labs  Results from last 7 days   Lab Units 06/25/22  0626 06/25/22  0322   WBC Thousand/uL 1 28* 1 45*   HEMOGLOBIN g/dL 10 2* 11 8   PLATELETS Thousands/uL 147* 168     Results from last 7 days   Lab Units 06/25/22  0626 06/25/22  0322   POTASSIUM mmol/L 3 3* 3 3*   CHLORIDE mmol/L 106 98*   CO2 mmol/L 23 24   BUN mg/dL 8 7   CREATININE mg/dL 0 76 0 84   EGFR ml/min/1 73sq m 89 79   CALCIUM mg/dL 8 2* 8 8   AST U/L  --  32   ALT U/L  --  19   ALK PHOS U/L  --  44*           Imaging Studies:   I have personally reviewed pertinent imaging study reports and images in PACS  Other Studies:   I have personally reviewed pertinent reports

## 2022-06-25 NOTE — ASSESSMENT & PLAN NOTE
· Patient with fever of 101 4 last night  · ANC count increased after receiving Granix  · Will give Granix dose today and likely hold tomorrow given improvement  · Will discuss with Oncology  · CXR without acute process  · CT chest abdomen pelvis ordered  · Continue cefepime and vancomycin at this time  · 6/25 Blood cultures x2 no growth at 1 day  · 6/26 repeat blood cultures drawn  · Infectious disease following  · Continue to monitor

## 2022-06-25 NOTE — ASSESSMENT & PLAN NOTE
· /69 HR 98  · Not currently prescribed outpatient antihypertensive medication  · Monitor BP per unit protocol

## 2022-06-25 NOTE — ASSESSMENT & PLAN NOTE
· Follows with Dr Valentine Going as outpatient for metastatic breast cancer   · Prescribed outpatient Dilaudid 2-4mg q3h prn moderate/severe pain  · PDMP reviewed; continue home dilaudid   · Continue outpatient f/u

## 2022-06-25 NOTE — CONSULTS
Vancomycin Assessment    Hector Lyon is a 49 y o  female who is currently receiving vancomycin 1 gram Q12H for other febrile neutropenia   Relevant clinical data and objective history reviewed:  Creatinine   Date Value Ref Range Status   06/25/2022 0 76 0 60 - 1 30 mg/dL Final     Comment:     Standardized to IDMS reference method   06/25/2022 0 84 0 60 - 1 30 mg/dL Final     Comment:     Standardized to IDMS reference method   06/14/2022 0 88 0 60 - 1 30 mg/dL Final     Comment:     Standardized to IDMS reference method   04/02/2014 0 80 0 60 - 1 30 mg/dL Final     Comment:     Standardized to IDMS reference method     BP 99/60 (BP Location: Left arm)   Pulse 67   Temp 97 8 °F (36 6 °C) (Oral)   Resp 18   LMP 05/26/2021   SpO2 97%   I/O last 3 completed shifts: In: 1050 [IV Piggyback:1050]  Out: 1 [Urine:1]  Lab Results   Component Value Date/Time    BUN 8 06/25/2022 06:26 AM    BUN 12 04/02/2014 01:15 PM    WBC 1 28 (LL) 06/25/2022 06:26 AM    HGB 10 2 (L) 06/25/2022 06:26 AM    HCT 31 0 (L) 06/25/2022 06:26 AM    MCV 98 06/25/2022 06:26 AM     (L) 06/25/2022 06:26 AM     Temp Readings from Last 3 Encounters:   06/25/22 97 8 °F (36 6 °C) (Oral)   06/20/22 97 9 °F (36 6 °C) (Temporal)   06/15/22 (!) 97 °F (36 1 °C) (Temporal)     Vancomycin Days of Therapy: 1    Assessment/Plan  The patient is currently on vancomycin utilizing scheduled dosing based on actual body weight  Baseline risks associated with therapy include: dehydration  The patient is currently receiving 1 gram Q12H and is clinically appropriate and dose will be continued  Pharmacy will also follow closely for s/sx of nephrotoxicity, infusion reactions, and appropriateness of therapy  BMP and CBC will be ordered per protocol  Plan for trough as patient approaches steady state, prior to the 4th  dose at approximately 2200 on 06/26/22  Due to infection severity, will target a trough of 15-20 (appropriate for most indications)   Pharmacy will continue to follow the patients culture results and clinical progress daily      Daljit Goddard, Pharmacist

## 2022-06-25 NOTE — H&P
Sergio 1965, 49 y o  female MRN: [id number]  Unit/Bed#: ED 23 Encounter: [id number]  Primary Care Provider: Isaac Linares MD   Date and time admitted to hospital: 6/25/2022  2:14 AM    * Sepsis [facility])  Assessment & Plan  Patient reports malaise and chills over the past several days, with development of 103F fever at home today  Recently treated with PO keflex for left forearm cellulitis  Also reports urinary urgency/frequency, mild occasional generalized abdominal discomfort, and recent cat scratch to her left eyebrow  Denies other symptom or complaint at this time      /69 (BP Location: Left arm)   Pulse 98   Temp 98 1 °F (36 7 °C) (Oral)   Resp 18   LMP 05/26/2021   SpO2 95%     · Currently on active chemotherapy  · As evidenced by WBC 1 45 and HR 98  · Reporting fever 103F at home today   · Protocal and lactic acid WNL  · Has been receiving treatment for left forearm cellulitis, remains possible infectious source, however rash appears minimal at this time  · Has small cat scratch on left superior orbit, which does not appear acutely infected and has no local lymphadenopathy (if failing to respond to abx as below can consider IV azithromycin for bartonella)  · Reports feeling like she has urinary urgency and frequency, however UA negative  · CXR without acute process; denies respiratory infectious s/s  · Reports occasional generalized abdominal discomfort; abdomen soft, non-distended, with minimal tenderness on deep palpation  · Has not been febrile while in ED, however took 1g Tylenol prior to arrival  · ED gave IV rocephin  · Continue IV rocephin, trend WBC, follow up with pending infectious labs    Hypokalemia  Assessment & Plan  · Potassium 3 3  · Replete and recheck    Hyponatremia  Assessment & Plan  · Sodium 128  · Received 1L IVF bolus     Primary malignant neoplasm of right breast with metastasis to other site [facility])  Assessment & Plan  · [facility] as an [facility]  · Currently receiving active chemotherapy  · Continue outpatient follow-up    Palliative care patient  Assessment & Plan  · Follows with Dr Doretha Rubi as outpatient for metastatic breast cancer   · Prescribed outpatient Dilaudid 2-4mg q3h prn moderate/severe pain  · PDMP reviewed; continue home dilaudid   · Continue outpatient f/u    Hypertension  Assessment & Plan  · /69 HR 98  · Not currently prescribed outpatient antihypertensive medication  · Monitor BP per unit protocol    VTE Pharmacologic Prophylaxis: VTE Score: 6 High Risk (Score >/= 5) - Pharmacological DVT Prophylaxis Ordered: rivaroxaban (Xarelto)  Sequential Compression Devices Ordered  Code Status: Level 1 - Full Code   Discussion with family: update in AM      Anticipated Length of Stay: Patient will be admitted on an inpatient basis with an anticipated length of stay of greater than 2 midnights secondary to SEPSIS  Total Time for Visit, including Counseling / Coordination of Care: 45 minutes Greater than 50% of this total time spent on direct patient counseling and coordination of care  Chief Complaint: fever, chills, and malaise     History of Present Illness:  Beau Pritchard is a 49 y o  female with a PMH of breast cancer metastatic to bone, HTN, and cancer related pain who presents with fever, chills, and malaise  Patient reports malaise and chills over the past several days, with development of 103F fever at home today  Recently treated with PO keflex for left forearm cellulitis  Also reports urinary urgency/frequency, mild occasional generalized abdominal discomfort, and recent cat scratch to her left eyebrow  Denies other symptom or complaint at this time  All questions answered at the bedside to the best of my ability  Review of Systems:  Review of Systems   Constitutional: Positive for chills and fever   Negative for activity change, appetite change, diaphoresis and fatigue  +Malaise     HENT: Negative for congestion, ear pain, nosebleeds and trouble swallowing  Eyes: Negative for pain and visual disturbance  Respiratory: Negative for apnea, cough, chest tightness, shortness of breath and wheezing  Cardiovascular: Negative for chest pain, palpitations and leg swelling  Gastrointestinal: Negative for abdominal distention, abdominal pain, blood in stool, constipation, diarrhea, nausea and vomiting  Endocrine: Negative for cold intolerance, heat intolerance and polyuria  Genitourinary: Negative for difficulty urinating, dysuria, flank pain and hematuria  Musculoskeletal: Negative for arthralgias, neck pain and neck stiffness  Skin: Positive for rash (L-forearm cellulitis )  Negative for color change and wound  Neurological: Negative for dizziness, tremors, syncope, weakness, light-headedness, numbness and headaches  Hematological: Negative for adenopathy  All other systems reviewed and are negative  Past Medical and Surgical History:   Past Medical History:   Diagnosis Date    Cancer [facility])     History of radiation exposure     Malignant neoplasm of right female breast ([address] ) 2012    right       Past Surgical History:   Procedure Laterality Date    BREAST CYST EXCISION      BREAST LUMPECTOMY Right 2012    HIP SURGERY      IR BIOPSY LIVER MASS  7/26/2021    IR PLEURAL EFFUSION LONG-TERM CATHETER PLACEMENT  8/17/2021    IR PLEURAL EFFUSION LONG-TERM CATHETER REMOVAL  5/11/2022    IR THORACENTESIS  7/26/2021       Meds/Allergies:  Prior to Admission medications    Medication Sig Start Date End Date Taking?  Authorizing Provider   acetaminophen (TYLENOL) 325 mg tablet Take 2 tablets (650 mg total) by mouth every 6 (six) hours as needed for mild pain 4/7/22   Dari Gutierrez MD   al mag oxide-diphenhydramine-lidocaine viscous (MAGIC MOUTHWASH) 1:1:1 suspension Swish and spit 10 mL every 4 (four) hours as needed for mouth pain or discomfort 3/8/22   Rosa García MD   albuterol (PROVENTIL HFA,VENTOLIN HFA) 90 mcg/act inhaler Inhale 2 puffs every 4 (four) hours as needed for wheezing 7/29/21   Vianey Mirza MD   benzonatate (TESSALON) 200 MG capsule Take 1 capsule (200 mg total) by mouth 3 (three) times a day as needed for cough 11/23/21   Rosa García MD   calcium carbonate (TUMS) 500 mg chewable tablet Chew 1 tablet (500 mg total) 3 (three) times a day as needed for indigestion or heartburn 7/29/21   Vianey Mirza MD   cephalexin (KEFLEX) 500 mg capsule Take 1 capsule (500 mg total) by mouth 4 (four) times a day for 10 days 6/20/22 6/30/22  Author Life, DO   CRANIAL PROSTHESIS, RX, Apply to cranial area as needed for comfort   5/10/22   Gilberto Tatum MD   dextromethorphan-guaiFENesin (ROBITUSSIN DM)  mg/5 mL syrup Take 10 mL by mouth every 4 (four) hours as needed for cough 7/29/21   Vianey Mirza MD   dicyclomine (BENTYL) 10 mg capsule Take 1 capsule (10 mg total) by mouth every 6 (six) hours as needed (abdominal cramping) 5/17/22   Rosa García MD   diphenhydrAMINE (BENADRYL) 50 MG tablet Take 1 tablet (50 mg total) by mouth daily at bedtime as needed for itching or sleep 7/29/21   Vinaey Mirza MD   diphenoxylate-atropine (LOMOTIL) 2 5-0 025 mg per tablet Take 1 tablet by mouth 4 (four) times a day as needed for diarrhea 8/30/21   Gilberto Tatum MD   DULoxetine (CYMBALTA) 30 mg delayed release capsule Take 1 capsule (30 mg total) by mouth daily 9/14/21   Gigi Gannon MD   fluticasone (FLONASE) 50 mcg/act nasal spray 2 sprays into each nostril daily 7/10/21 7/10/22  Historical Provider, MD   HYDROmorphone (DILAUDID) 2 mg tablet Take 1-2 tablets (2-4 mg total) by mouth every 3 (three) hours as needed (moderate/severe cancer-related pain) Max Daily Amount: 32 mg 6/24/22   Rosa García MD   letrozole [facility]) 2 5 mg tablet TAKE 1 TABLET BY MOUTH EVERY DAY 2/15/22 5/16/22  Hernán Lee Kamila Fernandez MD   Lidocaine Viscous HCl (XYLOCAINE) 2 % mucosal solution Swish and spit 10 mL 4 (four) times a day as needed for mouth pain or discomfort 3/10/22   Georgina Russo MD   melatonin 3 mg Take 2 tablets (6 mg total) by mouth daily at bedtime 10/12/21   April D DEBRA Saenz   metoclopramide (REGLAN) 10 mg tablet Take 1 tablet (10 mg total) by mouth 4 (four) times a day 9/16/21   Georgina Russo MD   multivitamin [facility]) TABS Take 1 tablet by mouth    Historical Provider, MD   ondansetron (ZOFRAN) 4 mg tablet Take 1 tablet (4 mg total) by mouth every 8 (eight) hours as needed for nausea or vomiting 9/16/21   Georgina Russo MD   oxybutynin (DITROPAN-XL) 5 mg 24 hr tablet Take 1 tablet (5 mg total) by mouth daily Take 1 tablet daily 8/3/21   Brando Soriano MD   palbociclib (Ibrance) 100 MG tablet Take 1 tablet (100 mg total) by mouth daily  Patient not taking: No sig reported 4/1/22   Krystal Antoine PA-C   potassium chloride (K-DUR,KLOR-CON) 20 mEq tablet Take 1 tablet (20 mEq total) by mouth 2 (two) times a day for 5 days 6/1/22 6/6/22  Flor Goddard MD   rivaroxaban (Xarelto) 20 mg tablet Take 1 tablet (20 mg total) by mouth daily with breakfast 3/31/22   Krystal Antoine PA-C   senna (SENOKOT) 8 6 MG tablet Take 1 tablet (8 6 mg total) by mouth daily at bedtime as needed for constipation 5/17/22   Georgina Russo MD     I have reviewed home medications with patient personally  Allergies:    Allergies   Allergen Reactions    Codeine Anaphylaxis    Morphine GI Intolerance, Itching and Vomiting    Sulfa Antibiotics Hives and Itching       Social History:  Marital Status: /Civil Union   Occupation: N/A  Patient Pre-hospital Living Situation: Home  Patient Pre-hospital Level of Mobility: walks  Patient Pre-hospital Diet Restrictions: heart healthy  Substance Use History:   Social History     Substance and Sexual Activity   Alcohol Use Not Currently     Social History Tobacco Use   Smoking Status Former Smoker    Packs/day: 0 25    Types: Cigarettes   Smokeless Tobacco Never Used     Social History     Substance and Sexual Activity   Drug Use Not Currently       Family History:  Family History   Problem Relation Age of Onset    Breast cancer Mother         in her 57's    Breast cancer Sister         inher 45s and 48    Colon cancer Maternal Grandmother     No Known Problems Paternal Grandmother     Breast cancer Other     No Known Problems Paternal Aunt        Physical Exam:     Vitals:   Blood Pressure: 106/70 (06/25/22 0520)  Pulse: 67 (06/25/22 0520)  Temperature: 97 8 °F (36 6 °C) (06/25/22 0520)  Temp Source: Oral (06/25/22 0520)  Respirations: 18 (06/25/22 0520)  SpO2: 95 % (06/25/22 0520)    Physical Exam  Vitals and nursing note reviewed  Constitutional:       General: She is not in acute distress  Appearance: Normal appearance  HENT:      Head: Normocephalic and atraumatic  Right Ear: External ear normal       Left Ear: External ear normal       Nose: Nose normal       Mouth/Throat:      Mouth: Mucous membranes are moist    Eyes:      Pupils: Pupils are equal, round, and reactive to light  Cardiovascular:      Rate and Rhythm: Normal rate and regular rhythm  Pulses: Normal pulses  Heart sounds: Normal heart sounds  No murmur heard  Pulmonary:      Effort: Pulmonary effort is normal  No respiratory distress  Breath sounds: Normal breath sounds  No wheezing or rales  Chest:      Chest wall: No tenderness  Abdominal:      General: Bowel sounds are normal  There is no distension  Palpations: Abdomen is soft  There is no mass  Tenderness: There is no abdominal tenderness  There is no guarding  Musculoskeletal:         General: No swelling or tenderness  Cervical back: Normal range of motion and neck supple  No rigidity or tenderness  Right lower leg: No edema  Left lower leg: No edema     Skin: General: Skin is warm and dry  Capillary Refill: Capillary refill takes less than 2 seconds  Findings: Erythema (Mild, left dorsal forearm) and lesion (Small, non erythematous, well healing scratch to left supraorbital arch) present  No rash  Neurological:      General: No focal deficit present  Mental Status: She is alert  Psychiatric:         Mood and Affect: Mood normal           Additional Data:     Lab Results:  Results from last 7 days   Lab Units 06/25/22  0322   WBC Thousand/uL 1 45*   HEMOGLOBIN g/dL 11 8   HEMATOCRIT % 34 1*   PLATELETS Thousands/uL 168   LYMPHO PCT % 52*   MONO PCT % 2*   EOS PCT % 0     Results from last 7 days   Lab Units 06/25/22  0322   SODIUM mmol/L 128*   POTASSIUM mmol/L 3 3*   CHLORIDE mmol/L 98*   CO2 mmol/L 24   BUN mg/dL 7   CREATININE mg/dL 0 84   ANION GAP mmol/L 6   CALCIUM mg/dL 8 8   ALBUMIN g/dL 3 0*   TOTAL BILIRUBIN mg/dL 0 72   ALK PHOS U/L 44*   ALT U/L 19   AST U/L 32   GLUCOSE RANDOM mg/dL 121                 Results from last 7 days   Lab Units 06/25/22  0322   LACTIC ACID mmol/L 1 0   PROCALCITONIN ng/ml 0 19       Imaging: Reviewed radiology reports from this admission including: chest xray  XR chest 2 views   ED Interpretation by Christophe Payan DO (06/25 1520)   No acute cardiopulmonary abnormalities          EKG and Other Studies Reviewed on Admission:   · EKG: NSR  HR 82     ** Please Note: This note has been constructed using a voice recognition system   **

## 2022-06-25 NOTE — ASSESSMENT & PLAN NOTE
· Follows with [address] [place] [address] Hematology as an outpatient  · Currently receiving active chemotherapy  · Continue outpatient follow-up

## 2022-06-25 NOTE — ASSESSMENT & PLAN NOTE
· Sodium stable currently  · Likely from polydipsia  · Discussion had with patient by Nephrology team  · Continue to monitor

## 2022-06-25 NOTE — CONSULTS
Consultation - Nephrology   Vincent Vela 49 y o  female MRN: [id number]  Unit/Bed#: ED 17 Encounter: [id number]    Referring PHYSICIAN:  Livia Adkins    REASON FOR THE CONSULTATION:  Hyponatremia    DATE OF CONSULTATION:  June 25, 2022    ADMISSION DIAGNOSIS: Sepsis ([facility] )     CHIEF COMPLAINT     Patient history of metastatic breast cancer came to the hospital with overall not feeling well and fever and being admitted for the neutropenic fever and I am being consulted for hyponatremia    HPI     Patient with history of metastatic breast cancer on chemotherapy  She was not feeling well last few days and yesterday she was in fever she call the primary doctor who advised to come to the emergency room and she is admitted with fever and neutropenia  Patient also found to have hyponatremia which responded to IV fluid    I am being consulted as it seems sodium got better much fast    Patient overall feeling well    Denies any dizziness weakness    Claims to be feeling better    No chest about she claims he was drinking quite a bit of water at home    PAST MEDICAL HISTORY     Past Medical History:   Diagnosis Date    Cancer [facility])     History of radiation exposure     Malignant neoplasm of right female breast ([facility] ) 2012    right       PAST SURGICAL HISTORY     Past Surgical History:   Procedure Laterality Date    BREAST CYST EXCISION      BREAST LUMPECTOMY Right 2012    HIP SURGERY      IR BIOPSY LIVER MASS  7/26/2021    IR PLEURAL EFFUSION LONG-TERM CATHETER PLACEMENT  8/17/2021    IR PLEURAL EFFUSION LONG-TERM CATHETER REMOVAL  5/11/2022    IR THORACENTESIS  7/26/2021       ALLERGIES     Allergies   Allergen Reactions    Codeine Anaphylaxis    Morphine GI Intolerance, Itching and Vomiting    Sulfa Antibiotics Hives and Itching       SOCIAL HISTORY     Social History     Substance and Sexual Activity   Alcohol Use Not Currently     Social History     Substance and Sexual Activity   Drug Use Not Currently     Social History     Tobacco Use   Smoking Status Former Smoker    Packs/day: 0 25    Types: Cigarettes   Smokeless Tobacco Never Used       FAMILY HISTORY     Family History   Problem Relation Age of Onset    Breast cancer Mother         in her 57's    Breast cancer Sister         inher 45s and 48    Colon cancer Maternal Grandmother     No Known Problems Paternal Grandmother     Breast cancer Other     No Known Problems Paternal Aunt        CURRENT MEDICATIONS       Current Facility-Administered Medications:     acetaminophen (TYLENOL) tablet 650 mg, 650 mg, Oral, Q6H PRN, Brayan Leary PA-C    al mag oxide-diphenhydramine-lidocaine viscous (MAGIC MOUTHWASH) suspension 10 mL, 10 mL, Swish & Spit, Q4H PRN, Kristine Olsen PA-C    albuterol (PROVENTIL HFA,VENTOLIN HFA) inhaler 2 puff, 2 puff, Inhalation, Q4H PRN, Kristine Olsen PA-C    calcium carbonate (TUMS) chewable tablet 500 mg, 500 mg, Oral, TID PRN, Kristine Olsen PA-C    cefepime (MAXIPIME) 2 g/50 mL dextrose IVPB, 2,000 mg, Intravenous, Q8H, Grace Shipley DO, Last Rate: 100 mL/hr at 06/25/22 0947, 2,000 mg at 06/25/22 0947    cefTRIAXone (ROCEPHIN) injection 1,000 mg, 1,000 mg, Intravenous, Q24H, Paula Serge Gabana, DO, 1,000 mg at 06/20/22 1559    dextrose 5 % bolus 500 mL, 500 mL, Intravenous, Once, Grace Shipley DO, 500 mL at 06/25/22 0946    dicyclomine (BENTYL) capsule 10 mg, 10 mg, Oral, Q6H PRN, Kristine Olsen PA-C    diphenhydrAMINE (BENADRYL) tablet 50 mg, 50 mg, Oral, HS PRN, Kristine Olsen PA-C    DULoxetine (CYMBALTA) delayed release capsule 30 mg, 30 mg, Oral, Daily, Brayan Leary PA-C, 30 mg at 06/25/22 0814    fluticasone (FLONASE) 50 mcg/act nasal spray 2 spray, 2 spray, Nasal, Daily, DAYDAY Holcomb-C, 2 spray at 06/25/22 0815    HYDROmorphone (DILAUDID) tablet 2 mg, 2 mg, Oral, Q3H PRN, Brayan Leary PA-C    HYDROmorphone (DILAUDID) tablet 4 mg, 4 mg, Oral, Q3H PRN, Kailey Gee PA-C, 4 mg at 06/25/22 4491    ketorolac (TORADOL) injection 15 mg, 15 mg, Intravenous, Q6H PRN, Naya Olsen PA-C    letrozole [facility]) tablet 2 5 mg, 2 5 mg, Oral, Daily, Kailey Gee PA-C, 2 5 mg at 06/25/22 3356    melatonin tablet 6 mg, 6 mg, Oral, HS, Kailey Gee PA-C    multi-electrolyte (PLASMALYTE-A/ISOLYTE-S PH 7 4) IV solution, 125 mL/hr, Intravenous, Continuous, Naya Hall [place], [place], Last Rate: 125 mL/hr at 06/25/22 0628, 125 mL/hr at 06/25/22 0628    ondansetron (ZOFRAN) injection 4 mg, 4 mg, Intravenous, Q6H PRN, Naya Olsen PA-C    oxybutynin (DITROPAN-XL) 24 hr tablet 5 mg, 5 mg, Oral, Daily, Kailey Gee PA-C, 5 mg at 06/25/22 6078    rivaroxaban (XARELTO) tablet 20 mg, 20 mg, Oral, Daily With Breakfast, Kailey Gee PA-C, 20 mg at 06/25/22 0815    senna (SENOKOT) tablet 8 6 mg, 1 tablet, Oral, HS PRN, Naya Olsen PA-C    tbo-filgrastim [facility]) subcutaneous injection 480 mcg, 480 mcg, Subcutaneous, Daily, Kenny Cotto DO    vancomycin (VANCOCIN) IVPB (premix in dextrose) 1,000 mg 200 mL, 15 mg/kg, Intravenous, Q12H, Kenny Cotto DO    Current Outpatient Medications:     acetaminophen (TYLENOL) 325 mg tablet, Take 2 tablets (650 mg total) by mouth every 6 (six) hours as needed for mild pain, Disp: 100 tablet, Rfl: 0    al mag oxide-diphenhydramine-lidocaine viscous (MAGIC MOUTHWASH) 1:1:1 suspension, Swish and spit 10 mL every 4 (four) hours as needed for mouth pain or discomfort, Disp: 300 mL, Rfl: 0    albuterol (PROVENTIL HFA,VENTOLIN HFA) 90 mcg/act inhaler, Inhale 2 puffs every 4 (four) hours as needed for wheezing, Disp: 8 g, Rfl: 0    benzonatate (TESSALON) 200 MG capsule, Take 1 capsule (200 mg total) by mouth 3 (three) times a day as needed for cough, Disp: 20 capsule, Rfl: 0    calcium carbonate (TUMS) 500 mg chewable tablet, Chew 1 tablet (500 mg total) 3 (three) times a day as needed for indigestion or heartburn, Disp: 30 tablet, Rfl: 0    cephalexin (KEFLEX) 500 mg capsule, Take 1 capsule (500 mg total) by mouth 4 (four) times a day for 10 days, Disp: 40 capsule, Rfl: 0    CRANIAL PROSTHESIS, RX,, Apply to cranial area as needed for comfort , Disp: 1 each, Rfl: 0    dextromethorphan-guaiFENesin (ROBITUSSIN DM)  mg/5 mL syrup, Take 10 mL by mouth every 4 (four) hours as needed for cough, Disp: 236 mL, Rfl: 0    dicyclomine (BENTYL) 10 mg capsule, Take 1 capsule (10 mg total) by mouth every 6 (six) hours as needed (abdominal cramping), Disp: 20 capsule, Rfl: 0    diphenhydrAMINE (BENADRYL) 50 MG tablet, Take 1 tablet (50 mg total) by mouth daily at bedtime as needed for itching or sleep, Disp: 30 tablet, Rfl: 0    diphenoxylate-atropine (LOMOTIL) 2 5-0 025 mg per tablet, Take 1 tablet by mouth 4 (four) times a day as needed for diarrhea, Disp: 30 tablet, Rfl: 0    DULoxetine (CYMBALTA) 30 mg delayed release capsule, Take 1 capsule (30 mg total) by mouth daily, Disp: 90 capsule, Rfl: 3    fluticasone (FLONASE) 50 mcg/act nasal spray, 2 sprays into each nostril daily, Disp: , Rfl:     HYDROmorphone (DILAUDID) 2 mg tablet, Take 1-2 tablets (2-4 mg total) by mouth every 3 (three) hours as needed (moderate/severe cancer-related pain) Max Daily Amount: 32 mg, Disp: 120 tablet, Rfl: 0    letrozole (FEMARA) 2 5 mg tablet, TAKE 1 TABLET BY MOUTH EVERY DAY, Disp: 90 tablet, Rfl: 2    Lidocaine Viscous HCl (XYLOCAINE) 2 % mucosal solution, Swish and spit 10 mL 4 (four) times a day as needed for mouth pain or discomfort, Disp: 200 mL, Rfl: 0    melatonin 3 mg, Take 2 tablets (6 mg total) by mouth daily at bedtime, Disp: 15 tablet, Rfl: 0    metoclopramide (REGLAN) 10 mg tablet, Take 1 tablet (10 mg total) by mouth 4 (four) times a day, Disp: 28 tablet, Rfl: 0    multivitamin (THERAGRAN) TABS, Take 1 tablet by mouth, Disp: , Rfl:     ondansetron (ZOFRAN) 4 mg tablet, Take 1 tablet (4 mg total) by mouth every 8 (eight) hours as needed for nausea or vomiting, Disp: 20 tablet, Rfl: 0    oxybutynin (DITROPAN-XL) 5 mg 24 hr tablet, Take 1 tablet (5 mg total) by mouth daily Take 1 tablet daily, Disp: 90 tablet, Rfl: 3    palbociclib (Ibrance) 100 MG tablet, Take 1 tablet (100 mg total) by mouth daily (Patient not taking: No sig reported), Disp: 21 tablet, Rfl: 1    potassium chloride (K-DUR,KLOR-CON) 20 mEq tablet, Take 1 tablet (20 mEq total) by mouth 2 (two) times a day for 5 days, Disp: 10 tablet, Rfl: 0    rivaroxaban (Xarelto) 20 mg tablet, Take 1 tablet (20 mg total) by mouth daily with breakfast, Disp: 30 tablet, Rfl: 2    senna (SENOKOT) 8 6 MG tablet, Take 1 tablet (8 6 mg total) by mouth daily at bedtime as needed for constipation, Disp: 30 tablet, Rfl: 0    REVIEW OF SYSTEMS     Review of Systems   Constitutional: Negative for activity change and fatigue  HENT: Negative for congestion and ear discharge  Eyes: Negative for photophobia and pain  Respiratory: Negative for apnea and choking  Cardiovascular: Negative for chest pain and palpitations  Gastrointestinal: Negative for abdominal distention and blood in stool  Endocrine: Negative for heat intolerance and polyphagia  Genitourinary: Negative for flank pain and urgency  Musculoskeletal: Negative for neck pain and neck stiffness  Skin: Negative for color change and wound  Allergic/Immunologic: Negative for food allergies and immunocompromised state  Neurological: Positive for weakness  Negative for seizures and facial asymmetry  Hematological: Negative for adenopathy  Does not bruise/bleed easily  Psychiatric/Behavioral: Negative for self-injury and suicidal ideas         LAB RESULTS        Results from last 7 days   Lab Units 06/25/22  0626 06/25/22  0322   WBC Thousand/uL 1 28* 1 45*   HEMOGLOBIN g/dL 10 2* 11 8   HEMATOCRIT % 31 0* 34 1*   PLATELETS Thousands/uL 147* 168   POTASSIUM mmol/L 3 3* 3 3*   CHLORIDE mmol/L 106 98*   CO2 mmol/L 23 24   BUN mg/dL 8 7   CREATININE mg/dL 0 76 0 84   EGFR ml/min/1 73sq m 89 79   CALCIUM mg/dL 8 2* 8 8       I have personally reviewed the old medical records and patient's previously known baseline creatinine level is ~ patient renal function normal and sodium was normal before yesterday  RADIOLOGY RESULTS     Results for orders placed during the hospital encounter   of 08/16/21    XR chest 1 view portable    Narrative  CHEST    INDICATION:   sob  COMPARISON:  Chest x-ray 8/9/2021    EXAM PERFORMED/VIEWS:  XR CHEST PORTABLE  Image: 1    FINDINGS:    Cardiomediastinal silhouette appears unremarkable  There remains small right pleural effusion with right basilar atelectasis  Scarring of right mid lung  Left lung is clear without pleural effusion  No pneumothorax  Osseous structures appear within normal limits for patient age  Impression  Persistent small right pleural effusion  Workstation performed: [id number]    Results for orders placed in visit on 02/22/22    XR chest pa & lateral    Narrative  CHEST    INDICATION:   R06 02: Shortness of breath  COMPARISON:  Compared with 10/19/2021    EXAM PERFORMED/VIEWS:  XR CHEST PA & LATERAL      FINDINGS:  Right lung base Pleurx catheter in place  Cardiomediastinal silhouette appears unremarkable  The lungs are clear  No pneumothorax or pleural effusion  Osseous structures appear within normal limits for patient age  Impression  Right lung base Pleurx catheter in place with no effusion  No acute cardiopulmonary disease  Workstation performed: [id number]    No results found for this or any previous visit  No results found for this or any previous visit  No results found for this or any previous visit  No results found for this or any previous visit        OBJECTIVE     Current Weight:    Vitals:    06/25/22 1015   BP: 110/68   Pulse: 71   Resp: 18   Temp:    SpO2: 98%       Intake/Output Summary (Last 24 hours) at 6/25/2022 1038  Last data filed at 6/25/2022 6640  Gross per 24 hour   Intake 1050 ml   Output 1 ml   Net 1049 ml       PHYSICAL EXAMINATION     Physical Exam  Constitutional:       General: She is not in acute distress  Appearance: She is well-developed  HENT:      Head: Normocephalic  Eyes:      General: No scleral icterus  Conjunctiva/sclera: Conjunctivae normal    Neck:      Vascular: No JVD  Cardiovascular:      Rate and Rhythm: Normal rate  Heart sounds: Normal heart sounds  Pulmonary:      Effort: Pulmonary effort is normal       Breath sounds: No wheezing  Abdominal:      Palpations: Abdomen is soft  Tenderness: There is no abdominal tenderness  Musculoskeletal:         General: Normal range of motion  Cervical back: Neck supple  Skin:     General: Skin is warm  Findings: No rash  Neurological:      Mental Status: She is alert and oriented to person, place, and time  Psychiatric:         Behavior: Behavior normal           PLAN / RECOMMENDATIONS      Hyponatremia:  Corrected with normal saline  It seems to be acute so correction rate can be up to 20 meq on 24 hours  Patient getting hypotonic solution because of rapid correction  I will check urine electrolytes as urinalysis suggest quite dilute urine suggesting excessive water intake  Will monitor sodium very closely at this point    Sepsis:  Patient does have fever with neutropenia:  ID service following and on antibiotic    Metastatic breast cancer:  Chemotherapy    Will continue to monitor patient with you    Thank you for the consultation to participate in patient's care  I have personally discussed my plan with the referring physician  Latrice Thomas MD  Nephrology  6/25/2022        Portions of the record may have been created with voice recognition software   Occasional wrong word or "sound a like" substitutions may have occurred due to the inherent limitations of voice recognition software  Read the chart carefully and recognize, using context, where substitutions have occurred

## 2022-06-25 NOTE — PLAN OF CARE
Problem: Potential for Falls  Goal: Patient will remain free of falls  Description: INTERVENTIONS:  - Educate patient/family on patient safety including physical limitations  - Instruct patient to call for assistance with activity   - Consult OT/PT to assist with strengthening/mobility   - Keep Call bell within reach  - Keep bed low and locked with side rails adjusted as appropriate  - Keep care items and personal belongings within reach  - Initiate and maintain comfort rounds  - Make Fall Risk Sign visible to staff  - Offer Toileting every 2Hours, in advance of need  - Initiate/Maintain alarm  - Obtain necessary fall risk management equipment:   - Apply yellow socks and bracelet for high fall risk patients  - Consider moving patient to room near nurses station  Outcome: Progressing     Problem: PAIN - ADULT  Goal: Verbalizes/displays adequate comfort level or baseline comfort level  Description: Interventions:  - Encourage patient to monitor pain and request assistance  - Assess pain using appropriate pain scale  - Administer analgesics based on type and severity of pain and evaluate response  - Implement non-pharmacological measures as appropriate and evaluate response  - Consider cultural and social influences on pain and pain management  - Notify physician/advanced practitioner if interventions unsuccessful or patient reports new pain  Outcome: Progressing     Problem: INFECTION - ADULT  Goal: Absence or prevention of progression during hospitalization  Description: INTERVENTIONS:  - Assess and monitor for signs and symptoms of infection  - Monitor lab/diagnostic results  - Monitor all insertion sites, i e  indwelling lines, tubes, and drains  - Monitor endotracheal if appropriate and nasal secretions for changes in amount and color  - [place] appropriate cooling/warming therapies per order  - Administer medications as ordered  - Instruct and encourage patient and family to use good hand hygiene technique  - Identify and instruct in appropriate isolation precautions for identified infection/condition  Outcome: Progressing  Goal: Absence of fever/infection during neutropenic period  Description: INTERVENTIONS:  - Monitor WBC    Outcome: Progressing     Problem: SAFETY ADULT  Goal: Patient will remain free of falls  Description: INTERVENTIONS:  - Educate patient/family on patient safety including physical limitations  - Instruct patient to call for assistance with activity   - Consult OT/PT to assist with strengthening/mobility   - Keep Call bell within reach  - Keep bed low and locked with side rails adjusted as appropriate  - Keep care items and personal belongings within reach  - Initiate and maintain comfort rounds  - Make Fall Risk Sign visible to staff  - Offer Toileting every 2 Hours, in advance of need  - Initiate/Maintain alarm  - Obtain necessary fall risk management equipment:   - Apply yellow socks and bracelet for high fall risk patients  - Consider moving patient to room near nurses station  Outcome: Progressing  Goal: Maintain or return to baseline ADL function  Description: INTERVENTIONS:  -  Assess patient's ability to carry out ADLs; assess patient's baseline for ADL function and identify physical deficits which impact ability to perform ADLs (bathing, care of mouth/teeth, toileting, grooming, dressing, etc )  - Assess/evaluate cause of self-care deficits   - Assess range of motion  - Assess patient's mobility; develop plan if impaired  - Assess patient's need for assistive devices and provide as appropriate  - Encourage maximum independence but intervene and supervise when necessary  - Involve family in performance of ADLs  - Assess for home care needs following discharge   - Consider OT consult to assist with ADL evaluation and planning for discharge  - Provide patient education as appropriate  Outcome: Progressing  Goal: Maintains/Returns to pre admission functional level  Description: INTERVENTIONS:  - Perform BMAT or MOVE assessment daily    - Set and communicate daily mobility goal to care team and patient/family/caregiver  - Collaborate with rehabilitation services on mobility goals if consulted  - Perform Range of Motion 3 times a day  - Reposition patient every 2 hours  - Dangle patient 3 times a day  - Stand patient 3 times a day  - Ambulate patient 3 times a day  - Out of bed to chair 3 times a day   - Out of bed for meals 3 times a day  - Out of bed for toileting  - Record patient progress and toleration of activity level   Outcome: Progressing     Problem: DISCHARGE PLANNING  Goal: Discharge to home or other facility with appropriate resources  Description: INTERVENTIONS:  - Identify barriers to discharge w/patient and caregiver  - Arrange for needed discharge resources and transportation as appropriate  - Identify discharge learning needs (meds, wound care, etc )  - Arrange for interpretive services to assist at discharge as needed  - Refer to Case Management Department for coordinating discharge planning if the patient needs post-hospital services based on physician/advanced practitioner order or complex needs related to functional status, cognitive ability, or social support system  Outcome: Progressing     Problem: Knowledge Deficit  Goal: Patient/family/caregiver demonstrates understanding of disease process, treatment plan, medications, and discharge instructions  Description: Complete learning assessment and assess knowledge base    Interventions:  - Provide teaching at level of understanding  - Provide teaching via preferred learning methods  Outcome: Progressing

## 2022-06-25 NOTE — ASSESSMENT & PLAN NOTE
Patient reports malaise and chills over the past several days, with development of 103F fever at home today  Recently treated with PO keflex for left forearm cellulitis  Also reports urinary urgency/frequency, mild occasional generalized abdominal discomfort, and recent cat scratch to her left eyebrow  Denies other symptom or complaint at this time      /69 (BP Location: Left arm)   Pulse 98   Temp 98 1 °F (36 7 °C) (Oral)   Resp 18   LMP 05/26/2021   SpO2 95%     · Currently on active chemotherapy  · As evidenced by WBC 1 45 and HR 98  · Reporting fever 103F at home today   · Protocal and lactic acid WNL  · Has been receiving treatment for left forearm cellulitis, remains possible infectious source, however rash appears minimal at this time  · Has small cat scratch on left superior orbit, which does not appear acutely infected and has no local lymphadenopathy (if failing to respond to abx as below can consider IV azithromycin for bartonella)  · Reports feeling like she has urinary urgency and frequency, however UA negative  · CXR without acute process; denies respiratory infectious s/s  · Reports occasional generalized abdominal discomfort; abdomen soft, non-distended, with minimal tenderness on deep palpation  · Has not been febrile while in ED, however took 1g Tylenol prior to arrival  · ED gave IV rocephin  · Continue IV rocephin, trend WBC, follow up with pending infectious labs

## 2022-06-25 NOTE — ASSESSMENT & PLAN NOTE
· Follows with Dr Valentine Going as outpatient for metastatic breast cancer   · Prescribed outpatient Dilaudid 2-4mg q3h prn moderate/severe pain  · PDMP reviewed; continue home dilaudid   · Will place consult for palliative care tomorrow  · Continue outpatient f/u

## 2022-06-25 NOTE — ED PROVIDER NOTES
History  Chief Complaint   Patient presents with    Fever - 9 weeks to 74 years     Pt was advised by on call oncology to be seen at ED for 103 fever at 22:30  Pt took 1000 mg acetaminophen at 22:30      49 y o  F, cancer patient on chemo, presents w fever, concern for infection  Source unknown  She had F/C at home today, took temp at 2230 - 103F  Took 1000mg APAP at 2230, and was advised by oncology doctor to present to the ED as she is immune compromised  Patient complaints of dysuria and frequency  Also indicates that she she had a cat scratch to the L face a few days ago  Also cellulitis to L forearm, which has been treated OP w keflex for the past 5 days  L forearm rash is improving on keflex  No cough, no SOB, no CP, no HA, no neck stiffness, no abd pain  Prior to Admission Medications   Prescriptions Last Dose Informant Patient Reported? Taking? CRANIAL PROSTHESIS, RX,   No No   Sig: Apply to cranial area as needed for comfort     DULoxetine (CYMBALTA) 30 mg delayed release capsule  Self No No   Sig: Take 1 capsule (30 mg total) by mouth daily   HYDROmorphone (DILAUDID) 2 mg tablet   No No   Sig: Take 1-2 tablets (2-4 mg total) by mouth every 3 (three) hours as needed (moderate/severe cancer-related pain) Max Daily Amount: 32 mg   Lidocaine Viscous HCl (XYLOCAINE) 2 % mucosal solution   No No   Sig: Swish and spit 10 mL 4 (four) times a day as needed for mouth pain or discomfort   acetaminophen (TYLENOL) 325 mg tablet   No No   Sig: Take 2 tablets (650 mg total) by mouth every 6 (six) hours as needed for mild pain   al mag oxide-diphenhydramine-lidocaine viscous (MAGIC MOUTHWASH) 1:1:1 suspension   No No   Sig: Swish and spit 10 mL every 4 (four) hours as needed for mouth pain or discomfort   albuterol (PROVENTIL HFA,VENTOLIN HFA) 90 mcg/act inhaler  Self No No   Sig: Inhale 2 puffs every 4 (four) hours as needed for wheezing   benzonatate (TESSALON) 200 MG capsule  Self No No Sig: Take 1 capsule (200 mg total) by mouth 3 (three) times a day as needed for cough   calcium carbonate (TUMS) 500 mg chewable tablet  Self No No   Sig: Chew 1 tablet (500 mg total) 3 (three) times a day as needed for indigestion or heartburn   cephalexin (KEFLEX) 500 mg capsule   No No   Sig: Take 1 capsule (500 mg total) by mouth 4 (four) times a day for 10 days   dextromethorphan-guaiFENesin (ROBITUSSIN DM)  mg/5 mL syrup  Self No No   Sig: Take 10 mL by mouth every 4 (four) hours as needed for cough   dicyclomine (BENTYL) 10 mg capsule   No No   Sig: Take 1 capsule (10 mg total) by mouth every 6 (six) hours as needed (abdominal cramping)   diphenhydrAMINE (BENADRYL) 50 MG tablet  Self No No   Sig: Take 1 tablet (50 mg total) by mouth daily at bedtime as needed for itching or sleep   diphenoxylate-atropine (LOMOTIL) 2 5-0 025 mg per tablet  Self No No   Sig: Take 1 tablet by mouth 4 (four) times a day as needed for diarrhea   fluticasone (FLONASE) 50 mcg/act nasal spray  Self Yes No   Si sprays into each nostril daily   letrozole (FEMARA) 2 5 mg tablet   No No   Sig: TAKE 1 TABLET BY MOUTH EVERY DAY   melatonin 3 mg  Self No No   Sig: Take 2 tablets (6 mg total) by mouth daily at bedtime   metoclopramide (REGLAN) 10 mg tablet  Self No No   Sig: Take 1 tablet (10 mg total) by mouth 4 (four) times a day   multivitamin (THERAGRAN) TABS  Self Yes No   Sig: Take 1 tablet by mouth   ondansetron (ZOFRAN) 4 mg tablet  Self No No   Sig: Take 1 tablet (4 mg total) by mouth every 8 (eight) hours as needed for nausea or vomiting   oxybutynin (DITROPAN-XL) 5 mg 24 hr tablet  Self No No   Sig: Take 1 tablet (5 mg total) by mouth daily Take 1 tablet daily   palbociclib (Ibrance) 100 MG tablet   No No   Sig: Take 1 tablet (100 mg total) by mouth daily   Patient not taking: No sig reported   potassium chloride (K-DUR,KLOR-CON) 20 mEq tablet   No No   Sig: Take 1 tablet (20 mEq total) by mouth 2 (two) times a day for 5 days   rivaroxaban (Xarelto) 20 mg tablet   No No   Sig: Take 1 tablet (20 mg total) by mouth daily with breakfast   senna (SENOKOT) 8 6 MG tablet   No No   Sig: Take 1 tablet (8 6 mg total) by mouth daily at bedtime as needed for constipation      Facility-Administered Medications Last Administration Doses Remaining   cefTRIAXone (ROCEPHIN) injection 1,000 mg 6/20/2022  3:59 PM           Past Medical History:   Diagnosis Date    [facility] [address] [facility]     History of radiation exposure     Malignant neoplasm of right female breast ([facility] [address] ) 2012    right       Past Surgical History:   Procedure Laterality Date    BREAST CYST EXCISION      BREAST LUMPECTOMY Right 2012    HIP SURGERY      IR BIOPSY LIVER MASS  7/26/2021    IR PLEURAL EFFUSION LONG-TERM CATHETER PLACEMENT  8/17/2021    IR PLEURAL EFFUSION LONG-TERM CATHETER REMOVAL  5/11/2022    IR THORACENTESIS  7/26/2021       Family History   Problem Relation Age of Onset    Breast cancer Mother         in her 63's    Breast cancer Sister         inher 45s and 48    Colon cancer Maternal Grandmother     No Known Problems Paternal Grandmother     Breast cancer Other     No Known Problems Paternal Aunt      I have reviewed and agree with the history as documented  E-Cigarette/Vaping    E-Cigarette Use Never User      E-Cigarette/Vaping Substances    Nicotine No     THC No     CBD No     Flavoring No      Social History     Tobacco Use    Smoking status: Former Smoker     Packs/day: 0 25     Types: Cigarettes    Smokeless tobacco: Never Used   Vaping Use    Vaping Use: Never used   Substance Use Topics    Alcohol use: Not Currently    Drug use: Not Currently       Review of Systems   Constitutional: Positive for chills, fatigue and fever  HENT: Negative for congestion, sinus pressure, sinus pain and sore throat  Respiratory: Negative for apnea, cough, chest tightness and shortness of breath      Cardiovascular: Negative for chest pain, palpitations and leg swelling  Gastrointestinal: Negative for abdominal pain, constipation, diarrhea, nausea and vomiting  Genitourinary: Positive for dysuria and frequency  Negative for flank pain  Musculoskeletal: Negative for back pain, neck pain and neck stiffness  Skin: Positive for rash (L forearm cellulitis, healing  )  Negative for color change and wound  Cat scratch to L face   Allergic/Immunologic: Positive for immunocompromised state  Neurological: Negative for dizziness, syncope and headaches  Physical Exam  Physical Exam  Vitals reviewed  Constitutional:       General: She is not in acute distress  Appearance: She is well-developed  She is ill-appearing  She is not diaphoretic  HENT:      Head: Normocephalic and atraumatic  Eyes:      General: No scleral icterus  Right eye: No discharge  Left eye: No discharge  Conjunctiva/sclera: Conjunctivae normal       Pupils: Pupils are equal, round, and reactive to light  Neck:      Vascular: No JVD  Cardiovascular:      Rate and Rhythm: Normal rate and regular rhythm  Heart sounds: Normal heart sounds  No murmur heard  No friction rub  No gallop  Pulmonary:      Effort: Pulmonary effort is normal  No respiratory distress  Breath sounds: Normal breath sounds  No wheezing, rhonchi or rales  Chest:      Chest wall: No tenderness  Abdominal:      General: Bowel sounds are normal  There is no distension  Palpations: Abdomen is soft  Tenderness: There is no abdominal tenderness  There is no right CVA tenderness, left CVA tenderness or guarding  Musculoskeletal:         General: No tenderness or deformity  Normal range of motion  Cervical back: Normal range of motion and neck supple  No rigidity  Right lower leg: No edema  Left lower leg: No edema  Lymphadenopathy:      Cervical: No cervical adenopathy  Skin:     General: Skin is warm and dry  Coloration: Skin is not pale  Findings: Rash (L forearm, healing, does not appear infected, no streaking erythema  Cat scractch to L face, healing, no signs of infection ) present  No erythema  Neurological:      General: No focal deficit present  Mental Status: She is alert and oriented to person, place, and time  Cranial Nerves: No cranial nerve deficit     Psychiatric:         Behavior: Behavior normal          Vital Signs  ED Triage Vitals   Temperature Pulse Respirations Blood Pressure SpO2   06/25/22 0219 06/25/22 0219 06/25/22 0219 06/25/22 0219 06/25/22 0219   98 1 °F (36 7 °C) 98 18 103/69 95 %      Temp Source Heart Rate Source Patient Position - Orthostatic VS BP Location FiO2 (%)   06/25/22 0219 06/25/22 0219 06/25/22 0219 06/25/22 0219 --   Oral Monitor Sitting Left arm       Pain Score       06/25/22 0400       4           Vitals:    06/25/22 0219   BP: 103/69   Pulse: 98   Patient Position - Orthostatic VS: Sitting         Visual Acuity      ED Medications  Medications   ceftriaxone (ROCEPHIN) 1 g/50 mL in dextrose IVPB (1,000 mg Intravenous New Bag 6/25/22 0458)   sodium chloride 0 9 % bolus 1,000 mL (0 mL Intravenous Stopped 6/25/22 0423)   ketorolac (TORADOL) injection 15 mg (15 mg Intravenous Given 6/25/22 0400)   potassium chloride (K-DUR,KLOR-CON) CR tablet 40 mEq (40 mEq Oral Given 6/25/22 0453)       Diagnostic Studies  Results Reviewed     Procedure Component Value Units Date/Time    CBC and differential [[id number]]  (Abnormal) Collected: 06/25/22 0322    Lab Status: Final result Specimen: Blood from Arm, Left Updated: 06/25/22 0432     WBC 1 45 Thousand/uL      RBC 3 53 Million/uL      Hemoglobin 11 8 g/dL      Hematocrit 34 1 %      MCV 97 fL      MCH 33 4 pg      MCHC 34 6 g/dL      RDW 15 5 %      MPV 12 0 fL      Platelets 838 Thousands/uL     Manual Differential(PHLEBS Do Not Order) [[id number]]  (Abnormal) Collected: 06/25/22 0322    Lab Status: Final result Specimen: Blood from Arm, Left Updated: 06/25/22 0432     Segmented % 46 %      Lymphocytes % 52 %      Monocytes % 2 %      Eosinophils, % 0 %      Basophils % 0 %      Absolute Neutrophils 0 67 Thousand/uL      Lymphocytes Absolute 0 75 Thousand/uL      Monocytes Absolute 0 03 Thousand/uL      Eosinophils Absolute 0 00 Thousand/uL      Basophils Absolute 0 00 Thousand/uL      Total Counted --     Platelet Estimate Adequate    Procalcitonin [[id number]]  (Normal) Collected: 06/25/22 0322    Lab Status: Final result Specimen: Blood from Arm, Left Updated: 06/25/22 0417     Procalcitonin 0 19 ng/ml     Urine Microscopic [[id number]]  (Normal) Collected: 06/25/22 0359    Lab Status: Final result Specimen: Urine, Clean Catch Updated: 06/25/22 0415     RBC, UA None Seen /hpf      WBC, UA None Seen /hpf      Epithelial Cells None Seen /hpf      Bacteria, UA None Seen /hpf     COVID/FLU/RSV [[id number]]  (Normal) Collected: 06/25/22 0322    Lab Status: Final result Specimen: Nares from Nose Updated: 06/25/22 0414     SARS-CoV-2 Negative     INFLUENZA A PCR Negative     INFLUENZA B PCR Negative     RSV PCR Negative    Narrative:      FOR PEDIATRIC PATIENTS - copy/paste COVID Guidelines URL to browser: [email or web address]    SARS-CoV-2 assay is a Nucleic Acid Amplification assay intended for the  qualitative detection of nucleic acid from SARS-CoV-2 in nasopharyngeal  swabs  Results are for the presumptive identification of SARS-CoV-2 RNA  Positive results are indicative of infection with SARS-CoV-2, the virus  causing COVID-19, but do not rule out bacterial infection or co-infection  with other viruses  Laboratories within the [place] and its  territories are required to report all positive results to the appropriate  public health authorities  Negative results do not preclude SARS-CoV-2  infection and should not be used as the sole basis for treatment or other  patient management decisions   Negative results must be combined with  clinical observations, patient history, and epidemiological information  This test has not been FDA cleared or approved  This test has been authorized by FDA under an Emergency Use Authorization  (EUA)  This test is only authorized for the duration of time the  declaration that circumstances exist justifying the authorization of the  emergency use of an in vitro diagnostic tests for detection of SARS-CoV-2  virus and/or diagnosis of COVID-19 infection under section 564(b)(1) of  the Act, 21 U  S C  822WFL-5(H)(0), unless the authorization is terminated  or revoked sooner  The test has been validated but independent review by FDA  and CLIA is pending  Test performed using [employer] GeneXpert: This RT-PCR assay targets N2,  a region unique to SARS-CoV-2  A conserved region in the E-gene was chosen  for pan-Sarbecovirus detection which includes SARS-CoV-2      UA w Reflex to Microscopic w Reflex to Culture [[id number]]  (Abnormal) Collected: 06/25/22 0359    Lab Status: Final result Specimen: Urine, Clean Catch Updated: 06/25/22 0413     Color, UA Yellow     Clarity, UA Clear     Specific Gravity, UA <=1 005     pH, UA 6 5     Leukocytes, UA Negative     Nitrite, UA Negative     Protein, UA Negative mg/dl      Glucose, UA Negative mg/dl      Ketones, UA Negative mg/dl      Urobilinogen, UA 0 2 E U /dl      Bilirubin, UA Negative     Occult Blood, UA Trace-Intact    HS Troponin I 4hr [[id number]]     Lab Status: No result Specimen: Blood     Comprehensive metabolic panel [[id number]]  (Abnormal) Collected: 06/25/22 0322    Lab Status: Final result Specimen: Blood from Arm, Left Updated: 06/25/22 0403     Sodium 128 mmol/L      Potassium 3 3 mmol/L      Chloride 98 mmol/L      CO2 24 mmol/L      ANION GAP 6 mmol/L      BUN 7 mg/dL      Creatinine 0 84 mg/dL      Glucose 121 mg/dL      Calcium 8 8 mg/dL      Corrected Calcium 9 6 mg/dL      AST 32 U/L      ALT 19 U/L      Alkaline Phosphatase 44 U/L      Total Protein 6 4 g/dL      Albumin 3 0 g/dL      Total Bilirubin 0 72 mg/dL      eGFR 79 ml/min/1 73sq m     Narrative:      [place] guidelines for Chronic Kidney Disease (CKD):     Stage 1 with normal or high GFR (GFR > 90 mL/min/1 73 square meters)    Stage 2 Mild CKD (GFR = 60-89 mL/min/1 73 square meters)    Stage 3A Moderate CKD (GFR = 45-59 mL/min/1 73 square meters)    Stage 3B Moderate CKD (GFR = 30-44 mL/min/1 73 square meters)    Stage 4 Severe CKD (GFR = 15-29 mL/min/1 73 square meters)    Stage 5 End Stage CKD (GFR <15 mL/min/1 73 square meters)  Note: GFR calculation is accurate only with a steady state creatinine    HS Troponin 0hr (reflex protocol) [[id number]]  (Normal) Collected: 06/25/22 0322    Lab Status: Final result Specimen: Blood from Arm, Left Updated: 06/25/22 0401     hs TnI 0hr 4 ng/L     HS Troponin I 2hr [[id number]]     Lab Status: No result Specimen: Blood     Lactic acid, plasma [[id number]]  (Normal) Collected: 06/25/22 0322    Lab Status: Final result Specimen: Blood from Arm, Left Updated: 06/25/22 0357     LACTIC ACID 1 0 mmol/L     Narrative:      Result may be elevated if tourniquet was used during collection  Blood culture [[id number]] Collected: 06/25/22 0322    Lab Status: In process Specimen: Blood from Arm, Left Updated: 06/25/22 0332    Blood culture [[id number]] Collected: 06/25/22 0322    Lab Status:  In process Specimen: Blood from Hand, Left Updated: 06/25/22 0332                 XR chest 2 views   ED Interpretation by Mely Bowman DO (06/25 6175)   No acute cardiopulmonary abnormalities                 Procedures  ECG 12 Lead Documentation Only    Date/Time: 6/25/2022 3:00 AM  Performed by: Mely Bowman DO  Authorized by: Mely Bowman DO     Indications / Diagnosis:  Infection  ECG reviewed by me, the ED Provider: yes    Patient location:  ED  Previous ECG:     Previous ECG: Compared to current    Comparison ECG info:  Feb 2022    Similarity:  No change    Comparison to cardiac monitor: Yes    Interpretation:     Interpretation: normal    Rate:     ECG rate:  82    ECG rate assessment: normal    Rhythm:     Rhythm: sinus rhythm    Ectopy:     Ectopy: none    QRS:     QRS axis:  Normal    QRS intervals:  Normal  Conduction:     Conduction: normal    ST segments:     ST segments:  Normal  T waves:     T waves: normal               ED Course  ED Course as of 06/25/22 0459   Sat Jun 25, 2022   0432 WBC(!!): 1 45   0432 Sodium(!): 128  Getting IVF   0432 Potassium(!): 3 3                            Initial Sepsis Screening     Row Name 06/25/22 0446                Is the patient's history suggestive of a new or worsening infection? Yes (Proceed)  -VC        Suspected source of infection suspect infection, source unknown  -VC        Are two or more of the following signs & symptoms of infection both present and new to the patient? Yes (Proceed)  -VC        Indicate SIRS criteria Tachycardia > 90 bpm;Leukopenia (WBC < 4000 IJL)  -VC        If the answer is yes to both questions, suspicion of sepsis is present --        If severe sepsis is present AND tissue hypoperfusion perists in the hour after fluid resuscitation or lactate > 4, the patient meets criteria for SEPTIC SHOCK --        Are any of the following organ dysfunction criteria present within 6 hours of suspected infection and SIRS criteria that are NOT considered to be chronic conditions?  No  -VC        Organ dysfunction --        Date of presentation of severe sepsis --        Time of presentation of severe sepsis --        Tissue hypoperfusion persists in the hour after crystalloid fluid administration, evidenced, by either: --        Was hypotension present within one hour of the conclusion of crystalloid fluid administration? --        Date of presentation of septic shock --        Time of presentation of septic shock -- User Key  (r) = Recorded By, (t) = Taken By, (c) = Cosigned By    [address] Name Provider Type    Cora MICHAELA  68 , DO Physician                SBIRT 20yo+    Flowsheet Row Most Recent Value   SBIRT (23 yo +)    In order to provide better care to our patients, we are screening all of our patients for alcohol and drug use  Would it be okay to ask you these screening questions? Yes Filed at: 06/25/2022 0345   Initial Alcohol Screen: US AUDIT-C     1  How often do you have a drink containing alcohol? 0 Filed at: 06/25/2022 0345   2  How many drinks containing alcohol do you have on a typical day you are drinking? 0 Filed at: 06/25/2022 0345   3b  FEMALE Any Age, or MALE 65+: How often do you have 4 or more drinks on one occassion? 0 Filed at: 06/25/2022 0345   Audit-C Score 0 Filed at: 06/25/2022 0345   MIRIAN: How many times in the past year have you    Used an illegal drug or used a prescription medication for non-medical reasons? Never Filed at: 06/25/2022 0345                    MDM  Number of Diagnoses or Management Options  Fever  Sepsis ([facility] )  Diagnosis management comments: Fever, meets sepsis criteria  Source unknown  immunocompromised 2/2 chemo  Hyponatremia  Admission for IV ABX, lyte repletion, and treatment for suspected infection          Amount and/or Complexity of Data Reviewed  Clinical lab tests: ordered and reviewed  Tests in the radiology section of CPT®: ordered        Disposition  Final diagnoses:   Fever   Sepsis ([facility] )   Hyponatremia     Time reflects when diagnosis was documented in both MDM as applicable and the Disposition within this note     Time User Action Codes Description Comment    6/25/2022  4:46 AM Eula Mccormack Add [R50 9] Fever     6/25/2022  4:46 AM Eula Mccormack Add [A41 9] Sepsis ([facility] )     6/25/2022  4:59 AM Wilmer Mccormack Add [E87 1] Hyponatremia       ED Disposition     ED Disposition   Admit    Condition   Stable    Date/Time   Sat Huy 25, 2022  4:46 AM    Comment   Case was discussed with Alida Chang and the patient's admission status was agreed to be Admission Status: inpatient status to the service of Dr Graham Ashley   Follow-up Information    None         Patient's Medications   Discharge Prescriptions    No medications on file       No discharge procedures on file      PDMP Review       Value Time User    PDMP Reviewed  Yes 6/24/2022  4:20 PM Chuy Mcallister MD          ED Provider  Electronically Signed by           Liz Vizcarra DO  06/25/22 5883

## 2022-06-25 NOTE — ASSESSMENT & PLAN NOTE
· Follows with [facility] as an outpatient  · Currently receiving active chemotherapy  · Continue outpatient follow-up

## 2022-06-26 ENCOUNTER — APPOINTMENT (INPATIENT)
Dept: CT IMAGING | Facility: HOSPITAL | Age: 57
DRG: 660 | End: 2022-06-26
Payer: COMMERCIAL

## 2022-06-26 PROBLEM — J02.9 SORE THROAT: Status: ACTIVE | Noted: 2022-06-26

## 2022-06-26 PROBLEM — R50.81 FEBRILE NEUTROPENIA (HCC): Status: ACTIVE | Noted: 2022-06-25

## 2022-06-26 PROBLEM — D70.9 FEBRILE NEUTROPENIA (HCC): Status: ACTIVE | Noted: 2022-06-25

## 2022-06-26 LAB
ANION GAP SERPL CALCULATED.3IONS-SCNC: 11 MMOL/L (ref 4–13)
ANION GAP SERPL CALCULATED.3IONS-SCNC: 6 MMOL/L (ref 4–13)
ANION GAP SERPL CALCULATED.3IONS-SCNC: 7 MMOL/L (ref 4–13)
BASOPHILS # BLD AUTO: 0.03 THOUSANDS/ΜL (ref 0–0.1)
BASOPHILS NFR BLD AUTO: 1 % (ref 0–1)
BUN SERPL-MCNC: 5 MG/DL (ref 5–25)
BUN SERPL-MCNC: 6 MG/DL (ref 5–25)
BUN SERPL-MCNC: 6 MG/DL (ref 5–25)
CALCIUM SERPL-MCNC: 7.4 MG/DL (ref 8.3–10.1)
CALCIUM SERPL-MCNC: 8 MG/DL (ref 8.3–10.1)
CALCIUM SERPL-MCNC: 8.3 MG/DL (ref 8.3–10.1)
CHLORIDE SERPL-SCNC: 101 MMOL/L (ref 100–108)
CHLORIDE SERPL-SCNC: 104 MMOL/L (ref 100–108)
CHLORIDE SERPL-SCNC: 105 MMOL/L (ref 100–108)
CO2 SERPL-SCNC: 22 MMOL/L (ref 21–32)
CO2 SERPL-SCNC: 25 MMOL/L (ref 21–32)
CO2 SERPL-SCNC: 26 MMOL/L (ref 21–32)
CREAT SERPL-MCNC: 0.74 MG/DL (ref 0.6–1.3)
CREAT SERPL-MCNC: 0.79 MG/DL (ref 0.6–1.3)
CREAT SERPL-MCNC: 0.87 MG/DL (ref 0.6–1.3)
EOSINOPHIL # BLD AUTO: 0 THOUSAND/ΜL (ref 0–0.61)
EOSINOPHIL NFR BLD AUTO: 0 % (ref 0–6)
ERYTHROCYTE [DISTWIDTH] IN BLOOD BY AUTOMATED COUNT: 15.4 % (ref 11.6–15.1)
GFR SERPL CREATININE-BSD FRML MDRD: 75 ML/MIN/1.73SQ M
GFR SERPL CREATININE-BSD FRML MDRD: 85 ML/MIN/1.73SQ M
GFR SERPL CREATININE-BSD FRML MDRD: 92 ML/MIN/1.73SQ M
GLUCOSE SERPL-MCNC: 101 MG/DL (ref 65–140)
GLUCOSE SERPL-MCNC: 104 MG/DL (ref 65–140)
GLUCOSE SERPL-MCNC: 131 MG/DL (ref 65–140)
HCT VFR BLD AUTO: 31.1 % (ref 34.8–46.1)
HGB BLD-MCNC: 10.3 G/DL (ref 11.5–15.4)
IMM GRANULOCYTES # BLD AUTO: 0.03 THOUSAND/UL (ref 0–0.2)
IMM GRANULOCYTES NFR BLD AUTO: 1 % (ref 0–2)
LYMPHOCYTES # BLD AUTO: 0.6 THOUSANDS/ΜL (ref 0.6–4.47)
LYMPHOCYTES NFR BLD AUTO: 18 % (ref 14–44)
MCH RBC QN AUTO: 32.5 PG (ref 26.8–34.3)
MCHC RBC AUTO-ENTMCNC: 33.1 G/DL (ref 31.4–37.4)
MCV RBC AUTO: 98 FL (ref 82–98)
MONOCYTES # BLD AUTO: 0.93 THOUSAND/ΜL (ref 0.17–1.22)
MONOCYTES NFR BLD AUTO: 28 % (ref 4–12)
NEUTROPHILS # BLD AUTO: 1.72 THOUSANDS/ΜL (ref 1.85–7.62)
NEUTS SEG NFR BLD AUTO: 52 % (ref 43–75)
NRBC BLD AUTO-RTO: 0 /100 WBCS
PLATELET # BLD AUTO: 142 THOUSANDS/UL (ref 149–390)
PMV BLD AUTO: 11.7 FL (ref 8.9–12.7)
POTASSIUM SERPL-SCNC: 3.4 MMOL/L (ref 3.5–5.3)
POTASSIUM SERPL-SCNC: 3.8 MMOL/L (ref 3.5–5.3)
POTASSIUM SERPL-SCNC: 3.8 MMOL/L (ref 3.5–5.3)
PROCALCITONIN SERPL-MCNC: 0.2 NG/ML
RBC # BLD AUTO: 3.17 MILLION/UL (ref 3.81–5.12)
SODIUM SERPL-SCNC: 134 MMOL/L (ref 136–145)
SODIUM SERPL-SCNC: 136 MMOL/L (ref 136–145)
SODIUM SERPL-SCNC: 137 MMOL/L (ref 136–145)
VANCOMYCIN TROUGH SERPL-MCNC: 11.5 UG/ML (ref 10–20)
WBC # BLD AUTO: 3.31 THOUSAND/UL (ref 4.31–10.16)

## 2022-06-26 PROCEDURE — C9113 INJ PANTOPRAZOLE SODIUM, VIA: HCPCS | Performed by: INTERNAL MEDICINE

## 2022-06-26 PROCEDURE — 87040 BLOOD CULTURE FOR BACTERIA: CPT | Performed by: INTERNAL MEDICINE

## 2022-06-26 PROCEDURE — 99232 SBSQ HOSP IP/OBS MODERATE 35: CPT | Performed by: INTERNAL MEDICINE

## 2022-06-26 PROCEDURE — 80202 ASSAY OF VANCOMYCIN: CPT | Performed by: INTERNAL MEDICINE

## 2022-06-26 PROCEDURE — 85025 COMPLETE CBC W/AUTO DIFF WBC: CPT | Performed by: INTERNAL MEDICINE

## 2022-06-26 PROCEDURE — 80048 BASIC METABOLIC PNL TOTAL CA: CPT | Performed by: INTERNAL MEDICINE

## 2022-06-26 PROCEDURE — 84145 PROCALCITONIN (PCT): CPT

## 2022-06-26 PROCEDURE — 99233 SBSQ HOSP IP/OBS HIGH 50: CPT | Performed by: INTERNAL MEDICINE

## 2022-06-26 RX ORDER — POLYETHYLENE GLYCOL 3350 17 G/17G
17 POWDER, FOR SOLUTION ORAL DAILY
Status: DISCONTINUED | OUTPATIENT
Start: 2022-06-26 | End: 2022-06-29 | Stop reason: HOSPADM

## 2022-06-26 RX ADMIN — CEFEPIME HYDROCHLORIDE 2000 MG: 2 INJECTION, POWDER, FOR SOLUTION INTRAVENOUS at 17:13

## 2022-06-26 RX ADMIN — DULOXETINE HYDROCHLORIDE 30 MG: 30 CAPSULE, DELAYED RELEASE ORAL at 09:47

## 2022-06-26 RX ADMIN — SODIUM CHLORIDE, SODIUM GLUCONATE, SODIUM ACETATE, POTASSIUM CHLORIDE, MAGNESIUM CHLORIDE, SODIUM PHOSPHATE, DIBASIC, AND POTASSIUM PHOSPHATE 125 ML/HR: .53; .5; .37; .037; .03; .012; .00082 INJECTION, SOLUTION INTRAVENOUS at 03:19

## 2022-06-26 RX ADMIN — CEFEPIME HYDROCHLORIDE 2000 MG: 2 INJECTION, POWDER, FOR SOLUTION INTRAVENOUS at 12:36

## 2022-06-26 RX ADMIN — VANCOMYCIN HYDROCHLORIDE 1000 MG: 1 INJECTION, SOLUTION INTRAVENOUS at 09:52

## 2022-06-26 RX ADMIN — TBO-FILGRASTIM 480 MCG: 480 INJECTION, SOLUTION SUBCUTANEOUS at 09:48

## 2022-06-26 RX ADMIN — LETROZOLE 2.5 MG: 2.5 TABLET, FILM COATED ORAL at 09:48

## 2022-06-26 RX ADMIN — NYSTATIN 500000 UNITS: 100000 SUSPENSION ORAL at 22:34

## 2022-06-26 RX ADMIN — VANCOMYCIN HYDROCHLORIDE 1000 MG: 1 INJECTION, SOLUTION INTRAVENOUS at 22:50

## 2022-06-26 RX ADMIN — NYSTATIN 500000 UNITS: 100000 SUSPENSION ORAL at 09:47

## 2022-06-26 RX ADMIN — NYSTATIN 500000 UNITS: 100000 SUSPENSION ORAL at 17:13

## 2022-06-26 RX ADMIN — HYDROMORPHONE HYDROCHLORIDE 4 MG: 4 TABLET ORAL at 11:06

## 2022-06-26 RX ADMIN — PANTOPRAZOLE SODIUM 40 MG: 40 INJECTION, POWDER, FOR SOLUTION INTRAVENOUS at 09:47

## 2022-06-26 RX ADMIN — CEFEPIME HYDROCHLORIDE 2000 MG: 2 INJECTION, POWDER, FOR SOLUTION INTRAVENOUS at 03:19

## 2022-06-26 RX ADMIN — POLYETHYLENE GLYCOL 3350 17 G: 17 POWDER, FOR SOLUTION ORAL at 09:47

## 2022-06-26 RX ADMIN — HYDROMORPHONE HYDROCHLORIDE 4 MG: 4 TABLET ORAL at 17:13

## 2022-06-26 RX ADMIN — OXYBUTYNIN CHLORIDE 5 MG: 5 TABLET, EXTENDED RELEASE ORAL at 09:47

## 2022-06-26 RX ADMIN — RIVAROXABAN 20 MG: 20 TABLET, FILM COATED ORAL at 09:47

## 2022-06-26 RX ADMIN — HYDROMORPHONE HYDROCHLORIDE 4 MG: 4 TABLET ORAL at 22:34

## 2022-06-26 RX ADMIN — Medication 6 MG: at 22:34

## 2022-06-26 NOTE — ASSESSMENT & PLAN NOTE
· Follows with Dr Yen Ceja as outpatient for metastatic breast cancer   · Prescribed outpatient Dilaudid 2-4mg q3h prn moderate/severe pain  · PDMP reviewed; continue home dilaudid   · Palliative care consulted  · Continue outpatient f/u

## 2022-06-26 NOTE — PROGRESS NOTES
[facility]  Progress Note - Jarome Ripper 1965, 49 y o  female MRN: [id number]  Unit/Bed#: -01 Encounter: [id number]  Primary Care Provider: Meenu Ferrera MD   Date and time admitted to hospital: 6/25/2022  2:14 AM    * Febrile neutropenia [facility])  Assessment & Plan  · Patient with fever of 101 4 last night  · ANC count increased after receiving Granix  · Will give Granix dose today and likely hold tomorrow given improvement  · Will discuss with Oncology  · CXR without acute process  · CT chest abdomen pelvis ordered  · Continue cefepime and vancomycin at this time  · 6/25 Blood cultures x2 no growth at 1 day  · 6/26 repeat blood cultures drawn  · Infectious disease following  · Continue to monitor    Sore throat  Assessment & Plan  · Patient complaining of slight pain or sore throat with swallowing  · Will start nystatin swish and swallow given immunocompromised state  · Continue to monitor    Hypokalemia  Assessment & Plan  · Resolved    Hyponatremia  Assessment & Plan  · Sodium stable currently  · Likely from polydipsia  · Discussion had with patient by Nephrology team  · Continue to monitor    Primary malignant neoplasm of right breast with metastasis to other site [facility])  Assessment & Plan  · Follows with [facility] as an outpatient  · Currently receiving active chemotherapy  · Continue outpatient follow-up    Palliative care patient  Assessment & Plan  · Follows with Dr Sarah Pgae as outpatient for metastatic breast cancer   · Prescribed outpatient Dilaudid 2-4mg q3h prn moderate/severe pain  · PDMP reviewed; continue home dilaudid   · Will place consult for palliative care tomorrow  · Continue outpatient f/u    Hypertension  Assessment & Plan  · Blood pressure currently well controlled  · Monitor BP per unit protocol        VTE Pharmacologic Prophylaxis: VTE Score: 6 High Risk (Score >/= 5) - Pharmacological DVT Prophylaxis Ordered: rivaroxaban (Xarelto)  Sequential Compression Devices Ordered  Patient Centered Rounds: I performed bedside rounds with nursing staff today  Discussions with Specialists or Other Care Team Provider:  Infectious Disease, Nephrology    Education and Discussions with Family / Patient: Attempted to update  () via phone  Left voicemail  Time Spent for Care: 30 minutes  More than 50% of total time spent on counseling and coordination of care as described above  Current Length of Stay: 1 day(s)  Current Patient Status: Inpatient   Certification Statement: The patient will continue to require additional inpatient hospital stay due to Febrile neutropenia  Discharge Plan: Anticipate discharge in 48-72 hrs to home  Code Status: Level 1 - Full Code    Subjective:   Patient resting comfortably on examination  Patient was complaining of slight sore throat on exam this morning  Patient still feels overall read down  Patient had no other complaints on exam     Objective:     Vitals:   Temp (24hrs), Av 6 °F (37 6 °C), Min:98 2 °F (36 8 °C), Max:101 4 °F (38 6 °C)    Temp:  [98 2 °F (36 8 °C)-101 4 °F (38 6 °C)] 99 2 °F (37 3 °C)  HR:  [] 101  Resp:  [16-19] 16  BP: ()/(60-74) 91/60  SpO2:  [89 %-95 %] 93 %  Body mass index is 24 21 kg/m²  Input and Output Summary (last 24 hours): Intake/Output Summary (Last 24 hours) at 2022 1504  Last data filed at 2022 1106  Gross per 24 hour   Intake 240 ml   Output --   Net 240 ml       Physical Exam:   Physical Exam  Vitals and nursing note reviewed  Constitutional:       General: She is not in acute distress  Appearance: She is well-developed  Comments: Chronically ill-appearing   HENT:      Head: Normocephalic and atraumatic  Eyes:      General: No scleral icterus  Conjunctiva/sclera: Conjunctivae normal    Cardiovascular:      Rate and Rhythm: Normal rate and regular rhythm  Heart sounds: Normal heart sounds   No murmur heard     No friction rub  No gallop  Pulmonary:      Effort: Pulmonary effort is normal  No respiratory distress  Breath sounds: Normal breath sounds  No wheezing or rales  Abdominal:      General: Bowel sounds are normal  There is no distension  Palpations: Abdomen is soft  Tenderness: There is no abdominal tenderness  Musculoskeletal:         General: Normal range of motion  Skin:     General: Skin is warm  Findings: No rash  Neurological:      Mental Status: She is alert and oriented to person, place, and time  Additional Data:     Labs:  Results from last 7 days   Lab Units 06/26/22  0458   WBC Thousand/uL 3 31*   HEMOGLOBIN g/dL 10 3*   HEMATOCRIT % 31 1*   PLATELETS Thousands/uL 142*   NEUTROS PCT % 52   LYMPHS PCT % 18   MONOS PCT % 28*   EOS PCT % 0     Results from last 7 days   Lab Units 06/26/22 0458 06/25/22  0626 06/25/22  0322   SODIUM mmol/L 137   < > 128*   POTASSIUM mmol/L 3 8   < > 3 3*   CHLORIDE mmol/L 105   < > 98*   CO2 mmol/L 25   < > 24   BUN mg/dL 6   < > 7   CREATININE mg/dL 0 79   < > 0 84   ANION GAP mmol/L 7   < > 6   CALCIUM mg/dL 8 0*   < > 8 8   ALBUMIN g/dL  --   --  3 0*   TOTAL BILIRUBIN mg/dL  --   --  0 72   ALK PHOS U/L  --   --  44*   ALT U/L  --   --  19   AST U/L  --   --  32   GLUCOSE RANDOM mg/dL 104   < > 121    < > = values in this interval not displayed  Results from last 7 days   Lab Units 06/26/22 0458 06/25/22  0322   LACTIC ACID mmol/L  --  1 0   PROCALCITONIN ng/ml 0 20 0 19       Lines/Drains:  Invasive Devices  Report    Peripheral Intravenous Line  Duration           Peripheral IV 06/25/22 Left Hand 1 day    Peripheral IV 06/25/22 Left Wrist 1 day          Drain  Duration           Closed/Suction Drain Right Back Other (Comment) 313 days                      Imaging: No pertinent imaging reviewed      Recent Cultures (last 7 days):   Results from last 7 days   Lab Units 06/25/22  0322   BLOOD CULTURE  No Growth at 24 hrs  No Growth at 24 hrs  Last 24 Hours Medication List:   Current Facility-Administered Medications   Medication Dose Route Frequency Provider Last Rate    acetaminophen  650 mg Oral Q6H PRN Fabrice Borrego PA-C      al mag oxide-diphenhydramine-lidocaine viscous  10 mL Swish & Spit Q4H PRN [place]      albuterol  2 puff Inhalation Q4H PRN [place]      calcium carbonate  500 mg Oral TID PRN Fabrice Borrego PA-C      cefepime  2,000 mg Intravenous Q8H Nagi Peralta, DO 2,000 mg (06/26/22 1236)    dicyclomine  10 mg Oral Q6H PRN Fabrice Borrego PA-C      diphenhydrAM[facility]  50 [facility] Oral HS [facility]      DULoxetine  30 mg Oral Daily Fabrice Regina Borrego      fluticasone  2 spray Nasal Daily [place]      HYDROmorphone  2 mg Oral Q3H PRN [facility]      [facility]  [facility]      ketorolac  15 mg Intravenous Q6H PRN [facility]      letrozole  2 5 mg Oral Daily [place]      melatonin  6 mg Oral HS [place]      nystatin  500,000 Units Swish & Swallow 4x Daily Nagi Peralta DO      ondansetron  4 mg Intravenous Q6H [facility]N [facility]      oxybutynin  5 mg Oral Daily [place]      pantoprazole  40 mg Intravenous Q24H [facility] Nagi Peralta DO      polyethylene glycol  17 g Oral Daily aNgi Peralta DO      rivaroxaban  20 mg Oral Daily With Breakfast Fabrice Borrego PA-C      senna  1 tablet Oral HS PRN [facility]      tbo-filgrastim  480 mcg Subcutaneous Daily Nagi Peralta DO      vancomycin  15 mg/kg Intravenous Q12H Nagi Peralta DO 1,000 mg (06/26/22 4527)        Today, Patient Was Seen By: Nagi Patella, DO    **Please Note: This note may have been constructed using a voice recognition system  **

## 2022-06-26 NOTE — UTILIZATION REVIEW
Initial Clinical Review    Admission: Date/Time/Statement:   Admission Orders (From admission, onward)     Ordered        06/25/22 0446  Inpatient Admission  Once                      Orders Placed This Encounter   Procedures    Inpatient Admission     Standing Status:   Standing     Number of Occurrences:   1     Order Specific Question:   Level of Care     Answer:   Med Surg [16]     Order Specific Question:   Estimated length of stay     Answer:   More than 2 Midnights     Order Specific Question:   Certification     Answer:   I certify that inpatient services are medically necessary for this patient for a duration of greater than two midnights  See H&P and MD Progress Notes for additional information about the patient's course of treatment  ED Arrival Information     Expected   -    Arrival   6/25/2022 02:06    Acuity   Urgent            Means of arrival   Walk-In    Escorted by   Spouse    Service   Hospitalist    Admission type   Urgent            Arrival complaint   FEVER           Chief Complaint   Patient presents with    Fever - 9 weeks to 74 years     Pt was advised by on call oncology to be seen at ED for 103 fever at 22:30  Pt took 1000 mg acetaminophen at 22:30  Initial Presentation: 49 y o  female presents to ED from home with fevers, chills and malaise for past several days  Temp 103  The day of admission  Recently  Treated with po antibiotics  For  Arm cellulitis  Has urinary frequency/urgency and occasional abdominal discomfort, recent cat scratch to eyebrow  PMH  Is  Metastatic breast cancer to bone, currently on chemo  and HTN  Labs  Reveal sodium  128, K  3 3,  WBC  1 45  Met Sepsis criteria with WBC, temp,  Heart rate  98  CXR  NAD  U/A  Negative  Admit  Ip with Sepsis, possible infectious source left forearm cellulitis, hypokalemia and hyponatremia and plan is  ROSA MARIA, monitor labs, follow  Up cultures, replace electrolytes, S/P  1l  IVF and monitor BP       ID consult  ( 6/25) Switching ROSA MARIA  To cefepime  Cultures pending  Adding  Vanco   Monitor   Left forearm site closely  Trend fevers  Nephrology consult ( 6/25)   Hyponatremia improved  Corrected with  NS  Monitor  Sodium closely  Patient getting hypotonic solution because of rapid correction  Date:    6/26       Day 2:   Continue current ROSA MARIA  Waiting repeat blood cultures due to fever  6/25  Blood cultures thus far NG  Admits to no BM in 1 week  Now agreeable to imaging, repeat cultures  Has lower abdominal discomfort  Still with low grade temp        ED Triage Vitals   Temperature Pulse Respirations Blood Pressure SpO2   06/25/22 0219 06/25/22 0219 06/25/22 0219 06/25/22 0219 06/25/22 0219   98 1 °F (36 7 °C) 98 18 103/69 95 %      Temp Source Heart Rate Source Patient Position - Orthostatic VS BP Location FiO2 (%)   06/25/22 0219 06/25/22 0219 06/25/22 0219 06/25/22 0219 --   Oral Monitor Sitting Left arm       Pain Score       06/25/22 0400       4          Wt Readings from Last 1 Encounters:   06/25/22 66 kg (145 lb 8 1 oz)     Additional Vital Signs:   -- -- 16 91/60 70 -- -- --    06/26/22 0500 -- -- -- -- -- 93 % -- --   06/26/22 04:19:15 99 2 °F (37 3 °C) 101 -- -- -- 93 % -- --   06/26/22 0300 -- 98 -- -- -- 89 % Abnormal  -- --   06/25/22 22:19:01 101 4 °F (38 6 °C) Abnormal  92 16 104/64 77 95 % -- Lying   06/25/22 1607 -- -- -- -- -- -- None (Room air) --   06/25/22 15:25:34 98 2 °F (36 8 °C) 86 19 120/74 89 95 % -- --   06/25/22 14:42:36 98 4 °F (36 9 °C) 78 16 127/69 88 97 % -- --   06/25/22 1345 -- 75 18 124/75 -- 96 % None (Room air) Lying   06/25/22 1228 -- 74 18 125/62 -- 96 % None (Room air) Lying   06/25/22 1015 -- 71 18 110/68 -- 98 % None (Room air) Lying   06/25/22 0732 -- 67 18 99/60 -- 97 % None (Room air) Lying   06/25/22 0520 97 8 °F (36 6 °C) 67 18 106/70 -- 95 % None (Room air) Sitting   06/25/22 0227 -- -- -- -- -- -- None (Room air) --   06/25/22 0219 98 1 °F (36 7 °C) 98 18 103/69 -- 95 % None (Room air) Sitting       Pertinent Labs/Diagnostic Test Results:   XR chest 2 views   ED Interpretation by Mela Archibald DO (06/25 0469)   No acute cardiopulmonary abnormalities      Final Result by Roberto Ruggiero MD (06/25 1049)      Small right pleural effusion        No evidence of acute abnormality in the chest                   Workstation performed: [id number]         CT chest abdomen pelvis wo contrast    (Results Pending)     Results from last 7 days   Lab Units 06/25/22  0322   SARS-COV-2  Negative     Results from last 7 days   Lab Units 06/26/22  0458 06/25/22  0626 06/25/22  0322   WBC Thousand/uL 3 31* 1 28* 1 45*   HEMOGLOBIN g/dL 10 3* 10 2* 11 8   HEMATOCRIT % 31 1* 31 0* 34 1*   PLATELETS Thousands/uL 142* 147* 168   NEUTROS ABS Thousands/µL 1 72*  --   --          Results from last 7 days   Lab Units 06/26/22  0458 06/26/22  0001 06/25/22  1601 06/25/22  0626 06/25/22  0322   SODIUM mmol/L 137 134* 140 140 128*   POTASSIUM mmol/L 3 8 3 8 3 6 3 3* 3 3*   CHLORIDE mmol/L 105 101 102 106 98*   CO2 mmol/L 25 22 24 23 24   ANION GAP mmol/L 7 11 14* 11 6   BUN mg/dL 6 5 6 8 7   CREATININE mg/dL 0 79 0 74 0 88 0 76 0 84   EGFR ml/min/1 73sq m 85 92 74 89 79   CALCIUM mg/dL 8 0* 7 4* 8 4 8 2* 8 8     Results from last 7 days   Lab Units 06/25/22  0322   AST U/L 32   ALT U/L 19   ALK PHOS U/L 44*   TOTAL PROTEIN g/dL 6 4   ALBUMIN g/dL 3 0*   TOTAL BILIRUBIN mg/dL 0 72         Results from last 7 days   Lab Units 06/26/22  0458 06/26/22  0001 06/25/22  1601 06/25/22  0626 06/25/22  0322   GLUCOSE RANDOM mg/dL 104 101 105 113 121           Results from last 7 days   Lab Units 06/25/22  0824 06/25/22  0519 06/25/22  0322   HS TNI 0HR ng/L  --   --  4   HS TNI 2HR ng/L  --  4  --    HSTNI D2 ng/L  --  0  --    HS TNI 4HR ng/L 4  --   --    HSTNI D4 ng/L 0  --   --                  Results from last 7 days   Lab Units 06/26/22  0458 06/25/22  0322   PROCALCITONIN ng/ml 0 20 0 19     Results from last 7 days   Lab Units 06/25/22  0322   LACTIC ACID mmol/L 1 0                                     Results from last 7 days   Lab Units 06/25/22  0359   OSMO UR mmol/KG 86*     Results from last 7 days   Lab Units 06/25/22  0359   CLARITY UA  Clear   COLOR UA  Yellow   SPEC GRAV UA  <=1 005   PH UA  6 5   GLUCOSE UA mg/dl Negative   KETONES UA mg/dl Negative   BLOOD UA  Trace-Intact*   PROTEIN UA mg/dl Negative   NITRITE UA  Negative   BILIRUBIN UA  Negative   UROBILINOGEN UA E U /dl 0 2   LEUKOCYTES UA  Negative   WBC UA /hpf None Seen   RBC UA /hpf None Seen   BACTERIA UA /hpf None Seen   EPITHELIAL CELLS WET PREP /hpf None Seen   SODIUM UR  11     Results from last 7 days   Lab Units 06/25/22  0322   INFLUENZA A PCR  Negative   INFLUENZA B PCR  Negative   RSV PCR  Negative                             Results from last 7 days   Lab Units 06/25/22  0322   BLOOD CULTURE  No Growth at 24 hrs  No Growth at 24 hrs                 ED Treatment:   Medication Administration from 06/25/2022 0206 to 06/25/2022 1439       Date/Time Order Dose Route Action Comments     06/25/2022 0423 sodium chloride 0 9 % bolus 1,000 mL 0 mL Intravenous Stopped      06/25/2022 0323 sodium chloride 0 9 % bolus 1,000 mL 1,000 mL Intravenous New Bag      06/25/2022 0400 ketorolac (TORADOL) injection 15 mg 15 mg Intravenous Given      06/25/2022 0453 potassium chloride (K-DUR,KLOR-CON) CR tablet 40 mEq 40 mEq Oral Given      06/25/2022 0528 ceftriaxone (ROCEPHIN) 1 g/50 mL in dextrose IVPB 0 mg Intravenous Stopped      06/25/2022 0458 ceftriaxone (ROCEPHIN) 1 g/50 mL in dextrose IVPB 1,000 mg Intravenous New Bag      06/25/2022 0814 DULoxetine (CYMBALTA) delayed release capsule 30 mg 30 mg Oral Given      06/25/2022 0815 fluticasone (FLONASE) 50 mcg/act nasal spray 2 spray 2 spray Nasal Given      06/25/2022 0814 letrozole [facility]) tablet 2 5 mg 2 5 mg Oral Given      06/25/2022 0814 oxybutynin (DITROPAN-XL) 24 hr tablet 5 mg 5 mg Oral Given      06/25/2022 0943 potassium chloride (K-DUR,KLOR-CON) CR tablet 20 mEq   Oral Canceled Entry      06/25/2022 0815 rivaroxaban (XARELTO) tablet 20 mg 20 mg Oral Given      06/25/2022 1338 multi-electrolyte (PLASMALYTE-A/ISOLYTE-S PH 7 4) IV solution 125 mL/hr Intravenous New Bag      06/25/2022 0628 multi-electrolyte (PLASMALYTE-A/ISOLYTE-S PH 7 4) IV solution 125 mL/hr Intravenous New Bag      06/25/2022 1159 HYDROmorphone (DILAUDID) tablet 4 mg 4 mg Oral Given      06/25/2022 0833 HYDROmorphone (DILAUDID) tablet 4 mg 4 mg Oral Given      06/25/2022 0947 cefepime (MAXIPIME) 2 g/50 mL dextrose IVPB 2,000 mg Intravenous New Bag      06/25/2022 0946 dextrose 5 % bolus 500 mL 500 mL Intravenous New Bag      06/25/2022 1114 vancomycin (VANCOCIN) IVPB (premix in dextrose) 1,000 mg 200 mL 1,000 mg Intravenous New Bag      06/25/2022 1159 tbo-filgrastim (GRANIX) subcutaneous injection 480 mcg 480 mcg Subcutaneous Given      06/25/2022 1313 pantoprazole (PROTONIX) injection 40 mg 40 mg Intravenous Given         Present on Admission:   Primary malignant neoplasm of right breast with metastasis to other site ([place] [address] )   Hypertension      Admitting Diagnosis: Hyponatremia [E87 1]  Febrile neutropenia ([place] [address] ) [D70 9, R50 81]  Fever [R50 9]  Sepsis ([place] [address] ) [A41 9]  Age/Sex: 49 y o  female  Admission Orders:  Scheduled Medications:  cefepime, 2,000 mg, Intravenous, Q8H  DULoxetine, 30 mg, Oral, Daily  fluticasone, 2 spray, Nasal, Daily  letrozole, 2 5 mg, Oral, Daily  melatonin, 6 mg, Oral, HS  nystatin, 500,000 Units, Swish & Swallow, 4x Daily  oxybutynin, 5 mg, Oral, Daily  pantoprazole, 40 mg, Intravenous, Q24H ARNOLDO  polyethylene glycol, 17 g, Oral, Daily  rivaroxaban, 20 mg, Oral, Daily With Breakfast  tbo-filgrastim, 480 mcg, Subcutaneous, Daily  vancomycin, 15 mg/kg, Intravenous, Q12H      Continuous IV Infusions:     PRN Meds:  acetaminophen, 650 mg, Oral, Q6H PRN  al mag oxide-diphenhydramine-lidocaine viscous, 10 mL, Swish & Spit, Q4H PRN  albuterol, 2 puff, Inhalation, Q4H PRN  calcium carbonate, 500 mg, Oral, TID PRN  dicyclomine, 10 mg, Oral, Q6H PRN  diphenhydrAMINE, 50 mg, Oral, HS PRN  HYDROmorphone, 2 mg, Oral, Q3H PRN  HYDROmorphone, 4 mg, Oral, Q3H PRN  ketorolac, 15 mg, Intravenous, Q6H PRN  ondansetron, 4 mg, Intravenous, Q6H PRN  senna, 1 tablet, Oral, HS PRN        IP CONSULT TO INFECTIOUS DISEASES  IP CONSULT TO NEPHROLOGY  IP CONSULT TO PHARMACY  IP CONSULT TO PHARMACY    Network Utilization Review Department  ATTENTION: Please call with any questions or concerns to [phone] and carefully listen to the prompts so that you are directed to the right person  All voicemails are confidential   Tere He all requests for admission clinical reviews, approved or denied determinations and any other requests to dedicated fax number below belonging to the campus where the patient is receiving treatment   List of dedicated fax numbers for the Facilities:  [address] DENIALS (Administrative/Medical Necessity) [phone]   [address] (Maternity/NICU/Pediatrics) [phone]   [address]  [address] [facility] [place] [address] [place] [address] [address] [phone]

## 2022-06-26 NOTE — ASSESSMENT & PLAN NOTE
· [facility]s with [facility] as an outpatient  · Currently receiving active chemotherapy  · Continue outpatient follow-up

## 2022-06-26 NOTE — PROGRESS NOTES
NEPHROLOGY PROGRESS NOTE    Patient: Pratik Olivas               Sex: female          DOA: 6/25/2022  2:14 AM   YOB: 1965        Age:  49 y o         LOS:  LOS: 1 day       HPI     Patient with metastatic breast cancer admitted with hyponatremia and fever    SUBJECTIVE     Patient seems to doing better    No chest pain no palpitation shortness of breath    Still has a fever of low-grade    No other complaint    CURRENT MEDICATIONS       Current Facility-Administered Medications:     acetaminophen (TYLENOL) tablet 650 mg, 650 mg, Oral, Q6H PRN, April Nolan-Málgraham 84, PA-C    al mag oxide-diphenhydramine-lidocaine viscous (MAGIC MOUTHWASH) suspension 10 mL, 10 mL, Swish & Spit, Q4H PRN, Nino Olsen PA-C    albuterol (PROVENTIL HFA,VENTOLIN HFA) inhaler 2 puff, 2 puff, Inhalation, Q4H PRN, Nino Olsen PA-C    calcium carbonate (TUMS) chewable tablet 500 mg, 500 mg, Oral, TID PRN, April Nolan-Málgraham 82, PA-C, 500 mg at 06/25/22 2141    cefepime (MAXIPIME) 2 g/50 mL dextrose IVPB, 2,000 mg, Intravenous, Q8H, Lisa Alcala DO, Last Rate: 100 mL/hr at 06/26/22 0319, 2,000 mg at 06/26/22 0319    dicyclomine (BENTYL) capsule 10 mg, 10 mg, Oral, Q6H PRN, DAYDAY Deras-JOAQUÍN, 10 mg at 06/25/22 2141    diphenhydrAMINE (BENADRYL) tablet 50 mg, 50 mg, Oral, HS PRN, Nino Olsen PA-C    DULoxetine (CYMBALTA) delayed release capsule 30 mg, 30 mg, Oral, Daily, April Nolan-Jane Oconnor, PA-C, 30 mg at 06/26/22 0947    fluticasone (FLONASE) 50 mcg/act nasal spray 2 spray, 2 spray, Nasal, Daily, April Nolan-Málgraham 84, PA-C, 2 spray at 06/25/22 0815    HYDROmorphone (DILAUDID) tablet 2 mg, 2 mg, Oral, Q3H PRN, Nino Olsen PA-C    HYDROmorphone (DILAUDID) tablet 4 mg, 4 mg, Oral, Q3H PRN, Nino Olsen PA-C, 4 mg at 06/26/22 1106    ketorolac (TORADOL) injection 15 mg, 15 mg, Intravenous, Q6H PRN, Nino Olsen PA-C    letrozole [facility]) tablet 2 5 mg, 2 5 mg, Oral, Daily, Kaitlina  Nicanor 82, YESENIA, 2 5 mg at 06/26/22 0948    melatonin tablet 6 mg, 6 mg, Oral, HS, Chetna Xie PA-C, 6 mg at 06/25/22 2134    nystatin (MYCOSTATIN) oral suspension 500,000 Units, 500,000 Units, Swish & Swallow, 4x Daily, Chelle Davila DO, 500,000 Units at 06/26/22 0947    ondansetron (ZOFRAN) injection 4 mg, 4 mg, Intravenous, Q6H PRN, Georgene Curling Kuna, PA-C    oxybutynin (DITROPAN-XL) 24 hr tablet 5 mg, 5 mg, Oral, Daily, Chetna Xie PA-C, 5 mg at 06/26/22 0947    pantoprazole (PROTONIX) injection 40 mg, 40 mg, Intravenous, Q24H [address], Chelle Davila DO, 40 mg at 06/26/22 0947    polyethylene glycol (MIRALAX) packet 17 g, 17 g, Oral, Daily, Chelle Davila DO, 17 g at 06/26/22 0947    rivaroxaban (XARELTO) tablet 20 mg, 20 mg, Oral, Daily With Breakfast, Chetna Xie PA-C, 20 mg at 06/26/22 4460    senna (SENOKOT) tablet 8 6 mg, 1 tablet, Oral, HS PRN, Georgene Curling Kuna, PA-C    tbo-filgrastim [facility]) subcutaneous injection 480 mcg, 480 mcg, Subcutaneous, Daily, Chelle Davila DO, 480 mcg at 06/26/22 0948    vancomycin (VANCOCIN) IVPB (premix in dextrose) 1,000 mg 200 mL, 15 mg/kg, Intravenous, Q12H, Chelle Davila DO, Last Rate: 200 mL/hr at 06/26/22 0952, 1,000 mg at 06/26/22 0952    OBJECTIVE     Current Weight: Weight - Scale: 66 kg (145 lb 8 1 oz)  Vitals:    06/26/22 0752   BP: 91/60   Pulse:    Resp: 16   Temp:    SpO2:      No intake or output data in the 24 hours ending 06/26/22 1119    PHYSICAL EXAMINATION     Physical Exam  Constitutional:       General: She is not in acute distress  Appearance: She is well-developed  HENT:      Head: Normocephalic  Eyes:      General: No scleral icterus  Conjunctiva/sclera: Conjunctivae normal    Neck:      Vascular: No JVD  Cardiovascular:      Rate and Rhythm: Normal rate  Heart sounds: Normal heart sounds  Pulmonary:      Effort: Pulmonary effort is normal       Breath sounds: No wheezing     Abdominal: Palpations: Abdomen is soft  Tenderness: There is no abdominal tenderness  Musculoskeletal:         General: Normal range of motion  Cervical back: Neck supple  Skin:     General: Skin is warm  Findings: No rash  Neurological:      Mental Status: She is alert and oriented to person, place, and time  Psychiatric:         Behavior: Behavior normal           LAB RESULTS     Results from last 7 days   Lab Units 06/26/22  0458 06/26/22  0001 06/25/22  1601 06/25/22  0626 06/25/22  0322   WBC Thousand/uL 3 31*  --   --  1 28* 1 45*   HEMOGLOBIN g/dL 10 3*  --   --  10 2* 11 8   HEMATOCRIT % 31 1*  --   --  31 0* 34 1*   PLATELETS Thousands/uL 142*  --   --  147* 168   POTASSIUM mmol/L 3 8 3 8 3 6 3 3* 3 3*   CHLORIDE mmol/L 105 101 102 106 98*   CO2 mmol/L 25 22 24 23 24   BUN mg/dL 6 5 6 8 7   CREATININE mg/dL 0 79 0 74 0 88 0 76 0 84   EGFR ml/min/1 73sq m 85 92 74 89 79   CALCIUM mg/dL 8 0* 7 4* 8 4 8 2* 8 8       RADIOLOGY RESULTS      Results for orders placed during the hospital encounter of 08/16/21    XR chest 1 view portable    Narrative  CHEST    INDICATION:   sob  COMPARISON:  Chest x-ray 8/9/2021    EXAM PERFORMED/VIEWS:  XR CHEST PORTABLE  Image: 1    FINDINGS:    Cardiomediastinal silhouette appears unremarkable  There remains small right pleural effusion with right basilar atelectasis  Scarring of right mid lung  Left lung is clear without pleural effusion  No pneumothorax  Osseous structures appear within normal limits for patient age  Impression  Persistent small right pleural effusion  Workstation performed: [id number]    Results for orders placed during the hospital encounter of 06/25/22    XR chest 2 views    Narrative  CHEST    INDICATION:   fever  61-year-old female with history of metastatic breast cancer  History of small right pleural effusion  COMPARISON:  Chest radiograph from February 22, 2022    CT of the chest, abdomen and pelvis from April 1, 2022  EXAM PERFORMED/VIEWS:  XR CHEST PA & LATERAL  The frontal view was performed utilizing dual energy radiographic technique  FINDINGS:  Right pleural catheter in place, unchanged since 2/22/2022  Surgical clips in the right axilla  Cardiomediastinal silhouette appears unremarkable  Blunting of the right posterior and lateral costophrenic sulci  Left pleural space clear  Lungs clear  No pneumothorax  Osseous structures appear within normal limits for patient age  Bone metastases demonstrated on prior CT not visible on this exam     Impression  Small right pleural effusion  No evidence of acute abnormality in the chest             Workstation performed: [id number]    No results found for this or any previous visit  No results found for this or any previous visit  No results found for this or any previous visit  No results found for this or any previous visit  PLAN / RECOMMENDATIONS      Hyponatremia:  Resolved  Basal urinary study at likely because of excessive water intake with very low urine osmolality  Discussed with the patient at length  Fluid restriction discussed with the patient along with high-protein diet  Will stop IV fluid at this point and monitor sodium closely    Fever: Id following  On antibiotic  So far no source available    Metastatic breast cancer: On chemotherapy    Will follow    Jyoti Campos MD  Nephrology  6/26/2022        Portions of the record may have been created with voice recognition software  Occasional wrong word or "sound a like" substitutions may have occurred due to the inherent limitations of voice recognition software  Read the chart carefully and recognize, using context, where substitutions have occurred

## 2022-06-26 NOTE — ASSESSMENT & PLAN NOTE
· No fever noted in last 24 hours  · Will discontinue Granix at this time given marked increase in WBC  · CXR without acute process  · CT chest abdomen pelvis-evidence of metastatic disease to liver and osseous structures  No fluid collections    Worsening pleural effusion of right greater than left  · Continue cefepime and vancomycin at this time  · 6/25 Blood cultures x2 no growth at 1 day  · 6/26 repeat blood cultures pending  · Infectious disease following  · Continue to monitor

## 2022-06-26 NOTE — ASSESSMENT & PLAN NOTE
· Patient complaining of slight pain or sore throat with swallowing  · Will start nystatin swish and swallow given immunocompromised state  · Continue to monitor

## 2022-06-26 NOTE — PROGRESS NOTES
Progress Note - Infectious Disease   Kanika Loyola 49 y o  female MRN: [id number]  Unit/Bed#: -01 Encounter: [id number]      Impression/Plan:  1  Neutropenic fever  Patient currently on chemotherapy for problem 3  She has developed neutropenia in the past   Admission [address]  Presented after developing reported fever, and had isolated fever on 06/25  Some possible/nonspecific urinary/GI complaints  She also 2 which she was using Keflex  No other localizing complaints  Blood cultures so far without growth  Cell counts improving with Granix today  Continue cefepime 2 g every 8 hours  Continue vancomycin for now given 2  Follow-up pending blood cultures  Pharmacy consult for vancomycin monitor  Repeat blood cultures ordered, given fever yesterday  Will check CT chest/abdomen/pelvis given abdominal complaints  Repeat CBC/chemistry tomorrow  Continue to trend fever curve/vitals  Granix as per Oncology  Recommend bowel regimen also given abdominal complaints  Supportive care as per primary  Additional interventions pending clinical course     2  Localized phlebitis and cellulitis  Patient developed most recently an area of localized phlebitis after chemotherapy  Swelling and cellulitis seemed to have responded to Keflex  Cell counts improving with Granix  The sites remain stable  Will continue vancomycin for now  Follow-up pending blood cultures  Repeat blood cultures ordered for today  Monitor site clinically  Continue to trend fever curve/WBC  Recommend discussion of port as outpatient with Oncology     3  Breast cancer on chemotherapy  Patient had prior episode of neutropenia with chemo in the past   Last chemotherapy was on 06/15  Granix as per Oncology  Supportive care as per primary      4  Worsening GERD  Patient reports increased GERD since after chemotherapy  Possibly medication related  Consider also esophageal candidiasis  No oral lesions    Reporting early satiety and still some discomfort with larger pieces of food  Cell counts improving  Continue PPI  Monitor for response  Will start nystatin for now  Antibiotics as above for now    Above plan discussed in detail with the patient and with primary service  ID consult service will continue to follow  Antibiotics:  Cefepime/vancomycin 2  Total antibiotic 3    Subjective:  Patient seen this morning with primary service attending present  She currently denies having any nausea, vomiting, chest pain  She is still having some discomfort with swallowing and reports early satiety  She also mentions some lower abdominal discomfort particularly suprapubic  Reports frequent urination but has also been receiving fluids  She also mentions not having a bowel movement now for a week  This time the patient is agreeable to imaging, repeat cultures, continued antibiotic and addition of nystatin  Cell counts appear to be improving on Granix  Additional questions answered  Objective:  Vitals:  Temp:  [98 2 °F (36 8 °C)-101 4 °F (38 6 °C)] 99 2 °F (37 3 °C)  HR:  [] 101  Resp:  [16-19] 16  BP: ()/(60-75) 91/60  SpO2:  [89 %-98 %] 93 %  Temp (24hrs), Av 3 °F (37 4 °C), Min:98 2 °F (36 8 °C), Max:101 4 °F (38 6 °C)  Current: Temperature: 99 2 °F (37 3 °C)    Physical Exam:   General Appearance:  Alert, interactive, nontoxic, no acute distress  Chronically ill-appearing  Throat: Oropharynx moist without lesions  Lungs:   Clear to auscultation bilaterally; no wheezes, rhonchi or rales; respirations unlabored on room air   Heart:  RRR; no murmur, rub or gallop appreciated   Abdomen:   Soft, non-tender, non-distended, positive bowel sounds  Mild discomfort when I palpate in the lower quadrants bilaterally and in the mid abdomen suprapubically  Extremities: No clubbing, cyanosis or edema   Skin: No new rashes or lesions  No new draining wounds noted    Previous areas of phlebitis and cellulitis do not have any signs and symptoms or changes concerning for cellulitis at this point and there is no signs of developing abscess  Labs, Imaging, & Other studies:   All pertinent labs and imaging studies were personally reviewed  Results from last 7 days   Lab Units 06/26/22  0458 06/25/22  0626 06/25/22  0322   WBC Thousand/uL 3 31* 1 28* 1 45*   HEMOGLOBIN g/dL 10 3* 10 2* 11 8   PLATELETS Thousands/uL 142* 147* 168     Results from last 7 days   Lab Units 06/26/22  0458 06/25/22  0626 06/25/22  0322   POTASSIUM mmol/L 3 8   < > 3 3*   CHLORIDE mmol/L 105   < > 98*   CO2 mmol/L 25   < > 24   BUN mg/dL 6   < > 7   CREATININE mg/dL 0 79   < > 0 84   EGFR ml/min/1 73sq m 85   < > 79   CALCIUM mg/dL 8 0*   < > 8 8   AST U/L  --   --  32   ALT U/L  --   --  19   ALK PHOS U/L  --   --  44*    < > = values in this interval not displayed  Results from last 7 days   Lab Units 06/25/22  0322   BLOOD CULTURE  Received in Microbiology Lab  Culture in Progress  Received in Microbiology Lab  Culture in Progress

## 2022-06-27 ENCOUNTER — APPOINTMENT (INPATIENT)
Dept: CT IMAGING | Facility: HOSPITAL | Age: 57
DRG: 660 | End: 2022-06-27
Payer: COMMERCIAL

## 2022-06-27 LAB
ANION GAP SERPL CALCULATED.3IONS-SCNC: 11 MMOL/L (ref 4–13)
ANION GAP SERPL CALCULATED.3IONS-SCNC: 6 MMOL/L (ref 4–13)
BASOPHILS # BLD MANUAL: 0 THOUSAND/UL (ref 0–0.1)
BASOPHILS NFR MAR MANUAL: 0 % (ref 0–1)
BUN SERPL-MCNC: 5 MG/DL (ref 5–25)
BUN SERPL-MCNC: 6 MG/DL (ref 5–25)
CALCIUM SERPL-MCNC: 8.2 MG/DL (ref 8.3–10.1)
CALCIUM SERPL-MCNC: 8.4 MG/DL (ref 8.3–10.1)
CHLORIDE SERPL-SCNC: 104 MMOL/L (ref 100–108)
CHLORIDE SERPL-SCNC: 104 MMOL/L (ref 100–108)
CO2 SERPL-SCNC: 25 MMOL/L (ref 21–32)
CO2 SERPL-SCNC: 26 MMOL/L (ref 21–32)
CREAT SERPL-MCNC: 0.76 MG/DL (ref 0.6–1.3)
CREAT SERPL-MCNC: 0.78 MG/DL (ref 0.6–1.3)
EOSINOPHIL # BLD MANUAL: 0 THOUSAND/UL (ref 0–0.4)
EOSINOPHIL NFR BLD MANUAL: 0 % (ref 0–6)
ERYTHROCYTE [DISTWIDTH] IN BLOOD BY AUTOMATED COUNT: 15.5 % (ref 11.6–15.1)
GFR SERPL CREATININE-BSD FRML MDRD: 86 ML/MIN/1.73SQ M
GFR SERPL CREATININE-BSD FRML MDRD: 89 ML/MIN/1.73SQ M
GLUCOSE SERPL-MCNC: 116 MG/DL (ref 65–140)
GLUCOSE SERPL-MCNC: 94 MG/DL (ref 65–140)
HCT VFR BLD AUTO: 33.1 % (ref 34.8–46.1)
HGB BLD-MCNC: 10.8 G/DL (ref 11.5–15.4)
LYMPHOCYTES # BLD AUTO: 1.67 THOUSAND/UL (ref 0.6–4.47)
LYMPHOCYTES # BLD AUTO: 10 % (ref 14–44)
MCH RBC QN AUTO: 32 PG (ref 26.8–34.3)
MCHC RBC AUTO-ENTMCNC: 32.6 G/DL (ref 31.4–37.4)
MCV RBC AUTO: 98 FL (ref 82–98)
MONOCYTES # BLD AUTO: 2.33 THOUSAND/UL (ref 0–1.22)
MONOCYTES NFR BLD: 14 % (ref 4–12)
MYELOCYTES NFR BLD MANUAL: 1 % (ref 0–1)
NEUTROPHILS # BLD MANUAL: 12.34 THOUSAND/UL (ref 1.85–7.62)
NEUTS BAND NFR BLD MANUAL: 11 % (ref 0–8)
NEUTS SEG NFR BLD AUTO: 63 % (ref 43–75)
PLATELET # BLD AUTO: 167 THOUSANDS/UL (ref 149–390)
PLATELET BLD QL SMEAR: ADEQUATE
PMV BLD AUTO: 12 FL (ref 8.9–12.7)
POTASSIUM SERPL-SCNC: 3.5 MMOL/L (ref 3.5–5.3)
POTASSIUM SERPL-SCNC: 3.9 MMOL/L (ref 3.5–5.3)
RBC # BLD AUTO: 3.38 MILLION/UL (ref 3.81–5.12)
RBC MORPH BLD: NORMAL
SODIUM SERPL-SCNC: 136 MMOL/L (ref 136–145)
SODIUM SERPL-SCNC: 140 MMOL/L (ref 136–145)
VARIANT LYMPHS # BLD AUTO: 1 %
WBC # BLD AUTO: 16.67 THOUSAND/UL (ref 4.31–10.16)

## 2022-06-27 PROCEDURE — 74176 CT ABD & PELVIS W/O CONTRAST: CPT

## 2022-06-27 PROCEDURE — 99232 SBSQ HOSP IP/OBS MODERATE 35: CPT | Performed by: INTERNAL MEDICINE

## 2022-06-27 PROCEDURE — 85027 COMPLETE CBC AUTOMATED: CPT | Performed by: INTERNAL MEDICINE

## 2022-06-27 PROCEDURE — 71250 CT THORAX DX C-: CPT

## 2022-06-27 PROCEDURE — 80048 BASIC METABOLIC PNL TOTAL CA: CPT | Performed by: INTERNAL MEDICINE

## 2022-06-27 PROCEDURE — C9113 INJ PANTOPRAZOLE SODIUM, VIA: HCPCS | Performed by: INTERNAL MEDICINE

## 2022-06-27 PROCEDURE — 99233 SBSQ HOSP IP/OBS HIGH 50: CPT | Performed by: INTERNAL MEDICINE

## 2022-06-27 PROCEDURE — 85007 BL SMEAR W/DIFF WBC COUNT: CPT | Performed by: INTERNAL MEDICINE

## 2022-06-27 RX ADMIN — VANCOMYCIN HYDROCHLORIDE 750 MG: 5 INJECTION, POWDER, LYOPHILIZED, FOR SOLUTION INTRAVENOUS at 19:25

## 2022-06-27 RX ADMIN — POLYETHYLENE GLYCOL 3350 17 G: 17 POWDER, FOR SOLUTION ORAL at 08:59

## 2022-06-27 RX ADMIN — LETROZOLE 2.5 MG: 2.5 TABLET, FILM COATED ORAL at 09:01

## 2022-06-27 RX ADMIN — NYSTATIN 500000 UNITS: 100000 SUSPENSION ORAL at 18:32

## 2022-06-27 RX ADMIN — RIVAROXABAN 20 MG: 20 TABLET, FILM COATED ORAL at 07:10

## 2022-06-27 RX ADMIN — NYSTATIN 500000 UNITS: 100000 SUSPENSION ORAL at 12:09

## 2022-06-27 RX ADMIN — DULOXETINE HYDROCHLORIDE 30 MG: 30 CAPSULE, DELAYED RELEASE ORAL at 08:59

## 2022-06-27 RX ADMIN — HYDROMORPHONE HYDROCHLORIDE 4 MG: 4 TABLET ORAL at 12:09

## 2022-06-27 RX ADMIN — ACETAMINOPHEN 650 MG: 325 TABLET, FILM COATED ORAL at 23:03

## 2022-06-27 RX ADMIN — CEFEPIME HYDROCHLORIDE 2000 MG: 2 INJECTION, POWDER, FOR SOLUTION INTRAVENOUS at 00:19

## 2022-06-27 RX ADMIN — CEFEPIME HYDROCHLORIDE 2000 MG: 2 INJECTION, POWDER, FOR SOLUTION INTRAVENOUS at 18:33

## 2022-06-27 RX ADMIN — VANCOMYCIN HYDROCHLORIDE 750 MG: 5 INJECTION, POWDER, LYOPHILIZED, FOR SOLUTION INTRAVENOUS at 09:44

## 2022-06-27 RX ADMIN — OXYBUTYNIN CHLORIDE 5 MG: 5 TABLET, EXTENDED RELEASE ORAL at 08:59

## 2022-06-27 RX ADMIN — CEFEPIME HYDROCHLORIDE 2000 MG: 2 INJECTION, POWDER, FOR SOLUTION INTRAVENOUS at 08:51

## 2022-06-27 RX ADMIN — Medication 6 MG: at 23:03

## 2022-06-27 RX ADMIN — PANTOPRAZOLE SODIUM 40 MG: 40 INJECTION, POWDER, FOR SOLUTION INTRAVENOUS at 08:59

## 2022-06-27 RX ADMIN — HYDROMORPHONE HYDROCHLORIDE 4 MG: 4 TABLET ORAL at 18:32

## 2022-06-27 RX ADMIN — HYDROMORPHONE HYDROCHLORIDE 4 MG: 4 TABLET ORAL at 07:10

## 2022-06-27 RX ADMIN — NYSTATIN 500000 UNITS: 100000 SUSPENSION ORAL at 23:03

## 2022-06-27 RX ADMIN — NYSTATIN 500000 UNITS: 100000 SUSPENSION ORAL at 08:59

## 2022-06-27 RX ADMIN — HYDROMORPHONE HYDROCHLORIDE 4 MG: 4 TABLET ORAL at 23:03

## 2022-06-27 NOTE — PROGRESS NOTES
Vancomycin Assessment    Lauren Blanc is a 49 y o  female who is currently receiving vancomycin 1000mg IV q12h for other febrile neutropenia   Relevant clinical data and objective history reviewed:  Creatinine   Date Value Ref Range Status   06/27/2022 0 78 0 60 - 1 30 mg/dL Final     Comment:     Standardized to IDMS reference method   06/26/2022 0 87 0 60 - 1 30 mg/dL Final     Comment:     Standardized to IDMS reference method   06/26/2022 0 79 0 60 - 1 30 mg/dL Final     Comment:     Standardized to IDMS reference method   04/02/2014 0 80 0 60 - 1 30 mg/dL Final     Comment:     Standardized to IDMS reference method     /65   Pulse 86   Temp 98 5 °F (36 9 °C)   Resp 15   Ht 5' 5" (1 651 m)   Wt 66 kg (145 lb 8 1 oz)   LMP 05/26/2021   SpO2 92%   BMI 24 21 kg/m²   I/O last 3 completed shifts: In: 360 [P O :360]  Out: 0   Lab Results   Component Value Date/Time    BUN 6 06/27/2022 12:21 AM    BUN 12 04/02/2014 01:15 PM    WBC 3 31 (L) 06/26/2022 04:58 AM    HGB 10 3 (L) 06/26/2022 04:58 AM    HCT 31 1 (L) 06/26/2022 04:58 AM    MCV 98 06/26/2022 04:58 AM     (L) 06/26/2022 04:58 AM     Temp Readings from Last 3 Encounters:   06/26/22 98 5 °F (36 9 °C)   06/20/22 97 9 °F (36 6 °C) (Temporal)   06/15/22 (!) 97 °F (36 1 °C) (Temporal)     Vancomycin Days of Therapy: 3    Assessment/Plan  The patient is currently on vancomycin utilizing scheduled dosing based on actual body weight  Baseline risks associated with therapy include: concomitant nephrotoxic medications and dehydration  The patient is currently receiving 1000mg IV q12h and after clinical evaluation will be changed to 750mg IV q8h  Pharmacy will also follow closely for s/sx of nephrotoxicity, infusion reactions, and appropriateness of therapy  BMP and CBC will be ordered per protocol  Plan for trough as patient approaches steady state, prior to the 4th  dose at approximately 0630 on 6/28/22    Due to infection severity, will target a trough of 15-20 (appropriate for most indications)   Pharmacy will continue to follow the patients culture results and clinical progress daily      Italia Meza, Pharmacist

## 2022-06-27 NOTE — PROGRESS NOTES
NEPHROLOGY PROGRESS NOTE    Patient: Pratik Olivas               Sex: female          DOA: 6/25/2022  2:14 AM   YOB: 1965        Age:  49 y o         LOS:  LOS: 2 days       HPI     Patient with metastatic breast cancer also hyponatremia    SUBJECTIVE     Overall seems to be stable    No other acute complaint    No fever no chills    CURRENT MEDICATIONS       Current Facility-Administered Medications:     acetaminophen (TYLENOL) tablet 650 mg, 650 mg, Oral, Q6H PRN, April Nolan-Málgraham [address], PA-C    al mag oxide-diphenhydramine-lidocaine viscous (MAGIC MOUTHWASH) suspension 10 mL, 10 mL, Swish & Spit, Q4H PRN, DAYDAY Deras-JOAQUÍN    albuterol (PROVENTIL HFA,VENTOLIN HFA) inhaler 2 puff, 2 puff, Inhalation, Q4H PRN, Kaitlina  Darwiniz-Málgraham [address], PA-C    calcium carbonate (TUMS) chewable tablet 500 mg, 500 mg, Oral, TID PRN, Crta  Ovidio-Málgraham 82, PA-C, 500 mg at 06/25/22 2141    cefepime (MAXIPIME) 2 g/50 mL dextrose IVPB, 2,000 mg, Intravenous, Q8H, Lisa Alcala DO, Last Rate: 100 mL/hr at 06/27/22 0851, 2,000 mg at 06/27/22 0851    dicyclomine (BENTYL) capsule 10 mg, 10 mg, Oral, Q6H PRN, Crta  Darwiniz-M[address]graham [address], PA-C, 10 mg at 06/25/22 2141    diphenhydrAMINE (BENADRYL) tablet 50 mg, 50 mg, Oral, HS PRN, Nino Olsen PA-C    DULoxetine (CYMBALTA) delayed release capsule 30 mg, 30 mg, Oral, Daily, Crta  Ovidio-M[address], PA-C, 30 mg at 06/27/22 0859    fluticasone (FLONASE) 50 mcg/act nasal spray 2 spray, 2 spray, Nasal, Daily, Crta  Darwiniz-Málaga [address], PA-C, 2 spray at 06/25/22 0815    HYDROmorphone (DILAUDID) tablet 2 mg, 2 mg, Oral, Q3H PRN, Nino Olsen PA-C    HYDROmorphone (DILAUDID) tablet 4 mg, 4 mg, Oral, Q3H PRN, April Vick 82, PA-C, 4 mg at 06/27/22 0710    letrozole [facility]) tablet 2 5 mg, 2 5 mg, Oral, Daily, April Vick 82, PA-C, 2 5 mg at 06/27/22 0901    melatonin tablet 6 mg, 6 mg, Oral, HS, Nino Hidalgo Stanford suzanne, PA-C, 6 mg at 06/26/22 2234    nystatin (MYCOSTATIN) oral suspension 500,000 Units, 500,000 Units, Swish & Swallow, 4x Daily, [facility], DO, 500,000 Units at 06/27/22 0859    ondansetron (ZOFRAN) injection 4 mg, 4 mg, Intravenous, Q6H PRN, Sarai Olsen PA-C    oxybutynin (DITROPAN-XL) 24 hr tablet 5 mg, 5 mg, Oral, Daily, Michelle Hyde PA-C, 5 mg at 06/27/22 0859    pantoprazole (PROTONIX) injection 40 mg, 40 mg, Intravenous, Q24H [address], [facility], DO, 40 mg at 06/27/22 0859    polyethylene glycol (MIRALAX) packet 17 g, 17 g, Oral, Daily, [facility], , 17 g at 06/27/22 0859    rivaroxaban (XARELTO) tablet 20 mg, 20 mg, Oral, Daily With Breakfast, Michelle Hyde PA-C, 20 mg at 06/27/22 0710    senna (SENOKOT) tablet 8 6 mg, 1 tablet, Oral, HS PRN, Sarai Olsen PA-C    vancomycin 750 mg in sodium chloride 0 9% 250 mL, 750 mg, Intravenous, Q8H, [facility], , 750 mg at 06/27/22 0944    OBJECTIVE     Current Weight: Weight - Scale: 66 kg (145 lb 8 1 oz)  Vitals:    06/27/22 0708   BP: 100/64   Pulse: 87   Resp: 19   Temp: 98 6 °F (37 °C)   SpO2: 91%       Intake/Output Summary (Last 24 hours) at 6/27/2022 1116  Last data filed at 6/27/2022 0427  Gross per 24 hour   Intake 220 ml   Output 0 ml   Net 220 ml       PHYSICAL EXAMINATION     Physical Exam  Constitutional:       General: She is not in acute distress  Appearance: She is well-developed  HENT:      Head: Normocephalic  Eyes:      General: No scleral icterus  Conjunctiva/sclera: Conjunctivae normal    Neck:      Vascular: No JVD  Cardiovascular:      Rate and Rhythm: Normal rate  Heart sounds: Normal heart sounds  Pulmonary:      Effort: Pulmonary effort is normal       Breath sounds: No wheezing  Abdominal:      Palpations: Abdomen is soft  Tenderness: There is no abdominal tenderness  Musculoskeletal:         General: Normal range of motion  Cervical back: Neck supple  Skin:     General: Skin is warm  Findings: No rash     Neurological: Mental Status: She is alert and oriented to person, place, and time  Psychiatric:         Behavior: Behavior normal           LAB RESULTS     Results from last 7 days   Lab Units 06/27/22  0526 06/27/22  0021 06/26/22  1819 06/26/22  0458 06/26/22  0001 06/25/22  1601 06/25/22  0626 06/25/22  0322   WBC Thousand/uL 16 67*  --   --  3 31*  --   --  1 28* 1 45*   HEMOGLOBIN g/dL 10 8*  --   --  10 3*  --   --  10 2* 11 8   HEMATOCRIT % 33 1*  --   --  31 1*  --   --  31 0* 34 1*   PLATELETS Thousands/uL 167  --   --  142*  --   --  147* 168   POTASSIUM mmol/L 3 9 3 5 3 4* 3 8 3 8 3 6 3 3* 3 3*   CHLORIDE mmol/L 104 104 104 105 101 102 106 98*   CO2 mmol/L 25 26 26 25 22 24 23 24   BUN mg/dL 5 6 6 6 5 6 8 7   CREATININE mg/dL 0 76 0 78 0 87 0 79 0 74 0 88 0 76 0 84   EGFR ml/min/1 73sq m 89 86 75 85 92 74 89 79   CALCIUM mg/dL 8 4 8 2* 8 3 8 0* 7 4* 8 4 8 2* 8 8       RADIOLOGY RESULTS      Results for orders placed during the hospital encounter of 08/16/21    XR chest 1 view portable    Narrative  CHEST    INDICATION:   sob  COMPARISON:  Chest x-ray 8/9/2021    EXAM PERFORMED/VIEWS:  XR CHEST PORTABLE  Image: 1    FINDINGS:    Cardiomediastinal silhouette appears unremarkable  There remains small right pleural effusion with right basilar atelectasis  Scarring of right mid lung  Left lung is clear without pleural effusion  No pneumothorax  Osseous structures appear within normal limits for patient age  Impression  Persistent small right pleural effusion  Workstation performed: [id number]    Results for orders placed during the hospital encounter of 06/25/22    XR chest 2 views    Narrative  CHEST    INDICATION:   fever  79-year-old female with history of metastatic breast cancer  History of small right pleural effusion  COMPARISON:  Chest radiograph from February 22, 2022  CT of the chest, abdomen and pelvis from April 1, 2022      EXAM PERFORMED/VIEWS:  XR CHEST PA & LATERAL  The frontal view was performed utilizing dual energy radiographic technique  FINDINGS:  Right pleural catheter in place, unchanged since 2/22/2022  Surgical clips in the right axilla  Cardiomediastinal silhouette appears unremarkable  Blunting of the right posterior and lateral costophrenic sulci  Left pleural space clear  Lungs clear  No pneumothorax  Osseous structures appear within normal limits for patient age  Bone metastases demonstrated on prior CT not visible on this exam     Impression  Small right pleural effusion  No evidence of acute abnormality in the chest             Workstation performed: [id number]    No results found for this or any previous visit  No results found for this or any previous visit  No results found for this or any previous visit  No results found for this or any previous visit  PLAN / RECOMMENDATIONS      Hyponatremia:  Remained stable and normal   Fluid restriction discussed with the patient    Metastatic breast cancer: On chemotherapy    Fever:  Afebrile on antibiotic    No new suggestion  will sign off and call back if needed    Queta Moncada MD  Nephrology  6/27/2022        Portions of the record may have been created with voice recognition software  Occasional wrong word or "sound a like" substitutions may have occurred due to the inherent limitations of voice recognition software  Read the chart carefully and recognize, using context, where substitutions have occurred
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
## 2022-06-27 NOTE — CONSULTS
Consultation - Palliative and Supportive Care   Sonya Grandchild 49 y o  female [id number]    Assessment:  Goals of care counseling  Symptom management- pain    Goals:  Level 1 code status      Plan:  Symptom management:  · Pain- states she was doing well with Dilaudid 4 mg every 4 hours as needed at home, does not need any prescribed at discharge  · Nausea- Zofran as needed  She is taking Reglan as needed at home, with little effect  Reglan as directed and Zofran as needed  · Constipation- Senna as directed at home    Social support:  · Supportive listening provided              I have reviewed the patient's controlled substance dispensing history in the Prescription Drug Monitoring Program in compliance with the [facility] regulations before prescribing any controlled substances  Last refills Dilaudid 2 mg # 120 on 06/24/2022    Decisional apparatus:  Patient has capacity on exam today  If capacity is lost, patient's substitute decision maker would default to spouse or living parent or brother if spouse is intoxicated by [place] 169  Advance Directive/Living Will: No  POLST: No  POA Forms: No    We appreciate the invitation to be involved in this patient's care  We will continue to follow throughout this hospitalization  Please do not hesitate to reach our on call provider through our clinic answering service at  should you have acute symptom control concerns  Ivanna Olivo, MICHELLE, DEBRA  Palliative and Supportive Care  Clinic/Answering Service: [phone]  You can find me on [employer]! 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 and malignant pleural effusion  She was brought into the ED at [facility] on 06/25/2022 secondary to malaise and fever over the past several days, with fever 103 at home  She states she was recently treated with antibiotics for cellulitis and UTI  States she has nausea and flushing with chemotherapy treatments  Reports constipation, denies nausea or vomiting this morning  Review of Systems   Constitutional: Negative for fatigue  Respiratory: Negative for shortness of breath  Cardiovascular: Negative for chest pain  Gastrointestinal: Positive for constipation  Negative for abdominal pain, nausea and vomiting           Past Medical History:   Diagnosis Date    Cancer [facility])     History of radiation exposure     Malignant neoplasm of right female breast ([address] ) 2012    right     Past Surgical History:   Procedure Laterality Date    BREAST CYST EXCISION      BREAST LUMPECTOMY Right 2012    HIP SURGERY      IR BIOPSY LIVER MASS  7/26/2021    IR PLEURAL EFFUSION LONG-TERM CATHETER PLACEMENT  8/17/2021    IR PLEURAL EFFUSION LONG-TERM CATHETER REMOVAL  5/11/2022    IR THORACENTESIS  7/26/2021     Social History     Socioeconomic History    Marital status: /Civil Union     Spouse name: Not on file    Number of children: Not on file    Years of education: Not on file    Highest education level: Not on file   Occupational History    Not on file   Tobacco Use    Smoking status: Former Smoker     Packs/day: 0 25     Types: Cigarettes    Smokeless tobacco: Never Used   Vaping Use    Vaping Use: Never used   Substance and Sexual Activity    Alcohol use: Not Currently    Drug use: Not Currently    Sexual activity: Not Currently   Other Topics Concern    Not on file   Social History Narrative    Not on file     Social Determinants of Health     Financial Resource Strain: Not on file   Food Insecurity: Not on file   Transportation Needs: No Transportation Needs    Lack of Transportation (Medical): No    Lack of Transportation (Non-Medical):  No   Physical Activity: Not on file   Stress: Not on file   Social Connections: Not on file   Intimate Partner Violence: Not on file   Housing Stability: Not on file     Family History   Problem Relation Age of Onset    Breast cancer Mother         in her 63's    Breast cancer Sister         inher 45s and 48    Colon cancer Maternal Grandmother     No Known Problems Paternal Grandmother     Breast cancer Other     No Known Problems Paternal Aunt        Medications:  current meds:   Current Facility-Administered Medications   Medication Dose Route Frequency    acetaminophen (TYLENOL) tablet 650 mg  650 mg Oral Q6H PRN    al mag oxide-diphenhydramine-lidocaine viscous (MAGIC MOUTHWASH) suspension 10 mL  10 mL Swish & Spit Q4H PRN    albuterol (PROVENTIL HFA,VENTOLIN HFA) inhaler 2 puff  2 puff Inhalation Q4H PRN    calcium carbonate (TUMS) chewable tablet 500 mg  500 mg Oral TID PRN    cefepime (MAXIPIME) 2 g/50 mL dextrose IVPB  2,000 mg Intravenous Q8H    dicyclomine (BENTYL) capsule 10 mg  10 mg Oral Q6H PRN    diphenhydrAMINE (BENADRYL) tablet 50 mg  50 mg Oral HS PRN    DULoxetine (CYMBALTA) delayed release capsule 30 mg  30 mg Oral Daily    fluticasone (FLONASE) 50 mcg/act nasal spray 2 spray  2 spray Nasal Daily    HYDROmorphone (DILAUDID) tablet 2 mg  2 mg Oral Q3H PRN    HYDROmorphone (DILAUDID) tablet 4 mg  4 mg Oral Q3H PRN    letrozole (FEMARA) tablet 2 5 mg  2 5 mg Oral Daily    melatonin tablet 6 mg  6 mg Oral HS    nystatin (MYCOSTATIN) oral suspension 500,000 Units  500,000 Units Swish & Swallow 4x Daily    ondansetron (ZOFRAN) injection 4 mg  4 mg Intravenous Q6H PRN    oxybutynin (DITROPAN-XL) 24 hr tablet 5 mg  5 mg Oral Daily    pantoprazole (PROTONIX) injection 40 mg  40 mg Intravenous Q24H [facility]    polyethylene glycol (MIRALAX) packet 17 g  17 g Oral Daily    rivaroxaban (XARELTO) tablet 20 mg  20 mg Oral Daily With Breakfast    senna (SENOKOT) tablet 8 6 mg  1 tablet Oral HS PRN    vancomycin 750 mg in sodium chloride 0 9% 250 mL  750 mg Intravenous Q8H       Allergies   Allergen Reactions    Codeine Anaphylaxis    Morphine GI Intolerance, Itching and Vomiting    Sulfa Antibiotics Hives and Itching       Objective:  /64   Pulse 87   Temp 98 6 °F (37 °C)   Resp 19   Ht 5' 5" (1 651 m)   Wt 66 kg (145 lb 8 1 oz)   LMP 05/26/2021   SpO2 91%   BMI 24 21 kg/m²   Physical Exam:  Constitutional: Appears comfortable and in no acute distress  Head: Normocephalic and atraumatic  Eyes: EOM are normal  No ocular discharge  No scleral icterus  Neck: no visible adenopathy or masses  Cardiac: Normal rate and rhythm, palpable pulses  Respiratory: Effort normal  No stridor  No respiratory distress  Gastrointestinal: No abdominal distension  Musculoskeletal: No edema  Neurological: Alert, oriented and appropriately conversant  Skin: Dry, no diaphoresis  Psychiatric: Displays a normal mood and affect  Behavior, judgement and thought content appear normal      Lab Results:   CBC:   Lab Results   Component Value Date    WBC 16 67 (H) 06/27/2022    HGB 10 8 (L) 06/27/2022    HCT 33 1 (L) 06/27/2022    MCV 98 06/27/2022     06/27/2022    MCH 32 0 06/27/2022    MCHC 32 6 06/27/2022    RDW 15 5 (H) 06/27/2022    MPV 12 0 06/27/2022   , CMP:   Lab Results   Component Value Date    SODIUM 140 06/27/2022    K 3 9 06/27/2022     06/27/2022    CO2 25 06/27/2022    BUN 5 06/27/2022    CREATININE 0 76 06/27/2022    CALCIUM 8 4 06/27/2022    EGFR 89 06/27/2022     Imaging Studies: I have personally reviewed pertinent reports  XR chest 2 views    Status: Final result               PACS Images     Show images for XR chest 2 views      Study Result    Narrative & Impression   CHEST      INDICATION:   fever    57-year-old female with history of metastatic breast cancer  History of small right pleural effusion      COMPARISON:  Chest radiograph from February 22, 2022  CT of the chest, abdomen and pelvis from April 1, 2022      EXAM PERFORMED/VIEWS:  XR CHEST PA & LATERAL  The frontal view was performed utilizing dual energy radiographic technique         FINDINGS:  Right pleural catheter in place, unchanged since 2/22/2022  Surgical clips in the right axilla      Cardiomediastinal silhouette appears unremarkable      Blunting of the right posterior and lateral costophrenic sulci  Left pleural space clear  Lungs clear  No pneumothorax      Osseous structures appear within normal limits for patient age  Bone metastases demonstrated on prior CT not visible on this exam      IMPRESSION:     Small right pleural effusion      No evidence of acute abnormality in the chest                  Workstation performed: [id number]        Imaging    XR chest 2 views (Order: [id number]) - 6/25/2022    Result History    XR chest 2 views (Order #[id number]) on 6/25/2022 - Order Result History Report - Result Edited      Order Report    Order Details    CT chest abdomen pelvis wo contrast    Status: Final result               PACS Images     Show images for CT chest abdomen pelvis wo contrast      Study Result    Narrative & Impression   CT CHEST, ABDOMEN AND PELVIS WITHOUT IV CONTRAST     INDICATION:   fever  Febrile neutropenia; history of metastatic breast cancer     COMPARISON:  4/1/2022; 11/19/2021     TECHNIQUE: CT examination of the chest, abdomen and pelvis was performed without intravenous contrast  This examination was performed without intravenous contrast in the context of the critical nationwide Omnipaque shortage  Study is limited in   sensitivity  Axial, sagittal, and coronal 2D reformatted images were created from the source data and submitted for interpretation      Radiation dose length product (DLP) for this visit:  632 mGy-cm     This examination, like all CT scans performed in the [facility], was performed utilizing techniques to minimize radiation dose exposure, including the use of iterative   reconstruction and automated exposure control       Enteric contrast was administered       FINDINGS:     CHEST     LUNGS:  2 mm right middle lobe nodule image 54, series 3, stable  5 mm right upper lobe nodule, image 58, series 3, stable  Right middle lobe 4 mm nodule, image 74, stable  Left lower lobe 5 mm nodule, image 72, stable  6 mm peribronchial nodule, left upper lobe, image 36, stable  Left lower lobe 4 mm nodule image 68, stable  A few other small nodules less than 4 mm are stable  There is no tracheal or endobronchial lesion      PLEURA:  Moderate right and small left effusion increased since April 1      HEART/GREAT VESSELS: Heart is unremarkable for patient's age  No thoracic aortic aneurysm      MEDIASTINUM AND KIA:  Calcified lymph nodes are noted in the mediastinum and kia without change  Slight thickening of the distal esophagus is seen versus small hiatus hernia      CHEST WALL AND LOWER NECK:  Surgical clips are noted in the right axilla      ABDOMEN     LIVER/BILIARY TREE:  Vague hypodensity left lobe of the liver is consistent with patient's known metastatic disease better seen on the prior contrast exam  Smaller lesions noted on prior study more difficult to appreciate on the current study  Very small   splenic lesions noted previously are also not well seen      GALLBLADDER:  No calcified gallstones  No pericholecystic inflammatory change      SPLEEN:  Unremarkable      PANCREAS:  Unremarkable      ADRENAL GLANDS:  Unremarkable      KIDNEYS/URETERS:  No hydronephrosis  Scattered nephrocalcinosis        STOMACH AND BOWEL:  Unremarkable      APPENDIX:  No findings to suggest appendicitis      ABDOMINOPELVIC CAVITY:  No ascites  No pneumoperitoneum  Small retroperitoneal lymph nodes appear unchanged    A few small mesenteric nodes are unchanged      No definite collection      VESSELS:  Unremarkable for patient's age      PELVIS     REPRODUCTIVE ORGANS:  Unremarkable for patient's age      URINARY BLADDER:  Unremarkable      ABDOMINAL WALL/INGUINAL REGIONS:  Unremarkable      OSSEOUS STRUCTURES: No acute fracture  Numerous sclerotic lesions are scattered throughout the osseous structures without significant change from April and suggest blastic metastatic disease      1 3 cm stable at T12  There is a lytic lesion involving the manubrium of the sternum which is also stable     IMPRESSION:     Evidence of metastatic disease to liver and osseous structures without obvious significant change      No new collection is identified      Worsening pleural effusions right greater than left      Small lung nodules are stable without new consolidation      No new bony destructive features                  Workstation performed: [id number]        Imaging    CT chest abdomen pelvis wo contrast (Order: [id number]) - 6/26/2022    Result History    CT chest abdomen pelvis wo contrast (Order #[id number]) on 6/27/2022 - Order Result History Report      Order Report    Order Details    Counseling / Coordination of Care  Total floor / unit time spent today 50 minutes  Greater than 50% of total time was spent with the patient and / or family counseling and / or coordination of care  A description of the counseling / coordination of care: Reviewed chart, provided medical updates, determined goals of care, discussed palliative care and symptom management, determined competency and POA/HCA, determined social/family support, provided psychosocial support  Reviewed with SLIM  This note was not shared with the patient due to reasonable likelihood of causing patient harm    Portions of this document may have been created using dictation software and as such some "sound alike" terms may have been generated by the system   Do not hesitate to contact me with any questions or clarifications

## 2022-06-27 NOTE — PLAN OF CARE
Problem: Potential for Falls  Goal: Patient will remain free of falls  Description: INTERVENTIONS:  - Educate patient/family on patient safety including physical limitations  - Instruct patient to call for assistance with activity   - Consult OT/PT to assist with strengthening/mobility   - Keep Call bell within reach  - Keep bed low and locked with side rails adjusted as appropriate  - Keep care items and personal belongings within reach  - Initiate and maintain comfort rounds  - Make Fall Risk Sign visible to staff  - Offer Toileting every  Hours, in advance of need  - Initiate/Maintainalarm  - Obtain necessary fall risk management equipment:   - Apply yellow socks and bracelet for high fall risk patients  - Consider moving patient to room near nurses station  Outcome: Progressing     Problem: PAIN - ADULT  Goal: Verbalizes/displays adequate comfort level or baseline comfort level  Description: Interventions:  - Encourage patient to monitor pain and request assistance  - Assess pain using appropriate pain scale  - Administer analgesics based on type and severity of pain and evaluate response  - Implement non-pharmacological measures as appropriate and evaluate response  - Consider cultural and social influences on pain and pain management  - Notify physician/advanced practitioner if interventions unsuccessful or patient reports new pain  Outcome: Progressing     Problem: INFECTION - ADULT  Goal: Absence or prevention of progression during hospitalization  Description: INTERVENTIONS:  - Assess and monitor for signs and symptoms of infection  - Monitor lab/diagnostic results  - Monitor all insertion sites, i e  indwelling lines, tubes, and drains  - Monitor endotracheal if appropriate and nasal secretions for changes in amount and color  - [place] appropriate cooling/warming therapies per order  - Administer medications as ordered  - Instruct and encourage patient and family to use good hand hygiene technique  - Identify and instruct in appropriate isolation precautions for identified infection/condition  Outcome: Progressing  Goal: Absence of fever/infection during neutropenic period  Description: INTERVENTIONS:  - Monitor WBC    Outcome: Progressing     Problem: SAFETY ADULT  Goal: Patient will remain free of falls  Description: INTERVENTIONS:  - Educate patient/family on patient safety including physical limitations  - Instruct patient to call for assistance with activity   - Consult OT/PT to assist with strengthening/mobility   - Keep Call bell within reach  - Keep bed low and locked with side rails adjusted as appropriate  - Keep care items and personal belongings within reach  - Initiate and maintain comfort rounds  - Make Fall Risk Sign visible to staff  - Offer Toileting every  Hours, in advance of need  - Initiate/Maintain alarm  - Obtain necessary fall risk management equipment:   - Apply yellow socks and bracelet for high fall risk patients  - Consider moving patient to room near nurses station  Outcome: Progressing  Goal: Maintain or return to baseline ADL function  Description: INTERVENTIONS:  -  Assess patient's ability to carry out ADLs; assess patient's baseline for ADL function and identify physical deficits which impact ability to perform ADLs (bathing, care of mouth/teeth, toileting, grooming, dressing, etc )  - Assess/evaluate cause of self-care deficits   - Assess range of motion  - Assess patient's mobility; develop plan if impaired  - Assess patient's need for assistive devices and provide as appropriate  - Encourage maximum independence but intervene and supervise when necessary  - Involve family in performance of ADLs  - Assess for home care needs following discharge   - Consider OT consult to assist with ADL evaluation and planning for discharge  - Provide patient education as appropriate  Outcome: Progressing  Goal: Maintains/Returns to pre admission functional level  Description: INTERVENTIONS:  - Perform BMAT or MOVE assessment daily    - Set and communicate daily mobility goal to care team and patient/family/caregiver  - Collaborate with rehabilitation services on mobility goals if consulted  - Perform Range of Motion  times a day  - Reposition patient every  hours  - Dangle patient  times a day  - Stand patient times a day  - Ambulate patient  times a day  - Out of bed to chair  times a day   - Out of bed for meals  times a day  - Out of bed for toileting  - Record patient progress and toleration of activity level   Outcome: Progressing     Problem: DISCHARGE PLANNING  Goal: Discharge to home or other facility with appropriate resources  Description: INTERVENTIONS:  - Identify barriers to discharge w/patient and caregiver  - Arrange for needed discharge resources and transportation as appropriate  - Identify discharge learning needs (meds, wound care, etc )  - Arrange for interpretive services to assist at discharge as needed  - Refer to Case Management Department for coordinating discharge planning if the patient needs post-hospital services based on physician/advanced practitioner order or complex needs related to functional status, cognitive ability, or social support system  Outcome: Progressing     Problem: Knowledge Deficit  Goal: Patient/family/caregiver demonstrates understanding of disease process, treatment plan, medications, and discharge instructions  Description: Complete learning assessment and assess knowledge base    Interventions:  - Provide teaching at level of understanding  - Provide teaching via preferred learning methods  Outcome: Progressing     Problem: MOBILITY - ADULT  Goal: Maintain or return to baseline ADL function  Description: INTERVENTIONS:  -  Assess patient's ability to carry out ADLs; assess patient's baseline for ADL function and identify physical deficits which impact ability to perform ADLs (bathing, care of mouth/teeth, toileting, grooming, dressing, etc )  - Assess/evaluate cause of self-care deficits   - Assess range of motion  - Assess patient's mobility; develop plan if impaired  - Assess patient's need for assistive devices and provide as appropriate  - Encourage maximum independence but intervene and supervise when necessary  - Involve family in performance of ADLs  - Assess for home care needs following discharge   - Consider OT consult to assist with ADL evaluation and planning for discharge  - Provide patient education as appropriate  Outcome: Progressing  Goal: Maintains/Returns to pre admission functional level  Description: INTERVENTIONS:  - Perform BMAT or MOVE assessment daily    - Set and communicate daily mobility goal to care team and patient/family/caregiver  - Collaborate with rehabilitation services on mobility goals if consulted  - Perform Range of Motion  times a day  - Reposition patient every  hours    - Dangle patient  times a day  - Stand patient  times a day  - Ambulate patient times a day  - Out of bed to chair times a day   - Out of bed for meals times a day  - Out of bed for toileting  - Record patient progress and toleration of activity level   Outcome: Progressing

## 2022-06-27 NOTE — UTILIZATION REVIEW
Inpatient Admission Authorization Request   NOTIFICATION OF INPATIENT ADMISSION/INPATIENT AUTHORIZATION REQUEST   SERVICING FACILITY:   [address] [place] [address]  Tax ID: [id number]  NPI: [id number]  Place of Service: Inpatient [address]  [address]  Place of Service Code: 24     ATTENDING PROVIDER:  Attending Name and NPI#: Faraz Ding [[id number]]  Address: [address] [place] [address]  Phone: [phone]     UTILIZATION REVIEW CONTACT:  Batsheva Brambila, Utilization   Network Utilization Review Department  Phone: [phone]  Fax [phone]  Email: [email or web address]     PHYSICIAN ADVISORY SERVICES:  FOR HRDI-LG-GGCL REVIEW - MEDICAL NECESSITY DENIAL  Phone: [phone]  Fax: [phone]  Email: [email or web address]     TYPE OF REQUEST:  Inpatient Status     ADMISSION INFORMATION:  ADMISSION DATE/TIME: 6/25/22  4:46 AM  PATIENT DIAGNOSIS CODE/DESCRIPTION:  Hyponatremia [E87 1]  Febrile neutropenia ([address] ) [D70 9, R50 81]  Fever [R50 9]  Sepsis ([facility] [address] ) [A41 9]  DISCHARGE DATE/TIME: No discharge date for patient encounter  IMPORTANT INFORMATION:  Please contact Batsheva Brambila directly with any questions or concerns regarding this request  Department voicemails are confidential     Send requests for admission clinical reviews, concurrent reviews, approvals, and administrative denials due to lack of clinical to fax [phone]

## 2022-06-27 NOTE — PROGRESS NOTES
Progress Note - Infectious Disease   Sonya Robb 49 y o  female MRN: [id number]  Unit/Bed#: -01 Encounter: [id number]      Impression/Plan:  1  Neutropenic fever   Patient currently on chemotherapy for problem 3  She has developed neutropenia in the past   Admission   Presented after developing reported fever, and had isolated fever on 06/25   Some possible/nonspecific urinary/GI complaints   She also had 2 which she was using Keflex  No other localizing complaints  Blood cultures so far without growth  Cell counts improved with Granix  No further fevers noted  CT imaging without localizing findings and noted metastatic disease  Continue cefepime/vancomycin through today  Follow-up pending blood cultures  Pharmacy consult for vancomycin monitor  Repeat CBC/chemistry tomorrow  Continue to trend fever curve/vitals  Continue bowel regimen and monitor stool output  Supportive care as per primary  If afebrile and cultures without growth tomorrow will discontinue further antibiotic      2  Localized phlebitis and cellulitis   Patient developed most recently an area of localized phlebitis after chemotherapy   Swelling and cellulitis seemed to have responded to Keflex  Cell counts improving with Granix  The sites remain stable  Will continue vancomycin for now  Follow-up pending blood cultures  Monitor site clinically  Continue to trend fever curve/WBC  Recommend discussion of port as outpatient with Oncology  Potential discontinuation of antibiotic tomorrow if cultures negative     3  Breast cancer on chemotherapy   Patient had prior episode of neutropenia with chemo in the past   Last chemotherapy was on 06/15   Counts improved with Granix   Supportive care as per primary      4  Worsening GERD   Patient reports increased GERD since after chemotherapy   Possibly medication related   Consider also esophageal candidiasis   No oral lesions    Reporting early satiety and still some discomfort with larger pieces of food  Cell counts improving  Symptoms slowly improving somewhat  Continue PPI  Monitor for response  Continue nystatin for now  Antibiotics as above for now     Above plan discussed in detail with the patient and with primary service  ID consult service will continue to follow      Antibiotics:  Cefepime/vancomycin 3  Total antibiotic 4    Subjective:  Patient seen at bedside this morning  She denied having any nausea, vomiting, chest pain shortness of breath  Still having some slight lower abdominal discomfort  Has not had a bowel movement yet  Still having some mild discomfort with swallowing but slightly improved with nystatin  No other new complaints  CT imaging pending  Cell counts improved  Granix discontinued  Objective:  Vitals:  Temp:  [98 5 °F (36 9 °C)-98 6 °F (37 °C)] 98 6 °F (37 °C)  HR:  [83-88] 87  Resp:  [15-19] 19  BP: ()/(62-65) 100/64  SpO2:  [90 %-93 %] 91 %  Temp (24hrs), Av 6 °F (37 °C), Min:98 5 °F (36 9 °C), Max:98 6 °F (37 °C)  Current: Temperature: 98 6 °F (37 °C)    Physical Exam:   General Appearance:  Alert, interactive, nontoxic, no acute distress  Throat: Oropharynx moist without lesions  Lungs:   Clear to auscultation bilaterally; no wheezes, rhonchi or rales; respirations unlabored   Heart:  RRR; no murmur, rub or gallop   Abdomen:   Soft, non-tender, non-distended, positive bowel sounds  Extremities: No clubbing, cyanosis or edema   Skin: No new rashes or lesions  No draining wounds noted         Labs, Imaging, & Other studies:   All pertinent labs and imaging studies were personally reviewed  Results from last 7 days   Lab Units 22  0526 22  0458 22  0626   WBC Thousand/uL 16 67* 3 31* 1 28*   HEMOGLOBIN g/dL 10 8* 10 3* 10 2*   PLATELETS Thousands/uL 167 142* 147*     Results from last 7 days   Lab Units 22  0526 22  0626 22  0322   POTASSIUM mmol/L 3 9   < > 3 3*   CHLORIDE mmol/L 104   < > 98*   CO2 mmol/L 25   < > 24   BUN mg/dL 5   < > 7   CREATININE mg/dL 0 76   < > 0 84   EGFR ml/min/1 73sq m 89   < > 79   CALCIUM mg/dL 8 4   < > 8 8   AST U/L  --   --  32   ALT U/L  --   --  19   ALK PHOS U/L  --   --  44*    < > = values in this interval not displayed  Results from last 7 days   Lab Units 06/26/22  1835 06/25/22  0322   BLOOD CULTURE  Received in Microbiology Lab  Culture in Progress  Received in Microbiology Lab  Culture in Progress  No Growth at 48 hrs  No Growth at 48 hrs

## 2022-06-27 NOTE — PROGRESS NOTES
[facility]  Progress Note - Media [place] 1965, 49 y o  female MRN: [id number]  Unit/Bed#: -01 Encounter: [id number]  Primary Care Provider: Thai Eagle MD   Date and time admitted to hospital: 6/25/2022  2:14 AM    * Febrile neutropenia (HCC)  Assessment & Plan  · No fever noted in last 24 hours  · Will discontinue Granix at this time given marked increase in WBC  · CXR without acute process  · CT chest abdomen pelvis-evidence of metastatic disease to liver and osseous structures  No fluid collections  Worsening pleural effusion of right greater than left  · Continue cefepime and vancomycin at this time  · 6/25 Blood cultures x2 no growth at 1 day  · 6/26 repeat blood cultures pending  · Infectious disease following  · Continue to monitor    Sore throat  Assessment & Plan  · Patient complaining of slight pain or sore throat with swallowing  · Continue nystatin swish and swallow given immunocompromised state  · Continue to monitor    Hypokalemia  Assessment & Plan  · Resolved    Hyponatremia  Assessment & Plan  · Stable    Primary malignant neoplasm of right breast with metastasis to other site [facility])  Assessment & Plan  · Follows with [facility] as an outpatient  · Currently receiving active chemotherapy  · Continue outpatient follow-up    Palliative care patient  Assessment & Plan  · Follows with Dr Ian Owen as outpatient for metastatic breast cancer   · Prescribed outpatient Dilaudid 2-4mg q3h prn moderate/severe pain  · PDMP reviewed; continue home dilaudid   · Palliative care consulted  · Continue outpatient f/u    Hypertension  Assessment & Plan  · Blood pressure currently well controlled  · Monitor BP per unit protocol        VTE Pharmacologic Prophylaxis: VTE Score: 6 High Risk (Score >/= 5) - Pharmacological DVT Prophylaxis Ordered: rivaroxaban (Xarelto)  Sequential Compression Devices Ordered      Patient Centered Rounds: I performed bedside rounds with nursing staff today  Discussions with Specialists or Other Care Team Provider: infectious disease, case management    Education and Discussions with Family / Patient: Attempted to call  at multiple different numbers and still unable to get in contact  Time Spent for Care: 45 minutes  More than 50% of total time spent on counseling and coordination of care as described above  Current Length of Stay: 2 day(s)  Current Patient Status: Inpatient   Certification Statement: The patient will continue to require additional inpatient hospital stay due to Febrile neutropenia  Discharge Plan: Anticipate discharge in 24-48 hrs to home  Code Status: Level 1 - Full Code    Subjective:   Patient resting comfortably on examination  Patient had no overnight events or complaints on exam this morning  Objective:     Vitals:   Temp (24hrs), Av 6 °F (37 °C), Min:98 5 °F (36 9 °C), Max:98 6 °F (37 °C)    Temp:  [98 5 °F (36 9 °C)-98 6 °F (37 °C)] 98 6 °F (37 °C)  HR:  [83-88] 87  Resp:  [15-19] 19  BP: ()/(62-65) 100/64  SpO2:  [90 %-93 %] 91 %  Body mass index is 24 21 kg/m²  Input and Output Summary (last 24 hours): Intake/Output Summary (Last 24 hours) at 2022 1232  Last data filed at 2022 0427  Gross per 24 hour   Intake 220 ml   Output 0 ml   Net 220 ml       Physical Exam:   Physical Exam  Vitals and nursing note reviewed  Constitutional:       General: She is not in acute distress  Appearance: She is well-developed  HENT:      Head: Normocephalic and atraumatic  Eyes:      General: No scleral icterus  Conjunctiva/sclera: Conjunctivae normal    Cardiovascular:      Rate and Rhythm: Normal rate and regular rhythm  Heart sounds: Normal heart sounds  No murmur heard  No friction rub  No gallop  Pulmonary:      Effort: Pulmonary effort is normal  No respiratory distress  Breath sounds: Normal breath sounds  No wheezing or rales     Abdominal: General: Bowel sounds are normal  There is no distension  Palpations: Abdomen is soft  Tenderness: There is no abdominal tenderness  Musculoskeletal:         General: Normal range of motion  Skin:     General: Skin is warm  Findings: No rash  Neurological:      Mental Status: She is alert and oriented to person, place, and time  Additional Data:     Labs:  Results from last 7 days   Lab Units 06/27/22  0526 06/26/22 0458   WBC Thousand/uL 16 67* 3 31*   HEMOGLOBIN g/dL 10 8* 10 3*   HEMATOCRIT % 33 1* 31 1*   PLATELETS Thousands/uL 167 142*   BANDS PCT % 11*  --    NEUTROS PCT %  --  52   LYMPHS PCT %  --  18   LYMPHO PCT % 10*  --    MONOS PCT %  --  28*   MONO PCT % 14*  --    EOS PCT % 0 0     Results from last 7 days   Lab Units 06/27/22  0526 06/25/22  0626 06/25/22  0322   SODIUM mmol/L 140   < > 128*   POTASSIUM mmol/L 3 9   < > 3 3*   CHLORIDE mmol/L 104   < > 98*   CO2 mmol/L 25   < > 24   BUN mg/dL 5   < > 7   CREATININE mg/dL 0 76   < > 0 84   ANION GAP mmol/L 11   < > 6   CALCIUM mg/dL 8 4   < > 8 8   ALBUMIN g/dL  --   --  3 0*   TOTAL BILIRUBIN mg/dL  --   --  0 72   ALK PHOS U/L  --   --  44*   ALT U/L  --   --  19   AST U/L  --   --  32   GLUCOSE RANDOM mg/dL 94   < > 121    < > = values in this interval not displayed  Results from last 7 days   Lab Units 06/26/22 0458 06/25/22  0322   LACTIC ACID mmol/L  --  1 0   PROCALCITONIN ng/ml 0 20 0 19       Lines/Drains:  Invasive Devices  Report    Peripheral Intravenous Line  Duration           Peripheral IV 06/25/22 Left Hand 2 days    Peripheral IV 06/25/22 Left Wrist 2 days          Drain  Duration           Closed/Suction Drain Right Back Other (Comment) 314 days                      Imaging: No pertinent imaging reviewed  Recent Cultures (last 7 days):   Results from last 7 days   Lab Units 06/26/22  1835 06/25/22  0322   BLOOD CULTURE  Received in Microbiology Lab  Culture in Progress    Received in Microbiology Lab  Culture in Progress  No Growth at 48 hrs  No Growth at 48 hrs  Last 24 Hours Medication List:   Current Facility-Administered Medications   Medication Dose Route Frequency Provider Last Rate    acetaminophen  650 mg Oral Q6H PRN Kailey Gee PA-C      al mag oxide-diphenhydramine-lidocaine viscous  10 mL Swish & Spit Q4H PRN [place], [place]      albuterol  2 puff Inhalation Q4H PRN [place], [place]      calcium carbonate  500 mg Oral TID PRN Kailey Gee PA-C      cefepime  2,000 mg Intravenous Q8H Kenny Cotto DO 2,000 mg (06/27/22 0851)    dicyclomine  10 mg Oral Q6H PRN Kailey Gee PA-C      diphenhydrAMINE  50 mg Oral HS PRN [facility]      DULoxetine  30 mg Oral Daily [place], [place]      fluticasone  2 spray Nasal Daily [place], [place]      HYDROmorphone  2 mg Oral Q3H PRN [place]      HYDROmorphone  4 mg Oral Q3H PRN [facility]      letrozole  2 5 mg Oral Daily [place]      melatonin  6 mg Oral HS [place], [place]      nystatin  500,000 Units Swish & Swallow 4x Daily Kenny Cotto DO      ondansetron  4 mg Intravenous Q6H PRN [facility]      oxybutynin  5 mg Oral Daily [place], [place]      pantoprazole  40 mg Intravenous Q24H [address] Kenny Cotto DO      polyethylene glycol  17 g Oral Daily Kenny Cotto DO      rivaroxaban  20 mg Oral Daily With Breakfast [place], [place]      senna  1 tablet Oral HS PRN [facility]      vancomycin  750 mg Intravenous Q8H Kenny Cotto DO          Today, Patient Was Seen By: Kenny Cotto DO    **Please Note: This note may have been constructed using a voice recognition system  **

## 2022-06-28 LAB
ANION GAP SERPL CALCULATED.3IONS-SCNC: 10 MMOL/L (ref 4–13)
ANION GAP SERPL CALCULATED.3IONS-SCNC: 8 MMOL/L (ref 4–13)
BASOPHILS # BLD MANUAL: 0 THOUSAND/UL (ref 0–0.1)
BASOPHILS NFR MAR MANUAL: 0 % (ref 0–1)
BUN SERPL-MCNC: 7 MG/DL (ref 5–25)
BUN SERPL-MCNC: 7 MG/DL (ref 5–25)
CALCIUM SERPL-MCNC: 7.1 MG/DL (ref 8.3–10.1)
CALCIUM SERPL-MCNC: 8.6 MG/DL (ref 8.3–10.1)
CHLORIDE SERPL-SCNC: 107 MMOL/L (ref 100–108)
CHLORIDE SERPL-SCNC: 110 MMOL/L (ref 100–108)
CO2 SERPL-SCNC: 23 MMOL/L (ref 21–32)
CO2 SERPL-SCNC: 27 MMOL/L (ref 21–32)
CREAT SERPL-MCNC: 0.78 MG/DL (ref 0.6–1.3)
CREAT SERPL-MCNC: 0.81 MG/DL (ref 0.6–1.3)
EOSINOPHIL # BLD MANUAL: 0 THOUSAND/UL (ref 0–0.4)
EOSINOPHIL NFR BLD MANUAL: 0 % (ref 0–6)
ERYTHROCYTE [DISTWIDTH] IN BLOOD BY AUTOMATED COUNT: 15.7 % (ref 11.6–15.1)
GFR SERPL CREATININE-BSD FRML MDRD: 82 ML/MIN/1.73SQ M
GFR SERPL CREATININE-BSD FRML MDRD: 86 ML/MIN/1.73SQ M
GLUCOSE SERPL-MCNC: 113 MG/DL (ref 65–140)
GLUCOSE SERPL-MCNC: 90 MG/DL (ref 65–140)
HCT VFR BLD AUTO: 28.4 % (ref 34.8–46.1)
HGB BLD-MCNC: 9 G/DL (ref 11.5–15.4)
LYMPHOCYTES # BLD AUTO: 12 % (ref 14–44)
LYMPHOCYTES # BLD AUTO: 2.12 THOUSAND/UL (ref 0.6–4.47)
MCH RBC QN AUTO: 31.8 PG (ref 26.8–34.3)
MCHC RBC AUTO-ENTMCNC: 31.7 G/DL (ref 31.4–37.4)
MCV RBC AUTO: 100 FL (ref 82–98)
METAMYELOCYTES NFR BLD MANUAL: 1 % (ref 0–1)
MONOCYTES # BLD AUTO: 0.18 THOUSAND/UL (ref 0–1.22)
MONOCYTES NFR BLD: 1 % (ref 4–12)
MYELOCYTES NFR BLD MANUAL: 2 % (ref 0–1)
NEUTROPHILS # BLD MANUAL: 14.87 THOUSAND/UL (ref 1.85–7.62)
NEUTS BAND NFR BLD MANUAL: 2 % (ref 0–8)
NEUTS SEG NFR BLD AUTO: 82 % (ref 43–75)
PLATELET # BLD AUTO: 177 THOUSANDS/UL (ref 149–390)
PLATELET BLD QL SMEAR: ADEQUATE
PMV BLD AUTO: 12.1 FL (ref 8.9–12.7)
POTASSIUM SERPL-SCNC: 3.1 MMOL/L (ref 3.5–5.3)
POTASSIUM SERPL-SCNC: 4.4 MMOL/L (ref 3.5–5.3)
RBC # BLD AUTO: 2.83 MILLION/UL (ref 3.81–5.12)
SODIUM SERPL-SCNC: 142 MMOL/L (ref 136–145)
SODIUM SERPL-SCNC: 143 MMOL/L (ref 136–145)
WBC # BLD AUTO: 17.7 THOUSAND/UL (ref 4.31–10.16)

## 2022-06-28 PROCEDURE — 85007 BL SMEAR W/DIFF WBC COUNT: CPT | Performed by: INTERNAL MEDICINE

## 2022-06-28 PROCEDURE — 85027 COMPLETE CBC AUTOMATED: CPT | Performed by: INTERNAL MEDICINE

## 2022-06-28 PROCEDURE — C9113 INJ PANTOPRAZOLE SODIUM, VIA: HCPCS | Performed by: INTERNAL MEDICINE

## 2022-06-28 PROCEDURE — 80048 BASIC METABOLIC PNL TOTAL CA: CPT | Performed by: FAMILY MEDICINE

## 2022-06-28 PROCEDURE — 99233 SBSQ HOSP IP/OBS HIGH 50: CPT | Performed by: FAMILY MEDICINE

## 2022-06-28 PROCEDURE — 99232 SBSQ HOSP IP/OBS MODERATE 35: CPT | Performed by: INTERNAL MEDICINE

## 2022-06-28 PROCEDURE — 80048 BASIC METABOLIC PNL TOTAL CA: CPT | Performed by: INTERNAL MEDICINE

## 2022-06-28 PROCEDURE — 99232 SBSQ HOSP IP/OBS MODERATE 35: CPT | Performed by: STUDENT IN AN ORGANIZED HEALTH CARE EDUCATION/TRAINING PROGRAM

## 2022-06-28 RX ORDER — FLUCONAZOLE 100 MG/1
400 TABLET ORAL ONCE
Status: COMPLETED | OUTPATIENT
Start: 2022-06-28 | End: 2022-06-28

## 2022-06-28 RX ORDER — POTASSIUM CHLORIDE 20 MEQ/1
40 TABLET, EXTENDED RELEASE ORAL ONCE
Status: COMPLETED | OUTPATIENT
Start: 2022-06-28 | End: 2022-06-28

## 2022-06-28 RX ORDER — DOCUSATE SODIUM 100 MG/1
100 CAPSULE, LIQUID FILLED ORAL 2 TIMES DAILY
Status: DISCONTINUED | OUTPATIENT
Start: 2022-06-28 | End: 2022-06-29 | Stop reason: HOSPADM

## 2022-06-28 RX ORDER — SENNOSIDES 8.6 MG
1 TABLET ORAL 2 TIMES DAILY
Status: DISCONTINUED | OUTPATIENT
Start: 2022-06-28 | End: 2022-06-29 | Stop reason: HOSPADM

## 2022-06-28 RX ORDER — LACTULOSE 20 G/30ML
10 SOLUTION ORAL DAILY PRN
Status: DISCONTINUED | OUTPATIENT
Start: 2022-06-28 | End: 2022-06-29 | Stop reason: HOSPADM

## 2022-06-28 RX ORDER — FLUCONAZOLE 100 MG/1
200 TABLET ORAL DAILY
Status: DISCONTINUED | OUTPATIENT
Start: 2022-06-29 | End: 2022-06-29 | Stop reason: HOSPADM

## 2022-06-28 RX ADMIN — NYSTATIN 500000 UNITS: 100000 SUSPENSION ORAL at 08:26

## 2022-06-28 RX ADMIN — PANTOPRAZOLE SODIUM 40 MG: 40 INJECTION, POWDER, FOR SOLUTION INTRAVENOUS at 08:27

## 2022-06-28 RX ADMIN — HYDROMORPHONE HYDROCHLORIDE 2 MG: 2 TABLET ORAL at 12:14

## 2022-06-28 RX ADMIN — SENNOSIDES 8.6 MG: 8.6 TABLET, FILM COATED ORAL at 05:40

## 2022-06-28 RX ADMIN — POTASSIUM CHLORIDE 40 MEQ: 1500 TABLET, EXTENDED RELEASE ORAL at 10:29

## 2022-06-28 RX ADMIN — VANCOMYCIN HYDROCHLORIDE 750 MG: 5 INJECTION, POWDER, LYOPHILIZED, FOR SOLUTION INTRAVENOUS at 02:35

## 2022-06-28 RX ADMIN — HYDROMORPHONE HYDROCHLORIDE 2 MG: 2 TABLET ORAL at 21:19

## 2022-06-28 RX ADMIN — CEFEPIME HYDROCHLORIDE 2000 MG: 2 INJECTION, POWDER, FOR SOLUTION INTRAVENOUS at 08:27

## 2022-06-28 RX ADMIN — DULOXETINE HYDROCHLORIDE 30 MG: 30 CAPSULE, DELAYED RELEASE ORAL at 08:26

## 2022-06-28 RX ADMIN — HYDROMORPHONE HYDROCHLORIDE 2 MG: 2 TABLET ORAL at 16:17

## 2022-06-28 RX ADMIN — OXYBUTYNIN CHLORIDE 5 MG: 5 TABLET, EXTENDED RELEASE ORAL at 08:26

## 2022-06-28 RX ADMIN — LACTULOSE 10 G: 20 SOLUTION ORAL at 20:57

## 2022-06-28 RX ADMIN — Medication 6 MG: at 21:01

## 2022-06-28 RX ADMIN — NYSTATIN 500000 UNITS: 100000 SUSPENSION ORAL at 12:12

## 2022-06-28 RX ADMIN — RIVAROXABAN 20 MG: 20 TABLET, FILM COATED ORAL at 08:26

## 2022-06-28 RX ADMIN — FLUCONAZOLE 400 MG: 100 TABLET ORAL at 14:56

## 2022-06-28 RX ADMIN — DOCUSATE SODIUM 100 MG: 100 CAPSULE, LIQUID FILLED ORAL at 17:56

## 2022-06-28 RX ADMIN — POLYETHYLENE GLYCOL 3350 17 G: 17 POWDER, FOR SOLUTION ORAL at 08:27

## 2022-06-28 RX ADMIN — SENNOSIDES 8.6 MG: 8.6 TABLET, FILM COATED ORAL at 17:56

## 2022-06-28 RX ADMIN — DOCUSATE SODIUM 100 MG: 100 CAPSULE, LIQUID FILLED ORAL at 12:12

## 2022-06-28 RX ADMIN — CEFEPIME HYDROCHLORIDE 2000 MG: 2 INJECTION, POWDER, FOR SOLUTION INTRAVENOUS at 01:44

## 2022-06-28 RX ADMIN — ACETAMINOPHEN 650 MG: 325 TABLET, FILM COATED ORAL at 21:18

## 2022-06-28 RX ADMIN — LETROZOLE 2.5 MG: 2.5 TABLET, FILM COATED ORAL at 08:27

## 2022-06-28 NOTE — PLAN OF CARE
Problem: INFECTION - ADULT  Goal: Absence or prevention of progression during hospitalization  Description: INTERVENTIONS:  - Assess and monitor for signs and symptoms of infection  - Monitor lab/diagnostic results  - Monitor all insertion sites, i e  indwelling lines, tubes, and drains  - Monitor endotracheal if appropriate and nasal secretions for changes in amount and color  - [place] appropriate cooling/warming therapies per order  - Administer medications as ordered  - Instruct and encourage patient and family to use good hand hygiene technique  - Identify and instruct in appropriate isolation precautions for identified infection/condition  Outcome: Progressing     Problem: DISCHARGE PLANNING  Goal: Discharge to home or other facility with appropriate resources  Description: INTERVENTIONS:  - Identify barriers to discharge w/patient and caregiver  - Arrange for needed discharge resources and transportation as appropriate  - Identify discharge learning needs (meds, wound care, etc )  - Arrange for interpretive services to assist at discharge as needed  - Refer to Case Management Department for coordinating discharge planning if the patient needs post-hospital services based on physician/advanced practitioner order or complex needs related to functional status, cognitive ability, or social support system  Outcome: Progressing     Problem: Knowledge Deficit  Goal: Patient/family/caregiver demonstrates understanding of disease process, treatment plan, medications, and discharge instructions  Description: Complete learning assessment and assess knowledge base    Interventions:  - Provide teaching at level of understanding  - Provide teaching via preferred learning methods  Outcome: Progressing     Problem: MOBILITY - ADULT  Goal: Maintain or return to baseline ADL function  Description: INTERVENTIONS:  -  Assess patient's ability to carry out ADLs; assess patient's baseline for ADL function and identify physical deficits which impact ability to perform ADLs (bathing, care of mouth/teeth, toileting, grooming, dressing, etc )  - Assess/evaluate cause of self-care deficits   - Assess range of motion  - Assess patient's mobility; develop plan if impaired  - Assess patient's need for assistive devices and provide as appropriate  - Encourage maximum independence but intervene and supervise when necessary  - Involve family in performance of ADLs  - Assess for home care needs following discharge   - Consider OT consult to assist with ADL evaluation and planning for discharge  - Provide patient education as appropriate  Outcome: Progressing

## 2022-06-28 NOTE — PROGRESS NOTES
Progress Note - Infectious Disease   Maryellen Valerio 49 y o  female MRN: [id number]  Unit/Bed#: -01 Encounter: [id number]      Impression/Plan:  1  Neutropenic fever   Patient currently on chemotherapy for problem 3  She has developed neutropenia in the past   Admission   Presented after developing reported fever, and had isolated fever on 06/25   Some possible/nonspecific urinary/GI complaints   She also had 2 which she was using Keflex  No other localizing complaints   Blood cultures without growth   Cell counts improved with Granix  No further fevers noted  CT imaging without localizing findings and noted metastatic disease  Discontinue antibiotics today  Follow-up pending blood cultures while admitted  Oral fluconazole trial as below  Repeat CBC/chemistry tomorrow  Continue to trend fever curve/vitals  Continue bowel regimen and monitor stool output  Supportive care/discharge as per primary     2  Localized phlebitis and cellulitis   Patient developed most recently an area of localized phlebitis after chemotherapy   Swelling and cellulitis seemed to have responded to Keflex   Cell counts improving with Granix   The sites remain stable  No further antibiotics as above  Follow-up pending blood cultures while admitted  Monitor site clinically while admitted  Continue to trend fever curve/WBC  Recommend discussion of port as outpatient with Oncology     3  Breast cancer on chemotherapy   Patient had prior episode of neutropenia with chemo in the past   Last chemotherapy was on 06/15   Counts improved with Granix   Supportive care as per primary      4  Worsening GERD   Patient reports increased GERD since after chemotherapy   Possibly medication related  Noel Warwick also esophageal candidiasis   No oral lesions   Reporting early satiety and still some discomfort with larger pieces of food    Symptoms persist   Continue PPI  Discontinue nystatin  Start oral fluconazole  Monitor for response  Anticipate 14 days of therapy  Antibiotics discontinued as above     Above plan discussed with the patient and with primary service  ID consult service will continue to follow      Antibiotics:  Antibiotics discontinued today  Fluconazole 1    Subjective:  Patient currently denies having any nausea, vomiting, chest pain or shortness of breath  She has not had a bowel movement as of yet  She is still reporting some soreness in her throat and the sensation of food getting stuck midway down her chest   Still reporting some GERD and early satiety  Willing to try antifungal   We discussed discontinuing antibiotics at this point with negative workup in no further fevers  She has no urinary complaints at this point  Objective:  Vitals:  Temp:  [97 8 °F (36 6 °C)-98 4 °F (36 9 °C)] 97 8 °F (36 6 °C)  HR:  [74-99] 74  Resp:  [16-17] 16  BP: ()/(66-70) 106/68  SpO2:  [92 %-96 %] 96 %  Temp (24hrs), Av 1 °F (36 7 °C), Min:97 8 °F (36 6 °C), Max:98 4 °F (36 9 °C)  Current: Temperature: 97 8 °F (36 6 °C)    Physical Exam:   General Appearance:  Alert, interactive, nontoxic, no acute distress  Chronically ill-appearing  Throat: Oropharynx moist without lesions  Lungs:   Clear to auscultation bilaterally; no wheezes, rhonchi or rales; respirations unlabored on room air   Heart:  RRR; no murmur, rub or gallop appreciated   Abdomen:   Soft, non-tender, non-distended, positive bowel sounds  Extremities: No clubbing, cyanosis or edema   Skin: No new rashes or lesions  No new draining wounds noted         Labs, Imaging, & Other studies:   All pertinent labs and imaging studies were personally reviewed  Results from last 7 days   Lab Units 22  0449 22  0526 22  0458   WBC Thousand/uL 17 70* 16 67* 3 31*   HEMOGLOBIN g/dL 9 0* 10 8* 10 3*   PLATELETS Thousands/uL 177 167 142*     Results from last 7 days   Lab Units 22  1311 22  0626 22  0322   POTASSIUM mmol/L 4 4   < > 3 3* CHLORIDE mmol/L 107   < > 98*   CO2 mmol/L 27   < > 24   BUN mg/dL 7   < > 7   CREATININE mg/dL 0 78   < > 0 84   EGFR ml/min/1 73sq m 86   < > 79   CALCIUM mg/dL 8 6   < > 8 8   AST U/L  --   --  32   ALT U/L  --   --  19   ALK PHOS U/L  --   --  44*    < > = values in this interval not displayed  Results from last 7 days   Lab Units 06/26/22  1835 06/25/22  0322   BLOOD CULTURE  No Growth at 24 hrs  No Growth at 24 hrs  No Growth at 72 hrs  No Growth at 72 hrs

## 2022-06-28 NOTE — PROGRESS NOTES
[facility] [facility]  Progress Note - Mili Lao 1965, 49 y o  female MRN: [id number]  Unit/Bed#: -01 Encounter: [id number]  Primary Care Provider: Gali Barksdale MD   Date and time admitted to hospital: 6/25/2022  2:14 AM    * Febrile neutropenia (HCC)  Assessment & Plan  · No fever in last 48 hours, status post Granix x2 which has been now discontinued  · Cleared from Infectious Disease standpoint, antibiotics discontinued, blood cultures x2 negative  · Status post chemo for breast cancer, last dose 2 weeks ago, next dose tomorrow    Hypokalemia  Assessment & Plan  · Down to 3 1 today, p o  40 mEq Replacement  · Recheck in the afternoon    Hyponatremia  Assessment & Plan  · Stable    Primary malignant neoplasm of right breast with metastasis to other site [facility])  Assessment & Plan  · Follows with [facility] as an outpatient  · Currently receiving active chemotherapy  · Continue outpatient follow-up with Oncology    Palliative care patient  Assessment & Plan  · Follows with Dr Cisco Carranza as outpatient for metastatic breast cancer   · Prescribed outpatient Dilaudid 2-4mg q3h prn moderate/severe pain  · PDMP reviewed; continue home dilaudid   · Palliative care consulted  · Continue outpatient f/u    Hypertension  Assessment & Plan  · /68   Pulse 74   Temp 97 8 °F (36 6 °C)   Resp 16   Ht 5' 5" (1 651 m)   Wt 66 kg (145 lb 8 1 oz)   LMP 05/26/2021   SpO2 96%   BMI 24 21 kg/m²   · Stable pressures        VTE Pharmacologic Prophylaxis: VTE Score: 6 Xarelto    Patient Centered Rounds: I performed bedside rounds with nursing staff today  Discussions with Specialists or Other Care Team Provider: yes ID    Education and Discussions with Family / Patient: Attempted to update  () via phone  Unable to contact  Time Spent for Care: 15 minutes  More than 50% of total time spent on counseling and coordination of care as described above      Current Length of Stay: 3 day(s)  Current Patient Status: Inpatient   Certification Statement: The patient will continue to require additional inpatient hospital stay due to need for electrolyte replacement and relief of contipation  Discharge Plan: Anticipate discharge tomorrow to home  Code Status: Level 1 - Full Code    Subjective:   Seen and examined at bedside  C/o constipation  No cp or sob    Objective:     Vitals:   Temp (24hrs), Av 1 °F (36 7 °C), Min:97 8 °F (36 6 °C), Max:98 4 °F (36 9 °C)    Temp:  [97 8 °F (36 6 °C)-98 4 °F (36 9 °C)] 97 8 °F (36 6 °C)  HR:  [74-99] 74  Resp:  [16-17] 16  BP: ()/(66-70) 106/68  SpO2:  [92 %-96 %] 96 %  Body mass index is 24 21 kg/m²  Input and Output Summary (last 24 hours):      Intake/Output Summary (Last 24 hours) at 2022 1333  Last data filed at 2022 3745  Gross per 24 hour   Intake 120 ml   Output 650 ml   Net -530 ml       Physical Exam:   Physical Exam General- Awake, alert and oriented x 3, looks comfortable  HEENT- Normocephalic, atraumatic, oral mucosa- moist  Neck- Supple, No carotid bruit, no JVD  CVS- Normal S1/ S2, Regular rate and rhythm, No murmur, No edema  Respiratory system- B/L clear breath sounds, no wheezing  Abdomen- Soft, Non distended, no tenderness, Bowel sound- present 4 quads  Genitourinary- No suprapubic tenderness, No CVA tenderness  Skin- No new bruise or rash  Musculoskeletal- No gross deformity  Psych- No acute psychosis  CNS- CN II- XII grossly intact, No acute focal neurologic deficit noted      Additional Data:     Labs:  Results from last 7 days   Lab Units 22  0449 22  0526 22  0458   WBC Thousand/uL 17 70*   < > 3 31*   HEMOGLOBIN g/dL 9 0*   < > 10 3*   HEMATOCRIT % 28 4*   < > 31 1*   PLATELETS Thousands/uL 177   < > 142*   BANDS PCT % 2   < >  --    NEUTROS PCT %  --   --  52   LYMPHS PCT %  --   --  18   LYMPHO PCT % 12*   < >  --    MONOS PCT %  --   --  28*   MONO PCT % 1*   < >  --    EOS PCT % 0   < > 0    < > = values in this interval not displayed  Results from last 7 days   Lab Units 06/28/22  1311 06/25/22  0626 06/25/22  0322   SODIUM mmol/L 142   < > 128*   POTASSIUM mmol/L 4 4   < > 3 3*   CHLORIDE mmol/L 107   < > 98*   CO2 mmol/L 27   < > 24   BUN mg/dL 7   < > 7   CREATININE mg/dL 0 78   < > 0 84   ANION GAP mmol/L 8   < > 6   CALCIUM mg/dL 8 6   < > 8 8   ALBUMIN g/dL  --   --  3 0*   TOTAL BILIRUBIN mg/dL  --   --  0 72   ALK PHOS U/L  --   --  44*   ALT U/L  --   --  19   AST U/L  --   --  32   GLUCOSE RANDOM mg/dL 113   < > 121    < > = values in this interval not displayed  Results from last 7 days   Lab Units 06/26/22  0458 06/25/22  0322   LACTIC ACID mmol/L  --  1 0   PROCALCITONIN ng/ml 0 20 0 19       Lines/Drains:  Invasive Devices  Report    Peripheral Intravenous Line  Duration           Peripheral IV 06/25/22 Left Hand 3 days    Peripheral IV 06/25/22 Left Wrist 3 days          Drain  Duration           Closed/Suction Drain Right Back Other (Comment) 315 days                      Imaging: No pertinent imaging reviewed  Recent Cultures (last 7 days):   Results from last 7 days   Lab Units 06/26/22  1835 06/25/22  0322   BLOOD CULTURE  No Growth at 24 hrs  No Growth at 24 hrs  No Growth at 72 hrs  No Growth at 72 hrs         Last 24 Hours Medication List:   Current Facility-Administered Medications   Medication Dose Route Frequency Provider Last Rate    acetaminophen  650 mg Oral Q6H PRN Joel Gotti PA-C      al mag oxide-diphenhydramine-lidocaine viscous  10 mL Swish & Spit Q4H PRN Dino gladys [place], [place]      albuterol  2 puff Inhalation Q4H PRN Joel Gotti [place]      calcium carbonate  500 mg Oral TID PRN Joel Gotti PA-C      dicyclomine  10 mg Oral Q6H PRN Dino palacios PA-C      diphenhydrAMINE  50 mg Oral HS PRN Dino Olsen PA-C      docusate sodium  100 mg Oral BID Vannesa Trotter MD      DULoxetine  30 mg Oral Daily Regina Atkins      fluticasone  2 spray Nasal Daily [place], [place]      HYDROmorphone  2 mg Oral Q3H PRN [place], [place]      HYDROmorphone  4 mg Oral Q3H PRN [place], [place]      letrozole  2 5 mg Oral Daily [place], [place]      melatonin  6 mg Oral HS [place], [place]      nystatin  500,000 Units Swish & Swallow 4x Daily [facility] Kayenta, DO      ondansetron  4 mg Intravenous Q6H PRN [place], [place]      oxybutynin  5 mg Oral Daily [place], [place]      pantoprazole  40 mg Intravenous Q24H [address] [place], DO      polyethylene glycol  17 g Oral Daily [facility], DO      rivaroxaban  20 mg Oral Daily With Breakfast [place], [place]      senna  1 tablet Oral HS PRN Michelle Hyde PA-C          Today, Patient Was Seen By: Kenny Hernandez MD    **Please Note: This note may have been constructed using a voice recognition system  **

## 2022-06-28 NOTE — SOCIAL WORK
Palliative LSW saw patient at the bedside today  LSW appreciates the opportunity to provider patient/family with inpatient emotional support and guidance while patient continues to receive medical attention from the medical team     Topics discussed: Patient addressed symptoms with Dr Gilmar Meraz  Patient reported that her youngest son left the house last night and is staying at a friends house to get away from   Patient reported that  is drinking and attempted to drive drunk so son took the keys and went to friends so that spouse couldn't drink  Patient reported that oldest son, Hung Kwong is safe in the home and that patient's brother is there to help Hung Kwong if needed  Patient denies concern for safety of children at this time  Patient reported that she would like to leave spouse however unable to do so financially and none of her family/friends have space for her and the children       Areas that need follow-up: ongoing support  Resources given: none  Others present:  [facility] Dr Pearl Morrison will continue to follow as requested by the medical team, patient, or family

## 2022-06-28 NOTE — PROGRESS NOTES
Progress note - Palliative and Supportive Care   Dayday Novak 49 y o  female [id number]    Patient Active Problem List   Diagnosis    Cough    Pleural effusion    Tobacco abuse    Metast[facility]oplasm ([facility] [address] )    Hypertension    Malignant neoplasm of breast in female, estrogen receptor positive ([facility] )    Palliative care patient    Malignant pleural effusion    Primary malignant neoplasm of right breast with metastasis to other site ([facility] )    Constipation    Dehydration    Bone metastases (HCC)    Cancer related pain    Chemotherapy induced neutropenia (HCC)    Febrile neutropenia (HCC)    Hyponatremia    Hypokalemia    Sore throat     Active issues specifically addressed today include:    - neutropenic fever   - metastatic breast cancer   - recurrent malignant pleural effusions   - cancer-related pain   - goals of care support   - palliative care encounter    Plan:  #symptom management  #cancer-related pain   - continue APAP 650 mg PO Q6H PRN   - max daily 4000 mg   - continue duloxetine 30 mg PO QDaily   - continue dicyclomine 10 mg PO Q6H PRN [abdominal cramping]   - continue hydromorphone 2 mg PO Q3H PRN [mod] or 4 mg PO Q3H PRN [severe]    - total OME usage yesterday: 64 mg    Adequate pain management on current regimen  #OIC   - change senna 1 tab PO BID   - continue miralax 17 g PO QDaily   - add lactulose 10 g PO QDaily PRN    Last BM 6 days ago  Passing flatus  Increasing intermittent abdominal cramping [bentyl effective]  Consider use of methlnaltrexone if no BM by tomorrow, discussed with patient, agreeable to plan  #nausea   - continue ondansetron 4 mg IV Q6H PRN    Resolved  Denies nausea, vomiting  Tolerating small volume PO intake without difficulty      #insomnia   - continue melatonin 6 mg PO QHS    #psychosocial support   - emotional support provided   - archify [phone]   - two children   - Dorita Gregory [son, 19, special needs]   - Alejandra Sotouse, 17]   - Merritt Salvador [sister] [phone]   - resides at home with spouse + sons      We appreciate the opportunity to participate in this patient's care  We will continue to follow  Please do not hesitate to contact our on-call provider through our clinic answering service at [phone] should you have acute symptom control concerns  Code Status: Full Code - Level 1   Decisional apparatus:  Patient is competent on my exam today  If competence is lost, patient's substitute decision maker would default to spouse by [place]     Advance Directive / Living Will / POLST:  Not on File, not previously completed    Interval history:   - NAEO   - symptoms assessment in MDM section above    MEDICATIONS / ALLERGIES:     current meds:   Current Facility-Administered Medications   Medication Dose Route Frequency    acetaminophen (TYLENOL) tablet 650 mg  650 mg Oral Q6H PRN    al mag oxide-diphenhydramine-lidocaine viscous (MAGIC MOUTHWASH) suspension 10 mL  10 mL Swish & Spit Q4H PRN    albuterol (PROVENTIL HFA,VENTOLIN HFA) inhaler 2 puff  2 puff Inhalation Q4H PRN    calcium carbonate (TUMS) chewable tablet 500 mg  500 mg Oral TID PRN    cefepime (MAXIPIME) 2 g/50 mL dextrose IVPB  2,000 mg Intravenous Q8H    dicyclomine (BENTYL) capsule 10 mg  10 mg Oral Q6H PRN    diphenhydrAMINE (BENADRYL) tablet 50 mg  50 mg Oral HS PRN    DULoxetine (CYMBALTA) delayed release capsule 30 mg  30 mg Oral Daily    fluticasone (FLONASE) 50 mcg/act nasal spray 2 spray  2 spray Nasal Daily    HYDROmorphone (DILAUDID) tablet 2 mg  2 mg Oral Q3H PRN    HYDROmorphone (DILAUDID) tablet 4 mg  4 mg Oral Q3H PRN    letrozole (FEMARA) tablet 2 5 mg  2 5 mg Oral Daily    melatonin tablet 6 mg  6 mg Oral HS    nystatin (MYCOSTATIN) oral suspension 500,000 Units  500,000 Units Swish & Swallow 4x Daily    ondansetron (ZOFRAN) injection 4 mg  4 mg Intravenous Q6H PRN    oxybutynin (DITROPAN-XL) 24 hr tablet 5 mg  5 mg Oral Daily    pantoprazole (PROTONIX) injection 40 mg  40 mg Intravenous Q24H [address]    polyethylene glycol (MIRALAX) packet 17 g  17 g Oral Daily    rivaroxaban (XARELTO) tablet 20 mg  20 mg Oral Daily With Breakfast    senna (SENOKOT) tablet 8 6 mg  1 tablet Oral HS PRN    vancomycin 750 mg in sodium chloride 0 9% 250 mL  750 mg Intravenous Q8H       Allergies   Allergen Reactions    Codeine Anaphylaxis    Morphine GI Intolerance, Itching and Vomiting    Sulfa Antibiotics Hives and Itching       OBJECTIVE:    Physical Exam  Physical Exam  Vitals and nursing note reviewed  Constitutional:       General: She is awake  Appearance: She is not diaphoretic  Comments: Sitting up in NAD  BMI 24 2  Non-toxic appearing   HENT:      Head: Normocephalic and atraumatic  Right Ear: External ear normal       Left Ear: External ear normal       Nose: No rhinorrhea  Eyes:      Comments: No gaze preference   Pulmonary:      [place]: No tachypnea, accessory muscle usage or respiratory distress  Comments: Completes full sentences without difficulty  Musculoskeletal:      Cervical back: Normal range of motion  Neurological:      General: No focal deficit present  Mental Status: She is alert and oriented to person, place, and time  Psychiatric:         Attention and Perception: Attention normal          Mood and Affect: Mood and affect normal          Speech: Speech normal          Cognition and Memory: Cognition and memory normal          Lab Results: I have personally reviewed pertinent labs  , CBC:   Lab Results   Component Value Date    WBC 17 70 (H) 06/28/2022    HGB 9 0 (L) 06/28/2022    HCT 28 4 (L) 06/28/2022     (H) 06/28/2022     06/28/2022    MCH 31 8 06/28/2022    MCHC 31 7 06/28/2022    RDW 15 7 (H) 06/28/2022    MPV 12 1 06/28/2022   , CMP:   Lab Results   Component Value Date    SODIUM 143 06/28/2022    K 3 1 (L) 06/28/2022     (H) 06/28/2022    CO2 23 06/28/2022    BUN 7 06/28/2022    CREATININE 0 81 06/28/2022    CALCIUM 7 1 (L) 06/28/2022    EGFR 82 06/28/2022   , PT/PTT:No results found for: PT, PTT  Imaging Studies: Reviewed  EKG, Pathology, and Other Studies: Reviewed    Counseling / Coordination of Care    Total floor / unit time spent today 25 minutes  Greater than 50% of total time was spent with the patient and / or family counseling and / or coordination of care  A description of the counseling / coordination of care: provided medical updates, discussed palliative care, determined competency, determined goals of care, determined POA, determined social/family support, discussed plans of care, discussed symptom management, provided psychosocial support  Symptoms management adjustment   Coordinated plan of care with primary team

## 2022-06-28 NOTE — ASSESSMENT & PLAN NOTE
· Follows with Dr Villarreal  as outpatient for metastatic breast cancer   · Prescribed outpatient Dilaudid 2-4mg q3h prn moderate/severe pain  · PDMP reviewed; continue home dilaudid   · Palliative care consulted  · Continue outpatient f/u

## 2022-06-28 NOTE — CASE MANAGEMENT
Case Management Assessment & Discharge Planning Note    Patient name Sonya Robb  Location /-60 MRN [id number]  : 1965 Date 2022       Current Admission Date: 2022  Current Admission Diagnosis:Febrile neutropenia [facility])   Patient Active Problem List    Diagnosis Date Noted    Sore throat 2022    Febrile neutropenia ([facility] [address] [facility] ) 2022    Hyponatremia 2022    Hypokalemia 2022    Chemotherapy induced neutropenia ([facility] ) 2022    Cancer related pain 2021    Dehydration 2021    Bone metastases ([facility] ) 2021    Constipation 2021    Palliative care patient 2021    Malignant pleural effusion 2021    Primary malignant neoplasm of right breast with metastasis to other site [facility]) 2021    Malignant neoplasm of breast in female, estrogen receptor positive ([facility] ) 2021    Hypertension 2021    Metastatic neoplasm ([facility] ) 2021    Cough 2021    Pleural effusion 2021    Tobacco abuse 2021      LOS (days): 3  Geometric Mean LOS (GMLOS) (days):   Days to GMLOS:     OBJECTIVE:    Risk of Unplanned Readmission Score: 22 84         Current admission status: Inpatient       Preferred Pharmacy:   [facility][id number]- [place], [place] - [facility] [address] [address]  Phone: [phone] Fax: [phone]    [address], [address]  Phone: [phone] Fax: [phone]    [facility] [address], [address] [place] [address]  Phone: [phone] Fax: [phone]    [facility] #[id number] - [address]  [address] [place] [address]  Phone: [phone] Fax: [phone]    [address] [place] [address], [address] [place] [address]  Phone: [phone] Fax: [address] [facility], [place] - [address]  [address]  [facility] [place] [facility] [address]  Phone: [phone] Fax: [phone]    [id number]- [place],  [facility]  [address] [facility]  [address] [facility] [address]  Phone: [phone] Fax: [phone]    Primary Care Provider: Naga Sewell MD    Primary Insurance: Batsheva Maria  Secondary Insurance:     ASSESSMENT:  [address] [place] [address] Representative - Spouse   Primary Phone: [phone] (Mobile)  Home Phone: [phone]                    Obs Notice Signed:  (The pt is not OBS)    Readmission Root Cause  30 Day Readmission: No (The pt is not a 30 day re-admit)    Patient Information  Admitted from[de-identified] Home  Mental Status: Alert  During Assessment patient was accompanied by: Not accompanied during assessment  Assessment information provided by[de-identified] Patient  Primary Caregiver: Self  Support Systems: Spouse/significant other  [place] of Residence: [place] [address] do you live in?: [address] entry access options   Select all that apply : Stairs  Number of steps to enter home : 2  Do the steps have railings?: No  Type of Current Residence: [facility]  In the last 12 months, was there a time when you were not able to pay the mortgage or rent on time?: No  In the last 12 months, how many places have you lived?: 1  In the last 12 months, was there a time when you did not have a steady place to sleep or slept in a shelter (including now)?: No  Homeless/housing insecurity resource given?: N/A  Living Arrangements: Lives w/ Spouse/significant other  Is patient a ?: No    Activities of Daily Living Prior to Admission  Functional Status: Independent  Completes ADLs independently?: Yes  Ambulates independently?: Yes  Does patient use assisted devices?: No  Does patient currently own DME?: No  Does patient have a history of Outpatient Therapy (PT/OT)?: No  Does the patient have a history of Short-Term Rehab?: No  Does patient have a history of HHC?: No  Does patient currently have [facility]?: No         Patient Information Continued  Income Source: SSI/SSD  Does patient have prescription coverage?: Yes  Within the past 12 months, you worried that your food would run out before you got the money to buy more : Never true  Within the past 12 months, the food you bought just didn't last and you didn't have money to get more : Never true  Food insecurity resource given?: N/A  Does patient receive dialysis treatments?: No  Does patient have a history of substance abuse?: No  Does patient have a history of Mental Health Diagnosis?: No    PHQ 2/9 Screening   Reviewed PHQ 2/9 Depression Screening Score?: Yes    Means of Transportation  Means of Transport to Appts[de-identified] Drives Self  In the past 12 months, has lack of transportation kept you from medical appointments or from getting medications?: No  In the past 12 months, has lack of transportation kept you from meetings, work, or from getting things needed for daily living?: No  Was application for public transport provided?: N/A        DISCHARGE DETAILS:    Discharge planning discussed with[de-identified] pt  Freedom of Choice: Yes  Comments - Freedom of Choice: The pt states that she does not need any HHC at this time  She states that he  works in medical for [employer]  She is confident he will be able to care for her if she has any needs  CM contacted family/caregiver?: Yes  Were Treatment Team discharge recommendations reviewed with patient/caregiver?: Yes  Did patient/caregiver verbalize understanding of patient care needs?: Yes  Were patient/caregiver advised of the risks associated with not following Treatment Team discharge recommendations?: Yes    Contacts  Patient Contacts: pt  Relationship to Patient[de-identified] Other (Comment)  Contact Method:  In Person  Reason/Outcome: Discharge [address]         Is the patient interested in [place] [address] at discharge?: No    DME Referral Provided  Referral made for DME?: No              Treatment Team Recommendation: Home  Discharge Destination Plan[de-identified] Home  Transport at Discharge : Family

## 2022-06-28 NOTE — ASSESSMENT & PLAN NOTE
· Follows with [address] [place] [address] Hematology as an outpatient  · Currently receiving active chemotherapy  · Continue outpatient follow-up with Oncology

## 2022-06-28 NOTE — ASSESSMENT & PLAN NOTE
· No fever in last 48 hours, status post Granix x2 which has been now discontinued  · Cleared from Infectious Disease standpoint, antibiotics discontinued, blood cultures x2 negative  · Status post chemo for breast cancer, last dose 2 weeks ago, next dose tomorrow

## 2022-06-28 NOTE — ASSESSMENT & PLAN NOTE
· /68   Pulse 74   Temp 97 8 °F (36 6 °C)   Resp 16   Ht 5' 5" (1 651 m)   Wt 66 kg (145 lb 8 1 oz)   LMP 05/26/2021   SpO2 96%   BMI 24 21 kg/m²   · Stable pressures

## 2022-06-29 ENCOUNTER — HOSPITAL ENCOUNTER (OUTPATIENT)
Dept: INFUSION CENTER | Facility: CLINIC | Age: 57
Discharge: HOME | End: 2022-06-29

## 2022-06-29 ENCOUNTER — TRANSITIONAL CARE MANAGEMENT (OUTPATIENT)
Dept: INTERNAL MEDICINE CLINIC | Facility: CLINIC | Age: 57
End: 2022-06-29

## 2022-06-29 VITALS
OXYGEN SATURATION: 93 % | WEIGHT: 145.5 LBS | BODY MASS INDEX: 24.24 KG/M2 | RESPIRATION RATE: 16 BRPM | DIASTOLIC BLOOD PRESSURE: 71 MMHG | TEMPERATURE: 97.9 F | HEART RATE: 77 BPM | HEIGHT: 65 IN | SYSTOLIC BLOOD PRESSURE: 110 MMHG

## 2022-06-29 LAB
ANION GAP SERPL CALCULATED.3IONS-SCNC: 8 MMOL/L (ref 4–13)
BUN SERPL-MCNC: 8 MG/DL (ref 5–25)
CALCIUM SERPL-MCNC: 8.4 MG/DL (ref 8.3–10.1)
CHLORIDE SERPL-SCNC: 106 MMOL/L (ref 100–108)
CO2 SERPL-SCNC: 27 MMOL/L (ref 21–32)
CREAT SERPL-MCNC: 0.76 MG/DL (ref 0.6–1.3)
ERYTHROCYTE [DISTWIDTH] IN BLOOD BY AUTOMATED COUNT: 15.8 % (ref 11.6–15.1)
GFR SERPL CREATININE-BSD FRML MDRD: 89 ML/MIN/1.73SQ M
GLUCOSE SERPL-MCNC: 90 MG/DL (ref 65–140)
HCT VFR BLD AUTO: 33.3 % (ref 34.8–46.1)
HGB BLD-MCNC: 10.7 G/DL (ref 11.5–15.4)
MCH RBC QN AUTO: 31.8 PG (ref 26.8–34.3)
MCHC RBC AUTO-ENTMCNC: 32.1 G/DL (ref 31.4–37.4)
MCV RBC AUTO: 99 FL (ref 82–98)
PLATELET # BLD AUTO: 198 THOUSANDS/UL (ref 149–390)
PMV BLD AUTO: 11.2 FL (ref 8.9–12.7)
POTASSIUM SERPL-SCNC: 4 MMOL/L (ref 3.5–5.3)
RBC # BLD AUTO: 3.37 MILLION/UL (ref 3.81–5.12)
SODIUM SERPL-SCNC: 141 MMOL/L (ref 136–145)
WBC # BLD AUTO: 10.31 THOUSAND/UL (ref 4.31–10.16)

## 2022-06-29 PROCEDURE — 99239 HOSP IP/OBS DSCHRG MGMT >30: CPT | Performed by: FAMILY MEDICINE

## 2022-06-29 PROCEDURE — 85027 COMPLETE CBC AUTOMATED: CPT | Performed by: FAMILY MEDICINE

## 2022-06-29 PROCEDURE — C9113 INJ PANTOPRAZOLE SODIUM, VIA: HCPCS | Performed by: INTERNAL MEDICINE

## 2022-06-29 PROCEDURE — 99232 SBSQ HOSP IP/OBS MODERATE 35: CPT | Performed by: INTERNAL MEDICINE

## 2022-06-29 PROCEDURE — 80048 BASIC METABOLIC PNL TOTAL CA: CPT | Performed by: FAMILY MEDICINE

## 2022-06-29 RX ORDER — FLUCONAZOLE 200 MG/1
200 TABLET ORAL DAILY
Qty: 12 TABLET | Refills: 0 | Status: SHIPPED | OUTPATIENT
Start: 2022-06-30 | End: 2022-07-12

## 2022-06-29 RX ORDER — POLYETHYLENE GLYCOL 3350 17 G/17G
17 POWDER, FOR SOLUTION ORAL DAILY
Refills: 0
Start: 2022-06-30

## 2022-06-29 RX ORDER — LACTULOSE 20 G/30ML
10 SOLUTION ORAL DAILY PRN
Qty: 300 ML | Refills: 0 | Status: SHIPPED | OUTPATIENT
Start: 2022-06-29 | End: 2022-09-07

## 2022-06-29 RX ORDER — DOCUSATE SODIUM 100 MG/1
100 CAPSULE, LIQUID FILLED ORAL 2 TIMES DAILY
Qty: 20 CAPSULE | Refills: 0 | Status: SHIPPED | OUTPATIENT
Start: 2022-06-29 | End: 2022-09-07

## 2022-06-29 RX ADMIN — HYDROMORPHONE HYDROCHLORIDE 2 MG: 2 TABLET ORAL at 09:01

## 2022-06-29 RX ADMIN — DOCUSATE SODIUM 100 MG: 100 CAPSULE, LIQUID FILLED ORAL at 08:44

## 2022-06-29 RX ADMIN — DULOXETINE HYDROCHLORIDE 30 MG: 30 CAPSULE, DELAYED RELEASE ORAL at 08:44

## 2022-06-29 RX ADMIN — SENNOSIDES 8.6 MG: 8.6 TABLET, FILM COATED ORAL at 08:44

## 2022-06-29 RX ADMIN — FLUTICASONE PROPIONATE 2 SPRAY: 50 SPRAY, METERED NASAL at 10:59

## 2022-06-29 RX ADMIN — OXYBUTYNIN CHLORIDE 5 MG: 5 TABLET, EXTENDED RELEASE ORAL at 08:44

## 2022-06-29 RX ADMIN — RIVAROXABAN 20 MG: 20 TABLET, FILM COATED ORAL at 08:45

## 2022-06-29 RX ADMIN — LETROZOLE 2.5 MG: 2.5 TABLET, FILM COATED ORAL at 08:48

## 2022-06-29 RX ADMIN — POLYETHYLENE GLYCOL 3350 17 G: 17 POWDER, FOR SOLUTION ORAL at 08:43

## 2022-06-29 RX ADMIN — FLUCONAZOLE 200 MG: 100 TABLET ORAL at 08:44

## 2022-06-29 RX ADMIN — PANTOPRAZOLE SODIUM 40 MG: 40 INJECTION, POWDER, FOR SOLUTION INTRAVENOUS at 08:44

## 2022-06-29 NOTE — ASSESSMENT & PLAN NOTE
· Stable and cleared for DC by ID standpoint  · No fever in last 48 hours, status post Granix x2 which has been now discontinued  · Cleared from Infectious Disease standpoint, antibiotics discontinued, blood cultures x2 negative  · Status post chemo for breast cancer, last dose 2 weeks ago, next dose tomorrow

## 2022-06-29 NOTE — DISCHARGE SUMMARY
[facility]  Discharge- Dayday Novak 1965, 49 y o  female MRN: [id number]  Unit/Bed#: -01 Encounter: [id number]  Primary Care Provider: Reinier Sim MD   Date and time admitted to hospital: 6/25/2022  2:14 AM    * Febrile neutropenia ([address] )  Assessment & Plan  · Stable and cleared for DC by ID standpoint  · No fever in last 48 hours, status post Granix x2 which has been now discontinued  · Cleared from Infectious Disease standpoint, antibiotics discontinued, blood cultures x2 negative  · Status post chemo for breast cancer, last dose 2 weeks ago, next dose tomorrow    Sore throat  Assessment & Plan  · Patient complaining of slight pain or sore throat with swallowing  · Continue nystatin swish and swallow given immunocompromised state  · Started on PO fluocanozle 14 days Rx total (12 days at home)    Hypokalemia  Assessment & Plan  · Resolved s/p p o  40 mEq Replacement on 6/28      Hyponatremia  Assessment & Plan  · Stable and resolved    Primary malignant neoplasm of right breast with metastasis to other site [facility])  Assessment & Plan  · To call oncology for further recs on chemo session  · Follows with [facility]ology as an outpatient  · Currently receiving active chemotherapy  · Continue outpatient follow-up with Oncology    Palliative care patient  Assessment & Plan  · Cont OP f/u  · Follows with Dr Yen Ceja as outpatient for metastatic breast cancer   · Prescribed outpatient Dilaudid 2-4mg q3h prn moderate/severe pain  · PDMP reviewed; continue home dilaudid   · Palliative care consulted      Hypertension  Assessment & Plan  /71   Pulse 77   Temp 97 9 °F (36 6 °C)   Resp 16   Ht 5' 5" (1 651 m)   Wt 66 kg (145 lb 8 1 oz)   LMP 05/26/2021   SpO2 93%   BMI 24 21 kg/m²   · Stable pressures   OP followup         Medical Problems             Resolved Problems  Date Reviewed: 6/29/2022   None               Discharging Physician / Practitioner: Kiersten Tatum MD  PCP: Humberto Lara MD  Admission Date:   Admission Orders (From admission, onward)     Ordered        06/25/22 0446  Inpatient Admission  Once                      Discharge Date: 06/29/22    Consultations During Hospital Stay:  IP CONSULT TO INFECTIOUS DISEASES  IP CONSULT TO NEPHROLOGY  IP CONSULT TO PHARMACY  IP CONSULT TO PHARMACY  · IP CONSULT TO PALLIATIVE CARE      Procedures Performed:   CT chest abdomen pelvis wo contrast   Final Result by Twin Owen MD (06/27 1217)      Evidence of metastatic disease to liver and osseous structures without obvious significant change  No new collection is identified  Worsening pleural effusions right greater than left  Small lung nodules are stable without new consolidation  No new bony destructive features  Workstation performed: [id number]         XR chest 2 views   ED Interpretation by Felecia Rocha DO (06/25 1991)   No acute cardiopulmonary abnormalities      Final Result by Sana Johnson MD (06/25 4660)      Small right pleural effusion  No evidence of acute abnormality in the chest                   Workstation performed: [id number]         ·   ·     Significant Findings / Test Results:   · Negative Blood cultures    Incidental Findings:   · none     Test Results Pending at Discharge (will require follow up):   · none     Outpatient Tests Requested:  · none    Complications:  none    Reason for Admission: febrile neutropenia    Hospital Course:   Shreyas Littlejohn is a 49 y o  female patient who originally presented to the hospital on 6/25/2022 due to febrile neutropenia  Was treated with IV antibiotics and ID consulted  Fever resolved  Also received grannix x2 and then was [facility] with satisfactory response  Hb stable at DC today  Afebrile and electrolytes stable  To follow up with oncology for further recommendations on chemo  Continue bowel regimen      Please see above list of diagnoses and related plan for additional information  Condition at Discharge: stable    Discharge Day Visit / Exam:   Subjective:  I am better  No cp or sob or abd pain, passing flatus  Vitals: Blood Pressure: 110/71 (06/29/22 0838)  Pulse: 77 (06/29/22 0838)  Temperature: 97 9 °F (36 6 °C) (06/29/22 0838)  Temp Source: Oral (06/27/22 1600)  Respirations: 16 (06/29/22 0838)  Height: 5' 5" (165 1 cm) (06/25/22 1446)  Weight - Scale: 66 kg (145 lb 8 1 oz) (06/25/22 1446)  SpO2: 93 % (06/29/22 0838)  Exam:   Physical Exam General- Awake, alert and oriented x 3, looks comfortable, alopecia  HEENT- Normocephalic, atraumatic, oral mucosa- moist  Neck- Supple, No carotid bruit, no JVD  CVS- Normal S1/ S2, Regular rate and rhythm, No murmur, No edema  Respiratory system- B/L clear breath sounds, no wheezing  Abdomen- Soft, Non distended, no tenderness, Bowel sound- present 4 quads  Genitourinary- No suprapubic tenderness, No CVA tenderness  Skin- No new bruise or rash  Musculoskeletal- No gross deformity  Psych- No acute psychosis  CNS- CN II- XII grossly intact, No acute focal neurologic deficit noted      Discussion with Family: Attempted to update  () via phone  Unable to contact  Discharge instructions/Information to patient and family:   See after visit summary for information provided to patient and family  Provisions for Follow-Up Care:  See after visit summary for information related to follow-up care and any pertinent home health orders  Disposition:   Home    Planned Readmission:  No     Discharge Statement:  I spent 35 minutes discharging the patient  This time was spent on the day of discharge  I had direct contact with the patient on the day of discharge  Greater than 50% of the total time was spent examining patient, answering all patient questions, arranging and discussing plan of care with patient as well as directly providing post-discharge instructions    Additional time then spent on discharge activities  Discharge Medications:  See after visit summary for reconciled discharge medications provided to patient and/or family        **Please Note: This note may have been constructed using a voice recognition system**

## 2022-06-29 NOTE — ASSESSMENT & PLAN NOTE
· Cont OP f/u  · Follows with Dr Siddharth Lewis as outpatient for metastatic breast cancer   · Prescribed outpatient Dilaudid 2-4mg q3h prn moderate/severe pain  · PDMP reviewed; continue home dilaudid   · Palliative care consulted

## 2022-06-29 NOTE — PROGRESS NOTES
Progress Note - Infectious Disease   Marica Schwab 49 y o  female MRN: [id number]  Unit/Bed#: -01 Encounter: [id number]      Impression/Plan:  1  Neutropenic fever   Patient currently on chemotherapy for problem 3  She has developed neutropenia in the past   Admission   Presented after developing reported fever, and had isolated fever on 06/25   Some possible/nonspecific urinary/GI complaints   She also had 2 which she was using Keflex  No other localizing complaints   Blood cultures without growth   Cell counts improved with Granix   No further fevers noted   CT imaging without localizing findings and noted metastatic disease  No further antibiotic  Follow-up pending blood cultures while admitted  Oral fluconazole as below  Trend CBC/vitals while admitted  Bowel regimen as per primary  Supportive care/discharge as per primary     2  Localized phlebitis and cellulitis   Patient developed most recently an area of localized phlebitis after chemotherapy   Swelling and cellulitis seemed to have responded to Keflex   Cell counts improving with Granix   The sites remain stable  No further antibiotics as above  Follow-up pending blood cultures while admitted  Monitor site clinically while admitted  Continue to trend fever curve/WBC  Recommend discussion of port as outpatient with Oncology     3  Breast cancer on chemotherapy   Patient had prior episode of neutropenia with chemo in the past   Last chemotherapy was on 06/15   Counts improved with Granix   Supportive care as per primary      4  Worsening GERD   Patient reports increased GERD since after chemotherapy   Possibly medication related  Joseph Sutton also esophageal candidiasis   No oral lesions   Reporting early satiety and still some discomfort with larger pieces of food  Reporting mild improvement with fluconazole initiation yesterday    Continue PPI  Continue fluconazole 200 mg daily, total 14 days  Monitor for response  If symptoms persist/recur recommend GI evaluation outpatient  No further antibiotics as above     Above plan discussed with primary service  Given current stability, ID consult service will sign off at this time  Please call if questions      Antibiotics:  Off systemic antibiotic day 2  Fluconazole 2    Subjective:  Patient seen this morning and she denied having any nausea, vomiting, chest pain or shortness of breath  She has yet to have a bowel movement  She reports some improvement in her swallowing since starting fluconazole  We discussed continuing oral tablet as an outpatient  I let her know as well that if her swallowing dysfunction continues that then she should seek referral to GI for further evaluation  Additional questions answered  Objective:  Vitals:  Temp:  [97 9 °F (36 6 °C)-98 °F (36 7 °C)] 97 9 °F (36 6 °C)  HR:  [68-77] 77  Resp:  [16-17] 16  BP: (110-125)/(68-71) 110/71  SpO2:  [93 %-95 %] 93 %  Temp (24hrs), Av °F (36 7 °C), Min:97 9 °F (36 6 °C), Max:98 °F (36 7 °C)  Current: Temperature: 97 9 °F (36 6 °C)    Physical Exam:   General Appearance:  Alert, interactive, nontoxic, no acute distress  Throat: Oropharynx moist without lesions  Lungs:   Clear to auscultation bilaterally; no wheezes, rhonchi or rales; respirations unlabored on room air   Heart:  RRR; no murmur, rub or gallop appreciated   Abdomen:   Soft, non-tender, non-distended, positive bowel sounds  Extremities: No clubbing, cyanosis or edema   Skin: No new rashes or lesions  No new draining wounds noted         Labs, Imaging, & Other studies:   All pertinent labs and imaging studies were personally reviewed  Results from last 7 days   Lab Units 22  0454 22  0449 22  0526   WBC Thousand/uL 10 31* 17 70* 16 67*   HEMOGLOBIN g/dL 10 7* 9 0* 10 8*   PLATELETS Thousands/uL 198 177 167     Results from last 7 days   Lab Units 22  0454 22  0626 22  0322   POTASSIUM mmol/L 4 0   < > 3 3*   CHLORIDE mmol/L 106   < > 98*   CO2 mmol/L 27   < > 24   BUN mg/dL 8   < > 7   CREATININE mg/dL 0 76   < > 0 84   EGFR ml/min/1 73sq m 89   < > 79   CALCIUM mg/dL 8 4   < > 8 8   AST U/L  --   --  32   ALT U/L  --   --  19   ALK PHOS U/L  --   --  44*    < > = values in this interval not displayed  Results from last 7 days   Lab Units 06/26/22  1835 06/25/22  0322   BLOOD CULTURE  No Growth at 48 hrs  No Growth at 48 hrs  No Growth at 72 hrs  No Growth at 72 hrs

## 2022-06-29 NOTE — ASSESSMENT & PLAN NOTE
· Patient complaining of slight pain or sore throat with swallowing  · Continue nystatin swish and swallow given immunocompromised state  · Started on PO fluocanozle 14 days Rx total (12 days at home)

## 2022-06-29 NOTE — PROGRESS NOTES
All discharge instructions reviewed with patient  Marika removed  Makenzie eaton  All scripts called to pharmacy by MD   Pt discharged to home

## 2022-06-29 NOTE — ASSESSMENT & PLAN NOTE
/71   Pulse 77   Temp 97 9 °F (36 6 °C)   Resp 16   Ht 5' 5" (1 651 m)   Wt 66 kg (145 lb 8 1 oz)   LMP 05/26/2021   SpO2 93%   BMI 24 21 kg/m²   · Stable pressures   OP followup

## 2022-06-29 NOTE — ASSESSMENT & PLAN NOTE
· To call oncology for further recs on chemo session  · [facility] as an [facility]  · Currently receiving active chemotherapy  · Continue outpatient follow-up with Oncology

## 2022-06-30 ENCOUNTER — HOSPITAL ENCOUNTER (OUTPATIENT)
Dept: INFUSION CENTER | Facility: CLINIC | Age: 57
Discharge: HOME | End: 2022-06-30

## 2022-06-30 LAB
BACTERIA BLD CULT: NORMAL
BACTERIA BLD CULT: NORMAL

## 2022-06-30 NOTE — UTILIZATION REVIEW
Albert Rojas MD   Physician   Hospitalist   Discharge Summary      Signed   Date of Service:  6/29/2022  9:24 AM                 Signed              Show:Clear all  [x]Manual[x]Template[x]Copied    Added by:  [x]Meir Galarza MD      []Richi for details    [facility]  Discharge- Coretha Amend 1965, 49 y o  female MRN: [id number]  Unit/Bed#: -01 Encounter: [id number]  Primary Care Provider: Nuno Mercado MD   Date and time admitted to hospital: 6/25/2022  2:14 AM     * Febrile neutropenia ([address] )  Assessment & Plan  · Stable and cleared for DC by ID standpoint  · No fever in last 48 hours, status post Granix x2 which has been now discontinued  · Cleared from Infectious Disease standpoint, antibiotics discontinued, blood cultures x2 negative  · Status post chemo for breast cancer, last dose 2 weeks ago, next dose tomorrow     Sore throat  Assessment & Plan  · Patient complaining of slight pain or sore throat with swallowing  · Continue nystatin swish and swallow given immunocompromised state  · Started on PO fluocanozle 14 days Rx total (12 days at home)     Hypokalemia  Assessment & Plan  · Resolved s/p p o  40 mEq Replacement on 6/28        Hyponatremia  Assessment & Plan  · Stable and resolved     Primary malignant neoplasm of right breast with metastasis to other site [facility])  Assessment & Plan  · To call oncology for further recs on chemo session  · Follows with [address] [place] [address] Hematology as an outpatient  · Currently receiving active chemotherapy  · Continue outpatient follow-up with Oncology     Palliative care patient  Assessment & Plan  · Cont OP f/u  · Follows with Dr Felicita Dobson as outpatient for metastatic breast cancer   · Prescribed outpatient Dilaudid 2-4mg q3h prn moderate/severe pain  · PDMP reviewed; continue home dilaudid   · Palliative care consulted        Hypertension  Assessment & Plan  · /71   Pulse 77   Temp 97 9 °F (36 6 °C)   Resp 16   Ht 5' 5" (1 651 m)   Wt 66 kg (145 lb 8 1 oz)   LMP 05/26/2021   SpO2 93%   BMI 24 21 kg/m²   · Stable pressures  OP followup                Medical Problems                  Resolved Problems  Date Reviewed: 6/29/2022   None                   Discharging Physician / Practitioner: Lam Mauro MD  PCP: Naga Sewell MD  Admission Date:       Admission Orders (From admission, onward)              Ordered          06/25/22 0446   Inpatient Admission  Once                          Discharge Date: 06/29/22     Consultations During Hospital Stay:  · IP CONSULT TO INFECTIOUS DISEASES  · IP CONSULT TO NEPHROLOGY  · IP CONSULT TO PHARMACY  · IP CONSULT TO PHARMACY  · IP CONSULT TO PALLIATIVE CARE        Procedures Performed:   · CT chest abdomen pelvis wo contrast ·   Final Result by Betty Arriaga MD (06/27 1217) ·     ·   Evidence of metastatic disease to liver and osseous structures without obvious significant change  ·     ·   No new collection is identified  ·     ·   Worsening pleural effusions right greater than left  ·     ·   Small lung nodules are stable without new consolidation  ·     ·   No new bony destructive features  ·     ·     ·     ·     ·     ·   Workstation performed: [id number] ·     ·     ·   XR chest 2 views ·   ED Interpretation by Albaro Kumari DO (06/25 1828) ·   No acute cardiopulmonary abnormalities ·     ·   Final Result by Bill Thompson MD (06/25 4247) ·     ·   Small right pleural effusion   ·     ·   No evidence of acute abnormality in the chest  ·     ·     ·     ·     ·     ·   Workstation performed: [id number] ·     ·     ·      ·       Significant Findings / Test Results:   · Negative Blood cultures     Incidental Findings:   · none      Test Results Pending at Discharge (will require follow up):   · none     Outpatient Tests Requested:  · none     Complications:  none     Reason for Admission: febrile neutropenia     Hospital Course:   Idabel Range is a 49 y o  female patient who originally presented to the hospital on 6/25/2022 due to febrile neutropenia  Was treated with IV antibiotics and ID consulted  Fever resolved  Also received grannix x2 and then was [facility] with satisfactory response  Hb stable at DC today  Afebrile and electrolytes stable  To follow up with oncology for further recommendations on chemo  Continue bowel regimen      Please see above list of diagnoses and related plan for additional information       Condition at Discharge: stable     Discharge Day Visit / Exam:   Subjective:  I am better  No cp or sob or abd pain, passing flatus  Vitals: Blood Pressure: 110/71 (06/29/22 0838)  Pulse: 77 (06/29/22 0838)  Temperature: 97 9 °F (36 6 °C) (06/29/22 0838)  Temp Source: Oral (06/27/22 1600)  Respirations: 16 (06/29/22 0838)  Height: 5' 5" (165 1 cm) (06/25/22 1446)  Weight - Scale: 66 kg (145 lb 8 1 oz) (06/25/22 1446)  SpO2: 93 % (06/29/22 0838)  Exam:   Physical Exam General- Awake, alert and oriented x 3, looks comfortable, alopecia  HEENT- Normocephalic, atraumatic, oral mucosa- moist  Neck- Supple, No carotid bruit, no JVD  CVS- Normal S1/ S2, Regular rate and rhythm, No murmur, No edema  Respiratory system- B/L clear breath sounds, no wheezing  Abdomen- Soft, Non distended, no tenderness, Bowel sound- present 4 quads  Genitourinary- No suprapubic tenderness, No CVA tenderness  Skin- No new bruise or rash  Musculoskeletal- No gross deformity  Psych- No acute psychosis  CNS- CN II- XII grossly intact, No acute focal neurologic deficit noted        Discussion with Family: Attempted to update  () via phone  Unable to contact      Discharge instructions/Information to patient and family:   See after visit summary for information provided to patient and family        Provisions for Follow-Up Care:  See after visit summary for information related to follow-up care and any pertinent home health orders         Disposition:   Home     Planned Readmission:  No     Discharge Statement:  I spent 35 minutes discharging the patient  This time was spent on the day of discharge  I had direct contact with the patient on the day of discharge  Greater than 50% of the total time was spent examining patient, answering all patient questions, arranging and discussing plan of care with patient as well as directly providing post-discharge instructions    Additional time then spent on discharge activities      Discharge Medications:  See after visit summary for reconciled discharge medications provided to patient and/or family        **Please Note: This note may have been constructed using a voice recognition system**

## 2022-07-02 LAB
BACTERIA BLD CULT: NORMAL
BACTERIA BLD CULT: NORMAL

## 2022-07-03 DIAGNOSIS — I82.90 THROMBOSIS: ICD-10-CM

## 2022-07-05 ENCOUNTER — OFFICE VISIT (OUTPATIENT)
Dept: INTERNAL MEDICINE CLINIC | Facility: CLINIC | Age: 57
End: 2022-07-05
Payer: COMMERCIAL

## 2022-07-05 VITALS
OXYGEN SATURATION: 99 % | HEART RATE: 72 BPM | TEMPERATURE: 96.9 F | DIASTOLIC BLOOD PRESSURE: 78 MMHG | HEIGHT: 65 IN | RESPIRATION RATE: 16 BRPM | BODY MASS INDEX: 22.92 KG/M2 | SYSTOLIC BLOOD PRESSURE: 102 MMHG | WEIGHT: 137.6 LBS

## 2022-07-05 DIAGNOSIS — C79.51 BONE METASTASES (HCC): ICD-10-CM

## 2022-07-05 DIAGNOSIS — Z12.11 COLON CANCER SCREENING: ICD-10-CM

## 2022-07-05 DIAGNOSIS — Z17.0 MALIGNANT NEOPLASM OF BREAST IN FEMALE, ESTROGEN RECEPTOR POSITIVE, UNSPECIFIED LATERALITY, UNSPECIFIED SITE OF BREAST (HCC): Primary | ICD-10-CM

## 2022-07-05 DIAGNOSIS — Z51.5 PALLIATIVE CARE PATIENT: ICD-10-CM

## 2022-07-05 DIAGNOSIS — C50.919 MALIGNANT NEOPLASM OF BREAST IN FEMALE, ESTROGEN RECEPTOR POSITIVE, UNSPECIFIED LATERALITY, UNSPECIFIED SITE OF BREAST (HCC): Primary | ICD-10-CM

## 2022-07-05 DIAGNOSIS — Z12.4 CERVICAL CANCER SCREENING: ICD-10-CM

## 2022-07-05 PROCEDURE — 99496 TRANSJ CARE MGMT HIGH F2F 7D: CPT | Performed by: INTERNAL MEDICINE

## 2022-07-05 RX ORDER — RIVAROXABAN 20 MG/1
TABLET, FILM COATED ORAL
Qty: 30 TABLET | Refills: 2 | Status: SHIPPED | OUTPATIENT
Start: 2022-07-05 | End: 2022-10-03

## 2022-07-05 NOTE — PROGRESS NOTES
Assessment/Plan:    Juvencio Benitez is here for [facility] visit  She has a h/o right breast cancer with mets  She was admitted to the hospital on 6/25/2022 with febrile neutropenia  She was treated with IV antibiotics and ID was consulted  She was deemed stable for discharge after receiving granix x 2  She was discharged with oral diflucan for sore throat  Of note, few days before admission, she was treated for cellulitis by an urgent care  LD chemo 6/15  Juvencio Benitez is in pain but it is mostly controlled  She will be seeing hem/onc in august; palliative care later next week  Cellulitis resolved  Discussed medication and recent hospitalization  Quality Measures:     BMI Counseling: Body mass index is 24 21 kg/m²  Follow-up plan was not completed due to patient receiving palliative care  Depression Screening and Follow-up Plan: Clincally patient does not have depression  No treatment is required  Return in about 3 months (around 10/5/2022)  No problem-specific Assessment & Plan notes found for this encounter  Diagnoses and all orders for this visit:    Malignant neoplasm of breast in female, estrogen receptor positive, unspecified laterality, unspecified site of breast ([facility] [address] )  -     CBC and differential; Future  -     Comprehensive metabolic panel; Future    Colon cancer screening  -     Ambulatory referral for colonoscopy; Future    Cervical cancer screening  -     Ambulatory Referral to Gynecology; Future    Palliative care patient    Bone metastases (HCC)  -     CBC and differential; Future  -     Comprehensive metabolic panel; Future          Subjective:      Patient ID: Brien Wallace is a 49 y o  female      TCM Call (since 6/4/2022)     Date and time call was made  6/29/2022  2:20 PM    Hospital care reviewed  Records reviewed    Patient was hospitialized at  [facility]    Date of Admission  06/25/22    Date of discharge  06/29/22    Disposition  Home    Were the patients medications reviewed and updated  Yes    Current Symptoms  None      TCM Call (since 6/4/2022)     Post hospital issues  None    Should patient be enrolled in anticoag monitoring? No    Scheduled for follow up?   Yes    Did you obtain your prescribed medications  Yes    Do you need help managing your prescriptions or medications  No    Is transportation to your appointment needed  No    I have advised the patient to call PCP with any new or worsening symptoms    Ivette Lazaro RMA    Living Arrangements  Family members    Support System  None    Are you recieving any outpatient services  No    Are you recieving home care services  No    Are you using any community resources  No    Current waiver services  No    Have you fallen in the last 12 months  No    Interperter language line needed  No    Counseling  Patient            ALLERGIES:  Allergies   Allergen Reactions    Codeine Anaphylaxis    Morphine GI Intolerance, Itching and Vomiting    Sulfa Antibiotics Hives and Itching       CURRENT MEDICATIONS:    Current Outpatient Medications:     acetaminophen (TYLENOL) 325 mg tablet, Take 2 tablets (650 mg total) by mouth every 6 (six) hours as needed for mild pain, Disp: 100 tablet, Rfl: 0    albuterol (PROVENTIL HFA,VENTOLIN HFA) 90 mcg/act inhaler, Inhale 2 puffs every 4 (four) hours as needed for wheezing, Disp: 8 g, Rfl: 0    benzonatate (TESSALON) 200 MG capsule, Take 1 capsule (200 mg total) by mouth 3 (three) times a day as needed for cough, Disp: 20 capsule, Rfl: 0    calcium carbonate (TUMS) 500 mg chewable tablet, Chew 1 tablet (500 mg total) 3 (three) times a day as needed for indigestion or heartburn, Disp: 30 tablet, Rfl: 0    dextromethorphan-guaiFENesin (ROBITUSSIN DM)  mg/5 mL syrup, Take 10 mL by mouth every 4 (four) hours as needed for cough, Disp: 236 mL, Rfl: 0    dicyclomine (BENTYL) 10 mg capsule, Take 1 capsule (10 mg total) by mouth every 6 (six) hours as needed (abdominal cramping), Disp: 20 capsule, Rfl: 0    diphenhydrAMINE (BENADRYL) 50 MG tablet, Take 1 tablet (50 mg total) by mouth daily at bedtime as needed for itching or sleep, Disp: 30 tablet, Rfl: 0    diphenoxylate-atropine (LOMOTIL) 2 5-0 025 mg per tablet, Take 1 tablet by mouth 4 (four) times a day as needed for diarrhea, Disp: 30 tablet, Rfl: 0    docusate sodium (COLACE) 100 mg capsule, Take 1 capsule (100 mg total) by mouth 2 (two) times a day for 10 days, Disp: 20 capsule, Rfl: 0    DULoxetine (CYMBALTA) 30 mg delayed release capsule, Take 1 capsule (30 mg total) by mouth daily, Disp: 90 capsule, Rfl: 3    fluconazole (DIFLUCAN) 200 mg tablet, Take 1 tablet (200 mg total) by mouth daily for 12 days, Disp: 12 tablet, Rfl: 0    fluticasone (FLONASE) 50 mcg/act nasal spray, 2 sprays into each nostril daily, Disp: , Rfl:     HYDROmorphone (DILAUDID) 2 mg tablet, Take 1-2 tablets (2-4 mg total) by mouth every 3 (three) hours as needed (moderate/severe cancer-related pain) Max Daily Amount: 32 mg, Disp: 120 tablet, Rfl: 0    Lidocaine Viscous HCl (XYLOCAINE) 2 % mucosal solution, Swish and spit 10 mL 4 (four) times a day as needed for mouth pain or discomfort, Disp: 200 mL, Rfl: 0    melatonin 3 mg, Take 2 tablets (6 mg total) by mouth daily at bedtime, Disp: 15 tablet, Rfl: 0    metoclopramide (REGLAN) 10 mg tablet, Take 1 tablet (10 mg total) by mouth 4 (four) times a day, Disp: 28 tablet, Rfl: 0    multivitamin (THERAGRAN) TABS, Take 1 tablet by mouth, Disp: , Rfl:     ondansetron (ZOFRAN) 4 mg tablet, Take 1 tablet (4 mg total) by mouth every 8 (eight) hours as needed for nausea or vomiting, Disp: 20 tablet, Rfl: 0    oxybutynin (DITROPAN-XL) 5 mg 24 hr tablet, Take 1 tablet (5 mg total) by mouth daily Take 1 tablet daily, Disp: 90 tablet, Rfl: 3    polyethylene glycol (MIRALAX) 17 g packet, Take 17 g by mouth daily, Disp: , Rfl: 0    Xarelto 20 MG tablet, TAKE 1 TABLET BY MOUTH DAILY WITH BREAKFAST, Disp: 30 tablet, Rfl: 2    al mag oxide-diphenhydramine-lidocaine viscous (MAGIC MOUTHWASH) 1:1:1 suspension, Swish and spit 10 mL every 4 (four) hours as needed for mouth pain or discomfort (Patient not taking: Reported on 7/5/2022), Disp: 300 mL, Rfl: 0    CRANIAL PROSTHESIS, RX,, Apply to cranial area as needed for comfort   (Patient not taking: Reported on 7/5/2022), Disp: 1 each, Rfl: 0    lactulose 20 g/30 mL, Take 15 mL (10 g total) by mouth daily as needed (constipaton) for up to 10 days (Patient not taking: Reported on 7/5/2022), Disp: 300 mL, Rfl: 0    letrozole (FEMARA) 2 5 mg tablet, TAKE 1 TABLET BY MOUTH EVERY DAY, Disp: 90 tablet, Rfl: 2    palbociclib (Ibrance) 100 MG tablet, Take 1 tablet (100 mg total) by mouth daily (Patient not taking: No sig reported), Disp: 21 tablet, Rfl: 1    potassium chloride (K-DUR,KLOR-CON) 20 mEq tablet, Take 1 tablet (20 mEq total) by mouth 2 (two) times a day for 5 days, Disp: 10 tablet, Rfl: 0    senna (SENOKOT) 8 6 MG tablet, Take 1 tablet (8 6 mg total) by mouth daily at bedtime as needed for constipation (Patient not taking: Reported on 7/5/2022), Disp: 30 tablet, Rfl: 0    ACTIVE PROBLEM LIST:  Patient Active Problem List   Diagnosis    Cough    Pleural effusion    Tobacco abuse    Metastatic neoplasm ([address] )    Hypertension    Malignant neoplasm of breast in female, estrogen receptor positive ([address] )    Palliative care patient    Malignant pleural effusion    Primary malignant neoplasm of right breast with metastasis to other site ([address] )    Constipation    Dehydration    Bone metastases (HCC)    Cancer related pain    Chemotherapy induced neutropenia (HCC)    Febrile neutropenia (HCC)    Hyponatremia    Hypokalemia    Sore throat       PAST MEDICAL HISTORY:  Past Medical History:   Diagnosis Date    Cancer [facility])     History of radiation exposure     Malignant neoplasm of right female breast ([address] ) 2012    right       PAST SURGICAL HISTORY:  Past Surgical History: Procedure Laterality Date    BREAST CYST EXCISION      BREAST LUMPECTOMY Right 2012    HIP SURGERY      IR BIOPSY LIVER MASS  7/26/2021    IR PLEURAL EFFUSION LONG-TERM CATHETER PLACEMENT  8/17/2021    IR PLEURAL EFFUSION LONG-TERM CATHETER REMOVAL  5/11/2022    IR THORACENTESIS  7/26/2021       FAMILY HISTORY:  Family History   Problem Relation Age of Onset    Breast cancer Mother         in her 57's    Breast cancer Sister         inher 45s and 48    Colon cancer Maternal Grandmother     No Known Problems Paternal Grandmother     Breast cancer Other     No Known Problems Paternal Aunt        SOCIAL HISTORY:  Social History     Socioeconomic History    Marital status: /Civil Union     Spouse name: Not on file    Number of children: Not on file    Years of education: Not on file    Highest education level: Not on file   Occupational History    Not on file   Tobacco Use    Smoking status: Former Smoker     Packs/day: 0 25     Types: Cigarettes    Smokeless tobacco: Never Used   Vaping Use    Vaping Use: Never used   Substance and Sexual Activity    Alcohol use: Not Currently    Drug use: Not Currently    Sexual activity: Not Currently   Other Topics Concern    Not on file   Social History Narrative    Not on file     Social Determinants of Health     Financial Resource Strain: Not on file   Food Insecurity: No Food Insecurity    Worried About Running Out of Food in the Last Year: Never true    [address] in the Last Year: Never true   Transportation Needs: No Transportation Needs    Lack of Transportation (Medical): No    Lack of Transportation (Non-Medical):  No   Physical Activity: Not on file   Stress: Not on file   Social Connections: Not on file   Intimate Partner Violence: Not on file   Housing Stability: Low Risk     Unable to Pay for Housing in the Last Year: No    Number of Places Lived in the Last Year: 1    Unstable Housing in the Last Year: No       Review of Systems   Constitutional: Positive for activity change and appetite change  Psychiatric/Behavioral: Positive for sleep disturbance  The patient is nervous/anxious  All other systems reviewed and are negative  Objective:  Vitals:    07/05/22 1258   BP: 102/78   BP Location: Left arm   Patient Position: Sitting   Cuff Size: Adult   Pulse: 72   Resp: 16   Temp: (!) 96 9 °F (36 1 °C)   TempSrc: Tympanic   SpO2: 99%   Weight: 62 4 kg (137 lb 9 6 oz)   Height: 5' 5" (1 651 m)     Body mass index is 22 9 kg/m²  Physical Exam  Vitals and nursing note reviewed  Constitutional:       Appearance: Normal appearance  Comments: Chronically ill appearing   HENT:      Head: Normocephalic and atraumatic  Mouth/Throat:      Mouth: Mucous membranes are moist    Cardiovascular:      Rate and Rhythm: Normal rate and regular rhythm  Heart sounds: Normal heart sounds  Pulmonary:      Effort: Pulmonary effort is normal       Breath sounds: Normal breath sounds  Abdominal:      Palpations: Abdomen is soft  Musculoskeletal:         General: Normal range of motion  Cervical back: Normal range of motion  Lymphadenopathy:      Cervical: No cervical adenopathy  Skin:     General: Skin is warm and dry  Coloration: Skin is pale  Neurological:      General: No focal deficit present  Mental Status: She is alert and oriented to person, place, and time  Mental status is at baseline     Psychiatric:         Mood and Affect: Mood normal            RESULTS:    Recent Results (from the past 1008 hour(s))   Cancer antigen 15-3    Collection Time: 05/31/22  4:16 PM   Result Value Ref Range    CA 15-3 42 6 (H) 0 0 - 25 0 U/mL   Cancer antigen 27 29    Collection Time: 05/31/22  4:16 PM   Result Value Ref Range    CA 27 29 55 6 (H) 0 0 - 42 3 U/mL   CBC and differential    Collection Time: 05/31/22  4:16 PM   Result Value Ref Range    WBC 3 11 (L) 4 31 - 10 16 Thousand/uL    RBC 3 90 3 81 - 5 12 Million/uL    Hemoglobin 13 1 11 5 - 15 4 g/dL    Hematocrit 39 7 34 8 - 46 1 %     (H) 82 - 98 fL    MCH 33 6 26 8 - 34 3 pg    MCHC 33 0 31 4 - 37 4 g/dL    RDW 14 6 11 6 - 15 1 %    MPV 11 7 8 9 - 12 7 fL    Platelets 636 096 - 902 Thousands/uL    nRBC 0 /100 WBCs    Neutrophils Relative 61 43 - 75 %    Immat GRANS % 0 0 - 2 %    Lymphocytes Relative 21 14 - 44 %    Monocytes Relative 16 (H) 4 - 12 %    Eosinophils Relative 0 0 - 6 %    Basophils Relative 2 (H) 0 - 1 %    Neutrophils Absolute 1 85 1 85 - 7 62 Thousands/µL    Immature Grans Absolute 0 01 0 00 - 0 20 Thousand/uL    Lymphocytes Absolute 0 66 0 60 - 4 47 Thousands/µL    Monocytes Absolute 0 51 0 17 - 1 22 Thousand/µL    Eosinophils Absolute 0 01 0 00 - 0 61 Thousand/µL    Basophils Absolute 0 07 0 00 - 0 10 Thousands/µL   Comprehensive metabolic panel    Collection Time: 05/31/22  4:16 PM   Result Value Ref Range    Sodium 140 136 - 145 mmol/L    Potassium 3 3 (L) 3 5 - 5 3 mmol/L    Chloride 106 100 - 108 mmol/L    CO2 24 21 - 32 mmol/L    ANION GAP 10 4 - 13 mmol/L    BUN 13 5 - 25 mg/dL    Creatinine 0 88 0 60 - 1 30 mg/dL    Glucose 116 65 - 140 mg/dL    Calcium 8 6 8 3 - 10 1 mg/dL    Corrected Calcium 9 2 8 3 - 10 1 mg/dL    AST 27 5 - 45 U/L    ALT 25 12 - 78 U/L    Alkaline Phosphatase 55 46 - 116 U/L    Total Protein 6 6 6 4 - 8 2 g/dL    Albumin 3 2 (L) 3 5 - 5 0 g/dL    Total Bilirubin 0 45 0 20 - 1 00 mg/dL    eGFR 74 ml/min/1 73sq m   CBC and differential    Collection Time: 06/14/22  3:45 PM   Result Value Ref Range    WBC 8 18 4 31 - 10 16 Thousand/uL    RBC 4 11 3 81 - 5 12 Million/uL    Hemoglobin 13 5 11 5 - 15 4 g/dL    Hematocrit 42 0 34 8 - 46 1 %     (H) 82 - 98 fL    MCH 32 8 26 8 - 34 3 pg    MCHC 32 1 31 4 - 37 4 g/dL    RDW 15 8 (H) 11 6 - 15 1 %    MPV 12 4 8 9 - 12 7 fL    Platelets 974 447 - 525 Thousands/uL    nRBC 0 /100 WBCs    Neutrophils Relative 84 (H) 43 - 75 %    Immat GRANS % 1 0 - 2 % Lymphocytes Relative 9 (L) 14 - 44 %    Monocytes Relative 5 4 - 12 %    Eosinophils Relative 0 0 - 6 %    Basophils Relative 1 0 - 1 %    Neutrophils Absolute 6 91 1 85 - 7 62 Thousands/µL    Immature Grans Absolute 0 07 0 00 - 0 20 Thousand/uL    Lymphocytes Absolute 0 75 0 60 - 4 47 Thousands/µL    Monocytes Absolute 0 40 0 17 - 1 22 Thousand/µL    Eosinophils Absolute 0 01 0 00 - 0 61 Thousand/µL    Basophils Absolute 0 04 0 00 - 0 10 Thousands/µL   Comprehensive metabolic panel    Collection Time: 06/14/22  3:45 PM   Result Value Ref Range    Sodium 140 136 - 145 mmol/L    Potassium 3 8 3 5 - 5 3 mmol/L    Chloride 105 100 - 108 mmol/L    CO2 24 21 - 32 mmol/L    ANION GAP 11 4 - 13 mmol/L    BUN 14 5 - 25 mg/dL    Creatinine 0 88 0 60 - 1 30 mg/dL    Glucose 105 65 - 140 mg/dL    Calcium 8 5 8 3 - 10 1 mg/dL    Corrected Calcium 9 1 8 3 - 10 1 mg/dL    AST 22 5 - 45 U/L    ALT 22 12 - 78 U/L    Alkaline Phosphatase 85 46 - 116 U/L    Total Protein 6 7 6 4 - 8 2 g/dL    Albumin 3 3 (L) 3 5 - 5 0 g/dL    Total Bilirubin 0 41 0 20 - 1 00 mg/dL    eGFR 74 ml/min/1 73sq m   ECG 12 lead    Collection Time: 06/25/22  3:00 AM   Result Value Ref Range    Ventricular Rate 82 BPM    Atrial Rate 82 BPM    CO Interval 172 ms    QRSD Interval 72 ms    QT Interval 374 ms    QTC Interval 436 ms    P Axis 68 degrees    QRS Axis 67 degrees    T Wave Axis 55 degrees   CBC and differential    Collection Time: 06/25/22  3:22 AM   Result Value Ref Range    WBC 1 45 (LL) 4 31 - 10 16 Thousand/uL    RBC 3 53 (L) 3 81 - 5 12 Million/uL    Hemoglobin 11 8 11 5 - 15 4 g/dL    Hematocrit 34 1 (L) 34 8 - 46 1 %    MCV 97 82 - 98 fL    MCH 33 4 26 8 - 34 3 pg    MCHC 34 6 31 4 - 37 4 g/dL    RDW 15 5 (H) 11 6 - 15 1 %    MPV 12 0 8 9 - 12 7 fL    Platelets 802 808 - 873 Thousands/uL   Comprehensive metabolic panel    Collection Time: 06/25/22  3:22 AM   Result Value Ref Range    Sodium 128 (L) 136 - 145 mmol/L    Potassium 3 3 (L) 3 5 - 5 3 mmol/L    Chloride 98 (L) 100 - 108 mmol/L    CO2 24 21 - 32 mmol/L    ANION GAP 6 4 - 13 mmol/L    BUN 7 5 - 25 mg/dL    Creatinine 0 84 0 60 - 1 30 mg/dL    Glucose 121 65 - 140 mg/dL    Calcium 8 8 8 3 - 10 1 mg/dL    Corrected Calcium 9 6 8 3 - 10 1 mg/dL    AST 32 5 - 45 U/L    ALT 19 12 - 78 U/L    Alkaline Phosphatase 44 (L) 46 - 116 U/L    Total Protein 6 4 6 4 - 8 2 g/dL    Albumin 3 0 (L) 3 5 - 5 0 g/dL    Total Bilirubin 0 72 0 20 - 1 00 mg/dL    eGFR 79 ml/min/1 73sq m   HS Troponin 0hr (reflex protocol)    Collection Time: 06/25/22  3:22 AM   Result Value Ref Range    hs TnI 0hr 4 "Refer to ACS Flowchart"- see link ng/L   Blood culture    Collection Time: 06/25/22  3:22 AM    Specimen: Arm, Left; Blood   Result Value Ref Range    Blood Culture No Growth After 5 Days  Blood culture    Collection Time: 06/25/22  3:22 AM    Specimen: Hand, Left; Blood   Result Value Ref Range    Blood Culture No Growth After 5 Days      COVID/FLU/RSV    Collection Time: 06/25/22  3:22 AM    Specimen: Nose; Nares   Result Value Ref Range    SARS-CoV-2 Negative Negative    INFLUENZA A PCR Negative Negative    INFLUENZA B PCR Negative Negative    RSV PCR Negative Negative   Lactic acid, plasma    Collection Time: 06/25/22  3:22 AM   Result Value Ref Range    LACTIC ACID 1 0 0 5 - 2 0 mmol/L   Procalcitonin    Collection Time: 06/25/22  3:22 AM   Result Value Ref Range    Procalcitonin 0 19 <=0 25 ng/ml   Manual Differential(PHLEBS Do Not Order)    Collection Time: 06/25/22  3:22 AM   Result Value Ref Range    Segmented % 46 43 - 75 %    Lymphocytes % 52 (H) 14 - 44 %    Monocytes % 2 (L) 4 - 12 %    Eosinophils, % 0 0 - 6 %    Basophils % 0 0 - 1 %    Absolute Neutrophils 0 67 (L) 1 85 - 7 62 Thousand/uL    Lymphocytes Absolute 0 75 0 60 - 4 47 Thousand/uL    Monocytes Absolute 0 03 0 00 - 1 22 Thousand/uL    Eosinophils Absolute 0 00 0 00 - 0 40 Thousand/uL    Basophils Absolute 0 00 0 00 - 0 10 Thousand/uL    Total Counted      Platelet Estimate Adequate Adequate   UA w Reflex to Microscopic w Reflex to Culture    Collection Time: 06/25/22  3:59 AM    Specimen: Urine, Clean Catch   Result Value Ref Range    Color, UA Yellow     Clarity, UA Clear     Specific Gravity, UA <=1 005 1 003 - 1 030    pH, UA 6 5 4 5, 5 0, 5 5, 6 0, 6 5, 7 0, 7 5, 8 0    Leukocytes, UA Negative Negative    Nitrite, UA Negative Negative    Protein, UA Negative Negative mg/dl    Glucose, UA Negative Negative mg/dl    Ketones, UA Negative Negative mg/dl    Urobilinogen, UA 0 2 0 2, 1 0 E U /dl E U /dl    Bilirubin, UA Negative Negative    Occult Blood, UA Trace-Intact (A) Negative   Urine Microscopic    Collection Time: 06/25/22  3:59 AM   Result Value Ref Range    RBC, UA None Seen None Seen, 0-1, 1-2, 2-4, 0-5 /hpf    WBC, UA None Seen None Seen, 0-1, 1-2, 0-5, 2-4 /hpf    Epithelial Cells None Seen None Seen, Occasional /hpf    Bacteria, UA None Seen None Seen, Occasional /hpf   Chloride, urine, random    Collection Time: 06/25/22  3:59 AM   Result Value Ref Range    Chloride, Ur <10 mmol/L   Osmolality, urine    Collection Time: 06/25/22  3:59 AM   Result Value Ref Range    Osmolality, Ur 86 (L) 250 - 900 mmol/KG   Sodium, urine, random    Collection Time: 06/25/22  3:59 AM   Result Value Ref Range    Sodium, Ur 11    Potassium, urine, random    Collection Time: 06/25/22  3:59 AM   Result Value Ref Range    Potassium Urine  4 7 mmol/L   HS Troponin I 2hr    Collection Time: 06/25/22  5:19 AM   Result Value Ref Range    hs TnI 2hr 4 "Refer to ACS Flowchart"- see link ng/L    Delta 2hr hsTnI 0 <20 ng/L   Basic metabolic panel    Collection Time: 06/25/22  6:26 AM   Result Value Ref Range    Sodium 140 136 - 145 mmol/L    Potassium 3 3 (L) 3 5 - 5 3 mmol/L    Chloride 106 100 - 108 mmol/L    CO2 23 21 - 32 mmol/L    ANION GAP 11 4 - 13 mmol/L    BUN 8 5 - 25 mg/dL    Creatinine 0 76 0 60 - 1 30 mg/dL    Glucose 113 65 - 140 mg/dL    Calcium 8 2 (L) 8 3 - 10 1 mg/dL    eGFR 89 ml/min/1 73sq m   CBC and differential    Collection Time: 06/25/22  6:26 AM   Result Value Ref Range    WBC 1 28 (LL) 4 31 - 10 16 Thousand/uL    RBC 3 16 (L) 3 81 - 5 12 Million/uL    Hemoglobin 10 2 (L) 11 5 - 15 4 g/dL    Hematocrit 31 0 (L) 34 8 - 46 1 %    MCV 98 82 - 98 fL    MCH 32 3 26 8 - 34 3 pg    MCHC 32 9 31 4 - 37 4 g/dL    RDW 15 4 (H) 11 6 - 15 1 %    MPV 11 7 8 9 - 12 7 fL    Platelets 795 (L) 864 - 390 Thousands/uL   Manual Differential(PHLEBS Do Not Order)    Collection Time: 06/25/22  6:26 AM   Result Value Ref Range    Segmented % 25 (L) 43 - 75 %    Lymphocytes % 36 14 - 44 %    Monocytes % 38 (H) 4 - 12 %    Eosinophils, % 0 0 - 6 %    Basophils % 0 0 - 1 %    Atypical Lymphocytes % 1 (H) <=0 %    Absolute Neutrophils 0 32 (L) 1 85 - 7 62 Thousand/uL    Lymphocytes Absolute 0 46 (L) 0 60 - 4 47 Thousand/uL    Monocytes Absolute 0 49 0 00 - 1 22 Thousand/uL    Eosinophils Absolute 0 00 0 00 - 0 40 Thousand/uL    Basophils Absolute 0 00 0 00 - 0 10 Thousand/uL    Total Counted      Platelet Estimate Borderline (A) Adequate   Uric acid    Collection Time: 06/25/22  6:26 AM   Result Value Ref Range    Uric Acid 4 1 2 0 - 6 8 mg/dL   HS Troponin I 4hr    Collection Time: 06/25/22  8:24 AM   Result Value Ref Range    hs TnI 4hr 4 "Refer to ACS Flowchart"- see link ng/L    Delta 4hr hsTnI 0 <20 ng/L   Basic metabolic panel    Collection Time: 06/25/22  4:01 PM   Result Value Ref Range    Sodium 140 136 - 145 mmol/L    Potassium 3 6 3 5 - 5 3 mmol/L    Chloride 102 100 - 108 mmol/L    CO2 24 21 - 32 mmol/L    ANION GAP 14 (H) 4 - 13 mmol/L    BUN 6 5 - 25 mg/dL    Creatinine 0 88 0 60 - 1 30 mg/dL    Glucose 105 65 - 140 mg/dL    Calcium 8 4 8 3 - 10 1 mg/dL    eGFR 74 ml/min/1 73sq m   Basic metabolic panel    Collection Time: 06/26/22 12:01 AM   Result Value Ref Range    Sodium 134 (L) 136 - 145 mmol/L    Potassium 3 8 3 5 - 5 3 mmol/L    Chloride 101 100 - 108 mmol/L    CO2 22 21 - 32 mmol/L    ANION GAP 11 4 - 13 mmol/L    BUN 5 5 - 25 mg/dL    Creatinine 0 74 0 60 - 1 30 mg/dL    Glucose 101 65 - 140 mg/dL    Calcium 7 4 (L) 8 3 - 10 1 mg/dL    eGFR 92 ml/min/1 73sq m   Procalcitonin, Next Day AM Collection    Collection Time: 06/26/22  4:58 AM   Result Value Ref Range    Procalcitonin 0 20 <=0 25 ng/ml   CBC and differential    Collection Time: 06/26/22  4:58 AM   Result Value Ref Range    WBC 3 31 (L) 4 31 - 10 16 Thousand/uL    RBC 3 17 (L) 3 81 - 5 12 Million/uL    Hemoglobin 10 3 (L) 11 5 - 15 4 g/dL    Hematocrit 31 1 (L) 34 8 - 46 1 %    MCV 98 82 - 98 fL    MCH 32 5 26 8 - 34 3 pg    MCHC 33 1 31 4 - 37 4 g/dL    RDW 15 4 (H) 11 6 - 15 1 %    MPV 11 7 8 9 - 12 7 fL    Platelets 747 (L) 306 - 390 Thousands/uL    nRBC 0 /100 WBCs    Neutrophils Relative 52 43 - 75 %    Immat GRANS % 1 0 - 2 %    Lymphocytes Relative 18 14 - 44 %    Monocytes Relative 28 (H) 4 - 12 %    Eosinophils Relative 0 0 - 6 %    Basophils Relative 1 0 - 1 %    Neutrophils Absolute 1 72 (L) 1 85 - 7 62 Thousands/µL    Immature Grans Absolute 0 03 0 00 - 0 20 Thousand/uL    Lymphocytes Absolute 0 60 0 60 - 4 47 Thousands/µL    Monocytes Absolute 0 93 0 17 - 1 22 Thousand/µL    Eosinophils Absolute 0 00 0 00 - 0 61 Thousand/µL    Basophils Absolute 0 03 0 00 - 0 10 Thousands/µL   Basic metabolic panel    Collection Time: 06/26/22  4:58 AM   Result Value Ref Range    Sodium 137 136 - 145 mmol/L    Potassium 3 8 3 5 - 5 3 mmol/L    Chloride 105 100 - 108 mmol/L    CO2 25 21 - 32 mmol/L    ANION GAP 7 4 - 13 mmol/L    BUN 6 5 - 25 mg/dL    Creatinine 0 79 0 60 - 1 30 mg/dL    Glucose 104 65 - 140 mg/dL    Calcium 8 0 (L) 8 3 - 10 1 mg/dL    eGFR 85 ml/min/1 73sq m   Basic metabolic panel    Collection Time: 06/26/22  6:19 PM   Result Value Ref Range    Sodium 136 136 - 145 mmol/L    Potassium 3 4 (L) 3 5 - 5 3 mmol/L    Chloride 104 100 - 108 mmol/L    CO2 26 21 - 32 mmol/L    ANION GAP 6 4 - 13 mmol/L    BUN 6 5 - 25 mg/dL    Creatinine 0 87 0 60 - 1 30 mg/dL    Glucose 131 65 - 140 mg/dL    Calcium 8 3 8 3 - 10 1 mg/dL    eGFR 75 ml/min/1 73sq m   Blood culture    Collection Time: 06/26/22  6:35 PM    Specimen: Blood   Result Value Ref Range    Blood Culture No Growth After 5 Days  Blood culture    Collection Time: 06/26/22  6:35 PM    Specimen: Blood   Result Value Ref Range    Blood Culture No Growth After 5 Days  Vancomycin, trough Obtain immediately prior to 4th dose  Please call pharmacy when level returns      Collection Time: 06/26/22 10:18 PM   Result Value Ref Range    Vancomycin Tr 11 5 10 0 - 20 0 ug/mL   Basic metabolic panel    Collection Time: 06/27/22 12:21 AM   Result Value Ref Range    Sodium 136 136 - 145 mmol/L    Potassium 3 5 3 5 - 5 3 mmol/L    Chloride 104 100 - 108 mmol/L    CO2 26 21 - 32 mmol/L    ANION GAP 6 4 - 13 mmol/L    BUN 6 5 - 25 mg/dL    Creatinine 0 78 0 60 - 1 30 mg/dL    Glucose 116 65 - 140 mg/dL    Calcium 8 2 (L) 8 3 - 10 1 mg/dL    eGFR 86 ml/min/1 73sq m   CBC and differential    Collection Time: 06/27/22  5:26 AM   Result Value Ref Range    WBC 16 67 (H) 4 31 - 10 16 Thousand/uL    RBC 3 38 (L) 3 81 - 5 12 Million/uL    Hemoglobin 10 8 (L) 11 5 - 15 4 g/dL    Hematocrit 33 1 (L) 34 8 - 46 1 %    MCV 98 82 - 98 fL    MCH 32 0 26 8 - 34 3 pg    MCHC 32 6 31 4 - 37 4 g/dL    RDW 15 5 (H) 11 6 - 15 1 %    MPV 12 0 8 9 - 12 7 fL    Platelets 127 473 - 810 Thousands/uL   Basic metabolic panel    Collection Time: 06/27/22  5:26 AM   Result Value Ref Range    Sodium 140 136 - 145 mmol/L    Potassium 3 9 3 5 - 5 3 mmol/L    Chloride 104 100 - 108 mmol/L    CO2 25 21 - 32 mmol/L    ANION GAP 11 4 - 13 mmol/L    BUN 5 5 - 25 mg/dL    Creatinine 0 76 0 60 - 1 30 mg/dL    Glucose 94 65 - 140 mg/dL    Calcium 8 4 8 3 - 10 1 mg/dL    eGFR 89 ml/min/1 73sq m   Manual Differential(PHLEBS Do Not Order)    Collection Time: 06/27/22  5:26 AM   Result Value Ref Range    Segmented % 63 43 - 75 %    Bands % 11 (H) 0 - 8 %    Lymphocytes % 10 (L) 14 - 44 %    Monocytes % 14 (H) 4 - 12 %    Eosinophils, % 0 0 - 6 %    Basophils % 0 0 - 1 %    Myelocytes % 1 0 - 1 %    Atypical Lymphocytes % 1 (H) <=0 %    Absolute Neutrophils 12 34 (H) 1 85 - 7 62 Thousand/uL    Lymphocytes Absolute 1 67 0 60 - 4 47 Thousand/uL    Monocytes Absolute 2 33 (H) 0 00 - 1 22 Thousand/uL    Eosinophils Absolute 0 00 0 00 - 0 40 Thousand/uL    Basophils Absolute 0 00 0 00 - 0 10 Thousand/uL    Total Counted      RBC Morphology Normal     Platelet Estimate Adequate Adequate   CBC and differential    Collection Time: 06/28/22  4:49 AM   Result Value Ref Range    WBC 17 70 (H) 4 31 - 10 16 Thousand/uL    RBC 2 83 (L) 3 81 - 5 12 Million/uL    Hemoglobin 9 0 (L) 11 5 - 15 4 g/dL    Hematocrit 28 4 (L) 34 8 - 46 1 %     (H) 82 - 98 fL    MCH 31 8 26 8 - 34 3 pg    MCHC 31 7 31 4 - 37 4 g/dL    RDW 15 7 (H) 11 6 - 15 1 %    MPV 12 1 8 9 - 12 7 fL    Platelets 317 968 - 034 Thousands/uL   Basic metabolic panel    Collection Time: 06/28/22  4:49 AM   Result Value Ref Range    Sodium 143 136 - 145 mmol/L    Potassium 3 1 (L) 3 5 - 5 3 mmol/L    Chloride 110 (H) 100 - 108 mmol/L    CO2 23 21 - 32 mmol/L    ANION GAP 10 4 - 13 mmol/L    BUN 7 5 - 25 mg/dL    Creatinine 0 81 0 60 - 1 30 mg/dL    Glucose 90 65 - 140 mg/dL    Calcium 7 1 (L) 8 3 - 10 1 mg/dL    eGFR 82 ml/min/1 73sq m   Manual Differential(PHLEBS Do Not Order)    Collection Time: 06/28/22  4:49 AM   Result Value Ref Range    Segmented % 82 (H) 43 - 75 %    Bands % 2 0 - 8 %    Lymphocytes % 12 (L) 14 - 44 %    Monocytes % 1 (L) 4 - 12 %    Eosinophils, % 0 0 - 6 %    Basophils % 0 0 - 1 %    Metamyelocytes% 1 0 - 1 %    Myelocytes % 2 (H) 0 - 1 %    Absolute Neutrophils 14 87 (H) 1 85 - 7 62 Thousand/uL    Lymphocytes Absolute 2 12 0 60 - 4 47 Thousand/uL    Monocytes Absolute 0 18 0 00 - 1 22 Thousand/uL    Eosinophils Absolute 0 00 0 00 - 0 40 Thousand/uL Basophils Absolute 0 00 0 00 - 0 10 Thousand/uL    Total Counted      Platelet Estimate Adequate Adequate   Basic metabolic panel    Collection Time: 06/28/22  1:11 PM   Result Value Ref Range    Sodium 142 136 - 145 mmol/L    Potassium 4 4 3 5 - 5 3 mmol/L    Chloride 107 100 - 108 mmol/L    CO2 27 21 - 32 mmol/L    ANION GAP 8 4 - 13 mmol/L    BUN 7 5 - 25 mg/dL    Creatinine 0 78 0 60 - 1 30 mg/dL    Glucose 113 65 - 140 mg/dL    Calcium 8 6 8 3 - 10 1 mg/dL    eGFR 86 ml/min/1 73sq m   Basic metabolic panel    Collection Time: 06/29/22  4:54 AM   Result Value Ref Range    Sodium 141 136 - 145 mmol/L    Potassium 4 0 3 5 - 5 3 mmol/L    Chloride 106 100 - 108 mmol/L    CO2 27 21 - 32 mmol/L    ANION GAP 8 4 - 13 mmol/L    BUN 8 5 - 25 mg/dL    Creatinine 0 76 0 60 - 1 30 mg/dL    Glucose 90 65 - 140 mg/dL    Calcium 8 4 8 3 - 10 1 mg/dL    eGFR 89 ml/min/1 73sq m   CBC    Collection Time: 06/29/22  4:54 AM   Result Value Ref Range    WBC 10 31 (H) 4 31 - 10 16 Thousand/uL    RBC 3 37 (L) 3 81 - 5 12 Million/uL    Hemoglobin 10 7 (L) 11 5 - 15 4 g/dL    Hematocrit 33 3 (L) 34 8 - 46 1 %    MCV 99 (H) 82 - 98 fL    MCH 31 8 26 8 - 34 3 pg    MCHC 32 1 31 4 - 37 4 g/dL    RDW 15 8 (H) 11 6 - 15 1 %    Platelets 542 472 - 641 Thousands/uL    MPV 11 2 8 9 - 12 7 fL       This note was created with voice recognition software  Phonic, grammatical and spelling errors may be present within the note as a result

## 2022-07-08 ENCOUNTER — HOSPITAL ENCOUNTER (OUTPATIENT)
Dept: CT IMAGING | Facility: HOSPITAL | Age: 57
Discharge: HOME | End: 2022-07-08
Payer: COMMERCIAL

## 2022-07-08 DIAGNOSIS — C50.919 MALIGNANT NEOPLASM OF BREAST IN FEMALE, ESTROGEN RECEPTOR POSITIVE, UNSPECIFIED LATERALITY, UNSPECIFIED SITE OF BREAST (HCC): ICD-10-CM

## 2022-07-08 DIAGNOSIS — C78.7 MALIGNANT NEOPLASM METASTATIC TO LIVER (HCC): ICD-10-CM

## 2022-07-08 DIAGNOSIS — C79.51 BONE METASTASES (HCC): ICD-10-CM

## 2022-07-08 DIAGNOSIS — Z17.0 MALIGNANT NEOPLASM OF BREAST IN FEMALE, ESTROGEN RECEPTOR POSITIVE, UNSPECIFIED LATERALITY, UNSPECIFIED SITE OF BREAST (HCC): ICD-10-CM

## 2022-07-08 PROCEDURE — 71260 CT THORAX DX C+: CPT

## 2022-07-08 PROCEDURE — G1004 CDSM NDSC: HCPCS

## 2022-07-08 PROCEDURE — 74177 CT ABD & PELVIS W/CONTRAST: CPT

## 2022-07-08 RX ADMIN — IOHEXOL 80 ML: 350 INJECTION, SOLUTION INTRAVENOUS at 17:35

## 2022-07-11 ENCOUNTER — TELEPHONE (OUTPATIENT)
Dept: HEMATOLOGY ONCOLOGY | Facility: CLINIC | Age: 57
End: 2022-07-11

## 2022-07-11 ENCOUNTER — HOSPITAL ENCOUNTER (OUTPATIENT)
Dept: NUCLEAR MEDICINE | Facility: HOSPITAL | Age: 57
Discharge: HOME | End: 2022-07-11
Payer: COMMERCIAL

## 2022-07-11 ENCOUNTER — APPOINTMENT (OUTPATIENT)
Dept: LAB | Facility: HOSPITAL | Age: 57
End: 2022-07-11
Payer: COMMERCIAL

## 2022-07-11 DIAGNOSIS — C50.919 MALIGNANT NEOPLASM OF BREAST IN FEMALE, ESTROGEN RECEPTOR POSITIVE, UNSPECIFIED LATERALITY, UNSPECIFIED SITE OF BREAST (HCC): ICD-10-CM

## 2022-07-11 DIAGNOSIS — C79.51 BONE METASTASES (HCC): ICD-10-CM

## 2022-07-11 DIAGNOSIS — Z17.0 MALIGNANT NEOPLASM OF BREAST IN FEMALE, ESTROGEN RECEPTOR POSITIVE, UNSPECIFIED LATERALITY, UNSPECIFIED SITE OF BREAST (HCC): ICD-10-CM

## 2022-07-11 DIAGNOSIS — C78.7 MALIGNANT NEOPLASM METASTATIC TO LIVER (HCC): ICD-10-CM

## 2022-07-11 LAB
ALBUMIN SERPL BCP-MCNC: 3.9 G/DL (ref 3.5–5)
ALP SERPL-CCNC: 51 U/L (ref 46–116)
ALT SERPL W P-5'-P-CCNC: 8 U/L (ref 12–78)
ANION GAP SERPL CALCULATED.3IONS-SCNC: 12 MMOL/L (ref 4–13)
AST SERPL W P-5'-P-CCNC: 15 U/L (ref 5–45)
BASOPHILS # BLD AUTO: 0.04 THOUSANDS/ΜL (ref 0–0.1)
BASOPHILS NFR BLD AUTO: 1 % (ref 0–1)
BILIRUB SERPL-MCNC: 0.28 MG/DL (ref 0.2–1)
BUN SERPL-MCNC: 11 MG/DL (ref 5–25)
CALCIUM SERPL-MCNC: 8.8 MG/DL (ref 8.3–10.1)
CHLORIDE SERPL-SCNC: 103 MMOL/L (ref 100–108)
CO2 SERPL-SCNC: 23 MMOL/L (ref 21–32)
CREAT SERPL-MCNC: 0.8 MG/DL (ref 0.6–1.3)
EOSINOPHIL # BLD AUTO: 0.03 THOUSAND/ΜL (ref 0–0.61)
EOSINOPHIL NFR BLD AUTO: 1 % (ref 0–6)
ERYTHROCYTE [DISTWIDTH] IN BLOOD BY AUTOMATED COUNT: 16.7 % (ref 11.6–15.1)
GFR SERPL CREATININE-BSD FRML MDRD: 83 ML/MIN/1.73SQ M
GLUCOSE SERPL-MCNC: 83 MG/DL (ref 65–140)
HCT VFR BLD AUTO: 40.5 % (ref 34.8–46.1)
HGB BLD-MCNC: 12.9 G/DL (ref 11.5–15.4)
IMM GRANULOCYTES # BLD AUTO: 0.02 THOUSAND/UL (ref 0–0.2)
IMM GRANULOCYTES NFR BLD AUTO: 1 % (ref 0–2)
LYMPHOCYTES # BLD AUTO: 0.49 THOUSANDS/ΜL (ref 0.6–4.47)
LYMPHOCYTES NFR BLD AUTO: 13 % (ref 14–44)
MCH RBC QN AUTO: 31.9 PG (ref 26.8–34.3)
MCHC RBC AUTO-ENTMCNC: 31.9 G/DL (ref 31.4–37.4)
MCV RBC AUTO: 100 FL (ref 82–98)
MONOCYTES # BLD AUTO: 0.21 THOUSAND/ΜL (ref 0.17–1.22)
MONOCYTES NFR BLD AUTO: 5 % (ref 4–12)
NEUTROPHILS # BLD AUTO: 3.1 THOUSANDS/ΜL (ref 1.85–7.62)
NEUTS SEG NFR BLD AUTO: 79 % (ref 43–75)
NRBC BLD AUTO-RTO: 0 /100 WBCS
PLATELET # BLD AUTO: 237 THOUSANDS/UL (ref 149–390)
PMV BLD AUTO: 12 FL (ref 8.9–12.7)
POTASSIUM SERPL-SCNC: 4.2 MMOL/L (ref 3.5–5.3)
PROT SERPL-MCNC: 7.7 G/DL (ref 6.4–8.2)
RBC # BLD AUTO: 4.05 MILLION/UL (ref 3.81–5.12)
SODIUM SERPL-SCNC: 138 MMOL/L (ref 136–145)
WBC # BLD AUTO: 3.89 THOUSAND/UL (ref 4.31–10.16)

## 2022-07-11 PROCEDURE — 80053 COMPREHEN METABOLIC PANEL: CPT

## 2022-07-11 PROCEDURE — 78306 BONE IMAGING WHOLE BODY: CPT

## 2022-07-11 PROCEDURE — 36415 COLL VENOUS BLD VENIPUNCTURE: CPT

## 2022-07-11 PROCEDURE — 85025 COMPLETE CBC W/AUTO DIFF WBC: CPT

## 2022-07-11 PROCEDURE — A9503 TC99M MEDRONATE: HCPCS

## 2022-07-11 NOTE — TELEPHONE ENCOUNTER
CALL RETURN FORM   Reason for patient call? Patient returning call from Mina Mart regarding moving her appt   Patient's primary oncologist? Dr Ivy Corrigan   Name of person the patient was calling for? Mina Mart   Any additional information to add, if applicable? Please call patient at [phone]   Informed patient that the message will be forwarded to the team and someone will get back to them as soon as possible    Did you relay this information to the patient?   Yes

## 2022-07-11 NOTE — TELEPHONE ENCOUNTER
Returned telephone call to patient in regards to change of appointment with provider  Reviewed date and time  GOMEZ to Dr Jer Delgadillo prior to treatment  Patient appreciative of call  Encouraged to call back with further questions and concerns

## 2022-07-11 NOTE — TELEPHONE ENCOUNTER
Call out to patient in regards to need for patient to see provider prior to any more treatment  Left voicemail on phone per Milind Mckeon PA-C recommendations  Please move up patient appointment with provider within 1-2 weeks  Please cancel infusion appointment for 7/13  Patient needs to be seen prior to treatment due to hospitalizations

## 2022-07-11 NOTE — TELEPHONE ENCOUNTER
Was able to get patient scheduled with provider before her treatment appt on 7/13  Will keep treatment scheduled for now  Left message for patient regarding this appt

## 2022-07-12 ENCOUNTER — TELEPHONE (OUTPATIENT)
Dept: HEMATOLOGY ONCOLOGY | Facility: CLINIC | Age: 57
End: 2022-07-12

## 2022-07-12 ENCOUNTER — OFFICE VISIT (OUTPATIENT)
Dept: PALLIATIVE MEDICINE | Facility: CLINIC | Age: 57
End: 2022-07-12
Payer: COMMERCIAL

## 2022-07-12 VITALS
BODY MASS INDEX: 22.86 KG/M2 | RESPIRATION RATE: 24 BRPM | SYSTOLIC BLOOD PRESSURE: 120 MMHG | DIASTOLIC BLOOD PRESSURE: 70 MMHG | HEART RATE: 68 BPM | WEIGHT: 137.4 LBS | OXYGEN SATURATION: 98 % | TEMPERATURE: 97.7 F

## 2022-07-12 DIAGNOSIS — C50.919 MALIGNANT NEOPLASM OF BREAST IN FEMALE, ESTROGEN RECEPTOR POSITIVE, UNSPECIFIED LATERALITY, UNSPECIFIED SITE OF BREAST (HCC): ICD-10-CM

## 2022-07-12 DIAGNOSIS — G89.3 CANCER ASSOCIATED PAIN: ICD-10-CM

## 2022-07-12 DIAGNOSIS — G89.3 CANCER RELATED PAIN: ICD-10-CM

## 2022-07-12 DIAGNOSIS — C50.911 PRIMARY MALIGNANT NEOPLASM OF RIGHT BREAST WITH METASTASIS TO OTHER SITE (HCC): Primary | ICD-10-CM

## 2022-07-12 DIAGNOSIS — C79.51 BONE METASTASES (HCC): ICD-10-CM

## 2022-07-12 DIAGNOSIS — Z51.5 PALLIATIVE CARE PATIENT: ICD-10-CM

## 2022-07-12 DIAGNOSIS — K59.00 CONSTIPATION, UNSPECIFIED CONSTIPATION TYPE: ICD-10-CM

## 2022-07-12 DIAGNOSIS — Z17.0 MALIGNANT NEOPLASM OF BREAST IN FEMALE, ESTROGEN RECEPTOR POSITIVE, UNSPECIFIED LATERALITY, UNSPECIFIED SITE OF BREAST (HCC): ICD-10-CM

## 2022-07-12 PROCEDURE — 99214 OFFICE O/P EST MOD 30 MIN: CPT | Performed by: STUDENT IN AN ORGANIZED HEALTH CARE EDUCATION/TRAINING PROGRAM

## 2022-07-12 RX ORDER — LANOLIN ALCOHOL/MO/W.PET/CERES
6 CREAM (GRAM) TOPICAL
Qty: 60 TABLET | Refills: 0 | Status: SHIPPED | OUTPATIENT
Start: 2022-07-12 | End: 2022-08-09

## 2022-07-12 NOTE — PROGRESS NOTES
Outpatient Follow-Up - Palliative and Supportive Care   Ant Grider 49 y o  female [id number]    Assessment & Plan  Problem List Items Addressed This Visit        Musculoskeletal and Integument    Bone metastases ([facility] [address] )       Other    Malignant neoplasm of breast in female, estrogen receptor positive ([facility] [address] )    Palliative care patient    Primary malignant neoplasm of right breast with metastasis to other site [facility]) - Primary    Constipation    Cancer related pain      Other Visit Diagnoses     Cancer associated pain        Relevant Medications    melatonin 3 mg    diphenhydrAMINE (BENADRYL) 50 MG tablet        #symptom management  #cancer-related pain   - continue APAP 650 mg PO Q6H PRN              - max daily 4000 mg   - continue hydromorphone 2-4 mg PO Q3H PRN   - continue duloxetine therapy     #anxiety   - continue duloxetine 30 mg PO QDaily     #nausea   - continue metoclopramide 10 mg PO QID PRN   - continue ondansetron 4 mg PO Q8H PRN     #insomnia   - continue melatonin 6 mg PO QHS     #OIC   - continue home bowel regimen   - continued adequate hydration   - addition of senna to regimen given worsening constipation     #abdominal cramping   - continue dicyclomine 10 mg PO Q6H PRN    #goals of care   - appointment with oncology tomorrow   - recent PET scan on 07/11/2022 with no new or worsening metastatic disease   - CT CAP completed, read pending   - treatment focused care with no limitations at this time    #psychosocial support   - emotional support provided   Aggie Rodriguez [spouse] [phone]      Next [address] Follow up in 4 weeks  Controlled Substance Review    [place] or [place]  reviewed: No red flags were identified; safe to proceed with prescription  [place]     PDMP Review       Value Time User    PDMP Reviewed  Yes (P)  7/12/2022 11:33 AM Hiram Christian MD          Medications adjusted this encounter:  Requested Prescriptions     Signed Prescriptions Disp Refills    melatonin 3 mg 60 tablet 0 Sig: Take 2 tablets (6 mg total) by mouth daily at bedtime    diphenhydrAMINE (BENADRYL) 50 MG tablet 30 tablet 0     Sig: Take 1 tablet (50 mg total) by mouth daily at bedtime as needed for itching or sleep     No orders of the defined types were placed in this encounter  Medications Discontinued During This Encounter   Medication Reason    diphenhydrAMINE (BENADRYL) 50 MG tablet Reorder    melatonin 3 mg Reorder         Vishal Medina was seen today for symptoms and planning cares related to above illnesses  I have reviewed the patient's controlled substance dispensing history in the Prescription Drug Monitoring Program in compliance with the [facility] regulations before prescribing any controlled substances  They are invited to continue to follow with us  If there are questions or concerns, please contact us through our clinic/answering service 24 hours a day, seven days a week  Winsome Delgado MD  [facility]        Visit Information    Accompanied By: No one    Source of History: Self, Medical record    History Limitations: None      History of Present Illness    Vishal Medina is a 49 y o  female who presents in follow up of symptoms related to metastatic breast cancer  Pertinent issues include: symptom management, pain, neoplasm related, depression or anxiety, assessment of goals of care, advance care planning  Patient reports persistent R-side pain, attributed to known cancer-related pain, worse in AM, manageable with current regimen, no changes requested at this time  Denies nausea, vomiting  Appetite improved, completing 3 meals/day, however, continued weight loss  BM once/week, lactulose added, helpful; working to identify effective bowel regimen; last BM this AM, passing flatus  Adequate sleep  Past medical, surgical, social, and family histories are reviewed and pertinent updates are made      Review of Systems   Constitutional: Positive for malaise/fatigue and weight loss  Negative for chills, decreased appetite and fever  HENT: Negative for congestion  Eyes: Negative for visual disturbance  Cardiovascular: Negative for chest pain  Respiratory: Negative for shortness of breath  Musculoskeletal: Negative for falls and neck pain  R side pain   Gastrointestinal: Positive for constipation  Negative for abdominal pain, nausea and vomiting  Genitourinary: Negative for frequency  Neurological: Negative for headaches  Psychiatric/Behavioral: The patient is nervous/anxious  The patient does not have insomnia  All other systems reviewed and are negative  Vital Signs    /70 (BP Location: Left arm, Cuff Size: Standard)   Pulse 68   Temp 97 7 °F (36 5 °C) (Temporal)   Resp (!) 24   Wt 62 3 kg (137 lb 6 4 oz)   LMP 05/26/2021   SpO2 98%   BMI 22 86 kg/m²     Physical Exam and Objective Data  Physical Exam  Vitals and nursing note reviewed  Constitutional:       General: She is awake  Appearance: She is not diaphoretic  Comments: Sitting up in NAD  BMI 22 9  Non-toxic appearing   HENT:      Head: Normocephalic and atraumatic  Right Ear: External ear normal       Left Ear: External ear normal       Nose: No rhinorrhea  Eyes:      Comments: No gaze preference   Cardiovascular:      Rate and Rhythm: Normal rate  Pulmonary:      Effort: No tachypnea, accessory muscle usage or respiratory distress  Comments: Completes full sentences without difficulty  Musculoskeletal:      Cervical back: Normal range of motion  Neurological:      General: No focal deficit present  Mental Status: She is alert and oriented to person, place, and time     Psychiatric:         Attention and Perception: Attention normal          Mood and Affect: Mood and affect normal          Speech: Speech normal          Cognition and Memory: Cognition and memory normal            Radiology and Laboratory:  I personally reviewed and interpreted the following results:    Most Recent COVID-19 Results:  Lab Results   Component Value Date/Time    SARSCOV2 Negative 06/25/2022 03:22 AM    SARSCOV2 Positive (A) 02/11/2022 02:10 PM    SARSCOV2 Positive (A) 12/20/2020 12:25 PM       Most Recent Lab Work:  Lab Results   Component Value Date/Time    SODIUM 138 07/11/2022 12:01 PM    K 4 2 07/11/2022 12:01 PM    K 3 7 04/02/2014 01:15 PM    BUN 11 07/11/2022 12:01 PM    BUN 12 04/02/2014 01:15 PM    CREATININE 0 80 07/11/2022 12:01 PM    CREATININE 0 80 04/02/2014 01:15 PM    GLUC 83 07/11/2022 12:01 PM     Lab Results   Component Value Date/Time    AST 15 07/11/2022 12:01 PM    AST 23 04/02/2014 01:15 PM    ALT 8 (L) 07/11/2022 12:01 PM    ALT 15 04/02/2014 01:15 PM    ALB 3 9 07/11/2022 12:01 PM    ALB 3 4 (L) 04/02/2014 01:15 PM     Lab Results   Component Value Date/Time    HGB 12 9 07/11/2022 12:01 PM    WBC 3 89 (L) 07/11/2022 12:01 PM     07/11/2022 12:01 PM    INR 1 07 08/17/2021 06:14 AM       Most Recent Imaging [last 30 days]:  Procedure: CT chest abdomen pelvis wo contrast    Result Date: 6/27/2022  Narrative: CT CHEST, ABDOMEN AND PELVIS WITHOUT IV CONTRAST INDICATION:   fever  Febrile neutropenia; history of metastatic breast cancer COMPARISON:  4/1/2022; 11/19/2021 TECHNIQUE: CT examination of the chest, abdomen and pelvis was performed without intravenous contrast  This examination was performed without intravenous contrast in the context of the critical nationwide Omnipaque shortage  Study is limited in sensitivity  Axial, sagittal, and coronal 2D reformatted images were created from the source data and submitted for interpretation  Radiation dose length product (DLP) for this visit:  632 mGy-cm     This examination, like all CT scans performed in the [facility], was performed utilizing techniques to minimize radiation dose exposure, including the use of iterative reconstruction and automated exposure control  Enteric contrast was administered  FINDINGS: CHEST LUNGS:  2 mm right middle lobe nodule image 54, series 3, stable  5 mm right upper lobe nodule, image 58, series 3, stable  Right middle lobe 4 mm nodule, image 74, stable  Left lower lobe 5 mm nodule, image 72, stable  6 mm peribronchial nodule, left upper lobe, image 36, stable  Left lower lobe 4 mm nodule image 68, stable  A few other small nodules less than 4 mm are stable  There is no tracheal or endobronchial lesion  PLEURA:  Moderate right and small left effusion increased since April 1  HEART/GREAT VESSELS: Heart is unremarkable for patient's age  No thoracic aortic aneurysm  MEDIASTINUM AND KIA:  Calcified lymph nodes are noted in the mediastinum and kia without change  Slight thickening of the distal esophagus is seen versus small hiatus hernia  CHEST WALL AND LOWER NECK:  Surgical clips are noted in the right axilla  ABDOMEN LIVER/BILIARY TREE:  Vague hypodensity left lobe of the liver is consistent with patient's known metastatic disease better seen on the prior contrast exam  Smaller lesions noted on prior study more difficult to appreciate on the current study  Very small  splenic lesions noted previously are also not well seen  GALLBLADDER:  No calcified gallstones  No pericholecystic inflammatory change  SPLEEN:  Unremarkable  PANCREAS:  Unremarkable  ADRENAL GLANDS:  Unremarkable  KIDNEYS/URETERS:  No hydronephrosis  Scattered nephrocalcinosis  Radha Nanny STOMACH AND BOWEL:  Unremarkable  APPENDIX:  No findings to suggest appendicitis  ABDOMINOPELVIC CAVITY:  No ascites  No pneumoperitoneum  Small retroperitoneal lymph nodes appear unchanged    A few small mesenteric nodes are unchanged  No definite collection  VESSELS:  Unremarkable for patient's age  PELVIS REPRODUCTIVE ORGANS:  Unremarkable for patient's age  URINARY BLADDER:  Unremarkable  ABDOMINAL WALL/INGUINAL REGIONS:  Unremarkable  OSSEOUS STRUCTURES: No acute fracture   Numerous sclerotic lesions are scattered throughout the osseous structures without significant change from April and suggest blastic metastatic disease  1 3 cm stable at T12  There is a lytic lesion involving the manubrium of the sternum which is also stable     Impression: Evidence of metastatic disease to liver and osseous structures without obvious significant change  No new collection is identified  Worsening pleural effusions right greater than left  Small lung nodules are stable without new consolidation  No new bony destructive features  Workstation performed: [id number]     Procedure: XR chest 2 views    Result Date: 6/25/2022  Narrative: CHEST INDICATION:   fever  61-year-old female with history of metastatic breast cancer  History of small right pleural effusion  COMPARISON:  Chest radiograph from February 22, 2022  CT of the chest, abdomen and pelvis from April 1, 2022  EXAM PERFORMED/VIEWS:  XR CHEST PA & LATERAL  The frontal view was performed utilizing dual energy radiographic technique  FINDINGS:  Right pleural catheter in place, unchanged since 2/22/2022  Surgical clips in the right axilla  Cardiomediastinal silhouette appears unremarkable  Blunting of the right posterior and lateral costophrenic sulci  Left pleural space clear  Lungs clear  No pneumothorax  Osseous structures appear within normal limits for patient age  Bone metastases demonstrated on prior CT not visible on this exam      Impression: Small right pleural effusion  No evidence of acute abnormality in the chest  Workstation performed: [id number]       30 minutes was spent face to face with Marica Schwab with greater than 50% of the time spent in counseling or coordination of care including discussions of provided medical updates, discussed palliative care, determined goals of care, determined social/family support, discussed plans of care, discussed symptom management, provided psychosocial support  Medication refill  PDMP Reviewed   All of the patient's or agent's questions were answered during this discussion

## 2022-07-12 NOTE — TELEPHONE ENCOUNTER
Called radiology reading room to have patient's scan read before her appointment tomorrow with Meliton Vale 7/13  Scan was done on 7/8 an was not yet resulted  Sherrill Kaba stated that she would send it to a radiologist to have the scan read

## 2022-07-12 NOTE — PROGRESS NOTES
Hematology/Oncology Progress Note    Date of Service: 7/13/2022    [address] [facility]  [facility]   [address]  [place] [id number]      : Arabella Shrestha PS today: 2 (up and about 50% or more of the day)    Hem/Onc Problem List:   1  Metastatic right-sided breast cancer, ER and WV positive, HER2 negative  2  Bone metastatic disease  3  History of right-sided pleural effusion  Chief Complaint:    Routine follow-up for management of stage IV breast cancer    Assessment/Plan:   LMP: 1 year ago (age 50)    3  Metastatic breast cancer, with liver, bone, lymph node and pleura involvement  ER and WV positive, HER2 negative  Bone scan showed bony metastatic disease  Patient started monthly Zoladex, daily letrozole and CDK4/6 inhibitor Abemeciclib  Unfortunately, patient developed significant watery diarrhea from Abemaciclib  Abemaciclib was changed to Ibrance 125 mg daily for 3 weeks on,  followed by 1 week off until disease progression on August 30, 2021  Ibrance dose was decreased to 100 mg because of neutropenia  CT scan and bone scan showed disease progression and treatment changed to chemo (Eribulin)  Restaging CT 7/8/2022 shows a very good WV (near 50% reduction in index liver lesions)  She is on Eribulin 1 4 milligram/meter subcutaneously day 1 day 8 every 21 day cycle until disease progression  Pt was admitted after C3 with neutropenic fever and is on Neulasta-like growth factor support  OncotypeMap test NGS was done 4/18/2022 with results: PIK3CA: [id number] mutation  ESR1: WT, PD-L1 IHC: negative  MSI-stable, TMB low (4 muts/mb)  BRCA 1&2 WT  Next line option would include Alpelisib + Fulvestrant    2  Bony metastatic disease  Bone scan showed bony metastatic disease  Patient  continues Zometa every 3 months    3   History of right breast cancer, status post lumpectomy and axillary lymph node dissection, status post adjuvant radiation therapy  4  Right-sided pleural effusion, status post thoracentesis, cytology positive for adenocarcinoma, ER/OK positive, HER2 negative  Status post PleurX catheter placement  Patient drained 325 cc fluid on December 1, 2020   Will continue to treat the underlying disease  5  Leukopenia 2nd to [place]  Improved      Discussion of decision making    I personally reviewed the following lab results, the image studies, pathology, other specialty/physicians consult notes and recommendations, and outside medical records from [facility]  I had a lengthy discussion with the patient and shared the work-up findings  We discussed the diagnosis and management plan as below  I spent 32 minutes reviewing the records (labs, clinician notes, outside records, medical history, ordering medicine/tests/procedures, interpreting the imaging/labs previously done) and coordination of care as well as direct time with the patient today, of which greater than 50% of the time was spent in counseling and coordination of care with the patient/family  · Plan/Labs  · Continue Eribulin q 21 days, C4 coming up, with Neulasta support  She is on Letrozole daily  · Restaging CT CAP in 3 months ordered  · Cont to f/u with palliative care, consider appetite stimulation if weight loss continues and decreased appetite  · Cont Zometa q12 weeks  · Pt was on off-label Zoladex since 2021 and will d/c as no further data to support this  Follow Up: q6 weeks while on treatment  All questions were answered to the patient's satisfaction during this encounter  The patient knows the contact information for our office and knows to reach out for any relevant concerns related to this encounter   They are to call for any temperature 100 4 or higher, new symptoms including but not restricted to shaking chills, decreased appetite, nausea, vomiting, diarrhea, increased fatigue, shortness of breath or chest pain, confusion, and not feeling the strength to come to the clinic  For all other listed problems and medical diagnosis in their chart - they are managed by PCP and/or other specialists, which the patient acknowledges  Thank you very much for your consultation and making us a part of this patient's care  We are continuing to follow closely with you  Please do not hesitate to reach out to me with any additional questions or concerns  Rebecca Valente MD  Hematology & Medical Oncology Staff Physician    Disclaimer: This document was prepared using [employer] technology  If a word or phrase is confusing, or does not make sense, this is likely due to recognition error which was not discovered during the providers review  If you believe an error has occurred, please Contact me through [employer] service for roula? cation  AJCC 8th Edition Cancer Stage :      Cancer Staging  1 per the patient from 2012    Hematology/Oncology History:   · History of right-sided breast cancer, initially diagnosed in 2012, status post lumpectomy and axillary lymph node dissection, status post adjuvant radiation therapy  Patient did not receive any adjuvant endocrine therapy or chemotherapy  · July 24, 2021, patient was admitted to hospital because of shortness of breath and cough  · July 24, 2021 CT chest PE protocol showed septal thickening throughout the right lung and bronchial wall thickening due to lymphangitic spread of tumor  Indeterminate lung nodules measuring 5 millimeter in the right upper lobe, right middle lobe and 7 millimeter in the left upper lobe and left lower lobe  Large right pleural effusion  Multiple liver metastatic disease measuring up to 4 centimeter  Lytic metastatic to sternal manubrium  · July 26, 2021, ultrasound abdomen shows multiple hepatic metastatic disease  Status post thoracentesis with 1200 mL of joanne fluid removed  Cytology showed adenoma carcinoma, ER and MN positive  40-50%, HER2 negative    · July 26, 2021 biopsy of the lung liver showed metastatic breast cancer, ER and % positive, HER2 negative  CT abdomen pelvis with contrast showed extensive hepatic metastatic disease  · July 27, 2021 MRI brain showed no evidence of intracranial metastatic disease  · August 6, 2021, mammogram showed no mammographic evidence of malignancy  · August 11, 2021 bone scan showed bony metastatic disease involving left iliac crest medially and adjacent sacrum  · August 12, 2021, Zoladex was given  Letrozole started  · August 16, 2022, patient started Abemaciclib  · August 30, 2021, DC abemaciclib because of diarrhea  Start Ibrance 125 mg daily 3 weeks on 1 week off  · Nov 19, 2021, CT c/a/p showed:   1  Since July 2021, new thromboses within the intrahepatic branches of the portal vein as described above  2  Increased diffuse sclerotic osseous lesions  3   Decreased size of most of the hepatic metastases  4   Indeterminate mottled appearance of the splenic parenchyma  Attention on follow-up is advised  5   Unchanged pulmonary nodules  6   Right pleural effusion has decreased in size since July 24, 2021  The smooth interlobular septal thickening has also decreased, and this change can be attributable to the decreased size of the pleural effusion  7   Mucosal hyperenhancement of the colon  Findings may be treatment-related, and correlation with gastrointestinal symptoms is advised  BONE SCAN:   1   A few areas of increased radiotracer uptake suspicious for osseous metastasis, with findings for minimal progression  · March 15, 2022 bone scan: Stable or improving scattered foci radiotracer uptake suspicious for osseous metastases  No new osseous foci suspicious for metastasis  ·  April 1, 2022 CT scan chest abdomen pelvis:  IMPRESSION:     Findings concerning for worsening hepatic metastases as evidenced by increased size and number of lesions    Please see above discussion      Progressive left portal vein thrombosis      The majority of the pulmonary nodules are stable  There is one subpleural nodule in the right upper lobe apex posteriorly, not seen on the prior study      Stable extensive osseous metastases      Multiple tiny hypodense splenic lesions are also stable, indeterminate      Persistent small right pleural effusion with a right pleural catheter in place  June 25, 2022: 4 day admission for neutropenic fever  July 8, 2022 CT CAP: 1    Stable scattered small pulmonary nodules bilaterally  Small right pleural effusion, slightly decreased  Improving hepatic metastasis  Less conspicuous small splenic hypodense lesions  Stable findings for widespread osseous metastasis  NM Bone Scan without significant changes (A lesion in the left lobe of the liver laterally measures 2 0 x 1 9 cm, previously 5 1 x 3 8 cm- 56 2% reduction  Somewhat stellate lesion in the right lobe of the liver  posteriorly now measures 1 8 x 1 2 cm image 33 series 2, previously 2 6 x 2 6 cm (42 3%)  History of Present Illiness:   Shelly Ramires is a 49 y o  female with the above-noted HemOnc history who is here for routine follow-up  Patient has metastatic breast cancer with bony metastatic disease  Zoladex and letrozole started on August 11-12, 2021  Abmaciclib was changed to [place] because of significant watery diarrhea  Patient did have [place]ropenia from [place]  Ibrance dose was decreased to 100 mg  Restaging CT scan and bone scan showed disease progression    Patient had the pleural catheter in place for malignant pleural effusion  Patient takes Zometa for bone health  Interval events: She feels more fatigued and malaise compared to a month ago  She has a moderate appetite and eats and drinks intermittently well  She does have numbness/tingling in arms but not affecting IADLS or ability to grasp anything  Denies F/C, N/V, SOB, CP, LH, HA, abd pain, diarrhea, falls, generalized weakness    She has constipation and is using Senna for it  Mild REIS  She does have 10 lbs weight loss in the past 6 months  Baseline weight: 168 lbs in the past, 145 lbs over the beginning of 2022     ROS: A 10-point of review of systems is obtained and other than the above is noncontributory  Objective:   VITALS:   /68   Pulse 69   Temp (!) 97 3 °F (36 3 °C)   Resp 18   Ht 5' 5" (1 651 m)   Wt 61 7 kg (136 lb)   LMP 05/26/2021   SpO2 100%   BMI 22 63 kg/m²     Physical EXAM:  General:  Alert, cooperative, no distress, appears stated age  Head:  Normocephalic, without obvious abnormality  Eyes:  Conjunctivae/corneas clear  EOMI  No evidence of conjunctivitis     Throat: Lips, mucosa, and tongue normal  No lesions in the oropharynx   Neck: Supple, symmetrical, trachea midline, no adenopathy    Lungs:   Clear to auscultation bilaterally  Respiratory effort easy, nonlabored    Heart:  Regular rate and rhythm, +S1, S2    Abdomen:   Soft, non-tender,nondistended  Bowel sounds normal      Extremities: Extremities normal, atraumatic, no cyanosis or edema  No axillary or inguinal adenopathy   Skin: Skin color, texture, turgor normal  No rashes or lesions    Neurologic: AAO   No focal neuro deficits noted b/l       Allergies   Allergen Reactions    Codeine Anaphylaxis    Morphine GI Intolerance, Itching and Vomiting    Sulfa Antibiotics Hives and Itching       Past Medical History:   Diagnosis Date    Cancer ([facility] [address] )     History of radiation exposure     Malignant neoplasm of right female breast ([facility] [address] ) 2012    right       Past Surgical History:   Procedure Laterality Date    BREAST CYST EXCISION      BREAST LUMPECTOMY Right 2012    HIP SURGERY      IR BIOPSY LIVER MASS  7/26/2021    IR PLEURAL EFFUSION LONG-TERM CATHETER PLACEMENT  8/17/2021    IR PLEURAL EFFUSION LONG-TERM CATHETER REMOVAL  5/11/2022    IR THORACENTESIS  7/26/2021       Family History   Problem Relation Age of Onset    Breast cancer Mother         in her 63's    Breast cancer Sister         inher 45s and 48    Colon cancer Maternal Grandmother     No Known Problems Paternal Grandmother     Breast cancer Other     No Known Problems Paternal Aunt        Social History     Socioeconomic History    Marital status: /Civil Union     Spouse name: Not on file    Number of children: Not on file    Years of education: Not on file    Highest education level: Not on file   Occupational History    Not on file   Tobacco Use    Smoking status: Former Smoker     Packs/day: 0 25     Types: Cigarettes    Smokeless tobacco: Never Used   Vaping Use    Vaping Use: Never used   Substance and Sexual Activity    Alcohol use: Not Currently    Drug use: Not Currently    Sexual activity: Not Currently   Other Topics Concern    Not on file   Social History Narrative    Not on file     Social Determinants of Health     Financial Resource Strain: Not on file   Food Insecurity: No Food Insecurity    Worried About Running Out of Food in the Last Year: Never true    [address] in the Last Year: Never true   Transportation Needs: No Transportation Needs    Lack of Transportation (Medical): No    Lack of Transportation (Non-Medical):  No   Physical Activity: Not on file   Stress: Not on file   Social Connections: Not on file   Intimate Partner Violence: Not on file   Housing Stability: Low Risk     Unable to Pay for Housing in the Last Year: No    Number of Places Lived in the Last Year: 1    Unstable Housing in the Last Year: No       Current Outpatient Medications   Medication Sig Dispense Refill    acetaminophen (TYLENOL) 325 mg tablet Take 2 tablets (650 mg total) by mouth every 6 (six) hours as needed for mild pain 100 tablet 0    albuterol (PROVENTIL HFA,VENTOLIN HFA) 90 mcg/act inhaler Inhale 2 puffs every 4 (four) hours as needed for wheezing 8 g 0    benzonatate (TESSALON) 200 MG capsule Take 1 capsule (200 mg total) by mouth 3 (three) times a day as needed for cough 20 capsule 0    calcium carbonate (TUMS) 500 mg chewable tablet Chew 1 tablet (500 mg total) 3 (three) times a day as needed for indigestion or heartburn 30 tablet 0    dextromethorphan-guaiFENesin (ROBITUSSIN DM)  mg/5 mL syrup Take 10 mL by mouth every 4 (four) hours as needed for cough 236 mL 0    dicyclomine (BENTYL) 10 mg capsule Take 1 capsule (10 mg total) by mouth every 6 (six) hours as needed (abdominal cramping) 20 capsule 0    diphenhydrAMINE (BENADRYL) 50 MG tablet Take 1 tablet (50 mg total) by mouth daily at bedtime as needed for itching or sleep 30 tablet 0    diphenoxylate-atropine (LOMOTIL) 2 5-0 025 mg per tablet Take 1 tablet by mouth 4 (four) times a day as needed for diarrhea 30 tablet 0    DULoxetine (CYMBALTA) 30 mg delayed release capsule Take 1 capsule (30 mg total) by mouth daily 90 capsule 3    HYDROmorphone (DILAUDID) 2 mg tablet Take 1-2 tablets (2-4 mg total) by mouth every 3 (three) hours as needed (moderate/severe cancer-related pain) Max Daily Amount: 32 mg 120 tablet 0    Lidocaine Viscous HCl (XYLOCAINE) 2 % mucosal solution Swish and spit 10 mL 4 (four) times a day as needed for mouth pain or discomfort 200 mL 0    melatonin 3 mg Take 2 tablets (6 mg total) by mouth daily at bedtime 60 tablet 0    metoclopramide (REGLAN) 10 mg tablet Take 1 tablet (10 mg total) by mouth 4 (four) times a day 28 tablet 0    multivitamin (THERAGRAN) TABS Take 1 tablet by mouth      ondansetron (ZOFRAN) 4 mg tablet Take 1 tablet (4 mg total) by mouth every 8 (eight) hours as needed for nausea or vomiting 20 tablet 0    oxybutynin (DITROPAN-XL) 5 mg 24 hr tablet Take 1 tablet (5 mg total) by mouth daily Take 1 tablet daily 90 tablet 3    polyethylene glycol (MIRALAX) 17 g packet Take 17 g by mouth daily  0    Xarelto 20 MG tablet TAKE 1 TABLET BY MOUTH DAILY WITH BREAKFAST 30 tablet 2    al mag oxide-diphenhydramine-lidocaine viscous (MAGIC MOUTHWASH) 1:1:1 suspension Swish and spit 10 mL every 4 (four) hours as needed for mouth pain or discomfort (Patient not taking: No sig reported) 300 mL 0    CRANIAL PROSTHESIS, RX, Apply to cranial area as needed for comfort  (Patient not taking: No sig reported) 1 each 0    docusate sodium (COLACE) 100 mg capsule Take 1 capsule (100 mg total) by mouth 2 (two) times a day for 10 days 20 capsule 0    fluticasone (FLONASE) 50 mcg/act nasal spray 2 sprays into each nostril daily      lactulose 20 g/30 mL Take 15 mL (10 g total) by mouth daily as needed (constipaton) for up to 10 days (Patient not taking: Reported on 7/5/2022) 300 mL 0    letrozole (FEMARA) 2 5 mg tablet TAKE 1 TABLET BY MOUTH EVERY DAY 90 tablet 2    palbociclib (Ibrance) 100 MG tablet Take 1 tablet (100 mg total) by mouth daily (Patient not taking: No sig reported) 21 tablet 1    potassium chloride (K-DUR,KLOR-CON) 20 mEq tablet Take 1 tablet (20 mEq total) by mouth 2 (two) times a day for 5 days 10 tablet 0    senna (SENOKOT) 8 6 MG tablet Take 1 tablet (8 6 mg total) by mouth daily at bedtime as needed for constipation (Patient not taking: No sig reported) 30 tablet 0     No current facility-administered medications for this visit  (Not in a hospital admission)      DATA REVIEW:    Pathology Result:    Final Diagnosis   Date Value Ref Range Status   07/26/2021   Final    A  B  Thoracentesis, Right (ThinPrep and cell block preparations):  Positive for malignancy  Metastatic carcinoma, compatible with breast primary  -  Immunohistochemical stains performed with appropriate controls show the tumor cells to be positive for Matthew-EP4, MOC31, BARRY-3 and ER with scattered background mesothelial cells staining for WT1 and calretinin, supporting the diagnosis  Satisfactory for evaluation  07/26/2021   Final    A   Liver (core biopsy):     - Poorly-differentiated carcinoma, most compatible with metastasis from the patient's reported breast primary  Comment:  ER / KS / Her-2 pending  Comment: This is an appended report  These results have been appended to a previously preliminary verified report  Image Results: They are reviewed and documented in Hematology/Oncology history    CT chest abdomen pelvis w contrast  Narrative: CT CHEST, ABDOMEN AND PELVIS WITH IV CONTRAST    INDICATION:   C50 919: Malignant neoplasm of unspecified site of unspecified female breast  Z17 0: Estrogen receptor positive status (ER+)  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  C79 51: Secondary malignant neoplasm of bone  COMPARISON:  CT chest abdomen pelvis 6/27/2022, bone scan 7/11/2022, CT chest abdomen pelvis 4/1/2022    TECHNIQUE: CT examination of the chest, abdomen and pelvis was performed  Axial, sagittal, and coronal 2D reformatted images were created from the source data and submitted for interpretation  Radiation dose length product (DLP) for this visit:  646 mGy-cm   This examination, like all CT scans performed in the [facility], was performed utilizing techniques to minimize radiation dose exposure, including the use of iterative   reconstruction and automated exposure control  IV Contrast:  80 mL of iohexol (OMNIPAQUE)  Enteric Contrast: Enteric contrast was not administered  FINDINGS:    CHEST    LUNGS:  Scattered small pulmonary nodules again noted  For example:   5 mm left upper lobe peribronchial nodule suggested, image 33 series 3, previously 6 mm  Stable 3 mm right middle lobe nodule superiorly image 52 series 3  Stable 5 mm nodule in the right middle lobe medially close to the minor fissure image 58 series 3  Unchanged 4 mm left lower lobe nodule laterally image 62 series 3  Stable 5 mm left lower lobe nodule medially image 64 series 3    Additional small pulmonary nodules noted with suggestion of underlying centrilobular nodules most conspicuous in the right upper lobe, stable  PLEURA:  Small right pleural effusion, slightly decreased  Stable position of the right basilar pleural catheter  HEART/GREAT VESSELS: Heart is unremarkable for patient's age  No thoracic aortic aneurysm  MEDIASTINUM AND KIA:  Calcified small lymph nodes again noted in the mediastinum and bilateral perihilar regions  CHEST WALL AND LOWER NECK:  Multiple surgical clips in the right axilla  ABDOMEN    LIVER/BILIARY TREE:  Improving hepatic lesions from contrast CT of 4/1/2022  A lesion in the left lobe of the liver laterally measures 2 0 x 1 9 cm image 49 series 2, previously 5 1 x 3 8 cm  Somewhat stellate lesion in the right lobe of the liver   posteriorly now measures 1 8 x 1 2 cm image 33 series 2, previously 2 6 x 2 6 cm  Stable findings for largely thrombosed left portal vein  Main portal vein is patent  GALLBLADDER:  No calcified gallstones  No pericholecystic inflammatory change  SPLEEN:  Heterogeneous appearance to the spleen  Previously seen on prior contrast CT were multiple small hypodense lesions less conspicuous on the current exam     PANCREAS:  Unremarkable  ADRENAL GLANDS:  Unremarkable  KIDNEYS/URETERS:  One or more simple renal cyst(s) is noted  Otherwise unremarkable kidneys  No hydronephrosis  STOMACH AND BOWEL:  Limited evaluation without enteric contrast   No acute findings  APPENDIX:  A normal appendix was visualized  ABDOMINOPELVIC CAVITY:  No ascites  No pneumoperitoneum  No lymphadenopathy  VESSELS:  Unremarkable for patient's age  PELVIS    REPRODUCTIVE ORGANS:  Unremarkable for patient's age  URINARY BLADDER:  Collapsed limiting evaluation  ABDOMINAL WALL/INGUINAL REGIONS:  Unremarkable  OSSEOUS STRUCTURES:  Innumerable sclerotic lesions compatible with widespread osseous metastasis without significant change  Impression: 1  Stable scattered small pulmonary nodules bilaterally    2   Small right pleural effusion, slightly decreased  3   Improving hepatic metastasis  4   Less conspicuous small splenic hypodense lesions  5   Stable findings for widespread osseous metastasis  Workstation performed: [id number]        LABS:  Lab data are reviewed and documented in HemOnc history       Recent Results (from the past 48 hour(s))   CBC and differential    Collection Time: 07/11/22 12:01 PM   Result Value Ref Range    WBC 3 89 (L) 4 31 - 10 16 Thousand/uL    RBC 4 05 3 81 - 5 12 Million/uL    Hemoglobin 12 9 11 5 - 15 4 g/dL    Hematocrit 40 5 34 8 - 46 1 %     (H) 82 - 98 fL    MCH 31 9 26 8 - 34 3 pg    MCHC 31 9 31 4 - 37 4 g/dL    RDW 16 7 (H) 11 6 - 15 1 %    MPV 12 0 8 9 - 12 7 fL    Platelets 399 346 - 179 Thousands/uL    nRBC 0 /100 WBCs    Neutrophils Relative 79 (H) 43 - 75 %    Immat GRANS % 1 0 - 2 %    Lymphocytes Relative 13 (L) 14 - 44 %    Monocytes Relative 5 4 - 12 %    Eosinophils Relative 1 0 - 6 %    Basophils Relative 1 0 - 1 %    Neutrophils Absolute 3 10 1 85 - 7 62 Thousands/µL    Immature Grans Absolute 0 02 0 00 - 0 20 Thousand/uL    Lymphocytes Absolute 0 49 (L) 0 60 - 4 47 Thousands/µL    Monocytes Absolute 0 21 0 17 - 1 22 Thousand/µL    Eosinophils Absolute 0 03 0 00 - 0 61 Thousand/µL    Basophils Absolute 0 04 0 00 - 0 10 Thousands/µL   Comprehensive metabolic panel    Collection Time: 07/11/22 12:01 PM   Result Value Ref Range    Sodium 138 136 - 145 mmol/L    Potassium 4 2 3 5 - 5 3 mmol/L    Chloride 103 100 - 108 mmol/L    CO2 23 21 - 32 mmol/L    ANION GAP 12 4 - 13 mmol/L    BUN 11 5 - 25 mg/dL    Creatinine 0 80 0 60 - 1 30 mg/dL    Glucose 83 65 - 140 mg/dL    Calcium 8 8 8 3 - 10 1 mg/dL    AST 15 5 - 45 U/L    ALT 8 (L) 12 - 78 U/L    Alkaline Phosphatase 51 46 - 116 U/L    Total Protein 7 7 6 4 - 8 2 g/dL    Albumin 3 9 3 5 - 5 0 g/dL    Total Bilirubin 0 28 0 20 - 1 00 mg/dL    eGFR 83 ml/min/1 73sq kristel Coon MD  7/13/2022, 9:49 AM

## 2022-07-12 NOTE — H&P (VIEW-ONLY)
Hematology/Oncology Progress Note    Date of Service: 7/13/2022    [address] [facility] [place] [facility]  [facility]   [address] [facility] [place] [address]      : Jorge Mancilla PS today: 2 (up and about 50% or more of the day)    Hem/Onc Problem List:   1  Metastatic right-sided breast cancer, ER and MT positive, HER2 negative  2  Bone metastatic disease  3  History of right-sided pleural effusion  Chief Complaint:    Routine follow-up for management of stage IV breast cancer    Assessment/Plan:   LMP: 1 year ago (age 50)    3  Metastatic breast cancer, with liver, bone, lymph node and pleura involvement  ER and MT positive, HER2 negative  Bone scan showed bony metastatic disease  Patient started monthly Zoladex, daily letrozole and CDK4/6 inhibitor Abemeciclib  Unfortunately, patient developed significant watery diarrhea from Abemaciclib  Abemaciclib was changed to Ibrance 125 mg daily for 3 weeks on,  followed by 1 week off until disease progression on August 30, 2021  Ibrance dose was decreased to 100 mg because of neutropenia  CT scan and bone scan showed disease progression and treatment changed to chemo (Eribulin)  Restaging CT 7/8/2022 shows a very good MT (near 50% reduction in index liver lesions)  She is on Eribulin 1 4 milligram/meter subcutaneously day 1 day 8 every 21 day cycle until disease progression  Pt was admitted after C3 with neutropenic fever and is on Neulasta-like growth factor support  OncotypeMap test NGS was done 4/18/2022 with results: PIK3CA: [id number] mutation  ESR1: WT, PD-L1 IHC: negative  MSI-stable, TMB low (4 muts/mb)  BRCA 1&2 WT  Next line option would include Alpelisib + Fulvestrant    2  Bony metastatic disease  Bone scan showed bony metastatic disease  Patient  continues Zometa every 3 months    3   History of right breast cancer, status post lumpectomy and axillary lymph node dissection, status post adjuvant radiation therapy  4  Right-sided pleural effusion, status post thoracentesis, cytology positive for adenocarcinoma, ER/GA positive, HER2 negative  Status post PleurX catheter placement  Patient drained 325 cc fluid on December 1, 2020   Will continue to treat the underlying disease  5  Leukopenia 2nd to [place]  Improved      Discussion of decision making    I personally reviewed the following lab results, the image studies, pathology, other specialty/physicians consult notes and recommendations, and outside medical records from [facility]  I had a lengthy discussion with the patient and shared the work-up findings  We discussed the diagnosis and management plan as below  I spent 32 minutes reviewing the records (labs, clinician notes, outside records, medical history, ordering medicine/tests/procedures, interpreting the imaging/labs previously done) and coordination of care as well as direct time with the patient today, of which greater than 50% of the time was spent in counseling and coordination of care with the patient/family  · Plan/Labs  · Continue Eribulin q 21 days, C4 coming up, with Neulasta support  She is on Letrozole daily  · Restaging CT CAP in 3 months ordered  · Cont to f/u with palliative care, consider appetite stimulation if weight loss continues and decreased appetite  · Cont Zometa q12 weeks  · Pt was on off-label Zoladex since 2021 and will d/c as no further data to support this  Follow Up: q6 weeks while on treatment  All questions were answered to the patient's satisfaction during this encounter  The patient knows the contact information for our office and knows to reach out for any relevant concerns related to this encounter   They are to call for any temperature 100 4 or higher, new symptoms including but not restricted to shaking chills, decreased appetite, nausea, vomiting, diarrhea, increased fatigue, shortness of breath or chest pain, confusion, and not feeling the strength to come to the clinic  For all other listed problems and medical diagnosis in their chart - they are managed by PCP and/or other specialists, which the patient acknowledges  Thank you very much for your consultation and making us a part of this patient's care  We are continuing to follow closely with you  Please do not hesitate to reach out to me with any additional questions or concerns  Rebecca Culp MD  Hematology & Medical Oncology Staff Physician    Disclaimer: This document was prepared using [employer] technology  If a word or phrase is confusing, or does not make sense, this is likely due to recognition error which was not discovered during the providers review  If you believe an error has occurred, please Contact me through [employer] service for roula? cation  AJCC 8th Edition Cancer Stage :      Cancer Staging  1 per the patient from 2012    Hematology/Oncology History:   · History of right-sided breast cancer, initially diagnosed in 2012, status post lumpectomy and axillary lymph node dissection, status post adjuvant radiation therapy  Patient did not receive any adjuvant endocrine therapy or chemotherapy  · July 24, 2021, patient was admitted to hospital because of shortness of breath and cough  · July 24, 2021 CT chest PE protocol showed septal thickening throughout the right lung and bronchial wall thickening due to lymphangitic spread of tumor  Indeterminate lung nodules measuring 5 millimeter in the right upper lobe, right middle lobe and 7 millimeter in the left upper lobe and left lower lobe  Large right pleural effusion  Multiple liver metastatic disease measuring up to 4 centimeter  Lytic metastatic to sternal manubrium  · July 26, 2021, ultrasound abdomen shows multiple hepatic metastatic disease  Status post thoracentesis with 1200 mL of joanne fluid removed  Cytology showed adenoma carcinoma, ER and MA positive  40-50%, HER2 negative    · July 26, 2021 biopsy of the lung liver showed metastatic breast cancer, ER and % positive, HER2 negative  CT abdomen pelvis with contrast showed extensive hepatic metastatic disease  · July 27, 2021 MRI brain showed no evidence of intracranial metastatic disease  · August 6, 2021, mammogram showed no mammographic evidence of malignancy  · August 11, 2021 bone scan showed bony metastatic disease involving left iliac crest medially and adjacent sacrum  · August 12, 2021, Zoladex was given  Letrozole started  · August 16, 2022, patient started Abemaciclib  · August 30, 2021, DC abemaciclib because of diarrhea  Start Ibrance 125 mg daily 3 weeks on 1 week off  · Nov 19, 2021, CT c/a/p showed:   1  Since July 2021, new thromboses within the intrahepatic branches of the portal vein as described above  2  Increased diffuse sclerotic osseous lesions  3   Decreased size of most of the hepatic metastases  4   Indeterminate mottled appearance of the splenic parenchyma  Attention on follow-up is advised  5   Unchanged pulmonary nodules  6   Right pleural effusion has decreased in size since July 24, 2021  The smooth interlobular septal thickening has also decreased, and this change can be attributable to the decreased size of the pleural effusion  7   Mucosal hyperenhancement of the colon  Findings may be treatment-related, and correlation with gastrointestinal symptoms is advised  BONE SCAN:   1   A few areas of increased radiotracer uptake suspicious for osseous metastasis, with findings for minimal progression  · March 15, 2022 bone scan: Stable or improving scattered foci radiotracer uptake suspicious for osseous metastases  No new osseous foci suspicious for metastasis  ·  April 1, 2022 CT scan chest abdomen pelvis:  IMPRESSION:     Findings concerning for worsening hepatic metastases as evidenced by increased size and number of lesions    Please see above discussion      Progressive left portal vein thrombosis      The majority of the pulmonary nodules are stable  There is one subpleural nodule in the right upper lobe apex posteriorly, not seen on the prior study      Stable extensive osseous metastases      Multiple tiny hypodense splenic lesions are also stable, indeterminate      Persistent small right pleural effusion with a right pleural catheter in place  June 25, 2022: 4 day admission for neutropenic fever  July 8, 2022 CT CAP: 1    Stable scattered small pulmonary nodules bilaterally  Small right pleural effusion, slightly decreased  Improving hepatic metastasis  Less conspicuous small splenic hypodense lesions  Stable findings for widespread osseous metastasis  NM Bone Scan without significant changes (A lesion in the left lobe of the liver laterally measures 2 0 x 1 9 cm, previously 5 1 x 3 8 cm- 56 2% reduction  Somewhat stellate lesion in the right lobe of the liver  posteriorly now measures 1 8 x 1 2 cm image 33 series 2, previously 2 6 x 2 6 cm (42 3%)  History of Present Illiness:   Marica Schwab is a 49 y o  female with the above-noted HemOnc history who is here for routine follow-up  Patient has metastatic breast cancer with bony metastatic disease  Zoladex and letrozole started on August 11-12, 2021  Abmaciclib was changed to [employer] because of significant watery diarrhea  Patient did have [place]ropenia from [place]  Ibrance dose was decreased to 100 mg  Restaging CT scan and bone scan showed disease progression    Patient had the pleural catheter in place for malignant pleural effusion  Patient takes Zometa for bone health  Interval events: She feels more fatigued and malaise compared to a month ago  She has a moderate appetite and eats and drinks intermittently well  She does have numbness/tingling in arms but not affecting IADLS or ability to grasp anything  Denies F/C, N/V, SOB, CP, LH, HA, abd pain, diarrhea, falls, generalized weakness    She has constipation and is using Senna for it  Mild REIS  She does have 10 lbs weight loss in the past 6 months  Baseline weight: 168 lbs in the past, 145 lbs over the beginning of 2022     ROS: A 10-point of review of systems is obtained and other than the above is noncontributory  Objective:   VITALS:   /68   Pulse 69   Temp (!) 97 3 °F (36 3 °C)   Resp 18   Ht 5' 5" (1 651 m)   Wt 61 7 kg (136 lb)   LMP 05/26/2021   SpO2 100%   BMI 22 63 kg/m²     Physical EXAM:  General:  Alert, cooperative, no distress, appears stated age  Head:  Normocephalic, without obvious abnormality  Eyes:  Conjunctivae/corneas clear  EOMI  No evidence of conjunctivitis     Throat: Lips, mucosa, and tongue normal  No lesions in the oropharynx   Neck: Supple, symmetrical, trachea midline, no adenopathy    Lungs:   Clear to auscultation bilaterally  Respiratory effort easy, nonlabored    Heart:  Regular rate and rhythm, +S1, S2    Abdomen:   Soft, non-tender,nondistended  Bowel sounds normal      Extremities: Extremities normal, atraumatic, no cyanosis or edema  No axillary or inguinal adenopathy   Skin: Skin color, texture, turgor normal  No rashes or lesions    Neurologic: AAO   No focal neuro deficits noted b/l       Allergies   Allergen Reactions    Codeine Anaphylaxis    Morphine GI Intolerance, Itching and Vomiting    Sulfa Antibiotics Hives and Itching       Past Medical History:   Diagnosis Date    Cancer ([facility] [address] )     History of radiation exposure     Malignant neoplasm of right female breast ([facility] [address] ) 2012    right       Past Surgical History:   Procedure Laterality Date    BREAST CYST EXCISION      BREAST LUMPECTOMY Right 2012    HIP SURGERY      IR BIOPSY LIVER MASS  7/26/2021    IR PLEURAL EFFUSION LONG-TERM CATHETER PLACEMENT  8/17/2021    IR PLEURAL EFFUSION LONG-TERM CATHETER REMOVAL  5/11/2022    IR THORACENTESIS  7/26/2021       Family History   Problem Relation Age of Onset    Breast cancer Mother         in her 63's    Breast cancer Sister         inher 45s and 48    Colon cancer Maternal Grandmother     No Known Problems Paternal Grandmother     Breast cancer Other     No Known Problems Paternal Aunt        Social History     Socioeconomic History    Marital status: /Civil Union     Spouse name: Not on file    Number of children: Not on file    Years of education: Not on file    Highest education level: Not on file   Occupational History    Not on file   Tobacco Use    Smoking status: Former Smoker     Packs/day: 0 25     Types: Cigarettes    Smokeless tobacco: Never Used   Vaping Use    Vaping Use: Never used   Substance and Sexual Activity    Alcohol use: Not Currently    Drug use: Not Currently    Sexual activity: Not Currently   Other Topics Concern    Not on file   Social History Narrative    Not on file     Social Determinants of Health     Financial Resource Strain: Not on file   Food Insecurity: No Food Insecurity    Worried About Running Out of Food in the Last Year: Never true    [address] in the Last Year: Never true   Transportation Needs: No Transportation Needs    Lack of Transportation (Medical): No    Lack of Transportation (Non-Medical):  No   Physical Activity: Not on file   Stress: Not on file   Social Connections: Not on file   Intimate Partner Violence: Not on file   Housing Stability: Low Risk     Unable to Pay for Housing in the Last Year: No    Number of Places Lived in the Last Year: 1    Unstable Housing in the Last Year: No       Current Outpatient Medications   Medication Sig Dispense Refill    acetaminophen (TYLENOL) 325 mg tablet Take 2 tablets (650 mg total) by mouth every 6 (six) hours as needed for mild pain 100 tablet 0    albuterol (PROVENTIL HFA,VENTOLIN HFA) 90 mcg/act inhaler Inhale 2 puffs every 4 (four) hours as needed for wheezing 8 g 0    benzonatate (TESSALON) 200 MG capsule Take 1 capsule (200 mg total) by mouth 3 (three) times a day as needed for cough 20 capsule 0    calcium carbonate (TUMS) 500 mg chewable tablet Chew 1 tablet (500 mg total) 3 (three) times a day as needed for indigestion or heartburn 30 tablet 0    dextromethorphan-guaiFENesin (ROBITUSSIN DM)  mg/5 mL syrup Take 10 mL by mouth every 4 (four) hours as needed for cough 236 mL 0    dicyclomine (BENTYL) 10 mg capsule Take 1 capsule (10 mg total) by mouth every 6 (six) hours as needed (abdominal cramping) 20 capsule 0    diphenhydrAMINE (BENADRYL) 50 MG tablet Take 1 tablet (50 mg total) by mouth daily at bedtime as needed for itching or sleep 30 tablet 0    diphenoxylate-atropine (LOMOTIL) 2 5-0 025 mg per tablet Take 1 tablet by mouth 4 (four) times a day as needed for diarrhea 30 tablet 0    DULoxetine (CYMBALTA) 30 mg delayed release capsule Take 1 capsule (30 mg total) by mouth daily 90 capsule 3    HYDROmorphone (DILAUDID) 2 mg tablet Take 1-2 tablets (2-4 mg total) by mouth every 3 (three) hours as needed (moderate/severe cancer-related pain) Max Daily Amount: 32 mg 120 tablet 0    Lidocaine Viscous HCl (XYLOCAINE) 2 % mucosal solution Swish and spit 10 mL 4 (four) times a day as needed for mouth pain or discomfort 200 mL 0    melatonin 3 mg Take 2 tablets (6 mg total) by mouth daily at bedtime 60 tablet 0    metoclopramide (REGLAN) 10 mg tablet Take 1 tablet (10 mg total) by mouth 4 (four) times a day 28 tablet 0    multivitamin (THERAGRAN) TABS Take 1 tablet by mouth      ondansetron (ZOFRAN) 4 mg tablet Take 1 tablet (4 mg total) by mouth every 8 (eight) hours as needed for nausea or vomiting 20 tablet 0    oxybutynin (DITROPAN-XL) 5 mg 24 hr tablet Take 1 tablet (5 mg total) by mouth daily Take 1 tablet daily 90 tablet 3    polyethylene glycol (MIRALAX) 17 g packet Take 17 g by mouth daily  0    Xarelto 20 MG tablet TAKE 1 TABLET BY MOUTH DAILY WITH BREAKFAST 30 tablet 2    al mag oxide-diphenhydramine-lidocaine viscous (MAGIC MOUTHWASH) 1:1:1 suspension Swish and spit 10 mL every 4 (four) hours as needed for mouth pain or discomfort (Patient not taking: No sig reported) 300 mL 0    CRANIAL PROSTHESIS, RX, Apply to cranial area as needed for comfort  (Patient not taking: No sig reported) 1 each 0    docusate sodium (COLACE) 100 mg capsule Take 1 capsule (100 mg total) by mouth 2 (two) times a day for 10 days 20 capsule 0    fluticasone (FLONASE) 50 mcg/act nasal spray 2 sprays into each nostril daily      lactulose 20 g/30 mL Take 15 mL (10 g total) by mouth daily as needed (constipaton) for up to 10 days (Patient not taking: Reported on 7/5/2022) 300 mL 0    letrozole (FEMARA) 2 5 mg tablet TAKE 1 TABLET BY MOUTH EVERY DAY 90 tablet 2    palbociclib (Ibrance) 100 MG tablet Take 1 tablet (100 mg total) by mouth daily (Patient not taking: No sig reported) 21 tablet 1    potassium chloride (K-DUR,KLOR-CON) 20 mEq tablet Take 1 tablet (20 mEq total) by mouth 2 (two) times a day for 5 days 10 tablet 0    senna (SENOKOT) 8 6 MG tablet Take 1 tablet (8 6 mg total) by mouth daily at bedtime as needed for constipation (Patient not taking: No sig reported) 30 tablet 0     No current facility-administered medications for this visit  (Not in a hospital admission)      DATA REVIEW:    Pathology Result:    Final Diagnosis   Date Value Ref Range Status   07/26/2021   Final    A  B  Thoracentesis, Right (ThinPrep and cell block preparations):  Positive for malignancy  Metastatic carcinoma, compatible with breast primary  -  Immunohistochemical stains performed with appropriate controls show the tumor cells to be positive for Matthew-EP4, MOC31, BARRY-3 and ER with scattered background mesothelial cells staining for WT1 and calretinin, supporting the diagnosis  Satisfactory for evaluation  07/26/2021   Final    A   Liver (core biopsy):     - Poorly-differentiated carcinoma, most compatible with metastasis from the patient's reported breast primary  Comment:  ER / ME / Her-2 pending  Comment: This is an appended report  These results have been appended to a previously preliminary verified report  Image Results: They are reviewed and documented in Hematology/Oncology history    CT chest abdomen pelvis w contrast  Narrative: CT CHEST, ABDOMEN AND PELVIS WITH IV CONTRAST    INDICATION:   C50 919: Malignant neoplasm of unspecified site of unspecified female breast  Z17 0: Estrogen receptor positive status (ER+)  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  C79 51: Secondary malignant neoplasm of bone  COMPARISON:  CT chest abdomen pelvis 6/27/2022, bone scan 7/11/2022, CT chest abdomen pelvis 4/1/2022    TECHNIQUE: CT examination of the chest, abdomen and pelvis was performed  Axial, sagittal, and coronal 2D reformatted images were created from the source data and submitted for interpretation  Radiation dose length product (DLP) for this visit:  646 mGy-cm   This examination, like all CT scans performed in the [facility], was performed utilizing techniques to minimize radiation dose exposure, including the use of iterative   reconstruction and automated exposure control  IV Contrast:  80 mL of iohexol (OMNIPAQUE)  Enteric Contrast: Enteric contrast was not administered  FINDINGS:    CHEST    LUNGS:  Scattered small pulmonary nodules again noted  For example:   5 mm left upper lobe peribronchial nodule suggested, image 33 series 3, previously 6 mm  Stable 3 mm right middle lobe nodule superiorly image 52 series 3  Stable 5 mm nodule in the right middle lobe medially close to the minor fissure image 58 series 3  Unchanged 4 mm left lower lobe nodule laterally image 62 series 3  Stable 5 mm left lower lobe nodule medially image 64 series 3    Additional small pulmonary nodules noted with suggestion of underlying centrilobular nodules most conspicuous in the right upper lobe, stable  PLEURA:  Small right pleural effusion, slightly decreased  Stable position of the right basilar pleural catheter  HEART/GREAT VESSELS: Heart is unremarkable for patient's age  No thoracic aortic aneurysm  MEDIASTINUM AND KIA:  Calcified small lymph nodes again noted in the mediastinum and bilateral perihilar regions  CHEST WALL AND LOWER NECK:  Multiple surgical clips in the right axilla  ABDOMEN    LIVER/BILIARY TREE:  Improving hepatic lesions from contrast CT of 4/1/2022  A lesion in the left lobe of the liver laterally measures 2 0 x 1 9 cm image 49 series 2, previously 5 1 x 3 8 cm  Somewhat stellate lesion in the right lobe of the liver   posteriorly now measures 1 8 x 1 2 cm image 33 series 2, previously 2 6 x 2 6 cm  Stable findings for largely thrombosed left portal vein  Main portal vein is patent  GALLBLADDER:  No calcified gallstones  No pericholecystic inflammatory change  SPLEEN:  Heterogeneous appearance to the spleen  Previously seen on prior contrast CT were multiple small hypodense lesions less conspicuous on the current exam     PANCREAS:  Unremarkable  ADRENAL GLANDS:  Unremarkable  KIDNEYS/URETERS:  One or more simple renal cyst(s) is noted  Otherwise unremarkable kidneys  No hydronephrosis  STOMACH AND BOWEL:  Limited evaluation without enteric contrast   No acute findings  APPENDIX:  A normal appendix was visualized  ABDOMINOPELVIC CAVITY:  No ascites  No pneumoperitoneum  No lymphadenopathy  VESSELS:  Unremarkable for patient's age  PELVIS    REPRODUCTIVE ORGANS:  Unremarkable for patient's age  URINARY BLADDER:  Collapsed limiting evaluation  ABDOMINAL WALL/INGUINAL REGIONS:  Unremarkable  OSSEOUS STRUCTURES:  Innumerable sclerotic lesions compatible with widespread osseous metastasis without significant change  Impression: 1  Stable scattered small pulmonary nodules bilaterally    2   Small right pleural effusion, slightly decreased  3   Improving hepatic metastasis  4   Less conspicuous small splenic hypodense lesions  5   Stable findings for widespread osseous metastasis  Workstation performed: [id number]        LABS:  Lab data are reviewed and documented in HemOnc history       Recent Results (from the past 48 hour(s))   CBC and differential    Collection Time: 07/11/22 12:01 PM   Result Value Ref Range    WBC 3 89 (L) 4 31 - 10 16 Thousand/uL    RBC 4 05 3 81 - 5 12 Million/uL    Hemoglobin 12 9 11 5 - 15 4 g/dL    Hematocrit 40 5 34 8 - 46 1 %     (H) 82 - 98 fL    MCH 31 9 26 8 - 34 3 pg    MCHC 31 9 31 4 - 37 4 g/dL    RDW 16 7 (H) 11 6 - 15 1 %    MPV 12 0 8 9 - 12 7 fL    Platelets 015 976 - 519 Thousands/uL    nRBC 0 /100 WBCs    Neutrophils Relative 79 (H) 43 - 75 %    Immat GRANS % 1 0 - 2 %    Lymphocytes Relative 13 (L) 14 - 44 %    Monocytes Relative 5 4 - 12 %    Eosinophils Relative 1 0 - 6 %    Basophils Relative 1 0 - 1 %    Neutrophils Absolute 3 10 1 85 - 7 62 Thousands/µL    Immature Grans Absolute 0 02 0 00 - 0 20 Thousand/uL    Lymphocytes Absolute 0 49 (L) 0 60 - 4 47 Thousands/µL    Monocytes Absolute 0 21 0 17 - 1 22 Thousand/µL    Eosinophils Absolute 0 03 0 00 - 0 61 Thousand/µL    Basophils Absolute 0 04 0 00 - 0 10 Thousands/µL   Comprehensive metabolic panel    Collection Time: 07/11/22 12:01 PM   Result Value Ref Range    Sodium 138 136 - 145 mmol/L    Potassium 4 2 3 5 - 5 3 mmol/L    Chloride 103 100 - 108 mmol/L    CO2 23 21 - 32 mmol/L    ANION GAP 12 4 - 13 mmol/L    BUN 11 5 - 25 mg/dL    Creatinine 0 80 0 60 - 1 30 mg/dL    Glucose 83 65 - 140 mg/dL    Calcium 8 8 8 3 - 10 1 mg/dL    AST 15 5 - 45 U/L    ALT 8 (L) 12 - 78 U/L    Alkaline Phosphatase 51 46 - 116 U/L    Total Protein 7 7 6 4 - 8 2 g/dL    Albumin 3 9 3 5 - 5 0 g/dL    Total Bilirubin 0 28 0 20 - 1 00 mg/dL    eGFR 83 ml/min/1 73sq kristel Cortez MD  7/13/2022, 9:49 AM

## 2022-07-13 ENCOUNTER — HOSPITAL ENCOUNTER (OUTPATIENT)
Dept: INFUSION CENTER | Facility: CLINIC | Age: 57
Discharge: HOME | End: 2022-07-13
Payer: COMMERCIAL

## 2022-07-13 ENCOUNTER — OFFICE VISIT (OUTPATIENT)
Dept: HEMATOLOGY ONCOLOGY | Facility: CLINIC | Age: 57
End: 2022-07-13
Payer: COMMERCIAL

## 2022-07-13 ENCOUNTER — DOCUMENTATION (OUTPATIENT)
Dept: HEMATOLOGY ONCOLOGY | Facility: CLINIC | Age: 57
End: 2022-07-13

## 2022-07-13 VITALS
HEIGHT: 65 IN | BODY MASS INDEX: 22.56 KG/M2 | DIASTOLIC BLOOD PRESSURE: 68 MMHG | HEART RATE: 62 BPM | RESPIRATION RATE: 18 BRPM | SYSTOLIC BLOOD PRESSURE: 112 MMHG | TEMPERATURE: 97.6 F | WEIGHT: 135.4 LBS

## 2022-07-13 VITALS
TEMPERATURE: 97.3 F | HEIGHT: 65 IN | OXYGEN SATURATION: 100 % | SYSTOLIC BLOOD PRESSURE: 100 MMHG | WEIGHT: 136 LBS | DIASTOLIC BLOOD PRESSURE: 68 MMHG | RESPIRATION RATE: 18 BRPM | HEART RATE: 69 BPM | BODY MASS INDEX: 22.66 KG/M2

## 2022-07-13 DIAGNOSIS — C50.911 PRIMARY MALIGNANT NEOPLASM OF RIGHT BREAST WITH METASTASIS TO OTHER SITE (HCC): ICD-10-CM

## 2022-07-13 DIAGNOSIS — C79.51 BONE METASTASES (HCC): Primary | ICD-10-CM

## 2022-07-13 DIAGNOSIS — T45.1X5A CHEMOTHERAPY INDUCED NEUTROPENIA (HCC): ICD-10-CM

## 2022-07-13 DIAGNOSIS — D70.1 CHEMOTHERAPY INDUCED NEUTROPENIA (HCC): ICD-10-CM

## 2022-07-13 DIAGNOSIS — C79.51 BONE METASTASES (HCC): ICD-10-CM

## 2022-07-13 DIAGNOSIS — C50.911 PRIMARY MALIGNANT NEOPLASM OF RIGHT BREAST WITH METASTASIS TO OTHER SITE (HCC): Primary | ICD-10-CM

## 2022-07-13 DIAGNOSIS — J91.0 MALIGNANT PLEURAL EFFUSION: ICD-10-CM

## 2022-07-13 PROCEDURE — 96409 CHEMO IV PUSH SNGL DRUG: CPT

## 2022-07-13 PROCEDURE — 96367 TX/PROPH/DG ADDL SEQ IV INF: CPT

## 2022-07-13 PROCEDURE — 99214 OFFICE O/P EST MOD 30 MIN: CPT | Performed by: INTERNAL MEDICINE

## 2022-07-13 RX ORDER — SODIUM CHLORIDE 9 MG/ML
20 INJECTION, SOLUTION INTRAVENOUS ONCE
Status: CANCELLED | OUTPATIENT
Start: 2022-07-28

## 2022-07-13 RX ORDER — SODIUM CHLORIDE 9 MG/ML
20 INJECTION, SOLUTION INTRAVENOUS ONCE
Status: CANCELLED | OUTPATIENT
Start: 2022-08-16

## 2022-07-13 RX ORDER — SODIUM CHLORIDE 9 MG/ML
20 INJECTION, SOLUTION INTRAVENOUS ONCE
Status: CANCELLED | OUTPATIENT
Start: 2022-07-13

## 2022-07-13 RX ORDER — SODIUM CHLORIDE 9 MG/ML
20 INJECTION, SOLUTION INTRAVENOUS ONCE
Status: COMPLETED | OUTPATIENT
Start: 2022-07-13 | End: 2022-07-13

## 2022-07-13 RX ADMIN — DEXAMETHASONE SODIUM PHOSPHATE 10 MG: 10 INJECTION, SOLUTION INTRAMUSCULAR; INTRAVENOUS at 11:50

## 2022-07-13 RX ADMIN — ERIBULIN MESYLATE 2.4 MG: 0.5 INJECTION INTRAVENOUS at 12:16

## 2022-07-13 RX ADMIN — SODIUM CHLORIDE 20 ML/HR: 0.9 INJECTION, SOLUTION INTRAVENOUS at 11:50

## 2022-07-13 NOTE — PROGRESS NOTES
Pt to clinic for Eribulin  Pt offers no complaints today  Tolerated infusions without complications  Pt aware of next appointment  AVS was offered, pt declined  Pt ambulated out of clinic safely

## 2022-07-13 NOTE — PROGRESS NOTES
Received notification from infusion in regards to patient asking for IV zofran premedications in treatment plan  Dr Maxwell De La Paz would like to keep treatment plan as is unless patient is experiencing nausea post infusion      Notified Infusion RN

## 2022-07-19 ENCOUNTER — APPOINTMENT (OUTPATIENT)
Dept: LAB | Facility: HOSPITAL | Age: 57
End: 2022-07-19
Payer: COMMERCIAL

## 2022-07-19 ENCOUNTER — TELEPHONE (OUTPATIENT)
Dept: OTHER | Facility: OTHER | Age: 57
End: 2022-07-19

## 2022-07-19 DIAGNOSIS — C79.51 BONE METASTASES (HCC): ICD-10-CM

## 2022-07-19 DIAGNOSIS — D70.1 CHEMOTHERAPY INDUCED NEUTROPENIA (HCC): ICD-10-CM

## 2022-07-19 DIAGNOSIS — T45.1X5A CHEMOTHERAPY INDUCED NEUTROPENIA (HCC): ICD-10-CM

## 2022-07-19 DIAGNOSIS — J91.0 MALIGNANT PLEURAL EFFUSION: ICD-10-CM

## 2022-07-19 DIAGNOSIS — C50.911 PRIMARY MALIGNANT NEOPLASM OF RIGHT BREAST WITH METASTASIS TO OTHER SITE (HCC): ICD-10-CM

## 2022-07-19 LAB
ALBUMIN SERPL BCP-MCNC: 3.4 G/DL (ref 3.5–5)
ALP SERPL-CCNC: 48 U/L (ref 46–116)
ALT SERPL W P-5'-P-CCNC: 22 U/L (ref 12–78)
ANION GAP SERPL CALCULATED.3IONS-SCNC: 11 MMOL/L (ref 4–13)
AST SERPL W P-5'-P-CCNC: 32 U/L (ref 5–45)
BASOPHILS # BLD AUTO: 0.01 THOUSANDS/ΜL (ref 0–0.1)
BASOPHILS NFR BLD AUTO: 1 % (ref 0–1)
BILIRUB SERPL-MCNC: 0.34 MG/DL (ref 0.2–1)
BUN SERPL-MCNC: 13 MG/DL (ref 5–25)
CALCIUM ALBUM COR SERPL-MCNC: 9.8 MG/DL (ref 8.3–10.1)
CALCIUM SERPL-MCNC: 9.3 MG/DL (ref 8.3–10.1)
CHLORIDE SERPL-SCNC: 104 MMOL/L (ref 96–108)
CO2 SERPL-SCNC: 27 MMOL/L (ref 21–32)
CREAT SERPL-MCNC: 0.99 MG/DL (ref 0.6–1.3)
EOSINOPHIL # BLD AUTO: 0.03 THOUSAND/ΜL (ref 0–0.61)
EOSINOPHIL NFR BLD AUTO: 2 % (ref 0–6)
ERYTHROCYTE [DISTWIDTH] IN BLOOD BY AUTOMATED COUNT: 16.6 % (ref 11.6–15.1)
GFR SERPL CREATININE-BSD FRML MDRD: 64 ML/MIN/1.73SQ M
GLUCOSE SERPL-MCNC: 96 MG/DL (ref 65–140)
HCT VFR BLD AUTO: 36 % (ref 34.8–46.1)
HGB BLD-MCNC: 11.8 G/DL (ref 11.5–15.4)
IMM GRANULOCYTES # BLD AUTO: 0.1 THOUSAND/UL (ref 0–0.2)
IMM GRANULOCYTES NFR BLD AUTO: 8 % (ref 0–2)
LYMPHOCYTES # BLD AUTO: 0.37 THOUSANDS/ΜL (ref 0.6–4.47)
LYMPHOCYTES NFR BLD AUTO: 29 % (ref 14–44)
MCH RBC QN AUTO: 31.6 PG (ref 26.8–34.3)
MCHC RBC AUTO-ENTMCNC: 32.8 G/DL (ref 31.4–37.4)
MCV RBC AUTO: 97 FL (ref 82–98)
MONOCYTES # BLD AUTO: 0.13 THOUSAND/ΜL (ref 0.17–1.22)
MONOCYTES NFR BLD AUTO: 10 % (ref 4–12)
NEUTROPHILS # BLD AUTO: 0.62 THOUSANDS/ΜL (ref 1.85–7.62)
NEUTS SEG NFR BLD AUTO: 50 % (ref 43–75)
NRBC BLD AUTO-RTO: 0 /100 WBCS
PLATELET # BLD AUTO: 132 THOUSANDS/UL (ref 149–390)
PMV BLD AUTO: 12.3 FL (ref 8.9–12.7)
POTASSIUM SERPL-SCNC: 3.6 MMOL/L (ref 3.5–5.3)
PROT SERPL-MCNC: 6.9 G/DL (ref 6.4–8.4)
RBC # BLD AUTO: 3.73 MILLION/UL (ref 3.81–5.12)
SODIUM SERPL-SCNC: 142 MMOL/L (ref 135–147)
WBC # BLD AUTO: 1.26 THOUSAND/UL (ref 4.31–10.16)

## 2022-07-19 PROCEDURE — 80053 COMPREHEN METABOLIC PANEL: CPT

## 2022-07-19 PROCEDURE — 36415 COLL VENOUS BLD VENIPUNCTURE: CPT

## 2022-07-19 PROCEDURE — 85025 COMPLETE CBC W/AUTO DIFF WBC: CPT

## 2022-07-19 NOTE — TELEPHONE ENCOUNTER
Lab Result: WBC 1 26   Date/Time Drawn: 07/19 @15:57   Ordering Provider: Kristine Riley   Lab Personnel's Name: Pam Dubose       The following critical/stat result was read back to the lab as stated above and [employer] to the on-call provider

## 2022-07-20 ENCOUNTER — HOSPITAL ENCOUNTER (OUTPATIENT)
Dept: INFUSION CENTER | Facility: CLINIC | Age: 57
Discharge: HOME | End: 2022-07-20

## 2022-07-20 ENCOUNTER — TELEPHONE (OUTPATIENT)
Dept: HEMATOLOGY ONCOLOGY | Facility: CLINIC | Age: 57
End: 2022-07-20

## 2022-07-20 DIAGNOSIS — J91.0 MALIGNANT PLEURAL EFFUSION: ICD-10-CM

## 2022-07-20 DIAGNOSIS — T45.1X5A CHEMOTHERAPY INDUCED NEUTROPENIA (HCC): Primary | ICD-10-CM

## 2022-07-20 DIAGNOSIS — C79.51 BONE METASTASES (HCC): ICD-10-CM

## 2022-07-20 DIAGNOSIS — D70.1 CHEMOTHERAPY INDUCED NEUTROPENIA (HCC): Primary | ICD-10-CM

## 2022-07-20 DIAGNOSIS — C50.911 PRIMARY MALIGNANT NEOPLASM OF RIGHT BREAST WITH METASTASIS TO OTHER SITE (HCC): ICD-10-CM

## 2022-07-20 RX ORDER — DEXTROSE AND SODIUM CHLORIDE 5; .9 G/100ML; G/100ML
75 INJECTION, SOLUTION INTRAVENOUS CONTINUOUS
Status: CANCELLED | OUTPATIENT
Start: 2022-07-20

## 2022-07-20 RX ORDER — CEFAZOLIN SODIUM 1 G/50ML
1000 SOLUTION INTRAVENOUS ONCE
Status: CANCELLED | OUTPATIENT
Start: 2022-07-20

## 2022-07-20 NOTE — TELEPHONE ENCOUNTER
Received notification from infusion in regards that patient [address]  Reviewed with Dr Yenifer Ramos, patient treatment deferred one week  Plan updated  Call out to both numbers in chart and left vm on both in regards to deferment of treatment for a week due to labs not within parameters  Left call back number and hours of operation to further discuss  Please defer treatment one week  Please update patient with new appointment dates and also reminder for labs

## 2022-07-20 NOTE — TELEPHONE ENCOUNTER
Called patient 2 more times today and her voicemail is now full  Copy of patient's schedule has been mailed to her home

## 2022-07-20 NOTE — TELEPHONE ENCOUNTER
Left detailed message for patient regarding her updated infusion schedule  Advised patient to call back if she has any questions  Patient can also view updates on her MyChart

## 2022-07-20 NOTE — PROGRESS NOTES
Received notification from infusion in regards that patient [address] 0 62  Reviewed with Dr Jer Delgadillo, patient treatment deferred one week  Plan updated  Call out to both numbers in chart and left vm on both in regards to deferment of treatment for a week due to labs not within parameters  Left call back number and hours of operation to further discuss

## 2022-07-21 ENCOUNTER — HOSPITAL ENCOUNTER (OUTPATIENT)
Dept: INFUSION CENTER | Facility: CLINIC | Age: 57
End: 2022-07-21

## 2022-07-27 ENCOUNTER — DOCUMENTATION (OUTPATIENT)
Dept: HEMATOLOGY ONCOLOGY | Facility: CLINIC | Age: 57
End: 2022-07-27

## 2022-07-27 ENCOUNTER — HOSPITAL ENCOUNTER (OUTPATIENT)
Dept: NON INVASIVE DIAGNOSTICS | Facility: HOSPITAL | Age: 57
Discharge: HOME | End: 2022-07-27
Attending: INTERNAL MEDICINE
Payer: COMMERCIAL

## 2022-07-27 VITALS
WEIGHT: 136.24 LBS | OXYGEN SATURATION: 99 % | BODY MASS INDEX: 22.7 KG/M2 | TEMPERATURE: 97.8 F | SYSTOLIC BLOOD PRESSURE: 111 MMHG | DIASTOLIC BLOOD PRESSURE: 72 MMHG | HEIGHT: 65 IN | RESPIRATION RATE: 15 BRPM | HEART RATE: 63 BPM

## 2022-07-27 DIAGNOSIS — C50.911 MALIGNANT NEOPLASM OF RIGHT FEMALE BREAST (HCC): ICD-10-CM

## 2022-07-27 LAB
BASOPHILS # BLD AUTO: 0.02 THOUSANDS/ΜL (ref 0–0.1)
BASOPHILS NFR BLD AUTO: 1 % (ref 0–1)
EOSINOPHIL # BLD AUTO: 0 THOUSAND/ΜL (ref 0–0.61)
EOSINOPHIL NFR BLD AUTO: 0 % (ref 0–6)
ERYTHROCYTE [DISTWIDTH] IN BLOOD BY AUTOMATED COUNT: 16.8 % (ref 11.6–15.1)
HCT VFR BLD AUTO: 35.7 % (ref 34.8–46.1)
HGB BLD-MCNC: 11.7 G/DL (ref 11.5–15.4)
IMM GRANULOCYTES # BLD AUTO: 0.01 THOUSAND/UL (ref 0–0.2)
IMM GRANULOCYTES NFR BLD AUTO: 0 % (ref 0–2)
LYMPHOCYTES # BLD AUTO: 0.46 THOUSANDS/ΜL (ref 0.6–4.47)
LYMPHOCYTES NFR BLD AUTO: 16 % (ref 14–44)
MCH RBC QN AUTO: 31.5 PG (ref 26.8–34.3)
MCHC RBC AUTO-ENTMCNC: 32.8 G/DL (ref 31.4–37.4)
MCV RBC AUTO: 96 FL (ref 82–98)
MONOCYTES # BLD AUTO: 0.52 THOUSAND/ΜL (ref 0.17–1.22)
MONOCYTES NFR BLD AUTO: 18 % (ref 4–12)
NEUTROPHILS # BLD AUTO: 1.88 THOUSANDS/ΜL (ref 1.85–7.62)
NEUTS SEG NFR BLD AUTO: 65 % (ref 43–75)
NRBC BLD AUTO-RTO: 0 /100 WBCS
PLATELET # BLD AUTO: 203 THOUSANDS/UL (ref 149–390)
PMV BLD AUTO: 10.3 FL (ref 8.9–12.7)
RBC # BLD AUTO: 3.72 MILLION/UL (ref 3.81–5.12)
WBC # BLD AUTO: 2.89 THOUSAND/UL (ref 4.31–10.16)

## 2022-07-27 PROCEDURE — 85025 COMPLETE CBC W/AUTO DIFF WBC: CPT | Performed by: INTERNAL MEDICINE

## 2022-07-27 PROCEDURE — C1788 PORT, INDWELLING, IMP: HCPCS

## 2022-07-27 PROCEDURE — 36561 INSERT TUNNELED CV CATH: CPT | Performed by: RADIOLOGY

## 2022-07-27 PROCEDURE — 36561 INSERT TUNNELED CV CATH: CPT

## 2022-07-27 PROCEDURE — 99152 MOD SED SAME PHYS/QHP 5/>YRS: CPT | Performed by: RADIOLOGY

## 2022-07-27 PROCEDURE — 77001 FLUOROGUIDE FOR VEIN DEVICE: CPT | Performed by: RADIOLOGY

## 2022-07-27 PROCEDURE — 76937 US GUIDE VASCULAR ACCESS: CPT | Performed by: RADIOLOGY

## 2022-07-27 PROCEDURE — 77001 FLUOROGUIDE FOR VEIN DEVICE: CPT

## 2022-07-27 PROCEDURE — 76937 US GUIDE VASCULAR ACCESS: CPT

## 2022-07-27 RX ORDER — MIDAZOLAM HYDROCHLORIDE 2 MG/2ML
INJECTION, SOLUTION INTRAMUSCULAR; INTRAVENOUS CODE/TRAUMA/SEDATION MEDICATION
Status: COMPLETED | OUTPATIENT
Start: 2022-07-27 | End: 2022-07-27

## 2022-07-27 RX ORDER — FENTANYL CITRATE 50 UG/ML
INJECTION, SOLUTION INTRAMUSCULAR; INTRAVENOUS CODE/TRAUMA/SEDATION MEDICATION
Status: COMPLETED | OUTPATIENT
Start: 2022-07-27 | End: 2022-07-27

## 2022-07-27 RX ORDER — DEXTROSE AND SODIUM CHLORIDE 5; .9 G/100ML; G/100ML
75 INJECTION, SOLUTION INTRAVENOUS CONTINUOUS
Status: DISCONTINUED | OUTPATIENT
Start: 2022-07-27 | End: 2022-07-28 | Stop reason: HOSPADM

## 2022-07-27 RX ORDER — CEFAZOLIN SODIUM 1 G/50ML
1000 SOLUTION INTRAVENOUS ONCE
Status: COMPLETED | OUTPATIENT
Start: 2022-07-27 | End: 2022-07-27

## 2022-07-27 RX ADMIN — CEFAZOLIN SODIUM 1000 MG: 1 SOLUTION INTRAVENOUS at 12:58

## 2022-07-27 RX ADMIN — FENTANYL CITRATE 50 MCG: 50 INJECTION, SOLUTION INTRAMUSCULAR; INTRAVENOUS at 13:20

## 2022-07-27 RX ADMIN — Medication 2 ML: at 13:25

## 2022-07-27 RX ADMIN — Medication 10 ML: at 13:27

## 2022-07-27 RX ADMIN — DEXTROSE AND SODIUM CHLORIDE 75 ML/HR: 5; .9 INJECTION, SOLUTION INTRAVENOUS at 12:53

## 2022-07-27 RX ADMIN — MIDAZOLAM HYDROCHLORIDE 0.5 MG: 1 INJECTION, SOLUTION INTRAMUSCULAR; INTRAVENOUS at 13:24

## 2022-07-27 RX ADMIN — MIDAZOLAM HYDROCHLORIDE 1 MG: 1 INJECTION, SOLUTION INTRAMUSCULAR; INTRAVENOUS at 13:20

## 2022-07-27 RX ADMIN — FENTANYL CITRATE 25 MCG: 50 INJECTION, SOLUTION INTRAMUSCULAR; INTRAVENOUS at 13:24

## 2022-07-27 NOTE — INTERVAL H&P NOTE
Patient arrived to IR for port placement    The procedure and risks were discussed with the patient  All questions were answered  Informed consent was obtained  H & P reviewed after examining the patient and I find no changes in the patient condition since the H & P has been written  /72   Pulse 60   Temp 98 1 °F (36 7 °C) (Temporal)   Resp 13   Ht 5' 5" (1 651 m)   Wt 61 8 kg (136 lb 3 9 oz)   LMP 05/26/2021   SpO2 99%   BMI 22 67 kg/m²     Patient re-evaluated   Accept as history and physical     Patricia Lepe, DO/July 27, 2022/12:20 PM

## 2022-07-27 NOTE — PROGRESS NOTES
Pt in [facility] for port placement  Spoke with Scooby Faria RN- APU  APU will draw CBC needed for tomorrow's treatment

## 2022-07-28 ENCOUNTER — HOSPITAL ENCOUNTER (OUTPATIENT)
Dept: INFUSION CENTER | Facility: CLINIC | Age: 57
Discharge: HOME | End: 2022-07-28
Payer: COMMERCIAL

## 2022-07-28 VITALS
SYSTOLIC BLOOD PRESSURE: 129 MMHG | RESPIRATION RATE: 16 BRPM | DIASTOLIC BLOOD PRESSURE: 77 MMHG | TEMPERATURE: 98.1 F | WEIGHT: 139.2 LBS | HEART RATE: 63 BPM | BODY MASS INDEX: 23.19 KG/M2 | HEIGHT: 65 IN

## 2022-07-28 DIAGNOSIS — J91.0 MALIGNANT PLEURAL EFFUSION: ICD-10-CM

## 2022-07-28 DIAGNOSIS — C79.51 BONE METASTASES (HCC): Primary | ICD-10-CM

## 2022-07-28 DIAGNOSIS — T45.1X5A CHEMOTHERAPY INDUCED NEUTROPENIA (HCC): ICD-10-CM

## 2022-07-28 DIAGNOSIS — C50.911 PRIMARY MALIGNANT NEOPLASM OF RIGHT BREAST WITH METASTASIS TO OTHER SITE (HCC): ICD-10-CM

## 2022-07-28 DIAGNOSIS — D70.1 CHEMOTHERAPY INDUCED NEUTROPENIA (HCC): ICD-10-CM

## 2022-07-28 PROCEDURE — 96367 TX/PROPH/DG ADDL SEQ IV INF: CPT

## 2022-07-28 PROCEDURE — 96409 CHEMO IV PUSH SNGL DRUG: CPT

## 2022-07-28 RX ORDER — SODIUM CHLORIDE 9 MG/ML
20 INJECTION, SOLUTION INTRAVENOUS ONCE
Status: COMPLETED | OUTPATIENT
Start: 2022-07-28 | End: 2022-07-28

## 2022-07-28 RX ADMIN — SODIUM CHLORIDE 20 ML/HR: 0.9 INJECTION, SOLUTION INTRAVENOUS at 15:10

## 2022-07-28 RX ADMIN — ERIBULIN MESYLATE 2.4 MG: 0.5 INJECTION INTRAVENOUS at 15:39

## 2022-07-28 RX ADMIN — DEXAMETHASONE SODIUM PHOSPHATE 10 MG: 10 INJECTION, SOLUTION INTRAMUSCULAR; INTRAVENOUS at 15:10

## 2022-07-28 NOTE — PROGRESS NOTES
Patient arrives for chemo, offers no complaints  She had a port placed yesterday  She reports the site is still very tender and she would prefer a peripheral IV today  PIV placed without issue, premeds in progress

## 2022-07-29 ENCOUNTER — HOSPITAL ENCOUNTER (OUTPATIENT)
Dept: INFUSION CENTER | Facility: CLINIC | Age: 57
Discharge: HOME | End: 2022-07-29
Payer: COMMERCIAL

## 2022-07-29 DIAGNOSIS — C50.911 PRIMARY MALIGNANT NEOPLASM OF RIGHT BREAST WITH METASTASIS TO OTHER SITE (HCC): ICD-10-CM

## 2022-07-29 DIAGNOSIS — C79.51 BONE METASTASES (HCC): ICD-10-CM

## 2022-07-29 DIAGNOSIS — J91.0 MALIGNANT PLEURAL EFFUSION: ICD-10-CM

## 2022-07-29 DIAGNOSIS — G89.3 CANCER RELATED PAIN: ICD-10-CM

## 2022-07-29 DIAGNOSIS — D70.1 CHEMOTHERAPY INDUCED NEUTROPENIA (HCC): ICD-10-CM

## 2022-07-29 DIAGNOSIS — Z51.5 PALLIATIVE CARE PATIENT: ICD-10-CM

## 2022-07-29 DIAGNOSIS — T45.1X5A CHEMOTHERAPY INDUCED NEUTROPENIA (HCC): ICD-10-CM

## 2022-07-29 DIAGNOSIS — C79.51 BONE METASTASES (HCC): Primary | ICD-10-CM

## 2022-07-29 PROCEDURE — 96372 THER/PROPH/DIAG INJ SC/IM: CPT

## 2022-07-29 RX ADMIN — PEGFILGRASTIM-BMEZ 6 MG: 6 INJECTION SUBCUTANEOUS at 16:28

## 2022-07-29 NOTE — TELEPHONE ENCOUNTER
Primary palliative medicine provider:   Kathryn Gurrola     Medication requested:  Hydromorphone 4 mg     If for pain, how has the patient been taking their pain medicine? Last appointment: 5/17     Next scheduled appointment: 08/08    PDMP review:    06/24/2022 06/24/2022 HYDROmorphone HCL (Tablet) 120 0 7 2  14 LAUREANO GOMEZ [facility], IMELDA YANES

## 2022-07-29 NOTE — PROGRESS NOTES
Pt to clinic for ziextenzo  Pt offers no complaints today  Tolerated sq injection without complications  Pt aware of next appointment  AVS was offered, pt declined  Pt ambulated out of clinic safely

## 2022-08-01 RX ORDER — HYDROMORPHONE HYDROCHLORIDE 2 MG/1
2-4 TABLET ORAL
Qty: 120 TABLET | Refills: 0 | Status: SHIPPED | OUTPATIENT
Start: 2022-08-01 | End: 2022-09-07 | Stop reason: SDUPTHER

## 2022-08-04 ENCOUNTER — TELEPHONE (OUTPATIENT)
Dept: HEMATOLOGY ONCOLOGY | Facility: CLINIC | Age: 57
End: 2022-08-04

## 2022-08-04 RX ORDER — SODIUM CHLORIDE 9 MG/ML
20 INJECTION, SOLUTION INTRAVENOUS ONCE
Status: CANCELLED | OUTPATIENT
Start: 2022-08-11

## 2022-08-04 RX ORDER — SODIUM CHLORIDE 9 MG/ML
20 INJECTION, SOLUTION INTRAVENOUS ONCE
Status: CANCELLED | OUTPATIENT
Start: 2022-08-18

## 2022-08-04 NOTE — TELEPHONE ENCOUNTER
CALL RETURN FORM   Reason for patient call? Patient returning call from DONAL Bonilla   Patient's primary oncologist?  Alisha Boucher   Name of person the patient was calling for? DONAL Bonilla   Any additional information to add, if applicable? Please call [phone]   Informed patient that the message will be forwarded to the team and someone will get back to them as soon as possible    Did you relay this information to the patient?  yes

## 2022-08-04 NOTE — TELEPHONE ENCOUNTER
Reached out to patient in regards to questions and concerns  Patient stated that Cindy Treviño called her yesterday and or today  Reviewed chart and no telephone encounter listed  Patient did state had an "Episode" yesterday of HA and lower back pain with no relief from pain medications prescribed by palliative care  Reviewed with patient to call us with further questions and concerns and to reach out to palliative if "episode" were to occur again   Patient appreciative of call

## 2022-08-07 ENCOUNTER — TELEPHONE (OUTPATIENT)
Dept: OTHER | Facility: OTHER | Age: 57
End: 2022-08-07

## 2022-08-08 ENCOUNTER — PATIENT OUTREACH (OUTPATIENT)
Dept: INTERNAL MEDICINE CLINIC | Facility: CLINIC | Age: 57
End: 2022-08-08

## 2022-08-08 ENCOUNTER — OFFICE VISIT (OUTPATIENT)
Dept: PALLIATIVE MEDICINE | Facility: CLINIC | Age: 57
End: 2022-08-08
Payer: COMMERCIAL

## 2022-08-08 ENCOUNTER — SOCIAL WORK (OUTPATIENT)
Dept: PALLIATIVE MEDICINE | Facility: CLINIC | Age: 57
End: 2022-08-08

## 2022-08-08 ENCOUNTER — TELEPHONE (OUTPATIENT)
Dept: HEMATOLOGY ONCOLOGY | Facility: CLINIC | Age: 57
End: 2022-08-08

## 2022-08-08 VITALS
WEIGHT: 135.4 LBS | HEART RATE: 87 BPM | DIASTOLIC BLOOD PRESSURE: 60 MMHG | OXYGEN SATURATION: 96 % | RESPIRATION RATE: 20 BRPM | BODY MASS INDEX: 22.53 KG/M2 | TEMPERATURE: 98.1 F | SYSTOLIC BLOOD PRESSURE: 110 MMHG

## 2022-08-08 DIAGNOSIS — C50.911 PRIMARY MALIGNANT NEOPLASM OF RIGHT BREAST WITH METASTASIS TO OTHER SITE (HCC): Primary | ICD-10-CM

## 2022-08-08 DIAGNOSIS — G89.3 CANCER RELATED PAIN: ICD-10-CM

## 2022-08-08 DIAGNOSIS — Z78.9 NEED FOR FOLLOW-UP BY SOCIAL WORKER: ICD-10-CM

## 2022-08-08 DIAGNOSIS — K59.00 CONSTIPATION, UNSPECIFIED CONSTIPATION TYPE: ICD-10-CM

## 2022-08-08 DIAGNOSIS — Z71.89 COUNSELING AND COORDINATION OF CARE: Primary | ICD-10-CM

## 2022-08-08 DIAGNOSIS — Z51.5 PALLIATIVE CARE PATIENT: ICD-10-CM

## 2022-08-08 DIAGNOSIS — Z78.9 UNDER CARE OF SOCIAL WORKER: ICD-10-CM

## 2022-08-08 DIAGNOSIS — C79.51 BONE METASTASES (HCC): ICD-10-CM

## 2022-08-08 PROCEDURE — NC001 PR NO CHARGE

## 2022-08-08 PROCEDURE — 99214 OFFICE O/P EST MOD 30 MIN: CPT | Performed by: STUDENT IN AN ORGANIZED HEALTH CARE EDUCATION/TRAINING PROGRAM

## 2022-08-08 NOTE — TELEPHONE ENCOUNTER
----- Message from Baldomero Alcala MD sent at 8/7/2022  6:54 PM EDT -----  I received a call from Gabriella's  Leonid Mccann  Patient has been having diarrhea, dyspnea, and general failure to thrive  Has basically been bed bound x24 hours  Leonid Mccann reports fevers around 100 though Gabriella disputes this  I spent some time speaking with both Denia Dimas and Leonid Mccann encouraging her to go to the ER to get checked out, bearing in mind the timeline for sepsis mgmt  Out of hearing of his wife, Leonid Mccann tells me Denia Dimas is specifically not going to the hospital because she is depressed about her diagnosis, as well as some significant family drama  She sounds to have been having a passive death wish   I tried to unpack what I could over the phone and most importantly I hopefully convinced Denia Dimas to go to the ER, but if for whatever reason she is not on your radar for tomorrow please reach out      - Brenda Snowball

## 2022-08-08 NOTE — PROGRESS NOTES
Telephone call placed to patient's , Jessicabettina Padilla due to receiving a referral on him  Alfonso Padilla informed me that Geno Palmer has stage 4 cancer and she is going for chemotherapy  Alfonso Padilla reports that Geno Palmer had MA but her case was closed just a few weeks ago due to his receiving SS  Geno Palmer now does not have any insurance and Alfonso Padilla informed me that she has outstanding medical bills  They also have a 16year old son who is in school and a 20yr old son who has Judith [place] Syndrome and receives SSI   Alfonso Padilla informed me that he receives approximately 2,500 monthly from [facility] and a VA benefit due to being 40% disabled  Telephone call placed to [place] and I was informed that Geno Palmer only has a $4 00 balance on her account and the bills are not processed yet  She can apply for [facility] once she receives a bill  I offered to call [facility] via conference call with patient to check on the status of patient's MA but patient was out of the home due to a medical appointment  Referral made to Farheen Walden, [facility] to assist with MA and Food Stamp application and to check on the current status with [facility]

## 2022-08-08 NOTE — TELEPHONE ENCOUNTER
I called the phone number associated with the patient and it went to voicemail  I then called the  who picked up and stated that her fever has resolved as well as the dyspnea and diarrhea  I encouraged them to give us a call if any of that recurs so that we can test for any C diff infection and then palliate her diarrhea and other symptoms that may arise  He states she is feeling better and will let us know if things change    Fidel Celestin MD

## 2022-08-08 NOTE — PROGRESS NOTES
Outpatient Follow-Up - Palliative and Supportive Care   Vishal Medina 49 y o  female [id number]    Assessment & Plan  Problem List Items Addressed This Visit        Musculoskeletal and Integument    Bone metastases ([address] )       Other    Palliative care patient    Primary malignant neoplasm of right breast with metastasis to other site [facility]) - Primary    Constipation    Cancer related pain        #symptom management  #cancer-related pain   - continue APAP 650 mg PO Q6H PRN              - max daily 4000 mg   - continue hydromorphone 2-4 mg PO Q3H PRN   - continue duloxetine therapy     #anxiety   - continue duloxetine 30 mg PO QDaily     #nausea   - continue metoclopramide 10 mg PO QID PRN   - continue ondansetron 4 mg PO Q8H PRN     #insomnia   - continue melatonin 6 mg PO QHS     #OIC   - continue home bowel regimen   - continued adequate hydration   - addition of senna to regimen given worsening constipation     #abdominal cramping   - continue dicyclomine 10 mg PO Q6H PRN    #recurrent pleural effusion   - drains 400-450 cc/week; stable   - reports consistent appearance of fluid    #goals of care   - call over weekend from spouse concerning for fever, diarrhea, SOB - recommendation for ED evaluation, patient did not wish to go  In office today patient's vitals WNL  Expanded discussion regarding threshold to go to ED given patient's previous neutropenic fever + high risk for PE or other life threatening pathologies  Use of white board for education  Patient states she checks temperature daily  Last week an episode of hypotension [84/54] while feeling diaphoretic and near syncope, at that time considered ED but ultimately decided against    - patient does not wish to go the ED right now, given patient's vital signs and currently reports feeling better; agree with patient's decision  However, discussed RTED precautions, all questions/concerns addressed  Met with [facility] LSW independently      #psychosocial support   - emotional support provided   - Trav Bowling [spouse] [phone]      Next [facility] Follow up in 4 weeks  Controlled Substance Review    [place]MP or [place]  reviewed: No red flags were identified; safe to proceed with prescription  [place] Sou[place] PDMP Review       Value Time User    PDMP Reviewed  Yes (P)  8/8/2022 11:17 AM Tashi Berger MD          Medications adjusted this encounter:  Requested Prescriptions      No prescriptions requested or ordered in this encounter     No orders of the defined types were placed in this encounter  There are no discontinued medications  Danya Naranjo was seen today for symptoms and planning cares related to above illnesses  I have reviewed the patient's controlled substance dispensing history in the Prescription Drug Monitoring Program in compliance with the [facility] regulations before prescribing any controlled substances  They are invited to continue to follow with us  If there are questions or concerns, please contact us through our clinic/answering service 24 hours a day, seven days a week  Tashi Berger MD  [facility] Palliative and Supportive [facility] [place]        Visit Information    Accompanied By: No one    Source of History: Self, Medical record    History Limitations: None      History of Present Illness    Danya Naranjo is a 49 y o  female who presents in follow up of symptoms related to metastatic breast cancer  Pertinent issues include: symptom management, pain, neoplasm related, assessment of goals of care, advance care planning  Patient reports persistent fatigue  Episode over the weekend of reported fever [by spouse], dyspnea on exertion, diarrhea  Patient reports no fever over weekend, multiple temperature checks  Use of lomotil with improvement of diarrhea, last BM this AM, soft  No missed anticoagulant  Denies chest pain  Notes REIS while doing household chores  Last week episode of REIS with hypotension [BP check of 84/54], near syncope  Patient considered ED visit at that time, but opted to remain home  This weekend patient did not feel well, however, notes improvement today  Spouse called [facility] on call with recommendation for ED evaluation, patient did not wish to go to ED at that time  Denies nausea, vomiting  Appetite variable, completes 2-3 meals/day  Weight stable  Continued R sided chest wall pain, known throughout cancer diagnosis, use of 6 tabs PO hydromorphone/day  Intermittent stomach cramping with use of bentyl effective  Adequate sleep  Past medical, surgical, social, and family histories are reviewed and pertinent updates are made  Review of Systems   Constitutional: Positive for malaise/fatigue  Negative for chills, decreased appetite, fever and weight loss  HENT: Negative for congestion  Eyes: Negative for visual disturbance  Cardiovascular: Positive for dyspnea on exertion  Negative for chest pain  Respiratory: Positive for shortness of breath  Musculoskeletal: Negative for falls and neck pain  Gastrointestinal: Positive for diarrhea  Negative for abdominal pain, constipation, nausea and vomiting  Genitourinary: Negative for frequency  Neurological: Negative for headaches  Psychiatric/Behavioral: The patient is nervous/anxious  The patient does not have insomnia  All other systems reviewed and are negative  Vital Signs    /60 (BP Location: Left arm, Cuff Size: Standard)   Pulse 87   Temp 98 1 °F (36 7 °C) (Temporal)   Resp 20   Wt 61 4 kg (135 lb 6 4 oz)   LMP 05/26/2021   SpO2 96%   BMI 22 53 kg/m²     Physical Exam and Objective Data  Physical Exam  Vitals and nursing note reviewed  Constitutional:       General: She is awake  Appearance: She is not diaphoretic  Comments: Sitting up in NAD  BMI 22 5  Non-toxic appearing   HENT:      Head: Normocephalic and atraumatic  Right Ear: External ear normal       Left Ear: External ear normal       Nose: No rhinorrhea  Eyes:      Comments: No gaze preference   Cardiovascular:      Rate and Rhythm: Normal rate  Pulmonary:      Effort: No tachypnea or respiratory distress  Comments: Completes full sentences without difficulty  Musculoskeletal:      Cervical back: Normal range of motion  Neurological:      General: No focal deficit present  Mental Status: She is alert and oriented to person, place, and time     Psychiatric:         Attention and Perception: Attention normal          Mood and Affect: Mood and affect normal          Speech: Speech normal          Cognition and Memory: Cognition and memory normal            Radiology and Laboratory:  I personally reviewed and interpreted the following results:    Most Recent COVID-19 Results:  Lab Results   Component Value Date/Time    SARSCOV2 Negative 06/25/2022 03:22 AM    SARSCOV2 Positive (A) 02/11/2022 02:10 PM    SARSCOV2 Positive (A) 12/20/2020 12:25 PM       Most Recent Lab Work:  Lab Results   Component Value Date/Time    SODIUM 142 07/19/2022 03:57 PM    K 3 6 07/19/2022 03:57 PM    K 3 7 04/02/2014 01:15 PM    BUN 13 07/19/2022 03:57 PM    BUN 12 04/02/2014 01:15 PM    CREATININE 0 99 07/19/2022 03:57 PM    CREATININE 0 80 04/02/2014 01:15 PM    GLUC 96 07/19/2022 03:57 PM     Lab Results   Component Value Date/Time    AST 32 07/19/2022 03:57 PM    AST 23 04/02/2014 01:15 PM    ALT 22 07/19/2022 03:57 PM    ALT 15 04/02/2014 01:15 PM    ALB 3 4 (L) 07/19/2022 03:57 PM    ALB 3 4 (L) 04/02/2014 01:15 PM     Lab Results   Component Value Date/Time    HGB 11 7 07/27/2022 02:43 PM    WBC 2 89 (L) 07/27/2022 02:43 PM     07/27/2022 02:43 PM    INR 1 07 08/17/2021 06:14 AM       Most Recent Imaging [last 30 days]:  Procedure: NM bone scan whole body    Result Date: 7/11/2022  Narrative: BONE SCAN  WHOLE BODY INDICATION: C50 919: Malignant neoplasm of unspecified site of unspecified female breast Z17 0: Estrogen receptor positive status (ER+) 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 C79 51: Secondary malignant neoplasm of bone PREVIOUS FILM CORRELATION:    3/15/2022, 11/19/2021 TECHNIQUE:   This study was performed following the intravenous administration of 26 9 mCi Tc-99m labeled MDP  Delayed, anterior and posterior whole body images were acquired, 2-3 hours after radiopharmaceutical administration  Additional spot views obtained FINDINGS:  Patient with known history of widespread osseous metastatic disease  In comparison to the prior examination, there is no evidence of new or worsening area of abnormal activity  Mildly heterogeneous activity within the anterior ribs, left more so than right, without progression  Mildly increased activity within the manubrium, again without significant change  Impression: In this patient with a known history of widespread osseous metastatic disease, there are no areas of new or worsening scintigraphic activity that would suggest osseous disease progression Workstation performed: [id number]     Procedure: CT chest abdomen pelvis w contrast    Result Date: 7/12/2022  Narrative: CT CHEST, ABDOMEN AND PELVIS WITH IV CONTRAST INDICATION:   C50 919: Malignant neoplasm of unspecified site of unspecified female breast Z17 0: Estrogen receptor positive status (ER+) 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 C79 51: Secondary malignant neoplasm of bone  COMPARISON:  CT chest abdomen pelvis 6/27/2022, bone scan 7/11/2022, CT chest abdomen pelvis 4/1/2022 TECHNIQUE: CT examination of the chest, abdomen and pelvis was performed  Axial, sagittal, and coronal 2D reformatted images were created from the source data and submitted for interpretation  Radiation dose length product (DLP) for this visit:  646 mGy-cm     This examination, like all CT scans performed in the [facility], was performed utilizing techniques to minimize radiation dose exposure, including the use of iterative reconstruction and automated exposure control  IV Contrast:  80 mL of iohexol (OMNIPAQUE) Enteric Contrast: Enteric contrast was not administered  FINDINGS: CHEST LUNGS:  Scattered small pulmonary nodules again noted  For example: 5 mm left upper lobe peribronchial nodule suggested, image 33 series 3, previously 6 mm  Stable 3 mm right middle lobe nodule superiorly image 52 series 3  Stable 5 mm nodule in the right middle lobe medially close to the minor fissure image 58 series 3  Unchanged 4 mm left lower lobe nodule laterally image 62 series 3  Stable 5 mm left lower lobe nodule medially image 64 series 3  Additional small pulmonary nodules noted with suggestion of underlying centrilobular nodules most conspicuous in the right upper lobe, stable  PLEURA:  Small right pleural effusion, slightly decreased  Stable position of the right basilar pleural catheter  HEART/GREAT VESSELS: Heart is unremarkable for patient's age  No thoracic aortic aneurysm  MEDIASTINUM AND KIA:  Calcified small lymph nodes again noted in the mediastinum and bilateral perihilar regions  CHEST WALL AND LOWER NECK:  Multiple surgical clips in the right axilla  ABDOMEN LIVER/BILIARY TREE:  Improving hepatic lesions from contrast CT of 4/1/2022  A lesion in the left lobe of the liver laterally measures 2 0 x 1 9 cm image 49 series 2, previously 5 1 x 3 8 cm  Somewhat stellate lesion in the right lobe of the liver posteriorly now measures 1 8 x 1 2 cm image 33 series 2, previously 2 6 x 2 6 cm  Stable findings for largely thrombosed left portal vein  Main portal vein is patent  GALLBLADDER:  No calcified gallstones  No pericholecystic inflammatory change  SPLEEN:  Heterogeneous appearance to the spleen  Previously seen on prior contrast CT were multiple small hypodense lesions less conspicuous on the current exam  PANCREAS:  Unremarkable  ADRENAL GLANDS:  Unremarkable   KIDNEYS/URETERS:  One or more simple renal cyst(s) is noted  Otherwise unremarkable kidneys  No hydronephrosis  STOMACH AND BOWEL:  Limited evaluation without enteric contrast   No acute findings  APPENDIX:  A normal appendix was visualized  ABDOMINOPELVIC CAVITY:  No ascites  No pneumoperitoneum  No lymphadenopathy  VESSELS:  Unremarkable for patient's age  PELVIS REPRODUCTIVE ORGANS:  Unremarkable for patient's age  URINARY BLADDER:  Collapsed limiting evaluation  ABDOMINAL WALL/INGUINAL REGIONS:  Unremarkable  OSSEOUS STRUCTURES:  Innumerable sclerotic lesions compatible with widespread osseous metastasis without significant change  Impression: 1  Stable scattered small pulmonary nodules bilaterally  2   Small right pleural effusion, slightly decreased  3   Improving hepatic metastasis  4   Less conspicuous small splenic hypodense lesions  5   Stable findings for widespread osseous metastasis  Workstation performed: [id number]     Procedure: IR port placement    Result Date: 7/27/2022  Narrative: Chest port placement 7/27/2022 1:12 PM History: Metastatic breast carcinoma Contrast: None Fluoroscopy time:  56 Number of images: 2 Radiation dose: 2 mGy Conscious sedation time: 30 min Procedure: The patient was identified verbally and by wristband  Timeout was performed  Informed consent was obtained  All elements of maximal sterile barrier technique were followed (cap, mask, sterile gown, sterile gloves, large sterile sheet, hand hygiene and 2% chlorhexidine for cutaneous antisepsis)  Following obtaining informed consent, the patient was prepped and draped in the usual sterile fashion  Using ultrasound guidance, access was gained to the patient's right internal jugular vein using a micropuncture system  The micropuncture wire was removed and a  035 wire was advanced to the level of the inferior vena cava using fluoroscopic guidance  Using 1% lidocaine, the region of the right anterior chest was was anesthetized    A small incision was made using a 15 blade scalpel  The port pocket was created using blunt dissection  The catheter tubing was tunneled from the incision to the venotomy  The micropuncture dilator was exchanged for a peel-away sheath, using fluoroscopic guidance  The catheter was placed through the peel-away sheath  The catheter was measured and cut to size  The catheter was attached to the port  The port was flushed with saline to ensure patency without evidence of leakage  The port was placed in the port pocket  The port was sutured in the pocket using 3-0 Vicryl  The incisions were approximated with 3-0 Vicryl and tissue adhesive  The patient tolerated the procedure well without apparent immediate complications  The patient left the IR department in unchanged condition  Dr Maira May performed and directly supervised the entire procedure  Findings: Using ultrasound guidance, the right internal jugular vein was cannulated using Seldinger technique  The right internal jugular vein was evaluated as a potential access site  The right internal jugular vein was patent, and free of thrombus  Static images of the vessel was obtained  Visualization of real time needle entry into the vessel was obtained  Fluoroscopic spot image demonstrates a newly placed single lumen chest port via the right internal jugular vein with the most central aspect at the SVC/RA junction  The catheter tubing is smooth in contour  Impression: Impression:  Successful placement of a single lumen chest port via the right internal jugular vein  Workstation performed: [id number]       35 minutes was spent face to face with Stuart Johnson with greater than 50% of the time spent in counseling or coordination of care including discussions of provided medical updates, discussed palliative care, determined goals of care, determined social/family support, discussed plans of care, discussed symptom management, provided psychosocial support  PDMP Reviewed   All of the patient's or agent's questions were answered during this discussion
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
## 2022-08-09 ENCOUNTER — APPOINTMENT (OUTPATIENT)
Dept: LAB | Facility: HOSPITAL | Age: 57
End: 2022-08-09
Payer: COMMERCIAL

## 2022-08-09 DIAGNOSIS — D70.1 CHEMOTHERAPY INDUCED NEUTROPENIA (HCC): ICD-10-CM

## 2022-08-09 DIAGNOSIS — C79.51 BONE METASTASES (HCC): ICD-10-CM

## 2022-08-09 DIAGNOSIS — T45.1X5A CHEMOTHERAPY INDUCED NEUTROPENIA (HCC): ICD-10-CM

## 2022-08-09 DIAGNOSIS — J91.0 MALIGNANT PLEURAL EFFUSION: ICD-10-CM

## 2022-08-09 DIAGNOSIS — G89.3 CANCER ASSOCIATED PAIN: ICD-10-CM

## 2022-08-09 DIAGNOSIS — C50.911 PRIMARY MALIGNANT NEOPLASM OF RIGHT BREAST WITH METASTASIS TO OTHER SITE (HCC): ICD-10-CM

## 2022-08-09 LAB
ALBUMIN SERPL BCP-MCNC: 3.4 G/DL (ref 3.5–5)
ALP SERPL-CCNC: 90 U/L (ref 46–116)
ALT SERPL W P-5'-P-CCNC: 21 U/L (ref 12–78)
ANION GAP SERPL CALCULATED.3IONS-SCNC: 11 MMOL/L (ref 4–13)
AST SERPL W P-5'-P-CCNC: 22 U/L (ref 5–45)
BASOPHILS # BLD AUTO: 0.03 THOUSANDS/ΜL (ref 0–0.1)
BASOPHILS NFR BLD AUTO: 0 % (ref 0–1)
BILIRUB SERPL-MCNC: 0.39 MG/DL (ref 0.2–1)
BUN SERPL-MCNC: 12 MG/DL (ref 5–25)
CALCIUM ALBUM COR SERPL-MCNC: 9.4 MG/DL (ref 8.3–10.1)
CALCIUM SERPL-MCNC: 8.9 MG/DL (ref 8.3–10.1)
CHLORIDE SERPL-SCNC: 105 MMOL/L (ref 96–108)
CO2 SERPL-SCNC: 27 MMOL/L (ref 21–32)
CREAT SERPL-MCNC: 0.86 MG/DL (ref 0.6–1.3)
EOSINOPHIL # BLD AUTO: 0 THOUSAND/ΜL (ref 0–0.61)
EOSINOPHIL NFR BLD AUTO: 0 % (ref 0–6)
ERYTHROCYTE [DISTWIDTH] IN BLOOD BY AUTOMATED COUNT: 18.3 % (ref 11.6–15.1)
GFR SERPL CREATININE-BSD FRML MDRD: 76 ML/MIN/1.73SQ M
GLUCOSE SERPL-MCNC: 126 MG/DL (ref 65–140)
HCT VFR BLD AUTO: 41.4 % (ref 34.8–46.1)
HGB BLD-MCNC: 13.4 G/DL (ref 11.5–15.4)
IMM GRANULOCYTES # BLD AUTO: 0.07 THOUSAND/UL (ref 0–0.2)
IMM GRANULOCYTES NFR BLD AUTO: 1 % (ref 0–2)
LYMPHOCYTES # BLD AUTO: 0.83 THOUSANDS/ΜL (ref 0.6–4.47)
LYMPHOCYTES NFR BLD AUTO: 10 % (ref 14–44)
MCH RBC QN AUTO: 30.9 PG (ref 26.8–34.3)
MCHC RBC AUTO-ENTMCNC: 32.4 G/DL (ref 31.4–37.4)
MCV RBC AUTO: 95 FL (ref 82–98)
MONOCYTES # BLD AUTO: 0.66 THOUSAND/ΜL (ref 0.17–1.22)
MONOCYTES NFR BLD AUTO: 8 % (ref 4–12)
NEUTROPHILS # BLD AUTO: 6.68 THOUSANDS/ΜL (ref 1.85–7.62)
NEUTS SEG NFR BLD AUTO: 81 % (ref 43–75)
NRBC BLD AUTO-RTO: 0 /100 WBCS
PLATELET # BLD AUTO: 190 THOUSANDS/UL (ref 149–390)
PMV BLD AUTO: 12.6 FL (ref 8.9–12.7)
POTASSIUM SERPL-SCNC: 3.7 MMOL/L (ref 3.5–5.3)
PROT SERPL-MCNC: 7 G/DL (ref 6.4–8.4)
RBC # BLD AUTO: 4.34 MILLION/UL (ref 3.81–5.12)
SODIUM SERPL-SCNC: 143 MMOL/L (ref 135–147)
WBC # BLD AUTO: 8.27 THOUSAND/UL (ref 4.31–10.16)

## 2022-08-09 PROCEDURE — 80053 COMPREHEN METABOLIC PANEL: CPT

## 2022-08-09 PROCEDURE — 36415 COLL VENOUS BLD VENIPUNCTURE: CPT

## 2022-08-09 PROCEDURE — 85025 COMPLETE CBC W/AUTO DIFF WBC: CPT

## 2022-08-09 RX ORDER — OMEPRAZOLE MAGNESIUM 20 MG
CAPSULE,DELAYED RELEASE (ENTERIC COATED) ORAL
Qty: 180 TABLET | Refills: 1 | Status: SHIPPED | OUTPATIENT
Start: 2022-08-09

## 2022-08-11 ENCOUNTER — HOSPITAL ENCOUNTER (OUTPATIENT)
Dept: INFUSION CENTER | Facility: CLINIC | Age: 57
Discharge: HOME | End: 2022-08-11
Payer: COMMERCIAL

## 2022-08-11 VITALS
SYSTOLIC BLOOD PRESSURE: 114 MMHG | BODY MASS INDEX: 22.56 KG/M2 | DIASTOLIC BLOOD PRESSURE: 62 MMHG | HEART RATE: 72 BPM | RESPIRATION RATE: 18 BRPM | WEIGHT: 135.4 LBS | HEIGHT: 65 IN | TEMPERATURE: 97.1 F

## 2022-08-11 DIAGNOSIS — C50.911 PRIMARY MALIGNANT NEOPLASM OF RIGHT BREAST WITH METASTASIS TO OTHER SITE (HCC): ICD-10-CM

## 2022-08-11 DIAGNOSIS — J91.0 MALIGNANT PLEURAL EFFUSION: ICD-10-CM

## 2022-08-11 DIAGNOSIS — D70.1 CHEMOTHERAPY INDUCED NEUTROPENIA (HCC): ICD-10-CM

## 2022-08-11 DIAGNOSIS — T45.1X5A CHEMOTHERAPY INDUCED NEUTROPENIA (HCC): ICD-10-CM

## 2022-08-11 DIAGNOSIS — C79.51 BONE METASTASES (HCC): Primary | ICD-10-CM

## 2022-08-11 PROBLEM — Z95.828 PORT-A-CATH IN PLACE: Status: ACTIVE | Noted: 2022-08-11

## 2022-08-11 PROCEDURE — 96409 CHEMO IV PUSH SNGL DRUG: CPT

## 2022-08-11 PROCEDURE — 96367 TX/PROPH/DG ADDL SEQ IV INF: CPT

## 2022-08-11 RX ORDER — SODIUM CHLORIDE 9 MG/ML
20 INJECTION, SOLUTION INTRAVENOUS ONCE
Status: COMPLETED | OUTPATIENT
Start: 2022-08-11 | End: 2022-08-11

## 2022-08-11 RX ADMIN — SODIUM CHLORIDE 20 ML/HR: 0.9 INJECTION, SOLUTION INTRAVENOUS at 10:32

## 2022-08-11 RX ADMIN — ERIBULIN MESYLATE 2.4 MG: 0.5 INJECTION INTRAVENOUS at 11:13

## 2022-08-11 RX ADMIN — DEXAMETHASONE SODIUM PHOSPHATE 10 MG: 10 INJECTION, SOLUTION INTRAMUSCULAR; INTRAVENOUS at 10:32

## 2022-08-11 NOTE — PROGRESS NOTES
Pt to clinic for eribulin   Pt offers no complaints today  Tolerated treatment without complications  Pt aware of next appointment  AVS was offered, pt declined  Pt ambulated out of clinic safely

## 2022-08-15 ENCOUNTER — TELEPHONE (OUTPATIENT)
Dept: PALLIATIVE MEDICINE | Facility: CLINIC | Age: 57
End: 2022-08-15

## 2022-08-15 ENCOUNTER — TELEPHONE (OUTPATIENT)
Dept: INTERNAL MEDICINE CLINIC | Facility: CLINIC | Age: 57
End: 2022-08-15

## 2022-08-15 NOTE — TELEPHONE ENCOUNTER
[facility] LSW received message to call patient's spouse, LSW tried both numbers, were not working from hospital

## 2022-08-15 NOTE — TELEPHONE ENCOUNTER
[facility] LSW placed call to patient's spouse  Spouse reported that he was told that if he applied for social security, it wouldn't negatively impact patient's social security or her medical insurance so he applied  He reported that shortly after, patient's social security was stopped and then patient received notification that her medicaid was going to be stopped  He expressed concern over her insurance being termed due to her active treatments and medical appointments  He did reach out to the  in primary care office who placed referral for assistance regarding insurance and food stamps  LSW did relay to him that there was a note in the chart that this was completed and that they maybe hearing from that office regarding resource assistance  Spouse reported that he is interested in getting family into family therapy, feels it would be helpful for everyone  Spouse reported that they have a lot they are going through and he feels like doing the therapy would be beneficial      LSW to look into family therapy resources

## 2022-08-16 ENCOUNTER — HOSPITAL ENCOUNTER (OUTPATIENT)
Dept: INFUSION CENTER | Facility: CLINIC | Age: 57
Discharge: HOME | End: 2022-08-16
Payer: COMMERCIAL

## 2022-08-16 VITALS
DIASTOLIC BLOOD PRESSURE: 65 MMHG | TEMPERATURE: 97.1 F | HEART RATE: 110 BPM | SYSTOLIC BLOOD PRESSURE: 111 MMHG | WEIGHT: 134 LBS | BODY MASS INDEX: 22.88 KG/M2 | RESPIRATION RATE: 16 BRPM | HEIGHT: 64 IN

## 2022-08-16 DIAGNOSIS — J91.0 MALIGNANT PLEURAL EFFUSION: ICD-10-CM

## 2022-08-16 DIAGNOSIS — C79.51 BONE METASTASES (HCC): Primary | ICD-10-CM

## 2022-08-16 DIAGNOSIS — C50.911 PRIMARY MALIGNANT NEOPLASM OF RIGHT BREAST WITH METASTASIS TO OTHER SITE (HCC): ICD-10-CM

## 2022-08-16 DIAGNOSIS — T45.1X5A CHEMOTHERAPY INDUCED NEUTROPENIA (HCC): ICD-10-CM

## 2022-08-16 DIAGNOSIS — D70.1 CHEMOTHERAPY INDUCED NEUTROPENIA (HCC): ICD-10-CM

## 2022-08-16 LAB
BASOPHILS # BLD AUTO: 0.02 THOUSANDS/ΜL (ref 0–0.1)
BASOPHILS NFR BLD AUTO: 1 % (ref 0–1)
EOSINOPHIL # BLD AUTO: 0 THOUSAND/ΜL (ref 0–0.61)
EOSINOPHIL NFR BLD AUTO: 0 % (ref 0–6)
ERYTHROCYTE [DISTWIDTH] IN BLOOD BY AUTOMATED COUNT: 17.7 % (ref 11.6–15.1)
HCT VFR BLD AUTO: 37.1 % (ref 34.8–46.1)
HGB BLD-MCNC: 12.2 G/DL (ref 11.5–15.4)
IMM GRANULOCYTES # BLD AUTO: 0.02 THOUSAND/UL (ref 0–0.2)
IMM GRANULOCYTES NFR BLD AUTO: 1 % (ref 0–2)
LYMPHOCYTES # BLD AUTO: 0.25 THOUSANDS/ΜL (ref 0.6–4.47)
LYMPHOCYTES NFR BLD AUTO: 10 % (ref 14–44)
MCH RBC QN AUTO: 30.7 PG (ref 26.8–34.3)
MCHC RBC AUTO-ENTMCNC: 32.9 G/DL (ref 31.4–37.4)
MCV RBC AUTO: 94 FL (ref 82–98)
MONOCYTES # BLD AUTO: 0.09 THOUSAND/ΜL (ref 0.17–1.22)
MONOCYTES NFR BLD AUTO: 3 % (ref 4–12)
NEUTROPHILS # BLD AUTO: 2.23 THOUSANDS/ΜL (ref 1.85–7.62)
NEUTS SEG NFR BLD AUTO: 85 % (ref 43–75)
NRBC BLD AUTO-RTO: 0 /100 WBCS
PLATELET # BLD AUTO: 178 THOUSANDS/UL (ref 149–390)
PMV BLD AUTO: 11.8 FL (ref 8.9–12.7)
RBC # BLD AUTO: 3.97 MILLION/UL (ref 3.81–5.12)
WBC # BLD AUTO: 2.61 THOUSAND/UL (ref 4.31–10.16)

## 2022-08-16 PROCEDURE — 96365 THER/PROPH/DIAG IV INF INIT: CPT

## 2022-08-16 PROCEDURE — 85025 COMPLETE CBC W/AUTO DIFF WBC: CPT | Performed by: INTERNAL MEDICINE

## 2022-08-16 RX ORDER — SODIUM CHLORIDE 9 MG/ML
20 INJECTION, SOLUTION INTRAVENOUS ONCE
OUTPATIENT
Start: 2022-11-08

## 2022-08-16 RX ORDER — SODIUM CHLORIDE 9 MG/ML
20 INJECTION, SOLUTION INTRAVENOUS ONCE
Status: COMPLETED | OUTPATIENT
Start: 2022-08-16 | End: 2022-08-16

## 2022-08-16 RX ADMIN — ZOLEDRONIC ACID 3.5 MG: 4 INJECTION, SOLUTION, CONCENTRATE INTRAVENOUS at 11:37

## 2022-08-16 RX ADMIN — SODIUM CHLORIDE 20 ML/HR: 0.9 INJECTION, SOLUTION INTRAVENOUS at 11:22

## 2022-08-17 ENCOUNTER — OFFICE VISIT (OUTPATIENT)
Dept: HEMATOLOGY ONCOLOGY | Facility: CLINIC | Age: 57
End: 2022-08-17
Payer: COMMERCIAL

## 2022-08-17 VITALS
SYSTOLIC BLOOD PRESSURE: 114 MMHG | OXYGEN SATURATION: 99 % | BODY MASS INDEX: 23.22 KG/M2 | WEIGHT: 136 LBS | RESPIRATION RATE: 16 BRPM | DIASTOLIC BLOOD PRESSURE: 82 MMHG | TEMPERATURE: 97.6 F | HEART RATE: 88 BPM | HEIGHT: 64 IN

## 2022-08-17 DIAGNOSIS — T45.1X5A CHEMOTHERAPY INDUCED NEUTROPENIA (HCC): Primary | ICD-10-CM

## 2022-08-17 DIAGNOSIS — D70.1 CHEMOTHERAPY INDUCED NEUTROPENIA (HCC): Primary | ICD-10-CM

## 2022-08-17 DIAGNOSIS — J91.0 MALIGNANT PLEURAL EFFUSION: ICD-10-CM

## 2022-08-17 DIAGNOSIS — C50.911 PRIMARY MALIGNANT NEOPLASM OF RIGHT BREAST WITH METASTASIS TO OTHER SITE (HCC): ICD-10-CM

## 2022-08-17 DIAGNOSIS — C79.51 BONE METASTASES (HCC): ICD-10-CM

## 2022-08-17 PROCEDURE — 99214 OFFICE O/P EST MOD 30 MIN: CPT | Performed by: INTERNAL MEDICINE

## 2022-08-17 NOTE — PROGRESS NOTES
Hematology/Oncology Progress Note    Date of Service: 8/16/2022    [address] [facility]   [address] [address]    Hem/Onc Problem List:   1  Metastatic right-sided breast cancer, ER and SD positive, HER2 negative  2  Bone metastatic disease  3  History of right-sided pleural effusion  Chief Complaint:    Routine follow-up for management of stage IV breast cancer    Assessment/Plan:        1   Metastatic breast cancer, with liver, bone, lymph node and pleura involvement   ER and SD positive, HER2 negative  Bone scan showed bony metastatic disease  Patient started monthly Zoladex, daily letrozole and CDK4/6 inhibitor Abemeciclib  Unfortunately, patient developed significant watery diarrhea from Abemaciclib  Abemaciclib was changed to Ibrance 125 mg daily for 3 weeks on,  followed by 1 week off until disease progression on August 30, 2021  Ibrance dose was decreased to 100 mg because of neutropenia  CT scan and bone scan showed disease progression and treatment changed to chemo (Eribulin)  Restaging CT 7/8/2022 shows a very good SD (near 50% reduction in index liver lesions)  NM bone scan showed no new findings  Patient has C5D8 scheduled for 8/18  She will continue current treatment of Eribulin 1 4 milligram/meter subcutaneously day 1 day 8 every 21 day cycle until disease progression  Pt was admitted after C3 with neutropenic fever and is on Neulasta-like growth factor support  Will continue this  She will have labs prior to each treatment  CT restage scheduled for 10/21  F/U in 6 weeks       2  Bony metastatic disease  Bone scan showed bony metastatic disease on 7/11  Patient  continues Zometa every 3 months  3   History of right breast cancer, status post lumpectomy and axillary lymph node dissection, status post adjuvant radiation therapy      4  Right-sided pleural effusion, status post thoracentesis, cytology positive for adenocarcinoma, ER/ND positive, HER2 negative  Status post PleurX catheter placement  Patient drained 325 cc fluid on December 1, 2020   Will continue to treat the underlying disease  She states that her draining has been less with last fluid drained 250 cc  · Discussion of decision making    I personally reviewed the following lab results, the image studies, pathology, other specialty/physicians consult notes and recommendations, and outside medical records from [facility]  I had a lengthy discussion with the patient and shared the work-up findings  I spent 30 minutes reviewing the records (labs, clinician notes, outside records, medical history, ordering medicine/tests/procedures, interpreting the imaging/labs previously done) and coordination of care as well as direct time with the patient today, of which greater than 50% of the time was spent in counseling and coordination of care with the patient/family  · Plan/Labs  · Metastatic breast cancer with diffuse bone metastasis  · ECOG 1  · Continue current treatment of Eribulin Q21 days  Next treatment is 8/18 which is C5D8  Continue neulasta support  Continue Letrozole 2 5mg once daily  Continue this current treatment until disease progression  · Restage CT scheduled for 10/12  · Continue to follow up with palliative care  · Continue Zometa Q12 weeks for bone metastasis    Follow Up: 6 weeks     All questions were answered to the patient's satisfaction during this encounter  The patient knows the contact information for our office and knows to reach out for any relevant concerns related to this encounter  They are to call for any temperature 100 4 or higher, new symptoms including but not restricted to shaking chills, decreased appetite, nausea, vomiting, diarrhea, increased fatigue, shortness of breath or chest pain, confusion, and not feeling the strength to come to the clinic   For all other listed problems and medical diagnosis in their chart - they are managed by PCP and/or other specialists, which the patient acknowledges  Thank you very much for your consultation and making us a part of this patient's care  We are continuing to follow closely with you  Please do not hesitate to reach out to me with any additional questions or concerns  AJCC 8th Edition Cancer Stage :      Cancer Staging  No matching staging information was found for the patient  Hematology/Oncology History:   · History of right-sided breast cancer, initially diagnosed in 2012, status post lumpectomy and axillary lymph node dissection, status post adjuvant radiation therapy    Patient did not receive any adjuvant endocrine therapy or chemotherapy  · July 24, 2021, patient was admitted to hospital because of shortness of breath and cough  · July 24, 2021 CT chest PE protocol showed septal thickening throughout the right lung and bronchial wall thickening due to lymphangitic spread of tumor   Indeterminate lung nodules measuring 5 millimeter in the right upper lobe, right middle lobe and 7 millimeter in the left upper lobe and left lower lobe   Large right pleural effusion   Multiple liver metastatic disease measuring up to 4 centimeter   Lytic metastatic to sternal manubrium  · July 26, 2021, ultrasound abdomen shows multiple hepatic metastatic disease   Status post thoracentesis with 1200 mL of joanne fluid removed   Cytology showed adenoma carcinoma, ER and TX positive  40-50%, HER2 negative  · July 26, 2021 biopsy of the lung liver showed metastatic breast cancer, ER and % positive, HER2 negative   CT abdomen pelvis with contrast showed extensive hepatic metastatic disease  · July 27, 2021 MRI brain showed no evidence of intracranial metastatic disease  · August 6, 2021, mammogram showed no mammographic evidence of malignancy    · August 11, 2021 bone scan showed bony metastatic disease involving left iliac crest medially and adjacent sacrum  · August 12, 2021, Zoladex was given  Letrozole started  · August 16, 2022, patient started Abemaciclib  · August 30, 2021, DC abemaciclib because of diarrhea  Start Ibrance 125 mg daily 3 weeks on 1 week off  · Nov 19, 2021, CT c/a/p showed:   1   Since July 2021, new thromboses within the intrahepatic branches of the portal vein as described above  2   Increased diffuse sclerotic osseous lesions     3   Decreased size of most of the hepatic metastases  4   Indeterminate mottled appearance of the splenic parenchyma   Attention on follow-up is advised  5   Unchanged pulmonary nodules  6   Right pleural effusion has decreased in size since July 24, 2021   The smooth interlobular septal thickening has also decreased, and this change can be attributable to the decreased size of the pleural effusion  7   Mucosal hyperenhancement of the colon   Findings may be treatment-related, and correlation with gastrointestinal symptoms is advised      BONE SCAN:   1   A few areas of increased radiotracer uptake suspicious for osseous metastasis, with findings for minimal progression    · March 15, 2022 bone scan: Stable or improving scattered foci radiotracer uptake suspicious for osseous metastases   No new osseous foci suspicious for metastasis     ·  April 1, 2022 CT scan chest abdomen pelvis:  IMPRESSION:     Findings concerning for worsening hepatic metastases as evidenced by increased size and number of lesions   Please see above discussion      Progressive left portal vein thrombosis      The majority of the pulmonary nodules are stable   There is one subpleural nodule in the right upper lobe apex posteriorly, not seen on the prior study      Stable extensive osseous metastases      Multiple tiny hypodense splenic lesions are also stable, indeterminate      Persistent small right pleural effusion with a right pleural catheter in place      June 25, 2022: 4 day admission for neutropenic fever  July 8, 2022 CT CAP: 1    Stable scattered small pulmonary nodules bilaterally  Small right pleural effusion, slightly decreased  Improving hepatic metastasis  Less conspicuous small splenic hypodense lesions  Stable findings for widespread osseous metastasis  NM Bone Scan without significant changes (A lesion in the left lobe of the liver laterally measures 2 0 x 1 9 cm, previously 5 1 x 3 8 cm- 56 2% reduction   Somewhat stellate lesion in the right lobe of the liver  posteriorly now measures 1 8 x 1 2 cm image 33 series 2, previously 2 6 x 2 6 cm (42 3%)  July 11, 2022, NM bone scan stated no areas of new or worsening scintigraphic activity suggesting osseous disease progression    History of Present Illiness:   Gaby Rider is a 49 y o  female with the above-noted HemOnc history who is here to follow up regarding breast cancer history  Patient has metastatic breast cancer with bony metastatic disease  Zoladex and letrozole started on August 11-12, 2021  Abmaciclib was changed to [place] [employer] because of significant watery diarrhea  Patient did [place]rop[place] from [place]  Ibrance dose was decreased to 100 mg  Restaging CT scan and bone scan showed disease progression     Patient had the pleural catheter in place for malignant pleural effusion  Patient takes Zometa for bone health  Interval events:  Patient states she is tolerating treatment without significant complaints  Does have bilateral migraines intermittently which is relieved by ibuprofen  Also has diffuse muscle aches after treatments which resolved as days pass  Otherwise, no new headache, vision change, chest pain, shortness of breath, abdominal pain, nausea/vomiting/diarrhea  Does have shortness of breath with exertion immediately following treatment which resolves after a few days  No bleeding anywhere  No frequent infections  Taking Neulasta without any complaints    She did inform me that her catheter is draining less from pleural effusion  250 cc on last draining  No fever or chills  No exertional chest pain, diaphoresis or shortness  of breath currently  Does have SOB with exertion intermittently as above  No cough and phlegm, no hemoptysis  Patient denied nausea  and vomiting  No abdominal pain  No diarrhea or constipation  No  symptoms  No headache or blurred vision currently  + migraines intermittently  No visual changes or new headaches  No seizure activity  Appetite good  No significant weight loss or weight gain  The patient denies bleeding anywhere  ROS: A 12-point of review of systems is obtained and other than the above is noncontributory  Objective:   VITALS:   [facility] 05/26/2021     Physical EXAM:  General:  Alert, cooperative, no distress, appears stated age  Head:  Normocephalic, without obvious abnormality, atraumatic  Eyes:  Conjunctivae/corneas clear  PERRL, EOMs intact  No evidence of conjunctivitis     Throat: Lips, mucosa, and tongue normal   No bleeding from mouth  Neck: Supple, symmetrical, trachea midline    Lungs:   Respiratory effort easy, nonlabored    Heart:  Regular rate and rhythm, S1, S2 normal    Abdomen:   Soft, non-tender,nondistended  No masses,  No organomegaly  Extremities:  Lymphatics: Extremities normal, atraumatic, no cyanosis or edema  No cervical, axillary or inguinal adenopathy   Skin: Skin color, texture, turgor normal  No rashes  Neurologic: A&Ox4   No focal neuro deficits       Allergies   Allergen Reactions    Codeine Anaphylaxis    Morphine GI Intolerance, Itching and Vomiting    Sulfa Antibiotics Hives and Itching       Past Medical History:   Diagnosis Date    Cancer [facility] [address] [facility]     History of radiation exposure     Malignant neoplasm of right female breast ([facility] [address] ) 2012    right       Past Surgical History:   Procedure Laterality Date    BREAST CYST EXCISION      BREAST LUMPECTOMY Right 2012    HIP SURGERY      IR BIOPSY LIVER MASS  7/26/2021    IR PLEURAL EFFUSION LONG-TERM CATHETER PLACEMENT  8/17/2021    IR PLEURAL EFFUSION LONG-TERM CATHETER REMOVAL  5/11/2022    IR PORT PLACEMENT  7/27/2022    IR THORACENTESIS  7/26/2021       Family History   Problem Relation Age of Onset    Breast cancer Mother         in her 57's    Breast cancer Sister         inher 45s and 48    Colon cancer Maternal Grandmother     No Known Problems Paternal Grandmother     Breast cancer Other     No Known Problems Paternal Aunt        Social History     Socioeconomic History    Marital status: /Civil Union     Spouse name: Not on file    Number of children: Not on file    Years of education: Not on file    Highest education level: Not on file   Occupational History    Not on file   Tobacco Use    Smoking status: Former Smoker     Packs/day: 0 25     Types: Cigarettes    Smokeless tobacco: Never Used   Vaping Use    Vaping Use: Never used   Substance and Sexual Activity    Alcohol use: Not Currently    Drug use: Not Currently    Sexual activity: Not Currently   Other Topics Concern    Not on file   Social History Narrative    Not on file     Social Determinants of Health     Financial Resource Strain: Not on file   Food Insecurity: No Food Insecurity    Worried About [address] [employer] in the Last Year: Never true    [facility] in the Last Year: Never true   Transportation Needs: No Transportation Needs    Lack of Transportation (Medical): No    Lack of Transportation (Non-Medical):  No   Physical Activity: Not on file   Stress: Not on file   Social Connections: Not on file   Intimate Partner Violence: Not on file   Housing Stability: Low Risk     Unable to Pay for Housing in the Last Year: No    Number of Places Lived in the Last Year: 1    Unstable Housing in the Last Year: No       Current Outpatient Medications   Medication Sig Dispense Refill    acetaminophen (TYLENOL) 325 mg tablet Take 2 tablets (650 mg total) by mouth every 6 (six) hours as needed for mild pain 100 tablet 0    al mag oxide-diphenhydramine-lidocaine viscous (MAGIC MOUTHWASH) 1:1:1 suspension Swish and spit 10 mL every 4 (four) hours as needed for mouth pain or discomfort 300 mL 0    albuterol (PROVENTIL HFA,VENTOLIN HFA) 90 mcg/act inhaler Inhale 2 puffs every 4 (four) hours as needed for wheezing 8 g 0    benzonatate (TESSALON) 200 MG capsule Take 1 capsule (200 mg total) by mouth 3 (three) times a day as needed for cough 20 capsule 0    calcium carbonate (TUMS) 500 mg chewable tablet Chew 1 tablet (500 mg total) 3 (three) times a day as needed for indigestion or heartburn 30 tablet 0    CRANIAL PROSTHESIS, RX, Apply to cranial area as needed for comfort   1 each 0    [employer] Melatonin 3 MG TAKE 2 TABLETS (6 MG TOTAL) BY MOUTH DAILY AT BEDTIME 180 tablet 1    dextromethorphan-guaiFENesin (ROBITUSSIN DM)  mg/5 mL syrup Take 10 mL by mouth every 4 (four) hours as needed for cough 236 mL 0    dicyclomine (BENTYL) 10 mg capsule Take 1 capsule (10 mg total) by mouth every 6 (six) hours as needed (abdominal cramping) 20 capsule 0    diphenhydrAMINE (BENADRYL) 50 MG tablet Take 1 tablet (50 mg total) by mouth daily at bedtime as needed for itching or sleep 30 tablet 0    diphenoxylate-atropine (LOMOTIL) 2 5-0 025 mg per tablet Take 1 tablet by mouth 4 (four) times a day as needed for diarrhea 30 tablet 0    docusate sodium (COLACE) 100 mg capsule Take 1 capsule (100 mg total) by mouth 2 (two) times a day for 10 days 20 capsule 0    DULoxetine (CYMBALTA) 30 mg delayed release capsule Take 1 capsule (30 mg total) by mouth daily 90 capsule 3    fluticasone (FLONASE) 50 mcg/act nasal spray 2 sprays into each nostril daily      HYDROmorphone (DILAUDID) 2 mg tablet Take 1-2 tablets (2-4 mg total) by mouth every 3 (three) hours as needed (moderate/severe cancer-related pain) Max Daily Amount: 32 mg 120 tablet 0    lactulose 20 g/30 mL Take 15 mL (10 g total) by mouth daily as needed (constipaton) for up to 10 days (Patient not taking: Reported on 7/5/2022) 300 mL 0    letrozole (FEMARA) 2 5 mg tablet TAKE 1 TABLET BY MOUTH EVERY DAY 90 tablet 2    Lidocaine Viscous HCl (XYLOCAINE) 2 % mucosal solution Swish and spit 10 mL 4 (four) times a day as needed for mouth pain or discomfort 200 mL 0    metoclopramide (REGLAN) 10 mg tablet Take 1 tablet (10 mg total) by mouth 4 (four) times a day 28 tablet 0    multivitamin (THERAGRAN) TABS Take 1 tablet by mouth      ondansetron (ZOFRAN) 4 mg tablet Take 1 tablet (4 mg total) by mouth every 8 (eight) hours as needed for nausea or vomiting 20 tablet 0    oxybutynin (DITROPAN-XL) 5 mg 24 hr tablet Take 1 tablet (5 mg total) by mouth daily Take 1 tablet daily 90 tablet 3    palbociclib (Ibrance) 100 MG tablet Take 1 tablet (100 mg total) by mouth daily 21 tablet 1    polyethylene glycol (MIRALAX) 17 g packet Take 17 g by mouth daily  0    potassium chloride (K-DUR,KLOR-CON) 20 mEq tablet Take 1 tablet (20 mEq total) by mouth 2 (two) times a day for 5 days 10 tablet 0    senna (SENOKOT) 8 6 MG tablet Take 1 tablet (8 6 mg total) by mouth daily at bedtime as needed for constipation 30 tablet 0    Xarelto 20 MG tablet TAKE 1 TABLET BY MOUTH DAILY WITH BREAKFAST 30 tablet 2     No current facility-administered medications for this visit  (Not in a hospital admission)      DATA REVIEW:    Pathology Result:    Final Diagnosis   Date Value Ref Range Status   07/26/2021   Final    A  B  Thoracentesis, Right (ThinPrep and cell block preparations):  Positive for malignancy  Metastatic carcinoma, compatible with breast primary  -  Immunohistochemical stains performed with appropriate controls show the tumor cells to be positive for Matthew-EP4, MOC31, BARRY-3 and ER with scattered background mesothelial cells staining for WT1 and calretinin, supporting the diagnosis  Satisfactory for evaluation  07/26/2021   Final    A   Liver (core biopsy):     - Poorly-differentiated carcinoma, most compatible with metastasis from the patient's reported breast primary  Comment:  ER / VA / Her-2 pending  Comment: This is an appended report  These results have been appended to a previously preliminary verified report  Image Results: They are reviewed and documented in Hematology/Oncology history    IR port placement  Narrative: Chest port placement 7/27/2022 1:12 PM    History: Metastatic breast carcinoma    Contrast: None    Fluoroscopy time:  56    Number of images: 2    Radiation dose: 2 mGy     Conscious sedation time: 30 min    Procedure: The patient was identified verbally and by wristband  Timeout was performed  Informed consent was obtained  All elements of maximal sterile barrier technique were followed (cap, mask, sterile gown, sterile gloves, large sterile sheet, hand hygiene and 2% chlorhexidine for cutaneous antisepsis)  Following obtaining informed consent, the patient was prepped and draped in the usual sterile fashion  Using ultrasound guidance, access was gained to the patient's right internal jugular vein using a micropuncture system  The micropuncture wire was   removed and a  035 wire was advanced to the level of the inferior vena cava using fluoroscopic guidance  Using 1% lidocaine, the region of the right anterior chest was was anesthetized  A small incision was made using a 15 blade scalpel  The port pocket was created using blunt dissection  The catheter tubing was tunneled from the incision to the venotomy  The micropuncture dilator was exchanged for a peel-away sheath, using fluoroscopic guidance  The catheter was placed through the peel-away sheath  The catheter was measured and cut to   size  The catheter was attached to the port  The port was flushed with saline to ensure patency without evidence of leakage  The port was placed in the port pocket   The port was sutured in the pocket using 3-0 Vicryl  The incisions were approximated with 3-0 Vicryl and tissue adhesive  The patient tolerated the procedure well without apparent immediate complications  The patient left the IR department in unchanged condition  Dr Agnieszka Fernandez performed and directly supervised the entire procedure  Findings:     Using ultrasound guidance, the right internal jugular vein was cannulated using Seldinger technique  The right internal jugular vein was evaluated as a potential access site  The right internal jugular vein was patent, and free of thrombus  Static   images of the vessel was obtained  Visualization of real time needle entry into the vessel was obtained  Fluoroscopic spot image demonstrates a newly placed single lumen chest port via the right internal jugular vein with the most central aspect at the SVC/RA junction  The catheter tubing is smooth in contour  Impression: Impression:      Successful placement of a single lumen chest port via the right internal jugular vein  Workstation performed: [id number]        LABS:  Lab data are reviewed and documented in HemOnc history       Recent Results (from the past 48 hour(s))   CBC and differential    Collection Time: 08/16/22 11:11 AM   Result Value Ref Range    WBC 2 61 (L) 4 31 - 10 16 Thousand/uL    RBC 3 97 3 81 - 5 12 Million/uL    Hemoglobin 12 2 11 5 - 15 4 g/dL    Hematocrit 37 1 34 8 - 46 1 %    MCV 94 82 - 98 fL    MCH 30 7 26 8 - 34 3 pg    MCHC 32 9 31 4 - 37 4 g/dL    RDW 17 7 (H) 11 6 - 15 1 %    MPV 11 8 8 9 - 12 7 fL    Platelets 045 001 - 198 Thousands/uL    nRBC 0 /100 WBCs    Neutrophils Relative 85 (H) 43 - 75 %    Immat GRANS % 1 0 - 2 %    Lymphocytes Relative 10 (L) 14 - 44 %    Monocytes Relative 3 (L) 4 - 12 %    Eosinophils Relative 0 0 - 6 %    Basophils Relative 1 0 - 1 %    Neutrophils Absolute 2 23 1 85 - 7 62 Thousands/µL    Immature Grans Absolute 0 02 0 00 - 0 20 Thousand/uL    Lymphocytes Absolute 0 25 (L) 0 60 - 4 47 Thousands/µL    Monocytes Absolute 0 09 (L) 0 17 - 1 22 Thousand/µL    Eosinophils Absolute 0 00 0 00 - 0 61 Thousand/µL    Basophils Absolute 0 02 0 00 - 0 10 Thousands/µL             Zachariah Camacho PA-C  8/16/2022, 8:23 PM

## 2022-08-18 ENCOUNTER — HOSPITAL ENCOUNTER (OUTPATIENT)
Dept: INFUSION CENTER | Facility: CLINIC | Age: 57
Discharge: HOME | DRG: 660 | End: 2022-08-18
Payer: COMMERCIAL

## 2022-08-18 VITALS
HEART RATE: 85 BPM | BODY MASS INDEX: 22.66 KG/M2 | SYSTOLIC BLOOD PRESSURE: 105 MMHG | DIASTOLIC BLOOD PRESSURE: 64 MMHG | TEMPERATURE: 97.8 F | WEIGHT: 136 LBS | HEIGHT: 65 IN | RESPIRATION RATE: 16 BRPM

## 2022-08-18 DIAGNOSIS — J91.0 MALIGNANT PLEURAL EFFUSION: ICD-10-CM

## 2022-08-18 DIAGNOSIS — C50.911 PRIMARY MALIGNANT NEOPLASM OF RIGHT BREAST WITH METASTASIS TO OTHER SITE (HCC): ICD-10-CM

## 2022-08-18 DIAGNOSIS — R39.81 FUNCTIONAL URINARY INCONTINENCE: ICD-10-CM

## 2022-08-18 DIAGNOSIS — D70.1 CHEMOTHERAPY INDUCED NEUTROPENIA (HCC): ICD-10-CM

## 2022-08-18 DIAGNOSIS — T45.1X5A CHEMOTHERAPY INDUCED NEUTROPENIA (HCC): ICD-10-CM

## 2022-08-18 DIAGNOSIS — C79.51 BONE METASTASES (HCC): Primary | ICD-10-CM

## 2022-08-18 PROCEDURE — 96409 CHEMO IV PUSH SNGL DRUG: CPT

## 2022-08-18 PROCEDURE — 96367 TX/PROPH/DG ADDL SEQ IV INF: CPT

## 2022-08-18 RX ORDER — OXYBUTYNIN CHLORIDE 5 MG/1
5 TABLET, EXTENDED RELEASE ORAL DAILY
Qty: 90 TABLET | Refills: 3 | Status: SHIPPED | OUTPATIENT
Start: 2022-08-18

## 2022-08-18 RX ORDER — SODIUM CHLORIDE 9 MG/ML
20 INJECTION, SOLUTION INTRAVENOUS ONCE
Status: COMPLETED | OUTPATIENT
Start: 2022-08-18 | End: 2022-08-18

## 2022-08-18 RX ADMIN — ERIBULIN MESYLATE 2.4 MG: 0.5 INJECTION INTRAVENOUS at 13:50

## 2022-08-18 RX ADMIN — SODIUM CHLORIDE 20 ML/HR: 0.9 INJECTION, SOLUTION INTRAVENOUS at 13:02

## 2022-08-18 RX ADMIN — DEXAMETHASONE SODIUM PHOSPHATE 10 MG: 10 INJECTION, SOLUTION INTRAMUSCULAR; INTRAVENOUS at 13:02

## 2022-08-18 NOTE — PROGRESS NOTES
Pt to clinic for halaven  Pt offers no complaints today  Tolerated infusions without complications  Pt aware of next appointment  Pt port accessed, flushed, and de-accessed with positive blood returned  AVS was offered, pt declined  Pt ambulated out of clinic safely

## 2022-08-19 ENCOUNTER — HOSPITAL ENCOUNTER (OUTPATIENT)
Dept: INFUSION CENTER | Facility: CLINIC | Age: 57
Discharge: HOME | DRG: 660 | End: 2022-08-19
Payer: COMMERCIAL

## 2022-08-19 DIAGNOSIS — T45.1X5A CHEMOTHERAPY INDUCED NEUTROPENIA (HCC): ICD-10-CM

## 2022-08-19 DIAGNOSIS — C50.911 PRIMARY MALIGNANT NEOPLASM OF RIGHT BREAST WITH METASTASIS TO OTHER SITE (HCC): ICD-10-CM

## 2022-08-19 DIAGNOSIS — D70.1 CHEMOTHERAPY INDUCED NEUTROPENIA (HCC): ICD-10-CM

## 2022-08-19 DIAGNOSIS — C79.51 BONE METASTASES (HCC): Primary | ICD-10-CM

## 2022-08-19 DIAGNOSIS — J91.0 MALIGNANT PLEURAL EFFUSION: ICD-10-CM

## 2022-08-19 PROCEDURE — 96372 THER/PROPH/DIAG INJ SC/IM: CPT

## 2022-08-19 RX ADMIN — PEGFILGRASTIM-BMEZ 6 MG: 6 INJECTION SUBCUTANEOUS at 15:08

## 2022-08-19 NOTE — PROGRESS NOTES
Pt to clinic for ziextenzo  Pt offers no complaints today  Tolerated sq injection without complications  Pt aware of next appointment  AVS declined  Pt ambulated out of clinic safely

## 2022-08-20 ENCOUNTER — TELEPHONE (OUTPATIENT)
Dept: OTHER | Facility: OTHER | Age: 57
End: 2022-08-20

## 2022-08-20 ENCOUNTER — HOSPITAL ENCOUNTER (INPATIENT)
Facility: HOSPITAL | Age: 57
LOS: 3 days | Discharge: HOME | DRG: 660 | End: 2022-08-23
Attending: EMERGENCY MEDICINE | Admitting: FAMILY MEDICINE
Payer: COMMERCIAL

## 2022-08-20 ENCOUNTER — APPOINTMENT (OUTPATIENT)
Dept: RADIOLOGY | Facility: HOSPITAL | Age: 57
DRG: 660 | End: 2022-08-20
Payer: COMMERCIAL

## 2022-08-20 DIAGNOSIS — D70.9 NEUTROPENIA (HCC): ICD-10-CM

## 2022-08-20 DIAGNOSIS — C50.911 PRIMARY MALIGNANT NEOPLASM OF RIGHT BREAST WITH METASTASIS TO OTHER SITE (HCC): ICD-10-CM

## 2022-08-20 DIAGNOSIS — L03.115 CELLULITIS OF RIGHT FOOT: Primary | ICD-10-CM

## 2022-08-20 PROBLEM — A41.9 SEPSIS (HCC): Status: ACTIVE | Noted: 2022-08-20

## 2022-08-20 PROBLEM — R50.9 FEVER: Status: ACTIVE | Noted: 2022-06-25

## 2022-08-20 LAB
ALBUMIN SERPL BCP-MCNC: 3.2 G/DL (ref 3.5–5)
ALP SERPL-CCNC: 59 U/L (ref 46–116)
ALT SERPL W P-5'-P-CCNC: 27 U/L (ref 12–78)
ANION GAP SERPL CALCULATED.3IONS-SCNC: 10 MMOL/L (ref 4–13)
APTT PPP: 29 SECONDS (ref 23–37)
AST SERPL W P-5'-P-CCNC: 36 U/L (ref 5–45)
BASOPHILS # BLD MANUAL: 0.01 THOUSAND/UL (ref 0–0.1)
BASOPHILS NFR MAR MANUAL: 2 % (ref 0–1)
BILIRUB SERPL-MCNC: 0.71 MG/DL (ref 0.2–1)
BUN SERPL-MCNC: 13 MG/DL (ref 5–25)
CALCIUM ALBUM COR SERPL-MCNC: 9.4 MG/DL (ref 8.3–10.1)
CALCIUM SERPL-MCNC: 8.8 MG/DL (ref 8.3–10.1)
CHLORIDE SERPL-SCNC: 104 MMOL/L (ref 96–108)
CO2 SERPL-SCNC: 23 MMOL/L (ref 21–32)
CREAT SERPL-MCNC: 0.65 MG/DL (ref 0.6–1.3)
EOSINOPHIL # BLD MANUAL: 0 THOUSAND/UL (ref 0–0.4)
EOSINOPHIL NFR BLD MANUAL: 0 % (ref 0–6)
ERYTHROCYTE [DISTWIDTH] IN BLOOD BY AUTOMATED COUNT: 17.9 % (ref 11.6–15.1)
FLUAV RNA RESP QL NAA+PROBE: NEGATIVE
FLUBV RNA RESP QL NAA+PROBE: NEGATIVE
GFR SERPL CREATININE-BSD FRML MDRD: 100 ML/MIN/1.73SQ M
GLUCOSE SERPL-MCNC: 129 MG/DL (ref 65–140)
HCT VFR BLD AUTO: 34.5 % (ref 34.8–46.1)
HGB BLD-MCNC: 11.5 G/DL (ref 11.5–15.4)
INR PPP: 1.34 (ref 0.84–1.19)
LACTATE SERPL-SCNC: 1.2 MMOL/L (ref 0.5–2)
LYMPHOCYTES # BLD AUTO: 0.19 THOUSAND/UL (ref 0.6–4.47)
LYMPHOCYTES # BLD AUTO: 35 % (ref 14–44)
MCH RBC QN AUTO: 31.1 PG (ref 26.8–34.3)
MCHC RBC AUTO-ENTMCNC: 33.3 G/DL (ref 31.4–37.4)
MCV RBC AUTO: 93 FL (ref 82–98)
MONOCYTES # BLD AUTO: 0.03 THOUSAND/UL (ref 0–1.22)
MONOCYTES NFR BLD: 5 % (ref 4–12)
NEUTROPHILS # BLD MANUAL: 0.3 THOUSAND/UL (ref 1.85–7.62)
NEUTS BAND NFR BLD MANUAL: 13 % (ref 0–8)
NEUTS SEG NFR BLD AUTO: 42 % (ref 43–75)
PLATELET # BLD AUTO: 114 THOUSANDS/UL (ref 149–390)
PLATELET BLD QL SMEAR: ABNORMAL
PMV BLD AUTO: 11.5 FL (ref 8.9–12.7)
POTASSIUM SERPL-SCNC: 2.9 MMOL/L (ref 3.5–5.3)
PROCALCITONIN SERPL-MCNC: 0.27 NG/ML
PROT SERPL-MCNC: 6.3 G/DL (ref 6.4–8.4)
PROTHROMBIN TIME: 16.3 SECONDS (ref 11.6–14.5)
RBC # BLD AUTO: 3.7 MILLION/UL (ref 3.81–5.12)
RSV RNA RESP QL NAA+PROBE: NEGATIVE
S PYO DNA THROAT QL NAA+PROBE: NOT DETECTED
SARS-COV-2 RNA RESP QL NAA+PROBE: NEGATIVE
SMUDGE CELLS BLD QL SMEAR: PRESENT
SODIUM SERPL-SCNC: 137 MMOL/L (ref 135–147)
VARIANT LYMPHS # BLD AUTO: 3 %
WBC # BLD AUTO: 0.54 THOUSAND/UL (ref 4.31–10.16)

## 2022-08-20 PROCEDURE — 80053 COMPREHEN METABOLIC PANEL: CPT | Performed by: EMERGENCY MEDICINE

## 2022-08-20 PROCEDURE — 83605 ASSAY OF LACTIC ACID: CPT | Performed by: EMERGENCY MEDICINE

## 2022-08-20 PROCEDURE — 85730 THROMBOPLASTIN TIME PARTIAL: CPT | Performed by: EMERGENCY MEDICINE

## 2022-08-20 PROCEDURE — 84145 PROCALCITONIN (PCT): CPT | Performed by: EMERGENCY MEDICINE

## 2022-08-20 PROCEDURE — 36415 COLL VENOUS BLD VENIPUNCTURE: CPT | Performed by: EMERGENCY MEDICINE

## 2022-08-20 PROCEDURE — 0241U HB NFCT DS VIR RESP RNA 4 TRGT: CPT | Performed by: EMERGENCY MEDICINE

## 2022-08-20 PROCEDURE — 96365 THER/PROPH/DIAG IV INF INIT: CPT

## 2022-08-20 PROCEDURE — 85610 PROTHROMBIN TIME: CPT | Performed by: EMERGENCY MEDICINE

## 2022-08-20 PROCEDURE — 85007 BL SMEAR W/DIFF WBC COUNT: CPT | Performed by: EMERGENCY MEDICINE

## 2022-08-20 PROCEDURE — 85027 COMPLETE CBC AUTOMATED: CPT | Performed by: EMERGENCY MEDICINE

## 2022-08-20 PROCEDURE — 99284 EMERGENCY DEPT VISIT MOD MDM: CPT

## 2022-08-20 PROCEDURE — 99285 EMERGENCY DEPT VISIT HI MDM: CPT | Performed by: EMERGENCY MEDICINE

## 2022-08-20 PROCEDURE — 87651 STREP A DNA AMP PROBE: CPT | Performed by: EMERGENCY MEDICINE

## 2022-08-20 PROCEDURE — 71046 X-RAY EXAM CHEST 2 VIEWS: CPT

## 2022-08-20 PROCEDURE — 93005 ELECTROCARDIOGRAM TRACING: CPT

## 2022-08-20 PROCEDURE — 99223 1ST HOSP IP/OBS HIGH 75: CPT

## 2022-08-20 PROCEDURE — 87040 BLOOD CULTURE FOR BACTERIA: CPT | Performed by: EMERGENCY MEDICINE

## 2022-08-20 RX ORDER — HYDROMORPHONE HYDROCHLORIDE 2 MG/1
2 TABLET ORAL
Status: DISCONTINUED | OUTPATIENT
Start: 2022-08-20 | End: 2022-08-23 | Stop reason: HOSPADM

## 2022-08-20 RX ORDER — CEFEPIME HYDROCHLORIDE 2 G/50ML
2000 INJECTION, SOLUTION INTRAVENOUS EVERY 12 HOURS
Status: DISCONTINUED | OUTPATIENT
Start: 2022-08-21 | End: 2022-08-22

## 2022-08-20 RX ORDER — VANCOMYCIN HYDROCHLORIDE 1 G/200ML
17.5 INJECTION, SOLUTION INTRAVENOUS EVERY 12 HOURS
Status: DISCONTINUED | OUTPATIENT
Start: 2022-08-20 | End: 2022-08-22

## 2022-08-20 RX ORDER — ACETAMINOPHEN 325 MG/1
650 TABLET ORAL EVERY 6 HOURS PRN
Status: DISCONTINUED | OUTPATIENT
Start: 2022-08-20 | End: 2022-08-23 | Stop reason: HOSPADM

## 2022-08-20 RX ORDER — DULOXETIN HYDROCHLORIDE 30 MG/1
30 CAPSULE, DELAYED RELEASE ORAL DAILY
Status: DISCONTINUED | OUTPATIENT
Start: 2022-08-21 | End: 2022-08-23 | Stop reason: HOSPADM

## 2022-08-20 RX ORDER — LETROZOLE 2.5 MG/1
2.5 TABLET, FILM COATED ORAL EVERY EVENING
Status: DISCONTINUED | OUTPATIENT
Start: 2022-08-20 | End: 2022-08-23 | Stop reason: HOSPADM

## 2022-08-20 RX ORDER — FLUTICASONE PROPIONATE 50 MCG
2 SPRAY, SUSPENSION (ML) NASAL DAILY
Status: DISCONTINUED | OUTPATIENT
Start: 2022-08-21 | End: 2022-08-23 | Stop reason: HOSPADM

## 2022-08-20 RX ORDER — ALBUTEROL SULFATE 90 UG/1
2 AEROSOL, METERED RESPIRATORY (INHALATION) EVERY 4 HOURS PRN
Status: DISCONTINUED | OUTPATIENT
Start: 2022-08-20 | End: 2022-08-23 | Stop reason: HOSPADM

## 2022-08-20 RX ORDER — CALCIUM CARBONATE 200(500)MG
500 TABLET,CHEWABLE ORAL 3 TIMES DAILY PRN
Status: DISCONTINUED | OUTPATIENT
Start: 2022-08-20 | End: 2022-08-23 | Stop reason: HOSPADM

## 2022-08-20 RX ORDER — OXYBUTYNIN CHLORIDE 5 MG/1
5 TABLET, EXTENDED RELEASE ORAL DAILY
Status: DISCONTINUED | OUTPATIENT
Start: 2022-08-21 | End: 2022-08-23 | Stop reason: HOSPADM

## 2022-08-20 RX ORDER — BENZONATATE 100 MG/1
200 CAPSULE ORAL 3 TIMES DAILY PRN
Status: DISCONTINUED | OUTPATIENT
Start: 2022-08-20 | End: 2022-08-23 | Stop reason: HOSPADM

## 2022-08-20 RX ORDER — DIPHENOXYLATE HYDROCHLORIDE AND ATROPINE SULFATE 2.5; .025 MG/1; MG/1
1 TABLET ORAL 4 TIMES DAILY PRN
Status: DISCONTINUED | OUTPATIENT
Start: 2022-08-20 | End: 2022-08-23 | Stop reason: HOSPADM

## 2022-08-20 RX ORDER — DICYCLOMINE HYDROCHLORIDE 10 MG/1
10 CAPSULE ORAL EVERY 6 HOURS PRN
Status: DISCONTINUED | OUTPATIENT
Start: 2022-08-20 | End: 2022-08-23 | Stop reason: HOSPADM

## 2022-08-20 RX ORDER — LANOLIN ALCOHOL/MO/W.PET/CERES
6 CREAM (GRAM) TOPICAL
Status: DISCONTINUED | OUTPATIENT
Start: 2022-08-20 | End: 2022-08-23 | Stop reason: HOSPADM

## 2022-08-20 RX ORDER — LETROZOLE 2.5 MG/1
2.5 TABLET, FILM COATED ORAL DAILY
Status: DISCONTINUED | OUTPATIENT
Start: 2022-08-21 | End: 2022-08-20

## 2022-08-20 RX ORDER — POTASSIUM CHLORIDE 20 MEQ/1
40 TABLET, EXTENDED RELEASE ORAL ONCE
Status: COMPLETED | OUTPATIENT
Start: 2022-08-20 | End: 2022-08-20

## 2022-08-20 RX ORDER — CEFTRIAXONE 1 G/50ML
1000 INJECTION, SOLUTION INTRAVENOUS ONCE
Status: COMPLETED | OUTPATIENT
Start: 2022-08-20 | End: 2022-08-20

## 2022-08-20 RX ORDER — VANCOMYCIN HYDROCHLORIDE 1 G/200ML
15 INJECTION, SOLUTION INTRAVENOUS EVERY 12 HOURS
Status: DISCONTINUED | OUTPATIENT
Start: 2022-08-20 | End: 2022-08-20

## 2022-08-20 RX ORDER — POTASSIUM CHLORIDE 14.9 MG/ML
20 INJECTION INTRAVENOUS ONCE
Status: COMPLETED | OUTPATIENT
Start: 2022-08-20 | End: 2022-08-21

## 2022-08-20 RX ORDER — DIPHENHYDRAMINE HCL 25 MG
50 TABLET ORAL
Status: DISCONTINUED | OUTPATIENT
Start: 2022-08-20 | End: 2022-08-23 | Stop reason: HOSPADM

## 2022-08-20 RX ORDER — POLYETHYLENE GLYCOL 3350 17 G/17G
17 POWDER, FOR SOLUTION ORAL DAILY
Status: DISCONTINUED | OUTPATIENT
Start: 2022-08-21 | End: 2022-08-23 | Stop reason: HOSPADM

## 2022-08-20 RX ADMIN — POTASSIUM CHLORIDE 20 MEQ: 14.9 INJECTION, SOLUTION INTRAVENOUS at 23:00

## 2022-08-20 RX ADMIN — NYSTATIN 500000 UNITS: 100000 SUSPENSION ORAL at 22:58

## 2022-08-20 RX ADMIN — LETROZOLE 2.5 MG: 2.5 TABLET, FILM COATED ORAL at 23:55

## 2022-08-20 RX ADMIN — HYDROMORPHONE HYDROCHLORIDE 2 MG: 2 TABLET ORAL at 22:58

## 2022-08-20 RX ADMIN — Medication 6 MG: at 23:00

## 2022-08-20 RX ADMIN — ACETAMINOPHEN 650 MG: 325 TABLET, FILM COATED ORAL at 23:00

## 2022-08-20 RX ADMIN — CEFTRIAXONE 1000 MG: 1 INJECTION, SOLUTION INTRAVENOUS at 21:23

## 2022-08-20 RX ADMIN — POTASSIUM CHLORIDE 40 MEQ: 1500 TABLET, EXTENDED RELEASE ORAL at 23:00

## 2022-08-20 RX ADMIN — VANCOMYCIN HYDROCHLORIDE 1000 MG: 1 INJECTION, SOLUTION INTRAVENOUS at 23:55

## 2022-08-20 RX ADMIN — RIVAROXABAN 20 MG: 20 TABLET, FILM COATED ORAL at 23:55

## 2022-08-20 NOTE — TELEPHONE ENCOUNTER
Patient has a fever of 100 and sore throat  She had chemo this week       On call provider was paged

## 2022-08-20 NOTE — ED PROVIDER NOTES
History  Chief Complaint   Patient presents with    Fever - 9 weeks to 74 years     Pt arrived ambulatory with c/o a sore throat and a fever at home of 100  Pt is a chemo pt and was told to come to the ER if she had a fever  HPI patient is a 82-year-old female with history of metastatic breast cancer, reports liver and bone  Patient presents today with reported temperature of a 100° home  Reports that she you receive chemotherapy this week and was advised to come to the hospital for fever  She reports she feels diffusely weak  She reports a small abrasion on her right foot that now has cause some redness and swelling over the dorsum of her foot  She denies any direct trauma to the area  Denies any cough or congestion  She reports primarily sore throat and pain with swallowing  Past medical history of metastatic breast cancer, has a drainage tube in her right l chest   Prior lump back to be apply are pleural effusion  Family history noncontributory  Social history former smoker, here with significant other who is fairly aggressive  Prior to Admission Medications   Prescriptions Last Dose Informant Patient Reported? Taking? CRANIAL PROSTHESIS, RX,   No No   Sig: Apply to cranial area as needed for comfort     [facility] Melatonin 3 MG   No No   Sig: TAKE 2 TABLETS (6 MG TOTAL) BY MOUTH DAILY AT BEDTIME   DULoxetine (CYMBALTA) 30 mg delayed release capsule  Self No No   Sig: Take 1 capsule (30 mg total) by mouth daily   HYDROmorphone (DILAUDID) 2 mg tablet   No No   Sig: Take 1-2 tablets (2-4 mg total) by mouth every 3 (three) hours as needed (moderate/severe cancer-related pain) Max Daily Amount: 32 mg   Lidocaine Viscous HCl (XYLOCAINE) 2 % mucosal solution   No No   Sig: Swish and spit 10 mL 4 (four) times a day as needed for mouth pain or discomfort   Xarelto 20 MG tablet   No No   Sig: TAKE 1 TABLET BY MOUTH DAILY WITH BREAKFAST   acetaminophen (TYLENOL) 325 mg tablet   No No   Sig: Take 2 tablets (650 mg total) by mouth every 6 (six) hours as needed for mild pain   al mag oxide-diphenhydramine-lidocaine viscous (MAGIC MOUTHWASH) 1:1:1 suspension   No No   Sig: Swish and spit 10 mL every 4 (four) hours as needed for mouth pain or discomfort   albuterol (PROVENTIL HFA,VENTOLIN HFA) 90 mcg/act inhaler  Self No No   Sig: Inhale 2 puffs every 4 (four) hours as needed for wheezing   benzonatate (TESSALON) 200 MG capsule  Self No No   Sig: Take 1 capsule (200 mg total) by mouth 3 (three) times a day as needed for cough   calcium carbonate (TUMS) 500 mg chewable tablet  Self No No   Sig: Chew 1 tablet (500 mg total) 3 (three) times a day as needed for indigestion or heartburn   dextromethorphan-guaiFENesin (ROBITUSSIN DM)  mg/5 mL syrup  Self No No   Sig: Take 10 mL by mouth every 4 (four) hours as needed for cough   dicyclomine (BENTYL) 10 mg capsule   No No   Sig: Take 1 capsule (10 mg total) by mouth every 6 (six) hours as needed (abdominal cramping)   diphenhydrAMINE (BENADRYL) 50 MG tablet   No No   Sig: Take 1 tablet (50 mg total) by mouth daily at bedtime as needed for itching or sleep   diphenoxylate-atropine (LOMOTIL) 2 5-0 025 mg per tablet  Self No No   Sig: Take 1 tablet by mouth 4 (four) times a day as needed for diarrhea   docusate sodium (COLACE) 100 mg capsule   No No   Sig: Take 1 capsule (100 mg total) by mouth 2 (two) times a day for 10 days   fluticasone (FLONASE) 50 mcg/act nasal spray  Self Yes No   Si sprays into each nostril daily   lactulose 20 g/30 mL   No No   Sig: Take 15 mL (10 g total) by mouth daily as needed (constipaton) for up to 10 days   Patient not taking: Reported on 2022   letrozole (FEMARA) 2 5 mg tablet   No No   Sig: TAKE 1 TABLET BY MOUTH EVERY DAY   metoclopramide (REGLAN) 10 mg tablet  Self No No   Sig: Take 1 tablet (10 mg total) by mouth 4 (four) times a day   multivitamin (THERAGRAN) TABS  Self Yes No   Sig: Take 1 tablet by mouth   ondansetron (ZOFRAN) 4 mg tablet  Self No No   Sig: Take 1 tablet (4 mg total) by mouth every 8 (eight) hours as needed for nausea or vomiting   oxybutynin (DITROPAN-XL) 5 mg 24 hr tablet   No No   Sig: Take 1 tablet (5 mg total) by mouth daily Take 1 tablet daily   palbociclib (Ibrance) 100 MG tablet   No No   Sig: Take 1 tablet (100 mg total) by mouth daily   polyethylene glycol (MIRALAX) 17 g packet   No No   Sig: Take 17 g by mouth daily   potassium chloride (K-DUR,KLOR-CON) 20 mEq tablet   No No   Sig: Take 1 tablet (20 mEq total) by mouth 2 (two) times a day for 5 days   senna (SENOKOT) 8 6 MG tablet   No No   Sig: Take 1 tablet (8 6 mg total) by mouth daily at bedtime as needed for constipation      Facility-Administered Medications: None       Past Medical History:   Diagnosis Date    Cancer ([address] )     History of radiation exposure     Malignant neoplasm of right female breast ([address] ) 2012    right       Past Surgical History:   Procedure Laterality Date    BREAST CYST EXCISION      BREAST LUMPECTOMY Right 2012    HIP SURGERY      IR BIOPSY LIVER MASS  7/26/2021    IR PLEURAL EFFUSION LONG-TERM CATHETER PLACEMENT  8/17/2021    IR PLEURAL EFFUSION LONG-TERM CATHETER REMOVAL  5/11/2022    IR PORT PLACEMENT  7/27/2022    IR THORACENTESIS  7/26/2021       Family History   Problem Relation Age of Onset    Breast cancer Mother         in her 63's    Breast cancer Sister         inher 45s and 48    Colon cancer Maternal Grandmother     No Known Problems Paternal Grandmother     Breast cancer Other     No Known Problems Paternal Aunt      I have reviewed and agree with the history as documented      E-Cigarette/Vaping    E-Cigarette Use Never User      E-Cigarette/Vaping Substances    Nicotine No     THC No     CBD No     Flavoring No      Social History     Tobacco Use    Smoking status: Former Smoker     Packs/day: 0 25     Types: Cigarettes    Smokeless tobacco: Never Used   Vaping Use    Vaping Use: Never used Substance Use Topics    Alcohol use: Not Currently    Drug use: Not Currently       Review of Systems   Constitutional: Positive for chills and fever  Negative for diaphoresis and fatigue  HENT: Negative for congestion, ear pain, nosebleeds and sore throat  Eyes: Negative for photophobia, pain, discharge and visual disturbance  Respiratory: Negative for cough, choking, chest tightness, shortness of breath and wheezing  Cardiovascular: Negative for chest pain and palpitations  Gastrointestinal: Negative for abdominal distention, abdominal pain, diarrhea and vomiting  Genitourinary: Negative for dysuria, flank pain and frequency  Musculoskeletal: Negative for back pain, gait problem and joint swelling  Skin: Negative for color change and rash  Neurological: Positive for weakness  Negative for dizziness, syncope and headaches  Psychiatric/Behavioral: Negative for behavioral problems and confusion  The patient is not nervous/anxious  All other systems reviewed and are negative  Physical Exam  Physical Exam  Vitals and nursing note reviewed  Constitutional:       Appearance: She is well-developed  HENT:      Head: Normocephalic  Right Ear: External ear normal       Left Ear: External ear normal       Nose: Nose normal    Eyes:      General: Lids are normal       Pupils: Pupils are equal, round, and reactive to light  Cardiovascular:      Rate and Rhythm: Normal rate and regular rhythm  Pulses: Normal pulses  Heart sounds: Normal heart sounds  Pulmonary:      Effort: Pulmonary effort is normal  No respiratory distress  Breath sounds: Normal breath sounds  Abdominal:      General: Abdomen is flat  Bowel sounds are normal       Tenderness: There is no abdominal tenderness  Musculoskeletal:         General: No deformity  Normal range of motion  Cervical back: Normal range of motion and neck supple  Skin:     General: Skin is warm and dry        Comments: Patient has some scattered scratches over the dorsum of her left foot with some erythema of the dorsum consistent with a localized cellulitis  Good distal pulses and sensation neurovascular tendon intact  Neurological:      General: No focal deficit present  Mental Status: She is alert and oriented to person, place, and time     Psychiatric:         Mood and Affect: Mood normal          Behavior: Behavior normal          Vital Signs  ED Triage Vitals [08/20/22 1941]   Temperature Pulse Respirations Blood Pressure SpO2   99 2 °F (37 3 °C) (!) 112 17 135/80 98 %      Temp Source Heart Rate Source Patient Position - Orthostatic VS BP Location FiO2 (%)   Oral Monitor Sitting Left arm --      Pain Score       --           Vitals:    08/20/22 1941 08/20/22 2018 08/20/22 2030 08/20/22 2130   BP: 135/80 122/74 114/72 112/67   Pulse: (!) 112 99 99 102   Patient Position - Orthostatic VS: Sitting  Lying Lying         Visual Acuity      ED Medications  Medications   cefTRIAXone (ROCEPHIN) IVPB (premix in dextrose) 1,000 mg 50 mL (0 mg Intravenous Stopped 8/20/22 2201)       Diagnostic Studies  Results Reviewed     Procedure Component Value Units Date/Time    CBC and differential [[id number]]  (Abnormal) Collected: 08/20/22 2037    Lab Status: Final result Specimen: Blood from Central Venous Line Updated: 08/20/22 2131     WBC 0 54 Thousand/uL      RBC 3 70 Million/uL      Hemoglobin 11 5 g/dL      Hematocrit 34 5 %      MCV 93 fL      MCH 31 1 pg      MCHC 33 3 g/dL      RDW 17 9 %      MPV 11 5 fL      Platelets 891 Thousands/uL     Manual Differential(PHLEBS Do Not Order) [[id number]]  (Abnormal) Collected: 08/20/22 2037    Lab Status: Final result Specimen: Blood from Central Venous Line Updated: 08/20/22 2131     Segmented % 42 %      Bands % 13 %      Lymphocytes % 35 %      Monocytes % 5 %      Eosinophils, % 0 %      Basophils % 2 %      Atypical Lymphocytes % 3 %      Absolute Neutrophils 0 30 Thousand/uL Lymphocytes Absolute 0 19 Thousand/uL      Monocytes Absolute 0 03 Thousand/uL      Eosinophils Absolute 0 00 Thousand/uL      Basophils Absolute 0 01 Thousand/uL      Total Counted --     Smudge Cells Present     Platelet Estimate Borderline    Procalcitonin [[id number]]  (Abnormal) Collected: 08/20/22 2037    Lab Status: Final result Specimen: Blood from Central Venous Line Updated: 08/20/22 2123     Procalcitonin 0 27 ng/ml     Lactic acid [[id number]]  (Normal) Collected: 08/20/22 2037    Lab Status: Final result Specimen: Blood from Central Venous Line Updated: 08/20/22 2105     LACTIC ACID 1 2 mmol/L     Narrative:      Result may be elevated if tourniquet was used during collection  FLU/RSV/COVID - if FLU/RSV clinically relevant [[id number]]  (Normal) Collected: 08/20/22 2014    Lab Status: Final result Specimen: Nares from Nose Updated: 08/20/22 2105     SARS-CoV-2 Negative     INFLUENZA A PCR Negative     INFLUENZA B PCR Negative     RSV PCR Negative    Narrative:      FOR PEDIATRIC PATIENTS - copy/paste COVID Guidelines URL to browser: [email or web address]  [employer]    SARS-CoV-2 assay is a Nucleic Acid Amplification assay intended for the  qualitative detection of nucleic acid from SARS-CoV-2 in nasopharyngeal  swabs  Results are for the presumptive identification of SARS-CoV-2 RNA  Positive results are indicative of infection with SARS-CoV-2, the virus  causing COVID-19, but do not rule out bacterial infection or co-infection  with other viruses  Laboratories within the [place] and its  territories are required to report all positive results to the appropriate  public health authorities  Negative results do not preclude SARS-CoV-2  infection and should not be used as the sole basis for treatment or other  patient management decisions  Negative results must be combined with  clinical observations, patient history, and epidemiological information    This test has not been FDA cleared or approved  This test has been authorized by FDA under an Emergency Use Authorization  (EUA)  This test is only authorized for the duration of time the  declaration that circumstances exist justifying the authorization of the  emergency use of an in vitro diagnostic tests for detection of SARS-CoV-2  virus and/or diagnosis of COVID-19 infection under section 564(b)(1) of  the Act, 21 U  S C  990YIK-4(R)(1), unless the authorization is terminated  or revoked sooner  The test has been validated but independent review by FDA  and CLIA is pending  Test performed using [employer] Gene[employer]pert: This RT-PCR assay targets N2,  a region unique to SARS-CoV-2  A conserved region in the E-gene was chosen  for pan-Sarbecovirus detection which includes SARS-CoV-2      Comprehensive metabolic panel [[id number]]  (Abnormal) Collected: 08/20/22 2037    Lab Status: Final result Specimen: Blood from Central Venous Line Updated: 08/20/22 2103     Sodium 137 mmol/L      Potassium 2 9 mmol/L      Chloride 104 mmol/L      CO2 23 mmol/L      ANION GAP 10 mmol/L      BUN 13 mg/dL      Creatinine 0 65 mg/dL      Glucose 129 mg/dL      Calcium 8 8 mg/dL      Corrected Calcium 9 4 mg/dL      AST 36 U/L      ALT 27 U/L      Alkaline Phosphatase 59 U/L      Total Protein 6 3 g/dL      Albumin 3 2 g/dL      Total Bilirubin 0 71 mg/dL      eGFR 100 ml/min/1 73sq m     Narrative:      [place] guidelines for Chronic Kidney Disease (CKD):     Stage 1 with normal or high GFR (GFR > 90 mL/min/1 73 square meters)    Stage 2 Mild CKD (GFR = 60-89 mL/min/1 73 square meters)    Stage 3A Moderate CKD (GFR = 45-59 mL/min/1 73 square meters)    Stage 3B Moderate CKD (GFR = 30-44 mL/min/1 73 square meters)    Stage 4 Severe CKD (GFR = 15-29 mL/min/1 73 square meters)    Stage 5 End Stage CKD (GFR <15 mL/min/1 73 square meters)  Note: GFR calculation is accurate only with a steady state creatinine Protime-INR [[id number]]  (Abnormal) Collected: 08/20/22 2037    Lab Status: Final result Specimen: Blood from Central Venous Line Updated: 08/20/22 2102     Protime 16 3 seconds      INR 1 34    APTT [[id number]]  (Normal) Collected: 08/20/22 2037    Lab Status: Final result Specimen: Blood from Central Venous Line Updated: 08/20/22 2102     PTT 29 seconds     Strep A PCR [[id number]]  (Normal) Collected: 08/20/22 2014    Lab Status: Final result Specimen: Throat Updated: 08/20/22 2050     STREP A PCR Not Detected    Blood culture #2 [[id number]] Collected: 08/20/22 2037    Lab Status: In process Specimen: Blood from Central Venous Line Updated: 08/20/22 2043    Blood culture #1 [[id number]] Collected: 08/20/22 2037    Lab Status: In process Specimen: Blood from Central Venous Line Updated: 08/20/22 2043    UA w Reflex to Microscopic w Reflex to Culture [[id number]]     Lab Status: No result Specimen: Urine                  XR chest 2 views    (Results Pending)              Procedures  ECG 12 Lead Documentation Only    Date/Time: 8/20/2022 8:22 PM  Performed by: Carolin Najera MD  Authorized by: Carolin Najera MD     Indications / Diagnosis:  Weakness, fever  ECG reviewed by me, the ED Provider: yes    Patient location:  ED  Previous ECG:     Previous ECG:  Compared to current    Comparison ECG info:  June 25th 2022    Similarity:  No change  Interpretation:     Interpretation: non-specific    Rate:     ECG rate:  Ninety-eight    ECG rate assessment: normal    Rhythm:     Rhythm: sinus rhythm    Comments:      Normal sinus rhythm no acute ST-T wave changes no change from prior EKG             ED Course            diagnostic testing:  White count was 0 54 consistent with recent chemotherapy, patient apparently did recently receive Neupogen  Hemoglobin was limb 0 5 no sign of anemia  Lactate was 1 2 no sign of severe sepsis  Procalcitonin was just elevated 0 27  COVID testing was negative    Electrolytes showed some hypokalemia at potassium at 2 9  Otherwise electrolytes within normal limits  Initial Sepsis Screening     Row Name 08/20/22 7956                Is the patient's history suggestive of a new or worsening infection? Yes (Proceed)  -PF        Suspected source of infection soft tissue  -PF        Are two or more of the following signs & symptoms of infection both present and new to the patient? Yes (Proceed)  -PF        Indicate SIRS criteria Tachycardia > 90 bpm;Leukopenia (WBC < 4000 IJL)  -PF        If the answer is yes to both questions, suspicion of sepsis is present --        If severe sepsis is present AND tissue hypoperfusion perists in the hour after fluid resuscitation or lactate > 4, the patient meets criteria for SEPTIC SHOCK --        Are any of the following organ dysfunction criteria present within 6 hours of suspected infection and SIRS criteria that are NOT considered to be chronic conditions? No  -PF        Organ dysfunction --        Date of presentation of severe sepsis --        Time of presentation of severe sepsis --        Tissue hypoperfusion persists in the hour after crystalloid fluid administration, evidenced, by either: --        Was hypotension present within one hour of the conclusion of crystalloid fluid administration? --        Date of presentation of septic shock --        Time of presentation of septic shock --              User Key  (r) = Recorded By, (t) = Taken By, (c) = Cosigned By    [address] Name Provider Type    PF Trisha Degroot MD Physician                              MDM medical decision making 42-year-old female presents emergency department reports fever at home reports weakness and sore throat  Patient does have some erythema of right foot with some abrasions in that area consistent with localized cellulitis  Patient neutropenic from a chemotherapy  Discussed with hospitalist service will admit for IV antibiotics    No sign of severe sepsis at this time normal lactate  Discussed with patient advised admission she agrees  Disposition   Final diagnoses:   Cellulitis of right foot   Neutropenia ([address] )     Time reflects when diagnosis was documented in both MDM as applicable and the Disposition within this note     Time User Action Codes Description Comment    8/20/2022  7:54 PM Sheree Babcock Add [N29 564] Cellulitis of right foot     8/20/2022  9:43 PM Sheree Babcock Add [D70 9] Neutropenia [facility])       ED Disposition     ED Disposition   Admit    Condition   Stable    Date/Time   Sat Aug 20, 2022 10:00 PM    Comment   Case was discussed with the hospitalist service and the patient's admission status was agreed to be inpatient status the service of Dr Epifanio Pulliam         Follow-up Information    None         Patient's Medications   Discharge Prescriptions    No medications on file       No discharge procedures on file      PDMP Review       Value Time User    PDMP Reviewed  Yes 8/8/2022 12:23 PM Rosa García MD          ED Provider  Electronically Signed by           Eulalia Ho MD  08/20/22 001 [id number]

## 2022-08-21 LAB
ANION GAP SERPL CALCULATED.3IONS-SCNC: 10 MMOL/L (ref 4–13)
ATRIAL RATE: 98 BPM
BILIRUB UR QL STRIP: NEGATIVE
BUN SERPL-MCNC: 10 MG/DL (ref 5–25)
CALCIUM SERPL-MCNC: 8.3 MG/DL (ref 8.3–10.1)
CHLORIDE SERPL-SCNC: 107 MMOL/L (ref 96–108)
CLARITY UR: CLEAR
CO2 SERPL-SCNC: 23 MMOL/L (ref 21–32)
COLOR UR: YELLOW
CREAT SERPL-MCNC: 0.73 MG/DL (ref 0.6–1.3)
ERYTHROCYTE [DISTWIDTH] IN BLOOD BY AUTOMATED COUNT: 17.8 % (ref 11.6–15.1)
GFR SERPL CREATININE-BSD FRML MDRD: 93 ML/MIN/1.73SQ M
GLUCOSE SERPL-MCNC: 109 MG/DL (ref 65–140)
GLUCOSE UR STRIP-MCNC: NEGATIVE MG/DL
HCT VFR BLD AUTO: 31.5 % (ref 34.8–46.1)
HGB BLD-MCNC: 10.4 G/DL (ref 11.5–15.4)
HGB UR QL STRIP.AUTO: NEGATIVE
KETONES UR STRIP-MCNC: NEGATIVE MG/DL
LEUKOCYTE ESTERASE UR QL STRIP: NEGATIVE
MCH RBC QN AUTO: 31.1 PG (ref 26.8–34.3)
MCHC RBC AUTO-ENTMCNC: 33 G/DL (ref 31.4–37.4)
MCV RBC AUTO: 94 FL (ref 82–98)
NITRITE UR QL STRIP: NEGATIVE
NRBC BLD AUTO-RTO: 0 /100 WBCS
P AXIS: 70 DEGREES
PH UR STRIP.AUTO: 6 [PH]
PLATELET # BLD AUTO: 105 THOUSANDS/UL (ref 149–390)
PMV BLD AUTO: 12 FL (ref 8.9–12.7)
POTASSIUM SERPL-SCNC: 3.4 MMOL/L (ref 3.5–5.3)
PR INTERVAL: 162 MS
PROT UR STRIP-MCNC: NEGATIVE MG/DL
QRS AXIS: 65 DEGREES
QRSD INTERVAL: 70 MS
QT INTERVAL: 340 MS
QTC INTERVAL: 434 MS
RBC # BLD AUTO: 3.34 MILLION/UL (ref 3.81–5.12)
SODIUM SERPL-SCNC: 140 MMOL/L (ref 135–147)
SP GR UR STRIP.AUTO: 1.02 (ref 1–1.03)
T WAVE AXIS: 67 DEGREES
UROBILINOGEN UR QL STRIP.AUTO: 0.2 E.U./DL
VENTRICULAR RATE: 98 BPM
WBC # BLD AUTO: 0.63 THOUSAND/UL (ref 4.31–10.16)

## 2022-08-21 PROCEDURE — 85027 COMPLETE CBC AUTOMATED: CPT

## 2022-08-21 PROCEDURE — 80048 BASIC METABOLIC PNL TOTAL CA: CPT

## 2022-08-21 PROCEDURE — 99232 SBSQ HOSP IP/OBS MODERATE 35: CPT | Performed by: INTERNAL MEDICINE

## 2022-08-21 PROCEDURE — 81003 URINALYSIS AUTO W/O SCOPE: CPT

## 2022-08-21 PROCEDURE — 93010 ELECTROCARDIOGRAM REPORT: CPT | Performed by: INTERNAL MEDICINE

## 2022-08-21 RX ADMIN — OXYBUTYNIN CHLORIDE 5 MG: 5 TABLET, EXTENDED RELEASE ORAL at 08:42

## 2022-08-21 RX ADMIN — CEFEPIME HYDROCHLORIDE 2000 MG: 2 INJECTION, SOLUTION INTRAVENOUS at 22:06

## 2022-08-21 RX ADMIN — POLYETHYLENE GLYCOL 3350 17 G: 17 POWDER, FOR SOLUTION ORAL at 08:42

## 2022-08-21 RX ADMIN — VANCOMYCIN HYDROCHLORIDE 1000 MG: 1 INJECTION, SOLUTION INTRAVENOUS at 22:06

## 2022-08-21 RX ADMIN — HYDROMORPHONE HYDROCHLORIDE 2 MG: 2 TABLET ORAL at 17:52

## 2022-08-21 RX ADMIN — NYSTATIN 500000 UNITS: 100000 SUSPENSION ORAL at 08:42

## 2022-08-21 RX ADMIN — FLUTICASONE PROPIONATE 2 SPRAY: 50 SPRAY, METERED NASAL at 08:42

## 2022-08-21 RX ADMIN — HYDROMORPHONE HYDROCHLORIDE 2 MG: 2 TABLET ORAL at 22:06

## 2022-08-21 RX ADMIN — ACETAMINOPHEN 650 MG: 325 TABLET, FILM COATED ORAL at 17:50

## 2022-08-21 RX ADMIN — NYSTATIN 500000 UNITS: 100000 SUSPENSION ORAL at 11:42

## 2022-08-21 RX ADMIN — Medication 6 MG: at 22:07

## 2022-08-21 RX ADMIN — ACETAMINOPHEN 650 MG: 325 TABLET, FILM COATED ORAL at 09:51

## 2022-08-21 RX ADMIN — NYSTATIN 500000 UNITS: 100000 SUSPENSION ORAL at 22:06

## 2022-08-21 RX ADMIN — DULOXETINE HYDROCHLORIDE 30 MG: 30 CAPSULE, DELAYED RELEASE ORAL at 08:42

## 2022-08-21 RX ADMIN — RIVAROXABAN 20 MG: 20 TABLET, FILM COATED ORAL at 17:52

## 2022-08-21 RX ADMIN — VANCOMYCIN HYDROCHLORIDE 1000 MG: 1 INJECTION, SOLUTION INTRAVENOUS at 11:39

## 2022-08-21 RX ADMIN — HYDROMORPHONE HYDROCHLORIDE 2 MG: 2 TABLET ORAL at 08:46

## 2022-08-21 RX ADMIN — CEFEPIME HYDROCHLORIDE 2000 MG: 2 INJECTION, SOLUTION INTRAVENOUS at 08:42

## 2022-08-21 RX ADMIN — LETROZOLE 2.5 MG: 2.5 TABLET, FILM COATED ORAL at 17:51

## 2022-08-21 NOTE — ASSESSMENT & PLAN NOTE
Follows with Dr Landon Carranza as outpatient  Continue home dilaudid 2-4mg q3h PRN moderate/severe pain (PDMP reviewed)  Palliative care consulted

## 2022-08-21 NOTE — ED NOTES
This RN heard yelling in patient's room  Upon entering, this RN inquired if patient was doing ok, patient states "I am ok, he is inebriated and is asking why you haven't put an IV in him yet, he thinks he is the patient" in reference to their   This RN asked if the patient would like their family member escorted out due to their yelling, patient states "not yet but if he keeps yelling I might"  Patient's  proceded to yell at this RN "why haven't you put an IV in me yet"  This RN explained to patient's  that they were not a patient in this facility  ED charge nurse notified of interaction        Tariq Gaytan RN  08/20/22 7215

## 2022-08-21 NOTE — SEPSIS NOTE
Sepsis Note   Danya Naranjo 49 y o  female MRN: [id number]  Unit/Bed#: ED 16 Encounter: [id number]       qSOFA     Row Name 08/20/22 2130 08/20/22 2030 08/20/22 2018 08/20/22 1941       Altered mental status GCS < 15 -- -- -- --     Respiratory Rate > / =22 -- 0 0 0     Systolic BP < / =754 0 0 0 0     Q Sofa Score 0 0 0 0                Initial Sepsis Screening     Row Name 08/20/22 2213                Is the patient's history suggestive of a new or worsening infection? Yes (Proceed)  -PF        Suspected source of infection soft tissue  -PF        Are two or more of the following signs & symptoms of infection both present and new to the patient? Yes (Proceed)  -PF        Indicate SIRS criteria Tachycardia > 90 bpm;Leukopenia (WBC < 4000 IJL)  -PF        If the answer is yes to both questions, suspicion of sepsis is present --        If severe sepsis is present AND tissue hypoperfusion perists in the hour after fluid resuscitation or lactate > 4, the patient meets criteria for SEPTIC SHOCK --        Are any of the following organ dysfunction criteria present within 6 hours of suspected infection and SIRS criteria that are NOT considered to be chronic conditions?  No  -PF        Organ dysfunction --        Date of presentation of severe sepsis --        Time of presentation of severe sepsis --        Tissue hypoperfusion persists in the hour after crystalloid fluid administration, evidenced, by either: --        Was hypotension present within one hour of the conclusion of crystalloid fluid administration? --        Date of presentation of septic shock --        Time of presentation of septic shock --              User Key  (r) = Recorded By, (t) = Taken By, (c) = Cosigned By    [address] Name Provider Type    PF Krystal Lucas MD Physician                   presentation consistent with infection, cellulitis of the right foot, patient patient has neutropenia from recent chemotherapy, cover with antibiotics

## 2022-08-21 NOTE — UTILIZATION REVIEW
Initial Clinical Review    Admission: Date/Time/Statement:   Admission Orders (From admission, onward)     Ordered        08/20/22 2200  INPATIENT ADMISSION  Once                      Orders Placed This Encounter   Procedures    INPATIENT ADMISSION     Standing Status:   Standing     Number of Occurrences:   1     Order Specific Question:   Level of Care     Answer:   Med Surg [16]     Order Specific Question:   Estimated length of stay     Answer:   More than 2 Midnights     Order Specific Question:   Certification     Answer:   I certify that inpatient services are medically necessary for this patient for a duration of greater than two midnights  See H&P and MD Progress Notes for additional information about the patient's course of treatment  ED Arrival Information     Expected   -    Arrival   8/20/2022 19:34    Acuity   Urgent            Means of arrival   Walk-In    Escorted by   Family Member    Service   Hospitalist    Admission type   Urgent            Arrival complaint   Fever (Cancer Patient)           Chief Complaint   Patient presents with    Fever - 9 weeks to 74 years     Pt arrived ambulatory with c/o a sore throat and a fever at home of 100  Pt is a chemo pt and was told to come to the ER if she had a fever  Initial Presentation: 49 y o  female presents to ED from home with fever, sore throat and right foot swelling and erythema  Temp 100 at home  Had an abrasion to right foot, area worsened  PMH is  Breast cancer, currently on chemo, and HTN  Labs reveal potassium  2 9,  WBC  0 54, ANC  0 3  CXR  NAD  Met Sepsis criteria with tachycardia,  WBC  0 52, possible source right foot cellulitis  Admit  Ip with Febrile Neutropenia, Sepsis and plan is monitor labs,  ROSA MARIA, neutropenic precautions,  And oncology consult  Date:      8/21        Day 2:   Unclear infectious source at present  No  Suggestion of  Gross cellulitis right foot  Continue to monitor labs      Palliative care patient  Continue  ROSA MARIA/neutropenic precautions  ED Triage Vitals   Temperature Pulse Respirations Blood Pressure SpO2   08/20/22 1941 08/20/22 1941 08/20/22 1941 08/20/22 1941 08/20/22 1941   99 2 °F (37 3 °C) (!) 112 17 135/80 98 %      Temp Source Heart Rate Source Patient Position - Orthostatic VS BP Location FiO2 (%)   08/20/22 1941 08/20/22 1941 08/20/22 1941 08/20/22 1941 --   Oral Monitor Sitting Left arm       Pain Score       08/20/22 7188       Med Not Given for Pain - for MAR use only          Wt Readings from Last 1 Encounters:   08/20/22 61 7 kg (136 lb)     Additional Vital Signs:   97 2 °F (36 2 °C) Abnormal  85 19 98/56 70 95 % -- --    08/21/22 08:43:06 -- 110 Abnormal  -- 105/65 78 94 % -- --   08/20/22 22:44:49 100 7 °F (38 2 °C) Abnormal  94 -- 117/79 92 93 % -- --   08/20/22 2130 -- 102 -- 112/67 85 98 % None (Room air) Lying   08/20/22 2030 -- 99 20 114/72 88 99 % None (Room air) Lying   08/20/22 2018 -- 99 19 122/74 94 100 % -- --   08/20/22 1941 99 2 °F (37 3 °C) 112 Abnormal  17 135/80 -- 98 % None (Room air) Sitting       Pertinent Labs/Diagnostic Test Results:   XR chest 2 views   Final Result by Larry Cross MD (08/21 3429)      Small right pleural effusion                    Workstation performed: [id number]           Results from last 7 days   Lab Units 08/20/22  2014   SARS-COV-2  Negative     Results from last 7 days   Lab Units 08/21/22  0500 08/20/22 2037 08/16/22  1111   WBC Thousand/uL 0 63* 0 54* 2 61*   HEMOGLOBIN g/dL 10 4* 11 5 12 2   HEMATOCRIT % 31 5* 34 5* 37 1   PLATELETS Thousands/uL 105* 114* 178   NEUTROS ABS Thousands/µL  --   --  2 23   BANDS PCT %  --  13*  --          Results from last 7 days   Lab Units 08/21/22  0500 08/20/22  2037   SODIUM mmol/L 140 137   POTASSIUM mmol/L 3 4* 2 9*   CHLORIDE mmol/L 107 104   CO2 mmol/L 23 23   ANION GAP mmol/L 10 10   BUN mg/dL 10 13   CREATININE mg/dL 0 73 0 65   EGFR ml/min/1 73sq m 93 100   CALCIUM mg/dL 8 3 8 8     Results from last 7 days   Lab Units 08/20/22 2037   AST U/L 36   ALT U/L 27   ALK PHOS U/L 59   TOTAL PROTEIN g/dL 6 3*   ALBUMIN g/dL 3 2*   TOTAL BILIRUBIN mg/dL 0 71         Results from last 7 days   Lab Units 08/21/22  0500 08/20/22 2037   GLUCOSE RANDOM mg/dL 109 129               Results from last 7 days   Lab Units 08/20/22 2037   PROTIME seconds 16 3*   INR  1 34*   PTT seconds 29         Results from last 7 days   Lab Units 08/20/22 2037   PROCALCITONIN ng/ml 0 27*     Results from last 7 days   Lab Units 08/20/22 2037   LACTIC ACID mmol/L 1 2                   Results from last 7 days   Lab Units 08/21/22  0008   CLARITY UA  Clear   COLOR UA  Yellow   SPEC GRAV UA  1 020   PH UA  6 0   GLUCOSE UA mg/dl Negative   KETONES UA mg/dl Negative   BLOOD UA  Negative   PROTEIN UA mg/dl Negative   NITRITE UA  Negative   BILIRUBIN UA  Negative   UROBILINOGEN UA E U /dl 0 2   LEUKOCYTES UA  Negative     Results from last 7 days   Lab Units 08/20/22 2014   INFLUENZA A PCR  Negative   INFLUENZA B PCR  Negative   RSV PCR  Negative                             Results from last 7 days   Lab Units 08/20/22 2037   BLOOD CULTURE  Received in Microbiology Lab  Culture in Progress  Received in Microbiology Lab  Culture in Progress  ED Treatment:   Medication Administration from 08/20/2022 1934 to 08/20/2022 2239       Date/Time Order Dose Route Action Comments     08/20/2022 2201 cefTRIAXone (ROCEPHIN) IVPB (premix in dextrose) 1,000 mg 50 mL 0 mg Intravenous Stopped      08/20/2022 2123 cefTRIAXone (ROCEPHIN) IVPB (premix in dextrose) 1,000 mg 50 mL 1,000 mg Intravenous New Bag         Present on Admission:   Febrile neutropenia (HCC)   Hypertension   Sore throat   Hypokalemia      Admitting Diagnosis: Fever [R50 9]  Cellulitis of right foot [L03 115]  Neutropenia (HCC) [D70 9]  Primary malignant neoplasm of right breast with metastasis to other site [facility]) [C50 911]  Age/Sex: 49 y  o  female  Admission Orders:  Scheduled Medications:  cefepime, 2,000 mg, Intravenous, Q12H  DULoxetine, 30 mg, Oral, Daily  fluticasone, 2 spray, Nasal, Daily  letrozole, 2 5 mg, Oral, QPM  melatonin, 6 mg, Oral, HS  nystatin, 500,000 Units, Swish & Swallow, 4x Daily  oxybutynin, 5 mg, Oral, Daily  polyethylene glycol, 17 g, Oral, Daily  rivaroxaban, 20 mg, Oral, QPM  vancomycin, 17 5 mg/kg, Intravenous, Q12H      Continuous IV Infusions:     PRN Meds:  acetaminophen, 650 mg, Oral, Q6H PRN  al mag oxide-diphenhydramine-lidocaine viscous, 10 mL, Swish & Spit, Q4H PRN  albuterol, 2 puff, Inhalation, Q4H PRN  benzonatate, 200 mg, Oral, TID PRN  calcium carbonate, 500 mg, Oral, TID PRN  dicyclomine, 10 mg, Oral, Q6H PRN  diphenhydrAMINE, 50 mg, Oral, HS PRN  diphenoxylate-atropine, 1 tablet, Oral, 4x Daily PRN  HYDROmorphone, 2 mg, Oral, Q3H PRN  HYDROmorphone, 2 mg, Oral, Q3H PRN        IP CONSULT TO PHARMACY  IP CONSULT TO PALLIATIVE CARE  IP CONSULT TO INFECTIOUS DISEASES  IP CONSULT TO ONCOLOGY    Network Utilization Review Department  ATTENTION: Please call with any questions or concerns to [phone] and carefully listen to the prompts so that you are directed to the right person  All voicemails are confidential   [facility] all requests for admission clinical reviews, approved or denied determinations and any other requests to dedicated fax number below belonging to the campus where the patient is receiving treatment   List of dedicated fax numbers for the Facilities:  [address] DENIALS (Administrative/Medical Necessity) [phone]   [address] [place] [address] (Maternity/NICU/Pediatrics) 261 [facility],[address] [address]  [address] Medical [place] 2000 [facility] [place] [address] Leslie Cyr 14[address] [facility] [phone]

## 2022-08-21 NOTE — PLAN OF CARE
Problem: INFECTION - ADULT  Goal: Absence or prevention of progression during hospitalization  Description: INTERVENTIONS:  - Assess and monitor for signs and symptoms of infection  - Monitor lab/diagnostic results  - Monitor all insertion sites, i e  indwelling lines, tubes, and drains  - Monitor endotracheal if appropriate and nasal secretions for changes in amount and color  - [place] appropriate cooling/warming therapies per order  - Administer medications as ordered  - Instruct and encourage patient and family to use good hand hygiene technique  - Identify and instruct in appropriate isolation precautions for identified infection/condition  Outcome: Progressing  Goal: Absence of fever/infection during neutropenic period  Description: INTERVENTIONS:  - Monitor WBC    Outcome: Progressing     Problem: SAFETY ADULT  Goal: Patient will remain free of falls  Description: INTERVENTIONS:  - Educate patient/family on patient safety including physical limitations  - Instruct patient to call for assistance with activity   - Consult OT/PT to assist with strengthening/mobility   - Keep Call bell within reach  - Keep bed low and locked with side rails adjusted as appropriate  - Keep care items and personal belongings within reach  - Initiate and maintain comfort rounds  - Make Fall Risk Sign visible to staff  - Offer Toileting every 2 Hours, in advance of need  - Initiate/Maintain bed alarm  - Obtain necessary fall risk management equipment: 2  - Apply yellow socks and bracelet for high fall risk patients  - Consider moving patient to room near nurses station  Outcome: Progressing  Goal: Maintain or return to baseline ADL function  Description: INTERVENTIONS:  -  Assess patient's ability to carry out ADLs; assess patient's baseline for ADL function and identify physical deficits which impact ability to perform ADLs (bathing, care of mouth/teeth, toileting, grooming, dressing, etc )  - Assess/evaluate cause of self-care deficits   - Assess range of motion  - Assess patient's mobility; develop plan if impaired  - Assess patient's need for assistive devices and provide as appropriate  - Encourage maximum independence but intervene and supervise when necessary  - Involve family in performance of ADLs  - Assess for home care needs following discharge   - Consider OT consult to assist with ADL evaluation and planning for discharge  - Provide patient education as appropriate  Outcome: Progressing  Goal: Maintains/Returns to pre admission functional level  Description: INTERVENTIONS:  - Perform BMAT or MOVE assessment daily    - Set and communicate daily mobility goal to care team and patient/family/caregiver  - Collaborate with rehabilitation services on mobility goals if consulted  - Perform Range of Motion 2 times a day  - Reposition patient every 2 hours    - Dangle patient 2 times a day  - Stand patient 2 times a day  - Ambulate patient 2 times a day  - Out of bed to chair 2 times a day   - Out of bed for meals 2 times a day  - Out of bed for toileting  - Record patient progress and toleration of activity level   Outcome: Progressing

## 2022-08-21 NOTE — ASSESSMENT & PLAN NOTE
· As evidenced by , WBC 0 52   · Possible mild right foot cellulitis vs other cause   · Procal 0 27; lactic acid WNL  · /67; stable while in ED   · Receiving IV abx as above   · F/u with pending infecitous lab work  · Trend WBC

## 2022-08-21 NOTE — PROGRESS NOTES
[facility]  Progress Note - Fredo Rear 1965, 49 y o  female MRN: [id number]  Unit/Bed#: -01 Encounter: [id number]  Primary Care Provider: Jess Barney MD   Date and time admitted to hospital: 8/20/2022  7:42 PM    * Febrile neutropenia [facility])  Assessment & Plan  · Patient reports development of fatigue, sore throat, and worsening right foot erythema/swelling following an abrasion to the area  Today she had a fever of 100F at home  Denies other symptom/complaint at this time      · Patient developed fever 100F, sore throat, and right foot erythema/swelling following multiple small abrasions acquired while playing with cat  · Denies other infectious s/s   · Currently on chemotherapy for breast cancer as below    · WBC 0 54; ANC 0 3  · Meets SEPSIS criteria as below   · CXR wet read without acute abnormality   · Unclear infectious source at this time, has mild right foot erythema as below, however not overtly suggestive of gross cellulitis  · IV cefepime and add IV vancomycin  · Neutropenic precautions  · ID and heme onc consulted     [facility]  Assessment & Plan  As evidenced by , WBC 0 52   Possible mild right foot cellulitis vs other cause   Procal 0 27; lactic acid WNL  /67; stable while in ED   Receiving IV abx as above   F/u with pending infecitous lab work  Trend WBC    Sore throat  Assessment & Plan  · Patient in immunocompromised state as above   · Strep A panel negative   · No appreciable oral/throat plaques at this time  · Ordered nystatin swish and swallow 2/2 immunocompromised status currently receiving abx   · See febrile neutropenia as above     Palliative care patient  Assessment & Plan  Follows with Dr Michela Ware as outpatient  Continue home dilaudid 2-4mg q3h PRN moderate/severe pain (PDMP reviewed)  Palliative care consulted     Malignant neoplasm of breast in female, estrogen receptor positive ([facility] )  Assessment & Plan  · With mets to liver and bone   · Currently receiving chemotherapy  · Follows with heme onc as an outpatient (last seen )  · Patient reports she is no longer taking daily ibrance  · Continue home letrozole  · Per recent heme onc note appears patient is on monthly zoladex, with daily letrozole and abemeciclib (this medication is not currently on patient's med list, and am having difficulty finding on hospital formulary; instructed patient this may likely need to be brought in from home)  · Heme onc consulted for issues as above         VTE Pharmacologic Prophylaxis:   Pharmacologic: Rivaroxaban (Xarelto)  Mechanical VTE Prophylaxis in Place: Yes    Patient Centered Rounds: I have performed bedside rounds with nursing staff today  Discussions with Specialists or Other Care Team Provider: cm, nursing    Education and Discussions with Family / Patient: pt    Time Spent for Care: 30 minutes  More than 50% of total time spent on counseling and coordination of care as described above  Current Length of Stay: 1 day(s)    Current Patient Status: Inpatient   Certification Statement: The patient will continue to require additional inpatient hospital stay due to See below    Discharge Plan:  Still requiring IV antibiotics    Code Status: Level 1 - Full Code      Subjective:   Denies chest pain, shortness breath, cough, fevers    Objective:     Vitals:   Temp (24hrs), Av °F (37 8 °C), Min:99 2 °F (37 3 °C), Max:100 7 °F (38 2 °C)    Temp:  [99 2 °F (37 3 °C)-100 7 °F (38 2 °C)] 100 7 °F (38 2 °C)  HR:  [] 110  Resp:  [17-20] 20  BP: (105-135)/(65-80) 105/65  SpO2:  [93 %-100 %] 94 %  Body mass index is 22 63 kg/m²  Input and Output Summary (last 24 hours): Intake/Output Summary (Last 24 hours) at 2022 0932  Last data filed at 2022 0545  Gross per 24 hour   Intake 300 ml   Output 400 ml   Net -100 ml       Physical Exam:     Physical Exam  Constitutional:       General: She is not in acute distress  Appearance: She is well-developed  She is not diaphoretic  HENT:      Head: Normocephalic and atraumatic  Nose: Nose normal       Mouth/Throat:      Pharynx: No oropharyngeal exudate  Eyes:      General: No scleral icterus  Right eye: No discharge  Left eye: No discharge  Conjunctiva/sclera: Conjunctivae normal    Neck:      Thyroid: No thyromegaly  Vascular: No JVD  Cardiovascular:      Rate and Rhythm: Normal rate and regular rhythm  Heart sounds: Normal heart sounds  No murmur heard  No friction rub  No gallop  Pulmonary:      Effort: Pulmonary effort is normal  No respiratory distress  Breath sounds: Normal breath sounds  No wheezing or rales  Chest:      Chest wall: No tenderness  Abdominal:      General: Bowel sounds are normal  There is no distension  Palpations: Abdomen is soft  Tenderness: There is no abdominal tenderness  There is no guarding or rebound  Musculoskeletal:         General: No tenderness or deformity  Normal range of motion  Cervical back: Normal range of motion and neck supple  Skin:     General: Skin is warm and dry  Findings: No erythema or rash  Neurological:      Mental Status: She is alert  Mental status is at baseline  Cranial Nerves: No cranial nerve deficit  Sensory: No sensory deficit  Motor: No abnormal muscle tone        Coordination: Coordination normal            Additional Data:     Labs:    Results from last 7 days   Lab Units 08/21/22  0500 08/20/22 2037 08/16/22  1111   WBC Thousand/uL 0 63* 0 54* 2 61*   HEMOGLOBIN g/dL 10 4* 11 5 12 2   HEMATOCRIT % 31 5* 34 5* 37 1   PLATELETS Thousands/uL 105* 114* 178   BANDS PCT %  --  13*  --    NEUTROS PCT %  --   --  85*   LYMPHS PCT %  --   --  10*   LYMPHO PCT %  --  35  --    MONOS PCT %  --   --  3*   MONO PCT %  --  5  --    EOS PCT %  --  0 0     Results from last 7 days   Lab Units 08/21/22  0500 08/20/22 2037   SODIUM mmol/L 140 137   POTASSIUM mmol/L 3 4* 2 9*   CHLORIDE mmol/L 107 104   CO2 mmol/L 23 23   BUN mg/dL 10 13   CREATININE mg/dL 0 73 0 65   ANION GAP mmol/L 10 10   CALCIUM mg/dL 8 3 8 8   ALBUMIN g/dL  --  3 2*   TOTAL BILIRUBIN mg/dL  --  0 71   ALK PHOS U/L  --  59   ALT U/L  --  27   AST U/L  --  36   GLUCOSE RANDOM mg/dL 109 129     Results from last 7 days   Lab Units 08/20/22 2037   INR  1 34*             Results from last 7 days   Lab Units 08/20/22 2037   LACTIC ACID mmol/L 1 2   PROCALCITONIN ng/ml 0 27*           * I Have Reviewed All Lab Data Listed Above  * Additional Pertinent Lab Tests Reviewed: All Labs Within Last 24 Hours Reviewed    Imaging:    Imaging Reports Reviewed Today Include: na  Imaging Personally Reviewed by Myself Includes:  na    Recent Cultures (last 7 days):     Results from last 7 days   Lab Units 08/20/22 2037   BLOOD CULTURE  Received in Microbiology Lab  Culture in Progress  Received in Microbiology Lab  Culture in Progress         Last 24 Hours Medication List:   Current Facility-Administered Medications   Medication Dose Route Frequency Provider Last Rate    acetaminophen  650 mg Oral Q6H PRN Dylan Royalrashmi, PA-C      al mag oxide-diphenhydramine-lidocaine viscous  10 mL Swish & Spit Q4H PRN [place], [place]      albuterol  2 puff Inhalation Q4H PRN [place], [place]      benzonatate  200 mg Oral TID PRN Dylan Conklin, PA-C      calcium carbonate  500 mg Oral TID PRN Dylan Royalrashmi, PA-C      cefepime  2,000 mg Intravenous Q12H Dylan Conklin, PA-C 2,000 mg (08/21/22 0842)    dicyclomine  10 mg Oral Q6H PRN Dylan Conklin, PA-C      diphenhydrAMINE  50 mg Oral HS PRN Dylan Conklin, PA-JOAQUÍN      diphenoxylate-atropine  1 tablet Oral 4x Daily PRN Dylan Conklin, PA-C      DULoxetine  30 mg Oral Daily [place], [place]      fluticasone  2 spray Nasal Daily [place], [place]      HYDROmorphone  2 mg Oral Q3H PRN Dylan Conklin, PABibiC      HYDROmorphone  2 mg Oral Q3H PRN [facility]      letrozole  2 5 mg Oral QPM [place], [place]      melatonin  6 mg Oral HS [place], [place]      nystatin  500,000 Units Swish & Swallow 4x Daily [place], [place]      oxybutynin  5 mg Oral Daily [place], [place]      polyethylene glycol  17 g Oral Daily [place], [place]      rivaroxaban  20 mg Oral QPM [place], [place]      vancomycin  17 5 mg/kg Intravenous Q12H Epifanio Hollis  mL/hr at 08/21/22 0056        Today, Patient Was Seen By: Marielle Denney MD    ** Please Note: Dictation voice to text software may have been used in the creation of this document   **

## 2022-08-21 NOTE — PROGRESS NOTES
Vancomycin Assessment    Brien Wallace is a 49 y o  female who is currently receiving vancomycin 1000mg IV q12h for other febrile neutropenia   Relevant clinical data and objective history reviewed:  Creatinine   Date Value Ref Range Status   08/20/2022 0 65 0 60 - 1 30 mg/dL Final     Comment:     Standardized to IDMS reference method   08/09/2022 0 86 0 60 - 1 30 mg/dL Final     Comment:     Standardized to IDMS reference method   07/19/2022 0 99 0 60 - 1 30 mg/dL Final     Comment:     Standardized to IDMS reference method   04/02/2014 0 80 0 60 - 1 30 mg/dL Final     Comment:     Standardized to IDMS reference method     /79   Pulse 94   Temp (!) 100 7 °F (38 2 °C)   Resp 20   Ht 5' 5" (1 651 m)   Wt 61 7 kg (136 lb)   LMP 05/26/2021   SpO2 93%   BMI 22 63 kg/m²   No intake/output data recorded  Lab Results   Component Value Date/Time    BUN 13 08/20/2022 08:37 PM    BUN 12 04/02/2014 01:15 PM    WBC 0 54 (LL) 08/20/2022 08:37 PM    HGB 11 5 08/20/2022 08:37 PM    HCT 34 5 (L) 08/20/2022 08:37 PM    MCV 93 08/20/2022 08:37 PM     (L) 08/20/2022 08:37 PM     Temp Readings from Last 3 Encounters:   08/20/22 (!) 100 7 °F (38 2 °C)   08/18/22 97 8 °F (36 6 °C) (Temporal)   08/17/22 97 6 °F (36 4 °C)     Vancomycin Days of Therapy: 1    Assessment/Plan  The patient is currently on vancomycin utilizing scheduled dosing based on actual body weight  Baseline risks associated with therapy include: concomitant nephrotoxic medications, advanced age, and dehydration  The patient is currently receiving 1000mg IV q12h and is clinically appropriate and dose will be continued  Pharmacy will also follow closely for s/sx of nephrotoxicity, infusion reactions, and appropriateness of therapy  BMP and CBC will be ordered per protocol  Plan for trough as patient approaches steady state, prior to the 4th  dose at approximately 1030 on 8/22/22    Due to infection severity, will target a trough of 15-20 (appropriate for most indications)   Pharmacy will continue to follow the patients culture results and clinical progress daily      Ruben Linder, Pharmacist

## 2022-08-21 NOTE — ASSESSMENT & PLAN NOTE
· Patient reports development of fatigue, sore throat, and worsening right foot erythema/swelling following an abrasion to the area  Today she had a fever of 100F at home  Denies other symptom/complaint at this time      · Patient developed fever 100F, sore throat, and right foot erythema/swelling following multiple small abrasions acquired while playing with cat  · Denies other infectious s/s   · Currently on chemotherapy for breast cancer as below    · WBC 0 54; ANC 0 3  · Meets SEPSIS criteria as below   · CXR wet read without acute abnormality   · Unclear infectious source at this time, has mild right foot erythema as below, however not overtly suggestive of gross cellulitis  · IV cefepime and add IV vancomycin  · Neutropenic precautions  · ID and heme onc consulted

## 2022-08-21 NOTE — H&P
Sergio 1965, 49 y o  female MRN: [id number]  Unit/Bed#: -01 Encounter: [id number]  Primary Care Provider: Kuldip Henriquez MD   Date and time admitted to hospital: 8/20/2022  7:42 PM    * Febrile neutropenia [facility])  Assessment & Plan  Patient reports development of fatigue, sore throat, and worsening right foot erythema/swelling following an abrasion to the area  Today she had a fever of 100F at home  Denies other symptom/complaint at this time      /67 (BP Location: Left arm)   Pulse 102   Temp 99 2 °F (37 3 °C) (Oral)   Resp 20   Ht 5' 5" (1 651 m)   Wt 61 7 kg (136 lb)   LMP 05/26/2021   SpO2 98%   BMI 22 63 kg/m²     · Patient developed fever 100F, sore throat, and right foot erythema/swelling following multiple small abrasions acquired while playing with cat  · Denies other infectious s/s   · Currently on chemotherapy for breast cancer as below    · WBC 0 54; ANC 0 3  · Meets SEPSIS criteria as below   · CXR wet read without acute abnormality   · ED gave IV ceftriaxone   · Unclear infectious source at this time, has mild right foot erythema as below, however not overtly suggestive of gross cellulitis  · Will order IV cefepime and add IV vancomycin  · Neutropenic precautions  · ID and heme onc consulted     Sepsis ([facility] )  Assessment & Plan  · As evidenced by , WBC 0 52   · Possible mild right foot cellulitis vs other cause   · Procal 0 27; lactic acid WNL  · /67; stable while in ED   · Receiving IV abx as above   · F/u with pending infecitous lab work  · Trend WBC    Malignant neoplasm of breast in female, estrogen receptor positive ([facility] )  Assessment & Plan  · With mets to liver and bone   · Currently receiving chemotherapy  · Follows with heme onc as an outpatient (last seen 08/17)  · Patient reports she is no longer taking daily ibrance  · Continue home letrozole  · Per recent heme onc note appears patient is on monthly zoladex, with daily letrozole and abemeciclib (this medication is not currently on patient's med list, and am having difficulty finding on hospital formulary; instructed patient this may likely need to be brought in from home)  · Heme onc consulted for issues as above     Sore throat  Assessment & Plan  · Patient in immunocompromised state as above   · Strep A panel negative   · No appreciable oral/throat plaques at this time  · Ordered nystatin swish and swallow 2/2 immunocompromised status currently receiving abx   · See febrile neutropenia as above     Hypokalemia  Assessment & Plan  · Potassium 2 9  · Replete and recheck   · Tele monitoring ordered     Palliative care patient  Assessment & Plan  · Follows with Dr Cisco Carranza as outpatient  · Continue home dilaudid 2-4mg q3h PRN moderate/severe pain (PDMP reviewed)  · Palliative care consulted     Hypertension  Assessment & Plan  · /67  · Not currently prescribed outpatient antihypertensive  · Monitor BP per unit protocol     VTE Pharmacologic Prophylaxis: VTE Score: 4 Moderate Risk (Score 3-4) - Pharmacological DVT Prophylaxis Ordered: rivaroxaban (Xarelto)  Code Status: Level 1 - Full Code   Discussion with family: update in AM      Anticipated Length of Stay: Patient will be admitted on an inpatient basis with an anticipated length of stay of greater than 2 midnights secondary to febrile neutropenia   Total Time for Visit, including Counseling / Coordination of Care: 60 minutes Greater than 50% of this total time spent on direct patient counseling and coordination of care  Chief Complaint: fever, sore throat, and right foot swelling/erythema    History of Present Illness:  Mili Lao is a 49 y o  female with a PMH of breast cancer and HTN who presents with fever, sore throat, and right foot swelling/erythema  Patient reports development of fatigue, sore throat, and worsening right foot erythema/swelling following an abrasion to the area   Today she had a fever of 100F at home  Denies other symptom/complaint at this time  All questions answered at the bedside to the best of my ability  Review of Systems:  Review of Systems   Constitutional: Positive for fatigue and fever  Negative for activity change, appetite change, chills and diaphoresis  HENT: Positive for sore throat  Negative for congestion, ear pain, nosebleeds and trouble swallowing  Eyes: Negative for pain and visual disturbance  Respiratory: Negative for apnea, cough, chest tightness, shortness of breath and wheezing  Cardiovascular: Negative for chest pain, palpitations and leg swelling  Gastrointestinal: Negative for abdominal distention, abdominal pain, blood in stool, constipation, diarrhea, nausea and vomiting  Endocrine: Negative for cold intolerance, heat intolerance and polyuria  Genitourinary: Negative for difficulty urinating, dysuria, flank pain and hematuria  Musculoskeletal: Negative for arthralgias, neck pain and neck stiffness  Skin: Positive for color change (right foot erythema)  Negative for rash and wound  Neurological: Negative for dizziness, tremors, syncope, weakness, light-headedness, numbness and headaches  Hematological: Negative for adenopathy  All other systems reviewed and are negative  Past Medical and Surgical History:   Past Medical History:   Diagnosis Date    Cancer [facility])     History of radiation exposure     Malignant neoplasm of right female breast ([facility] ) 2012    right       Past Surgical History:   Procedure Laterality Date    BREAST CYST EXCISION      BREAST LUMPECTOMY Right 2012    HIP SURGERY      IR BIOPSY LIVER MASS  7/26/2021    IR PLEURAL EFFUSION LONG-TERM CATHETER PLACEMENT  8/17/2021    IR PLEURAL EFFUSION LONG-TERM CATHETER REMOVAL  5/11/2022    IR PORT PLACEMENT  7/27/2022    IR THORACENTESIS  7/26/2021       Meds/Allergies:  Prior to Admission medications    Medication Sig Start Date End Date Taking? Authorizing Provider   acetaminophen (TYLENOL) 325 mg tablet Take 2 tablets (650 mg total) by mouth every 6 (six) hours as needed for mild pain 4/7/22   Corinne Clancy MD   al mag oxide-diphenhydramine-lidocaine viscous (MAGIC MOUTHWASH) 1:1:1 suspension Swish and spit 10 mL every 4 (four) hours as needed for mouth pain or discomfort 3/8/22   Corinne Clancy MD   albuterol (PROVENTIL HFA,VENTOLIN HFA) 90 mcg/act inhaler Inhale 2 puffs every 4 (four) hours as needed for wheezing 7/29/21   Obdulia Lazaro MD   benzonatate (TESSALON) 200 MG capsule Take 1 capsule (200 mg total) by mouth 3 (three) times a day as needed for cough 11/23/21   Corinne Clancy MD   calcium carbonate (TUMS) 500 mg chewable tablet Chew 1 tablet (500 mg total) 3 (three) times a day as needed for indigestion or heartburn 7/29/21   Obdulia Lazaro MD   CRANIAL PROSTHESIS, RX, Apply to cranial area as needed for comfort   5/10/22   Caroline Gibson MD   [employer] Melatonin 3 MG TAKE 2 TABLETS (6 MG TOTAL) BY MOUTH DAILY AT BEDTIME 8/9/22   Corinne Clancy MD   dextromethorphan-guaiFENesin [facility] DM)  mg/5 mL syrup Take 10 mL by mouth every 4 (four) hours as needed for cough 7/29/21   Obdulia Lazaro MD   dicyclomine (BENTYL) 10 mg capsule Take 1 capsule (10 mg total) by mouth every 6 (six) hours as needed (abdominal cramping) 5/17/22   Corinne Clancy MD   diphenhydrAMINE (BENADRYL) 50 MG tablet Take 1 tablet (50 mg total) by mouth daily at bedtime as needed for itching or sleep 7/12/22   Corinne Clancy MD   diphenoxylate-atropine (LOMOTIL) 2 5-0 025 mg per tablet Take 1 tablet by mouth 4 (four) times a day as needed for diarrhea 8/30/21   Caroline Gibson MD   docusate sodium (COLACE) 100 mg capsule Take 1 capsule (100 mg total) by mouth 2 (two) times a day for 10 days 6/29/22 7/9/22  Paulette Roldan MD   DULoxetine (CYMBALTA) 30 mg delayed release capsule Take 1 capsule (30 mg total) by mouth daily 9/14/21 Obdulia Osborn MD   fluticasone [facility]) 50 mcg/act nasal spray 2 sprays into each nostril daily 7/10/21 7/10/22  Historical Provider, MD   HYDROmorphone (DILAUDID) 2 mg tablet Take 1-2 tablets (2-4 mg total) by mouth every 3 (three) hours as needed (moderate/severe cancer-related pain) Max Daily Amount: 32 mg 8/1/22   Veda Miner MD   lactulose 20 g/30 mL Take 15 mL (10 g total) by mouth daily as needed (constipaton) for up to 10 days  Patient not taking: Reported on 7/5/2022 6/29/22 7/9/22  Kylie Mina MD   letrozole [facility]) 2 5 mg tablet TAKE 1 TABLET BY MOUTH EVERY DAY 2/15/22 5/16/22  Dario Santiago MD   Lidocaine Viscous HCl (XYLOCAINE) 2 % mucosal solution Swish and spit 10 mL 4 (four) times a day as needed for mouth pain or discomfort 3/10/22   Veda Miner MD   metoclopramide (REGLAN) 10 mg tablet Take 1 tablet (10 mg total) by mouth 4 (four) times a day 9/16/21   Veda Miner MD   multivitamin [facility]) TABS Take 1 tablet by mouth    Historical Provider, MD   ondansetron (ZOFRAN) 4 mg tablet Take 1 tablet (4 mg total) by mouth every 8 (eight) hours as needed for nausea or vomiting 9/16/21   Veda Miner MD   oxybutynin (DITROPAN-XL) 5 mg 24 hr tablet Take 1 tablet (5 mg total) by mouth daily Take 1 tablet daily 8/18/22   Kristie Yates MD   palbociclib (Ibrance) 100 MG tablet Take 1 tablet (100 mg total) by mouth daily 4/1/22   Cammie Truong PA-C   polyethylene glycol (MIRALAX) 17 g packet Take 17 g by mouth daily 6/30/22   Kylie Mina MD   potassium chloride (K-DUR,KLOR-CON) 20 mEq tablet Take 1 tablet (20 mEq total) by mouth 2 (two) times a day for 5 days 6/1/22 6/6/22  Dario Santiago MD   senna (SENOKOT) 8 6 MG tablet Take 1 tablet (8 6 mg total) by mouth daily at bedtime as needed for constipation 5/17/22   Veda Miner MD   Xarelto 20 MG tablet TAKE 1 TABLET BY MOUTH DAILY WITH BREAKFAST 7/5/22   Ruthy Man MD     I have reviewed home medications with patient personally  Allergies: Allergies   Allergen Reactions    Codeine Anaphylaxis    Morphine GI Intolerance, Itching and Vomiting    Sulfa Antibiotics Hives and Itching       Social History:  Marital Status: /Civil Union   Occupation: N/A  Patient Pre-hospital Living Situation: Home  Patient Pre-hospital Level of Mobility: walks  Patient Pre-hospital Diet Restrictions: none  Substance Use History:   Social History     Substance and Sexual Activity   Alcohol Use Not Currently     Social History     Tobacco Use   Smoking Status Former Smoker    Packs/day: 0 25    Types: Cigarettes   Smokeless Tobacco Never Used     Social History     Substance and Sexual Activity   Drug Use Not Currently       Family History:  Family History   Problem Relation Age of Onset    Breast cancer Mother         in her 63's    Breast cancer Sister         inher 45s and 48    Colon cancer Maternal Grandmother     No Known Problems Paternal Grandmother     Breast cancer Other     No Known Problems Paternal Aunt        Physical Exam:     Vitals:   Blood Pressure: 117/79 (08/20/22 2244)  Pulse: 94 (08/20/22 2244)  Temperature: (!) 100 7 °F (38 2 °C) (08/20/22 2244)  Temp Source: Oral (08/20/22 1941)  Respirations: 20 (08/20/22 2030)  Height: 5' 5" (165 1 cm) (08/20/22 1941)  Weight - Scale: 61 7 kg (136 lb) (08/20/22 1941)  SpO2: 93 % (08/20/22 2244)    Physical Exam  Vitals and nursing note reviewed  Constitutional:       General: She is not in acute distress  Appearance: Normal appearance  She is ill-appearing (chronic)  HENT:      Head: Normocephalic and atraumatic  Right Ear: External ear normal       Left Ear: External ear normal       Nose: Nose normal       Mouth/Throat:      Mouth: Mucous membranes are moist    Eyes:      Pupils: Pupils are equal, round, and reactive to light  Cardiovascular:      Rate and Rhythm: Normal rate and regular rhythm  Pulses: Normal pulses  Heart sounds: Normal heart sounds  No murmur heard  Comments: Right-sided port in place   Pulmonary:      Effort: Pulmonary effort is normal  No respiratory distress  Breath sounds: Normal breath sounds  No wheezing or rales  Chest:      Chest wall: No tenderness  Abdominal:      General: Bowel sounds are normal  There is no distension  Palpations: Abdomen is soft  There is no mass  Tenderness: There is no abdominal tenderness  There is no guarding  Musculoskeletal:         General: No swelling or tenderness  Cervical back: Normal range of motion and neck supple  No rigidity or tenderness  Right lower leg: No edema  Left lower leg: No edema  Skin:     General: Skin is warm and dry  Capillary Refill: Capillary refill takes less than 2 seconds  Findings: Erythema (mild, dorsum and between right 2nd and 3rd toes w/ scattered scabs ) present  No lesion or rash  Neurological:      General: No focal deficit present  Mental Status: She is alert     Psychiatric:         Mood and Affect: Mood normal           Additional Data:     Lab Results:  Results from last 7 days   Lab Units 08/20/22 2037 08/16/22  1111   WBC Thousand/uL 0 54* 2 61*   HEMOGLOBIN g/dL 11 5 12 2   HEMATOCRIT % 34 5* 37 1   PLATELETS Thousands/uL 114* 178   BANDS PCT % 13*  --    NEUTROS PCT %  --  85*   LYMPHS PCT %  --  10*   LYMPHO PCT % 35  --    MONOS PCT %  --  3*   MONO PCT % 5  --    EOS PCT % 0 0     Results from last 7 days   Lab Units 08/20/22 2037   SODIUM mmol/L 137   POTASSIUM mmol/L 2 9*   CHLORIDE mmol/L 104   CO2 mmol/L 23   BUN mg/dL 13   CREATININE mg/dL 0 65   ANION GAP mmol/L 10   CALCIUM mg/dL 8 8   ALBUMIN g/dL 3 2*   TOTAL BILIRUBIN mg/dL 0 71   ALK PHOS U/L 59   ALT U/L 27   AST U/L 36   GLUCOSE RANDOM mg/dL 129     Results from last 7 days   Lab Units 08/20/22 2037   INR  1 34*             Results from last 7 days   Lab Units 08/20/22 2037   LACTIC ACID mmol/L 1 2 PROCALCITONIN ng/ml 0 27*       Imaging: Personally reviewed the following imaging: chest xray  XR chest 2 views    (Results Pending)       EKG and Other Studies Reviewed on Admission:   · EKG: NSR  HR 98     ** Please Note: This note has been constructed using a voice recognition system   **

## 2022-08-21 NOTE — ED NOTES
Patient's  at bedside yelling at X ray technician regarding the amount of time patient can leave the room for X ray  This RN intervened and talked to the family member about appropriate communication with hospital staff  Patient's  arguing with this RN and X ray technician and security was called        Echo Morley RN  08/20/22 2010

## 2022-08-21 NOTE — ASSESSMENT & PLAN NOTE
Patient reports development of fatigue, sore throat, and worsening right foot erythema/swelling following an abrasion to the area  Today she had a fever of 100F at home  Denies other symptom/complaint at this time      /67 (BP Location: Left arm)   Pulse 102   Temp 99 2 °F (37 3 °C) (Oral)   Resp 20   Ht 5' 5" (1 651 m)   Wt 61 7 kg (136 lb)   LMP 05/26/2021   SpO2 98%   BMI 22 63 kg/m²     · Patient developed fever 100F, sore throat, and right foot erythema/swelling following multiple small abrasions acquired while playing with cat  · Denies other infectious s/s   · Currently on chemotherapy for breast cancer as below    · WBC 0 54; ANC 0 3  · Meets SEPSIS criteria as below   · CXR wet read without acute abnormality   · ED gave IV ceftriaxone   · Unclear infectious source at this time, has mild right foot erythema as below, however not overtly suggestive of gross cellulitis  · Will order IV cefepime and add IV vancomycin  · Neutropenic precautions  · ID and heme onc consulted

## 2022-08-21 NOTE — ASSESSMENT & PLAN NOTE
· Follows with Dr Mona Hebert as outpatient  · Continue home dilaudid 2-4mg q3h PRN moderate/severe pain (PDMP reviewed)  · Palliative care consulted

## 2022-08-21 NOTE — ASSESSMENT & PLAN NOTE
· Patient in immunocompromised state as above   · Strep A panel negative   · No appreciable oral/throat plaques at this time  · Ordered nystatin swish and swallow 2/2 immunocompromised status currently receiving abx   · See febrile neutropenia as above

## 2022-08-21 NOTE — ASSESSMENT & PLAN NOTE
As evidenced by , WBC 0 52   Possible mild right foot cellulitis vs other cause   Procal 0 27; lactic acid WNL  /67; stable while in ED   Receiving IV abx as above   F/u with pending infecitous lab work  Trend WBC

## 2022-08-21 NOTE — PROGRESS NOTES
Vancomycin IV Pharmacy-to-Dose Consultation    Brynn Robin is a 49 y o  female who is currently receiving Vancomycin IV with management by the Pharmacy Consult service  Assessment/Plan:  The patient was reviewed  Renal function is stable and no signs or symptoms of nephrotoxicity and/or infusion reactions were documented in the chart  Based on todays assessment, continue current vancomycin (day # 2) dosing of 1000mg IV q12h, with a plan for trough to be drawn at 1030 on 8/22  We will continue to follow the patients culture results and clinical progress daily      Leo Curling, Pharmacist

## 2022-08-21 NOTE — ASSESSMENT & PLAN NOTE
· With mets to liver and bone   · Currently receiving chemotherapy  · Follows with heme onc as an outpatient (last seen 08/17)  · Patient reports she is no longer taking daily ibrance  · Continue home letrozole  · Per recent heme onc note appears patient is on monthly zoladex, with daily letrozole and abemeciclib (this medication is not currently on patient's med list, and am having difficulty finding on hospital formulary; instructed patient this may likely need to be brought in from home)  · Heme onc consulted for issues as above

## 2022-08-22 ENCOUNTER — TELEPHONE (OUTPATIENT)
Dept: HEMATOLOGY ONCOLOGY | Facility: CLINIC | Age: 57
End: 2022-08-22

## 2022-08-22 LAB
BASOPHILS # BLD AUTO: 0.11 THOUSANDS/ΜL (ref 0–0.1)
BASOPHILS NFR BLD AUTO: 4 % (ref 0–1)
EOSINOPHIL # BLD AUTO: 0 THOUSAND/ΜL (ref 0–0.61)
EOSINOPHIL NFR BLD AUTO: 0 % (ref 0–6)
ERYTHROCYTE [DISTWIDTH] IN BLOOD BY AUTOMATED COUNT: 18 % (ref 11.6–15.1)
HCT VFR BLD AUTO: 31.9 % (ref 34.8–46.1)
HGB BLD-MCNC: 10.5 G/DL (ref 11.5–15.4)
IMM GRANULOCYTES # BLD AUTO: 0.3 THOUSAND/UL (ref 0–0.2)
IMM GRANULOCYTES NFR BLD AUTO: 11 % (ref 0–2)
LYMPHOCYTES # BLD AUTO: 0.39 THOUSANDS/ΜL (ref 0.6–4.47)
LYMPHOCYTES NFR BLD AUTO: 15 % (ref 14–44)
MCH RBC QN AUTO: 31 PG (ref 26.8–34.3)
MCHC RBC AUTO-ENTMCNC: 32.9 G/DL (ref 31.4–37.4)
MCV RBC AUTO: 94 FL (ref 82–98)
MONOCYTES # BLD AUTO: 0.34 THOUSAND/ΜL (ref 0.17–1.22)
MONOCYTES NFR BLD AUTO: 13 % (ref 4–12)
NEUTROPHILS # BLD AUTO: 1.55 THOUSANDS/ΜL (ref 1.85–7.62)
NEUTS SEG NFR BLD AUTO: 57 % (ref 43–75)
NRBC BLD AUTO-RTO: 1 /100 WBCS
PLATELET # BLD AUTO: 100 THOUSANDS/UL (ref 149–390)
PMV BLD AUTO: 11.9 FL (ref 8.9–12.7)
RBC # BLD AUTO: 3.39 MILLION/UL (ref 3.81–5.12)
WBC # BLD AUTO: 2.69 THOUSAND/UL (ref 4.31–10.16)

## 2022-08-22 PROCEDURE — 99222 1ST HOSP IP/OBS MODERATE 55: CPT | Performed by: INTERNAL MEDICINE

## 2022-08-22 PROCEDURE — 99255 IP/OBS CONSLTJ NEW/EST HI 80: CPT | Performed by: INTERNAL MEDICINE

## 2022-08-22 PROCEDURE — 99233 SBSQ HOSP IP/OBS HIGH 50: CPT | Performed by: INTERNAL MEDICINE

## 2022-08-22 PROCEDURE — 85025 COMPLETE CBC W/AUTO DIFF WBC: CPT | Performed by: INTERNAL MEDICINE

## 2022-08-22 PROCEDURE — 99253 IP/OBS CNSLTJ NEW/EST LOW 45: CPT | Performed by: STUDENT IN AN ORGANIZED HEALTH CARE EDUCATION/TRAINING PROGRAM

## 2022-08-22 RX ORDER — POTASSIUM CHLORIDE 20 MEQ/1
20 TABLET, EXTENDED RELEASE ORAL ONCE
Status: COMPLETED | OUTPATIENT
Start: 2022-08-22 | End: 2022-08-22

## 2022-08-22 RX ORDER — CEFEPIME HYDROCHLORIDE 2 G/50ML
2000 INJECTION, SOLUTION INTRAVENOUS EVERY 8 HOURS
Status: DISCONTINUED | OUTPATIENT
Start: 2022-08-22 | End: 2022-08-23

## 2022-08-22 RX ADMIN — Medication 6 MG: at 22:10

## 2022-08-22 RX ADMIN — OXYBUTYNIN CHLORIDE 5 MG: 5 TABLET, EXTENDED RELEASE ORAL at 09:24

## 2022-08-22 RX ADMIN — SODIUM CHLORIDE 500 ML: 0.9 INJECTION, SOLUTION INTRAVENOUS at 07:54

## 2022-08-22 RX ADMIN — ACETAMINOPHEN 650 MG: 325 TABLET, FILM COATED ORAL at 18:19

## 2022-08-22 RX ADMIN — HYDROMORPHONE HYDROCHLORIDE 2 MG: 2 TABLET ORAL at 12:56

## 2022-08-22 RX ADMIN — LETROZOLE 2.5 MG: 2.5 TABLET, FILM COATED ORAL at 18:03

## 2022-08-22 RX ADMIN — HYDROMORPHONE HYDROCHLORIDE 2 MG: 2 TABLET ORAL at 05:25

## 2022-08-22 RX ADMIN — HYDROMORPHONE HYDROCHLORIDE 2 MG: 2 TABLET ORAL at 23:19

## 2022-08-22 RX ADMIN — DULOXETINE HYDROCHLORIDE 30 MG: 30 CAPSULE, DELAYED RELEASE ORAL at 09:24

## 2022-08-22 RX ADMIN — RIVAROXABAN 20 MG: 20 TABLET, FILM COATED ORAL at 18:03

## 2022-08-22 RX ADMIN — POLYETHYLENE GLYCOL 3350 17 G: 17 POWDER, FOR SOLUTION ORAL at 09:25

## 2022-08-22 RX ADMIN — CEFEPIME HYDROCHLORIDE 2000 MG: 2 INJECTION, SOLUTION INTRAVENOUS at 18:04

## 2022-08-22 RX ADMIN — HYDROMORPHONE HYDROCHLORIDE 2 MG: 2 TABLET ORAL at 20:09

## 2022-08-22 RX ADMIN — CEFEPIME HYDROCHLORIDE 2000 MG: 2 INJECTION, SOLUTION INTRAVENOUS at 09:25

## 2022-08-22 RX ADMIN — ACETAMINOPHEN 650 MG: 325 TABLET, FILM COATED ORAL at 05:24

## 2022-08-22 RX ADMIN — POTASSIUM CHLORIDE 20 MEQ: 1500 TABLET, EXTENDED RELEASE ORAL at 07:54

## 2022-08-22 NOTE — ASSESSMENT & PLAN NOTE
· Follows with Dr Cisco Carranza as outpatient  · Continue home dilaudid 2-4mg q3h PRN moderate/severe pain (PDMP reviewed)  · Palliative care consulted

## 2022-08-22 NOTE — ASSESSMENT & PLAN NOTE
· Patient reports development of fatigue and sore throat    Likely secondary to pharyngitis  · Currently on chemotherapy for breast cancer as below    · Meets SEPSIS criteria as below   · CXR wet read without acute abnormality   · ID on board, appreciate recommendations  · Continue IV cefepime  · Vancomycin discontinued  · Neutropenic precautions  · Monitor fever curves and WBC count with differentials  · Follow-up blood cultures, negative thus far

## 2022-08-22 NOTE — ASSESSMENT & PLAN NOTE
· With mets to liver and bone   · Currently receiving chemotherapy  · Follows with heme onc as an outpatient (last seen 08/17)  · Patient reports she is no longer taking daily ibrance  · Continue home letrozole  · Heme onc consulted, appreciate recommendations

## 2022-08-22 NOTE — CONSULTS
Consultation - Infectious Disease   Shreyas Littlejohn 49 y o  female MRN: [id number]  Unit/Bed#: -01 Encounter: [id number]      IMPRESSION & RECOMMENDATIONS:   Impression/Recommendations:  1  Neutropenic fever  Admission [address] was 300, with fevers up to 100 7  Most likely due to #2  Negative UA, CXR, COVID-19/influenza PCR, GAS PCR, blood cultures  Fever curve is coming down  Patient is hemodynamically stable and nontoxic  Transient hypotension after receiving Dilaudid     -continue IV cefepime  Increase to 2 g q 8 hours  -hold vancomycin as blood cultures are negative  -follow-up blood cultures  -follow temperatures and hemodynamics  -follow CBC/ANC    2  Mild pharyngitis  Negative GAS PCR  Most likely viral process  No signs of thrush  No odynophagia/dysphagia     -symptomatic management  -serial exams    3  Right foot abrasion  No clinical evidence of infection on my exam   No fluctuance, purulence or joint involvement     -no antibiotic indicated  -serial exams    4  Metastatic breast cancer with osseous, hepatic metastases on active chemotherapy  Via new chest wall port  Patient states she did receive Neulasta  -follow CBC/ANC  -oncology evaluation  -palliative care follow-up for pain management    Antibiotics:  Vancomycin/cefepime 2    I discussed above plan with Dr Marianne Suárez from Internal Medicine Service  I personally reviewed prior hospitalization records  Thank you for this consultation  We will follow along with you  HISTORY OF PRESENT ILLNESS:  Reason for Consult:  Neutropenic fever    HPI: Shreyas Littlejohn is a 49 y o  female with metastatic breast cancer with mets to liver, bone on chemotherapy (last treatment was on 08/18 followed by Neulasta) via new chest wall port, recent admission in late June 2022 for neutropenic fever and upper extremity phlebitis/cellulitis that improved with Keflex    Patient presented to ER on 08/20 with multiple complaints including fever, mild sore throat x1 day  Early last week, patient also suffered some scratches to her right foot when she was chasing her cat around the house  Denies cat scratches or bites to the foot  Currently denies foot pain, swelling, redness, open wounds or drainage  Patient's temperature was 100 0° at home prompting her to come to the ER  ANC was low at 300  COVID-19/influenza PCR were negative  GAS PCR was negative  Patient was started empirically on vancomycin and cefepime  Blood cultures are preliminarily negative  Patient is feeling better today  Minimal sore throat  No other URI symptoms  No active foot complaints  No new issues with the port  REVIEW OF SYSTEMS:  A complete system-based review of systems is otherwise negative      PAST MEDICAL HISTORY:  Past Medical History:   Diagnosis Date    Cancer [facility])     History of radiation exposure     Malignant neoplasm of right female breast ([facility] [address] ) 2012    right     Past Surgical History:   Procedure Laterality Date    BACK SURGERY      BREAST CYST EXCISION      BREAST LUMPECTOMY Right 2012    HIP SURGERY      IR BIOPSY LIVER MASS  2021    IR PLEURAL EFFUSION LONG-TERM CATHETER PLACEMENT  2021    IR PLEURAL EFFUSION LONG-TERM CATHETER REMOVAL  2022    IR PORT PLACEMENT  2022    IR THORACENTESIS  2021       FAMILY HISTORY:  Non-contributory    SOCIAL HISTORY:  Social History     Substance and Sexual Activity   Alcohol Use Not Currently     Social History     Substance and Sexual Activity   Drug Use Not Currently     Social History     Tobacco Use   Smoking Status Former Smoker    Packs/day: 0 25    Years: 40 00    Pack years: 10 00    Types: Cigarettes    Start date:     Quit date:     Years since quittin 6   Smokeless Tobacco Never Used       ALLERGIES:  Allergies   Allergen Reactions    Codeine Anaphylaxis    Morphine GI Intolerance, Itching and Vomiting    Sulfa Antibiotics Hives and Itching MEDICATIONS:  All current active medications have been reviewed  PHYSICAL EXAM:  Vitals:  Temp:  [97 2 °F (36 2 °C)-99 6 °F (37 6 °C)] 99 6 °F (37 6 °C)  HR:  [] 102  Resp:  [16-20] 18  BP: (84-98)/(50-57) 86/50  SpO2:  [92 %-95 %] 92 %  Temp (24hrs), Av 8 °F (37 1 °C), Min:97 2 °F (36 2 °C), Max:99 6 °F (37 6 °C)  Current: Temperature: 99 6 °F (37 6 °C)     Physical Exam:  General:  Pale appearing, nontoxic, sitting comfortably in chair  Eyes:  Conjunctive clear with no hemorrhages or effusions  Oropharynx:  No ulcers, no lesions, no thrush, canker sore noted on left side of her tongue  Neck:  Supple, no lymphadenopathy  Lungs:  Clear to auscultation bilaterally, no accessory muscle use  Chest wall port in place with no surrounding erythema, tenderness or purulence  Abdomen:  Soft, non-tender, non-distended  Extremities:  No peripheral cyanosis, clubbing, or edema  Skin:  A few scratches on her right foot are noted with no palpable fluctuance, tenderness  No open wounds or drainage  No impairment of motion  Neurological:  Moves all four extremities spontaneously    LABS, IMAGING, & OTHER STUDIES:  Lab Results:  I have personally reviewed pertinent labs  Results from last 7 days   Lab Units 22  05022   POTASSIUM mmol/L 3 4* 2 9*   CHLORIDE mmol/L 107 104   CO2 mmol/L 23 23   BUN mg/dL 10 13   CREATININE mg/dL 0 73 0 65   EGFR ml/min/1 73sq m 93 100   CALCIUM mg/dL 8 3 8 8   AST U/L  --  36   ALT U/L  --  27   ALK PHOS U/L  --  59     Results from last 7 days   Lab Units 22  0500 22  1111   WBC Thousand/uL 0 63* 0 54* 2 61*   HEMOGLOBIN g/dL 10 4* 11 5 12 2   PLATELETS Thousands/uL 105* 114* 178     Results from last 7 days   Lab Units 22   BLOOD CULTURE  No Growth at 24 hrs  No Growth at 24 hrs  Imaging Studies:   I have personally reviewed pertinent imaging study reports and images in PACS      Chest x-ray shows small right pleural effusion  EKG, Pathology, and Other Studies:   I have personally reviewed pertinent reports

## 2022-08-22 NOTE — ASSESSMENT & PLAN NOTE
· Blood pressure on the soft side secondary to Dilaudid    · Recheck blood pressure improved  · Not currently prescribed outpatient antihypertensive  · Monitor BP per unit protocol

## 2022-08-22 NOTE — PLAN OF CARE
Problem: INFECTION - ADULT  Goal: Absence or prevention of progression during hospitalization  Description: INTERVENTIONS:  - Assess and monitor for signs and symptoms of infection  - Monitor lab/diagnostic results  - Monitor all insertion sites, i e  indwelling lines, tubes, and drains  - Monitor endotracheal if appropriate and nasal secretions for changes in amount and color  - [place] appropriate cooling/warming therapies per order  - Administer medications as ordered  - Instruct and encourage patient and family to use good hand hygiene technique  - Identify and instruct in appropriate isolation precautions for identified infection/condition  Outcome: Progressing  Goal: Absence of fever/infection during neutropenic period  Description: INTERVENTIONS:  - Monitor WBC    Outcome: Progressing     Problem: SAFETY ADULT  Goal: Patient will remain free of falls  Description: INTERVENTIONS:  - Educate patient/family on patient safety including physical limitations  - Instruct patient to call for assistance with activity   - Consult OT/PT to assist with strengthening/mobility   - Keep Call bell within reach  - Keep bed low and locked with side rails adjusted as appropriate  - Keep care items and personal belongings within reach  - Initiate and maintain comfort rounds  - Make Fall Risk Sign visible to staff  - Offer Toileting every 2 Hours, in advance of need  - Initiate/Maintain bed alarm  - Obtain necessary fall risk management equipment: socks  - Apply yellow socks and bracelet for high fall risk patients  - Consider moving patient to room near nurses station  Outcome: Progressing  Goal: Maintain or return to baseline ADL function  Description: INTERVENTIONS:  -  Assess patient's ability to carry out ADLs; assess patient's baseline for ADL function and identify physical deficits which impact ability to perform ADLs (bathing, care of mouth/teeth, toileting, grooming, dressing, etc )  - Assess/evaluate cause of self-care deficits   - Assess range of motion  - Assess patient's mobility; develop plan if impaired  - Assess patient's need for assistive devices and provide as appropriate  - Encourage maximum independence but intervene and supervise when necessary  - Involve family in performance of ADLs  - Assess for home care needs following discharge   - Consider OT consult to assist with ADL evaluation and planning for discharge  - Provide patient education as appropriate  Outcome: Progressing  Goal: Maintains/Returns to pre admission functional level  Description: INTERVENTIONS:  - Perform BMAT or MOVE assessment daily    - Set and communicate daily mobility goal to care team and patient/family/caregiver  - Collaborate with rehabilitation services on mobility goals if consulted  - Perform Range of Motion 2 times a day  - Reposition patient every 2 hours    - Dangle patient 2 times a day  - Stand patient 2 times a day  - Ambulate patient 2 times a day  - Out of bed to chair 2 times a day   - Out of bed for meals 2 times a day  - Out of bed for toileting  - Record patient progress and toleration of activity level   Outcome: Progressing     Problem: Potential for Falls  Goal: Patient will remain free of falls  Description: INTERVENTIONS:  - Educate patient/family on patient safety including physical limitations  - Instruct patient to call for assistance with activity   - Consult OT/PT to assist with strengthening/mobility   - Keep Call bell within reach  - Keep bed low and locked with side rails adjusted as appropriate  - Keep care items and personal belongings within reach  - Initiate and maintain comfort rounds  - Make Fall Risk Sign visible to staff  - Offer Toileting every 2 Hours, in advance of need  - Initiate/Maintain bed alarm  - Obtain necessary fall risk management equipment: socks  - Apply yellow socks and bracelet for high fall risk patients  - Consider moving patient to room near nurses station  Outcome: Progressing

## 2022-08-22 NOTE — TELEPHONE ENCOUNTER
Left message for patient that a hosp follow up has been scheduled for her to see Loreta Fernandez PA-C on 8/26 at 1pm  Patient is currently hospitalized  An AVS should be given to her upon discharge with appt details

## 2022-08-22 NOTE — PLAN OF CARE

## 2022-08-22 NOTE — SOCIAL WORK
Palliative LSW saw patient at the bedside today  LSW appreciates the opportunity to provide patient/family with inpatient emotional support and guidance while patient continues to receive medical attention from the medical team     Topics discussed: Discussion had regarding events leading up to hospitalization  Patient's  also admitted to hospital  LSW confirmed that patient's 2 sons are being cared for while both parents are hospitalized  Patient's brother is watching the boys and patient reports that he will take care of them  Patient did discuss stressors associated with her 's ETOH abuse and how it's impacting the family  LSW did provide list of therapists in the area that the family can contact for family therapy per request of  on previous call (patient aware of call)  Patient verbalizes awareness and understanding that she is unable to control spouse's behaviors or whether or not he gets treatments  Dicussed ways in which she can set boundaries in order to care for herself better such as instead of driving spouse down to [facility] for ED visit, call 911 and let them take him to local ED for treatment  Patient can be assured that he is being cared for and she won't have to drive 1 hr each way  Patient discussed situation in which he drove 2 5 hours away intoxicated and he called her to pick him up so she had to drive 5 hours, day after treatment  Discussed telling him to go to ED to sober up or call a friend to have him picked up so she doesn't put herself at risk driving that far after chemo  Patient did report that if something were to happen to her/her , patient's younger son will take care of his brother after he turns 25  Patient's son, Jose Ang is going to be a father in March and the mother of his baby has a sibling with Autism so she would be able to assist with [place]      Areas that need follow-up: ongoing support  Resources given: listing of [address] providers  Others present:  [facility]-DEBRA [place]     I have spent 30 minutes with Patient  today in which greater than 50% of this time was spent in counseling/coordination of care regarding Patient and family education and emotional support         LSW will continue to follow as requested by the medical team, patient, or family

## 2022-08-22 NOTE — CONSULTS
Consultation - Palliative and Supportive Care   Melissa Perez 49 y o  female [id number]    Patient Active Problem List   Diagnosis    Cough    Pleural effusion    Tobacco abuse    Metast[facility] neoplasm ([facility] )    Hypertension    Malignant neoplasm of breast in female, estrogen receptor positive ([facility] )    Palliative care patient    Malignant pleural effusion    Primary malignant neoplasm of right breast with metastasis to other site ([facility] )    Constipation    Dehydration    Bone metastases (HCC)    Cancer related pain    Chemotherapy induced neutropenia (HCC)    Febrile neutropenia (HCC)    Hyponatremia    Hypokalemia    Sore throat    Port-A-Cath in place    Sepsis ([facility] )     Assessment:  Goals of care counseling  Malignant neoplasm of female breast  Symptom management:  Neoplasm related pain  OIC  Psychosocial support    Goals:  Level 1 code status      Plan:  Symptom management:  · Malignant neoplasm of female breast- continue to follow with [place]  · Neoplasm related pain- continue hydromorphone as needed and monitor  · OIC- continue Miralax daily and monitor   · Goals of Care counseling- ongoing goals of care counseling, currently Level 1  · Psychosocial support- supportive listening provided    Social support:  · Supportive listening provided                I have reviewed the patient's controlled substance dispensing history in the Prescription Drug Monitoring Program in compliance with the [facility] regulations before prescribing any controlled substances  Last refills: Dilaudid 2 mg #120 on 08/01/2022    Decisional apparatus:  Patient is competent on exam today  If competency is lost, patient's substitute decision maker would default to  Kay [place] by [place] Act 169  Advance Directive/Living Will: No  POLST: No  POA Forms: No    We appreciate the invitation to be involved in this patient's care  We will continue to follow throughout this hospitalization    Please do not hesitate to reach our on call provider through our clinic answering service at  should you have acute symptom control concerns  Favian Graves, MSN, DEBRA  Palliative and Supportive Care  Clinic/Answering Service: [phone]  You can find me on Suzanneonne[employer]! IDENTIFICATION:  Inpatient consult to Palliative Care  Consult performed by: DEBRA Medina Arm  Consult ordered by: Ivan Miranda PA-C        Physician Requesting Consult: Juan Pollard MD  Reason for Consult / Principal Problem: GOC counseling and SM secondary to malignant neoplasm of female breast, neoplasm related pain  History of Present Illness:  Kanika Loyola is a 49 y o  female who presents with a palliative diagnosis of malignant neoplasm of breast, was [facility] ED at [facility] on [facility]20/2022 secondary to fever 100F at home, sore throat, and right swelling/erythea following an abrasion to area  Patient was seen sitting up in bed, states she is doing better today, throat is a little sore, and she is tired, denies chest pain or shortness of  Breath  Appetite is fair, denies nausea or vomiting  Review of Systems   Constitutional: Positive for fatigue  Negative for fever  HENT: Positive for sore throat  Respiratory: Negative for shortness of breath  Cardiovascular: Negative for chest pain  Gastrointestinal: Negative for abdominal pain, constipation, diarrhea, nausea and vomiting           Past Medical History:   Diagnosis Date    Cancer [facility])     History of radiation exposure     Malignant neoplasm of right female breast ([facility] [address] ) 2012    right     Past Surgical History:   Procedure Laterality Date    BACK SURGERY      BREAST CYST EXCISION      BREAST LUMPECTOMY Right 2012    HIP SURGERY      IR BIOPSY LIVER MASS  07/26/2021    IR PLEURAL EFFUSION LONG-TERM CATHETER PLACEMENT  08/17/2021    IR PLEURAL EFFUSION LONG-TERM CATHETER REMOVAL  05/11/2022    IR PORT PLACEMENT  07/27/2022    IR THORACENTESIS  2021     Social History     Socioeconomic History    Marital status: /Civil Union     Spouse name: Not on file    Number of children: Not on file    Years of education: Not on file    Highest education level: Not on file   Occupational History    Not on file   Tobacco Use    Smoking status: Former Smoker     Packs/day: 0 25     Years: 40 00     Pack years: 10 00     Types: Cigarettes     Start date:      Quit date:      Years since quittin 6    Smokeless tobacco: Never Used   Vaping Use    Vaping Use: Never used   Substance and Sexual Activity    Alcohol use: Not Currently    Drug use: Not Currently    Sexual activity: Not Currently     Partners: Male   Other Topics Concern    Not on file   Social History Narrative    Not on file     Social Determinants of Health     Financial Resource Strain: Not on file   Food Insecurity: No Food Insecurity    Worried About [address] Payment in the Last Year: Never true    [place] of Food in the Last Year: Never true   Transportation Needs: No Transportation Needs    Lack of Transportation (Medical): No    Lack of Transportation (Non-Medical):  No   Physical Activity: Not on file   Stress: Not on file   Social Connections: Not on file   Intimate Partner Violence: Not on file   Housing Stability: Low Risk     Unable to Pay for Housing in the Last Year: No    Number of Places Lived in the Last Year: 1    Unstable Housing in the Last Year: No     Family History   Problem Relation Age of Onset    Breast cancer Mother         in her 63's    Breast cancer Sister         inher 45s and 48    Colon cancer Maternal Grandmother     No Known Problems Paternal Grandmother     Breast cancer Other     No Known Problems Paternal Aunt        Medications:  current meds:   Current Facility-Administered Medications   Medication Dose Route Frequency    acetaminophen (TYLENOL) tablet 650 mg  650 mg Oral Q6H PRN    al [employer] oxide-diphenhydramine-lidocaine viscous (MAGIC MOUTHWASH) suspension 10 mL  10 mL Swish & Spit Q4H PRN    albuterol (PROVENTIL HFA,VENTOLIN HFA) inhaler 2 puff  2 puff Inhalation Q4H PRN    benzonatate (TESSALON PERLES) capsule 200 mg  200 mg Oral TID PRN    calcium carbonate (TUMS) chewable tablet 500 mg  500 mg Oral TID PRN    cefepime (MAXIPIME) IVPB (premix in dextrose) 2,000 mg 50 mL  2,000 mg Intravenous Q12H    dicyclomine (BENTYL) capsule 10 mg  10 mg Oral Q6H PRN    diphenhydrAMINE (BENADRYL) tablet 50 mg  50 mg Oral HS PRN    diphenoxylate-atropine (LOMOTIL) 2 5-0 025 mg per tablet 1 tablet  1 tablet Oral 4x Daily PRN    DULoxetine (CYMBALTA) delayed release capsule 30 mg  30 mg Oral Daily    fluticasone (FLONASE) 50 mcg/act nasal spray 2 spray  2 spray Nasal Daily    HYDROmorphone (DILAUDID) tablet 2 mg  2 mg Oral Q3H PRN    HYDROmorphone (DILAUDID) tablet 2 mg  2 mg Oral Q3H PRN    letrozole (FEMARA) tablet 2 5 mg  2 5 mg Oral QPM    melatonin tablet 6 mg  6 mg Oral HS    nystatin (MYCOSTATIN) oral suspension 500,000 Units  500,000 Units Swish & Swallow 4x Daily    oxybutynin (DITROPAN-XL) 24 hr tablet 5 mg  5 mg Oral Daily    polyethylene glycol (MIRALAX) packet 17 g  17 g Oral Daily    rivaroxaban (XARELTO) tablet 20 mg  20 mg Oral QPM    vancomycin (VANCOCIN) IVPB (premix in dextrose) 1,000 mg 200 mL  17 5 mg/kg Intravenous Q12H       Allergies   Allergen Reactions    Codeine Anaphylaxis    Morphine GI Intolerance, Itching and Vomiting    Sulfa Antibiotics Hives and Itching       Objective:  BP (!) 86/50 (BP Location: Left arm)   Pulse 102   Temp 99 6 °F (37 6 °C)   Resp 18   Ht 5' 5" (1 651 m)   Wt 61 7 kg (136 lb)   LMP 05/26/2021   SpO2 92%   BMI 22 63 kg/m²   Physical Exam:  Constitutional: Appears comfortable and in no acute distress  Head: Normocephalic and atraumatic  Eyes: EOM are normal  No ocular discharge  No scleral icterus     Neck: no visible adenopathy or masses  Cardiac: tachycardia  Respiratory: Effort normal  No stridor  No respiratory distress  No cough  Gastrointestinal: No abdominal distension  Musculoskeletal: No edema  Neurological: Alert, oriented and appropriately conversant  Skin: Dry, no diaphoresis  Psychiatric: Displays a normal mood and affect  Behavior, judgement and thought content appear normal      Lab Results: CBC: No results found for: WBC, HGB, HCT, MCV, PLT, ADJUSTEDWBC, MCH, MCHC, RDW, MPV, NRBC, CMP: No results found for: SODIUM, K, CL, CO2, ANIONGAP, BUN, CREATININE, GLUCOSE, CALCIUM, AST, ALT, ALKPHOS, PROT, BILITOT, EGFR  Imaging Studies: I have personally reviewed pertinent reports  XR chest 2 views    Status: Final result               PACS Images     Show images for XR chest 2 views      Study Result    Narrative & Impression   CHEST      INDICATION:   Fever, neutropenia      COMPARISON:  CT chest 7/8/2022     EXAM PERFORMED/VIEWS:  XR CHEST PA & LATERAL        FINDINGS:  Right-sided chest port  Right basilar chest tube  Right axillary surgical clips      Cardiomediastinal silhouette appears unremarkable      The lungs are clear  Small right pleural effusion  No pneumothorax      Osseous structures appear within normal limits for patient age      IMPRESSION:     Small right pleural effusion                  Workstation performed: [id number]         Counseling / Coordination of Care  Total floor / unit time spent today 30 minutes  Greater than 50% of total time was spent with the patient and / or family counseling and / or coordination of care  A description of the counseling / coordination of care: Reviewed chart,  provided medical updates, determined goals of care, discussed palliative care and symptom management, provided psychosocial support  Reviewed with SLIM, ICU team, RN and CM      This note was not shared with the patient due to reasonable likelihood of causing patient harm    Portions of this document may have been created using dictation software and as such some "sound alike" terms may have been generated by the system  Do not hesitate to contact me with any questions or clarifications

## 2022-08-22 NOTE — WOUND OSTOMY CARE
Progress Note - Wound   Erlene Left 49 y o  female MRN: [id number]  Unit/Bed#: -01 Encounter: [id number]      Assessment:   Patient admitted due to febrile neutropenia  History of HTN and malignant neoplasm of breast  Wound care consulted for right toe wound  Patient agreeable to assessment, alert and oriented x4, continent of bowel and bladder, turns independently for assessment, is an assist with care  Primary RN at bedside for assessment  1  Bilateral sacrum, buttock, and heels- skin is dry, intact, blanchable pink  2  Right 2nd and 3rd toes noted with linear excoriation due to cat scratch per patient (cat belongs to patient)  Scattered linear excoriations covered 100% in dry brown adhered scabbing, no drainage noted  April-wounds are dry, intact, no redness, no warmth, no swelling  Patient does state there is tenderness/discomfort when flexing the toes  Patient states she prefers to use a type of ointment for a wound dressing- will recommend Silvasorb gel at this time for treatment  Educated patient on importance of frequent offloading of pressure via turning, repositioning, and weight-shifting  Verbalized understanding of plan of care  No induration, fluctuance, odor, warmth, redness, or purulence noted to the above noted wound  New dressing applied  Patient tolerated well  Primary nurse aware of plan of care  See flow sheets for more detailed assessment findings  Wound care will sign off at this time, please re-consult if needed    Skin care Plan:  1-Protect sacrum w/Allevyn foam, kavon with P, change q3d and PRN, peel back and check skin q-shift  2-Turn/reposition q2h for pressure re-distribution on skin   3-Elevate heels to offload pressure  4-Moisturize skin daily with skin nourishing cream  5-Ehob cushion in chair when out of bed  6-Hydraguard to bilateral heels BID and PRN  7-Right toes- Cleanse with NSS, pat dry   Apply layer of Silvasorb gel over wounds, cover with 4x4, wrap with Lamont Rodrigues  Change every other day and as needed  Wound 08/20/22 Other (comment) Toe (Comment  which one) Anterior;Right (Active)   Wound Image    08/22/22 0931   Wound Description Dry; Other (Comment) 08/22/22 0931   April-wound Assessment Dry; Intact 08/22/22 0931   Drainage Amount None 08/22/22 0931   Non-staged Wound Description Not applicable 72/41/75 8655   Treatments Cleansed;Site care 08/22/22 0931   Dressing Silvasorb gel;Dry dressing 08/22/22 0931   Wound packed? No 08/22/22 0931   Dressing Changed Changed 08/22/22 0931   Patient Tolerance Tolerated well 08/22/22 0931   Dressing Status Clean; Intact;Dry 08/22/22 0931       Call or tigertext with any questions  Wound Care will sign off at this time    Jacobo [address] and Ostomy care

## 2022-08-22 NOTE — CONSULTS
Medical Oncology/Hematology Consult Note  Beba Marquez, female, 49 y o , 1965,  /-01, [id number]     Reason for admission: Febrile neutropenia   Reason for consultation: above     ASSESSMENT AND PLAN:     1  Febrile Neutropenia    Had fever 100F at home 8/20 prompting presentation  Also had development of erythema/swelling on right foot after chasing cat and obtaining abrasions there   8/20: WBC 0 54, Hgb 11 5, MCV 93, PLT 114K, ANC 0 30  abs lymph 0 19   Blood cultures - no growth at 24 hours    U/A negative  CXR neg other than small pleural effusion   Currently on IV cefepime   Patient s/p neulasta 8/19  Last treatment was day prior 8/18   Plan/Recommendations:  o Patient had no CBC-D today  Will place order for today and follow [facility]  Recommend monitoring CBC with ANC once daily   o ID following patient  Appreciate their recommendations  On IV cefepime  o No need for additional G-CSF at this time since patient received Neulasta 8/19  Should see improvement in [address] [place] [address] within 1 week  Could consider providing additional short acting G-CSF such as granix if develops identified active infection  o Continue neutropenic precautions  o Next treatment for patient is C6D1 9/1  Will keep this appt for now  Will make follow up appt prior to appt for hospital follow up  2  Stage IV Breast Cancer    Metastatic breast cancer with liver, bone, lymph node, pleural involvement   ER/ID pos, HER2 neg   Current treatment: Eribulin 1 4mg/m2 SQ D1, D8 / Q21 day cycle  Has neulasta support  Letrozole 2 5mg once daily  Receiving Zometa INJ Q12 weeks for bone metastasis   Plan/Recommendations:  o Continue current treatment plan until disease progression and/or intolerable side effect  Patient understands and is in agreement with this plan  Thank you for the opportunity to participate in this patient's care  Interval History: Patient laying comfortably in bed in no acute distress   Has pain being managed well while inpatient  No HA, increased CP, SOB, abd pain, N/V/D  Swelling of right foot has gone down  No fevers, chills while in hospital      ECO    History of present illness: This is a 53-year-old female with past medical history of breast cancer on treatment currently, hypertension who presented to the hospital with fever 100F at home, sore throat, right foot swelling/erythema  She presented on   He had neutropenia on admission  Was admitted for febrile neutropenia  Had right foot erythema/swelling following small abrasions while playing with cat  Patient's primary oncology team is Dr Isha Culp and myself  Last seen by me on   Review of Systems:   Review of Systems   Constitutional: Positive for fatigue  Negative for activity change, appetite change and unexpected weight change  HENT: Negative for nosebleeds  Eyes: Negative for visual disturbance  Respiratory: Positive for shortness of breath (intermittent SOB with exertion at baseline  not increased)  Negative for cough  Cardiovascular: Negative for chest pain, palpitations and leg swelling  Gastrointestinal: Negative for abdominal pain, anal bleeding, blood in stool, constipation, diarrhea, nausea and vomiting  Endocrine: Negative for cold intolerance  Genitourinary: Negative for hematuria  Musculoskeletal: Negative for arthralgias  Skin: Positive for wound (Right foot had lesions after chasing her cat)  Negative for color change, pallor and rash  Neurological: Negative for dizziness, syncope, light-headedness and headaches  Hematological: Negative for adenopathy  Does not bruise/bleed easily  Psychiatric/Behavioral: Negative for sleep disturbance           PHYSICAL EXAM:    BP (!) 86/50 (BP Location: Left arm)   Pulse 102   Temp 99 6 °F (37 6 °C)   Resp 18   Ht 5' 5" (1 651 m)   Wt 61 7 kg (136 lb)   LMP 2021   SpO2 92%   BMI 22 63 kg/m²     Physical Exam  Constitutional: General: She is not in acute distress  Appearance: Normal appearance  She is not ill-appearing, toxic-appearing or diaphoretic  HENT:      Head: Normocephalic and atraumatic  Eyes:      General: No scleral icterus  Extraocular Movements: Extraocular movements intact  Conjunctiva/sclera: Conjunctivae normal       Pupils: Pupils are equal, round, and reactive to light  Cardiovascular:      Rate and Rhythm: Normal rate and regular rhythm  Heart sounds: Normal heart sounds  No murmur heard  Pulmonary:      Effort: Pulmonary effort is normal  No respiratory distress  Breath sounds: Normal breath sounds  No stridor  No wheezing, rhonchi or rales  Abdominal:      Palpations: Abdomen is soft  Tenderness: There is no abdominal tenderness  Musculoskeletal:         General: No tenderness  Normal range of motion  Cervical back: Normal range of motion and neck supple  Right lower leg: No edema  Left lower leg: No edema  Skin:     General: Skin is warm and dry  Coloration: Skin is not jaundiced or pale  Findings: Lesion (healing lesions to right foot) present  No bruising, erythema or rash  Neurological:      General: No focal deficit present  Mental Status: She is alert and oriented to person, place, and time  Mental status is at baseline  Motor: No weakness  Psychiatric:         Mood and Affect: Mood normal          Behavior: Behavior normal          Thought Content:  Thought content normal          Judgment: Judgment normal          LABS:     Recent Results (from the past 48 hour(s))   FLU/RSV/COVID - if FLU/RSV clinically relevant    Collection Time: 08/20/22  8:14 PM    Specimen: Nose; Nares   Result Value Ref Range    SARS-CoV-2 Negative Negative    INFLUENZA A PCR Negative Negative    INFLUENZA B PCR Negative Negative    RSV PCR Negative Negative   Strep A PCR    Collection Time: 08/20/22  8:14 PM    Specimen: Throat   Result Value Ref Range STREP A PCR Not Detected Not Detected   ECG 12 lead    Collection Time: 08/20/22  8:15 PM   Result Value Ref Range    Ventricular Rate 98 BPM    Atrial Rate 98 BPM    MO Interval 162 ms    QRSD Interval 70 ms    QT Interval 340 ms    QTC Interval 434 ms    P Axis 70 degrees    QRS Axis 65 degrees    T Wave Axis 67 degrees   CBC and differential    Collection Time: 08/20/22  8:37 PM   Result Value Ref Range    WBC 0 54 (LL) 4 31 - 10 16 Thousand/uL    RBC 3 70 (L) 3 81 - 5 12 Million/uL    Hemoglobin 11 5 11 5 - 15 4 g/dL    Hematocrit 34 5 (L) 34 8 - 46 1 %    MCV 93 82 - 98 fL    MCH 31 1 26 8 - 34 3 pg    MCHC 33 3 31 4 - 37 4 g/dL    RDW 17 9 (H) 11 6 - 15 1 %    MPV 11 5 8 9 - 12 7 fL    Platelets 601 (L) 422 - 390 Thousands/uL   Comprehensive metabolic panel    Collection Time: 08/20/22  8:37 PM   Result Value Ref Range    Sodium 137 135 - 147 mmol/L    Potassium 2 9 (L) 3 5 - 5 3 mmol/L    Chloride 104 96 - 108 mmol/L    CO2 23 21 - 32 mmol/L    ANION GAP 10 4 - 13 mmol/L    BUN 13 5 - 25 mg/dL    Creatinine 0 65 0 60 - 1 30 mg/dL    Glucose 129 65 - 140 mg/dL    Calcium 8 8 8 3 - 10 1 mg/dL    Corrected Calcium 9 4 8 3 - 10 1 mg/dL    AST 36 5 - 45 U/L    ALT 27 12 - 78 U/L    Alkaline Phosphatase 59 46 - 116 U/L    Total Protein 6 3 (L) 6 4 - 8 4 g/dL    Albumin 3 2 (L) 3 5 - 5 0 g/dL    Total Bilirubin 0 71 0 20 - 1 00 mg/dL    eGFR 100 ml/min/1 73sq m   Lactic acid    Collection Time: 08/20/22  8:37 PM   Result Value Ref Range    LACTIC ACID 1 2 0 5 - 2 0 mmol/L   Procalcitonin    Collection Time: 08/20/22  8:37 PM   Result Value Ref Range    Procalcitonin 0 27 (H) <=0 25 ng/ml   Protime-INR    Collection Time: 08/20/22  8:37 PM   Result Value Ref Range    Protime 16 3 (H) 11 6 - 14 5 seconds    INR 1 34 (H) 0 84 - 1 19   APTT    Collection Time: 08/20/22  8:37 PM   Result Value Ref Range    PTT 29 23 - 37 seconds   Blood culture #1    Collection Time: 08/20/22  8:37 PM    Specimen: Central Venous Line; Blood   Result Value Ref Range    Blood Culture No Growth at 24 hrs  Blood culture #2    Collection Time: 08/20/22  8:37 PM    Specimen: Central Venous Line; Blood   Result Value Ref Range    Blood Culture No Growth at 24 hrs      Manual Differential(PHLEBS Do Not Order)    Collection Time: 08/20/22  8:37 PM   Result Value Ref Range    Segmented % 42 (L) 43 - 75 %    Bands % 13 (H) 0 - 8 %    Lymphocytes % 35 14 - 44 %    Monocytes % 5 4 - 12 %    Eosinophils, % 0 0 - 6 %    Basophils % 2 (H) 0 - 1 %    Atypical Lymphocytes % 3 (H) <=0 %    Absolute Neutrophils 0 30 (L) 1 85 - 7 62 Thousand/uL    Lymphocytes Absolute 0 19 (L) 0 60 - 4 47 Thousand/uL    Monocytes Absolute 0 03 0 00 - 1 22 Thousand/uL    Eosinophils Absolute 0 00 0 00 - 0 40 Thousand/uL    Basophils Absolute 0 01 0 00 - 0 10 Thousand/uL    Total Counted      Smudge Cells Present     Platelet Estimate Borderline (A) Adequate   UA w Reflex to Microscopic w Reflex to Culture    Collection Time: 08/21/22 12:08 AM    Specimen: Urine, Clean Catch   Result Value Ref Range    Color, UA Yellow     Clarity, UA Clear     Specific Gravity, UA 1 020 1 003 - 1 030    pH, UA 6 0 4 5, 5 0, 5 5, 6 0, 6 5, 7 0, 7 5, 8 0    Leukocytes, UA Negative Negative    Nitrite, UA Negative Negative    Protein, UA Negative Negative mg/dl    Glucose, UA Negative Negative mg/dl    Ketones, UA Negative Negative mg/dl    Urobilinogen, UA 0 2 0 2, 1 0 E U /dl E U /dl    Bilirubin, UA Negative Negative    Occult Blood, UA Negative Negative   Basic metabolic panel    Collection Time: 08/21/22  5:00 AM   Result Value Ref Range    Sodium 140 135 - 147 mmol/L    Potassium 3 4 (L) 3 5 - 5 3 mmol/L    Chloride 107 96 - 108 mmol/L    CO2 23 21 - 32 mmol/L    ANION GAP 10 4 - 13 mmol/L    BUN 10 5 - 25 mg/dL    Creatinine 0 73 0 60 - 1 30 mg/dL    Glucose 109 65 - 140 mg/dL    Calcium 8 3 8 3 - 10 1 mg/dL    eGFR 93 ml/min/1 73sq m   CBC and differential    Collection Time: 08/21/22  5:00 AM Result Value Ref Range    WBC 0 63 (LL) 4 31 - 10 16 Thousand/uL    RBC 3 34 (L) 3 81 - 5 12 Million/uL    Hemoglobin 10 4 (L) 11 5 - 15 4 g/dL    Hematocrit 31 5 (L) 34 8 - 46 1 %    MCV 94 82 - 98 fL    MCH 31 1 26 8 - 34 3 pg    MCHC 33 0 31 4 - 37 4 g/dL    RDW 17 8 (H) 11 6 - 15 1 %    MPV 12 0 8 9 - 12 7 fL    Platelets 272 (L) 026 - 390 Thousands/uL    nRBC 0 /100 WBCs       XR chest 2 views    Result Date: 8/21/2022  Narrative: CHEST INDICATION:   Fever, neutropenia  COMPARISON:  CT chest 7/8/2022 EXAM PERFORMED/VIEWS:  XR CHEST PA & LATERAL FINDINGS:  Right-sided chest port  Right basilar chest tube  Right axillary surgical clips  Cardiomediastinal silhouette appears unremarkable  The lungs are clear  Small right pleural effusion  No pneumothorax  Osseous structures appear within normal limits for patient age  Impression: Small right pleural effusion  Workstation performed: [id number]     IR port placement    Result Date: 7/27/2022  Narrative: Chest port placement 7/27/2022 1:12 PM History: Metastatic breast carcinoma Contrast: None Fluoroscopy time:  56 Number of images: 2 Radiation dose: 2 mGy Conscious sedation time: 30 min Procedure: The patient was identified verbally and by wristband  Timeout was performed  Informed consent was obtained  All elements of maximal sterile barrier technique were followed (cap, mask, sterile gown, sterile gloves, large sterile sheet, hand hygiene and 2% chlorhexidine for cutaneous antisepsis)  Following obtaining informed consent, the patient was prepped and draped in the usual sterile fashion  Using ultrasound guidance, access was gained to the patient's right internal jugular vein using a micropuncture system  The micropuncture wire was removed and a  035 wire was advanced to the level of the inferior vena cava using fluoroscopic guidance  Using 1% lidocaine, the region of the right anterior chest was was anesthetized    A small incision was made using a 15 blade scalpel  The port pocket was created using blunt dissection  The catheter tubing was tunneled from the incision to the venotomy  The micropuncture dilator was exchanged for a peel-away sheath, using fluoroscopic guidance  The catheter was placed through the peel-away sheath  The catheter was measured and cut to size  The catheter was attached to the port  The port was flushed with saline to ensure patency without evidence of leakage  The port was placed in the port pocket  The port was sutured in the pocket using 3-0 Vicryl  The incisions were approximated with 3-0 Vicryl and tissue adhesive  The patient tolerated the procedure well without apparent immediate complications  The patient left the IR department in unchanged condition  Dr Maira May performed and directly supervised the entire procedure  Findings: Using ultrasound guidance, the right internal jugular vein was cannulated using Seldinger technique  The right internal jugular vein was evaluated as a potential access site  The right internal jugular vein was patent, and free of thrombus  Static images of the vessel was obtained  Visualization of real time needle entry into the vessel was obtained  Fluoroscopic spot image demonstrates a newly placed single lumen chest port via the right internal jugular vein with the most central aspect at the SVC/RA junction  The catheter tubing is smooth in contour  Impression: Impression:  Successful placement of a single lumen chest port via the right internal jugular vein   Workstation performed: [id number]         HISTORY:    Past Medical History:   Diagnosis Date    Cancer [facility])     History of radiation exposure     Malignant neoplasm of right female breast ([address] ) 2012    right       Past Surgical History:   Procedure Laterality Date    BACK SURGERY      BREAST CYST EXCISION      BREAST LUMPECTOMY Right 2012    HIP SURGERY      IR BIOPSY LIVER MASS  07/26/2021    IR PLEURAL EFFUSION LONG-TERM CATHETER PLACEMENT  2021    IR PLEURAL EFFUSION LONG-TERM CATHETER REMOVAL  2022    IR PORT PLACEMENT  2022    IR THORACENTESIS  2021       Family History   Problem Relation Age of Onset    Breast cancer Mother         in her 57's    Breast cancer Sister         inher 45s and 48    Colon cancer Maternal Grandmother     No Known Problems Paternal Grandmother     Breast cancer Other     No Known Problems Paternal Aunt        Social History     Socioeconomic History    Marital status: /Civil Union     Spouse name: None    Number of children: None    Years of education: None    Highest education level: None   Occupational History    None   Tobacco Use    Smoking status: Former Smoker     Packs/day: 0 25     Years: 40 00     Pack years: 10 00     Types: Cigarettes     Start date:      Quit date:      Years since quittin 6    Smokeless tobacco: Never Used   Vaping Use    Vaping Use: Never used   Substance and Sexual Activity    Alcohol use: Not Currently    Drug use: Not Currently    Sexual activity: Not Currently     Partners: Male   Other Topics Concern    None   Social History Narrative    None     Social Determinants of Health     Financial Resource Strain: Not on file   Food Insecurity: No Food Insecurity    Worried About Running Out of Food in the Last Year: Never true    Pako of Food in the Last Year: Never true   Transportation Needs: No Transportation Needs    Lack of Transportation (Medical): No    Lack of Transportation (Non-Medical):  No   Physical Activity: Not on file   Stress: Not on file   Social Connections: Not on file   Intimate Partner Violence: Not on file   Housing Stability: Low Risk     Unable to Pay for Housing in the Last Year: No    Number of Places Lived in the Last Year: 1    Unstable Housing in the Last Year: No         Current Facility-Administered Medications:     acetaminophen (TYLENOL) tablet 650 mg, 650 mg, Oral, Q6H PRN, Ivan Mirpatricia, PA-C, 650 mg at 08/22/22 5546    al mag oxide-diphenhydramine-lidocaine viscous (MAGIC MOUTHWASH) suspension 10 mL, 10 mL, Swish & Spit, Q4H PRN, Destiny Olsen PA-C    albuterol (PROVENTIL HFA,VENTOLIN HFA) inhaler 2 puff, 2 puff, Inhalation, Q4H PRN, Destiny Olsen PA-C    benzonatate (TESSALON PERLES) capsule 200 mg, 200 mg, Oral, TID PRN, Ivan Miranda PA-C    calcium carbonate (TUMS) chewable tablet 500 mg, 500 mg, Oral, TID PRN, DAYDAY العلي-JOAQUÍN    cefepime (MAXIPIME) IVPB (premix in dextrose) 2,000 mg 50 mL, 2,000 mg, Intravenous, Q8H, Marti Aldea, DO    dicyclomine (BENTYL) capsule 10 mg, 10 mg, Oral, Q6H PRN, Destiny Olsen PA-C    diphenhydrAMINE (BENADRYL) tablet 50 mg, 50 mg, Oral, HS PRN, Ivan Miranda PA-C    diphenoxylate-atropine (LOMOTIL) 2 5-0 025 mg per tablet 1 tablet, 1 tablet, Oral, 4x Daily PRN, Destiny Olsen PA-C    DULoxetine (CYMBALTA) delayed release capsule 30 mg, 30 mg, Oral, Daily, Ivan Ruth, PA-C, 30 mg at 08/22/22 0613    fluticasone (FLONASE) 50 mcg/act nasal spray 2 spray, 2 spray, Nasal, Daily, Ivan Mirpatricia, PA-C, 2 spray at 08/21/22 3937    HYDROmorphone (DILAUDID) tablet 2 mg, 2 mg, Oral, Q3H PRN, Ivan Mires, PA-C, 2 mg at 08/21/22 2206    HYDROmorphone (DILAUDID) tablet 2 mg, 2 mg, Oral, Q3H PRN, Ivan Mirpatricia, PA-C, 2 mg at 08/22/22 7714    letrozole [facility]) tablet 2 5 mg, 2 5 mg, Oral, QPM, Ivan Miranda PA-C, 2 5 mg at 08/21/22 1751    melatonin tablet 6 mg, 6 mg, Oral, HS, Destiny palacios PA-C, 6 mg at 08/21/22 2207    nystatin (MYCOSTATIN) oral suspension 500,000 Units, 500,000 Units, Swish & Swallow, 4x Daily, Ivan Miranda PA-C, 500,000 Units at 08/21/22 2206    oxybutynin (DITROPAN-XL) 24 hr tablet 5 mg, 5 mg, Oral, Daily, Destiny Olsen PA-C, 5 mg at 08/22/22 1694    polyethylene glycol (MIRALAX) packet 17 g, 17 g, Oral, Daily, Destiny palacios PA-C, 17 g at 08/22/22 0522    rivaroxaban (XARELTO) tablet 20 mg, 20 mg, Oral, QPM, Tea Blevins PA-C, 20 mg at 08/21/22 4522    Medications Prior to Admission   Medication    acetaminophen (TYLENOL) 325 mg tablet    al mag oxide-diphenhydramine-lidocaine viscous (MAGIC MOUTHWASH) 1:1:1 suspension    albuterol (PROVENTIL HFA,VENTOLIN HFA) 90 mcg/act inhaler    benzonatate (TESSALON) 200 MG capsule    calcium carbonate (TUMS) 500 mg chewable tablet    CRANIAL PROSTHESIS, RX,    [facility] Melatonin 3 MG    dextromethorphan-guaiFENesin (ROBITUSSIN DM)  mg/5 mL syrup    dicyclomine (BENTYL) 10 mg capsule    diphenhydrAMINE (BENADRYL) 50 MG tablet    diphenoxylate-atropine (LOMOTIL) 2 5-0 025 mg per tablet    DULoxetine (CYMBALTA) 30 mg delayed release capsule    HYDROmorphone (DILAUDID) 2 mg tablet    Lidocaine Viscous HCl (XYLOCAINE) 2 % mucosal solution    metoclopramide (REGLAN) 10 mg tablet    multivitamin (THERAGRAN) TABS    ondansetron (ZOFRAN) 4 mg tablet    oxybutynin (DITROPAN-XL) 5 mg 24 hr tablet    palbociclib (Ibrance) 100 MG tablet    polyethylene glycol (MIRALAX) 17 g packet    senna (SENOKOT) 8 6 MG tablet    Xarelto 20 MG tablet    docusate sodium (COLACE) 100 mg capsule    fluticasone (FLONASE) 50 mcg/act nasal spray    lactulose 20 g/30 mL    letrozole (FEMARA) 2 5 mg tablet    potassium chloride (K-DUR,KLOR-CON) 20 mEq tablet       Allergies   Allergen Reactions    Codeine Anaphylaxis    Morphine GI Intolerance, Itching and Vomiting    Sulfa Antibiotics Hives and Itching       Labs and pertinent reports reviewed  This note has been generated by voice recognition software system  Therefore, there may be spelling, grammar, and or syntax errors  Please contact if questions arise

## 2022-08-22 NOTE — UTILIZATION REVIEW
Inpatient Admission Authorization Request   NOTIFICATION OF INPATIENT ADMISSION/INPATIENT AUTHORIZATION REQUEST   SERVICING FACILITY:   [address] [place] [address]  Tax ID: [id number]  NPI: [id number]  Place of Service: Inpatient [facility]  [address]  Place of Service Code: 24     ATTENDING PROVIDER:  Attending Name and NPI#: Adrienne Ellison Md [[id number]]  Address: [address] [place] [address]  Phone: [phone]     UTILIZATION REVIEW CONTACT:  [facility]ja [facility]ney Utilization   Network Utilization Review Department  Phone: [phone]  Fax [phone]  Email: [email or web address]     PHYSICIAN ADVISORY SERVICES:  FOR CLTS-IZ-PFJI REVIEW - MEDICAL NECESSITY DENIAL  Phone: [phone]  Fax: [phone]  Email: [email or web address]     TYPE OF REQUEST:  Inpatient Status     ADMISSION INFORMATION:  ADMISSION DATE/TIME: 8/20/22 10:00 PM  PATIENT DIAGNOSIS CODE/DESCRIPTION:  Fever [R50 9]  Cellulitis of right foot [L03 115]  Neutropenia (HCC) [D70 9]  Primary malignant neoplasm of right breast with metastasis to other site [facility]) [C50 911]  DISCHARGE DATE/TIME: No discharge date for patient encounter  IMPORTANT INFORMATION:  Please contact Puja Denney directly with any questions or concerns regarding this request  Department voicemails are confidential     Send requests for admission clinical reviews, concurrent reviews, approvals, and administrative denials due to lack of clinical to fax [phone]

## 2022-08-22 NOTE — PROGRESS NOTES
[address] [facility]  Progress Note Baltazar Buitrago 1965, 49 y o  female MRN: [id number]  Unit/Bed#: -01 Encounter: [id number]  Primary Care Provider: Gali Barksdale MD   Date and time admitted to hospital: 8/20/2022  7:42 PM    * Febrile neutropenia [facility])  Assessment & Plan  · Patient reports development of fatigue and sore throat  Likely secondary to pharyngitis  · Currently on chemotherapy for breast cancer as below    · Meets SEPSIS criteria as below   · CXR wet read without acute abnormality   · ID on board, appreciate recommendations  · Continue IV cefepime  · Vancomycin discontinued  · Neutropenic precautions  · Monitor fever curves and WBC count with differentials  · Follow-up blood cultures, negative thus far    Sepsis ([facility] )  Assessment & Plan  · Met sirs criteria, likely secondary to pharyngitis   · Continue IV cefepime  · Follow-up on blood cultures, negative thus far  · Monitor fever curves and WBC count    Hypokalemia  Assessment & Plan  · Potassium replaced  · Continue to monitor    Palliative care patient  Assessment & Plan  · Follows with Dr Cisco Carranza as outpatient  · Continue home dilaudid 2-4mg q3h PRN moderate/severe pain (PDMP reviewed)  · Palliative care consulted     Malignant neoplasm of breast in female, estrogen receptor positive ([facility] )  Assessment & Plan  · With mets to liver and bone   · Currently receiving chemotherapy  · Follows with heme onc as an outpatient (last seen 08/17)  · Patient reports she is no longer taking daily ibrance  · Continue home letrozole  · Heme onc consulted, appreciate recommendations    Hypertension  Assessment & Plan  · Blood pressure on the soft side secondary to Dilaudid    · Recheck blood pressure improved  · Not currently prescribed outpatient antihypertensive  · Monitor BP per unit protocol       VTE Pharmacologic Prophylaxis:   Pharmacologic: Rivaroxaban (Xarelto)  Mechanical VTE Prophylaxis in Place: Yes    Review of Systems:    Review of Systems   Constitutional: Negative for chills and fever  HENT: Positive for mouth sores and sore throat  Negative for ear pain  Eyes: Negative for pain and visual disturbance  Respiratory: Negative for cough and shortness of breath  Cardiovascular: Negative for chest pain and palpitations  Gastrointestinal: Negative for abdominal pain and vomiting  Genitourinary: Negative for dysuria and hematuria  Musculoskeletal: Negative for arthralgias and back pain  Skin: Negative for color change and rash  Neurological: Negative for seizures and syncope  All other systems reviewed and are negative  Past Medical and Surgical History:     Past Medical History:   Diagnosis Date    Cancer [facility])     History of radiation exposure     Malignant neoplasm of right female breast ([facility] [address] ) 2012    right       Past Surgical History:   Procedure Laterality Date    BACK SURGERY      BREAST CYST EXCISION      BREAST LUMPECTOMY Right 2012    HIP SURGERY      IR BIOPSY LIVER MASS  2021    IR PLEURAL EFFUSION LONG-TERM CATHETER PLACEMENT  2021    IR PLEURAL EFFUSION LONG-TERM CATHETER REMOVAL  2022    IR PORT PLACEMENT  2022    IR THORACENTESIS  2021       Patient Centered Rounds:     Discussions with Specialists or Other Care Team Provider:  Infectious Disease, case management    Education and Discussions with Family / Patient:  Patient    Time Spent for Care: 30 minutes  More than 50% of total time spent on counseling and coordination of care as described above      Current Length of Stay: 2 day(s)    Current Patient Status: Inpatient   Certification Statement: The patient will continue to require additional inpatient hospital stay due to IV antibiotics    Discharge Plan:  24-48 hours    Code Status: Level 1 - Full Code      Subjective:   Denies any new complaints    Objective:     Vitals:   Temp (24hrs), Av 8 °F (37 1 °C), Min:97 2 °F (36 2 °C), Max:99 6 °F (37 6 °C)    Temp:  [97 2 °F (36 2 °C)-99 6 °F (37 6 °C)] 99 6 °F (37 6 °C)  HR:  [] 102  Resp:  [16-20] 18  BP: (84-98)/(50-57) 86/50  SpO2:  [92 %-95 %] 92 %  Body mass index is 22 63 kg/m²  Input and Output Summary (last 24 hours): Intake/Output Summary (Last 24 hours) at 8/22/2022 1358  Last data filed at 8/22/2022 0754  Gross per 24 hour   Intake 870 ml   Output 0 ml   Net 870 ml       Physical Exam:     Physical Exam  Vitals and nursing note reviewed  Constitutional:       General: She is not in acute distress  Appearance: She is well-developed  She is not ill-appearing or toxic-appearing  HENT:      Head: Normocephalic and atraumatic  Mouth/Throat:      Mouth: Mucous membranes are moist       Pharynx: Oropharynx is clear  Eyes:      General: No scleral icterus  Conjunctiva/sclera: Conjunctivae normal    Cardiovascular:      Rate and Rhythm: Normal rate and regular rhythm  Heart sounds: No murmur heard  Pulmonary:      Effort: Pulmonary effort is normal  No respiratory distress  Breath sounds: Normal breath sounds  No wheezing or rales  Abdominal:      General: Bowel sounds are normal       Palpations: Abdomen is soft  Tenderness: There is no abdominal tenderness  Musculoskeletal:      Cervical back: Normal range of motion and neck supple  Right lower leg: No edema  Left lower leg: No edema  Skin:     General: Skin is warm and dry  Findings: Lesion (Abrasion on the right toes, no surrounding erythema or signs of infection) present  Neurological:      General: No focal deficit present  Mental Status: She is alert and oriented to person, place, and time     Psychiatric:         Mood and Affect: Mood normal          Behavior: Behavior normal          Additional Data:     Labs:    Results from last 7 days   Lab Units 08/21/22  0500 08/20/22 2037 08/16/22  1111   WBC Thousand/uL 0 63* 0 54* 2 61*   HEMOGLOBIN g/dL 10 4* 11 5 12 2   HEMATOCRIT % 31 5* 34 5* 37 1   PLATELETS Thousands/uL 105* 114* 178   BANDS PCT %  --  13*  --    NEUTROS PCT %  --   --  85*   LYMPHS PCT %  --   --  10*   LYMPHO PCT %  --  35  --    MONOS PCT %  --   --  3*   MONO PCT %  --  5  --    EOS PCT %  --  0 0     Results from last 7 days   Lab Units 08/21/22  0500 08/20/22 2037   SODIUM mmol/L 140 137   POTASSIUM mmol/L 3 4* 2 9*   CHLORIDE mmol/L 107 104   CO2 mmol/L 23 23   BUN mg/dL 10 13   CREATININE mg/dL 0 73 0 65   ANION GAP mmol/L 10 10   CALCIUM mg/dL 8 3 8 8   ALBUMIN g/dL  --  3 2*   TOTAL BILIRUBIN mg/dL  --  0 71   ALK PHOS U/L  --  59   ALT U/L  --  27   AST U/L  --  36   GLUCOSE RANDOM mg/dL 109 129     Results from last 7 days   Lab Units 08/20/22 2037   INR  1 34*             Results from last 7 days   Lab Units 08/20/22 2037   LACTIC ACID mmol/L 1 2   PROCALCITONIN ng/ml 0 27*           * I Have Reviewed All Lab Data Listed Above  * Additional Pertinent Lab Tests Reviewed: Desire Rider Admission Reviewed    Imaging:    Imaging Reports Reviewed Today Include:  Chest x-ray  Imaging Personally Reviewed by Myself Includes:  None    Recent Cultures (last 7 days):     Results from last 7 days   Lab Units 08/20/22 2037   BLOOD CULTURE  No Growth at 24 hrs  No Growth at 24 hrs         Last 24 Hours Medication List:   Current Facility-Administered Medications   Medication Dose Route Frequency Provider Last Rate    acetaminophen  650 mg Oral Q6H PRN Merryl DAYDAY Gtz-C      al mag oxide-diphenhydramine-lidocaine viscous  10 mL Swish & Spit Q4H PRN Sly [place], [place]      albuterol  2 puff Inhalation Q4H PRN Sly [place], [place]      benzonatate  200 mg Oral TID PRN Merryl Rank, PA-JOAQUÍN      calcium carbonate  500 mg Oral TID PRN Merryl Ulisses PA-JOAQUÍN      cefepime  2,000 mg Intravenous Q8H Marti Aldea, DO      dicyclomine  10 mg Oral Q6H PRN Sly Mariel [place], PA-JOAQUÍN      diphenhydrAMINE  50 mg Oral HS PRN [place] Layne Aranda PA-C      diphenoxylate-atropine  1 tablet Oral 4x Daily PRN Lexine Dave, Miguel Santos      DULoxetine  30 mg Oral Daily [facility], Miguel Santos      fluticasone  2 spray Nasal Daily [facility], Miguel Santos      HYDROmorphone  2 mg Oral Q3H PRN [facility], Miguel Santos      HYDROmorphone  2 mg Oral Q3H PRN [facility], Miguel Santos      letrozole  2 5 mg Oral QPM [facility], Miguel Santos      melatonin  6 mg Oral HS Lexine Dave, Miguel Santos      nystatin  500,000 Units Swish & Swallow 4x Daily Lexine Dave, Miguel Santos      oxybutynin  5 mg Oral Daily [facility], Miguel Santos      polyethylene glycol  17 g Oral Daily [facility], Miguel Santos      rivaroxaban  20 mg Oral QPM Nick Acosta PA-C          Today, Patient Was Seen By: Dustin Bang MD    ** Please Note: Dictation voice to text software may have been used in the creation of this document   **

## 2022-08-22 NOTE — ASSESSMENT & PLAN NOTE
· Met sirs criteria, likely secondary to pharyngitis   · Continue IV cefepime  · Follow-up on blood cultures, negative thus far  · Monitor fever curves and WBC count

## 2022-08-23 VITALS
RESPIRATION RATE: 17 BRPM | HEART RATE: 101 BPM | BODY MASS INDEX: 22.66 KG/M2 | WEIGHT: 136 LBS | HEIGHT: 65 IN | OXYGEN SATURATION: 95 % | DIASTOLIC BLOOD PRESSURE: 70 MMHG | TEMPERATURE: 99 F | SYSTOLIC BLOOD PRESSURE: 102 MMHG

## 2022-08-23 LAB
ANION GAP SERPL CALCULATED.3IONS-SCNC: 11 MMOL/L (ref 4–13)
BUN SERPL-MCNC: 6 MG/DL (ref 5–25)
CALCIUM SERPL-MCNC: 8.8 MG/DL (ref 8.3–10.1)
CHLORIDE SERPL-SCNC: 103 MMOL/L (ref 96–108)
CO2 SERPL-SCNC: 24 MMOL/L (ref 21–32)
CREAT SERPL-MCNC: 0.79 MG/DL (ref 0.6–1.3)
ERYTHROCYTE [DISTWIDTH] IN BLOOD BY AUTOMATED COUNT: 18.1 % (ref 11.6–15.1)
GFR SERPL CREATININE-BSD FRML MDRD: 85 ML/MIN/1.73SQ M
GLUCOSE SERPL-MCNC: 114 MG/DL (ref 65–140)
HCT VFR BLD AUTO: 32.9 % (ref 34.8–46.1)
HGB BLD-MCNC: 10.7 G/DL (ref 11.5–15.4)
MCH RBC QN AUTO: 30.5 PG (ref 26.8–34.3)
MCHC RBC AUTO-ENTMCNC: 32.5 G/DL (ref 31.4–37.4)
MCV RBC AUTO: 94 FL (ref 82–98)
NRBC BLD AUTO-RTO: 1 /100 WBCS
PLATELET # BLD AUTO: 119 THOUSANDS/UL (ref 149–390)
PMV BLD AUTO: 12.5 FL (ref 8.9–12.7)
POTASSIUM SERPL-SCNC: 3.7 MMOL/L (ref 3.5–5.3)
RBC # BLD AUTO: 3.51 MILLION/UL (ref 3.81–5.12)
SODIUM SERPL-SCNC: 138 MMOL/L (ref 135–147)
WBC # BLD AUTO: 6.22 THOUSAND/UL (ref 4.31–10.16)

## 2022-08-23 PROCEDURE — 85027 COMPLETE CBC AUTOMATED: CPT | Performed by: INTERNAL MEDICINE

## 2022-08-23 PROCEDURE — 99239 HOSP IP/OBS DSCHRG MGMT >30: CPT | Performed by: STUDENT IN AN ORGANIZED HEALTH CARE EDUCATION/TRAINING PROGRAM

## 2022-08-23 PROCEDURE — 80048 BASIC METABOLIC PNL TOTAL CA: CPT | Performed by: INTERNAL MEDICINE

## 2022-08-23 PROCEDURE — 99232 SBSQ HOSP IP/OBS MODERATE 35: CPT | Performed by: STUDENT IN AN ORGANIZED HEALTH CARE EDUCATION/TRAINING PROGRAM

## 2022-08-23 PROCEDURE — 99232 SBSQ HOSP IP/OBS MODERATE 35: CPT | Performed by: INTERNAL MEDICINE

## 2022-08-23 RX ADMIN — ACETAMINOPHEN 650 MG: 325 TABLET, FILM COATED ORAL at 05:17

## 2022-08-23 RX ADMIN — DULOXETINE HYDROCHLORIDE 30 MG: 30 CAPSULE, DELAYED RELEASE ORAL at 08:20

## 2022-08-23 RX ADMIN — FLUTICASONE PROPIONATE 2 SPRAY: 50 SPRAY, METERED NASAL at 08:31

## 2022-08-23 RX ADMIN — DIPHENHYDRAMINE HYDROCHLORIDE 10 ML: 12.5 SOLUTION ORAL at 05:26

## 2022-08-23 RX ADMIN — POLYETHYLENE GLYCOL 3350 17 G: 17 POWDER, FOR SOLUTION ORAL at 08:21

## 2022-08-23 RX ADMIN — OXYBUTYNIN CHLORIDE 5 MG: 5 TABLET, EXTENDED RELEASE ORAL at 08:21

## 2022-08-23 RX ADMIN — CEFEPIME HYDROCHLORIDE 2000 MG: 2 INJECTION, SOLUTION INTRAVENOUS at 01:21

## 2022-08-23 RX ADMIN — HYDROMORPHONE HYDROCHLORIDE 2 MG: 2 TABLET ORAL at 12:29

## 2022-08-23 RX ADMIN — HYDROMORPHONE HYDROCHLORIDE 2 MG: 2 TABLET ORAL at 05:17

## 2022-08-23 NOTE — DISCHARGE INSTR - AVS FIRST PAGE
Recommended close follow-up with primary care provider in 3-5 days of discharge  Recommended close follow-up with primary Hematology Oncology

## 2022-08-23 NOTE — PLAN OF CARE
Problem: INFECTION - ADULT  Goal: Absence or prevention of progression during hospitalization  Description: INTERVENTIONS:  - Assess and monitor for signs and symptoms of infection  - Monitor lab/diagnostic results  - Monitor all insertion sites, i e  indwelling lines, tubes, and drains  - Monitor endotracheal if appropriate and nasal secretions for changes in amount and color  - [place] appropriate cooling/warming therapies per order  - Administer medications as ordered  - Instruct and encourage patient and family to use good hand hygiene technique  - Identify and instruct in appropriate isolation precautions for identified infection/condition  Outcome: Adequate for Discharge     Problem: INFECTION - ADULT  Goal: Absence of fever/infection during neutropenic period  Description: INTERVENTIONS:  - Monitor WBC    Outcome: Adequate for Discharge     Problem: Potential for Falls  Goal: Patient will remain free of falls  Description: INTERVENTIONS:  - Educate patient/family on patient safety including physical limitations  - Instruct patient to call for assistance with activity   - Consult OT/PT to assist with strengthening/mobility   - Keep Call bell within reach  - Keep bed low and locked with side rails adjusted as appropriate  - Keep care items and personal belongings within reach  - Initiate and maintain comfort rounds  - Make Fall Risk Sign visible to staff  - Offer Toileting every 3 Hours, in advance of need  - Initiate/Maintain bed alarm  - Obtain necessary fall risk management equipment:   - Apply yellow socks and bracelet for high fall risk patients  - Consider moving patient to room near nurses station  Outcome: Adequate for Discharge

## 2022-08-23 NOTE — SOCIAL WORK
Palliative Supportive Care  met with patient/family to continue to provide emotional support and guidance  Updated biopsychosocial information relevant to support: Patient's spouse arrested in patient's hospital room prior to [facility] team arrival for [facility] Violation  Patient being discharged today  Patient expressed concern over patient and son's Social Security being cut off due to spouse having had received his  She reported that now that he is going to be incarcerated the family will lose income  She had a letter from [facility] that she needed to appeal termination of benefits on son's behalf in hospital that she received 2 days ago  The appeal had to be done within a particular timeframe so LSW completed letter stating patient was admitted in hospital and therefore unable to complete  Also printed out the form that patient will need to complete for the SSA benefits  Identified areas of need include: ongoing support  Resources provided: document for SSA and letter of hospitalization dates  Areas that need future follow-up include: ongoing support    Manj[facility] [address], [facility] and [facility]) Present  I have spent 40 minutes with Patient  today in which greater than 50% of this time was spent in counseling/coordination of care regarding Patient and family education and emotional support and resources      Palliative  will follow-up as requested by patient, family, and primary team   Please contact with any specific requests

## 2022-08-23 NOTE — PLAN OF CARE
Problem: INFECTION - ADULT  Goal: Absence or prevention of progression during hospitalization  Description: INTERVENTIONS:  - Assess and monitor for signs and symptoms of infection  - Monitor lab/diagnostic results  - Monitor all insertion sites, i e  indwelling lines, tubes, and drains  - Monitor endotracheal if appropriate and nasal secretions for changes in amount and color  - [place] appropriate cooling/warming therapies per order  - Administer medications as ordered  - Instruct and encourage patient and family to use good hand hygiene technique  - Identify and instruct in appropriate isolation precautions for identified infection/condition  Outcome: Progressing  Goal: Absence of fever/infection during neutropenic period  Description: INTERVENTIONS:  - Monitor WBC    Outcome: Progressing     Problem: SAFETY ADULT  Goal: Patient will remain free of falls  Description: INTERVENTIONS:  - Educate patient/family on patient safety including physical limitations  - Instruct patient to call for assistance with activity   - Consult OT/PT to assist with strengthening/mobility   - Keep Call bell within reach  - Keep bed low and locked with side rails adjusted as appropriate  - Keep care items and personal belongings within reach  - Initiate and maintain comfort rounds  - Make Fall Risk Sign visible to staff  - Offer Toileting every Hours, in advance of need  - Initiate/Maintain alarm  - Obtain necessary fall risk management equipment:   - Apply yellow socks and bracelet for high fall risk patients  - Consider moving patient to room near nurses station  Outcome: Progressing  Goal: Maintain or return to baseline ADL function  Description: INTERVENTIONS:  -  Assess patient's ability to carry out ADLs; assess patient's baseline for ADL function and identify physical deficits which impact ability to perform ADLs (bathing, care of mouth/teeth, toileting, grooming, dressing, etc )  - Assess/evaluate cause of self-care deficits   - Assess range of motion  - Assess patient's mobility; develop plan if impaired  - Assess patient's need for assistive devices and provide as appropriate  - Encourage maximum independence but intervene and supervise when necessary  - Involve family in performance of ADLs  - Assess for home care needs following discharge   - Consider OT consult to assist with ADL evaluation and planning for discharge  - Provide patient education as appropriate  Outcome: Progressing  Goal: Maintains/Returns to pre admission functional level  Description: INTERVENTIONS:  - Perform BMAT or MOVE assessment daily    - Set and communicate daily mobility goal to care team and patient/family/caregiver  - Collaborate with rehabilitation services on mobility goals if consulted  - Perform Range of Motion  times a day  - Reposition patient every  hours    - Dangle patient  times a day  - Stand patient  times a day  - Ambulate patient  times a day  - Out of bed to chair  times a day   - Out of bed for meals  times a day  - Out of bed for toileting  - Record patient progress and toleration of activity level   Outcome: Progressing     Problem: Potential for Falls  Goal: Patient will remain free of falls  Description: INTERVENTIONS:  - Educate patient/family on patient safety including physical limitations  - Instruct patient to call for assistance with activity   - Consult OT/PT to assist with strengthening/mobility   - Keep Call bell within reach  - Keep bed low and locked with side rails adjusted as appropriate  - Keep care items and personal belongings within reach  - Initiate and maintain comfort rounds  - Make Fall Risk Sign visible to staff  - Offer Toileting every  Hours, in advance of need  - Initiate/Maintain alarm  - Obtain necessary fall risk management equipment:   - Apply yellow socks and bracelet for high fall risk patients  - Consider moving patient to room near nurses station  Outcome: Progressing

## 2022-08-23 NOTE — PLAN OF CARE
Problem: INFECTION - ADULT  Goal: Absence or prevention of progression during hospitalization  Description: INTERVENTIONS:  - Assess and monitor for signs and symptoms of infection  - Monitor lab/diagnostic results  - Monitor all insertion sites, i e  indwelling lines, tubes, and drains  - Monitor endotracheal if appropriate and nasal secretions for changes in amount and color  - [place] appropriate cooling/warming therapies per order  - Administer medications as ordered  - Instruct and encourage patient and family to use good hand hygiene technique  - Identify and instruct in appropriate isolation precautions for identified infection/condition  8/23/2022 0405 by Alysia Mensah RN  Outcome: Progressing  8/23/2022 0353 by Alysia Mensah RN  Outcome: Progressing  Goal: Absence of fever/infection during neutropenic period  Description: INTERVENTIONS:  - Monitor WBC    8/23/2022 0405 by Alysia Mensah RN  Outcome: Progressing  8/23/2022 0353 by Alysia Mensah RN  Outcome: Progressing     Problem: SAFETY ADULT  Goal: Patient will remain free of falls  Description: INTERVENTIONS:  - Educate patient/family on patient safety including physical limitations  - Instruct patient to call for assistance with activity   - Consult OT/PT to assist with strengthening/mobility   - Keep Call bell within reach  - Keep bed low and locked with side rails adjusted as appropriate  - Keep care items and personal belongings within reach  - Initiate and maintain comfort rounds  - Make Fall Risk Sign visible to staff  - Offer Toileting every  Hours, in advance of need  - Initiate/Maintain alarm  - Obtain necessary fall risk management equipment:   - Apply yellow socks and bracelet for high fall risk patients  - Consider moving patient to room near nurses station  8/23/2022 0405 by Alysia Mensah RN  Outcome: Progressing  8/23/2022 0353 by Alysia Mensah RN  Outcome: Progressing  Goal: Maintain or return to baseline ADL function  Description: INTERVENTIONS:  -  Assess patient's ability to carry out ADLs; assess patient's baseline for ADL function and identify physical deficits which impact ability to perform ADLs (bathing, care of mouth/teeth, toileting, grooming, dressing, etc )  - Assess/evaluate cause of self-care deficits   - Assess range of motion  - Assess patient's mobility; develop plan if impaired  - Assess patient's need for assistive devices and provide as appropriate  - Encourage maximum independence but intervene and supervise when necessary  - Involve family in performance of ADLs  - Assess for home care needs following discharge   - Consider OT consult to assist with ADL evaluation and planning for discharge  - Provide patient education as appropriate  8/23/2022 0405 by Gale Gunn RN  Outcome: Progressing  8/23/2022 0353 by Gale Gunn RN  Outcome: Progressing  Goal: Maintains/Returns to pre admission functional level  Description: INTERVENTIONS:  - Perform BMAT or MOVE assessment daily    - Set and communicate daily mobility goal to care team and patient/family/caregiver  - Collaborate with rehabilitation services on mobility goals if consulted  - Perform Range of Motion  times a day  - Reposition patient every  hours    - Dangle patient  times a day  - Stand patient  times a day  - Ambulate patient  times a day  - Out of bed to chair  times a day   - Out of bed for meals  times a day  - Out of bed for toileting  - Record patient progress and toleration of activity level   8/23/2022 0405 by Gale Gunn RN  Outcome: Progressing  8/23/2022 0353 by Gale Gunn RN  Outcome: Progressing     Problem: Potential for Falls  Goal: Patient will remain free of falls  Description: INTERVENTIONS:  - Educate patient/family on patient safety including physical limitations  - Instruct patient to call for assistance with activity   - Consult OT/PT to assist with strengthening/mobility   - Keep Call bell within reach  - Keep bed low and locked with side rails adjusted as appropriate  - Keep care items and personal belongings within reach  - Initiate and maintain comfort rounds  - Make Fall Risk Sign visible to staff  - Offer Toileting every  Hours, in advance of need  - Initiate/Maintain alarm  - Obtain necessary fall risk management equipment:  - Apply yellow socks and bracelet for high fall risk patients  - Consider moving patient to room near nurses station  8/23/2022 0405 by Sheffield Gowers, RN  Outcome: Progressing  8/23/2022 0353 by Sheffield Gowers, RN  Outcome: Progressing

## 2022-08-23 NOTE — PROGRESS NOTES
Progress Note - Infectious Disease   Kristopher Gray 49 y o  female MRN: [id number]  Unit/Bed#: -01 Encounter: [id number]      IMPRESSION & RECOMMENDATIONS:   Impression/Recommendations:  1  Neutropenic fever  Admission [address] was 300, with fevers up to 100 7  Most likely due to #2  Negative UA, CXR, COVID-19/influenza PCR, GAS PCR, blood cultures  Patient is hemodynamically stable and nontoxic  Transient hypotension after receiving Dilaudid  Fevers have improved  Neutropenia has now resolved      -discontinue cefepime  -no further antibiotic indicated at this time in the absence of acute bacterial infection, with resolution of fever/neutropenia  -follow temperatures and hemodynamics  -follow CBC/ANC     2  Mild pharyngitis  Negative GAS PCR  Most likely viral process  No signs of thrush  No odynophagia/dysphagia      -symptomatic management  -serial exams     3  Right foot abrasion  No clinical evidence of infection on my exam   No fluctuance, purulence or joint involvement      -no antibiotic indicated  -serial exams     4  Metastatic breast cancer with osseous, hepatic metastases on active chemotherapy  Via new chest wall port  Patient states she did receive Neulasta  ANC has improved      -follow CBC/ANC  -oncology evaluation  -palliative care follow-up for pain management     Antibiotics:  Cefepime 3    I discussed above plan with Dr Mayi Araya from Internal Medicine Service  Subjective:  T-max 99 0°  Sore throat is improving  She still has pain at her tongue or the canker sores  No other new focal complaints  Tolerating oral intake      Objective:  Vitals:  Temp:  [98 9 °F (37 2 °C)-99 °F (37 2 °C)] 99 °F (37 2 °C)  HR:  [] 101  Resp:  [15-19] 17  BP: (102-107)/(68-72) 102/70  SpO2:  [93 %-96 %] 95 %  Temp (24hrs), Av °F (37 2 °C), Min:98 9 °F (37 2 °C), Max:99 °F (37 2 °C)  Current: Temperature: 99 °F (37 2 °C)    Physical Exam:   General:  No acute distress, pale appearing, nontoxic  HEENT:  Atraumatic normocephalic  Psychiatric:  Awake and alert  Pulmonary:  Normal respiratory excursion without accessory muscle use  Chest wall port in place with no surrounding erythema, tenderness or visible  Abdomen:  Soft, nontender  Extremities:  No edema  Skin:  No diffuse rashes  Stable scratches on her right foot with no signs of acute infection  Neuro: Moves all extremities spontaneously  Lab Results:  I have personally reviewed pertinent labs  Results from last 7 days   Lab Units 08/23/22  0518 08/21/22  0500 08/20/22 2037   POTASSIUM mmol/L 3 7 3 4* 2 9*   CHLORIDE mmol/L 103 107 104   CO2 mmol/L 24 23 23   BUN mg/dL 6 10 13   CREATININE mg/dL 0 79 0 73 0 65   EGFR ml/min/1 73sq m 85 93 100   CALCIUM mg/dL 8 8 8 3 8 8   AST U/L  --   --  36   ALT U/L  --   --  27   ALK PHOS U/L  --   --  59     Results from last 7 days   Lab Units 08/23/22  0518 08/22/22  1456 08/21/22  0500   WBC Thousand/uL 6 22 2 69* 0 63*   HEMOGLOBIN g/dL 10 7* 10 5* 10 4*   PLATELETS Thousands/uL 119* 100* 105*     Results from last 7 days   Lab Units 08/20/22 2037   BLOOD CULTURE  No Growth at 48 hrs  No Growth at 48 hrs  Imaging Studies:   I have personally reviewed pertinent imaging study reports and images in PACS  EKG, Pathology, and Other Studies:   I have personally reviewed pertinent reports

## 2022-08-23 NOTE — PROGRESS NOTES
Progress Note - Palliative & Supportive Care  Danya Naranjo  49 y o   female  /-01   MRN: [id number]  Encounter: [id number]     Assessment  Neoplasm related pain  OIC  Nausea  Goals of Care    Goals of Care  Level 1 code status    Plan  Symptom Management  · Neoplasm related pain- continue Dilaudid as directed and monitor  · Nausea- stable  · OIC- stable, continue bowel regimen and monitor  · Goals of Care- patient desires to continue treatment and follow with Oncology  Level 1 code status  · Psychosocial support- supportive listening and emotional support provided  · Follow up with Palliative outpatient after discharge    24 Hour History  Chart reviewed before visit  Patient was seen lying in bed in room, she is complaining of low back pain increased with changing position from lying to sitting  Denies abdominal pain, nauseas or vomiting  Plan to be discharged this afternoon, states her spouse was arrested in her patient room today due to parole violation  She resides with her brother, who will be picking her up for discharge home  Current pain location(s): low back  Pain Scale:   5/10  PRN Use of Pain Medications: 8 mg oral Dialudid  24 hour Total OME for 32 mg      Review of Systems   Constitutional: Positive for fatigue  Respiratory: Negative for shortness of breath  Cardiovascular: Negative for chest pain  Gastrointestinal: Negative for constipation, diarrhea, nausea and vomiting  Musculoskeletal: Positive for back pain           Medications    Current Facility-Administered Medications:     acetaminophen (TYLENOL) tablet 650 mg, 650 mg, Oral, Q6H PRN, Monica Bocanegra PA-C, 650 mg at 08/23/22 3619    al mag oxide-diphenhydramine-lidocaine viscous (MAGIC MOUTHWASH) suspension 10 mL, 10 mL, Swish & Spit, Q4H PRN, Kathleen Olsen PA-C, 10 mL at 08/23/22 0549    albuterol (PROVENTIL HFA,VENTOLIN HFA) inhaler 2 puff, 2 puff, Inhalation, Q4H PRN, Monica Bocanegra PA-C   benzonatate (TESSALON PERLES) capsule 200 mg, 200 mg, Oral, TID PRN, Gris Harden PA-C    calcium carbonate (TUMS) chewable tablet 500 mg, 500 mg, Oral, TID PRN, DAYDAY Christian-C    cefepime (MAXIPIME) IVPB (premix in dextrose) 2,000 mg 50 mL, 2,000 mg, Intravenous, Q8H, Marti Aldea, DO, Last Rate: 100 mL/hr at 08/23/22 0834, Restarted at 08/23/22 0834    dicyclomine (BENTYL) capsule 10 mg, 10 mg, Oral, Q6H PRN, Vicki Olsen PA-C    diphenhydrAMINE (BENADRYL) tablet 50 mg, 50 mg, Oral, HS PRN, Gris Harden PA-C    diphenoxylate-atropine (LOMOTIL) 2 5-0 025 mg per tablet 1 tablet, 1 tablet, Oral, 4x Daily PRN, Vicki Olsen PA-C    DULoxetine (CYMBALTA) delayed release capsule 30 mg, 30 mg, Oral, Daily, Gris Harden PA-C, 30 mg at 08/23/22 0820    fluticasone (FLONASE) 50 mcg/act nasal spray 2 spray, 2 spray, Nasal, Daily, Gris Harden, PA-C, 2 spray at 08/23/22 0831    HYDROmorphone (DILAUDID) tablet 2 mg, 2 mg, Oral, Q3H PRN, Gris Harden, PA-C, 2 mg at 08/23/22 4434    HYDROmorphone (DILAUDID) tablet 2 mg, 2 mg, Oral, Q3H PRN, Gris Harden, PA-C, 2 mg at 08/22/22 3718    letrozole [facility]) tablet 2 5 mg, 2 5 mg, Oral, QPM, Gris Harden, PA-C, 2 5 mg at 08/22/22 1803    melatonin tablet 6 mg, 6 mg, Oral, HS, Gris Harden, PA-C, 6 mg at 08/22/22 2210    nystatin (MYCOSTATIN) oral suspension 500,000 Units, 500,000 Units, Swish & Swallow, 4x Daily, Gris Harden PA-C, 500,000 Units at 08/21/22 2206    oxybutynin (DITROPAN-XL) 24 hr tablet 5 mg, 5 mg, Oral, Daily, Gris Harden PA-C, 5 mg at 08/23/22 8863    polyethylene glycol (MIRALAX) packet 17 g, 17 g, Oral, Daily, DAYDAY Christian-C, 17 g at 08/23/22 8909    rivaroxaban (XARELTO) tablet 20 mg, 20 mg, Oral, QPM, Gris Harden PA-C, 20 mg at 08/22/22 1803    Objective  /70   Pulse 101   Temp 99 °F (37 2 °C)   Resp 17   Ht 5' 5" (1 651 m)   Wt 61 7 kg (136 lb)   LMP 05/26/2021 SpO2 95%   BMI 22 63 kg/m²   Physical Exam:   Constitutional: Ill appearing  Comfortable and in no acute distress  Head: Normocephalic and atraumatic  Eyes: EOM are normal  No ocular discharge  No scleral icterus  Neck: no visible adenopathy or masses  Cardiac: Normal rate and rhythm, normal pulses  Respiratory: Effort normal  No stridor  No respiratory distress  No cough  Gastrointestinal: No abdominal distension  Musculoskeletal: No edema  Neurological: Alert, oriented and appropriately conversant  Skin: Dry, no diaphoresis  Psychiatric: Displays a normal mood and affect  Behavior, judgement and thought content appear normal      Lab Results:   CBC:   Lab Results   Component Value Date    WBC 6 22 08/23/2022    HGB 10 7 (L) 08/23/2022    HCT 32 9 (L) 08/23/2022    MCV 94 08/23/2022     (L) 08/23/2022    MCH 30 5 08/23/2022    MCHC 32 5 08/23/2022    RDW 18 1 (H) 08/23/2022    MPV 12 5 08/23/2022    NRBC 1 08/23/2022   , CMP:   Lab Results   Component Value Date    SODIUM 138 08/23/2022    K 3 7 08/23/2022     08/23/2022    CO2 24 08/23/2022    BUN 6 08/23/2022    CREATININE 0 79 08/23/2022    CALCIUM 8 8 08/23/2022    EGFR 85 08/23/2022     Imaging Studies: no new studies for review    Counseling / Coordination of Care  Total floor / unit time spent today 30  minutes  Greater than 50% of total time was spent with the patient and / or family counseling and / or coordinating of care  A description of the counseling / coordination of care: Chart reviewed, provided medical updates, determined goals of care, discussed palliative care and symptom management,  determined social/family support, provided psychosocial support   Reviewed with SLIM    privacy exception: note includes other individuals    Jose Hanna, MICHELLE, CRNP  Palliative & Supportive Care    Portions of this document may have been created using dictation software and as such some "sound alike" terms may have been generated by the system  Do not hesitate to contact me with any questions or clarifications

## 2022-08-23 NOTE — DISCHARGE SUMMARY
[facility]  Discharge- Beba Marquez 1965, 49 y o  female MRN: [id number]  Unit/Bed#: -01 Encounter: [id number]  Primary Care Provider: Ariel Webster MD   Date and time admitted to hospital: 8/20/2022  7:42 PM    * Febrile neutropenia [facility])  Assessment & Plan  · Patient reports development of fatigue and sore throat  Likely secondary to pharyngitis  · Currently on chemotherapy for breast cancer  · CXR report noted with small right pleural effusion  · Was treated with IV cefepime empirically per ID recommendation  · Blood cultures without growth  · WBC and  ANC improved  · Id recommended no need for antibiotics  · Hemodynamically stable for discharge with outpatient follow-up with PCP  SIRS ([place] [id number] )  Assessment & Plan  · Met sirs criteria, likely secondary to pharyngitis  · Two cultures without growth  · Discontinueantibiotics per ID recommendation  Hypokalemia  Assessment & Plan  · Potassium replaced  · Now resolved    Palliative care patient  Assessment & Plan  · Follows with Dr Keturah Brittle as outpatient  · Continue home dilaudid 2-4mg q3h PRN moderate/severe pain (PDMP reviewed)  · Recommended close follow-up with primary care provider  Malignant neoplasm of breast in female, estrogen receptor positive ([facility] )  Assessment & Plan  · With mets to liver and bone   · Currently receiving chemotherapy  · Follows with heme onc as an outpatient (last seen 08/17)  · Patient reports she is no longer taking daily ibrance  · Patient received Neulasta on 08/19 and no need for additional growth stimulating factor for now as per Heme-Onc  · WBC and ANC improved  · Continue home letrozole  · Heme onc consulted, appreciate recommendations    Hypertension  Assessment & Plan  · Blood pressure controlled  · Not currently prescribed outpatient antihypertensive  · Recommended outpatient follow-up with primary care provider        Medical Problems             Resolved Problems  Date Reviewed: 8/23/2022   None               Discharging Physician / Practitioner: Earnest Thurston MD  PCP: Amrik Gross MD  Admission Date:   Admission Orders (From admission, onward)     Ordered        08/20/22 [address]  Once                      Discharge Date: 08/23/22    Consultations During Hospital Stay:  · Heme-Onc, ID, palliative care    Reason for Admission:  Neutropenic fever, fever likely secondary to mild viral pharyngitis  Hospital Course:   Vanna Landaverde is a 49 y o  female patient with past medical history of malignant neoplasm of breast currently on chemotherapy, hypertension, who originally presented to the hospital on 8/20/2022 due to fever, sore throat, mild superficial scrape and swelling and erythema of right foot toe  On on presentation patient had reported fatigue, sore throat, worsening swelling and erythema of the right foot to abrasion and on further evaluation patient was found to have neutropenic fever  She did meet SIRS criteria which was likely due to viral mild pharyngitis IR patient was treated with broad-spectrum antibiotics empirically in the settings of neutropenia  Blood cultures remain without growth  Patient was also seen by Heme-Onc and recommended no further intervention since she had received Neulasta on outpatient setting  Subsequently WBC and ANC improved  Id recommended no need for further antibiotics since symptoms due to mild viral pharyngitis  Fever and neutropenia has now resolved  Currently patient reports feeling much better and eager to go home  Cleared by ID for discharge  I recommended close follow-up with primary care provider to have repeat CBC within 1 week  Also recommended close follow-up with primary Heme-Onc  Patient is agreeable with above plan  No other events reported  Refer to earlier notes for further clarification  Please see above list of diagnoses and related plan for additional information       Condition at Discharge: good    Discharge Day Visit / Exam:   Subjective:  Currently she is afebrile, appears comfortable not in distress  Currently she denies chest pain, dyspnea, fever, chills, nausea, vomiting, dysuria, diarrhea, any other new complaints  Reports feeling better and eager to go home  Vitals: Blood Pressure: 102/70 (08/23/22 0731)  Pulse: 101 (08/23/22 0731)  Temperature: 99 °F (37 2 °C) (08/23/22 0731)  Temp Source: Oral (08/20/22 1941)  Respirations: 17 (08/23/22 0731)  Height: 5' 5" (165 1 cm) (08/20/22 1941)  Weight - Scale: 61 7 kg (136 lb) (08/20/22 1941)  SpO2: 95 % (08/23/22 0731)  Exam:   Physical Exam  Constitutional:       General: She is not in acute distress  Appearance: Normal appearance  HENT:      Head: Normocephalic and atraumatic  Eyes:      Pupils: Pupils are equal, round, and reactive to light  Cardiovascular:      Rate and Rhythm: Normal rate  Pulses: Normal pulses  Pulmonary:      Effort: Pulmonary effort is normal  No respiratory distress  Breath sounds: Normal breath sounds  No stridor  No wheezing or rhonchi  Abdominal:      General: Bowel sounds are normal  There is no distension  Palpations: Abdomen is soft  Tenderness: There is no abdominal tenderness  Musculoskeletal:      Cervical back: Normal range of motion  Skin:     Comments: Right foot noted with minimal superficial healed scrape between 2nd and 3rd toe without local erythema or edema, no signs of cellulitis   Neurological:      General: No focal deficit present  Mental Status: She is alert and oriented to person, place, and time  Mental status is at baseline  Psychiatric:         Mood and Affect: Mood normal          Behavior: Behavior normal          Discharge instructions/Information to patient and family:   See after visit summary for information provided to patient and family        Provisions for Follow-Up Care:  See after visit summary for information related to follow-up care and any pertinent home health orders  Disposition:   Home    Planned Readmission:      Discharge Statement:  I spent 35 minutes discharging the patient  This time was spent on the day of discharge  I had direct contact with the patient on the day of discharge  Greater than 50% of the total time was spent examining patient, answering all patient questions, arranging and discussing plan of care with patient as well as directly providing post-discharge instructions  Additional time then spent on discharge activities  Discharge Medications:  See after visit summary for reconciled discharge medications provided to patient and/or family        **Please Note: This note may have been constructed using a voice recognition system**

## 2022-08-24 ENCOUNTER — TRANSITIONAL CARE MANAGEMENT (OUTPATIENT)
Dept: INTERNAL MEDICINE CLINIC | Facility: CLINIC | Age: 57
End: 2022-08-24

## 2022-08-24 ENCOUNTER — PATIENT OUTREACH (OUTPATIENT)
Dept: INTERNAL MEDICINE CLINIC | Facility: CLINIC | Age: 57
End: 2022-08-24

## 2022-08-24 NOTE — UTILIZATION REVIEW
Notification of Discharge   This is a Notification of Discharge from our facility [address]  Please be advised that this patient has been discharge from our facility  Below you will find the admission and discharge date and time including the patients disposition  UTILIZATION REVIEW CONTACT:  Vinayak Allen  Utilization   Network Utilization Review Department  Phone: [phone] x carefully listen to the prompts  All voicemails are confidential   Email: [email or web address]     PHYSICIAN ADVISORY SERVICES:  FOR NQSO-PG-VXGA REVIEW - MEDICAL NECESSITY DENIAL  Phone: [phone]  Fax: [phone]  Email: [email or web address]     PRESENTATION DATE: 8/20/2022  7:42 PM  OBERVATION ADMISSION DATE:   INPATIENT ADMISSION DATE: 8/20/22 10:00 PM   DISCHARGE DATE: 8/23/2022  5:14 PM  DISPOSITION: Home/Self Care Home/Self Care      IMPORTANT INFORMATION:  Send all requests for admission clinical reviews, approved or denied determinations and any other requests to dedicated fax number below belonging to the campus where the patient is receiving treatment   List of dedicated fax numbers:  [address] DENIALS (Administrative/Medical Necessity) [phone]   [address] (Maternity/NICU/Pediatrics) [phone]   Cass Flatten [phone]   Ron West [phone]   [address] [place] [address]  [phone]   [address] [place] [address],[address] [phone]   [facility]  [phone]   [address] [place] [address], [place] [id number] [facility] [phone]

## 2022-08-25 ENCOUNTER — PATIENT OUTREACH (OUTPATIENT)
Dept: INTERNAL MEDICINE CLINIC | Facility: CLINIC | Age: 57
End: 2022-08-25

## 2022-08-25 LAB
BACTERIA BLD CULT: NORMAL
BACTERIA BLD CULT: NORMAL

## 2022-08-25 RX ORDER — SODIUM CHLORIDE 9 MG/ML
20 INJECTION, SOLUTION INTRAVENOUS ONCE
Status: CANCELLED | OUTPATIENT
Start: 2022-09-02

## 2022-08-25 RX ORDER — SODIUM CHLORIDE 9 MG/ML
20 INJECTION, SOLUTION INTRAVENOUS ONCE
Status: CANCELLED | OUTPATIENT
Start: 2022-01-01

## 2022-08-25 NOTE — PROGRESS NOTES
[address] [place] [address] placed call, no answer      [address] [place] [address] will reach out again on 8/25/22

## 2022-08-26 ENCOUNTER — TELEPHONE (OUTPATIENT)
Dept: HEMATOLOGY ONCOLOGY | Facility: CLINIC | Age: 57
End: 2022-08-26

## 2022-08-26 NOTE — TELEPHONE ENCOUNTER
Patient called and stated that patient will not be able to come to appointment today as scheduled due to  being in [facility]  Reviewed with Garry Higgins PA-C patient to be seen prior to 9/1/22 treatment     Please reschedule appointment

## 2022-08-26 NOTE — TELEPHONE ENCOUNTER
Returned telephone call to patient and went right to   Reviewed patient appointment day and time with Ptee Patel PA-C today  Left call back number as well  BP monitoring,continue current antihypertensive meds, low salt diet,followup with PMD in 1-2 weeks

## 2022-08-31 ENCOUNTER — PATIENT OUTREACH (OUTPATIENT)
Dept: INTERNAL MEDICINE CLINIC | Facility: CLINIC | Age: 57
End: 2022-08-31

## 2022-08-31 NOTE — PROGRESS NOTES
Twin Bryant received patient referral from [facility]  Patient needs help with MA and possiably SNAP benefits  Twin Bryant did complete a chart review prior to calling patient  Twin Bryant left a voice message introducing myself and requesting a return call  Twin Bryant will contact patient again in one weeks time if no return call is received

## 2022-09-01 ENCOUNTER — TELEPHONE (OUTPATIENT)
Dept: HEMATOLOGY ONCOLOGY | Facility: CLINIC | Age: 57
End: 2022-09-01

## 2022-09-01 ENCOUNTER — APPOINTMENT (OUTPATIENT)
Dept: URGENT CARE | Age: 57
End: 2022-09-01
Payer: COMMERCIAL

## 2022-09-01 DIAGNOSIS — C79.51 BONE METASTASES (HCC): ICD-10-CM

## 2022-09-01 NOTE — PROGRESS NOTES
Reviewed with Veto Dukes PA-C that patient is to receive Eribulin every 21 days as ordered, IV administration Infusion RN notified

## 2022-09-01 NOTE — TELEPHONE ENCOUNTER
Informed patient her appt is confirmed for 9/2 at 1pm and we will keep the 9/8 appt as is per Dr Neema Batista  She voiced understanding

## 2022-09-01 NOTE — TELEPHONE ENCOUNTER
Spoke with patient about her missed treatment appointment today, she has not been feeling well and is experiencing diarrhea  She is taking lomotil, advised she can alternate with imodium  She stated she does not want to take too many medications but is staying hydrated  Due to her symptoms, she has not been able to sleep so took a melatonin last night and overslept  Patient's treatment rescheduled for tomorrow at 1pm  She stated she will go get labs today so there is no delay because she has a follow up appt with her PCP  Patient's day 8 is scheduled on 9/8, which will be a day sooner  There are no openings at the  for 9/9 and patient does not want to miss her treatments  Will check with Dr Darin King if he is ok keeping the 9/8 appt

## 2022-09-02 ENCOUNTER — OFFICE VISIT (OUTPATIENT)
Dept: INTERNAL MEDICINE CLINIC | Facility: CLINIC | Age: 57
End: 2022-09-02
Payer: COMMERCIAL

## 2022-09-02 ENCOUNTER — HOSPITAL ENCOUNTER (OUTPATIENT)
Dept: INFUSION CENTER | Facility: CLINIC | Age: 57
End: 2022-09-02
Payer: COMMERCIAL

## 2022-09-02 ENCOUNTER — APPOINTMENT (OUTPATIENT)
Dept: LAB | Facility: HOSPITAL | Age: 57
End: 2022-09-02
Payer: COMMERCIAL

## 2022-09-02 VITALS
OXYGEN SATURATION: 97 % | HEIGHT: 65 IN | RESPIRATION RATE: 14 BRPM | WEIGHT: 136.6 LBS | BODY MASS INDEX: 22.76 KG/M2 | HEART RATE: 71 BPM | SYSTOLIC BLOOD PRESSURE: 98 MMHG | TEMPERATURE: 98.6 F | DIASTOLIC BLOOD PRESSURE: 72 MMHG

## 2022-09-02 VITALS
WEIGHT: 136 LBS | SYSTOLIC BLOOD PRESSURE: 102 MMHG | HEIGHT: 65 IN | RESPIRATION RATE: 16 BRPM | HEART RATE: 87 BPM | BODY MASS INDEX: 22.66 KG/M2 | DIASTOLIC BLOOD PRESSURE: 59 MMHG | TEMPERATURE: 97.6 F

## 2022-09-02 DIAGNOSIS — J91.0 MALIGNANT PLEURAL EFFUSION: ICD-10-CM

## 2022-09-02 DIAGNOSIS — C50.911 PRIMARY MALIGNANT NEOPLASM OF RIGHT BREAST WITH METASTASIS TO OTHER SITE (HCC): ICD-10-CM

## 2022-09-02 DIAGNOSIS — C79.51 BONE METASTASES (HCC): Primary | ICD-10-CM

## 2022-09-02 DIAGNOSIS — Z17.0 MALIGNANT NEOPLASM OF BREAST IN FEMALE, ESTROGEN RECEPTOR POSITIVE, UNSPECIFIED LATERALITY, UNSPECIFIED SITE OF BREAST (HCC): Primary | ICD-10-CM

## 2022-09-02 DIAGNOSIS — C79.51 BONE METASTASES (HCC): ICD-10-CM

## 2022-09-02 DIAGNOSIS — D70.1 CHEMOTHERAPY INDUCED NEUTROPENIA (HCC): ICD-10-CM

## 2022-09-02 DIAGNOSIS — C50.919 MALIGNANT NEOPLASM OF BREAST IN FEMALE, ESTROGEN RECEPTOR POSITIVE, UNSPECIFIED LATERALITY, UNSPECIFIED SITE OF BREAST (HCC): Primary | ICD-10-CM

## 2022-09-02 DIAGNOSIS — F41.9 ANXIETY AND DEPRESSION: ICD-10-CM

## 2022-09-02 DIAGNOSIS — D70.9 FEBRILE NEUTROPENIA (HCC): ICD-10-CM

## 2022-09-02 DIAGNOSIS — Z51.5 PALLIATIVE CARE PATIENT: ICD-10-CM

## 2022-09-02 DIAGNOSIS — T45.1X5A CHEMOTHERAPY INDUCED NEUTROPENIA (HCC): ICD-10-CM

## 2022-09-02 DIAGNOSIS — R50.81 FEBRILE NEUTROPENIA (HCC): ICD-10-CM

## 2022-09-02 DIAGNOSIS — F32.A ANXIETY AND DEPRESSION: ICD-10-CM

## 2022-09-02 PROBLEM — Z72.0 TOBACCO ABUSE: Status: RESOLVED | Noted: 2021-07-24 | Resolved: 2022-09-02

## 2022-09-02 PROBLEM — J02.9 SORE THROAT: Status: RESOLVED | Noted: 2022-06-26 | Resolved: 2022-09-02

## 2022-09-02 PROBLEM — E87.1 HYPONATREMIA: Status: RESOLVED | Noted: 2022-06-25 | Resolved: 2022-09-02

## 2022-09-02 PROBLEM — E86.0 DEHYDRATION: Status: RESOLVED | Noted: 2021-08-30 | Resolved: 2022-09-02

## 2022-09-02 PROBLEM — A41.9 SEPSIS (HCC): Status: RESOLVED | Noted: 2022-08-20 | Resolved: 2022-09-02

## 2022-09-02 PROBLEM — E87.6 HYPOKALEMIA: Status: RESOLVED | Noted: 2022-06-25 | Resolved: 2022-09-02

## 2022-09-02 LAB
ALBUMIN SERPL BCP-MCNC: 3.3 G/DL (ref 3.5–5)
ALP SERPL-CCNC: 68 U/L (ref 46–116)
ALT SERPL W P-5'-P-CCNC: 16 U/L (ref 12–78)
ANION GAP SERPL CALCULATED.3IONS-SCNC: 11 MMOL/L (ref 4–13)
AST SERPL W P-5'-P-CCNC: 19 U/L (ref 5–45)
BASOPHILS # BLD AUTO: 0.02 THOUSANDS/ΜL (ref 0–0.1)
BASOPHILS NFR BLD AUTO: 0 % (ref 0–1)
BILIRUB SERPL-MCNC: 0.37 MG/DL (ref 0.2–1)
BUN SERPL-MCNC: 15 MG/DL (ref 5–25)
CALCIUM ALBUM COR SERPL-MCNC: 9.5 MG/DL (ref 8.3–10.1)
CALCIUM SERPL-MCNC: 8.9 MG/DL (ref 8.3–10.1)
CHLORIDE SERPL-SCNC: 106 MMOL/L (ref 96–108)
CO2 SERPL-SCNC: 23 MMOL/L (ref 21–32)
CREAT SERPL-MCNC: 0.9 MG/DL (ref 0.6–1.3)
EOSINOPHIL # BLD AUTO: 0 THOUSAND/ΜL (ref 0–0.61)
EOSINOPHIL NFR BLD AUTO: 0 % (ref 0–6)
ERYTHROCYTE [DISTWIDTH] IN BLOOD BY AUTOMATED COUNT: 20.2 % (ref 11.6–15.1)
GFR SERPL CREATININE-BSD FRML MDRD: 72 ML/MIN/1.73SQ M
GLUCOSE SERPL-MCNC: 99 MG/DL (ref 65–140)
HCT VFR BLD AUTO: 37.2 % (ref 34.8–46.1)
HGB BLD-MCNC: 12.2 G/DL (ref 11.5–15.4)
IMM GRANULOCYTES # BLD AUTO: 0.04 THOUSAND/UL (ref 0–0.2)
IMM GRANULOCYTES NFR BLD AUTO: 1 % (ref 0–2)
LYMPHOCYTES # BLD AUTO: 0.64 THOUSANDS/ΜL (ref 0.6–4.47)
LYMPHOCYTES NFR BLD AUTO: 9 % (ref 14–44)
MCH RBC QN AUTO: 31.6 PG (ref 26.8–34.3)
MCHC RBC AUTO-ENTMCNC: 32.8 G/DL (ref 31.4–37.4)
MCV RBC AUTO: 96 FL (ref 82–98)
MONOCYTES # BLD AUTO: 0.43 THOUSAND/ΜL (ref 0.17–1.22)
MONOCYTES NFR BLD AUTO: 6 % (ref 4–12)
NEUTROPHILS # BLD AUTO: 5.82 THOUSANDS/ΜL (ref 1.85–7.62)
NEUTS SEG NFR BLD AUTO: 84 % (ref 43–75)
NRBC BLD AUTO-RTO: 0 /100 WBCS
PLATELET # BLD AUTO: 218 THOUSANDS/UL (ref 149–390)
PMV BLD AUTO: 12.3 FL (ref 8.9–12.7)
POTASSIUM SERPL-SCNC: 4 MMOL/L (ref 3.5–5.3)
PROT SERPL-MCNC: 6.7 G/DL (ref 6.4–8.4)
RBC # BLD AUTO: 3.86 MILLION/UL (ref 3.81–5.12)
SODIUM SERPL-SCNC: 140 MMOL/L (ref 135–147)
WBC # BLD AUTO: 6.95 THOUSAND/UL (ref 4.31–10.16)

## 2022-09-02 PROCEDURE — 96365 THER/PROPH/DIAG IV INF INIT: CPT

## 2022-09-02 PROCEDURE — 80053 COMPREHEN METABOLIC PANEL: CPT

## 2022-09-02 PROCEDURE — 99496 TRANSJ CARE MGMT HIGH F2F 7D: CPT | Performed by: INTERNAL MEDICINE

## 2022-09-02 PROCEDURE — 36415 COLL VENOUS BLD VENIPUNCTURE: CPT

## 2022-09-02 PROCEDURE — 96401 CHEMO ANTI-NEOPL SQ/IM: CPT

## 2022-09-02 PROCEDURE — 85025 COMPLETE CBC W/AUTO DIFF WBC: CPT

## 2022-09-02 RX ORDER — SODIUM CHLORIDE 9 MG/ML
20 INJECTION, SOLUTION INTRAVENOUS ONCE
Status: COMPLETED | OUTPATIENT
Start: 2022-09-02 | End: 2022-09-02

## 2022-09-02 RX ADMIN — SODIUM CHLORIDE 20 ML/HR: 0.9 INJECTION, SOLUTION INTRAVENOUS at 13:25

## 2022-09-02 RX ADMIN — DEXAMETHASONE SODIUM PHOSPHATE 10 MG: 10 INJECTION, SOLUTION INTRAMUSCULAR; INTRAVENOUS at 13:31

## 2022-09-02 RX ADMIN — ERIBULIN MESYLATE 2.4 MG: 0.5 INJECTION INTRAVENOUS at 14:16

## 2022-09-02 NOTE — PROGRESS NOTES
Assessment/Plan:     Patient is here for hospital follow-up  She was  Admitted to the hospital for febrile neutropenia secondary to a cellulitis  She was treated with IV antibiotics and her counts did end up coming up  Cultures were all negative  CBC done today revealing normal white count  And normal ANC  She continues follow-up with Hematology-Oncology  Received infusion today  Quality Measures:     Depression Screening and Follow-up Plan: Clincally patient does not have depression  No treatment is required  Return for Next scheduled follow up  No problem-specific Assessment & Plan notes found for this encounter  Diagnoses and all orders for this visit:    Malignant neoplasm of breast in female, estrogen receptor positive, unspecified laterality, unspecified site of breast ([facility] )    Palliative care patient    Anxiety and depression    Febrile neutropenia ([facility] )          Subjective:      Patient ID: Lebron Hernandez is a 49 y o  female  TCM Call     Date and time call was made  8/24/2022  8:26 AM    Hospital care reviewed  Records reviewed    Patient was hospitialized at  [facility]    Date of Admission  08/22/22    Date of discharge  08/23/22    Diagnosis  Febrile neutropenia    Disposition  Home    Were the patients medications reviewed and updated  Yes    Current Symptoms  Dizziness; Fatigue    Dizziness severity  Moderate    Quality Character  Lightheadedness    Episode pattern  Intermittent    Cause  No known event    Clinical progress  Unchanged      TCM Call     Post hospital issues  None    Should patient be enrolled in anticoag monitoring? No    Scheduled for follow up?   Yes    Did you obtain your prescribed medications  Yes    Do you need help managing your prescriptions or medications  No    Is transportation to your appointment needed  No    I have advised the patient to call PCP with any new or worsening symptoms    Lunda Dancer, LPN    Living Arrangements Spouse or Significiant other    Support System  Family; Friends    The type of support provided  Emotional    Do you have social support  Yes, as much as I need    Are you recieving any outpatient services  No    Are you recieving home care services  No    Are you using any community resources  No    Current waiver services  No    Have you fallen in the last 12 months  No    Interperter language line needed  No    Counseling  Patient    Counseling topics  Importance of RX compliance;  Activities of daily   living            ALLERGIES:  Allergies   Allergen Reactions    Codeine Anaphylaxis    Morphine GI Intolerance, Itching and Vomiting    Sulfa Antibiotics Hives and Itching       CURRENT MEDICATIONS:    Current Outpatient Medications:     acetaminophen (TYLENOL) 325 mg tablet, Take 2 tablets (650 mg total) by mouth every 6 (six) hours as needed for mild pain, Disp: 100 tablet, Rfl: 0    al mag oxide-diphenhydramine-lidocaine viscous (MAGIC MOUTHWASH) 1:1:1 suspension, Swish and spit 10 mL every 4 (four) hours as needed for mouth pain or discomfort, Disp: 300 mL, Rfl: 0    albuterol (PROVENTIL HFA,VENTOLIN HFA) 90 mcg/act inhaler, Inhale 2 puffs every 4 (four) hours as needed for wheezing, Disp: 8 g, Rfl: 0    calcium carbonate (TUMS) 500 mg chewable tablet, Chew 1 tablet (500 mg total) 3 (three) times a day as needed for indigestion or heartburn, Disp: 30 tablet, Rfl: 0    CRANIAL PROSTHESIS, RX,, Apply to cranial area as needed for comfort , Disp: 1 each, Rfl: 0    [employer] Melatonin 3 MG, TAKE 2 TABLETS (6 MG TOTAL) BY MOUTH DAILY AT BEDTIME, Disp: 180 tablet, Rfl: 1    dextromethorphan-guaiFENesin (ROBITUSSIN DM)  mg/5 mL syrup, Take 10 mL by mouth every 4 (four) hours as needed for cough, Disp: 236 mL, Rfl: 0    dicyclomine (BENTYL) 10 mg capsule, Take 1 capsule (10 mg total) by mouth every 6 (six) hours as needed (abdominal cramping), Disp: 20 capsule, Rfl: 0    diphenhydrAMINE (BENADRYL) 50 MG tablet, Take 1 tablet (50 mg total) by mouth daily at bedtime as needed for itching or sleep, Disp: 30 tablet, Rfl: 0    diphenoxylate-atropine (LOMOTIL) 2 5-0 025 mg per tablet, Take 1 tablet by mouth 4 (four) times a day as needed for diarrhea, Disp: 30 tablet, Rfl: 0    docusate sodium (COLACE) 100 mg capsule, Take 1 capsule (100 mg total) by mouth 2 (two) times a day for 10 days, Disp: 20 capsule, Rfl: 0    DULoxetine (CYMBALTA) 30 mg delayed release capsule, Take 1 capsule (30 mg total) by mouth daily, Disp: 90 capsule, Rfl: 3    HYDROmorphone (DILAUDID) 2 mg tablet, Take 1-2 tablets (2-4 mg total) by mouth every 3 (three) hours as needed (moderate/severe cancer-related pain) Max Daily Amount: 32 mg, Disp: 120 tablet, Rfl: 0    letrozole (FEMARA) 2 5 mg tablet, TAKE 1 TABLET BY MOUTH EVERY DAY, Disp: 90 tablet, Rfl: 2    Lidocaine Viscous HCl (XYLOCAINE) 2 % mucosal solution, Swish and spit 10 mL 4 (four) times a day as needed for mouth pain or discomfort, Disp: 200 mL, Rfl: 0    metoclopramide (REGLAN) 10 mg tablet, Take 1 tablet (10 mg total) by mouth 4 (four) times a day, Disp: 28 tablet, Rfl: 0    multivitamin (THERAGRAN) TABS, Take 1 tablet by mouth, Disp: , Rfl:     ondansetron (ZOFRAN) 4 mg tablet, Take 1 tablet (4 mg total) by mouth every 8 (eight) hours as needed for nausea or vomiting, Disp: 20 tablet, Rfl: 0    oxybutynin (DITROPAN-XL) 5 mg 24 hr tablet, Take 1 tablet (5 mg total) by mouth daily Take 1 tablet daily, Disp: 90 tablet, Rfl: 3    palbociclib (Ibrance) 100 MG tablet, Take 1 tablet (100 mg total) by mouth daily, Disp: 21 tablet, Rfl: 1    polyethylene glycol (MIRALAX) 17 g packet, Take 17 g by mouth daily, Disp: , Rfl: 0    senna (SENOKOT) 8 6 MG tablet, Take 1 tablet (8 6 mg total) by mouth daily at bedtime as needed for constipation, Disp: 30 tablet, Rfl: 0    Xarelto 20 MG tablet, TAKE 1 TABLET BY MOUTH DAILY WITH BREAKFAST, Disp: 30 tablet, Rfl: 2    lactulose 20 g/30 mL, Take 15 mL (10 g total) by mouth daily as needed (constipaton) for up to 10 days (Patient not taking: Reported on 7/5/2022), Disp: 300 mL, Rfl: 0  No current facility-administered medications for this visit      ACTIVE PROBLEM LIST:  Patient Active Problem List   Diagnosis    Cough    Pleural effusion    Metastatic neoplasm (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 ) 2012    right       PAST SURGICAL HISTORY:  Past Surgical History:   Procedure Laterality Date    BACK SURGERY      BREAST CYST EXCISION      BREAST LUMPECTOMY Right 2012    HIP SURGERY      IR BIOPSY LIVER MASS  07/26/2021    IR PLEURAL EFFUSION LONG-TERM CATHETER PLACEMENT  08/17/2021    IR PLEURAL EFFUSION LONG-TERM CATHETER REMOVAL  05/11/2022    IR PORT PLACEMENT  07/27/2022    IR THORACENTESIS  07/26/2021       FAMILY HISTORY:  Family History   Problem Relation Age of Onset    Breast cancer Mother         in her 63's    Breast cancer Sister         inher 45s and 48    Colon cancer Maternal Grandmother     No Known Problems Paternal Grandmother     Breast cancer Other     No Known Problems Paternal Aunt        SOCIAL HISTORY:  Social History     Socioeconomic History    Marital status: /Civil Union     Spouse name: Not on file    Number of children: Not on file    Years of education: Not on file    Highest education level: Not on file   Occupational History    Not on file   Tobacco Use    Smoking status: Former Smoker     Packs/day: 0 25     Years: 15 00     Pack years: 3 75     Types: Cigarettes     Start date:      Quit date: 7/10/2021     Years since quittin 1    Smokeless tobacco: Never Used   Vaping Use    Vaping Use: Never used   Substance and Sexual Activity    Alcohol use: Not Currently    Drug use: Not Currently    Sexual activity: Not Currently     Partners: Male     Birth control/protection: Male Sterilization   Other Topics Concern    Not on file   Social History Narrative    Not on file     Social Determinants of Health     Financial Resource Strain: Not on file   Food Insecurity: No Food Insecurity    Worried About Running Out of Food in the Last Year: Never true    Pako of Food in the Last Year: Never true   Transportation Needs: No Transportation Needs    Lack of Transportation (Medical): No    Lack of Transportation (Non-Medical): No   Physical Activity: Not on file   Stress: Not on file   Social Connections: Not on file   Intimate Partner Violence: Not on file   Housing Stability: Low Risk     Unable to Pay for Housing in the Last Year: No    Number of Places Lived in the Last Year: 1    Unstable Housing in the Last Year: No       Review of Systems   Allergic/Immunologic: Positive for immunocompromised state  Psychiatric/Behavioral: Positive for sleep disturbance  All other systems reviewed and are negative  Objective:  Vitals:    22 1510   BP: 98/72   BP Location: Left arm   Patient Position: Sitting   Cuff Size: Adult   Pulse: 71   Resp: 14   Temp: 98 6 °F (37 °C)   TempSrc: Tympanic   SpO2: 97%   Weight: 62 kg (136 lb 9 6 oz)   Height: 5' 5" (1 651 m)     Body mass index is 22 73 kg/m²  Physical Exam  Vitals and nursing note reviewed  Constitutional:       Appearance: Normal appearance  HENT:      Head: Normocephalic and atraumatic  Cardiovascular:      Rate and Rhythm: Normal rate and regular rhythm        Heart sounds: Normal heart sounds  Pulmonary:      Effort: Pulmonary effort is normal       Breath sounds: Normal breath sounds  Musculoskeletal:         General: Normal range of motion  Cervical back: Normal range of motion  Right lower leg: No edema  Left lower leg: No edema  Skin:     General: Skin is warm and dry  Coloration: Skin is pale  Neurological:      General: No focal deficit present  Mental Status: She is alert and oriented to person, place, and time  Mental status is at baseline     Psychiatric:         Mood and Affect: Mood normal            RESULTS:    Recent Results (from the past 1008 hour(s))   CBC and differential    Collection Time: 07/27/22  2:43 PM   Result Value Ref Range    WBC 2 89 (L) 4 31 - 10 16 Thousand/uL    RBC 3 72 (L) 3 81 - 5 12 Million/uL    Hemoglobin 11 7 11 5 - 15 4 g/dL    Hematocrit 35 7 34 8 - 46 1 %    MCV 96 82 - 98 fL    MCH 31 5 26 8 - 34 3 pg    MCHC 32 8 31 4 - 37 4 g/dL    RDW 16 8 (H) 11 6 - 15 1 %    MPV 10 3 8 9 - 12 7 fL    Platelets 579 147 - 328 Thousands/uL    nRBC 0 /100 WBCs    Neutrophils Relative 65 43 - 75 %    Immat GRANS % 0 0 - 2 %    Lymphocytes Relative 16 14 - 44 %    Monocytes Relative 18 (H) 4 - 12 %    Eosinophils Relative 0 0 - 6 %    Basophils Relative 1 0 - 1 %    Neutrophils Absolute 1 88 1 85 - 7 62 Thousands/µL    Immature Grans Absolute 0 01 0 00 - 0 20 Thousand/uL    Lymphocytes Absolute 0 46 (L) 0 60 - 4 47 Thousands/µL    Monocytes Absolute 0 52 0 17 - 1 22 Thousand/µL    Eosinophils Absolute 0 00 0 00 - 0 61 Thousand/µL    Basophils Absolute 0 02 0 00 - 0 10 Thousands/µL   CBC and differential    Collection Time: 08/09/22  3:42 PM   Result Value Ref Range    WBC 8 27 4 31 - 10 16 Thousand/uL    RBC 4 34 3 81 - 5 12 Million/uL    Hemoglobin 13 4 11 5 - 15 4 g/dL    Hematocrit 41 4 34 8 - 46 1 %    MCV 95 82 - 98 fL    MCH 30 9 26 8 - 34 3 pg    MCHC 32 4 31 4 - 37 4 g/dL    RDW 18 3 (H) 11 6 - 15 1 %    MPV 12 6 8 9 - 12 7 fL Platelets 901 072 - 345 Thousands/uL    nRBC 0 /100 WBCs    Neutrophils Relative 81 (H) 43 - 75 %    Immat GRANS % 1 0 - 2 %    Lymphocytes Relative 10 (L) 14 - 44 %    Monocytes Relative 8 4 - 12 %    Eosinophils Relative 0 0 - 6 %    Basophils Relative 0 0 - 1 %    Neutrophils Absolute 6 68 1 85 - 7 62 Thousands/µL    Immature Grans Absolute 0 07 0 00 - 0 20 Thousand/uL    Lymphocytes Absolute 0 83 0 60 - 4 47 Thousands/µL    Monocytes Absolute 0 66 0 17 - 1 22 Thousand/µL    Eosinophils Absolute 0 00 0 00 - 0 61 Thousand/µL    Basophils Absolute 0 03 0 00 - 0 10 Thousands/µL   Comprehensive metabolic panel    Collection Time: 08/09/22  3:42 PM   Result Value Ref Range    Sodium 143 135 - 147 mmol/L    Potassium 3 7 3 5 - 5 3 mmol/L    Chloride 105 96 - 108 mmol/L    CO2 27 21 - 32 mmol/L    ANION GAP 11 4 - 13 mmol/L    BUN 12 5 - 25 mg/dL    Creatinine 0 86 0 60 - 1 30 mg/dL    Glucose 126 65 - 140 mg/dL    Calcium 8 9 8 3 - 10 1 mg/dL    Corrected Calcium 9 4 8 3 - 10 1 mg/dL    AST 22 5 - 45 U/L    ALT 21 12 - 78 U/L    Alkaline Phosphatase 90 46 - 116 U/L    Total Protein 7 0 6 4 - 8 4 g/dL    Albumin 3 4 (L) 3 5 - 5 0 g/dL    Total Bilirubin 0 39 0 20 - 1 00 mg/dL    eGFR 76 ml/min/1 73sq m   CBC and differential    Collection Time: 08/16/22 11:11 AM   Result Value Ref Range    WBC 2 61 (L) 4 31 - 10 16 Thousand/uL    RBC 3 97 3 81 - 5 12 Million/uL    Hemoglobin 12 2 11 5 - 15 4 g/dL    Hematocrit 37 1 34 8 - 46 1 %    MCV 94 82 - 98 fL    MCH 30 7 26 8 - 34 3 pg    MCHC 32 9 31 4 - 37 4 g/dL    RDW 17 7 (H) 11 6 - 15 1 %    MPV 11 8 8 9 - 12 7 fL    Platelets 625 181 - 225 Thousands/uL    nRBC 0 /100 WBCs    Neutrophils Relative 85 (H) 43 - 75 %    Immat GRANS % 1 0 - 2 %    Lymphocytes Relative 10 (L) 14 - 44 %    Monocytes Relative 3 (L) 4 - 12 %    Eosinophils Relative 0 0 - 6 %    Basophils Relative 1 0 - 1 %    Neutrophils Absolute 2 23 1 85 - 7 62 Thousands/µL    Immature Grans Absolute 0 02 0 00 - 0  20 Thousand/uL    Lymphocytes Absolute 0 25 (L) 0 60 - 4 47 Thousands/µL    Monocytes Absolute 0 09 (L) 0 17 - 1 22 Thousand/µL    Eosinophils Absolute 0 00 0 00 - 0 61 Thousand/µL    Basophils Absolute 0 02 0 00 - 0 10 Thousands/µL   FLU/RSV/COVID - if FLU/RSV clinically relevant    Collection Time: 08/20/22  8:14 PM    Specimen: Nose; Nares   Result Value Ref Range    SARS-CoV-2 Negative Negative    INFLUENZA A PCR Negative Negative    INFLUENZA B PCR Negative Negative    RSV PCR Negative Negative   Strep A PCR    Collection Time: 08/20/22  8:14 PM    Specimen: Throat   Result Value Ref Range    STREP A PCR Not Detected Not Detected   ECG 12 lead    Collection Time: 08/20/22  8:15 PM   Result Value Ref Range    Ventricular Rate 98 BPM    Atrial Rate 98 BPM    SD Interval 162 ms    QRSD Interval 70 ms    QT Interval 340 ms    QTC Interval 434 ms    P Axis 70 degrees    QRS Axis 65 degrees    T Wave Axis 67 degrees   CBC and differential    Collection Time: 08/20/22  8:37 PM   Result Value Ref Range    WBC 0 54 (LL) 4 31 - 10 16 Thousand/uL    RBC 3 70 (L) 3 81 - 5 12 Million/uL    Hemoglobin 11 5 11 5 - 15 4 g/dL    Hematocrit 34 5 (L) 34 8 - 46 1 %    MCV 93 82 - 98 fL    MCH 31 1 26 8 - 34 3 pg    MCHC 33 3 31 4 - 37 4 g/dL    RDW 17 9 (H) 11 6 - 15 1 %    MPV 11 5 8 9 - 12 7 fL    Platelets 391 (L) 828 - 390 Thousands/uL   Comprehensive metabolic panel    Collection Time: 08/20/22  8:37 PM   Result Value Ref Range    Sodium 137 135 - 147 mmol/L    Potassium 2 9 (L) 3 5 - 5 3 mmol/L    Chloride 104 96 - 108 mmol/L    CO2 23 21 - 32 mmol/L    ANION GAP 10 4 - 13 mmol/L    BUN 13 5 - 25 mg/dL    Creatinine 0 65 0 60 - 1 30 mg/dL    Glucose 129 65 - 140 mg/dL    Calcium 8 8 8 3 - 10 1 mg/dL    Corrected Calcium 9 4 8 3 - 10 1 mg/dL    AST 36 5 - 45 U/L    ALT 27 12 - 78 U/L    Alkaline Phosphatase 59 46 - 116 U/L    Total Protein 6 3 (L) 6 4 - 8 4 g/dL    Albumin 3 2 (L) 3 5 - 5 0 g/dL    Total Bilirubin 0 71 0 20 - 1 00 mg/dL    eGFR 100 ml/min/1 73sq m   Lactic acid    Collection Time: 08/20/22  8:37 PM   Result Value Ref Range    LACTIC ACID 1 2 0 5 - 2 0 mmol/L   Procalcitonin    Collection Time: 08/20/22  8:37 PM   Result Value Ref Range    Procalcitonin 0 27 (H) <=0 25 ng/ml   Protime-INR    Collection Time: 08/20/22  8:37 PM   Result Value Ref Range    Protime 16 3 (H) 11 6 - 14 5 seconds    INR 1 34 (H) 0 84 - 1 19   APTT    Collection Time: 08/20/22  8:37 PM   Result Value Ref Range    PTT 29 23 - 37 seconds   Blood culture #1    Collection Time: 08/20/22  8:37 PM    Specimen: Central Venous Line; Blood   Result Value Ref Range    Blood Culture No Growth After 5 Days  Blood culture #2    Collection Time: 08/20/22  8:37 PM    Specimen: Central Venous Line; Blood   Result Value Ref Range    Blood Culture No Growth After 5 Days      Manual Differential(PHLEBS Do Not Order)    Collection Time: 08/20/22  8:37 PM   Result Value Ref Range    Segmented % 42 (L) 43 - 75 %    Bands % 13 (H) 0 - 8 %    Lymphocytes % 35 14 - 44 %    Monocytes % 5 4 - 12 %    Eosinophils, % 0 0 - 6 %    Basophils % 2 (H) 0 - 1 %    Atypical Lymphocytes % 3 (H) <=0 %    Absolute Neutrophils 0 30 (L) 1 85 - 7 62 Thousand/uL    Lymphocytes Absolute 0 19 (L) 0 60 - 4 47 Thousand/uL    Monocytes Absolute 0 03 0 00 - 1 22 Thousand/uL    Eosinophils Absolute 0 00 0 00 - 0 40 Thousand/uL    Basophils Absolute 0 01 0 00 - 0 10 Thousand/uL    Total Counted      Smudge Cells Present     Platelet Estimate Borderline (A) Adequate   UA w Reflex to Microscopic w Reflex to Culture    Collection Time: 08/21/22 12:08 AM    Specimen: Urine, Clean Catch   Result Value Ref Range    Color, UA Yellow     Clarity, UA Clear     Specific Gravity, UA 1 020 1 003 - 1 030    pH, UA 6 0 4 5, 5 0, 5 5, 6 0, 6 5, 7 0, 7 5, 8 0    Leukocytes, UA Negative Negative    Nitrite, UA Negative Negative    Protein, UA Negative Negative mg/dl    Glucose, UA Negative Negative mg/dl Ketones, UA Negative Negative mg/dl    Urobilinogen, UA 0 2 0 2, 1 0 E U /dl E U /dl    Bilirubin, UA Negative Negative    Occult Blood, UA Negative Negative   Basic metabolic panel    Collection Time: 08/21/22  5:00 AM   Result Value Ref Range    Sodium 140 135 - 147 mmol/L    Potassium 3 4 (L) 3 5 - 5 3 mmol/L    Chloride 107 96 - 108 mmol/L    CO2 23 21 - 32 mmol/L    ANION GAP 10 4 - 13 mmol/L    BUN 10 5 - 25 mg/dL    Creatinine 0 73 0 60 - 1 30 mg/dL    Glucose 109 65 - 140 mg/dL    Calcium 8 3 8 3 - 10 1 mg/dL    eGFR 93 ml/min/1 73sq m   CBC and differential    Collection Time: 08/21/22  5:00 AM   Result Value Ref Range    WBC 0 63 (LL) 4 31 - 10 16 Thousand/uL    RBC 3 34 (L) 3 81 - 5 12 Million/uL    Hemoglobin 10 4 (L) 11 5 - 15 4 g/dL    Hematocrit 31 5 (L) 34 8 - 46 1 %    MCV 94 82 - 98 fL    MCH 31 1 26 8 - 34 3 pg    MCHC 33 0 31 4 - 37 4 g/dL    RDW 17 8 (H) 11 6 - 15 1 %    MPV 12 0 8 9 - 12 7 fL    Platelets 538 (L) 267 - 390 Thousands/uL    nRBC 0 /100 WBCs   CBC and differential    Collection Time: 08/22/22  2:56 PM   Result Value Ref Range    WBC 2 69 (L) 4 31 - 10 16 Thousand/uL    RBC 3 39 (L) 3 81 - 5 12 Million/uL    Hemoglobin 10 5 (L) 11 5 - 15 4 g/dL    Hematocrit 31 9 (L) 34 8 - 46 1 %    MCV 94 82 - 98 fL    MCH 31 0 26 8 - 34 3 pg    MCHC 32 9 31 4 - 37 4 g/dL    RDW 18 0 (H) 11 6 - 15 1 %    MPV 11 9 8 9 - 12 7 fL    Platelets 954 (L) 567 - 390 Thousands/uL    nRBC 1 /100 WBCs    Neutrophils Relative 57 43 - 75 %    Immat GRANS % 11 (H) 0 - 2 %    Lymphocytes Relative 15 14 - 44 %    Monocytes Relative 13 (H) 4 - 12 %    Eosinophils Relative 0 0 - 6 %    Basophils Relative 4 (H) 0 - 1 %    Neutrophils Absolute 1 55 (L) 1 85 - 7 62 Thousands/µL    Immature Grans Absolute 0 30 (H) 0 00 - 0 20 Thousand/uL    Lymphocytes Absolute 0 39 (L) 0 60 - 4 47 Thousands/µL    Monocytes Absolute 0 34 0 17 - 1 22 Thousand/µL    Eosinophils Absolute 0 00 0 00 - 0 61 Thousand/µL    Basophils Absolute 0 11 (H) 0 00 - 0 10 Thousands/µL   CBC and differential    Collection Time: 08/23/22  5:18 AM   Result Value Ref Range    WBC 6 22 4 31 - 10 16 Thousand/uL    RBC 3 51 (L) 3 81 - 5 12 Million/uL    Hemoglobin 10 7 (L) 11 5 - 15 4 g/dL    Hematocrit 32 9 (L) 34 8 - 46 1 %    MCV 94 82 - 98 fL    MCH 30 5 26 8 - 34 3 pg    MCHC 32 5 31 4 - 37 4 g/dL    RDW 18 1 (H) 11 6 - 15 1 %    MPV 12 5 8 9 - 12 7 fL    Platelets 036 (L) 082 - 390 Thousands/uL    nRBC 1 /100 WBCs   Basic metabolic panel    Collection Time: 08/23/22  5:18 AM   Result Value Ref Range    Sodium 138 135 - 147 mmol/L    Potassium 3 7 3 5 - 5 3 mmol/L    Chloride 103 96 - 108 mmol/L    CO2 24 21 - 32 mmol/L    ANION GAP 11 4 - 13 mmol/L    BUN 6 5 - 25 mg/dL    Creatinine 0 79 0 60 - 1 30 mg/dL    Glucose 114 65 - 140 mg/dL    Calcium 8 8 8 3 - 10 1 mg/dL    eGFR 85 ml/min/1 73sq m   Comprehensive metabolic panel    Collection Time: 09/02/22 11:48 AM   Result Value Ref Range    Sodium 140 135 - 147 mmol/L    Potassium 4 0 3 5 - 5 3 mmol/L    Chloride 106 96 - 108 mmol/L    CO2 23 21 - 32 mmol/L    ANION GAP 11 4 - 13 mmol/L    BUN 15 5 - 25 mg/dL    Creatinine 0 90 0 60 - 1 30 mg/dL    Glucose 99 65 - 140 mg/dL    Calcium 8 9 8 3 - 10 1 mg/dL    Corrected Calcium 9 5 8 3 - 10 1 mg/dL    AST 19 5 - 45 U/L    ALT 16 12 - 78 U/L    Alkaline Phosphatase 68 46 - 116 U/L    Total Protein 6 7 6 4 - 8 4 g/dL    Albumin 3 3 (L) 3 5 - 5 0 g/dL    Total Bilirubin 0 37 0 20 - 1 00 mg/dL    eGFR 72 ml/min/1 73sq m   CBC and differential    Collection Time: 09/02/22 11:48 AM   Result Value Ref Range    WBC 6 95 4 31 - 10 16 Thousand/uL    RBC 3 86 3 81 - 5 12 Million/uL    Hemoglobin 12 2 11 5 - 15 4 g/dL    Hematocrit 37 2 34 8 - 46 1 %    MCV 96 82 - 98 fL    MCH 31 6 26 8 - 34 3 pg    MCHC 32 8 31 4 - 37 4 g/dL    RDW 20 2 (H) 11 6 - 15 1 %    MPV 12 3 8 9 - 12 7 fL    Platelets 964 352 - 854 Thousands/uL    nRBC 0 /100 WBCs    Neutrophils Relative 84 (H) 43 - 75 %    Immat GRANS % 1 0 - 2 %    Lymphocytes Relative 9 (L) 14 - 44 %    Monocytes Relative 6 4 - 12 %    Eosinophils Relative 0 0 - 6 %    Basophils Relative 0 0 - 1 %    Neutrophils Absolute 5 82 1 85 - 7 62 Thousands/µL    Immature Grans Absolute 0 04 0 00 - 0 20 Thousand/uL    Lymphocytes Absolute 0 64 0 60 - 4 47 Thousands/µL    Monocytes Absolute 0 43 0 17 - 1 22 Thousand/µL    Eosinophils Absolute 0 00 0 00 - 0 61 Thousand/µL    Basophils Absolute 0 02 0 00 - 0 10 Thousands/µL       This note was created with voice recognition software  Phonic, grammatical and spelling errors may be present within the note as a result

## 2022-09-02 NOTE — PROGRESS NOTES
Pt tolerated [place] well, no adverse reaction noted  Pt's port has brisk blood return, flushed and de-accessed  Pt aware of next appt and declined an AVS  Pt left infusion in stable condition

## 2022-09-07 ENCOUNTER — OFFICE VISIT (OUTPATIENT)
Dept: PALLIATIVE MEDICINE | Facility: CLINIC | Age: 57
End: 2022-09-07
Payer: COMMERCIAL

## 2022-09-07 VITALS
OXYGEN SATURATION: 99 % | SYSTOLIC BLOOD PRESSURE: 100 MMHG | BODY MASS INDEX: 21.99 KG/M2 | HEART RATE: 94 BPM | WEIGHT: 132 LBS | HEIGHT: 65 IN | DIASTOLIC BLOOD PRESSURE: 70 MMHG | TEMPERATURE: 97.6 F

## 2022-09-07 DIAGNOSIS — J91.0 MALIGNANT PLEURAL EFFUSION: ICD-10-CM

## 2022-09-07 DIAGNOSIS — Z51.5 PALLIATIVE CARE PATIENT: ICD-10-CM

## 2022-09-07 DIAGNOSIS — G89.3 CANCER RELATED PAIN: ICD-10-CM

## 2022-09-07 DIAGNOSIS — R10.9 ABDOMINAL CRAMPING: ICD-10-CM

## 2022-09-07 DIAGNOSIS — C50.911 PRIMARY MALIGNANT NEOPLASM OF RIGHT BREAST WITH METASTASIS TO OTHER SITE (HCC): Primary | ICD-10-CM

## 2022-09-07 DIAGNOSIS — C79.51 BONE METASTASES (HCC): ICD-10-CM

## 2022-09-07 PROCEDURE — 99214 OFFICE O/P EST MOD 30 MIN: CPT | Performed by: STUDENT IN AN ORGANIZED HEALTH CARE EDUCATION/TRAINING PROGRAM

## 2022-09-07 RX ORDER — HYDROMORPHONE HYDROCHLORIDE 2 MG/1
2-4 TABLET ORAL
Qty: 120 TABLET | Refills: 0 | Status: SHIPPED | OUTPATIENT
Start: 2022-09-07

## 2022-09-07 RX ORDER — DICYCLOMINE HYDROCHLORIDE 10 MG/1
10 CAPSULE ORAL EVERY 6 HOURS PRN
Qty: 40 CAPSULE | Refills: 0 | Status: SHIPPED | OUTPATIENT
Start: 2022-09-07

## 2022-09-07 NOTE — PROGRESS NOTES
[address] [facility] 49 y o  female [id number]    Assessment & Plan  Problem List Items Addressed This Visit        Respiratory    Malignant pleural effusion       Musculoskeletal and Integument    Bone metastases (HCC)    Relevant Medications    HYDROmorphone (DILAUDID) 2 mg tablet       Other    Palliative care patient    Relevant Medications    HYDROmorphone (DILAUDID) 2 mg tablet    dicyclomine (BENTYL) 10 mg capsule    Primary malignant neoplasm of right breast with metastasis to other site [facility]) - Primary    Relevant Medications    HYDROmorphone (DILAUDID) 2 mg tablet    dicyclomine (BENTYL) 10 mg capsule    Cancer related pain    Relevant Medications    HYDROmorphone (DILAUDID) 2 mg tablet      Other Visit Diagnoses     Abdominal cramping        Relevant Medications    dicyclomine (BENTYL) 10 mg capsule        #symptom management  #cancer-related pain   - continue APAP 650 mg PO Q6H PRN              - max daily 4000 mg   - continue hydromorphone 2-4 mg PO Q3H PRN   - continue duloxetine therapy     #anxiety   - continue duloxetine 30 mg PO QDaily     #nausea   - continue metoclopramide 10 mg PO QID PRN   - continue ondansetron 4 mg PO Q8H PRN     #insomnia   - continue melatonin 6 mg PO QHS     #OIC   - continue home bowel regimen   - continued adequate hydration     #abdominal cramping   - continue dicyclomine 10 mg PO Q6H PRN     #recurrent pleural effusion   - drains 250-300 cc/week; stable   - reports consistent appearance of fluid    #goals of care   - stabilizing conditions at home, decreased life stressors for patient and children   - will coordinate with [facility] for home supportive cares   - patient with questions regarding SSD/SSI   - treatment focused care with no limitations at this time   - recently hospitalized for febrile neutropenia on 08/20-08/23     #psychosocial support   - emotional support provided   Richard Hendrix [spouse] [phone] - two children   - Armen [son, Down syndrome]   - Jonathan Sullivan [son]      Next [facility] Follow up in 4 weeks  Controlled Substance Review    [place] or [place]  reviewed: No red flags were identified; safe to proceed with prescription  [place] PDMP Review       Value Time User    PDMP Reviewed  Yes (P)  9/7/2022  9:14 AM Jodie Brunner, MD          Medications adjusted this encounter:  Requested Prescriptions     Signed Prescriptions Disp Refills    HYDROmorphone (DILAUDID) 2 mg tablet 120 tablet 0     Sig: Take 1-2 tablets (2-4 mg total) by mouth every 3 (three) hours as needed (moderate/severe cancer-related pain) Max Daily Amount: 32 mg    dicyclomine (BENTYL) 10 mg capsule 40 capsule 0     Sig: Take 1 capsule (10 mg total) by mouth every 6 (six) hours as needed (abdominal cramping)     No orders of the defined types were placed in this encounter  Medications Discontinued During This Encounter   Medication Reason    dicyclomine (BENTYL) 10 mg capsule Reorder    HYDROmorphone (DILAUDID) 2 mg tablet Reorder         Liliane Mosqueda was seen today for symptoms and planning cares related to above illnesses  I have reviewed the patient's controlled substance dispensing history in the Prescription Drug Monitoring Program in compliance with the [facility] regulations before prescribing any controlled substances  They are invited to continue to follow with us  If there are questions or concerns, please contact us through our clinic/answering service 24 hours a day, seven days a week  Jodie Brunner, MD  [facility]  [phone]      Visit Information    Accompanied By: No one    Source of History: Self, Medical record    History Limitations: None      History of Present Illness    Liliane Mosqueda is a 49 y o  female who presents in follow up of symptoms related to metastatic breast cancer c/b recurrent malignant pleural effusions   Pertinent issues include: symptom management, pain, neoplasm related, assessment of goals of care, advance care planning  Patient reports improving since hospitalization  However, still feels fatigued  Intermittent diarrhea over the past 2-3 weeks, no identified triggers  Managed with lomotil and loperamide  Currently resolved, regular BM  Attributes to antineoplastic therapy  Denies nausea, vomiting  Post-chemo abdominal cramping, dicyclomine effective management  Decreased PO hydromorphone use daily, now 4 tabs/day  Attributes to decreased home stressors  R shoulder pain attributed to port placement 1-2 months ago  Variable sleep  Patient to contact IR regarding shoulder pain that patient attributes to port  Past medical, surgical, social, and family histories are reviewed and pertinent updates are made  Review of Systems   Constitutional: Positive for malaise/fatigue  Negative for chills, decreased appetite, fever and weight loss  HENT: Negative for congestion  Eyes: Negative for visual disturbance  Cardiovascular: Negative for chest pain  Respiratory: Negative for shortness of breath  Musculoskeletal: Negative for falls  R shoulder pain   Gastrointestinal: Positive for diarrhea  Negative for abdominal pain, constipation, nausea and vomiting  Genitourinary: Negative for frequency  Neurological: Negative for headaches  Psychiatric/Behavioral: Negative for depression  The patient does not have insomnia and is not nervous/anxious  All other systems reviewed and are negative  Vital Signs    /70   Pulse 94   Temp 97 6 °F (36 4 °C)   Ht 5' 5" (1 651 m)   Wt 59 9 kg (132 lb)   LMP 05/26/2021   SpO2 99%   BMI 21 97 kg/m²     Physical Exam and Objective Data  Physical Exam  Vitals and nursing note reviewed  Constitutional:       General: She is awake  Appearance: She is not diaphoretic  Comments: Sitting up in NAD  BMI 22 0  Non-toxic appearing   HENT:      Head: Normocephalic and atraumatic  Right Ear: External ear normal       Left Ear: External ear normal       Nose: No rhinorrhea  Eyes:      Comments: No gaze preference   Pulmonary:      Effort: No tachypnea, accessory muscle usage or respiratory distress  Comments: Completes full sentences without difficulty  Musculoskeletal:      Cervical back: Normal range of motion  Neurological:      General: No focal deficit present  Mental Status: She is alert and oriented to person, place, and time  Psychiatric:         Attention and Perception: Attention normal          Mood and Affect: Mood and affect normal          Speech: Speech normal          Cognition and Memory: Cognition and memory normal            Radiology and Laboratory:  I personally reviewed and interpreted the following results:    Most Recent COVID-19 Results:  Lab Results   Component Value Date/Time    SARSCOV2 Negative 08/20/2022 08:14 PM    SARSCOV2 Negative 06/25/2022 03:22 AM    SARSCOV2 Positive (A) 12/20/2020 12:25 PM       Most Recent Lab Work:  Lab Results   Component Value Date/Time    SODIUM 140 09/02/2022 11:48 AM    K 4 0 09/02/2022 11:48 AM    K 3 7 04/02/2014 01:15 PM    BUN 15 09/02/2022 11:48 AM    BUN 12 04/02/2014 01:15 PM    CREATININE 0 90 09/02/2022 11:48 AM    CREATININE 0 80 04/02/2014 01:15 PM    GLUC 99 09/02/2022 11:48 AM     Lab Results   Component Value Date/Time    AST 19 09/02/2022 11:48 AM    AST 23 04/02/2014 01:15 PM    ALT 16 09/02/2022 11:48 AM    ALT 15 04/02/2014 01:15 PM    ALB 3 3 (L) 09/02/2022 11:48 AM    ALB 3 4 (L) 04/02/2014 01:15 PM     Lab Results   Component Value Date/Time    HGB 12 2 09/02/2022 11:48 AM    WBC 6 95 09/02/2022 11:48 AM     09/02/2022 11:48 AM    INR 1 34 (H) 08/20/2022 08:37 PM    PTT 29 08/20/2022 08:37 PM       Most Recent Imaging [last 30 days]:  Procedure: XR chest 2 views    Result Date: 8/21/2022  Narrative: CHEST INDICATION:   Fever, neutropenia   COMPARISON:  CT chest 7/8/2022 EXAM PERFORMED/VIEWS:  XR CHEST PA & LATERAL FINDINGS:  Right-sided chest port  Right basilar chest tube  Right axillary surgical clips  Cardiomediastinal silhouette appears unremarkable  The lungs are clear  Small right pleural effusion  No pneumothorax  Osseous structures appear within normal limits for patient age  Impression: Small right pleural effusion  Workstation performed: [id number]       30 minutes was spent face to face with Lauren Isidoroayla with greater than 50% of the time spent in counseling or coordination of care including discussions of provided medical updates, discussed palliative care, determined goals of care, determined social/family support, discussed plans of care, discussed symptom management, provided psychosocial support  Medication refills  Coordinated with [facility]  PDMP Reviewed  All of the patient's or agent's questions were answered during this discussion

## 2022-09-08 ENCOUNTER — HOSPITAL ENCOUNTER (OUTPATIENT)
Dept: INFUSION CENTER | Facility: CLINIC | Age: 57
Discharge: HOME | End: 2022-09-08
Payer: COMMERCIAL

## 2022-09-08 ENCOUNTER — PATIENT OUTREACH (OUTPATIENT)
Dept: INTERNAL MEDICINE CLINIC | Facility: CLINIC | Age: 57
End: 2022-09-08

## 2022-09-08 VITALS
HEIGHT: 65 IN | SYSTOLIC BLOOD PRESSURE: 125 MMHG | BODY MASS INDEX: 22.46 KG/M2 | WEIGHT: 134.8 LBS | DIASTOLIC BLOOD PRESSURE: 74 MMHG | HEART RATE: 85 BPM | TEMPERATURE: 97.6 F | RESPIRATION RATE: 16 BRPM

## 2022-09-08 DIAGNOSIS — C79.51 BONE METASTASES (HCC): Primary | ICD-10-CM

## 2022-09-08 DIAGNOSIS — C50.911 PRIMARY MALIGNANT NEOPLASM OF RIGHT BREAST WITH METASTASIS TO OTHER SITE (HCC): ICD-10-CM

## 2022-09-08 DIAGNOSIS — T45.1X5A CHEMOTHERAPY INDUCED NEUTROPENIA (HCC): ICD-10-CM

## 2022-09-08 DIAGNOSIS — J91.0 MALIGNANT PLEURAL EFFUSION: ICD-10-CM

## 2022-09-08 DIAGNOSIS — D70.1 CHEMOTHERAPY INDUCED NEUTROPENIA (HCC): ICD-10-CM

## 2022-09-08 PROCEDURE — 96401 CHEMO ANTI-NEOPL SQ/IM: CPT

## 2022-09-08 PROCEDURE — 96365 THER/PROPH/DIAG IV INF INIT: CPT

## 2022-09-08 RX ORDER — SODIUM CHLORIDE 9 MG/ML
20 INJECTION, SOLUTION INTRAVENOUS ONCE
Status: COMPLETED | OUTPATIENT
Start: 2022-09-08 | End: 2022-09-08

## 2022-09-08 RX ADMIN — ERIBULIN MESYLATE 2.4 MG: 0.5 INJECTION INTRAVENOUS at 12:38

## 2022-09-08 RX ADMIN — SODIUM CHLORIDE 20 ML/HR: 0.9 INJECTION, SOLUTION INTRAVENOUS at 12:01

## 2022-09-08 RX ADMIN — DEXAMETHASONE SODIUM PHOSPHATE 10 MG: 10 INJECTION, SOLUTION INTRAMUSCULAR; INTRAVENOUS at 12:01

## 2022-09-08 NOTE — PROGRESS NOTES
Pt presents for [facility] offering no complaints  Pt tolerated treatment without incident  AVS printed  Next appointment reviewed

## 2022-09-08 NOTE — PROGRESS NOTES
[facility] called patient introduced myself and my role  CHW assessment was completed, and patient agreed to services

## 2022-09-09 ENCOUNTER — PATIENT OUTREACH (OUTPATIENT)
Dept: INTERNAL MEDICINE CLINIC | Facility: CLINIC | Age: 57
End: 2022-09-09

## 2022-09-09 ENCOUNTER — HOSPITAL ENCOUNTER (OUTPATIENT)
Dept: INFUSION CENTER | Facility: CLINIC | Age: 57
End: 2022-09-09
Payer: COMMERCIAL

## 2022-09-09 ENCOUNTER — TELEPHONE (OUTPATIENT)
Dept: HEMATOLOGY ONCOLOGY | Facility: CLINIC | Age: 57
End: 2022-09-09

## 2022-09-09 DIAGNOSIS — C50.911 PRIMARY MALIGNANT NEOPLASM OF RIGHT BREAST WITH METASTASIS TO OTHER SITE (HCC): ICD-10-CM

## 2022-09-09 DIAGNOSIS — T45.1X5A CHEMOTHERAPY INDUCED NEUTROPENIA (HCC): ICD-10-CM

## 2022-09-09 DIAGNOSIS — Z78.9 NEED FOR FOLLOW-UP BY SOCIAL WORKER: Primary | ICD-10-CM

## 2022-09-09 DIAGNOSIS — D70.1 CHEMOTHERAPY INDUCED NEUTROPENIA (HCC): ICD-10-CM

## 2022-09-09 DIAGNOSIS — C79.51 BONE METASTASES (HCC): Primary | ICD-10-CM

## 2022-09-09 DIAGNOSIS — J91.0 MALIGNANT PLEURAL EFFUSION: ICD-10-CM

## 2022-09-09 PROCEDURE — 96372 THER/PROPH/DIAG INJ SC/IM: CPT

## 2022-09-09 RX ADMIN — PEGFILGRASTIM-BMEZ 6 MG: 6 INJECTION SUBCUTANEOUS at 13:47

## 2022-09-09 NOTE — PROGRESS NOTES
Pt here for ziextenzo injection  Pt states that she was very itchy yesterday when she got home and she took benadryl but then was restless all night long especially in her legs and didn't sleep  Also states she gets belly pains and reflux when she gets home after treatments  Reached out to Gokul Morales RN re: all of patients concerns, she is passing along info to Terrie Vera PA-c,  Suggested maybe adding Pepcid IV to her pre-meds  Pt is aware that office will call her if they need to  Tolerated injection in the left arm without incident  AVS declined  Walked out in stable condition

## 2022-09-09 NOTE — PROGRESS NOTES
[address] [place] [address] left a message for patient  CMOC was calling to complete [employer] application for SNAP Benefits      [address] [place] [address] will call patient on Monday 8/12/22

## 2022-09-09 NOTE — PROGRESS NOTES
Patient discussed during morning huddle with [facility], Meggan Faria is assisting patient with SNAP application and she informed me that patients' MA was already reinstated  Rosalee Faria also informed me that patient's  was incarcerated and patient is concern about possible loss of his income  Referral placed to Oncology Social Worker to provide additional assistance and support  Telephone call placed to patient and I left a message with my contact information and requested that she return my call so that I can provide needed assistance

## 2022-09-09 NOTE — TELEPHONE ENCOUNTER
Received messages from Infusion RN in regards to patient s/s experiencing      "christychrissy cornejo, i have andrew cruz [birth year] she wais stating that she was very itchy at home yesterday after her treatment of halaven  she said that she gets belly pain and then face flushing when she gets home from the treatment  she said that she didn't sleep at all last night  she said that the day of treatment she is always like this and slowly gets better over time  she took benadryl last night and her bentyl and nothing helped  she said that she had restless leg syndrome as well  i am about to give her her ziextenzo shot but i wanted to let you know about how she is feeling  benadryl can be having the reverse effect on her i told ehr, it does give restless legs in some people and can cause people to stay up  but i told her to keep taking it if she is having itchiy  she says that she isn't as itchy today as yesterday and she was able to eat a little today " Pepcid was recommended and reviewed with Chacha Velarde PA-C      Reviewed will continue to monitor and that patient next appointment is on 9/21 with Chacha Velarde PA-C

## 2022-09-12 ENCOUNTER — TELEPHONE (OUTPATIENT)
Dept: PALLIATIVE MEDICINE | Facility: CLINIC | Age: 57
End: 2022-09-12

## 2022-09-12 NOTE — TELEPHONE ENCOUNTER
Patient is requesting a call back from MSW  Patient needs guidance filling our some disability paper work       Please advise

## 2022-09-19 ENCOUNTER — TELEPHONE (OUTPATIENT)
Dept: HEMATOLOGY ONCOLOGY | Facility: CLINIC | Age: 57
End: 2022-09-19

## 2022-09-20 ENCOUNTER — PATIENT OUTREACH (OUTPATIENT)
Dept: CASE MANAGEMENT | Facility: HOSPITAL | Age: 57
End: 2022-09-20

## 2022-09-20 NOTE — PROGRESS NOTES
Chart review completed, s/w outpt CM regarding pt's current family dynamics, outpt CM team is supporting pt already however I will meet her in the [place] office at her appt tomorrow to introduce myself and assess for any additional areas of need

## 2022-09-20 NOTE — PROGRESS NOTES
Hematology/Oncology Progress Note    Date of Service: 9/21/2022    [address] [facility]EMATOLOG[facility]   [address] [address]    Hem/Onc Problem List:   1  Metastatic right-sided breast cancer, ER and PA positive, HER2 negative  2  Bone metastatic disease  3  History of right-sided pleural effusion  Chief Complaint:    Routine follow-up for management of stage IV breast cancer    Assessment/Plan:        1   Metastatic breast cancer, with liver, bone, lymph node and pleura involvement   ER and PA positive, HER2 negative  Bone scan showed bony metastatic disease  Patient started monthly Zoladex, daily letrozole and CDK4/6 inhibitor Abemeciclib  Unfortunately, patient developed significant watery diarrhea from Abemaciclib  Abemaciclib was changed to Ibrance 125 mg daily for 3 weeks on,  followed by 1 week off until disease progression on August 30, 2021  Ibrance dose was decreased to 100 mg because of neutropenia  CT scan and bone scan showed disease progression and treatment changed to chemo (Eribulin)  Restaging CT 7/8/2022 shows a very good PA (near 50% reduction in index liver lesions)  NM bone scan showed no new findings  Patient to continue current treatment  Refilled letrozole in treatment today  was supposed to have cycle 7, day 1 scheduled for tomorrow 9/22  For unclear reasons this was not scheduled  We will schedule this for patient today  She will have lab work prior to treatment including CBC, CMP  She has a re-stage CT chest, abdomen, pelvis scheduled for 10/12/2022  Follow-up 1 week after CT chest with Dr Kaur Nieves  2  Bony metastatic disease  Bone scan showed bony metastatic disease on 7/11  Patient  continues Zometa every 3 months  3   History of right breast cancer, status post lumpectomy and axillary lymph node dissection, status post adjuvant radiation therapy      4  Right-sided pleural effusion, status post thoracentesis, cytology positive for adenocarcinoma, ER/NE positive, HER2 negative  Status post PleurX catheter placement  Patient drained 325 cc fluid on December 1, 2020   Will continue to treat the underlying disease  Per patient, this has been draining less  · Discussion of decision making    I personally reviewed the following lab results, the image studies, pathology, other specialty/physicians consult notes and recommendations, and outside medical records from [facility]  I had a lengthy discussion with the patient and shared the work-up findings  I spent 30 minutes reviewing the records (labs, clinician notes, outside records, medical history, ordering medicine/tests/procedures, interpreting the imaging/labs previously done) and coordination of care as well as direct time with the patient today, of which greater than 50% of the time was spent in counseling and coordination of care with the patient/family  · Plan/Labs  · Metastatic breast cancer with diffuse bone metastasis  · ECOG 1   · Continue current treatment of Eribulin Q21 days  Next treatment was supposed to be tomorrow  For unclear reasons, C7D1 was not scheduled  Will schedule this today  Continue neulasta support  Continue Letrozole 2 5mg once daily  Continue this current treatment until disease progression  · Restage CT scheduled for 10/12/22  · Continue to follow up with palliative care  · Continue Zometa Q12 weeks for bone metastasis  · Per Dr Deepti Matson last note --> "OncotypeMap test NGS was done 4/18/2022 with results: PIK3CA: [id number] mutation  ESR1: WT, PD-L1 IHC: negative  MSI-stable, TMB low (4 muts/mb)  BRCA 1&2 WT  Next line option would include Alpelisib + Fulvestrant"    Follow Up: 1 month with Dr Deepti Matson     All questions were answered to the patient's satisfaction during this encounter   The patient knows the contact information for our office and knows to reach out for any relevant concerns related to this encounter  They are to call for any temperature 100 4 or higher, new symptoms including but not restricted to shaking chills, decreased appetite, nausea, vomiting, diarrhea, increased fatigue, shortness of breath or chest pain, confusion, and not feeling the strength to come to the clinic  For all other listed problems and medical diagnosis in their chart - they are managed by PCP and/or other specialists, which the patient acknowledges  Thank you very much for your consultation and making us a part of this patient's care  We are continuing to follow closely with you  Please do not hesitate to reach out to me with any additional questions or concerns  AJCC 8th Edition Cancer Stage :      Cancer Staging  No matching staging information was found for the patient  Hematology/Oncology History:   · History of right-sided breast cancer, initially diagnosed in 2012, status post lumpectomy and axillary lymph node dissection, status post adjuvant radiation therapy    Patient did not receive any adjuvant endocrine therapy or chemotherapy  · July 24, 2021, patient was admitted to hospital because of shortness of breath and cough  · July 24, 2021 CT chest PE protocol showed septal thickening throughout the right lung and bronchial wall thickening due to lymphangitic spread of tumor   Indeterminate lung nodules measuring 5 millimeter in the right upper lobe, right middle lobe and 7 millimeter in the left upper lobe and left lower lobe   Large right pleural effusion   Multiple liver metastatic disease measuring up to 4 centimeter   Lytic metastatic to sternal manubrium  · July 26, 2021, ultrasound abdomen shows multiple hepatic metastatic disease   Status post thoracentesis with 1200 mL of joanne fluid removed   Cytology showed adenoma carcinoma, ER and NH positive  40-50%, HER2 negative    · July 26, 2021 biopsy of the lung liver showed metastatic breast cancer, ER and % positive, HER2 negative   CT abdomen pelvis with contrast showed extensive hepatic metastatic disease  · July 27, 2021 MRI brain showed no evidence of intracranial metastatic disease  · August 6, 2021, mammogram showed no mammographic evidence of malignancy  · August 11, 2021 bone scan showed bony metastatic disease involving left iliac crest medially and adjacent sacrum  · August 12, 2021, Zoladex was given  Letrozole started  · August 16, 2022, patient started Abemaciclib  · August 30, 2021, DC abemaciclib because of diarrhea  Start Ibrance 125 mg daily 3 weeks on 1 week off  · Nov 19, 2021, CT c/a/p showed:   1   Since July 2021, new thromboses within the intrahepatic branches of the portal vein as described above  2   Increased diffuse sclerotic osseous lesions     3   Decreased size of most of the hepatic metastases  4   Indeterminate mottled appearance of the splenic parenchyma   Attention on follow-up is advised  5   Unchanged pulmonary nodules  6   Right pleural effusion has decreased in size since July 24, 2021   The smooth interlobular septal thickening has also decreased, and this change can be attributable to the decreased size of the pleural effusion  7   Mucosal hyperenhancement of the colon   Findings may be treatment-related, and correlation with gastrointestinal symptoms is advised      BONE SCAN:   1   A few areas of increased radiotracer uptake suspicious for osseous metastasis, with findings for minimal progression    · March 15, 2022 bone scan: Stable or improving scattered foci radiotracer uptake suspicious for osseous metastases   No new osseous foci suspicious for metastasis     ·  April 1, 2022 CT scan chest abdomen pelvis:  IMPRESSION:     Findings concerning for worsening hepatic metastases as evidenced by increased size and number of lesions   Please see above discussion      Progressive left portal vein thrombosis      The majority of the pulmonary nodules are stable   There is one subpleural nodule in the right upper lobe apex posteriorly, not seen on the prior study      Stable extensive osseous metastases      Multiple tiny hypodense splenic lesions are also stable, indeterminate      Persistent small right pleural effusion with a right pleural catheter in place      June 25, 2022: 4 day admission for neutropenic fever  July 8, 2022 CT CAP: 1    Stable scattered small pulmonary nodules bilaterally  Small right pleural effusion, slightly decreased  Improving hepatic metastasis  Less conspicuous small splenic hypodense lesions  Stable findings for widespread osseous metastasis  NM Bone Scan without significant changes (A lesion in the left lobe of the liver laterally measures 2 0 x 1 9 cm, previously 5 1 x 3 8 cm- 56 2% reduction   Somewhat stellate lesion in the right lobe of the liver  posteriorly now measures 1 8 x 1 2 cm image 33 series 2, previously 2 6 x 2 6 cm (42 3%)  July 11, 2022, NM bone scan stated no areas of new or worsening scintigraphic activity suggesting osseous disease progression    History of Present Illiness:   Sonya Grandchild is a 49 y o  female with the above-noted HemOnc history who is here to follow up regarding breast cancer history  Patient has metastatic breast cancer with bony metastatic disease  Zoladex and letrozole started on August 11-12, 2021  Abmaciclib was changed to [place] because of significant watery diarrhea  Patient did have neutropenia from [place]  Ibrance dose was decreased to 100 mg  Restaging CT scan and bone scan showed disease progression     Patient had the pleural catheter in place for malignant pleural effusion  Patient takes Zometa for bone health  Interval events:  Patient states she is tolerating treatment without significant complaints  She does note feeling fatigued and short of breath days following treatment  Afterwards, this improves the next few days  No bleeding anywhere, frequent infection    No new headache, vision change, chest pain, shortness of breath, abdominal pain, nausea/vomiting/diarrhea  She has decreased support due to  being in group home recently  Her son and brother help with transportation and errands around house  Weight at visit 8/2021: 146lbs  Weight today 9/21/22: 131lbs    + fatigue  No fever or chills  No exertional chest pain, diaphoresis or SOB  No cough and phlegm, no hemoptysis  Patient denied nausea  and vomiting  No abdominal pain  No diarrhea or constipation  No  symptoms  No headache or blurred vision currently  No visual changes or new headaches  No seizure activity  Appetite good  No significant weight loss or weight gain  The patient denies bleeding anywhere  ROS: A 12-point of review of systems is obtained and other than the above is noncontributory  Objective:   VITALS:   BP 98/56   Pulse 91   Temp 98 °F (36 7 °C)   Resp 16   Ht 5' 5" (1 651 m)   Wt 59 4 kg (131 lb)   LMP 05/26/2021   SpO2 100%   BMI 21 80 kg/m²     Physical EXAM:  General:  Alert, cooperative, no distress, appears stated age  Head:  Normocephalic, without obvious abnormality, atraumatic  Eyes:  Conjunctivae/corneas clear  PERRL, EOMs intact  No evidence of conjunctivitis     Throat: Lips, mucosa, and tongue normal   No bleeding from mouth  Neck: Supple, symmetrical, trachea midline    Lungs:   Respiratory effort easy, nonlabored    Heart:  Regular rate and rhythm, S1, S2 normal    Abdomen:   Soft, non-tender,nondistended  No masses,  No organomegaly  Extremities:  Lymphatics: Extremities normal, atraumatic, no cyanosis or edema  No cervical, axillary or inguinal adenopathy   Skin: Skin color, texture, turgor normal  No rashes  Neurologic: A&Ox4   No focal neuro deficits       Allergies   Allergen Reactions    Codeine Anaphylaxis    Morphine GI Intolerance, Itching and Vomiting    Sulfa Antibiotics Hives and Itching       Past Medical History:   Diagnosis Date    Cancer ([facility] [address] )     Headache(784 0) 2021    History of radiation exposure     Malignant neoplasm of right female breast ([facility] [address] ) 2012    right       Past Surgical History:   Procedure Laterality Date    BACK SURGERY      BREAST CYST EXCISION      BREAST LUMPECTOMY Right 2012    HIP SURGERY      IR BIOPSY LIVER MASS  2021    IR PLEURAL EFFUSION LONG-TERM CATHETER PLACEMENT  2021    IR PLEURAL EFFUSION LONG-TERM CATHETER REMOVAL  2022    IR PORT PLACEMENT  2022    IR THORACENTESIS  2021       Family History   Problem Relation Age of Onset    Breast cancer Mother         in her 63's    Breast cancer Sister         inher 45s and 48    Colon cancer Maternal Grandmother     No Known Problems Paternal Grandmother     Breast cancer Other     No Known Problems Paternal Aunt        Social History     Socioeconomic History    Marital status: /Civil Union     Spouse name: Not on file    Number of children: Not on file    Years of education: Not on file    Highest education level: Not on file   Occupational History    Not on file   Tobacco Use    Smoking status: Former Smoker     Packs/day: 0 25     Years: 15 00     Pack years: 3 75     Types: Cigarettes     Start date:      Quit date: 7/10/2021     Years since quittin 2    Smokeless tobacco: Never Used   Vaping Use    Vaping Use: Never used   Substance and Sexual Activity    Alcohol use: Not Currently    Drug use: Not Currently    Sexual activity: Not Currently     Partners: Male     Birth control/protection: Male Sterilization   Other Topics Concern    Not on file   Social History Narrative    Not on file     Social Determinants of Health     Financial Resource Strain: Not on file   Food Insecurity: No Food Insecurity    Worried About Running Out of Food in the Last Year: Never true    Pako of Food in the Last Year: Never true   Transportation Needs: No Transportation Needs    Lack of Transportation (Medical): No    Lack of Transportation (Non-Medical): No   Physical Activity: Not on file   Stress: Not on file   Social Connections: Not on file   Intimate Partner Violence: Not on file   Housing Stability: Low Risk     Unable to Pay for Housing in the Last Year: No    Number of Places Lived in the Last Year: 1    Unstable Housing in the Last Year: No       Current Outpatient Medications   Medication Sig Dispense Refill    acetaminophen (TYLENOL) 325 mg tablet Take 2 tablets (650 mg total) by mouth every 6 (six) hours as needed for mild pain 100 tablet 0    al mag oxide-diphenhydramine-lidocaine viscous (MAGIC MOUTHWASH) 1:1:1 suspension Swish and spit 10 mL every 4 (four) hours as needed for mouth pain or discomfort 300 mL 0    albuterol (PROVENTIL HFA,VENTOLIN HFA) 90 mcg/act inhaler Inhale 2 puffs every 4 (four) hours as needed for wheezing 8 g 0    calcium carbonate (TUMS) 500 mg chewable tablet Chew 1 tablet (500 mg total) 3 (three) times a day as needed for indigestion or heartburn 30 tablet 0    CRANIAL PROSTHESIS, RX, Apply to cranial area as needed for comfort   1 each 0    [employer] Melatonin 3 MG TAKE 2 TABLETS (6 MG TOTAL) BY MOUTH DAILY AT BEDTIME 180 tablet 1    dextromethorphan-guaiFENesin (ROBITUSSIN DM)  mg/5 mL syrup Take 10 mL by mouth every 4 (four) hours as needed for cough 236 mL 0    dicyclomine (BENTYL) 10 mg capsule Take 1 capsule (10 mg total) by mouth every 6 (six) hours as needed (abdominal cramping) 40 capsule 0    diphenhydrAMINE (BENADRYL) 50 MG tablet Take 1 tablet (50 mg total) by mouth daily at bedtime as needed for itching or sleep 30 tablet 0    diphenoxylate-atropine (LOMOTIL) 2 5-0 025 mg per tablet Take 1 tablet by mouth 4 (four) times a day as needed for diarrhea 30 tablet 0    DULoxetine (CYMBALTA) 30 mg delayed release capsule Take 1 capsule (30 mg total) by mouth daily 90 capsule 3    HYDROmorphone (DILAUDID) 2 mg tablet Take 1-2 tablets (2-4 mg total) by mouth every 3 (three) hours as needed (moderate/severe cancer-related pain) Max Daily Amount: 32 mg 120 tablet 0    letrozole (FEMARA) 2 5 mg tablet Take 1 tablet (2 5 mg total) by mouth daily 90 tablet 2    Lidocaine Viscous HCl (XYLOCAINE) 2 % mucosal solution Swish and spit 10 mL 4 (four) times a day as needed for mouth pain or discomfort 200 mL 0    metoclopramide (REGLAN) 10 mg tablet Take 1 tablet (10 mg total) by mouth 4 (four) times a day 28 tablet 0    multivitamin (THERAGRAN) TABS Take 1 tablet by mouth      ondansetron (ZOFRAN) 4 mg tablet Take 1 tablet (4 mg total) by mouth every 8 (eight) hours as needed for nausea or vomiting 20 tablet 0    oxybutynin (DITROPAN-XL) 5 mg 24 hr tablet Take 1 tablet (5 mg total) by mouth daily Take 1 tablet daily 90 tablet 3    polyethylene glycol (MIRALAX) 17 g packet Take 17 g by mouth daily  0    senna (SENOKOT) 8 6 MG tablet Take 1 tablet (8 6 mg total) by mouth daily at bedtime as needed for constipation 30 tablet 0    Xarelto 20 MG tablet TAKE 1 TABLET BY MOUTH DAILY WITH BREAKFAST 30 tablet 2    docusate sodium (COLACE) 100 mg capsule Take 1 capsule (100 mg total) by mouth 2 (two) times a day for 10 days 20 capsule 0    lactulose 20 g/30 mL Take 15 mL (10 g total) by mouth daily as needed (constipaton) for up to 10 days 300 mL 0     No current facility-administered medications for this visit  (Not in a hospital admission)      DATA REVIEW:    Pathology Result:    Final Diagnosis   Date Value Ref Range Status   07/26/2021   Final    A  B  Thoracentesis, Right (ThinPrep and cell block preparations):  Positive for malignancy  Metastatic carcinoma, compatible with breast primary  -  Immunohistochemical stains performed with appropriate controls show the tumor cells to be positive for Matthew-EP4, MOC31, BARRY-3 and ER with scattered background mesothelial cells staining for WT1 and calretinin, supporting the diagnosis  Satisfactory for evaluation  07/26/2021   Final    A  Liver (core biopsy):     - Poorly-differentiated carcinoma, most compatible with metastasis from the patient's reported breast primary  Comment:  ER / MA / Her-2 pending  Comment: This is an appended report  These results have been appended to a previously preliminary verified report  Image Results: They are reviewed and documented in Hematology/Oncology history    XR chest 2 views  Narrative: CHEST     INDICATION:   Fever, neutropenia  COMPARISON:  CT chest 7/8/2022    EXAM PERFORMED/VIEWS:  XR CHEST PA & LATERAL    FINDINGS:  Right-sided chest port  Right basilar chest tube  Right axillary surgical clips  Cardiomediastinal silhouette appears unremarkable  The lungs are clear  Small right pleural effusion  No pneumothorax  Osseous structures appear within normal limits for patient age  Impression: Small right pleural effusion  Workstation performed: [id number]        LABS:  Lab data are reviewed and documented in HemOnc history  No results found for this or any previous visit (from the past 48 hour(s))            Bruce Benitez PA-C  9/21/2022, 2:26 PM

## 2022-09-21 ENCOUNTER — OFFICE VISIT (OUTPATIENT)
Dept: HEMATOLOGY ONCOLOGY | Facility: CLINIC | Age: 57
End: 2022-09-21
Payer: COMMERCIAL

## 2022-09-21 ENCOUNTER — TELEPHONE (OUTPATIENT)
Dept: HEMATOLOGY ONCOLOGY | Facility: CLINIC | Age: 57
End: 2022-09-21

## 2022-09-21 ENCOUNTER — PATIENT OUTREACH (OUTPATIENT)
Dept: CASE MANAGEMENT | Facility: HOSPITAL | Age: 57
End: 2022-09-21

## 2022-09-21 VITALS
HEART RATE: 91 BPM | SYSTOLIC BLOOD PRESSURE: 98 MMHG | HEIGHT: 65 IN | OXYGEN SATURATION: 100 % | DIASTOLIC BLOOD PRESSURE: 56 MMHG | TEMPERATURE: 98 F | RESPIRATION RATE: 16 BRPM | WEIGHT: 131 LBS | BODY MASS INDEX: 21.83 KG/M2

## 2022-09-21 DIAGNOSIS — C50.911 PRIMARY MALIGNANT NEOPLASM OF RIGHT BREAST WITH METASTASIS TO OTHER SITE (HCC): ICD-10-CM

## 2022-09-21 DIAGNOSIS — Z17.0 MALIGNANT NEOPLASM OF BREAST IN FEMALE, ESTROGEN RECEPTOR POSITIVE, UNSPECIFIED LATERALITY, UNSPECIFIED SITE OF BREAST (HCC): ICD-10-CM

## 2022-09-21 DIAGNOSIS — F32.A ANXIETY AND DEPRESSION: ICD-10-CM

## 2022-09-21 DIAGNOSIS — C50.919 MALIGNANT NEOPLASM OF BREAST IN FEMALE, ESTROGEN RECEPTOR POSITIVE, UNSPECIFIED LATERALITY, UNSPECIFIED SITE OF BREAST (HCC): ICD-10-CM

## 2022-09-21 DIAGNOSIS — C79.51 BONE METASTASES (HCC): ICD-10-CM

## 2022-09-21 DIAGNOSIS — T45.1X5A CHEMOTHERAPY INDUCED NEUTROPENIA (HCC): Primary | ICD-10-CM

## 2022-09-21 DIAGNOSIS — J91.0 MALIGNANT PLEURAL EFFUSION: ICD-10-CM

## 2022-09-21 DIAGNOSIS — F41.9 ANXIETY AND DEPRESSION: ICD-10-CM

## 2022-09-21 DIAGNOSIS — D70.1 CHEMOTHERAPY INDUCED NEUTROPENIA (HCC): Primary | ICD-10-CM

## 2022-09-21 PROCEDURE — 99214 OFFICE O/P EST MOD 30 MIN: CPT | Performed by: INTERNAL MEDICINE

## 2022-09-21 RX ORDER — SODIUM CHLORIDE 9 MG/ML
20 INJECTION, SOLUTION INTRAVENOUS ONCE
Status: CANCELLED | OUTPATIENT
Start: 2022-10-26

## 2022-09-21 RX ORDER — SODIUM CHLORIDE 9 MG/ML
20 INJECTION, SOLUTION INTRAVENOUS ONCE
Status: CANCELLED | OUTPATIENT
Start: 2022-09-28

## 2022-09-21 RX ORDER — SODIUM CHLORIDE 9 MG/ML
20 INJECTION, SOLUTION INTRAVENOUS ONCE
OUTPATIENT
Start: 2022-11-02

## 2022-09-21 RX ORDER — LETROZOLE 2.5 MG/1
2.5 TABLET, FILM COATED ORAL DAILY
Qty: 90 TABLET | Refills: 2 | Status: SHIPPED | OUTPATIENT
Start: 2022-09-21 | End: 2022-12-20

## 2022-09-21 RX ORDER — SODIUM CHLORIDE 9 MG/ML
20 INJECTION, SOLUTION INTRAVENOUS ONCE
Status: CANCELLED | OUTPATIENT
Start: 2022-10-05

## 2022-09-21 RX ORDER — DULOXETIN HYDROCHLORIDE 30 MG/1
30 CAPSULE, DELAYED RELEASE ORAL DAILY
Qty: 90 CAPSULE | Refills: 3 | Status: SHIPPED | OUTPATIENT
Start: 2022-09-21

## 2022-09-21 NOTE — TELEPHONE ENCOUNTER
While we try to accommodate patient requests, our priority is to schedule treatment according to Doctor's orders and site availability  1  Does the Provider use the intake sheet or checkout note? checkout  2    3  What would be a preferred day of the week that would work best for your infusion appointment? Any  4  Do you prefer mornings or afternoons for your appointments? Late morning  5  Are there any days or dates that do not work for your schedule, including any upcoming vacations? no  We are going to try our best to schedule you at the infusion center closest to your home  In the event that we are unable to what would be your next preferred infusion site or sites? [facility]    6  Do you have transportation to take you to all of your appointments? Yes  7     Would you like the infusion center to draw labs from your port? (disregard if patient doesn't have a port or need labs for infusion appointment) yes

## 2022-09-21 NOTE — TELEPHONE ENCOUNTER
Please schedule patient for Eribulin and Zometa  She prefers late mornings any day of the week  Thank you!

## 2022-09-21 NOTE — PROGRESS NOTES
MSW met with pt in the [facility]  office this afternoon after a call from pt's outpt CM team   She tells me that her  is in snf at this time for a probation violation related to drinking  She is unsure how long he will be incarcerated for as he has not yet had a hearing  In the interim, she tells me that her and her disabled son's SSD were reduced to [place] since her  was making "too much money"  She has been told that they need to reapply for SSD but she's unsure who told her that  I suggested that she call the [place] [facility] [place] hotline or our local office and get an appointment, she says that she plans to do a walk in visit instead, I did caution her to call first and make sure they are taking walk ins  She thinks that she needs a form printed out for [facility] but is unable to tell me what form it is  She says that she has tried to apply for [facility] on the [employer] portal but has been having a hard time doing this as she does not have a computer and she's trying to do it all by phone  She has had attempts to help her with this from the outpt CM staff but she is unsure when she s/w them last   She has an upcoming appt with her PCP and I will see if the outpt CM can meet her there with the paperwork printed out that she requires  MSW will review with the outpt CM team to see if they are able to assist at her 10/5 appt

## 2022-09-21 NOTE — TELEPHONE ENCOUNTER
I called pharmacy  Prior authorization for Letrozole is needed  Please call insurance company to have this approved  Thanks!     ANUPAMA: [id number]  ID: [id number]  Group: [id number]  Phone number: [phone]

## 2022-09-22 NOTE — TELEPHONE ENCOUNTER
Attempted to call patient a second time to go over schedule, no answer  Patient was given my teams number to return my call  Patient was made aware of next infusion time and date

## 2022-09-22 NOTE — TELEPHONE ENCOUNTER
Left a detailed message with my teams number to return my call  Patient was made aware of next infusion date and time

## 2022-09-23 ENCOUNTER — PATIENT OUTREACH (OUTPATIENT)
Dept: INTERNAL MEDICINE CLINIC | Facility: CLINIC | Age: 57
End: 2022-09-23

## 2022-09-23 ENCOUNTER — TELEPHONE (OUTPATIENT)
Dept: HEMATOLOGY ONCOLOGY | Facility: CLINIC | Age: 57
End: 2022-09-23

## 2022-09-23 NOTE — PROGRESS NOTES
Telephone call placed to patient again and I left a message on her VM requesting that she return my call  Patient has an appointment with her PCP on 10/5  I will consult with Gricelda Ware to see if she can meet with patient at the office and assist with [facility] application

## 2022-09-23 NOTE — TELEPHONE ENCOUNTER
Patient called asking to speak to MICHAEL BROWN [facility] or a nurse due to a prescription  Patient can be reached at [phone]  Thanks!

## 2022-09-23 NOTE — TELEPHONE ENCOUNTER
Attempted to call patient a third time, no answer  I left a detailed message with my teams number to return my call  Patient was made aware updates can be seen on [employer]  Mailed schedule to patients home address

## 2022-09-23 NOTE — TELEPHONE ENCOUNTER
Email out to oncology finance group in regards to Prior Auth needed for patient letrozole  patient stated that is what is needed  Reviewed with patient will reach out to finance group on Monday to help assist with patient getting medication  Patient appreciative of call

## 2022-09-26 ENCOUNTER — PATIENT OUTREACH (OUTPATIENT)
Dept: INTERNAL MEDICINE CLINIC | Facility: CLINIC | Age: 57
End: 2022-09-26

## 2022-09-26 NOTE — PROGRESS NOTES
I received a return call from [facility] and she left a message on my VM  I called her back and left another message requesting that she return my call when she is available  Referral was also placed to Kylah Wiggins to assist patient with SNAP application  I will make another outreach to her in two weeks if I do not hear back from her before then

## 2022-09-27 ENCOUNTER — TELEPHONE (OUTPATIENT)
Dept: HEMATOLOGY ONCOLOGY | Facility: CLINIC | Age: 57
End: 2022-09-27

## 2022-09-27 NOTE — TELEPHONE ENCOUNTER
Call out to patient in regards to letrozole, reviewed per our Oral chemotherapy team, that patient should be able to obtain prescription  Patient stated will reach out to pharmacy in regards to prescription  Patient appreciative of call

## 2022-09-28 ENCOUNTER — HOSPITAL ENCOUNTER (OUTPATIENT)
Dept: INFUSION CENTER | Facility: CLINIC | Age: 57
Discharge: HOME | End: 2022-09-28
Payer: COMMERCIAL

## 2022-09-28 VITALS
WEIGHT: 130.6 LBS | SYSTOLIC BLOOD PRESSURE: 127 MMHG | DIASTOLIC BLOOD PRESSURE: 63 MMHG | RESPIRATION RATE: 16 BRPM | BODY MASS INDEX: 21.76 KG/M2 | HEART RATE: 77 BPM | HEIGHT: 65 IN | TEMPERATURE: 98.2 F

## 2022-09-28 DIAGNOSIS — J91.0 MALIGNANT PLEURAL EFFUSION: ICD-10-CM

## 2022-09-28 DIAGNOSIS — C50.911 PRIMARY MALIGNANT NEOPLASM OF RIGHT BREAST WITH METASTASIS TO OTHER SITE (HCC): ICD-10-CM

## 2022-09-28 DIAGNOSIS — D70.1 CHEMOTHERAPY INDUCED NEUTROPENIA (HCC): ICD-10-CM

## 2022-09-28 DIAGNOSIS — C79.51 BONE METASTASES (HCC): Primary | ICD-10-CM

## 2022-09-28 DIAGNOSIS — T45.1X5A CHEMOTHERAPY INDUCED NEUTROPENIA (HCC): ICD-10-CM

## 2022-09-28 LAB
ALBUMIN SERPL BCP-MCNC: 3.1 G/DL (ref 3.5–5)
ALP SERPL-CCNC: 57 U/L (ref 46–116)
ALT SERPL W P-5'-P-CCNC: 12 U/L (ref 12–78)
ANION GAP SERPL CALCULATED.3IONS-SCNC: 9 MMOL/L (ref 4–13)
AST SERPL W P-5'-P-CCNC: 18 U/L (ref 5–45)
BASOPHILS # BLD AUTO: 0.03 THOUSANDS/ΜL (ref 0–0.1)
BASOPHILS NFR BLD AUTO: 1 % (ref 0–1)
BILIRUB SERPL-MCNC: 0.46 MG/DL (ref 0.2–1)
BUN SERPL-MCNC: 15 MG/DL (ref 5–25)
CALCIUM ALBUM COR SERPL-MCNC: 9.3 MG/DL (ref 8.3–10.1)
CALCIUM SERPL-MCNC: 8.6 MG/DL (ref 8.3–10.1)
CHLORIDE SERPL-SCNC: 108 MMOL/L (ref 96–108)
CO2 SERPL-SCNC: 23 MMOL/L (ref 21–32)
CREAT SERPL-MCNC: 0.67 MG/DL (ref 0.6–1.3)
EOSINOPHIL # BLD AUTO: 0 THOUSAND/ΜL (ref 0–0.61)
EOSINOPHIL NFR BLD AUTO: 0 % (ref 0–6)
ERYTHROCYTE [DISTWIDTH] IN BLOOD BY AUTOMATED COUNT: 18.9 % (ref 11.6–15.1)
GFR SERPL CREATININE-BSD FRML MDRD: 99 ML/MIN/1.73SQ M
GLUCOSE SERPL-MCNC: 100 MG/DL (ref 65–140)
HCT VFR BLD AUTO: 34.9 % (ref 34.8–46.1)
HGB BLD-MCNC: 11.5 G/DL (ref 11.5–15.4)
IMM GRANULOCYTES # BLD AUTO: 0.03 THOUSAND/UL (ref 0–0.2)
IMM GRANULOCYTES NFR BLD AUTO: 1 % (ref 0–2)
LYMPHOCYTES # BLD AUTO: 0.37 THOUSANDS/ΜL (ref 0.6–4.47)
LYMPHOCYTES NFR BLD AUTO: 6 % (ref 14–44)
MCH RBC QN AUTO: 33.1 PG (ref 26.8–34.3)
MCHC RBC AUTO-ENTMCNC: 33 G/DL (ref 31.4–37.4)
MCV RBC AUTO: 101 FL (ref 82–98)
MONOCYTES # BLD AUTO: 0.55 THOUSAND/ΜL (ref 0.17–1.22)
MONOCYTES NFR BLD AUTO: 9 % (ref 4–12)
NEUTROPHILS # BLD AUTO: 5.26 THOUSANDS/ΜL (ref 1.85–7.62)
NEUTS SEG NFR BLD AUTO: 83 % (ref 43–75)
NRBC BLD AUTO-RTO: 0 /100 WBCS
PLATELET # BLD AUTO: 218 THOUSANDS/UL (ref 149–390)
PMV BLD AUTO: 12.2 FL (ref 8.9–12.7)
POTASSIUM SERPL-SCNC: 3.8 MMOL/L (ref 3.5–5.3)
PROT SERPL-MCNC: 6.4 G/DL (ref 6.4–8.4)
RBC # BLD AUTO: 3.47 MILLION/UL (ref 3.81–5.12)
SODIUM SERPL-SCNC: 140 MMOL/L (ref 135–147)
WBC # BLD AUTO: 6.24 THOUSAND/UL (ref 4.31–10.16)

## 2022-09-28 PROCEDURE — 96367 TX/PROPH/DG ADDL SEQ IV INF: CPT

## 2022-09-28 PROCEDURE — 80053 COMPREHEN METABOLIC PANEL: CPT | Performed by: INTERNAL MEDICINE

## 2022-09-28 PROCEDURE — 85025 COMPLETE CBC W/AUTO DIFF WBC: CPT | Performed by: INTERNAL MEDICINE

## 2022-09-28 PROCEDURE — 96409 CHEMO IV PUSH SNGL DRUG: CPT

## 2022-09-28 RX ORDER — SODIUM CHLORIDE 9 MG/ML
20 INJECTION, SOLUTION INTRAVENOUS ONCE
Status: COMPLETED | OUTPATIENT
Start: 2022-09-28 | End: 2022-09-28

## 2022-09-28 RX ADMIN — ERIBULIN MESYLATE 2.4 MG: 0.5 INJECTION INTRAVENOUS at 15:11

## 2022-09-28 RX ADMIN — DEXAMETHASONE SODIUM PHOSPHATE 10 MG: 10 INJECTION, SOLUTION INTRAMUSCULAR; INTRAVENOUS at 14:34

## 2022-09-28 RX ADMIN — SODIUM CHLORIDE 20 ML/HR: 0.9 INJECTION, SOLUTION INTRAVENOUS at 14:34

## 2022-09-28 NOTE — PROGRESS NOTES
Pt here for chemotherapy  Port accessed with positive blood return noted  Stat labs sent and within parameters for treatment today  Pt tolerated treatment without adverse effects  AVS given  Pt walked out of unit safely

## 2022-10-04 ENCOUNTER — PATIENT OUTREACH (OUTPATIENT)
Dept: INTERNAL MEDICINE CLINIC | Facility: CLINIC | Age: 57
End: 2022-10-04

## 2022-10-04 ENCOUNTER — HOSPITAL ENCOUNTER (OUTPATIENT)
Dept: INFUSION CENTER | Facility: CLINIC | Age: 57
Discharge: HOME | End: 2022-10-04
Payer: COMMERCIAL

## 2022-10-04 DIAGNOSIS — T45.1X5A CHEMOTHERAPY INDUCED NEUTROPENIA (HCC): ICD-10-CM

## 2022-10-04 DIAGNOSIS — D70.1 CHEMOTHERAPY INDUCED NEUTROPENIA (HCC): ICD-10-CM

## 2022-10-04 DIAGNOSIS — C50.919 MALIGNANT NEOPLASM OF BREAST IN FEMALE, ESTROGEN RECEPTOR POSITIVE, UNSPECIFIED LATERALITY, UNSPECIFIED SITE OF BREAST (HCC): ICD-10-CM

## 2022-10-04 DIAGNOSIS — C50.911 PRIMARY MALIGNANT NEOPLASM OF RIGHT BREAST WITH METASTASIS TO OTHER SITE (HCC): ICD-10-CM

## 2022-10-04 DIAGNOSIS — Z95.828 PORT-A-CATH IN PLACE: ICD-10-CM

## 2022-10-04 DIAGNOSIS — C79.51 BONE METASTASES (HCC): Primary | ICD-10-CM

## 2022-10-04 DIAGNOSIS — J91.0 MALIGNANT PLEURAL EFFUSION: ICD-10-CM

## 2022-10-04 DIAGNOSIS — C78.7 MALIGNANT NEOPLASM METASTATIC TO LIVER (HCC): ICD-10-CM

## 2022-10-04 DIAGNOSIS — Z17.0 MALIGNANT NEOPLASM OF BREAST IN FEMALE, ESTROGEN RECEPTOR POSITIVE, UNSPECIFIED LATERALITY, UNSPECIFIED SITE OF BREAST (HCC): ICD-10-CM

## 2022-10-04 LAB
ANISOCYTOSIS BLD QL SMEAR: PRESENT
BASOPHILS # BLD MANUAL: 0.02 THOUSAND/UL (ref 0–0.1)
BASOPHILS NFR MAR MANUAL: 1 % (ref 0–1)
EOSINOPHIL # BLD MANUAL: 0.02 THOUSAND/UL (ref 0–0.4)
EOSINOPHIL NFR BLD MANUAL: 1 % (ref 0–6)
ERYTHROCYTE [DISTWIDTH] IN BLOOD BY AUTOMATED COUNT: 17.7 % (ref 11.6–15.1)
HCT VFR BLD AUTO: 32.8 % (ref 34.8–46.1)
HGB BLD-MCNC: 11 G/DL (ref 11.5–15.4)
LYMPHOCYTES # BLD AUTO: 0.12 THOUSAND/UL (ref 0.6–4.47)
LYMPHOCYTES # BLD AUTO: 5 % (ref 14–44)
MCH RBC QN AUTO: 33.1 PG (ref 26.8–34.3)
MCHC RBC AUTO-ENTMCNC: 33.5 G/DL (ref 31.4–37.4)
MCV RBC AUTO: 99 FL (ref 82–98)
MONOCYTES # BLD AUTO: 0.05 THOUSAND/UL (ref 0–1.22)
MONOCYTES NFR BLD: 2 % (ref 4–12)
NEUTROPHILS # BLD MANUAL: 2.13 THOUSAND/UL (ref 1.85–7.62)
NEUTS BAND NFR BLD MANUAL: 3 % (ref 0–8)
NEUTS SEG NFR BLD AUTO: 88 % (ref 43–75)
PLATELET # BLD AUTO: 114 THOUSANDS/UL (ref 149–390)
PLATELET BLD QL SMEAR: ABNORMAL
PMV BLD AUTO: 12.8 FL (ref 8.9–12.7)
RBC # BLD AUTO: 3.32 MILLION/UL (ref 3.81–5.12)
WBC # BLD AUTO: 2.34 THOUSAND/UL (ref 4.31–10.16)

## 2022-10-04 PROCEDURE — 85027 COMPLETE CBC AUTOMATED: CPT | Performed by: INTERNAL MEDICINE

## 2022-10-04 PROCEDURE — 85007 BL SMEAR W/DIFF WBC COUNT: CPT | Performed by: INTERNAL MEDICINE

## 2022-10-04 NOTE — PROGRESS NOTES
I received a return call from patient and she left a message on my VM requesting that I return her call  I called patient back and I introduced myself and explained my role  Gabriella informed me that her  is still in [facility] and she does not know when he will be released  She also told me that she has not been able to apply for food stamps on line due to having trouble with her password  Selena Parker reports that she went to the [facility] today and picked up a paper application  She is planning to complete it and drop it off tomorrow  Our phone connection kept cutting in and out and we were unable to finish our conversation  Selena Parker agrees to call me back tomorrow when she is in an area that has better reception

## 2022-10-04 NOTE — PROGRESS NOTES
Patient reports to infusion center for lab specimen collection via port today  Patient reports feeling well with no complaints, labs obtained via R CW port with no difficulty  Patient tolerated procedure well with no adverse events  Patient aware of next appointment, AVS declined

## 2022-10-05 ENCOUNTER — HOSPITAL ENCOUNTER (OUTPATIENT)
Dept: INFUSION CENTER | Facility: CLINIC | Age: 57
Discharge: HOME | End: 2022-10-05
Payer: COMMERCIAL

## 2022-10-05 VITALS
TEMPERATURE: 97.7 F | WEIGHT: 130.6 LBS | HEART RATE: 85 BPM | HEIGHT: 65 IN | BODY MASS INDEX: 21.76 KG/M2 | DIASTOLIC BLOOD PRESSURE: 61 MMHG | SYSTOLIC BLOOD PRESSURE: 111 MMHG | RESPIRATION RATE: 17 BRPM

## 2022-10-05 DIAGNOSIS — C79.51 BONE METASTASES (HCC): Primary | ICD-10-CM

## 2022-10-05 DIAGNOSIS — T45.1X5A CHEMOTHERAPY INDUCED NEUTROPENIA (HCC): ICD-10-CM

## 2022-10-05 DIAGNOSIS — J91.0 MALIGNANT PLEURAL EFFUSION: ICD-10-CM

## 2022-10-05 DIAGNOSIS — D70.1 CHEMOTHERAPY INDUCED NEUTROPENIA (HCC): ICD-10-CM

## 2022-10-05 DIAGNOSIS — C50.911 PRIMARY MALIGNANT NEOPLASM OF RIGHT BREAST WITH METASTASIS TO OTHER SITE (HCC): ICD-10-CM

## 2022-10-05 PROCEDURE — 96401 CHEMO ANTI-NEOPL SQ/IM: CPT

## 2022-10-05 PROCEDURE — 96365 THER/PROPH/DIAG IV INF INIT: CPT

## 2022-10-05 RX ORDER — SODIUM CHLORIDE 9 MG/ML
20 INJECTION, SOLUTION INTRAVENOUS ONCE
Status: COMPLETED | OUTPATIENT
Start: 2022-10-05 | End: 2022-10-05

## 2022-10-05 RX ADMIN — DEXAMETHASONE SODIUM PHOSPHATE 10 MG: 10 INJECTION, SOLUTION INTRAMUSCULAR; INTRAVENOUS at 11:15

## 2022-10-05 RX ADMIN — ERIBULIN MESYLATE 2.4 MG: 0.5 INJECTION INTRAVENOUS at 11:48

## 2022-10-05 RX ADMIN — SODIUM CHLORIDE 20 ML/HR: 0.9 INJECTION, SOLUTION INTRAVENOUS at 11:16

## 2022-10-05 NOTE — PROGRESS NOTES
Pt to clinic for halaven  Pt offers no complaints today  Tolerated infusion without complications  Pt aware of next appointment  Pt port accessed, flushed, and de-accessed with positive blood returned  AVS was offered, pt declined  Pt ambulated out of clinic safely

## 2022-10-06 ENCOUNTER — HOSPITAL ENCOUNTER (OUTPATIENT)
Dept: INFUSION CENTER | Facility: CLINIC | Age: 57
Discharge: HOME | End: 2022-10-06
Payer: COMMERCIAL

## 2022-10-06 DIAGNOSIS — C79.51 BONE METASTASES (HCC): Primary | ICD-10-CM

## 2022-10-06 DIAGNOSIS — T45.1X5A CHEMOTHERAPY INDUCED NEUTROPENIA (HCC): ICD-10-CM

## 2022-10-06 DIAGNOSIS — J91.0 MALIGNANT PLEURAL EFFUSION: ICD-10-CM

## 2022-10-06 DIAGNOSIS — D70.1 CHEMOTHERAPY INDUCED NEUTROPENIA (HCC): ICD-10-CM

## 2022-10-06 DIAGNOSIS — C50.911 PRIMARY MALIGNANT NEOPLASM OF RIGHT BREAST WITH METASTASIS TO OTHER SITE (HCC): ICD-10-CM

## 2022-10-06 PROCEDURE — 96372 THER/PROPH/DIAG INJ SC/IM: CPT

## 2022-10-06 RX ADMIN — PEGFILGRASTIM-BMEZ 6 MG: 6 INJECTION SUBCUTANEOUS at 12:51

## 2022-10-06 NOTE — PROGRESS NOTES
Pt to clinic for ziextenzo sq injection in the RLQ  Pt offers no complaints today  Tolerated sq injection without complications  Pt aware of next appointment  AVS was offered, pt declined  Pt ambulated out of clinic safely

## 2022-10-12 ENCOUNTER — HOSPITAL ENCOUNTER (OUTPATIENT)
Dept: CT IMAGING | Facility: HOSPITAL | Age: 57
Discharge: HOME | End: 2022-10-12
Attending: INTERNAL MEDICINE
Payer: COMMERCIAL

## 2022-10-12 DIAGNOSIS — C50.911 PRIMARY MALIGNANT NEOPLASM OF RIGHT BREAST WITH METASTASIS TO OTHER SITE (HCC): ICD-10-CM

## 2022-10-12 PROBLEM — R05.9 COUGH: Status: RESOLVED | Noted: 2021-07-24 | Resolved: 2022-10-12

## 2022-10-12 PROCEDURE — G1004 CDSM NDSC: HCPCS

## 2022-10-12 PROCEDURE — 74177 CT ABD & PELVIS W/CONTRAST: CPT

## 2022-10-12 PROCEDURE — 71260 CT THORAX DX C+: CPT

## 2022-10-12 RX ADMIN — IOHEXOL 100 ML: 300 INJECTION, SOLUTION INTRAVENOUS at 14:56

## 2022-10-17 ENCOUNTER — PATIENT OUTREACH (OUTPATIENT)
Dept: INTERNAL MEDICINE CLINIC | Facility: CLINIC | Age: 57
End: 2022-10-17
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
## 2022-10-18 ENCOUNTER — HOSPITAL ENCOUNTER (OUTPATIENT)
Dept: INFUSION CENTER | Facility: CLINIC | Age: 57
Discharge: HOME | End: 2022-10-18

## 2022-10-18 ENCOUNTER — TELEPHONE (OUTPATIENT)
Dept: HEMATOLOGY ONCOLOGY | Facility: CLINIC | Age: 57
End: 2022-10-18

## 2022-10-18 ENCOUNTER — PATIENT OUTREACH (OUTPATIENT)
Dept: INTERNAL MEDICINE CLINIC | Facility: CLINIC | Age: 57
End: 2022-10-18

## 2022-10-18 NOTE — TELEPHONE ENCOUNTER
patient treatment that was scheduled for 10/19/22 deferred a week due to recent family emergencies     Message sent to Dr Sabino Simon and MSW in regards to reasoning of deferment  Please let RN and patient know of new date
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
## 2022-10-19 ENCOUNTER — HOSPITAL ENCOUNTER (OUTPATIENT)
Dept: INFUSION CENTER | Facility: CLINIC | Age: 57
End: 2022-10-19

## 2022-10-20 ENCOUNTER — PATIENT OUTREACH (OUTPATIENT)
Dept: INTERNAL MEDICINE CLINIC | Facility: CLINIC | Age: 57
End: 2022-10-20

## 2022-10-20 NOTE — PROGRESS NOTES
[facility] called patient, she is requesting help with status SNAP benefits and to assist her with applying for public housing  [facility] left a voice message requesting a return call  [facility] will follow up with patient in less then one week if no return call is received

## 2022-10-21 ENCOUNTER — PATIENT OUTREACH (OUTPATIENT)
Dept: INTERNAL MEDICINE CLINIC | Facility: CLINIC | Age: 57
End: 2022-10-21
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
## 2022-10-25 ENCOUNTER — TELEPHONE (OUTPATIENT)
Dept: INFUSION CENTER | Facility: CLINIC | Age: 57
End: 2022-10-25

## 2022-10-25 DIAGNOSIS — C79.51 BONE METASTASES (HCC): ICD-10-CM

## 2022-10-25 DIAGNOSIS — J91.0 MALIGNANT PLEURAL EFFUSION: ICD-10-CM

## 2022-10-25 DIAGNOSIS — D70.1 CHEMOTHERAPY INDUCED NEUTROPENIA (HCC): Primary | ICD-10-CM

## 2022-10-25 DIAGNOSIS — C50.911 PRIMARY MALIGNANT NEOPLASM OF RIGHT BREAST WITH METASTASIS TO OTHER SITE (HCC): ICD-10-CM

## 2022-10-25 DIAGNOSIS — T45.1X5A CHEMOTHERAPY INDUCED NEUTROPENIA (HCC): Primary | ICD-10-CM

## 2022-10-25 NOTE — PROGRESS NOTES
Hematology/Oncology Progress Note    Date of Service: 11/2/2022    [facility]   [address] [address]    Hem/Onc Problem List:   1  Metastatic right-sided breast cancer, ER and MS positive, HER2 negative  2  Bone metastatic disease  3  History of right-sided pleural effusion  Chief Complaint:    Routine follow-up for management of stage IV breast cancer    Assessment/Plan:        1   Metastatic breast cancer, with liver, bone, lymph node and pleura involvement   ER and MS positive, HER2 negative  Bone scan showed bony metastatic disease  Patient started monthly Zoladex, daily letrozole and CDK4/6 inhibitor Abemeciclib  Unfortunately, patient developed significant watery diarrhea from Abemaciclib  Abemaciclib was changed to Ibrance 125 mg daily for 3 weeks on,  followed by 1 week off until disease progression on August 30, 2021  Ibrance dose was decreased to 100 mg because of neutropenia  CT scan and bone scan showed disease progression and treatment changed to chemo (Eribulin)  Restaging CT 7/8/2022 shows a very good MS (near 50% reduction in index liver lesions)  NM bone scan showed no new findings  Restaging CT 10/12/2022 with stable disease except for one liver lesion and will attempt to get IR to do liver directed therapy as otherwise we are having an excellent systemic response overall  Patient to continue current treatment  On letrozole  2  Bony metastatic disease  Bone scan showed bony metastatic disease on 7/11  Patient  continues Zometa every 3 months  3   History of right breast cancer, status post lumpectomy and axillary lymph node dissection, status post adjuvant radiation therapy  4  Right-sided pleural effusion, status post thoracentesis, cytology positive for adenocarcinoma, ER/MS positive, HER2 negative  Status post PleurX catheter placement    Patient drained 325 cc fluid on December 1, 2020   Will continue to treat the underlying disease  Per patient, this has been draining less  · Discussion of decision making    I personally reviewed the following lab results, the image studies, pathology, other specialty/physicians consult notes and recommendations, and outside medical records from [facility]  I had a lengthy discussion with the patient and shared the work-up findings  I spent 31 minutes reviewing the records (labs, clinician notes, outside records, medical history, ordering medicine/tests/procedures, interpreting the imaging/labs previously done) and coordination of care as well as direct time with the patient today, of which greater than 50% of the time was spent in counseling and coordination of care with the patient/family  · Plan/Labs  · Metastatic breast cancer with diffuse bone metastasis  · ECOG 1   · Continue current treatment of Eribulin Q21 days  C9 coming up  Continue neulasta support  Continue Letrozole 2 5mg once daily  Continue this current treatment until disease progression  · Restage CT CAP in 2 months ordered  · IR referral to consider liver directed therapy to segment IVB which is only site of progressing disease  · Continue to follow up with palliative care  · Continue Zometa Q12 weeks for bone metastasis  · OncotypeMap test NGS was done 4/18/2022 with results: PIK3CA: [id number] mutation  ESR1: WT, PD-L1 IHC: negative  MSI-stable, TMB low (4 muts/mb)  BRCA 1&2 WT  Next line option would include Alpelisib + Fulvestrant    Follow Up: 6 weeks with Sonya, 12 weeks with me    All questions were answered to the patient's satisfaction during this encounter  The patient knows the contact information for our office and knows to reach out for any relevant concerns related to this encounter   They are to call for any temperature 100 4 or higher, new symptoms including but not restricted to shaking chills, decreased appetite, nausea, vomiting, diarrhea, increased fatigue, shortness of breath or chest pain, confusion, and not feeling the strength to come to the clinic  For all other listed problems and medical diagnosis in their chart - they are managed by PCP and/or other specialists, which the patient acknowledges  Thank you very much for your consultation and making us a part of this patient's care  We are continuing to follow closely with you  Please do not hesitate to reach out to me with any additional questions or concerns  AJCC 8th Edition Cancer Stage :      Cancer Staging  No matching staging information was found for the patient  Hematology/Oncology History:   · History of right-sided breast cancer, initially diagnosed in 2012, status post lumpectomy and axillary lymph node dissection, status post adjuvant radiation therapy    Patient did not receive any adjuvant endocrine therapy or chemotherapy  · July 24, 2021, patient was admitted to hospital because of shortness of breath and cough  · July 24, 2021 CT chest PE protocol showed septal thickening throughout the right lung and bronchial wall thickening due to lymphangitic spread of tumor   Indeterminate lung nodules measuring 5 millimeter in the right upper lobe, right middle lobe and 7 millimeter in the left upper lobe and left lower lobe   Large right pleural effusion   Multiple liver metastatic disease measuring up to 4 centimeter   Lytic metastatic to sternal manubrium  · July 26, 2021, ultrasound abdomen shows multiple hepatic metastatic disease   Status post thoracentesis with 1200 mL of joanne fluid removed   Cytology showed adenoma carcinoma, ER and OR positive  40-50%, HER2 negative  · July 26, 2021 biopsy of the lung liver showed metastatic breast cancer, ER and % positive, HER2 negative   CT abdomen pelvis with contrast showed extensive hepatic metastatic disease  · July 27, 2021 MRI brain showed no evidence of intracranial metastatic disease    · August 6, 2021, mammogram showed no mammographic evidence of malignancy  · August 11, 2021 bone scan showed bony metastatic disease involving left iliac crest medially and adjacent sacrum  · August 12, 2021, Zoladex was given  Letrozole started  · August 16, 2022, patient started Abemaciclib  · August 30, 2021, DC abemaciclib because of diarrhea  Start Ibrance 125 mg daily 3 weeks on 1 week off  · Nov 19, 2021, CT c/a/p showed:   1   Since July 2021, new thromboses within the intrahepatic branches of the portal vein as described above  2   Increased diffuse sclerotic osseous lesions     3   Decreased size of most of the hepatic metastases  4   Indeterminate mottled appearance of the splenic parenchyma   Attention on follow-up is advised  5   Unchanged pulmonary nodules  6   Right pleural effusion has decreased in size since July 24, 2021   The smooth interlobular septal thickening has also decreased, and this change can be attributable to the decreased size of the pleural effusion  7   Mucosal hyperenhancement of the colon   Findings may be treatment-related, and correlation with gastrointestinal symptoms is advised      BONE SCAN:   1   A few areas of increased radiotracer uptake suspicious for osseous metastasis, with findings for minimal progression    · March 15, 2022 bone scan: Stable or improving scattered foci radiotracer uptake suspicious for osseous metastases   No new osseous foci suspicious for metastasis     ·  April 1, 2022 CT scan chest abdomen pelvis:  IMPRESSION:     Findings concerning for worsening hepatic metastases as evidenced by increased size and number of lesions   Please see above discussion      Progressive left portal vein thrombosis      The majority of the pulmonary nodules are stable   There is one subpleural nodule in the right upper lobe apex posteriorly, not seen on the prior study      Stable extensive osseous metastases      Multiple tiny hypodense splenic lesions are also stable, indeterminate      Persistent small right pleural effusion with a right pleural catheter in place      June 25, 2022: 4 day admission for neutropenic fever  July 8, 2022 CT CAP: 1    Stable scattered small pulmonary nodules bilaterally  Small right pleural effusion, slightly decreased  Improving hepatic metastasis  Less conspicuous small splenic hypodense lesions  Stable findings for widespread osseous metastasis  NM Bone Scan without significant changes (A lesion in the left lobe of the liver laterally measures 2 0 x 1 9 cm, previously 5 1 x 3 8 cm- 56 2% reduction   Somewhat stellate lesion in the right lobe of the liver  posteriorly now measures 1 8 x 1 2 cm image 33 series 2, previously 2 6 x 2 6 cm (42 3%)  July 11, 2022, NM bone scan stated no areas of new or worsening scintigraphic activity suggesting osseous disease progression  10/12/2022 CT CAP w/c: New 8 mm lesion within segment IVb of the liver compared to July 8, 2022, suspicious for progression of metastatic disease  However, a few other liver lesions are improved  Improved small right pleural effusion  Stable extensive osseous metastatic metastatic disease  History of Present Illiness:   Jose Warner is a 56 y o  female with the above-noted HemOnc history who is here to follow up regarding breast cancer history  Patient has metastatic breast cancer with bony metastatic disease  Zoladex and letrozole started on August 11-12, 2021  Abmaciclib was changed to [place] because of significant watery diarrhea  Patient did have neutropenia from [place]  Ibrance dose was decreased to 100 mg  Restaging CT scan and bone scan showed disease progression     Patient had the pleural catheter in place for malignant pleural effusion  Patient takes Zometa for bone health  Interval events:  She is tolerating treatment without significant complaints  She is fatigued  No bleeding anywhere, frequent infection    No new headache, vision change, chest pain, shortness of breath, abdominal pain, nausea/vomiting/diarrhea  She has decreased support due to  being in correction recently  Her son and brother helped with transportation and errands around house but there is conflict with her brother now  Weight at visit 8/2021: 146lbs  Weight 9/21/22: 131lbs  Weight today: 123 lbs    + fatigue  No fever or chills  No exertional chest pain, diaphoresis or SOB  No cough and phlegm, no hemoptysis  Patient denied nausea  and vomiting  No abdominal pain  No diarrhea or constipation  No  symptoms  No headache or blurred vision currently  No visual changes or new headaches  No seizure activity  Appetite good  No significant weight loss or weight gain  The patient denies bleeding anywhere  ROS: A 10-point of review of systems is obtained and other than the above is noncontributory  Objective:   VITALS:   /68   Pulse 105   Temp 98 °F (36 7 °C)   Resp 17   Ht 5' 5" (1 651 m)   Wt 55 8 kg (123 lb)   LMP 05/26/2021   BMI 20 47 kg/m²     Physical EXAM:  General:  Alert, cooperative, no distress, appears stated age  Head:  Normocephalic, without obvious abnormality, atraumatic  Eyes:  Conjunctivae/corneas clear  EOMs intact  No evidence of conjunctivitis     Throat: Lips, mucosa, and tongue normal     Neck: Supple, symmetrical, trachea midline    Lungs:   Respiratory effort easy, nonlabored    Heart:  Regular rate and rhythm, +S1, S2   Abdomen:   Soft, non-tender,nondistended  No masses,      Extremities:  Lymphatics: Extremities normal, atraumatic, no cyanosis or edema  No cervical, axillary or inguinal adenopathy   Skin: Skin color, texture, turgor normal  No rashes  Neurologic: AAO   No focal neuro deficits       Allergies   Allergen Reactions   • Codeine Anaphylaxis   • Morphine GI Intolerance, Itching and Vomiting   • Sulfa Antibiotics Hives and Itching       Past Medical History:   Diagnosis Date   • Cancer [facility])    • Headache(784 0) 2021   • History of radiation exposure    • Malignant neoplasm of right female breast [facility]) 2012    right       Past Surgical History:   Procedure Laterality Date   • BACK SURGERY     • BREAST CYST EXCISION     • BREAST LUMPECTOMY Right 2012   • HIP SURGERY     • IR BIOPSY LIVER MASS  2021   • IR PLEURAL EFFUSION LONG-TERM CATHETER PLACEMENT  2021   • IR PLEURAL EFFUSION LONG-TERM CATHETER REMOVAL  2022   • IR PORT PLACEMENT  2022   • IR THORACENTESIS  2021       Family History   Problem Relation Age of Onset   • Breast cancer Mother         in her 63's   • Breast cancer Sister         inher 45s and 48   • Colon cancer Maternal Grandmother    • No Known Problems Paternal Grandmother    • Breast cancer Other    • No Known Problems Paternal Aunt        Social History     Socioeconomic History   • Marital status: /Civil Union     Spouse name: Not on file   • Number of children: Not on file   • Years of education: Not on file   • Highest education level: Not on file   Occupational History   • Not on file   Tobacco Use   • Smoking status: Former Smoker     Packs/day: 0 25     Years: 15 00     Pack years: 3 75     Types: Cigarettes     Start date:      Quit date: 7/10/2021     Years since quittin 3   • Smokeless tobacco: Never Used   Vaping Use   • Vaping Use: Never used   Substance and Sexual Activity   • Alcohol use: Not Currently   • Drug use: Not Currently   • Sexual activity: Not Currently     Partners: Male     Birth control/protection: Male Sterilization   Other Topics Concern   • Not on file   Social History Narrative   • Not on file     Social Determinants of Health     Financial Resource Strain: Not on file   Food Insecurity: No Food Insecurity   • Worried About Running Out of Food in the Last Year: Never true   • Ran Out of Food in the Last Year: Never true   Transportation Needs: No Transportation Needs   • Lack of Transportation (Medical):  No   • Lack of Transportation (Non-Medical): No   Physical Activity: Not on file   Stress: Not on file   Social Connections: Not on file   Intimate Partner Violence: Not on file   Housing Stability: Low Risk    • Unable to Pay for Housing in the Last Year: No   • Number of Places Lived in the Last Year: 1   • Unstable Housing in the Last Year: No       Current Outpatient Medications   Medication Sig Dispense Refill   • acetaminophen (TYLENOL) 325 mg tablet Take 2 tablets (650 mg total) by mouth every 6 (six) hours as needed for mild pain 100 tablet 0   • al mag oxide-diphenhydramine-lidocaine viscous (MAGIC MOUTHWASH) 1:1:1 suspension Swish and spit 10 mL every 4 (four) hours as needed for mouth pain or discomfort 300 mL 0   • albuterol (PROVENTIL HFA,VENTOLIN HFA) 90 mcg/act inhaler Inhale 2 puffs every 4 (four) hours as needed for wheezing 8 g 0   • calcium carbonate (TUMS) 500 mg chewable tablet Chew 1 tablet (500 mg total) 3 (three) times a day as needed for indigestion or heartburn 30 tablet 0   • CRANIAL PROSTHESIS, RX, Apply to cranial area as needed for comfort   1 each 0   • [facility] Melatonin 3 MG TAKE 2 TABLETS (6 MG TOTAL) BY MOUTH DAILY AT BEDTIME 180 tablet 1   • dextromethorphan-guaiFENesin (ROBITUSSIN DM)  mg/5 mL syrup Take 10 mL by mouth every 4 (four) hours as needed for cough 236 mL 0   • dicyclomine (BENTYL) 10 mg capsule Take 1 capsule (10 mg total) by mouth every 6 (six) hours as needed (abdominal cramping) 40 capsule 0   • diphenhydrAMINE (BENADRYL) 50 MG tablet Take 1 tablet (50 mg total) by mouth daily at bedtime as needed for itching or sleep 30 tablet 0   • diphenoxylate-atropine (LOMOTIL) 2 5-0 025 mg per tablet Take 1 tablet by mouth 4 (four) times a day as needed for diarrhea 30 tablet 0   • DULoxetine (CYMBALTA) 30 mg delayed release capsule Take 1 capsule (30 mg total) by mouth daily 90 capsule 3   • HYDROmorphone (DILAUDID) 2 mg tablet Take 1-2 tablets (2-4 mg total) by mouth every 3 (three) hours as needed (moderate/severe cancer-related pain) Max Daily Amount: 32 mg 120 tablet 0   • letrozole (FEMARA) 2 5 mg tablet Take 1 tablet (2 5 mg total) by mouth daily 90 tablet 2   • Lidocaine Viscous HCl (XYLOCAINE) 2 % mucosal solution Swish and spit 10 mL 4 (four) times a day as needed for mouth pain or discomfort 200 mL 0   • metoclopramide (REGLAN) 10 mg tablet Take 1 tablet (10 mg total) by mouth 4 (four) times a day 28 tablet 0   • multivitamin (THERAGRAN) TABS Take 1 tablet by mouth     • ondansetron (ZOFRAN) 4 mg tablet Take 1 tablet (4 mg total) by mouth every 8 (eight) hours as needed for nausea or vomiting 20 tablet 0   • oxybutynin (DITROPAN-XL) 5 mg 24 hr tablet Take 1 tablet (5 mg total) by mouth daily Take 1 tablet daily 90 tablet 3   • polyethylene glycol (MIRALAX) 17 g packet Take 17 g by mouth daily  0   • potassium chloride (K-DUR,KLOR-CON) 20 mEq tablet Take 1 tablet (20 mEq total) by mouth 2 (two) times a day for 3 days 6 tablet 0   • senna (SENOKOT) 8 6 MG tablet Take 1 tablet (8 6 mg total) by mouth daily at bedtime as needed for constipation 30 tablet 0   • Xarelto 20 MG tablet TAKE 1 TABLET BY MOUTH EVERY DAY WITH BREAKFAST 30 tablet 11   • docusate sodium (COLACE) 100 mg capsule Take 1 capsule (100 mg total) by mouth 2 (two) times a day for 10 days 20 capsule 0   • lactulose 20 g/30 mL Take 15 mL (10 g total) by mouth daily as needed (constipaton) for up to 10 days 300 mL 0     No current facility-administered medications for this visit  (Not in a hospital admission)      DATA REVIEW:    Pathology Result:    Final Diagnosis   Date Value Ref Range Status   07/26/2021   Final    A  B  Thoracentesis, Right (ThinPrep and cell block preparations):  Positive for malignancy  Metastatic carcinoma, compatible with breast primary    -  Immunohistochemical stains performed with appropriate controls show the tumor cells to be positive for Matthew-EP4, MOC31, BARRY-3 and ER with scattered background mesothelial cells staining for WT1 and calretinin, supporting the diagnosis  Satisfactory for evaluation  07/26/2021   Final    A  Liver (core biopsy):     - Poorly-differentiated carcinoma, most compatible with metastasis from the patient's reported breast primary  Comment:  ER / MN / Her-2 pending  Comment: This is an appended report  These results have been appended to a previously preliminary verified report  Image Results: They are reviewed and documented in Hematology/Oncology history    CT chest abdomen pelvis w contrast  Addendum: ADDENDUM:     As a clarification, the mildly improved 2 1 x 1 4 cm lesion within the  lateral left hepatic lobe segment 2 is seen on series 2, image 53  ADDENDUM:     In the impression, it should also be noted that there is mild wall  thickening involving the ascending colon, which may represent mild colitis  versus from underdistention  Correlate for symptoms of abdominal pain or  change in bowel habits  Narrative: CT CHEST, ABDOMEN AND PELVIS WITH IV CONTRAST    INDICATION:   C50 911: Malignant neoplasm of unspecified site of right female breast     COMPARISON:  CT chest, abdomen, pelvis 7/8/2022; bone scan 7/11/2022    TECHNIQUE: CT examination of the chest, abdomen and pelvis was performed  Axial, sagittal, and coronal 2D reformatted images were created from the source data and submitted for interpretation  Radiation dose length product (DLP) for this visit:  636 mGy-cm   This examination, like all CT scans performed in the [facility], was performed utilizing techniques to minimize radiation dose exposure, including the use of iterative   reconstruction and automated exposure control  IV Contrast:  100 mL of iohexol (OMNIPAQUE)  Enteric Contrast: Enteric contrast was administered      FINDINGS:    CHEST    LUNGS:  Stable subcentimeter pulmonary nodules, mostly within the right upper lobe (for example series 3, images 43, 49, 53, 57, 63, 78, 105)  No new or enlarging pulmonary nodules  PLEURA:  Small right pleural effusion, decreased  Right basilar pleural pigtail catheter in place  HEART/GREAT VESSELS: Heart is unremarkable for patient's age  No thoracic aortic aneurysm  MEDIASTINUM AND KIA:  Nonenlarged calcified lymph nodes within the mediastinum and bilateral kia, stable  No new or progressing lymphadenopathy  CHEST WALL AND LOWER NECK:  Right axillary surgical clips  Right chest port catheter tip terminating near the cavoatrial junction  Stable nonspecific small dystrophic calcifications within the medial right breast     ABDOMEN    LIVER/BILIARY TREE:      -New 8 x 8 mm low-attenuation lesion within the medial left hepatic lobe inferiorly, segment IVb (series 2, image 63)  -Similar to mildly improved 2 1 x 1 4 cm lesion within the lateral left hepatic lobe, segment 2 (series 2, image 3), previously 1 6 x 2 1 cm when remeasured in a similar fashion on July 8, 2022       -Mildly improved right hepatic lobe lesion posteriorly segment 7/8 now measuring 1 5 x 1 0 cm (series 2, image 58), previously 1 7 x 1 0 cm on July 8, 2022 when remeasured       -There are a few additional subcentimeter lesions that are not significantly changed  GALLBLADDER:  No calcified gallstones  No pericholecystic inflammatory change  SPLEEN:  Stable nonspecific heterogeneous appearance  PANCREAS:  Unremarkable  ADRENAL GLANDS:  Unremarkable  KIDNEYS/URETERS:  No suspicious lesions  A few subcentimeter cysts  No hydronephrosis  STOMACH AND BOWEL:  There is a short segment of mild circumferential wall thickening involving the ascending colon near the hepatic flexure (series 2, images 83, 90)  This may represent mild focal colitis versus exaggerated wall thickening due to   underdistention  No bowel obstruction  The remainder of the stomach and bowel are unremarkable      APPENDIX: No findings to suggest appendicitis  ABDOMINOPELVIC CAVITY:  No ascites  No pneumoperitoneum  Similar subcentimeter retroperitoneal nodes, which are nonspecific  No pathologically enlarged lymphadenopathy  VESSELS:  Unremarkable for patient's age  PELVIS    REPRODUCTIVE ORGANS:  Unremarkable for patient's age  URINARY BLADDER:  Unremarkable  ABDOMINAL WALL/INGUINAL REGIONS:  Unremarkable  OSSEOUS STRUCTURES:  Numerous sclerotic lesions throughout the appendicular and axial skeleton compatible with the sequela of osseous metastatic disease, not significantly changed  Impression: New 8 mm lesion within segment IVb of the liver compared to July 8, 2022, suspicious for progression of metastatic disease  However, a few other liver lesions are improved  Improved small right pleural effusion  Stable extensive osseous metastatic metastatic disease  The study was marked in EPIC for significant notification  Workstation performed: [id number]        LABS:  Lab data are reviewed and documented in HemOnc history       Recent Results (from the past 48 hour(s))   CBC and differential    Collection Time: 11/01/22 12:08 PM   Result Value Ref Range    WBC 1 89 (LL) 4 31 - 10 16 Thousand/uL    RBC 3 50 (L) 3 81 - 5 12 Million/uL    Hemoglobin 11 9 11 5 - 15 4 g/dL    Hematocrit 35 1 34 8 - 46 1 %     (H) 82 - 98 fL    MCH 34 0 26 8 - 34 3 pg    MCHC 33 9 31 4 - 37 4 g/dL    RDW 16 2 (H) 11 6 - 15 1 %    MPV 13 7 (H) 8 9 - 12 7 fL    Platelets 95 (L) 031 - 390 Thousands/uL   Comprehensive metabolic panel    Collection Time: 11/01/22 12:08 PM   Result Value Ref Range    Sodium 140 135 - 147 mmol/L    Potassium 3 2 (L) 3 5 - 5 3 mmol/L    Chloride 104 96 - 108 mmol/L    CO2 23 21 - 32 mmol/L    ANION GAP 13 4 - 13 mmol/L    BUN 13 5 - 25 mg/dL    Creatinine 0 85 0 60 - 1 30 mg/dL    Glucose 140 65 - 140 mg/dL    Calcium 9 5 8 3 - 10 1 mg/dL    AST 32 5 - 45 U/L    ALT 17 12 - 78 U/L    Alkaline Phosphatase 57 46 - 116 U/L    Total Protein 7 1 6 4 - 8 4 g/dL    Albumin 3 5 3 5 - 5 0 g/dL    Total Bilirubin 0 49 0 20 - 1 00 mg/dL    eGFR 77 ml/min/1 73sq m   Manual Differential(PHLEBS Do Not Order)    Collection Time: 11/01/22 12:08 PM   Result Value Ref Range    Segmented % 75 43 - 75 %    Lymphocytes % 21 14 - 44 %    Monocytes % 3 (L) 4 - 12 %    Eosinophils, % 1 0 - 6 %    Basophils % 0 0 - 1 %    Absolute Neutrophils 1 42 (L) 1 85 - 7 62 Thousand/uL    Lymphocytes Absolute 0 40 (L) 0 60 - 4 47 Thousand/uL    Monocytes Absolute 0 06 0 00 - 1 22 Thousand/uL    Eosinophils Absolute 0 02 0 00 - 0 40 Thousand/uL    Basophils Absolute 0 00 0 00 - 0 10 Thousand/uL    Total Counted      RBC Morphology Present     Anisocytosis Present     Platelet Estimate Decreased (A) Adequate             Rebecca Perez  11/2/2022, 2:17 PM

## 2022-10-26 ENCOUNTER — HOSPITAL ENCOUNTER (OUTPATIENT)
Dept: INFUSION CENTER | Facility: CLINIC | Age: 57
Discharge: HOME | End: 2022-10-26
Payer: COMMERCIAL

## 2022-10-26 VITALS
BODY MASS INDEX: 20.66 KG/M2 | TEMPERATURE: 98.2 F | SYSTOLIC BLOOD PRESSURE: 101 MMHG | RESPIRATION RATE: 16 BRPM | DIASTOLIC BLOOD PRESSURE: 63 MMHG | WEIGHT: 124 LBS | HEIGHT: 65 IN | HEART RATE: 99 BPM

## 2022-10-26 DIAGNOSIS — J91.0 MALIGNANT PLEURAL EFFUSION: ICD-10-CM

## 2022-10-26 DIAGNOSIS — C79.51 BONE METASTASES (HCC): Primary | ICD-10-CM

## 2022-10-26 DIAGNOSIS — D70.1 CHEMOTHERAPY INDUCED NEUTROPENIA (HCC): ICD-10-CM

## 2022-10-26 DIAGNOSIS — T45.1X5A CHEMOTHERAPY INDUCED NEUTROPENIA (HCC): ICD-10-CM

## 2022-10-26 DIAGNOSIS — C50.911 PRIMARY MALIGNANT NEOPLASM OF RIGHT BREAST WITH METASTASIS TO OTHER SITE (HCC): ICD-10-CM

## 2022-10-26 LAB
ALBUMIN SERPL BCP-MCNC: 3.5 G/DL (ref 3.5–5)
ALP SERPL-CCNC: 55 U/L (ref 46–116)
ALT SERPL W P-5'-P-CCNC: 12 U/L (ref 12–78)
ANION GAP SERPL CALCULATED.3IONS-SCNC: 11 MMOL/L (ref 4–13)
AST SERPL W P-5'-P-CCNC: 23 U/L (ref 5–45)
BASOPHILS # BLD AUTO: 0.02 THOUSANDS/ÂΜL (ref 0–0.1)
BASOPHILS NFR BLD AUTO: 1 % (ref 0–1)
BILIRUB SERPL-MCNC: 0.4 MG/DL (ref 0.2–1)
BUN SERPL-MCNC: 14 MG/DL (ref 5–25)
CALCIUM SERPL-MCNC: 9.5 MG/DL (ref 8.3–10.1)
CHLORIDE SERPL-SCNC: 105 MMOL/L (ref 96–108)
CO2 SERPL-SCNC: 25 MMOL/L (ref 21–32)
CREAT SERPL-MCNC: 0.9 MG/DL (ref 0.6–1.3)
EOSINOPHIL # BLD AUTO: 0 THOUSAND/ÂΜL (ref 0–0.61)
EOSINOPHIL NFR BLD AUTO: 0 % (ref 0–6)
ERYTHROCYTE [DISTWIDTH] IN BLOOD BY AUTOMATED COUNT: 17.5 % (ref 11.6–15.1)
GFR SERPL CREATININE-BSD FRML MDRD: 72 ML/MIN/1.73SQ M
GLUCOSE SERPL-MCNC: 106 MG/DL (ref 65–140)
HCT VFR BLD AUTO: 36.4 % (ref 34.8–46.1)
HGB BLD-MCNC: 12 G/DL (ref 11.5–15.4)
IMM GRANULOCYTES # BLD AUTO: 0.03 THOUSAND/UL (ref 0–0.2)
IMM GRANULOCYTES NFR BLD AUTO: 1 % (ref 0–2)
LYMPHOCYTES # BLD AUTO: 0.3 THOUSANDS/ÂΜL (ref 0.6–4.47)
LYMPHOCYTES NFR BLD AUTO: 8 % (ref 14–44)
MCH RBC QN AUTO: 33.8 PG (ref 26.8–34.3)
MCHC RBC AUTO-ENTMCNC: 33 G/DL (ref 31.4–37.4)
MCV RBC AUTO: 103 FL (ref 82–98)
MONOCYTES # BLD AUTO: 0.33 THOUSAND/ÂΜL (ref 0.17–1.22)
MONOCYTES NFR BLD AUTO: 8 % (ref 4–12)
NEUTROPHILS # BLD AUTO: 3.33 THOUSANDS/ÂΜL (ref 1.85–7.62)
NEUTS SEG NFR BLD AUTO: 82 % (ref 43–75)
NRBC BLD AUTO-RTO: 0 /100 WBCS
PLATELET # BLD AUTO: 198 THOUSANDS/UL (ref 149–390)
PMV BLD AUTO: 12.2 FL (ref 8.9–12.7)
POTASSIUM SERPL-SCNC: 4.2 MMOL/L (ref 3.5–5.3)
PROT SERPL-MCNC: 6.8 G/DL (ref 6.4–8.4)
RBC # BLD AUTO: 3.55 MILLION/UL (ref 3.81–5.12)
SODIUM SERPL-SCNC: 141 MMOL/L (ref 135–147)
WBC # BLD AUTO: 4.01 THOUSAND/UL (ref 4.31–10.16)

## 2022-10-26 PROCEDURE — 85025 COMPLETE CBC W/AUTO DIFF WBC: CPT | Performed by: INTERNAL MEDICINE

## 2022-10-26 PROCEDURE — 80053 COMPREHEN METABOLIC PANEL: CPT | Performed by: INTERNAL MEDICINE

## 2022-10-26 RX ORDER — SODIUM CHLORIDE 9 MG/ML
20 INJECTION, SOLUTION INTRAVENOUS ONCE
Status: COMPLETED | OUTPATIENT
Start: 2022-10-26 | End: 2022-10-26

## 2022-10-26 RX ADMIN — ERIBULIN MESYLATE 2.4 MG: 0.5 INJECTION INTRAVENOUS at 13:52

## 2022-10-26 RX ADMIN — DEXAMETHASONE SODIUM PHOSPHATE 10 MG: 10 INJECTION, SOLUTION INTRAMUSCULAR; INTRAVENOUS at 13:18

## 2022-10-26 RX ADMIN — SODIUM CHLORIDE 20 ML/HR: 0.9 INJECTION, SOLUTION INTRAVENOUS at 13:19

## 2022-10-27 ENCOUNTER — PATIENT OUTREACH (OUTPATIENT)
Dept: INTERNAL MEDICINE CLINIC | Facility: CLINIC | Age: 57
End: 2022-10-27

## 2022-10-27 NOTE — PROGRESS NOTES
Telephone call received from [facility] and I returned her call  Gabriella informed me that she tried to reach Nwe Alvarado but she wasn't available  [facility] reports that she is still awaiting to be approved for food stamps and she does not know what to do  She informed me that she is waiting to have an interview with a   [facility] also reports that she received a letter from [facility] stating that her son is no longer eligible for benefits  [facility] reports that she is not receiving her husbands benefits while he is away and he sends her only $500 to pay her bills  [facility] at her brothers home and she informed me that she never went to court to file the PFA due to not having transportation  She reports that her brother is back from snf and is living with her at the home  I agreed to call DHS with her on a conference call for information regarding her SNAP application  Telephone call placed to [facility] and I spoke to Ms  Tariq Barber who reports that patient already completed her interview with [employer] today  The information went to her  at the [address] [place] [address]  She informed us that her  at the [address] [place] [address] is someone with the last name Saud Thurston  She provided [facility] with additional numbers for local food pantries  I also provided her with the main number to the [facility] reports that she does not have transportation due to her car needing to be repaired  She is not registered with [employer] and would like to apply  I will request that Braulio Bryant assist her with applying for Shared Ride and complete the application for MA and PWD so that patient can be transported to non medical places  I advised Gabriella to contact her State Representative's office for assistance with her son's SS case and she agrees

## 2022-10-31 ENCOUNTER — TELEPHONE (OUTPATIENT)
Dept: HEMATOLOGY ONCOLOGY | Facility: CLINIC | Age: 57
End: 2022-10-31

## 2022-10-31 DIAGNOSIS — Z51.5 PALLIATIVE CARE PATIENT: ICD-10-CM

## 2022-10-31 DIAGNOSIS — C50.911 PRIMARY MALIGNANT NEOPLASM OF RIGHT BREAST WITH METASTASIS TO OTHER SITE (HCC): ICD-10-CM

## 2022-10-31 DIAGNOSIS — C79.51 BONE METASTASES (HCC): ICD-10-CM

## 2022-10-31 DIAGNOSIS — G89.3 CANCER RELATED PAIN: ICD-10-CM

## 2022-10-31 RX ORDER — HYDROMORPHONE HYDROCHLORIDE 2 MG/1
2-4 TABLET ORAL
Qty: 120 TABLET | Refills: 0 | Status: CANCELLED | OUTPATIENT
Start: 2022-10-31

## 2022-10-31 RX ORDER — HYDROMORPHONE HYDROCHLORIDE 2 MG/1
2-4 TABLET ORAL
Qty: 120 TABLET | Refills: 0 | Status: SHIPPED | OUTPATIENT
Start: 2022-10-31

## 2022-10-31 NOTE — TELEPHONE ENCOUNTER
Primary palliative medicine provider: Ginger Parekh    Medication requested: OxyCodone 2 mg tabs    Last appointment:9/7/22    Next scheduled appointment: 11/28/22    PDMP review:  [id number] 09/07/2022 09/07/2022 HYDROmorphone HCL (Tablet) 120 0 8 2  0 [facility]  [place]icaid 0 / 0 [place]     1 [id number] 08/01/2022 08/01/2022 HYDROmorphone HCL (Tablet) 120 0 7 2  14 [facility]  [place]    1 [id number] 06/24/2022 06/24/2022 HYDROmorphone HCL (Tablet) 120 0 7 2  14 [facility]  [place]    1 [id number] 06/07/2022 06/07/2022 HYDROmorphone HCL (Tablet) 120 0 8 2  0 [facility]   Medicaid 0 / 0           Pharmacy: [address] [place], [place] [id number] [phone]

## 2022-10-31 NOTE — TELEPHONE ENCOUNTER
Virtual appointment made on 11/2/22 per Dr Mark Ortiz recommendations   My chart message to patient in regards to changes

## 2022-10-31 NOTE — TELEPHONE ENCOUNTER
Primary palliative medicine provider: Dr Sue Meek     Medication requested:  Dilaudid 2 mg tablet     If for pain, how has the patient been taking their pain medicine?      Last appointment:  9/07     Next scheduled appointment: 11/28       Pharmacy: [facility]

## 2022-10-31 NOTE — TELEPHONE ENCOUNTER
CALL RETURN FORM   Reason for patient call? Patient has infusion around the time for her appointment  Wants to know if she can get virtual     Patient's primary oncologist? Dr Alberto Jessica    Name of person the patient was calling for? Casandra Lent    Any additional information to add, if applicable? [phone]   Informed patient that the message will be forwarded to the team and someone will get back to them as soon as possible    Did you relay this information to the patient?   Yes

## 2022-11-01 ENCOUNTER — HOSPITAL ENCOUNTER (OUTPATIENT)
Dept: INFUSION CENTER | Facility: CLINIC | Age: 57
Discharge: HOME | End: 2022-11-01

## 2022-11-01 ENCOUNTER — TELEPHONE (OUTPATIENT)
Dept: HEMATOLOGY ONCOLOGY | Facility: CLINIC | Age: 57
End: 2022-11-01

## 2022-11-01 ENCOUNTER — TELEPHONE (OUTPATIENT)
Dept: PALLIATIVE MEDICINE | Facility: CLINIC | Age: 57
End: 2022-11-01

## 2022-11-01 DIAGNOSIS — C78.7 MALIGNANT NEOPLASM METASTATIC TO LIVER (HCC): ICD-10-CM

## 2022-11-01 DIAGNOSIS — C79.51 BONE METASTASES (HCC): Primary | ICD-10-CM

## 2022-11-01 DIAGNOSIS — T45.1X5A CHEMOTHERAPY INDUCED NEUTROPENIA (HCC): ICD-10-CM

## 2022-11-01 DIAGNOSIS — C50.919 MALIGNANT NEOPLASM OF BREAST IN FEMALE, ESTROGEN RECEPTOR POSITIVE, UNSPECIFIED LATERALITY, UNSPECIFIED SITE OF BREAST (HCC): ICD-10-CM

## 2022-11-01 DIAGNOSIS — Z95.828 PORT-A-CATH IN PLACE: ICD-10-CM

## 2022-11-01 DIAGNOSIS — C50.911 PRIMARY MALIGNANT NEOPLASM OF RIGHT BREAST WITH METASTASIS TO OTHER SITE (HCC): ICD-10-CM

## 2022-11-01 DIAGNOSIS — Z17.0 MALIGNANT NEOPLASM OF BREAST IN FEMALE, ESTROGEN RECEPTOR POSITIVE, UNSPECIFIED LATERALITY, UNSPECIFIED SITE OF BREAST (HCC): ICD-10-CM

## 2022-11-01 DIAGNOSIS — D70.1 CHEMOTHERAPY INDUCED NEUTROPENIA (HCC): ICD-10-CM

## 2022-11-01 DIAGNOSIS — E87.6 HYPOKALEMIA: Primary | ICD-10-CM

## 2022-11-01 DIAGNOSIS — J91.0 MALIGNANT PLEURAL EFFUSION: ICD-10-CM

## 2022-11-01 LAB
ALBUMIN SERPL BCP-MCNC: 3.5 G/DL (ref 3.5–5)
ALP SERPL-CCNC: 57 U/L (ref 46–116)
ALT SERPL W P-5'-P-CCNC: 17 U/L (ref 12–78)
ANION GAP SERPL CALCULATED.3IONS-SCNC: 13 MMOL/L (ref 4–13)
ANISOCYTOSIS BLD QL SMEAR: PRESENT
AST SERPL W P-5'-P-CCNC: 32 U/L (ref 5–45)
BASOPHILS # BLD MANUAL: 0 THOUSAND/UL (ref 0–0.1)
BASOPHILS NFR MAR MANUAL: 0 % (ref 0–1)
BILIRUB SERPL-MCNC: 0.49 MG/DL (ref 0.2–1)
BUN SERPL-MCNC: 13 MG/DL (ref 5–25)
CALCIUM SERPL-MCNC: 9.5 MG/DL (ref 8.3–10.1)
CHLORIDE SERPL-SCNC: 104 MMOL/L (ref 96–108)
CO2 SERPL-SCNC: 23 MMOL/L (ref 21–32)
CREAT SERPL-MCNC: 0.85 MG/DL (ref 0.6–1.3)
EOSINOPHIL # BLD MANUAL: 0.02 THOUSAND/UL (ref 0–0.4)
EOSINOPHIL NFR BLD MANUAL: 1 % (ref 0–6)
ERYTHROCYTE [DISTWIDTH] IN BLOOD BY AUTOMATED COUNT: 16.2 % (ref 11.6–15.1)
GFR SERPL CREATININE-BSD FRML MDRD: 77 ML/MIN/1.73SQ M
GLUCOSE SERPL-MCNC: 140 MG/DL (ref 65–140)
HCT VFR BLD AUTO: 35.1 % (ref 34.8–46.1)
HGB BLD-MCNC: 11.9 G/DL (ref 11.5–15.4)
LYMPHOCYTES # BLD AUTO: 0.4 THOUSAND/UL (ref 0.6–4.47)
LYMPHOCYTES # BLD AUTO: 21 % (ref 14–44)
MCH RBC QN AUTO: 34 PG (ref 26.8–34.3)
MCHC RBC AUTO-ENTMCNC: 33.9 G/DL (ref 31.4–37.4)
MCV RBC AUTO: 100 FL (ref 82–98)
MONOCYTES # BLD AUTO: 0.06 THOUSAND/UL (ref 0–1.22)
MONOCYTES NFR BLD: 3 % (ref 4–12)
NEUTROPHILS # BLD MANUAL: 1.42 THOUSAND/UL (ref 1.85–7.62)
NEUTS SEG NFR BLD AUTO: 75 % (ref 43–75)
PLATELET # BLD AUTO: 95 THOUSANDS/UL (ref 149–390)
PLATELET BLD QL SMEAR: ABNORMAL
PMV BLD AUTO: 13.7 FL (ref 8.9–12.7)
POTASSIUM SERPL-SCNC: 3.2 MMOL/L (ref 3.5–5.3)
PROT SERPL-MCNC: 7.1 G/DL (ref 6.4–8.4)
RBC # BLD AUTO: 3.5 MILLION/UL (ref 3.81–5.12)
RBC MORPH BLD: PRESENT
SODIUM SERPL-SCNC: 140 MMOL/L (ref 135–147)
WBC # BLD AUTO: 1.89 THOUSAND/UL (ref 4.31–10.16)

## 2022-11-01 RX ORDER — POTASSIUM CHLORIDE 20 MEQ/1
20 TABLET, EXTENDED RELEASE ORAL 2 TIMES DAILY
Qty: 6 TABLET | Refills: 0 | Status: SHIPPED | OUTPATIENT
Start: 2022-11-01 | End: 2022-11-04

## 2022-11-01 RX ORDER — SODIUM CHLORIDE 9 MG/ML
20 INJECTION, SOLUTION INTRAVENOUS ONCE
OUTPATIENT
Start: 2022-11-08

## 2022-11-01 NOTE — TELEPHONE ENCOUNTER
Elva Avina [employer]    Hydromorphone HCL 2 MG     Key # [id number]    I did initiate in Cover my meds

## 2022-11-01 NOTE — TELEPHONE ENCOUNTER
Received notification from Lab in regards to patient's lab results       WBC 1 89  ANC 1 42- within parameters for treatment  Plts- 95k-within parameters for treatment  K 3 2

## 2022-11-01 NOTE — TELEPHONE ENCOUNTER
Call out to patient in regards to patient labs and recommendations by Aaron Boone PA-C patient to take Potassium chloride 20mEq BID for 3 days  Patient appreciative of call

## 2022-11-01 NOTE — PROGRESS NOTES
anc 1 42  Potassium 3 2  Wbc 1 89  plts 95k    Reviewed with Sima Montgomery PA-C of patient labs  Per Sima Montgomery PA-C patient is okay for treatment as scheduled on 11/2/22  Potassium chloride 20 mEq BID for 3 days ordered  Patient notified  Ok to treat added to treatment plan

## 2022-11-02 ENCOUNTER — HOSPITAL ENCOUNTER (OUTPATIENT)
Dept: INFUSION CENTER | Facility: CLINIC | Age: 57
Discharge: HOME | End: 2022-11-02

## 2022-11-02 ENCOUNTER — TELEPHONE (OUTPATIENT)
Dept: HEMATOLOGY ONCOLOGY | Facility: CLINIC | Age: 57
End: 2022-11-02

## 2022-11-02 ENCOUNTER — TELEMEDICINE (OUTPATIENT)
Dept: HEMATOLOGY ONCOLOGY | Facility: CLINIC | Age: 57
End: 2022-11-02

## 2022-11-02 VITALS
HEART RATE: 105 BPM | BODY MASS INDEX: 20.56 KG/M2 | TEMPERATURE: 98 F | RESPIRATION RATE: 17 BRPM | HEIGHT: 65 IN | WEIGHT: 123.4 LBS | SYSTOLIC BLOOD PRESSURE: 105 MMHG | DIASTOLIC BLOOD PRESSURE: 69 MMHG

## 2022-11-02 VITALS
HEART RATE: 105 BPM | DIASTOLIC BLOOD PRESSURE: 68 MMHG | TEMPERATURE: 98 F | BODY MASS INDEX: 20.49 KG/M2 | HEIGHT: 65 IN | RESPIRATION RATE: 17 BRPM | SYSTOLIC BLOOD PRESSURE: 105 MMHG | WEIGHT: 123 LBS

## 2022-11-02 DIAGNOSIS — T45.1X5A CHEMOTHERAPY INDUCED NEUTROPENIA (HCC): ICD-10-CM

## 2022-11-02 DIAGNOSIS — C79.51 BONE METASTASES (HCC): Primary | ICD-10-CM

## 2022-11-02 DIAGNOSIS — D70.1 CHEMOTHERAPY INDUCED NEUTROPENIA (HCC): ICD-10-CM

## 2022-11-02 DIAGNOSIS — J91.0 MALIGNANT PLEURAL EFFUSION: ICD-10-CM

## 2022-11-02 DIAGNOSIS — C50.911 PRIMARY MALIGNANT NEOPLASM OF RIGHT BREAST WITH METASTASIS TO OTHER SITE (HCC): ICD-10-CM

## 2022-11-02 RX ORDER — SODIUM CHLORIDE 9 MG/ML
20 INJECTION, SOLUTION INTRAVENOUS ONCE
Status: COMPLETED | OUTPATIENT
Start: 2022-11-02 | End: 2022-11-02

## 2022-11-02 RX ADMIN — DEXAMETHASONE SODIUM PHOSPHATE 10 MG: 10 INJECTION, SOLUTION INTRAMUSCULAR; INTRAVENOUS at 13:05

## 2022-11-02 RX ADMIN — ERIBULIN MESYLATE 2.4 MG: 0.5 INJECTION INTRAVENOUS at 13:45

## 2022-11-02 RX ADMIN — SODIUM CHLORIDE 20 ML/HR: 0.9 INJECTION, SOLUTION INTRAVENOUS at 13:04

## 2022-11-02 NOTE — PROGRESS NOTES
Pt presents for chemotherapy offering no compliants  Pt tolerated treatment without incident  AVS declined  Next appointment reviewed

## 2022-11-02 NOTE — TELEPHONE ENCOUNTER
Called patient to check in for virtual appt on 11/22/22 with Dr Clemencia Whitaker, voicemail is full and unable to leave a message  Per Gregse Derrick, Dr Clemencia Whitaker will see patient at infusion appt at 2:20PM in person

## 2022-11-03 ENCOUNTER — HOSPITAL ENCOUNTER (OUTPATIENT)
Dept: INFUSION CENTER | Facility: CLINIC | Age: 57
Discharge: HOME | End: 2022-11-03

## 2022-11-03 DIAGNOSIS — C50.911 PRIMARY MALIGNANT NEOPLASM OF RIGHT BREAST WITH METASTASIS TO OTHER SITE (HCC): ICD-10-CM

## 2022-11-03 DIAGNOSIS — D70.1 CHEMOTHERAPY INDUCED NEUTROPENIA (HCC): ICD-10-CM

## 2022-11-03 DIAGNOSIS — J91.0 MALIGNANT PLEURAL EFFUSION: ICD-10-CM

## 2022-11-03 DIAGNOSIS — C79.51 BONE METASTASES (HCC): Primary | ICD-10-CM

## 2022-11-03 DIAGNOSIS — T45.1X5A CHEMOTHERAPY INDUCED NEUTROPENIA (HCC): ICD-10-CM

## 2022-11-03 RX ADMIN — PEGFILGRASTIM-BMEZ 6 MG: 6 INJECTION SUBCUTANEOUS at 14:18

## 2022-11-04 ENCOUNTER — PATIENT OUTREACH (OUTPATIENT)
Dept: INTERNAL MEDICINE CLINIC | Facility: CLINIC | Age: 57
End: 2022-11-04

## 2022-11-04 ENCOUNTER — TELEMEDICINE (OUTPATIENT)
Dept: INTERVENTIONAL RADIOLOGY/VASCULAR | Facility: CLINIC | Age: 57
End: 2022-11-04

## 2022-11-04 DIAGNOSIS — C50.911 PRIMARY MALIGNANT NEOPLASM OF RIGHT BREAST WITH METASTASIS TO OTHER SITE (HCC): Primary | ICD-10-CM

## 2022-11-04 NOTE — PROGRESS NOTES
Incoming call from patient, [facility] was unavailable to talk with patient in a private setting at the  time of her call  [facility] asked her to please call ZOË Tirado

## 2022-11-08 NOTE — PROGRESS NOTES
Virtual Regular Visit    Verification of patient location:    Patient is located in the following state in which I hold an active license [place]      Assessment/Plan:    Problem List Items Addressed This Visit        Other    Primary malignant neoplasm of right breast with metastasis to other site [facility]) - Primary    Relevant Orders    IR microwave ablation      56 y F w/ metastatic breast cancer with stable disease / excellent response to therapy with the exception of a newly developed 8 mm seg IVB liver lesion  Other lesions are responding to treatment  I discussed different treatment options including TACE, Y90 and ablation  Given size / location of the this new lesion, I recommended ablation due to its single stage benefit and liver preservation for possible future Y90 if she shows future liver lesion progression and she remains a viable candidate for locoregional therapy  I described microwave ablation details including risks and benefits  She wishes to proceed  Will coordinate with scheduling  Reason for visit is   Chief Complaint   Patient presents with   • Virtual Regular Visit        Encounter provider Alia Cunningham DO    Provider located at [address] [place] [address] [id number] [facility] [id number] [phone]      Recent Visits  Date Type Provider Dept   11/04/22 222 Rosario Childers 56 recent visits within past 7 days and meeting all other requirements  Future Appointments  No visits were found meeting these conditions  Showing future appointments within next 150 days and meeting all other requirements       The patient was identified by name and date of birth  Cici Anne was informed that this is a telemedicine visit and that the visit is being conducted through the [employer]  She agrees to proceed     My office door was closed  No one else was in the room    She acknowledged consent and understanding of privacy and security of the video platform  The patient has agreed to participate and understands they can discontinue the visit at any time  Patient is aware this is a billable service  CC: Metastatic breast cancer  HPI:  56 y F with overall stable metastatic breast cancer with excellent systemic response  However, a new 8 mm liver lesion has developed in segment IVb with other liver lesions showing treatment response  She is referred to IR to discuss LDT options given this lesion  She endorses fatigue, but is up out of bed >50% doing basic activities  She denied fever, CP, SOB         Past Medical History:   Diagnosis Date   • Cancer ([address] )    • Headache(784 0) 11/2021   • History of radiation exposure    • Malignant neoplasm of right female breast [facility]) 2012    right       Past Surgical History:   Procedure Laterality Date   • BACK SURGERY     • BREAST CYST EXCISION     • BREAST LUMPECTOMY Right 2012   • HIP SURGERY     • IR BIOPSY LIVER MASS  07/26/2021   • IR PLEURAL EFFUSION LONG-TERM CATHETER PLACEMENT  08/17/2021   • IR PLEURAL EFFUSION LONG-TERM CATHETER REMOVAL  05/11/2022   • IR PORT PLACEMENT  07/27/2022   • IR THORACENTESIS  07/26/2021       Current Outpatient Medications   Medication Sig Dispense Refill   • acetaminophen (TYLENOL) 325 mg tablet Take 2 tablets (650 mg total) by mouth every 6 (six) hours as needed for mild pain 100 tablet 0   • al mag oxide-diphenhydramine-lidocaine viscous (MAGIC MOUTHWASH) 1:1:1 suspension Swish and spit 10 mL every 4 (four) hours as needed for mouth pain or discomfort 300 mL 0   • albuterol (PROVENTIL HFA,VENTOLIN HFA) 90 mcg/act inhaler Inhale 2 puffs every 4 (four) hours as needed for wheezing 8 g 0   • calcium carbonate (TUMS) 500 mg chewable tablet Chew 1 tablet (500 mg total) 3 (three) times a day as needed for indigestion or heartburn 30 tablet 0   • CRANIAL PROSTHESIS, RX, Apply to cranial area as needed for comfort   1 each 0   • CVS Melatonin 3 MG TAKE 2 TABLETS (6 MG TOTAL) BY MOUTH DAILY AT BEDTIME 180 tablet 1   • dextromethorphan-guaiFENesin (ROBITUSSIN DM)  mg/5 mL syrup Take 10 mL by mouth every 4 (four) hours as needed for cough 236 mL 0   • dicyclomine (BENTYL) 10 mg capsule Take 1 capsule (10 mg total) by mouth every 6 (six) hours as needed (abdominal cramping) 40 capsule 0   • diphenhydrAMINE (BENADRYL) 50 MG tablet Take 1 tablet (50 mg total) by mouth daily at bedtime as needed for itching or sleep 30 tablet 0   • diphenoxylate-atropine (LOMOTIL) 2 5-0 025 mg per tablet Take 1 tablet by mouth 4 (four) times a day as needed for diarrhea 30 tablet 0   • docusate sodium (COLACE) 100 mg capsule Take 1 capsule (100 mg total) by mouth 2 (two) times a day for 10 days 20 capsule 0   • DULoxetine (CYMBALTA) 30 mg delayed release capsule Take 1 capsule (30 mg total) by mouth daily 90 capsule 3   • HYDROmorphone (DILAUDID) 2 mg tablet Take 1-2 tablets (2-4 mg total) by mouth every 3 (three) hours as needed (moderate/severe cancer-related pain) Max Daily Amount: 32 mg 120 tablet 0   • lactulose 20 g/30 mL Take 15 mL (10 g total) by mouth daily as needed (constipaton) for up to 10 days 300 mL 0   • letrozole (FEMARA) 2 5 mg tablet Take 1 tablet (2 5 mg total) by mouth daily 90 tablet 2   • Lidocaine Viscous HCl (XYLOCAINE) 2 % mucosal solution Swish and spit 10 mL 4 (four) times a day as needed for mouth pain or discomfort 200 mL 0   • metoclopramide (REGLAN) 10 mg tablet Take 1 tablet (10 mg total) by mouth 4 (four) times a day 28 tablet 0   • multivitamin (THERAGRAN) TABS Take 1 tablet by mouth     • ondansetron (ZOFRAN) 4 mg tablet Take 1 tablet (4 mg total) by mouth every 8 (eight) hours as needed for nausea or vomiting 20 tablet 0   • oxybutynin (DITROPAN-XL) 5 mg 24 hr tablet Take 1 tablet (5 mg total) by mouth daily Take 1 tablet daily 90 tablet 3   • polyethylene glycol (MIRALAX) 17 g packet Take 17 g by mouth daily  0   • potassium chloride (K-DUR,KLOR-CON) 20 mEq tablet Take 1 tablet (20 mEq total) by mouth 2 (two) times a day for 3 days 6 tablet 0   • senna (SENOKOT) 8 6 MG tablet Take 1 tablet (8 6 mg total) by mouth daily at bedtime as needed for constipation 30 tablet 0   • Xarelto 20 MG tablet TAKE 1 TABLET BY MOUTH EVERY DAY WITH BREAKFAST 30 tablet 11     No current facility-administered medications for this visit  Allergies   Allergen Reactions   • Codeine Anaphylaxis   • Morphine GI Intolerance, Itching and Vomiting   • Sulfa Antibiotics Hives and Itching       Review of Systems   Constitutional: Positive for fatigue  HENT: Negative  Eyes: Negative  Respiratory: Negative  Cardiovascular: Negative  Gastrointestinal: Negative  Genitourinary: Negative  Musculoskeletal: Negative  Skin: Negative  Neurological: Negative  Hematological: Negative  Psychiatric/Behavioral: Negative  Video Exam    There were no vitals filed for this visit  Physical Exam  HENT:      Head: Normocephalic  Eyes:      Conjunctiva/sclera: Conjunctivae normal    Pulmonary:      Effort: Pulmonary effort is normal    Abdominal:      General: Abdomen is flat  Musculoskeletal:      Cervical back: Normal range of motion  Neurological:      Mental Status: She is alert and oriented to person, place, and time  Psychiatric:         Mood and Affect: Mood normal          Behavior: Behavior normal           I spent 30 minutes with patient today in which greater than 50% of the time was spent in counseling/coordination of care regarding her liver lesion

## 2022-11-18 ENCOUNTER — PATIENT OUTREACH (OUTPATIENT)
Dept: INTERNAL MEDICINE CLINIC | Facility: CLINIC | Age: 57
End: 2022-11-18

## 2022-11-18 NOTE — PROGRESS NOTES
[address] Shea Bryant called patient as a scheduled outreach  Patient was Visiting  in [place], he is expected to be released 4/2023  Patient is still living at brothers home  She states that everything at the home is OK for now  16year old son that was living in the home is now on Probation in a "special program" near [place] for 4 or 5 months  The sons pregnant girlfriend now living with friends  Patients [employer] benefits have been restored  Patient older son Brooklyn Bynum is receiving his disability payments  Patient reports that she was able to repair her car  I provided supportive listening and information on community resources  Referrals have been completed  [address] Shea Bryant will outreach patient in one month

## 2022-11-18 NOTE — PRE-PROCEDURE INSTRUCTIONS
Pt was given an arrival time of 8am on 11/23 for her ablation with IR at [facility]  Instructed to start holding xarelto on 11/21, to have nothing to eat or drink after midnight and to have a ride home after the procedure

## 2022-11-22 ENCOUNTER — ANESTHESIA EVENT (OUTPATIENT)
Dept: CT IMAGING | Facility: HOSPITAL | Age: 57
End: 2022-11-22

## 2022-11-23 ENCOUNTER — HOSPITAL ENCOUNTER (OUTPATIENT)
Dept: CT IMAGING | Facility: HOSPITAL | Age: 57
Discharge: HOME | End: 2022-11-23
Attending: RADIOLOGY

## 2022-11-23 ENCOUNTER — ANESTHESIA (OUTPATIENT)
Dept: CT IMAGING | Facility: HOSPITAL | Age: 57
End: 2022-11-23

## 2022-11-23 VITALS
DIASTOLIC BLOOD PRESSURE: 58 MMHG | TEMPERATURE: 98.5 F | HEART RATE: 85 BPM | WEIGHT: 124.78 LBS | SYSTOLIC BLOOD PRESSURE: 130 MMHG | HEIGHT: 65 IN | BODY MASS INDEX: 20.79 KG/M2 | OXYGEN SATURATION: 99 % | RESPIRATION RATE: 20 BRPM

## 2022-11-23 DIAGNOSIS — C50.911 PRIMARY MALIGNANT NEOPLASM OF RIGHT BREAST WITH METASTASIS TO OTHER SITE (HCC): ICD-10-CM

## 2022-11-23 LAB
ERYTHROCYTE [DISTWIDTH] IN BLOOD BY AUTOMATED COUNT: 16.3 % (ref 11.6–15.1)
HCT VFR BLD AUTO: 39.1 % (ref 34.8–46.1)
HGB BLD-MCNC: 12.5 G/DL (ref 11.5–15.4)
INR PPP: 0.99 (ref 0.84–1.19)
MCH RBC QN AUTO: 34.3 PG (ref 26.8–34.3)
MCHC RBC AUTO-ENTMCNC: 32 G/DL (ref 31.4–37.4)
MCV RBC AUTO: 107 FL (ref 82–98)
PLATELET # BLD AUTO: 173 THOUSANDS/UL (ref 149–390)
PMV BLD AUTO: 11.7 FL (ref 8.9–12.7)
PROTHROMBIN TIME: 12.9 SECONDS (ref 11.6–14.5)
RBC # BLD AUTO: 3.64 MILLION/UL (ref 3.81–5.12)
WBC # BLD AUTO: 4.1 THOUSAND/UL (ref 4.31–10.16)

## 2022-11-23 RX ORDER — LIDOCAINE HYDROCHLORIDE 10 MG/ML
INJECTION, SOLUTION EPIDURAL; INFILTRATION; INTRACAUDAL; PERINEURAL AS NEEDED
Status: COMPLETED | OUTPATIENT
Start: 2022-11-23 | End: 2022-11-23

## 2022-11-23 RX ORDER — SODIUM CHLORIDE, SODIUM LACTATE, POTASSIUM CHLORIDE, CALCIUM CHLORIDE 600; 310; 30; 20 MG/100ML; MG/100ML; MG/100ML; MG/100ML
20 INJECTION, SOLUTION INTRAVENOUS CONTINUOUS
Status: CANCELLED | OUTPATIENT
Start: 2022-11-23

## 2022-11-23 RX ORDER — HYDROMORPHONE HCL/PF 1 MG/ML
0.2 SYRINGE (ML) INJECTION
Status: COMPLETED | OUTPATIENT
Start: 2022-11-23 | End: 2022-11-23

## 2022-11-23 RX ORDER — ACETAMINOPHEN 325 MG/1
650 TABLET ORAL EVERY 4 HOURS PRN
Status: DISCONTINUED | OUTPATIENT
Start: 2022-11-23 | End: 2022-11-27 | Stop reason: HOSPADM

## 2022-11-23 RX ORDER — SODIUM CHLORIDE, SODIUM LACTATE, POTASSIUM CHLORIDE, CALCIUM CHLORIDE 600; 310; 30; 20 MG/100ML; MG/100ML; MG/100ML; MG/100ML
50 INJECTION, SOLUTION INTRAVENOUS CONTINUOUS
Status: DISCONTINUED | OUTPATIENT
Start: 2022-11-23 | End: 2022-11-27 | Stop reason: HOSPADM

## 2022-11-23 RX ORDER — FENTANYL CITRATE 50 UG/ML
INJECTION, SOLUTION INTRAMUSCULAR; INTRAVENOUS AS NEEDED
Status: DISCONTINUED | OUTPATIENT
Start: 2022-11-23 | End: 2022-11-23

## 2022-11-23 RX ORDER — METRONIDAZOLE 500 MG/100ML
500 INJECTION, SOLUTION INTRAVENOUS ONCE
Status: COMPLETED | OUTPATIENT
Start: 2022-11-23 | End: 2022-11-23

## 2022-11-23 RX ORDER — MIDAZOLAM HYDROCHLORIDE 2 MG/2ML
INJECTION, SOLUTION INTRAMUSCULAR; INTRAVENOUS AS NEEDED
Status: DISCONTINUED | OUTPATIENT
Start: 2022-11-23 | End: 2022-11-23

## 2022-11-23 RX ORDER — HYDROMORPHONE HCL IN WATER/PF 6 MG/30 ML
0.2 PATIENT CONTROLLED ANALGESIA SYRINGE INTRAVENOUS ONCE
Status: DISCONTINUED | OUTPATIENT
Start: 2022-11-23 | End: 2022-11-27 | Stop reason: HOSPADM

## 2022-11-23 RX ORDER — ONDANSETRON 2 MG/ML
4 INJECTION INTRAMUSCULAR; INTRAVENOUS ONCE AS NEEDED
Status: COMPLETED | OUTPATIENT
Start: 2022-11-23 | End: 2022-11-23

## 2022-11-23 RX ORDER — ONDANSETRON 2 MG/ML
INJECTION INTRAMUSCULAR; INTRAVENOUS AS NEEDED
Status: DISCONTINUED | OUTPATIENT
Start: 2022-11-23 | End: 2022-11-23

## 2022-11-23 RX ORDER — CEFAZOLIN SODIUM 1 G/50ML
1000 SOLUTION INTRAVENOUS ONCE
Status: COMPLETED | OUTPATIENT
Start: 2022-11-23 | End: 2022-11-23

## 2022-11-23 RX ORDER — LIDOCAINE HYDROCHLORIDE 10 MG/ML
INJECTION, SOLUTION EPIDURAL; INFILTRATION; INTRACAUDAL; PERINEURAL AS NEEDED
Status: DISCONTINUED | OUTPATIENT
Start: 2022-11-23 | End: 2022-11-23

## 2022-11-23 RX ORDER — HYDROMORPHONE HYDROCHLORIDE 2 MG/1
2 TABLET ORAL ONCE AS NEEDED
Status: COMPLETED | OUTPATIENT
Start: 2022-11-23 | End: 2022-11-23

## 2022-11-23 RX ORDER — PROPOFOL 10 MG/ML
INJECTION, EMULSION INTRAVENOUS AS NEEDED
Status: DISCONTINUED | OUTPATIENT
Start: 2022-11-23 | End: 2022-11-23

## 2022-11-23 RX ORDER — SUCCINYLCHOLINE/SOD CL,ISO/PF 100 MG/5ML
SYRINGE (ML) INTRAVENOUS AS NEEDED
Status: DISCONTINUED | OUTPATIENT
Start: 2022-11-23 | End: 2022-11-23

## 2022-11-23 RX ADMIN — ACETAMINOPHEN 650 MG: 325 TABLET ORAL at 12:13

## 2022-11-23 RX ADMIN — METRONIDAZOLE 500 MG: 500 INJECTION, SOLUTION INTRAVENOUS at 09:40

## 2022-11-23 RX ADMIN — MIDAZOLAM HYDROCHLORIDE 1 MG: 1 INJECTION, SOLUTION INTRAMUSCULAR; INTRAVENOUS at 09:19

## 2022-11-23 RX ADMIN — Medication 60 MG: at 09:32

## 2022-11-23 RX ADMIN — ALBUMIN HUMAN 250 ML: 0.05 SOLUTION INTRAVENOUS at 10:05

## 2022-11-23 RX ADMIN — HYDROMORPHONE HYDROCHLORIDE 2 MG: 2 TABLET ORAL at 13:13

## 2022-11-23 RX ADMIN — CEFAZOLIN SODIUM 1000 MG: 1 SOLUTION INTRAVENOUS at 09:24

## 2022-11-23 RX ADMIN — PROPOFOL 30 MG: 10 INJECTION, EMULSION INTRAVENOUS at 09:30

## 2022-11-23 RX ADMIN — FENTANYL CITRATE 50 MCG: 50 INJECTION, SOLUTION INTRAMUSCULAR; INTRAVENOUS at 09:56

## 2022-11-23 RX ADMIN — HYDROMORPHONE HYDROCHLORIDE 0.2 MG: 1 INJECTION, SOLUTION INTRAMUSCULAR; INTRAVENOUS; SUBCUTANEOUS at 11:35

## 2022-11-23 RX ADMIN — ONDANSETRON 4 MG: 2 INJECTION INTRAMUSCULAR; INTRAVENOUS at 11:27

## 2022-11-23 RX ADMIN — LIDOCAINE HYDROCHLORIDE 10 ML: 10 INJECTION, SOLUTION EPIDURAL; INFILTRATION; INTRACAUDAL; PERINEURAL at 10:04

## 2022-11-23 RX ADMIN — HYDROMORPHONE HYDROCHLORIDE 0.2 MG: 1 INJECTION, SOLUTION INTRAMUSCULAR; INTRAVENOUS; SUBCUTANEOUS at 11:42

## 2022-11-23 RX ADMIN — PROPOFOL 100 MG: 10 INJECTION, EMULSION INTRAVENOUS at 09:29

## 2022-11-23 RX ADMIN — LIDOCAINE HYDROCHLORIDE 50 MG: 10 INJECTION, SOLUTION EPIDURAL; INFILTRATION; INTRACAUDAL; PERINEURAL at 09:29

## 2022-11-23 RX ADMIN — IOHEXOL 100 ML: 350 INJECTION, SOLUTION INTRAVENOUS at 10:57

## 2022-11-23 RX ADMIN — SODIUM CHLORIDE, SODIUM LACTATE, POTASSIUM CHLORIDE, AND CALCIUM CHLORIDE 50 ML/HR: .6; .31; .03; .02 INJECTION, SOLUTION INTRAVENOUS at 08:29

## 2022-11-23 RX ADMIN — HYDROMORPHONE HYDROCHLORIDE 0.2 MG: 1 INJECTION, SOLUTION INTRAMUSCULAR; INTRAVENOUS; SUBCUTANEOUS at 14:35

## 2022-11-23 RX ADMIN — ONDANSETRON 4 MG: 2 INJECTION INTRAMUSCULAR; INTRAVENOUS at 10:53

## 2022-11-23 RX ADMIN — PROPOFOL 50 MG: 10 INJECTION, EMULSION INTRAVENOUS at 09:32

## 2022-11-23 NOTE — ANESTHESIA PREPROCEDURE EVALUATION
Procedure:  IR MICROWAVE ABLATION    Relevant Problems   ANESTHESIA (within normal limits)   (-) History of anesthesia complications      CARDIO   (+) Hypertension      ENDO (within normal limits)      GI/HEPATIC  Confirmed NPO appropriate   (-) Gastroesophageal reflux disease      /RENAL (within normal limits)      GYN   (+) Malignant neoplasm of breast in female, estrogen receptor positive (HCC)   (+) Primary malignant neoplasm of right breast with metastasis to other site ([address] )      MUSCULOSKELETAL (within normal limits)      NEURO/PSYCH (within normal limits)      PULMONARY  prior PleurX catheter, now removed   (+) Malignant pleural effusion   (+) Pleural effusion   (-) Smoking   (-) URI (upper respiratory infection)        Physical Exam    Airway    Mallampati score: II  TM Distance: >3 FB  Neck ROM: full     Dental       Cardiovascular  Rhythm: regular, Rate: normal,     Pulmonary  Breath sounds clear to auscultation,     Other Findings        Anesthesia Plan  ASA Score- 3     Anesthesia Type- general with ASA Monitors  Additional Monitors:   Airway Plan: LMA  Plan Factors-Exercise tolerance (METS): >4 METS  Chart reviewed  EKG reviewed  Existing labs reviewed  Patient is not a current smoker  Induction- intravenous  Postoperative Plan-   Planned trial extubation    Informed Consent- Anesthetic plan and risks discussed with patient and spouse  I personally reviewed this patient with the CRNA  Discussed and agreed on the Anesthesia Plan with the CRNA             Lab Results   Component Value Date    WBC 4 10 (L) 11/23/2022    HGB 12 5 11/23/2022    HCT 39 1 11/23/2022     (H) 11/23/2022     11/23/2022     Lab Results   Component Value Date    SODIUM 140 11/01/2022    K 3 2 (L) 11/01/2022     11/01/2022    CO2 23 11/01/2022    BUN 13 11/01/2022    CREATININE 0 85 11/01/2022    GLUC 140 11/01/2022    CALCIUM 9 5 11/01/2022     Lab Results   Component Value Date    ALT 17 11/01/2022    AST 32 11/01/2022    ALKPHOS 57 11/01/2022    BILITOT 0 4 04/02/2014     Lab Results   Component Value Date    INR 1 34 (H) 08/20/2022    INR 1 07 08/17/2021    INR 0 99 07/25/2021    PROTIME 16 3 (H) 08/20/2022    PROTIME 13 4 08/17/2021    PROTIME 13 3 07/25/2021     Lab Results   Component Value Date    HGBA1C 5 8 (H) 07/16/2021

## 2022-11-23 NOTE — QUICK NOTE
Right Asept catheter drained of 800 mls clear dark yellow pleural fluid at this time, tolerated well

## 2022-11-23 NOTE — H&P
Interventional Radiology  History and Physical 2022     Shawn Karimi   1965   [id number]    Assessment/Plan:  43-year-old female with history of breast cancer found to have new segment 4b liver lesion concerning for metastatic disease presents for microwave ablation  Problem List Items Addressed This Visit        Other    Primary malignant neoplasm of right breast with metastasis to other site [facility])    Relevant Orders    IR microwave ablation          Subjective:     Patient ID: Shawn Karimi is a 56 y o  female  History of Present Illness  Patient with history of breast cancer found to have new segment 4b liver lesion concerning for metastatic disease presents for microwave ablation      Review of Systems      Past Medical History:   Diagnosis Date   • Cancer [address] [address] )    • Headache(784 0) 2021   • History of radiation exposure    • Malignant neoplasm of right female breast ([facility] [address] ) 2012    right        Past Surgical History:   Procedure Laterality Date   • BACK SURGERY     • BREAST CYST EXCISION     • BREAST LUMPECTOMY Right    • HIP SURGERY Right     debridement of femur   • IR BIOPSY LIVER MASS  2021   • IR PLEURAL EFFUSION LONG-TERM CATHETER PLACEMENT  2021   • IR PLEURAL EFFUSION LONG-TERM CATHETER REMOVAL  2022   • IR PORT PLACEMENT  2022   • IR THORACENTESIS  2021        Social History     Tobacco Use   Smoking Status Former   • Packs/day: 0 25   • Years: 15 00   • Pack years: 3 75   • Types: Cigarettes   • Start date: 20   • Quit date: 7/10/2021   • Years since quittin 3   Smokeless Tobacco Never        Social History     Substance and Sexual Activity   Alcohol Use Not Currently        Social History     Substance and Sexual Activity   Drug Use Not Currently        Allergies   Allergen Reactions   • Codeine Anaphylaxis   • Morphine GI Intolerance, Itching and Vomiting   • Sulfa Antibiotics Hives and Itching       Current Outpatient Medications Medication Sig Dispense Refill   • acetaminophen (TYLENOL) 325 mg tablet Take 2 tablets (650 mg total) by mouth every 6 (six) hours as needed for mild pain 100 tablet 0   • al mag oxide-diphenhydramine-lidocaine viscous (MAGIC MOUTHWASH) 1:1:1 suspension Swish and spit 10 mL every 4 (four) hours as needed for mouth pain or discomfort 300 mL 0   • calcium carbonate (TUMS) 500 mg chewable tablet Chew 1 tablet (500 mg total) 3 (three) times a day as needed for indigestion or heartburn 30 tablet 0   • dicyclomine (BENTYL) 10 mg capsule Take 1 capsule (10 mg total) by mouth every 6 (six) hours as needed (abdominal cramping) 40 capsule 0   • DULoxetine (CYMBALTA) 30 mg delayed release capsule Take 1 capsule (30 mg total) by mouth daily 90 capsule 3   • HYDROmorphone (DILAUDID) 2 mg tablet Take 1-2 tablets (2-4 mg total) by mouth every 3 (three) hours as needed (moderate/severe cancer-related pain) Max Daily Amount: 32 mg 120 tablet 0   • letrozole (FEMARA) 2 5 mg tablet Take 1 tablet (2 5 mg total) by mouth daily 90 tablet 2   • Lidocaine Viscous HCl (XYLOCAINE) 2 % mucosal solution Swish and spit 10 mL 4 (four) times a day as needed for mouth pain or discomfort 200 mL 0   • metoclopramide (REGLAN) 10 mg tablet Take 1 tablet (10 mg total) by mouth 4 (four) times a day 28 tablet 0   • multivitamin (THERAGRAN) TABS Take 1 tablet by mouth     • oxybutynin (DITROPAN-XL) 5 mg 24 hr tablet Take 1 tablet (5 mg total) by mouth daily Take 1 tablet daily 90 tablet 3   • senna (SENOKOT) 8 6 MG tablet Take 1 tablet (8 6 mg total) by mouth daily at bedtime as needed for constipation 30 tablet 0   • Xarelto 20 MG tablet TAKE 1 TABLET BY MOUTH EVERY DAY WITH BREAKFAST 30 tablet 11   • albuterol (PROVENTIL HFA,VENTOLIN HFA) 90 mcg/act inhaler Inhale 2 puffs every 4 (four) hours as needed for wheezing 8 g 0   • CRANIAL PROSTHESIS, RX, Apply to cranial area as needed for comfort   1 each 0   • CVS Melatonin 3 MG TAKE 2 TABLETS (6 MG TOTAL) BY MOUTH DAILY AT BEDTIME 180 tablet 1   • dextromethorphan-guaiFENesin (ROBITUSSIN DM)  mg/5 mL syrup Take 10 mL by mouth every 4 (four) hours as needed for cough 236 mL 0   • diphenhydrAMINE (BENADRYL) 50 MG tablet Take 1 tablet (50 mg total) by mouth daily at bedtime as needed for itching or sleep 30 tablet 0   • diphenoxylate-atropine (LOMOTIL) 2 5-0 025 mg per tablet Take 1 tablet by mouth 4 (four) times a day as needed for diarrhea 30 tablet 0   • docusate sodium (COLACE) 100 mg capsule Take 1 capsule (100 mg total) by mouth 2 (two) times a day for 10 days 20 capsule 0   • lactulose 20 g/30 mL Take 15 mL (10 g total) by mouth daily as needed (constipaton) for up to 10 days 300 mL 0   • ondansetron (ZOFRAN) 4 mg tablet Take 1 tablet (4 mg total) by mouth every 8 (eight) hours as needed for nausea or vomiting 20 tablet 0   • polyethylene glycol (MIRALAX) 17 g packet Take 17 g by mouth daily  0   • potassium chloride (K-DUR,KLOR-CON) 20 mEq tablet Take 1 tablet (20 mEq total) by mouth 2 (two) times a day for 3 days 6 tablet 0     Current Facility-Administered Medications   Medication Dose Route Frequency Provider Last Rate Last Admin   • ceFAZolin (ANCEF) IVPB (premix in dextrose) 1,000 mg 50 mL  1,000 mg Intravenous Once Luis F Lin DO       • lactated ringers infusion  50 mL/hr Intravenous Continuous Wilhemena MD Gurinder       • metroNIDAZOLE (FLAGYL) IVPB (premix) 500 mg 100 mL  500 mg Intravenous Once Luis F Lin DO              Objective:    Vitals:    11/23/22 0807   BP: 103/60   Pulse: 61   Resp: 16   Temp: 98 3 °F (36 8 °C)   TempSrc: Temporal   SpO2: 100%   Weight: 56 6 kg (124 lb 12 5 oz)   Height: 5' 5" (1 651 m)        Physical Exam  Constitutional:       Appearance: Normal appearance  Cardiovascular:      Rate and Rhythm: Normal rate  Pulmonary:      Effort: Pulmonary effort is normal    Skin:     General: Skin is dry             No results found for: BNP   Lab Results   Component Value Date    WBC 1 89 (LL) 11/01/2022    HGB 11 9 11/01/2022    HCT 35 1 11/01/2022     (H) 11/01/2022    PLT 95 (L) 11/01/2022     Lab Results   Component Value Date    INR 1 34 (H) 08/20/2022    INR 1 07 08/17/2021    INR 0 99 07/25/2021    PROTIME 16 3 (H) 08/20/2022    PROTIME 13 4 08/17/2021    PROTIME 13 3 07/25/2021     Lab Results   Component Value Date    PTT 29 08/20/2022         I have personally reviewed pertinent imaging and laboratory results  Code Status: Prior  Advance Directive and Living Will:      Power of :    POLST:      This text is generated with voice recognition software  There may be translation, syntax,  or grammatical errors  If you have any questions, please contact the dictating provider

## 2022-11-23 NOTE — BRIEF OP NOTE (RAD/CATH)
INTERVENTIONAL RADIOLOGY PROCEDURE NOTE    Date: 11/23/2022    Procedure:   Procedure Summary     Date: 11/23/22 Room / Location: [facility] CT Scan    Anesthesia Start: 4793 Anesthesia Stop:     Procedure: IR MICROWAVE ABLATION Diagnosis:       Primary malignant neoplasm of right breast with metastasis to other site [facility])      (metastatic breast cancer)    Scheduled Providers: Elder Martinez MD Responsible Provider: Sue Vallejo MD    Anesthesia Type: general ASA Status: 3          Preoperative diagnosis:   1  Primary malignant neoplasm of right breast with metastasis to other site [facility])         Postoperative diagnosis: Same  Surgeon: Elder Martinez MD     Assistant: None  No qualified resident was available  Blood loss: 1 mL    Specimens: 1 core needle sample placed into formalin  Findings: Successful microwave ablation of segment IVb liver mass with biopsy  Complications: None immediate      Anesthesia: local and general

## 2022-11-23 NOTE — ANESTHESIA POSTPROCEDURE EVALUATION
Post-Op Assessment Note    CV Status:  Stable  Pain Score: 0    Pain management: adequate     Mental Status:  Alert and awake   Hydration Status:  Euvolemic   PONV Controlled:  Controlled   Airway Patency:  Patent      Post Op Vitals Reviewed: Yes      Staff: CRNA         No notable events documented      BP   109/64   Temp   97 4   Pulse  78   Resp   16   SpO2   100

## 2022-11-23 NOTE — DISCHARGE INSTRUCTIONS
Ablation of the Liver                                                         WHAT YOU NEED TO KNOW:                             Liver ablation is a treatment that destroys liver tumors without removing them  Using image guidance, a probe is inserted into the tumor  Ablations can be done using high energy radio waves (RFA)  Or using microwave energy  Heat destroys the abnormal tissue  A cyroablation destroys abnormal tissue by freezing it  DISCHARGE INSTRUCTIONS:         You may resume your normal diet and medications  Small sips of flat soda will help with nausea  Limit your activity for 24 hours  Graciela Chen  Patients Contact Interventional  Radiology at [phone]     if any of the following occur:    Contact your healthcare provider if:  You have severe pain that does not get better with medicine  Difficulty breathing, nausea or vomiting  Chills or fever above 101 degrees F  Pain at the probe sites not relieved by medication  Develop any redness, swelling, heat, unusual drainage, heavy bruising or  bleeding from the probe sites  You continue to have pain a week after your procedure  You have questions or concerns about your condition or care  Follow up with your healthcare provider as directed: You will need to return within a month for a CT scan or MRI of your liver  Write down your questions so you remember to ask them during your visits  Rest as needed: Slowly start to do more each day  Return to your daily activities as directed by your healthcare provider  © 2017 [employer] is for End User's use only and may not be sold, redistributed or otherwise used for commercial purposes  All illustrations and images included in CareNotes® are the copyrighted property of A D [employer] M [employer]  or [employer]    The above information is an  only  It is not intended as medical advice for individual conditions or treatments  Talk to your doctor, nurse or pharmacist before following any medical regimen to see if it is safe and effective for you

## 2022-11-23 NOTE — LETTER
[id number]  [address]  [address] [place] [address]  Dept: [phone]    November 23, 2022     Patient: Hazel Redd   YOB: 1965   Date of Visit: 11/23/2022       To Whom it May Concern:    Salena Musa Abraham's spouse Hazel Redd was under my professional care  She was seen in the hospital on 11/23/2022  Please excuse Salena Musa from work on 11/23/2022 as he needs to accompany his spouse after the hospital visit  If you have any questions or concerns, please don't hesitate to call           Sincerely,          Lela Alarcon MD

## 2022-11-28 ENCOUNTER — SOCIAL WORK (OUTPATIENT)
Dept: PALLIATIVE MEDICINE | Facility: CLINIC | Age: 57
End: 2022-11-28

## 2022-11-28 ENCOUNTER — OFFICE VISIT (OUTPATIENT)
Dept: PALLIATIVE MEDICINE | Facility: CLINIC | Age: 57
End: 2022-11-28

## 2022-11-28 VITALS
RESPIRATION RATE: 16 BRPM | HEART RATE: 83 BPM | OXYGEN SATURATION: 96 % | WEIGHT: 125 LBS | TEMPERATURE: 97.3 F | DIASTOLIC BLOOD PRESSURE: 60 MMHG | BODY MASS INDEX: 20.83 KG/M2 | HEIGHT: 65 IN | SYSTOLIC BLOOD PRESSURE: 100 MMHG

## 2022-11-28 DIAGNOSIS — C79.51 BONE METASTASES (HCC): ICD-10-CM

## 2022-11-28 DIAGNOSIS — K59.00 CONSTIPATION, UNSPECIFIED CONSTIPATION TYPE: ICD-10-CM

## 2022-11-28 DIAGNOSIS — C50.911 PRIMARY MALIGNANT NEOPLASM OF RIGHT BREAST WITH METASTASIS TO OTHER SITE (HCC): Primary | ICD-10-CM

## 2022-11-28 DIAGNOSIS — G89.3 CANCER RELATED PAIN: ICD-10-CM

## 2022-11-28 DIAGNOSIS — Z51.5 PALLIATIVE CARE PATIENT: ICD-10-CM

## 2022-11-28 DIAGNOSIS — Z71.89 COUNSELING AND COORDINATION OF CARE: Primary | ICD-10-CM

## 2022-11-28 NOTE — PROGRESS NOTES
Outpatient Follow-Up - Palliative and Supportive Care   Gabrielle Marrufo 56 y o  female [id number]    Assessment & Plan  Problem List Items Addressed This Visit        Musculoskeletal and Integument    Bone metastases ([address] )       Other    Palliative care patient    Primary malignant neoplasm of right breast with metastasis to other site [facility]) - Primary    Constipation    Cancer related pain     #symptom management  #cancer-related pain   - continue APAP 650 mg PO Q6H PRN              - max daily 4000 mg   - continue hydromorphone 2-4 mg PO Q3H PRN   - continue duloxetine therapy     #anxiety   - continue duloxetine 30 mg PO QDaily     #nausea   - continue metoclopramide 10 mg PO QID PRN   - continue ondansetron 4 mg PO Q8H PRN     #insomnia   - continue melatonin 6 mg PO QHS     #OIC   - continue home bowel regimen   - continued adequate hydration     #abdominal cramping   - continue dicyclomine 10 mg PO Q6H PRN    #mucositis   - continue magic mouthwash     #goals of care   - recent oncology visit 11/02 with documentation of excellent response to therapy, other than new liver lesion; follow up with IR   - s/p microwave ablation 11/23 to liver lesion   - treatment focused care with no limitations at this time   - [facility] LSW met with patient in conjunction to [facility] provider today    #psychosocial support   - emotional support provided   Marlon Ebro [spouse] [phone]   - two children              - Armen [son, Down syndrome]              - Ankur [son]      Next [facility] Follow up in 4 weeks  Controlled Substance Review    [place]MP or [place]  reviewed: No red flags were identified; safe to proceed with prescription  Irl Rhonda     PDMP Review       Value Time User    PDMP Reviewed  Yes (P)  11/28/2022  7:56 AM Darien Howe MD          Medications adjusted this encounter:  Requested Prescriptions      No prescriptions requested or ordered in this encounter     No orders of the defined types were placed in this encounter  There are no discontinued medications  Michelle Mota was seen today for symptoms and planning cares related to above illnesses  I have reviewed the patient's controlled substance dispensing history in the Prescription Drug Monitoring Program in compliance with the [facility] regulations before prescribing any controlled substances  They are invited to continue to follow with us  If there are questions or concerns, please contact us through our clinic/answering service 24 hours a day, seven days a week  Lester Madrigal MD  [facility] Palliative and Supportive Care  [phone]      Visit Information    Accompanied By: No one    Source of History: Self, Medical record    History Limitations: None      History of Present Illness    Michelle Mota is a 56 y o  female who presents in follow up of symptoms related to metastatic breast cancer c/b recurrent malignant pleural effusions  Pertinent issues include: symptom management, pain, neoplasm related, assessment of goals of care, advance care planning  Patient reports slowly improving post-microwave ablation associated pain in RUQ; current pain regimen adequately manages, use of PO hydromorphone 3-4 tabs/day  Denies nausea, vomiting  Appetite improving  Recently suffered with mucositis after last infusion, since resolved  Magic mouthwash with some relief  10 lb weight loss over the past 2-3 months  BM daily  Last BM 4 days ago, use of bowel regimen, passing flatus  Adequate sleep  Multiple life stressors, refer to [facility] LSW documentation for expanded discussion  Past medical, surgical, social, and family histories are reviewed and pertinent updates are made  Review of Systems   Constitutional: Positive for decreased appetite, malaise/fatigue and weight loss  Negative for chills and fever  HENT: Negative for congestion  Eyes: Negative for visual disturbance  Cardiovascular: Negative for chest pain     Respiratory: Negative for shortness of breath  Musculoskeletal: Negative for falls and neck pain  Gastrointestinal: Positive for abdominal pain and constipation  Negative for nausea and vomiting  Genitourinary: Negative for frequency  Neurological: Negative for headaches  Psychiatric/Behavioral: The patient does not have insomnia  All other systems reviewed and are negative  Vital Signs    /60   Pulse 83   Temp (!) 97 3 °F (36 3 °C)   Resp 16   Ht 5' 5" (1 651 m)   Wt 56 7 kg (125 lb)   LMP 05/26/2021   SpO2 96%   BMI 20 80 kg/m²     Physical Exam and Objective Data  Physical Exam  Vitals and nursing note reviewed  Constitutional:       General: She is awake  Appearance: She is not diaphoretic  Comments: Sitting up in NAD  BMI 20 8  Non-toxic appearing   HENT:      Head: Normocephalic and atraumatic  Right Ear: External ear normal       Left Ear: External ear normal       Nose: No rhinorrhea  Eyes:      Comments: No gaze preference   Cardiovascular:      Rate and Rhythm: Normal rate  Pulmonary:      Effort: No tachypnea, accessory muscle usage or respiratory distress  Comments: Completes full sentences without difficulty  Musculoskeletal:      Cervical back: Normal range of motion  Neurological:      General: No focal deficit present  Mental Status: She is alert and oriented to person, place, and time     Psychiatric:         Attention and Perception: Attention normal          Mood and Affect: Mood and affect normal          Speech: Speech normal          Cognition and Memory: Cognition and memory normal            Radiology and Laboratory:  I personally reviewed and interpreted the following results:    Most Recent COVID-19 Results:  Lab Results   Component Value Date/Time    SARSCOV2 Negative 08/20/2022 08:14 PM    SARSCOV2 Negative 06/25/2022 03:22 AM    SARSCOV2 Positive (A) 12/20/2020 12:25 PM       Most Recent Lab Work:  Lab Results   Component Value Date/Time SODIUM 140 11/01/2022 12:08 PM    K 3 2 (L) 11/01/2022 12:08 PM    K 3 7 04/02/2014 01:15 PM    BUN 13 11/01/2022 12:08 PM    BUN 12 04/02/2014 01:15 PM    CREATININE 0 85 11/01/2022 12:08 PM    CREATININE 0 80 04/02/2014 01:15 PM    GLUC 140 11/01/2022 12:08 PM     Lab Results   Component Value Date/Time    AST 32 11/01/2022 12:08 PM    AST 23 04/02/2014 01:15 PM    ALT 17 11/01/2022 12:08 PM    ALT 15 04/02/2014 01:15 PM    ALB 3 5 11/01/2022 12:08 PM    ALB 3 4 (L) 04/02/2014 01:15 PM     Lab Results   Component Value Date/Time    HGB 12 5 11/23/2022 08:25 AM    WBC 4 10 (L) 11/23/2022 08:25 AM     11/23/2022 08:25 AM    INR 0 99 11/23/2022 08:25 AM    PTT 29 08/20/2022 08:37 PM       Most Recent Imaging [last 30 days]:  No results found  35 minutes was spent face to face with Inocencio Cartagena with greater than 50% of the time spent in counseling or coordination of care including discussions of provided medical updates, discussed palliative care, determined goals of care, determined social/family support, discussed plans of care, discussed symptom management, provided psychosocial support  Expanded emotional support provided  PDMP Reviewed  All of the patient's or agent's questions were answered during this discussion
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
## 2022-12-12 NOTE — PROGRESS NOTES
Hematology/Oncology Progress Note    Date of Service: 12/13/2022    [facility]   [address] [address]    Hem/Onc Problem List:   1  Metastatic right-sided breast cancer, ER and KY positive, HER2 negative  2  Bone metastatic disease  3  History of right-sided pleural effusion  Chief Complaint:    Routine follow-up for management of stage IV breast cancer    Assessment/Plan:      Metastatic breast cancer, with liver, bone, lymph node and pleura involvement   ER and KY positive, HER2 negative  Bone scan showed bony metastatic disease  Patient started monthly Zoladex, daily letrozole and CDK4/6 inhibitor Abemeciclib  Unfortunately, patient developed significant watery diarrhea from Abemaciclib  Abemaciclib was changed to Ibrance 125 mg daily for 3 weeks on,  followed by 1 week off until disease progression on August 30, 2021  Ibrance dose was decreased to 100 mg because of neutropenia  CT scan and bone scan showed disease progression and treatment changed to chemo (Eribulin)  Restaging CT 7/8/2022 shows a very good KY (near 50% reduction in index liver lesions)  NM bone scan showed no new findings  Restaging CT 10/12/2022 with stable disease except for one liver lesion  Patient s/p liver ablation 11/23/22  Has been off Eribulin since C8D9 for 11/3/22  See next steps of treatment plan below  2  Bony metastatic disease  Bone scan showed bony metastatic disease on 7/11  Patient  continues Zometa every 3 months  3   History of right breast cancer, status post lumpectomy and axillary lymph node dissection, status post adjuvant radiation therapy  4  Right-sided pleural effusion, status post thoracentesis, cytology positive for adenocarcinoma, ER/KY positive, HER2 negative  Status post PleurX catheter placement  Has not required frequency drainage  Continue to treat underlying disease      · Discussion of decision making    I personally reviewed the following lab results, the image studies, pathology, other specialty/physicians consult notes and recommendations, and outside medical records from [facility]  I had a lengthy discussion with the patient and shared the work-up findings  I spent 30 minutes reviewing the records (labs, clinician notes, outside records, medical history, ordering medicine/tests/procedures, interpreting the imaging/labs previously done) and coordination of care as well as direct time with the patient today, of which greater than 50% of the time was spent in counseling and coordination of care with the patient/family  · Plan/Labs  · Metastatic breast cancer with diffuse bone metastasis  · ECOG 1   · Patient is S/P liver ablation  Tolerated this with mild pain in RUQ area that is subsiding  She has been off treatment since 11/2/22  Will resume Eribulin Q21 days with neulasta support starting first week of January per patient request  Would like to restart treatment after holidays  Continue Letrozole 2 5mg once daily  This was never stopped  Continue this current treatment until disease progression  · Patient will have labs prior to next treatment  However, will obtain CBC-D, CMP, iron panel, B12, TSH now due to fatigue she has been experiencing lately  · Restage CT CAP in 2 months ordered --> scheduled for 2/1/23  · Continue to follow up with palliative care  · Continue Zometa Q12 weeks for bone metastasis  · OncotypeMap test NGS was done 4/18/2022 with results: PIK3CA: [id number] mutation  ESR1: WT, PD-L1 IHC: negative  MSI-stable, TMB low (4 muts/mb)  BRCA 1&2 WT  Possible future treatment option would include Alpelisib + Fulvestrant  However, recent tissue examination off liver showed benefit from Enhertu HER2-low finding  Follow Up: 3-4 weeks   F/U with Dr Cassie Macias 1 week after restage CT    All questions were answered to the patient's satisfaction during this encounter  The patient knows the contact information for our office and knows to reach out for any relevant concerns related to this encounter  They are to call for any temperature 100 4 or higher, new symptoms including but not restricted to shaking chills, decreased appetite, nausea, vomiting, diarrhea, increased fatigue, shortness of breath or chest pain, confusion, and not feeling the strength to come to the clinic  For all other listed problems and medical diagnosis in their chart - they are managed by PCP and/or other specialists, which the patient acknowledges  Thank you very much for your consultation and making us a part of this patient's care  We are continuing to follow closely with you  Please do not hesitate to reach out to me with any additional questions or concerns  AJCC 8th Edition Cancer Stage :      Cancer Staging  No matching staging information was found for the patient  Hematology/Oncology History:   · History of right-sided breast cancer, initially diagnosed in 2012, status post lumpectomy and axillary lymph node dissection, status post adjuvant radiation therapy    Patient did not receive any adjuvant endocrine therapy or chemotherapy  · July 24, 2021, patient was admitted to hospital because of shortness of breath and cough  · July 24, 2021 CT chest PE protocol showed septal thickening throughout the right lung and bronchial wall thickening due to lymphangitic spread of tumor   Indeterminate lung nodules measuring 5 millimeter in the right upper lobe, right middle lobe and 7 millimeter in the left upper lobe and left lower lobe   Large right pleural effusion   Multiple liver metastatic disease measuring up to 4 centimeter   Lytic metastatic to sternal manubrium    · July 26, 2021, ultrasound abdomen shows multiple hepatic metastatic disease   Status post thoracentesis with 1200 mL of joanne fluid removed   Cytology showed adenoma carcinoma, ER and CO positive  40-50%, HER2 negative  · July 26, 2021 biopsy of the lung liver showed metastatic breast cancer, ER and % positive, HER2 negative   CT abdomen pelvis with contrast showed extensive hepatic metastatic disease  · July 27, 2021 MRI brain showed no evidence of intracranial metastatic disease  · August 6, 2021, mammogram showed no mammographic evidence of malignancy  · August 11, 2021 bone scan showed bony metastatic disease involving left iliac crest medially and adjacent sacrum  · August 12, 2021, Zoladex was given  Letrozole started  · August 16, 2022, patient started Abemaciclib  · August 30, 2021, DC abemaciclib because of diarrhea  Start Ibrance 125 mg daily 3 weeks on 1 week off  · Nov 19, 2021, CT c/a/p showed:   1   Since July 2021, new thromboses within the intrahepatic branches of the portal vein as described above  2   Increased diffuse sclerotic osseous lesions     3   Decreased size of most of the hepatic metastases  4   Indeterminate mottled appearance of the splenic parenchyma   Attention on follow-up is advised  5   Unchanged pulmonary nodules  6   Right pleural effusion has decreased in size since July 24, 2021   The smooth interlobular septal thickening has also decreased, and this change can be attributable to the decreased size of the pleural effusion  7   Mucosal hyperenhancement of the colon   Findings may be treatment-related, and correlation with gastrointestinal symptoms is advised      BONE SCAN:   1   A few areas of increased radiotracer uptake suspicious for osseous metastasis, with findings for minimal progression    · March 15, 2022 bone scan: Stable or improving scattered foci radiotracer uptake suspicious for osseous metastases   No new osseous foci suspicious for metastasis     ·  April 1, 2022 CT scan chest abdomen pelvis:  IMPRESSION:     Findings concerning for worsening hepatic metastases as evidenced by increased size and number of lesions   Please see above discussion      Progressive left portal vein thrombosis      The majority of the pulmonary nodules are stable   There is one subpleural nodule in the right upper lobe apex posteriorly, not seen on the prior study      Stable extensive osseous metastases      Multiple tiny hypodense splenic lesions are also stable, indeterminate      Persistent small right pleural effusion with a right pleural catheter in place      June 25, 2022: 4 day admission for neutropenic fever  July 8, 2022 CT CAP: 1    Stable scattered small pulmonary nodules bilaterally  Small right pleural effusion, slightly decreased  Improving hepatic metastasis  Less conspicuous small splenic hypodense lesions  Stable findings for widespread osseous metastasis  NM Bone Scan without significant changes (A lesion in the left lobe of the liver laterally measures 2 0 x 1 9 cm, previously 5 1 x 3 8 cm- 56 2% reduction   Somewhat stellate lesion in the right lobe of the liver  posteriorly now measures 1 8 x 1 2 cm image 33 series 2, previously 2 6 x 2 6 cm (42 3%)  July 11, 2022, NM bone scan stated no areas of new or worsening scintigraphic activity suggesting osseous disease progression  10/12/2022 CT CAP w/c: New 8 mm lesion within segment IVb of the liver compared to July 8, 2022, suspicious for progression of metastatic disease  However, a few other liver lesions are improved  Improved small right pleural effusion  Stable extensive osseous metastatic metastatic disease  11/23/23: S/P Liver ablation  History of Present Illiness:   Hector Lyon is a 56 y o  female with the above-noted HemOnc history who is here to follow up regarding breast cancer history  Patient has metastatic breast cancer with bony metastatic disease  Zoladex and letrozole started on August 11-12, 2021  Abmaciclib was changed to [place] [employer] because of significant watery diarrhea  [place] did [place] from [place]  Ibrance dose was decreased to 100 mg    Restaging CT scan and bone scan showed disease progression     Patient had the pleural catheter in place for malignant pleural effusion  Patient takes Zometa for bone health  Interval events 12/13/22:  Patient is s/p liver ablation 11/23/22  She had some discomfort there  She has been off treatment since at least 10/2022  She does note feeling more fatigued than usual  Otherwise, no other complaints  She has no new HA, visual change, CP, SOB, ABD pain, N/V/D, swelling, bleeding anywhere  No appetite changes   is in USP  Son, brother have been helping with support  Daughter is present for today's visit  Weight at visit 8/2021: 146lbs  Weight 9/21/22: 131lbs  Weight today 12/13/22: 123lbs     ROS: A 10-point of review of systems is obtained and other than the above is noncontributory  Objective:   VITALS:   /80   Pulse 85   Resp 16   Ht 5' 5" (1 651 m)   Wt 55 8 kg (123 lb)   LMP 05/26/2021   SpO2 96%   BMI 20 47 kg/m²     Physical EXAM:  General:  Alert, cooperative, no distress, appears stated age  Head:  Normocephalic, without obvious abnormality, atraumatic  Eyes:  Conjunctivae/corneas clear  EOMs intact  No evidence of conjunctivitis     Throat: Lips, mucosa, and tongue normal     Neck: Supple, symmetrical, trachea midline    Lungs:   Respiratory effort easy, nonlabored    Heart:  Regular rate and rhythm, +S1, S2   Abdomen:   Soft, non-tender,nondistended  No masses,      Extremities:  Lymphatics: Extremities normal, atraumatic, no cyanosis or edema  No cervical, axillary or inguinal adenopathy   Skin: Skin color, texture, turgor normal  No rashes  Neurologic: AAO   No focal neuro deficits       Allergies   Allergen Reactions   • Codeine Anaphylaxis   • Morphine GI Intolerance, Itching and Vomiting   • Sulfa Antibiotics Hives and Itching       Past Medical History:   Diagnosis Date   • Cancer ([address] )    • Headache(784 0) 11/2021   • History of radiation exposure    • Malignant neoplasm of right female breast [facility]) 2012    right       Past Surgical History:   Procedure Laterality Date   • BACK SURGERY     • BREAST CYST EXCISION     • BREAST LUMPECTOMY Right 2012   • HIP SURGERY Right     debridement of femur   • IR BIOPSY LIVER MASS  2021   • IR MICROWAVE ABLATION  2022   • IR PLEURAL EFFUSION LONG-TERM CATHETER PLACEMENT  2021   • IR PLEURAL EFFUSION LONG-TERM CATHETER REMOVAL  2022   • IR PORT PLACEMENT  2022   • IR THORACENTESIS  2021       Family History   Problem Relation Age of Onset   • Breast cancer Mother         in her 63's   • Breast cancer Sister         inher 45s and 48   • Colon cancer Maternal Grandmother    • No Known Problems Paternal Grandmother    • Breast cancer Other    • No Known Problems Paternal Aunt        Social History     Socioeconomic History   • Marital status: /Civil Union     Spouse name: Not on file   • Number of children: Not on file   • Years of education: Not on file   • Highest education level: Not on file   Occupational History   • Not on file   Tobacco Use   • Smoking status: Former     Packs/day: 0 25     Years: 15 00     Pack years: 3 75     Types: Cigarettes     Start date: 20     Quit date: 7/10/2021     Years since quittin 4   • Smokeless tobacco: Never   Vaping Use   • Vaping Use: Never used   Substance and Sexual Activity   • Alcohol use: Not Currently   • Drug use: Not Currently   • Sexual activity: Not Currently     Partners: Male     Birth control/protection: Male Sterilization   Other Topics Concern   • Not on file   Social History Narrative   • Not on file     Social Determinants of Health     Financial Resource Strain: Not on file   Food Insecurity: No Food Insecurity   • Worried About Running Out of Food in the Last Year: Never true   • Ran Out of Food in the Last Year: Never true   Transportation Needs: No Transportation Needs   • Lack of Transportation (Medical):  No   • Lack of Transportation (Non-Medical): No   Physical Activity: Not on file   Stress: Not on file   Social Connections: Not on file   Intimate Partner Violence: Not on file   Housing Stability: Low Risk    • Unable to Pay for Housing in the Last Year: No   • Number of Places Lived in the Last Year: 1   • Unstable Housing in the Last Year: No       Current Outpatient Medications   Medication Sig Dispense Refill   • acetaminophen (TYLENOL) 325 mg tablet Take 2 tablets (650 mg total) by mouth every 6 (six) hours as needed for mild pain 100 tablet 0   • al mag oxide-diphenhydramine-lidocaine viscous (MAGIC MOUTHWASH) 1:1:1 suspension Swish and spit 10 mL every 4 (four) hours as needed for mouth pain or discomfort 300 mL 0   • albuterol (PROVENTIL HFA,VENTOLIN HFA) 90 mcg/act inhaler Inhale 2 puffs every 4 (four) hours as needed for wheezing 8 g 0   • calcium carbonate (TUMS) 500 mg chewable tablet Chew 1 tablet (500 mg total) 3 (three) times a day as needed for indigestion or heartburn 30 tablet 0   • CRANIAL PROSTHESIS, RX, Apply to cranial area as needed for comfort   1 each 0   • [employer] Melatonin 3 MG TAKE 2 TABLETS (6 MG TOTAL) BY MOUTH DAILY AT BEDTIME 180 tablet 1   • dextromethorphan-guaiFENesin (ROBITUSSIN DM)  mg/5 mL syrup Take 10 mL by mouth every 4 (four) hours as needed for cough 236 mL 0   • dicyclomine (BENTYL) 10 mg capsule Take 1 capsule (10 mg total) by mouth every 6 (six) hours as needed (abdominal cramping) 40 capsule 0   • diphenhydrAMINE (BENADRYL) 50 MG tablet Take 1 tablet (50 mg total) by mouth daily at bedtime as needed for itching or sleep 30 tablet 0   • diphenoxylate-atropine (LOMOTIL) 2 5-0 025 mg per tablet Take 1 tablet by mouth 4 (four) times a day as needed for diarrhea 30 tablet 0   • docusate sodium (COLACE) 100 mg capsule Take 1 capsule (100 mg total) by mouth 2 (two) times a day for 10 days 20 capsule 0   • DULoxetine (CYMBALTA) 30 mg delayed release capsule Take 1 capsule (30 mg total) by mouth daily 90 capsule 3   • HYDROmorphone (DILAUDID) 2 mg tablet Take 1-2 tablets (2-4 mg total) by mouth every 3 (three) hours as needed (moderate/severe cancer-related pain) Max Daily Amount: 32 mg 120 tablet 0   • lactulose 20 g/30 mL Take 15 mL (10 g total) by mouth daily as needed (constipaton) for up to 10 days 300 mL 0   • letrozole (FEMARA) 2 5 mg tablet Take 1 tablet (2 5 mg total) by mouth daily 90 tablet 2   • Lidocaine Viscous HCl (XYLOCAINE) 2 % mucosal solution Swish and spit 10 mL 4 (four) times a day as needed for mouth pain or discomfort 200 mL 0   • metoclopramide (REGLAN) 10 mg tablet Take 1 tablet (10 mg total) by mouth 4 (four) times a day 28 tablet 0   • multivitamin (THERAGRAN) TABS Take 1 tablet by mouth     • ondansetron (ZOFRAN) 4 mg tablet Take 1 tablet (4 mg total) by mouth every 8 (eight) hours as needed for nausea or vomiting 20 tablet 0   • oxybutynin (DITROPAN-XL) 5 mg 24 hr tablet Take 1 tablet (5 mg total) by mouth daily Take 1 tablet daily 90 tablet 3   • polyethylene glycol (MIRALAX) 17 g packet Take 17 g by mouth daily  0   • potassium chloride (K-DUR,KLOR-CON) 20 mEq tablet Take 1 tablet (20 mEq total) by mouth 2 (two) times a day for 3 days 6 tablet 0   • senna (SENOKOT) 8 6 MG tablet Take 1 tablet (8 6 mg total) by mouth daily at bedtime as needed for constipation 30 tablet 0   • Xarelto 20 MG tablet TAKE 1 TABLET BY MOUTH EVERY DAY WITH BREAKFAST 30 tablet 11     No current facility-administered medications for this visit  (Not in a hospital admission)      DATA REVIEW:    Pathology Result:    Final Diagnosis   Date Value Ref Range Status   11/23/2022   Final    A  Liver core (biopsy):  - Metastatic carcinoma    Comment:  - ER, [place], Her2 ordered  - The tumor cells stain for GATA3 with absent CK7, CK20, GCDFP15 staining  The immunoprofile is most consistent with the patient's known breast primary       MOLECULAR TESTING AND CONSULT SLIDES:     Molecular testing:  Block A1 is available for molecular testing  Consult slides:  Slide A1 with associated IHC is available for consultation  Interpretation performed at , [address] [place] [address]        07/26/2021   Final    A  B  Thoracentesis, Right (ThinPrep and cell block preparations):  Positive for malignancy  Metastatic carcinoma, compatible with breast primary  -  Immunohistochemical stains performed with appropriate controls show the tumor cells to be positive for Matthew-EP4, MOC31, BARRY-3 and ER with scattered background mesothelial cells staining for WT1 and calretinin, supporting the diagnosis  Satisfactory for evaluation  07/26/2021   Final    A  Liver (core biopsy):     - Poorly-differentiated carcinoma, most compatible with metastasis from the patient's reported breast primary  Comment:  ER / [place] / Her-2 pending  Comment: This is an appended report  These results have been appended to a previously preliminary verified report  Image Results: They are reviewed and documented in Hematology/Oncology history    IR microwave ablation  Narrative: PROCEDURE: Liver microwave ablation    Procedural Personnel  Attending physician(s): Dr Mirian Khan    Pre-procedure diagnosis: Breast cancer  Post-procedure diagnosis: Same  Indication: Patient with metastatic breast cancer to the liver found to have new segment 4b liver lesion  PROCEDURE SUMMARY:  - Microwave ablation of liver lesion using ultrasound and fluoroscopy guidance  - Ultrasound-guided percutaneous biopsy of liver lesion    PROCEDURE DETAILS:    Pre-procedure  Consent: Informed consent for the procedure including risks, benefits and alternatives was obtained and time-out was performed prior to the procedure  Preparation: The site was prepared and draped using maximal sterile barrier technique including cutaneous antisepsis      Anesthesia/sedation  Level of anesthesia/sedation: General anesthesia  Anesthesia/sedation administered by: Anesthesiology  Total intra-service sedation time (minutes): 120    Biopsy  Under ultrasound guidance, an 18-gauge core biopsy needle was used to obtain one core needle sample from the segment 4b of the liver lesion which was placed into formalin and sent to pathology  Microwave ablation  Under ultrasound and CT guidance, a 15-gauge microwave ablation needle was advanced into the segment IVb liver lesion  Ablation applicator: AngioD[employer] Solero  Target #1  Ablation position 1  - Number of applicators: 1  - Duration (minutes): 2   - Maximum energy applied (ashley): 100   Intraprocedural imaging findings: Small intramuscular hematoma which was non-expanding on postop imaging  Radiation Dose  CT dose length product (mGy-cm): 1300 88     Additional Details  Specimens removed: 1 core needle sample placed into formalin  Estimated blood loss (mL): 1  Complications: No immediate complications  Impression: Percutaneous microwave ablation of segment IVb liver lesion with biopsy  Plan:     Follow-up in 3 months with repeat CT to evaluate for regression of the liver lesion  Workstation performed: [id number]        LABS:  Lab data are reviewed and documented in HemOn history  No results found for this or any previous visit (from the past 48 hour(s))            Daren Shone  12/13/2022, 3:06 PM

## 2022-12-13 ENCOUNTER — TELEPHONE (OUTPATIENT)
Dept: SURGICAL ONCOLOGY | Facility: CLINIC | Age: 57
End: 2022-12-13

## 2022-12-13 ENCOUNTER — APPOINTMENT (OUTPATIENT)
Dept: LAB | Facility: HOSPITAL | Age: 57
End: 2022-12-13

## 2022-12-13 ENCOUNTER — OFFICE VISIT (OUTPATIENT)
Dept: HEMATOLOGY ONCOLOGY | Facility: CLINIC | Age: 57
End: 2022-12-13

## 2022-12-13 VITALS
WEIGHT: 123 LBS | OXYGEN SATURATION: 96 % | RESPIRATION RATE: 16 BRPM | HEART RATE: 85 BPM | HEIGHT: 65 IN | DIASTOLIC BLOOD PRESSURE: 80 MMHG | BODY MASS INDEX: 20.49 KG/M2 | SYSTOLIC BLOOD PRESSURE: 122 MMHG

## 2022-12-13 DIAGNOSIS — R53.83 FATIGUE, UNSPECIFIED TYPE: ICD-10-CM

## 2022-12-13 DIAGNOSIS — D70.1 CHEMOTHERAPY INDUCED NEUTROPENIA (HCC): ICD-10-CM

## 2022-12-13 DIAGNOSIS — C50.911 PRIMARY MALIGNANT NEOPLASM OF RIGHT BREAST WITH METASTASIS TO OTHER SITE (HCC): ICD-10-CM

## 2022-12-13 DIAGNOSIS — J91.0 MALIGNANT PLEURAL EFFUSION: ICD-10-CM

## 2022-12-13 DIAGNOSIS — T45.1X5A CHEMOTHERAPY INDUCED NEUTROPENIA (HCC): ICD-10-CM

## 2022-12-13 DIAGNOSIS — C79.51 BONE METASTASES (HCC): Primary | ICD-10-CM

## 2022-12-13 DIAGNOSIS — C79.51 BONE METASTASES (HCC): ICD-10-CM

## 2022-12-13 LAB
ALBUMIN SERPL BCP-MCNC: 3.8 G/DL (ref 3.5–5)
ALP SERPL-CCNC: 88 U/L (ref 46–116)
ALT SERPL W P-5'-P-CCNC: 22 U/L (ref 12–78)
ANION GAP SERPL CALCULATED.3IONS-SCNC: 3 MMOL/L (ref 4–13)
AST SERPL W P-5'-P-CCNC: 31 U/L (ref 5–45)
BASOPHILS # BLD AUTO: 0.03 THOUSANDS/ÂΜL (ref 0–0.1)
BASOPHILS NFR BLD AUTO: 1 % (ref 0–1)
BILIRUB SERPL-MCNC: 0.32 MG/DL (ref 0.2–1)
BUN SERPL-MCNC: 16 MG/DL (ref 5–25)
CALCIUM SERPL-MCNC: 9.5 MG/DL (ref 8.3–10.1)
CHLORIDE SERPL-SCNC: 108 MMOL/L (ref 96–108)
CO2 SERPL-SCNC: 26 MMOL/L (ref 21–32)
CREAT SERPL-MCNC: 0.64 MG/DL (ref 0.6–1.3)
EOSINOPHIL # BLD AUTO: 0.03 THOUSAND/ÂΜL (ref 0–0.61)
EOSINOPHIL NFR BLD AUTO: 1 % (ref 0–6)
ERYTHROCYTE [DISTWIDTH] IN BLOOD BY AUTOMATED COUNT: 14.7 % (ref 11.6–15.1)
FERRITIN SERPL-MCNC: 191 NG/ML (ref 8–388)
GFR SERPL CREATININE-BSD FRML MDRD: 100 ML/MIN/1.73SQ M
GLUCOSE SERPL-MCNC: 91 MG/DL (ref 65–140)
HCT VFR BLD AUTO: 40.2 % (ref 34.8–46.1)
HGB BLD-MCNC: 12.8 G/DL (ref 11.5–15.4)
IMM GRANULOCYTES # BLD AUTO: 0.01 THOUSAND/UL (ref 0–0.2)
IMM GRANULOCYTES NFR BLD AUTO: 0 % (ref 0–2)
IRON SATN MFR SERPL: 15 % (ref 15–50)
IRON SERPL-MCNC: 54 UG/DL (ref 50–170)
LYMPHOCYTES # BLD AUTO: 0.4 THOUSANDS/ÂΜL (ref 0.6–4.47)
LYMPHOCYTES NFR BLD AUTO: 7 % (ref 14–44)
MCH RBC QN AUTO: 33.6 PG (ref 26.8–34.3)
MCHC RBC AUTO-ENTMCNC: 31.8 G/DL (ref 31.4–37.4)
MCV RBC AUTO: 106 FL (ref 82–98)
MONOCYTES # BLD AUTO: 0.25 THOUSAND/ÂΜL (ref 0.17–1.22)
MONOCYTES NFR BLD AUTO: 5 % (ref 4–12)
NEUTROPHILS # BLD AUTO: 4.7 THOUSANDS/ÂΜL (ref 1.85–7.62)
NEUTS SEG NFR BLD AUTO: 86 % (ref 43–75)
NRBC BLD AUTO-RTO: 0 /100 WBCS
PLATELET # BLD AUTO: 217 THOUSANDS/UL (ref 149–390)
PMV BLD AUTO: 12 FL (ref 8.9–12.7)
POTASSIUM SERPL-SCNC: 4.6 MMOL/L (ref 3.5–5.3)
PROT SERPL-MCNC: 7.8 G/DL (ref 6.4–8.4)
RBC # BLD AUTO: 3.81 MILLION/UL (ref 3.81–5.12)
SODIUM SERPL-SCNC: 137 MMOL/L (ref 135–147)
TIBC SERPL-MCNC: 369 UG/DL (ref 250–450)
TSH SERPL DL<=0.05 MIU/L-ACNC: 1.08 UIU/ML (ref 0.45–4.5)
VIT B12 SERPL-MCNC: 1251 PG/ML (ref 100–900)
WBC # BLD AUTO: 5.42 THOUSAND/UL (ref 4.31–10.16)

## 2022-12-13 RX ORDER — SODIUM CHLORIDE 9 MG/ML
20 INJECTION, SOLUTION INTRAVENOUS ONCE
OUTPATIENT
Start: 2023-01-10

## 2022-12-13 RX ORDER — SODIUM CHLORIDE 9 MG/ML
20 INJECTION, SOLUTION INTRAVENOUS ONCE
OUTPATIENT
Start: 2023-01-17

## 2022-12-13 NOTE — TELEPHONE ENCOUNTER
restart treatment starting first week January with labs prior  Needs infusion appts for labs    While we try to accommodate patient requests, our priority is to schedule treatment according to Doctor's orders and site availability  1  Does the Provider use the intake sheet or checkout note? No  2  What would be a preferred day of the week that would work best for your infusion appointment? Tues or Wed  3  Do you prefer mornings or afternoons for your appointments? Late Morning, Early Afternoon  4  Are there any days or dates that do not work for your schedule, including any upcoming vacations? No  5  We are going to try our best to schedule you at the infusion center closest to your home  In the event that we are unable to what would be your next preferred infusion site or sites? 1  [facility]    6  Do you have transportation to take you to all of your appointments? Yes  7   Would you like the infusion center to draw labs from your port? (disregard if patient doesn't have a port or need labs for infusion appointment) yes

## 2022-12-14 NOTE — TELEPHONE ENCOUNTER
Left message on answering machine with time and date of first infusion, patient is aware schedule can be seen on mychart  Patient was provided with my teams number

## 2022-12-15 ENCOUNTER — PATIENT OUTREACH (OUTPATIENT)
Dept: INTERNAL MEDICINE CLINIC | Facility: CLINIC | Age: 57
End: 2022-12-15

## 2022-12-15 NOTE — PROGRESS NOTES
[facility] called patient for a scheduled outreach  The telephone was answered , but immediately hung up  This happened twice  Patient returned my call, she said that housing was still an issue for her  Patient is still residing at her brothers home, however she said he is going to evict her and her kids  She is one the 211 emergency housing list, patient  tells [facility] that she has called local shelters and they can not help her  [facility] provided her with two telephone numbers for [facility] in [place] [phone] and [phone]  Patient asked if [employer] was still helping with first months rent and security, I told her to my knowledge they did not have funds but I provided her the [facility] phone number of [phone] patient said she will call both places  Both her  and 16year old Son are still in work release programs out of town  Patient told me that her  is getting his benefits and is going to try and send her money to get an apartment   Patient fears that it will be hard to fins a place to live because she has a eviction on her record from [place]  Patients 21year old son is not receiving his SSD  They have stopped payments until they receive a completed Medical review form  Patient told me that she made him an appointment for the physical and it will be done in January 2023  Patient does expect to receive retro money once her son is reinstated with SSD  [facility] will outreach patient again in 2 weeks to follow up housing plans

## 2022-12-27 ENCOUNTER — OFFICE VISIT (OUTPATIENT)
Dept: PALLIATIVE MEDICINE | Facility: CLINIC | Age: 57
End: 2022-12-27

## 2022-12-27 ENCOUNTER — PATIENT OUTREACH (OUTPATIENT)
Dept: INTERNAL MEDICINE CLINIC | Facility: CLINIC | Age: 57
End: 2022-12-27

## 2022-12-27 VITALS
WEIGHT: 120 LBS | DIASTOLIC BLOOD PRESSURE: 72 MMHG | TEMPERATURE: 97.2 F | HEART RATE: 81 BPM | RESPIRATION RATE: 18 BRPM | BODY MASS INDEX: 19.99 KG/M2 | SYSTOLIC BLOOD PRESSURE: 98 MMHG | HEIGHT: 65 IN | OXYGEN SATURATION: 98 %

## 2022-12-27 DIAGNOSIS — K59.00 CONSTIPATION, UNSPECIFIED CONSTIPATION TYPE: ICD-10-CM

## 2022-12-27 DIAGNOSIS — C50.911 PRIMARY MALIGNANT NEOPLASM OF RIGHT BREAST WITH METASTASIS TO OTHER SITE (HCC): Primary | ICD-10-CM

## 2022-12-27 DIAGNOSIS — C79.51 BONE METASTASES (HCC): ICD-10-CM

## 2022-12-27 DIAGNOSIS — G89.3 CANCER RELATED PAIN: ICD-10-CM

## 2022-12-27 DIAGNOSIS — Z51.5 PALLIATIVE CARE PATIENT: ICD-10-CM

## 2022-12-27 RX ORDER — HYDROMORPHONE HYDROCHLORIDE 2 MG/1
2-4 TABLET ORAL
Qty: 120 TABLET | Refills: 0 | Status: SHIPPED | OUTPATIENT
Start: 2022-12-27

## 2022-12-27 NOTE — PROGRESS NOTES
Outpatient Follow-Up - Palliative and Supportive Care   Mary Iraheta 56 y o  female [id number]    Assessment & Plan  Problem List Items Addressed This Visit        Musculoskeletal and Integument    Bone metastases ([address] )    Relevant Medications    HYDROmorphone (DILAUDID) 2 mg tablet       Other    Palliative care patient    Relevant Medications    HYDROmorphone (DILAUDID) 2 mg tablet    Primary malignant neoplasm of right breast with metastasis to other site [facility]) - Primary    Relevant Medications    HYDROmorphone (DILAUDID) 2 mg tablet    Constipation    Cancer related pain    Relevant Medications    HYDROmorphone (DILAUDID) 2 mg tablet     #symptom management  #cancer-related pain   - continue APAP 650 mg PO Q6H PRN              - max daily 4000 mg   - continue hydromorphone 2-4 mg PO Q3H PRN   - continue duloxetine therapy     #anxiety   - continue duloxetine 30 mg PO QDaily     #nausea   - continue metoclopramide 10 mg PO QID PRN   - continue ondansetron 4 mg PO Q8H PRN     #insomnia   - continue melatonin 6 mg PO QHS     #OIC   - continue home bowel regimen   - continued adequate hydration     #abdominal cramping   - continue dicyclomine 10 mg PO Q6H PRN     #mucositis   - continue magic mouthwash    #goals of care   - plan to restart antineoplastic therapy after the holidays    #psychosocial support   - emotional support provided   Yahir Andres [spouse] [phone]   - two children              - Armen [son, Down syndrome]              - Ankur [son]      Next [facility] Follow up in 4 weeks  Controlled Substance Review    [place]MP or [place]  reviewed: No red flags were identified; safe to proceed with prescription  Antoni Pollard     PDMP Review       Value Time User    PDMP Reviewed  Yes (P)  12/27/2022 11:48 AM Antonio Woodall MD          Medications adjusted this encounter:  Requested Prescriptions     Signed Prescriptions Disp Refills   • HYDROmorphone (DILAUDID) 2 mg tablet 120 tablet 0     Sig: Take 1-2 tablets (2-4 mg total) by mouth every 3 (three) hours as needed (moderate/severe cancer-related pain) Max Daily Amount: 32 mg     No orders of the defined types were placed in this encounter  Medications Discontinued During This Encounter   Medication Reason   • HYDROmorphone (DILAUDID) 2 mg tablet Reorder         Terrence Rea was seen today for symptoms and planning cares related to above illnesses  I have reviewed the patient's controlled substance dispensing history in the Prescription Drug Monitoring Program in compliance with the [facility] regulations before prescribing any controlled substances  They are invited to continue to follow with us  If there are questions or concerns, please contact us through our clinic/answering service 24 hours a day, seven days a week  Ganga Ruggiero MD  [facility]iative and Support[facility]        Visit Information    Accompanied By: Family member    Source of History: Self, Medical record    History Limitations: None      History of Present Illness    Terrence Rea is a 56 y o  female who presents in follow up of symptoms related to metastatic breast cancer c/b recurrent malignant pleural effusions  Pertinent issues include: symptom management, pain, neoplasm related, assessment of goals of care, advance care planning  Patient reports overall doing well, feels break from antineoplastic therapy allowed side effects to subside, increased energy  New R-sided rib pain over the past 2-3 weeks, otherwise, continued cancer-related pain  Use of PO hydromorphone x 2-3 tabs/day, overall decreased from prior use  Denies nausea, vomiting  Appetite intact, recent weight gain  BM regular, daily  Adequate sleep  Past medical, surgical, social, and family histories are reviewed and pertinent updates are made  Review of Systems   Constitutional: Positive for malaise/fatigue  Negative for chills, decreased appetite, fever and weight loss     HENT: Negative for congestion  Eyes: Negative for visual disturbance  Cardiovascular: Negative for chest pain  Respiratory: Negative for shortness of breath  Musculoskeletal: Negative for falls and neck pain  R-side rib pain   Gastrointestinal: Negative for abdominal pain, constipation, nausea and vomiting  Genitourinary: Negative for frequency  Neurological: Negative for headaches  Psychiatric/Behavioral: The patient does not have insomnia  All other systems reviewed and are negative  Vital Signs    BP 98/72 (BP Location: Left arm, Patient Position: Sitting, Cuff Size: Adult)   Pulse 81   Temp (!) 97 2 °F (36 2 °C) (Tympanic)   Resp 18   Ht 5' 5" (1 651 m)   Wt 54 4 kg (120 lb)   LMP 05/26/2021   SpO2 98%   BMI 19 97 kg/m²     Physical Exam and Objective Data  Physical Exam  Vitals and nursing note reviewed  Constitutional:       General: She is awake  Appearance: She is not diaphoretic  Comments: Sitting up comfortably in NAD  BMI 20 0  Non-toxic appearing   HENT:      Head: Normocephalic and atraumatic  Right Ear: External ear normal       Left Ear: External ear normal       Nose: No rhinorrhea  Eyes:      Comments: No gaze preference   Cardiovascular:      Rate and Rhythm: Normal rate  Pulmonary:      Effort: No tachypnea, accessory muscle usage or respiratory distress  Comments: Completes full sentences without difficulty  Musculoskeletal:      Cervical back: Normal range of motion  Neurological:      General: No focal deficit present  Mental Status: She is alert and oriented to person, place, and time     Psychiatric:         Attention and Perception: Attention normal          Mood and Affect: Mood and affect normal          Speech: Speech normal          Cognition and Memory: Cognition and memory normal            Radiology and Laboratory:  I personally reviewed and interpreted the following results:    Most Recent COVID-19 Results:  Lab Results   Component Value Date/Time    SARSCOV2 Negative 08/20/2022 08:14 PM    SARSCOV2 Negative 06/25/2022 03:22 AM    SARSCOV2 Positive (A) 12/20/2020 12:25 PM       Most Recent Lab Work:  Lab Results   Component Value Date/Time    SODIUM 137 12/13/2022 02:59 PM    K 4 6 12/13/2022 02:59 PM    K 3 7 04/02/2014 01:15 PM    BUN 16 12/13/2022 02:59 PM    BUN 12 04/02/2014 01:15 PM    CREATININE 0 64 12/13/2022 02:59 PM    CREATININE 0 80 04/02/2014 01:15 PM    GLUC 91 12/13/2022 02:59 PM     Lab Results   Component Value Date/Time    AST 31 12/13/2022 02:59 PM    AST 23 04/02/2014 01:15 PM    ALT 22 12/13/2022 02:59 PM    ALT 15 04/02/2014 01:15 PM    ALB 3 8 12/13/2022 02:59 PM    ALB 3 4 (L) 04/02/2014 01:15 PM     Lab Results   Component Value Date/Time    HGB 12 8 12/13/2022 02:59 PM    WBC 5 42 12/13/2022 02:59 PM     12/13/2022 02:59 PM    INR 0 99 11/23/2022 08:25 AM    PTT 29 08/20/2022 08:37 PM       Most Recent Imaging [last 30 days]:  No results found  30 minutes was spent face to face with Alessia Veloz and her family with greater than 50% of the time spent in counseling or coordination of care including discussions of provided medical updates, discussed palliative care, determined goals of care, determined social/family support, discussed plans of care, discussed symptom management, provided psychosocial support  Medication refill  PDMP Reviewed  All of the patient's or agent's questions were answered during this discussion

## 2022-12-27 NOTE — PROGRESS NOTES
Telephone genesis placed to [facility] to provide follow up communication  [facility] did not answer and I left a message on her VM requesting that she return my call  I also consulted with Rossy Chacko and she provided additional resources to [facility]  Mansi Cintron is not actively working on any new issues with patient  [facility] is also being followed by the Palliative Care   I will follow up with patient once more in two weeks and proceed with closing her case to avoid duplication of services since she is already receiving social work support

## 2023-01-01 ENCOUNTER — HOME CARE VISIT (OUTPATIENT)
Dept: HOME HOSPICE | Facility: HOSPICE | Age: 58
End: 2023-01-01
Payer: COMMERCIAL

## 2023-01-01 ENCOUNTER — HOME CARE VISIT (OUTPATIENT)
Dept: HOME HEALTH SERVICES | Facility: HOME HEALTHCARE | Age: 58
End: 2023-01-01
Payer: COMMERCIAL

## 2023-01-01 VITALS — HEART RATE: 80 BPM | RESPIRATION RATE: 16 BRPM

## 2023-01-01 DIAGNOSIS — J91.0 MALIGNANT PLEURAL EFFUSION: ICD-10-CM

## 2023-01-01 DIAGNOSIS — C50.911 PRIMARY MALIGNANT NEOPLASM OF RIGHT BREAST WITH METASTASIS TO OTHER SITE (HCC): ICD-10-CM

## 2023-01-01 DIAGNOSIS — J90 PLEURAL EFFUSION: ICD-10-CM

## 2023-01-01 DIAGNOSIS — C79.51 MALIGNANT NEOPLASM METASTATIC TO BONE (HCC): Primary | ICD-10-CM

## 2023-01-01 DIAGNOSIS — J91.0 MALIGNANT PLEURAL EFFUSION: Primary | ICD-10-CM

## 2023-01-01 PROCEDURE — G0299 HHS/HOSPICE OF RN EA 15 MIN: HCPCS

## 2023-01-01 PROCEDURE — G0155 HHCP-SVS OF CSW,EA 15 MIN: HCPCS

## 2023-01-01 RX ORDER — ACETAMINOPHEN 325 MG/1
650 TABLET ORAL ONCE
Status: CANCELLED | OUTPATIENT
Start: 2023-01-01

## 2023-01-01 RX ORDER — SODIUM CHLORIDE 9 MG/ML
20 INJECTION, SOLUTION INTRAVENOUS ONCE
Status: CANCELLED | OUTPATIENT
Start: 2023-09-06

## 2023-01-01 RX ORDER — SODIUM CHLORIDE 9 MG/ML
20 INJECTION, SOLUTION INTRAVENOUS ONCE
Status: CANCELLED | OUTPATIENT
Start: 2023-01-01

## 2023-01-01 RX ORDER — ACETAMINOPHEN 325 MG/1
650 TABLET ORAL ONCE
Status: CANCELLED | OUTPATIENT
Start: 2023-09-06

## 2023-01-01 RX ORDER — ATROPINE SULFATE 1 MG/ML
0.25 INJECTION, SOLUTION INTRAVENOUS ONCE
Status: CANCELLED | OUTPATIENT
Start: 2023-09-06

## 2023-01-01 RX ORDER — ATROPINE SULFATE 1 MG/ML
0.25 INJECTION, SOLUTION INTRAVENOUS ONCE
Status: CANCELLED | OUTPATIENT
Start: 2023-01-01

## 2023-01-01 RX ORDER — DEXTROSE MONOHYDRATE 50 MG/ML
20 INJECTION, SOLUTION INTRAVENOUS ONCE
Status: CANCELLED | OUTPATIENT
Start: 2023-01-01

## 2023-01-03 NOTE — PROGRESS NOTES
Hematology/Oncology Progress Note    Date of Service: 1/4/2023    [address] [facility]   [address] [address]    Hem/Onc Problem List:   1  Metastatic right-sided breast cancer, ER and AZ positive, HER2 negative  2  Bone metastatic disease  3  History of right-sided pleural effusion  Chief Complaint:    Routine follow-up for management of stage IV breast cancer    Assessment/Plan:     1  Metastatic breast cancer, with liver, bone, lymph node and pleura involvement   ER and AZ positive, HER2 negative  Bone scan showed bony metastatic disease  Patient started monthly Zoladex, daily letrozole and CDK4/6 inhibitor Abemeciclib  Unfortunately, patient developed significant watery diarrhea from Abemaciclib  Abemaciclib was changed to Ibrance 125 mg daily for 3 weeks on,  followed by 1 week off until disease progression on August 30, 2021  Ibrance dose was decreased to 100 mg because of neutropenia  CT scan and bone scan showed disease progression and treatment changed to chemo (Eribulin)  Restaging CT 7/8/2022 shows a very good AZ (near 50% reduction in index liver lesions)  NM bone scan showed no new findings  Restaging CT 10/12/2022 with stable disease except for one liver lesion  Patient s/p liver ablation 11/23/22  Has been off Eribulin since C8D9 for 11/3/22  Wanted to continue to hold treatment due to the holiday season  Restart scheduled for 1/9/23  See next steps of treatment plan below  2  Bony metastatic disease  Bone scan showed bony metastatic disease on 7/11  Patient  continues Zometa every 3 months  3   History of right breast cancer, status post lumpectomy and axillary lymph node dissection, status post adjuvant radiation therapy  4  Right-sided pleural effusion, status post thoracentesis, cytology positive for adenocarcinoma, ER/AZ positive, HER2 negative    Status post PleurX catheter placement  Has not required frequency drainage  Continue to treat underlying disease  · Discussion of decision making    I personally reviewed the following lab results, the image studies, pathology, other specialty/physicians consult notes and recommendations, and outside medical records from [facility]  I had a lengthy discussion with the patient and shared the work-up findings  I spent 30 minutes reviewing the records (labs, clinician notes, outside records, medical history, ordering medicine/tests/procedures, interpreting the imaging/labs previously done) and coordination of care as well as direct time with the patient today, of which greater than 50% of the time was spent in counseling and coordination of care with the patient/family  · Plan/Labs  · Metastatic breast cancer with diffuse bone metastasis  ECOG 1   · Patient is S/P liver ablation 11/2022  · Has been off Eribulin since C8D9 for 11/3/22  Patient at last visit wanted to continue to hold treatment due to the holiday season  She is scheduled to resume Eribulin Q21 days with neulasta support 1/9/23  She will have labs prior to treatment which we will follow  · Continue Letrozole 2 5mg once daily  This was never stopped  Continue this current treatment until disease progression  · Restage CT CAP in 2 months ordered --> scheduled for 2/1/23  · Continue to follow up with palliative care  · Continue Zometa Q12 weeks for bone metastasis  · OncotypeMap test NGS was done 4/18/2022 with results: PIK3CA: [id number] mutation  ESR1: WT, PD-L1 IHC: negative  MSI-stable, TMB low (4 muts/mb)  BRCA 1&2 WT  Possible future treatment option would include Alpelisib + Fulvestrant  However, recent tissue examination off liver showed benefit from Enhertu HER2-low finding  Follow Up: 3-4 weeks  F/U with Dr Shnanan Gasca scheduled for 2/9/2023    All questions were answered to the patient's satisfaction during this encounter   The patient knows the contact information for our office and knows to reach out for any relevant concerns related to this encounter  They are to call for any temperature 100 4 or higher, new symptoms including but not restricted to shaking chills, decreased appetite, nausea, vomiting, diarrhea, increased fatigue, shortness of breath or chest pain, confusion, and not feeling the strength to come to the clinic  For all other listed problems and medical diagnosis in their chart - they are managed by PCP and/or other specialists, which the patient acknowledges  Thank you very much for your consultation and making us a part of this patient's care  We are continuing to follow closely with you  Please do not hesitate to reach out to me with any additional questions or concerns  AJCC 8th Edition Cancer Stage :      Cancer Staging  No matching staging information was found for the patient  Hematology/Oncology History:   · History of right-sided breast cancer, initially diagnosed in 2012, status post lumpectomy and axillary lymph node dissection, status post adjuvant radiation therapy    Patient did not receive any adjuvant endocrine therapy or chemotherapy  · July 24, 2021, patient was admitted to hospital because of shortness of breath and cough  · July 24, 2021 CT chest PE protocol showed septal thickening throughout the right lung and bronchial wall thickening due to lymphangitic spread of tumor   Indeterminate lung nodules measuring 5 millimeter in the right upper lobe, right middle lobe and 7 millimeter in the left upper lobe and left lower lobe   Large right pleural effusion   Multiple liver metastatic disease measuring up to 4 centimeter   Lytic metastatic to sternal manubrium  · July 26, 2021, ultrasound abdomen shows multiple hepatic metastatic disease   Status post thoracentesis with 1200 mL of joanne fluid removed   Cytology showed adenoma carcinoma, ER and AR positive  40-50%, HER2 negative    · July 26, 2021 biopsy of the lung liver showed metastatic breast cancer, ER and % positive, HER2 negative   CT abdomen pelvis with contrast showed extensive hepatic metastatic disease  · July 27, 2021 MRI brain showed no evidence of intracranial metastatic disease  · August 6, 2021, mammogram showed no mammographic evidence of malignancy  · August 11, 2021 bone scan showed bony metastatic disease involving left iliac crest medially and adjacent sacrum  · August 12, 2021, Zoladex was given  Letrozole started  · August 16, 2022, patient started Abemaciclib  · August 30, 2021, DC abemaciclib because of diarrhea  Start Ibrance 125 mg daily 3 weeks on 1 week off  · Nov 19, 2021, CT c/a/p showed:   1   Since July 2021, new thromboses within the intrahepatic branches of the portal vein as described above  2   Increased diffuse sclerotic osseous lesions     3   Decreased size of most of the hepatic metastases  4   Indeterminate mottled appearance of the splenic parenchyma   Attention on follow-up is advised  5   Unchanged pulmonary nodules  6   Right pleural effusion has decreased in size since July 24, 2021   The smooth interlobular septal thickening has also decreased, and this change can be attributable to the decreased size of the pleural effusion  7   Mucosal hyperenhancement of the colon   Findings may be treatment-related, and correlation with gastrointestinal symptoms is advised      BONE SCAN:   1   A few areas of increased radiotracer uptake suspicious for osseous metastasis, with findings for minimal progression    · March 15, 2022 bone scan: Stable or improving scattered foci radiotracer uptake suspicious for osseous metastases   No new osseous foci suspicious for metastasis     ·  April 1, 2022 CT scan chest abdomen pelvis:  IMPRESSION:     Findings concerning for worsening hepatic metastases as evidenced by increased size and number of lesions   Please see above discussion      Progressive left portal vein thrombosis      The majority of the pulmonary nodules are stable   There is one subpleural nodule in the right upper lobe apex posteriorly, not seen on the prior study      Stable extensive osseous metastases      Multiple tiny hypodense splenic lesions are also stable, indeterminate      Persistent small right pleural effusion with a right pleural catheter in place      June 25, 2022: 4 day admission for neutropenic fever  July 8, 2022 CT CAP: 1    Stable scattered small pulmonary nodules bilaterally  Small right pleural effusion, slightly decreased  Improving hepatic metastasis  Less conspicuous small splenic hypodense lesions  Stable findings for widespread osseous metastasis  NM Bone Scan without significant changes (A lesion in the left lobe of the liver laterally measures 2 0 x 1 9 cm, previously 5 1 x 3 8 cm- 56 2% reduction   Somewhat stellate lesion in the right lobe of the liver  posteriorly now measures 1 8 x 1 2 cm image 33 series 2, previously 2 6 x 2 6 cm (42 3%)  July 11, 2022, NM bone scan stated no areas of new or worsening scintigraphic activity suggesting osseous disease progression  10/12/2022 CT CAP w/c: New 8 mm lesion within segment IVb of the liver compared to July 8, 2022, suspicious for progression of metastatic disease  However, a few other liver lesions are improved  Improved small right pleural effusion  Stable extensive osseous metastatic metastatic disease  11/23/22: S/P Liver ablation  History of Present Illiness:   Brien Wallace is a 56 y o  female with the above-noted HemOnc history who is here to follow up regarding breast cancer history  See details of history above  Patient current on Eribulin Q 21 days  Has been off Eribulin since C8D9 for 11/3/22  This is due to new hepatic metastasis requiring ablation  S/p liver ablation 11/23/22  Interval events 1/4/2023:  Patient has slight Left sided TMJ pain at times   Some discomfort in RUQ area which has been ongoing off/on since liver ablation  Not worsening  Otherwise, no new HA, vision change, CP, SOB, abd pain, N/V/D  No bleeding anywhere  No difficulty eating  No change in appetite  Weight at visit 8/2021: 146lbs  Weight 9/21/22: 131lbs  Weight 12/13/22: 123lbs   Weight 1/4/23: 125lbs    ROS: A 10-point of review of systems is obtained and other than the above is noncontributory  Objective:   VITALS:   /72   Pulse 82   Temp 98 °F (36 7 °C)   Resp 16   Ht 5' 5" (1 651 m)   Wt 56 7 kg (125 lb)   LMP 05/26/2021   SpO2 96%   BMI 20 80 kg/m²     Physical EXAM:  General:  Alert, cooperative, no distress, appears stated age  Head:  Normocephalic, without obvious abnormality, atraumatic  Eyes:  Conjunctivae/corneas clear  EOMs intact  No evidence of conjunctivitis     Throat: Lips, mucosa, and tongue normal     Neck: Supple, symmetrical, trachea midline    Lungs:   Respiratory effort easy, nonlabored    Heart:  Regular rate and rhythm, +S1, S2   Abdomen:   Soft, non-tender,nondistended  No masses,      Extremities:  Lymphatics: Extremities normal, atraumatic, no cyanosis or edema  No cervical, axillary or inguinal adenopathy   Skin: Skin color, texture, turgor normal  No rashes  Neurologic: AAO   No focal neuro deficits       Allergies   Allergen Reactions   • Codeine Anaphylaxis   • Morphine GI Intolerance, Itching and Vomiting   • Sulfa Antibiotics Hives and Itching       Past Medical History:   Diagnosis Date   • Cancer ([address] )    • Headache(784 0) 11/2021   • History of radiation exposure    • Malignant neoplasm of right female breast [facility]) 2012    right       Past Surgical History:   Procedure Laterality Date   • BACK SURGERY     • BREAST CYST EXCISION     • BREAST LUMPECTOMY Right 2012   • HIP SURGERY Right     debridement of femur   • IR BIOPSY LIVER MASS  07/26/2021   • IR MICROWAVE ABLATION  11/23/2022   • IR PLEURAL EFFUSION LONG-TERM CATHETER PLACEMENT  08/17/2021   • IR PLEURAL EFFUSION LONG-TERM CATHETER REMOVAL  2022   • IR PORT PLACEMENT  2022   • IR THORACENTESIS  2021       Family History   Problem Relation Age of Onset   • Breast cancer Mother         in her 63's   • Breast cancer Sister         inher 45s and 48   • Colon cancer Maternal Grandmother    • No Known Problems Paternal Grandmother    • Breast cancer Other    • No Known Problems Paternal Aunt        Social History     Socioeconomic History   • Marital status: /Civil Union     Spouse name: Not on file   • Number of children: Not on file   • Years of education: Not on file   • Highest education level: Not on file   Occupational History   • Not on file   Tobacco Use   • Smoking status: Former     Packs/day: 0 25     Years: 15 00     Pack years: 3 75     Types: Cigarettes     Start date: 20     Quit date: 7/10/2021     Years since quittin 4   • Smokeless tobacco: Never   Vaping Use   • Vaping Use: Never used   Substance and Sexual Activity   • Alcohol use: Not Currently   • Drug use: Not Currently   • Sexual activity: Not Currently     Partners: Male     Birth control/protection: Male Sterilization   Other Topics Concern   • Not on file   Social History Narrative   • Not on file     Social Determinants of Health     Financial Resource Strain: Not on file   Food Insecurity: No Food Insecurity   • Worried About Running Out of Food in the Last Year: Never true   • Ran Out of Food in the Last Year: Never true   Transportation Needs: No Transportation Needs   • Lack of Transportation (Medical): No   • Lack of Transportation (Non-Medical):  No   Physical Activity: Not on file   Stress: Not on file   Social Connections: Not on file   Intimate Partner Violence: Not on file   Housing Stability: Low Risk    • Unable to Pay for Housing in the Last Year: No   • Number of Places Lived in the Last Year: 1   • Unstable Housing in the Last Year: No       Current Outpatient Medications   Medication Sig Dispense Refill   • acetaminophen (TYLENOL) 325 mg tablet Take 2 tablets (650 mg total) by mouth every 6 (six) hours as needed for mild pain 100 tablet 0   • al mag oxide-diphenhydramine-lidocaine viscous (MAGIC MOUTHWASH) 1:1:1 suspension Swish and spit 10 mL every 4 (four) hours as needed for mouth pain or discomfort 300 mL 0   • albuterol (PROVENTIL HFA,VENTOLIN HFA) 90 mcg/act inhaler Inhale 2 puffs every 4 (four) hours as needed for wheezing 8 g 0   • calcium carbonate (TUMS) 500 mg chewable tablet Chew 1 tablet (500 mg total) 3 (three) times a day as needed for indigestion or heartburn 30 tablet 0   • CRANIAL PROSTHESIS, RX, Apply to cranial area as needed for comfort   1 each 0   • [employer] Melatonin 3 MG TAKE 2 TABLETS (6 MG TOTAL) BY MOUTH DAILY AT BEDTIME 180 tablet 1   • dextromethorphan-guaiFENesin (ROBITUSSIN DM)  mg/5 mL syrup Take 10 mL by mouth every 4 (four) hours as needed for cough 236 mL 0   • dicyclomine (BENTYL) 10 mg capsule Take 1 capsule (10 mg total) by mouth every 6 (six) hours as needed (abdominal cramping) 40 capsule 0   • diphenhydrAMINE (BENADRYL) 50 MG tablet Take 1 tablet (50 mg total) by mouth daily at bedtime as needed for itching or sleep 30 tablet 0   • diphenoxylate-atropine (LOMOTIL) 2 5-0 025 mg per tablet Take 1 tablet by mouth 4 (four) times a day as needed for diarrhea 30 tablet 0   • DULoxetine (CYMBALTA) 30 mg delayed release capsule Take 1 capsule (30 mg total) by mouth daily 90 capsule 3   • HYDROmorphone (DILAUDID) 2 mg tablet Take 1-2 tablets (2-4 mg total) by mouth every 3 (three) hours as needed (moderate/severe cancer-related pain) Max Daily Amount: 32 mg 120 tablet 0   • Lidocaine Viscous HCl (XYLOCAINE) 2 % mucosal solution Swish and spit 10 mL 4 (four) times a day as needed for mouth pain or discomfort 200 mL 0   • metoclopramide (REGLAN) 10 mg tablet Take 1 tablet (10 mg total) by mouth 4 (four) times a day 28 tablet 0   • multivitamin (THERAGRAN) TABS Take 1 tablet by mouth     • ondansetron (ZOFRAN) 4 mg tablet Take 1 tablet (4 mg total) by mouth every 8 (eight) hours as needed for nausea or vomiting 20 tablet 0   • oxybutynin (DITROPAN-XL) 5 mg 24 hr tablet Take 1 tablet (5 mg total) by mouth daily Take 1 tablet daily 90 tablet 3   • polyethylene glycol (MIRALAX) 17 g packet Take 17 g by mouth daily  0   • senna (SENOKOT) 8 6 MG tablet Take 1 tablet (8 6 mg total) by mouth daily at bedtime as needed for constipation 30 tablet 0   • Xarelto 20 MG tablet TAKE 1 TABLET BY MOUTH EVERY DAY WITH BREAKFAST 30 tablet 11   • docusate sodium (COLACE) 100 mg capsule Take 1 capsule (100 mg total) by mouth 2 (two) times a day for 10 days 20 capsule 0   • lactulose 20 g/30 mL Take 15 mL (10 g total) by mouth daily as needed (constipaton) for up to 10 days 300 mL 0   • letrozole (FEMARA) 2 5 mg tablet Take 1 tablet (2 5 mg total) by mouth daily 90 tablet 2   • potassium chloride (K-DUR,KLOR-CON) 20 mEq tablet Take 1 tablet (20 mEq total) by mouth 2 (two) times a day for 3 days (Patient not taking: Reported on 12/27/2022) 6 tablet 0     No current facility-administered medications for this visit  (Not in a hospital admission)      DATA REVIEW:    Pathology Result:    Final Diagnosis   Date Value Ref Range Status   11/23/2022   Final    A  Liver core (biopsy):  - Metastatic carcinoma    Comment:  - ER, MT, Her2 ordered  - The tumor cells stain for GATA3 with absent CK7, CK20, GCDFP15 staining  The immunoprofile is most consistent with the patient's known breast primary  MOLECULAR TESTING AND CONSULT SLIDES:     Molecular testing:  Block A1 is available for molecular testing  Consult slides:  Slide A1 with associated IHC is available for consultation  Interpretation performed at [address]        07/26/2021   Final    A  B  Thoracentesis, Right (ThinPrep and cell block preparations):  Positive for malignancy    Metastatic carcinoma, compatible with breast primary  -  Immunohistochemical stains performed with appropriate controls show the tumor cells to be positive for Matthew-EP4, MOC31, BARRY-3 and ER with scattered background mesothelial cells staining for WT1 and calretinin, supporting the diagnosis  Satisfactory for evaluation  07/26/2021   Final    A  Liver (core biopsy):     - Poorly-differentiated carcinoma, most compatible with metastasis from the patient's reported breast primary  Comment:  ER / TX / Her-2 pending  Comment: This is an appended report  These results have been appended to a previously preliminary verified report  Image Results: They are reviewed and documented in Hematology/Oncology history    IR microwave ablation  Narrative: PROCEDURE: Liver microwave ablation    Procedural Personnel  Attending physician(s): Dr Georgia Guevara    Pre-procedure diagnosis: Breast cancer  Post-procedure diagnosis: Same  Indication: Patient with metastatic breast cancer to the liver found to have new segment 4b liver lesion  PROCEDURE SUMMARY:  - Microwave ablation of liver lesion using ultrasound and fluoroscopy guidance  - Ultrasound-guided percutaneous biopsy of liver lesion    PROCEDURE DETAILS:    Pre-procedure  Consent: Informed consent for the procedure including risks, benefits and alternatives was obtained and time-out was performed prior to the procedure  Preparation: The site was prepared and draped using maximal sterile barrier technique including cutaneous antisepsis  Anesthesia/sedation  Level of anesthesia/sedation: General anesthesia  Anesthesia/sedation administered by: Anesthesiology  Total intra-service sedation time (minutes): 120    Biopsy  Under ultrasound guidance, an 18-gauge core biopsy needle was used to obtain one core needle sample from the segment 4b of the liver lesion which was placed into formalin and sent to pathology      Microwave ablation  Under ultrasound and CT guidance, a 15-gauge microwave ablation needle was advanced into the segment IVb liver lesion  Ablation applicator: AngioDynamNetrada Solero  Target #1  Ablation position 1  - Number of applicators: 1  - Duration (minutes): 2   - Maximum energy applied (ashley): 100   Intraprocedural imaging findings: Small intramuscular hematoma which was non-expanding on postop imaging  Radiation Dose  CT dose length product (mGy-cm): 1300 88     Additional Details  Specimens removed: 1 core needle sample placed into formalin  Estimated blood loss (mL): 1  Complications: No immediate complications  Impression: Percutaneous microwave ablation of segment IVb liver lesion with biopsy  Plan:     Follow-up in 3 months with repeat CT to evaluate for regression of the liver lesion  Workstation performed: [id number]        LABS:  Lab data are reviewed and documented in HemOnc history  No results found for this or any previous visit (from the past 48 hour(s))            Bryon Juaresr  1/4/2023, 10:12 AM

## 2023-01-04 ENCOUNTER — OFFICE VISIT (OUTPATIENT)
Dept: HEMATOLOGY ONCOLOGY | Facility: CLINIC | Age: 58
End: 2023-01-04

## 2023-01-04 VITALS
OXYGEN SATURATION: 96 % | HEIGHT: 65 IN | RESPIRATION RATE: 16 BRPM | HEART RATE: 82 BPM | BODY MASS INDEX: 20.83 KG/M2 | DIASTOLIC BLOOD PRESSURE: 72 MMHG | WEIGHT: 125 LBS | TEMPERATURE: 98 F | SYSTOLIC BLOOD PRESSURE: 104 MMHG

## 2023-01-04 DIAGNOSIS — C50.911 PRIMARY MALIGNANT NEOPLASM OF RIGHT BREAST WITH METASTASIS TO OTHER SITE (HCC): Primary | ICD-10-CM

## 2023-01-04 DIAGNOSIS — D70.1 CHEMOTHERAPY INDUCED NEUTROPENIA (HCC): ICD-10-CM

## 2023-01-04 DIAGNOSIS — J91.0 MALIGNANT PLEURAL EFFUSION: ICD-10-CM

## 2023-01-04 DIAGNOSIS — C79.51 BONE METASTASES (HCC): ICD-10-CM

## 2023-01-04 DIAGNOSIS — T45.1X5A CHEMOTHERAPY INDUCED NEUTROPENIA (HCC): ICD-10-CM

## 2023-01-06 ENCOUNTER — PATIENT OUTREACH (OUTPATIENT)
Dept: INTERNAL MEDICINE CLINIC | Facility: CLINIC | Age: 58
End: 2023-01-06

## 2023-01-06 NOTE — PROGRESS NOTES
[facility] called patient for final outreach  Patient reports that she may have a place to move to in the next couple of months  This apartment is a few miles from were she lives now  Patient reported that the resources that [facility] provided her are not able to assist with rental assistance at his time  Patient reports that things are "OK" at her brothers house for now  Patient brought her older son to the doctor today for his Medical review and is hopeful that his SSD will be reinstated  Patient is still receiving SNAP Benefits and her MA is in place  After reviewing patient's chart, and discussing referral needs, it was determined that patient's needs for this referral have been met  Patient is aware that this referral will be closed as of today  Patient has [facility] contact information  No further outreach is scheduled at this time

## 2023-01-09 ENCOUNTER — HOSPITAL ENCOUNTER (OUTPATIENT)
Dept: INFUSION CENTER | Facility: CLINIC | Age: 58
Discharge: HOME | End: 2023-01-09

## 2023-01-09 DIAGNOSIS — Z95.828 PORT-A-CATH IN PLACE: Primary | ICD-10-CM

## 2023-01-09 DIAGNOSIS — C79.51 BONE METASTASES (HCC): ICD-10-CM

## 2023-01-09 DIAGNOSIS — D70.1 CHEMOTHERAPY INDUCED NEUTROPENIA (HCC): ICD-10-CM

## 2023-01-09 DIAGNOSIS — C79.51 BONE METASTASES (HCC): Primary | ICD-10-CM

## 2023-01-09 DIAGNOSIS — C78.7 MALIGNANT NEOPLASM METASTATIC TO LIVER (HCC): ICD-10-CM

## 2023-01-09 DIAGNOSIS — J91.0 MALIGNANT PLEURAL EFFUSION: ICD-10-CM

## 2023-01-09 DIAGNOSIS — Z17.0 MALIGNANT NEOPLASM OF BREAST IN FEMALE, ESTROGEN RECEPTOR POSITIVE, UNSPECIFIED LATERALITY, UNSPECIFIED SITE OF BREAST (HCC): ICD-10-CM

## 2023-01-09 DIAGNOSIS — T45.1X5A CHEMOTHERAPY INDUCED NEUTROPENIA (HCC): ICD-10-CM

## 2023-01-09 DIAGNOSIS — C50.919 MALIGNANT NEOPLASM OF BREAST IN FEMALE, ESTROGEN RECEPTOR POSITIVE, UNSPECIFIED LATERALITY, UNSPECIFIED SITE OF BREAST (HCC): ICD-10-CM

## 2023-01-09 DIAGNOSIS — C50.911 PRIMARY MALIGNANT NEOPLASM OF RIGHT BREAST WITH METASTASIS TO OTHER SITE (HCC): ICD-10-CM

## 2023-01-09 LAB
ALBUMIN SERPL BCP-MCNC: 3.2 G/DL (ref 3.5–5)
ALP SERPL-CCNC: 95 U/L (ref 46–116)
ALT SERPL W P-5'-P-CCNC: 30 U/L (ref 12–78)
ANION GAP SERPL CALCULATED.3IONS-SCNC: 10 MMOL/L (ref 4–13)
AST SERPL W P-5'-P-CCNC: 33 U/L (ref 5–45)
BASOPHILS # BLD AUTO: 0.01 THOUSANDS/ÂΜL (ref 0–0.1)
BASOPHILS NFR BLD AUTO: 0 % (ref 0–1)
BILIRUB SERPL-MCNC: 0.25 MG/DL (ref 0.2–1)
BUN SERPL-MCNC: 21 MG/DL (ref 5–25)
CALCIUM ALBUM COR SERPL-MCNC: 9.5 MG/DL (ref 8.3–10.1)
CALCIUM SERPL-MCNC: 8.9 MG/DL (ref 8.3–10.1)
CHLORIDE SERPL-SCNC: 102 MMOL/L (ref 96–108)
CO2 SERPL-SCNC: 25 MMOL/L (ref 21–32)
CREAT SERPL-MCNC: 0.77 MG/DL (ref 0.6–1.3)
EOSINOPHIL # BLD AUTO: 0.03 THOUSAND/ÂΜL (ref 0–0.61)
EOSINOPHIL NFR BLD AUTO: 1 % (ref 0–6)
ERYTHROCYTE [DISTWIDTH] IN BLOOD BY AUTOMATED COUNT: 14.5 % (ref 11.6–15.1)
GFR SERPL CREATININE-BSD FRML MDRD: 87 ML/MIN/1.73SQ M
GLUCOSE SERPL-MCNC: 102 MG/DL (ref 65–140)
HCT VFR BLD AUTO: 36.2 % (ref 34.8–46.1)
HGB BLD-MCNC: 12.1 G/DL (ref 11.5–15.4)
IMM GRANULOCYTES # BLD AUTO: 0.01 THOUSAND/UL (ref 0–0.2)
IMM GRANULOCYTES NFR BLD AUTO: 0 % (ref 0–2)
LYMPHOCYTES # BLD AUTO: 0.36 THOUSANDS/ÂΜL (ref 0.6–4.47)
LYMPHOCYTES NFR BLD AUTO: 13 % (ref 14–44)
MCH RBC QN AUTO: 33.3 PG (ref 26.8–34.3)
MCHC RBC AUTO-ENTMCNC: 33.4 G/DL (ref 31.4–37.4)
MCV RBC AUTO: 100 FL (ref 82–98)
MONOCYTES # BLD AUTO: 0.32 THOUSAND/ÂΜL (ref 0.17–1.22)
MONOCYTES NFR BLD AUTO: 12 % (ref 4–12)
NEUTROPHILS # BLD AUTO: 2.02 THOUSANDS/ÂΜL (ref 1.85–7.62)
NEUTS SEG NFR BLD AUTO: 74 % (ref 43–75)
NRBC BLD AUTO-RTO: 0 /100 WBCS
PLATELET # BLD AUTO: 172 THOUSANDS/UL (ref 149–390)
PMV BLD AUTO: 10.2 FL (ref 8.9–12.7)
POTASSIUM SERPL-SCNC: 3.8 MMOL/L (ref 3.5–5.3)
PROT SERPL-MCNC: 6.8 G/DL (ref 6.4–8.4)
RBC # BLD AUTO: 3.63 MILLION/UL (ref 3.81–5.12)
SODIUM SERPL-SCNC: 137 MMOL/L (ref 135–147)
WBC # BLD AUTO: 2.75 THOUSAND/UL (ref 4.31–10.16)

## 2023-01-09 RX ORDER — SODIUM CHLORIDE 9 MG/ML
20 INJECTION, SOLUTION INTRAVENOUS ONCE
Status: CANCELLED | OUTPATIENT
Start: 2023-01-09

## 2023-01-09 RX ORDER — SODIUM CHLORIDE 9 MG/ML
20 INJECTION, SOLUTION INTRAVENOUS ONCE
OUTPATIENT
Start: 2023-01-10

## 2023-01-09 NOTE — PROGRESS NOTES
Pt to clinic for port flush and lab draw via port, offers no complaints today, tolerated procedure without complications, aware of next appointment, port de-accessed, avs printed and reviewed

## 2023-01-10 ENCOUNTER — HOSPITAL ENCOUNTER (OUTPATIENT)
Dept: INFUSION CENTER | Facility: CLINIC | Age: 58
Discharge: HOME | End: 2023-01-10

## 2023-01-10 VITALS
HEIGHT: 65 IN | WEIGHT: 123.6 LBS | RESPIRATION RATE: 18 BRPM | BODY MASS INDEX: 20.59 KG/M2 | TEMPERATURE: 97.7 F | HEART RATE: 81 BPM | DIASTOLIC BLOOD PRESSURE: 75 MMHG | SYSTOLIC BLOOD PRESSURE: 103 MMHG

## 2023-01-10 DIAGNOSIS — Z17.0 MALIGNANT NEOPLASM OF BREAST IN FEMALE, ESTROGEN RECEPTOR POSITIVE, UNSPECIFIED LATERALITY, UNSPECIFIED SITE OF BREAST (HCC): ICD-10-CM

## 2023-01-10 DIAGNOSIS — T45.1X5A CHEMOTHERAPY INDUCED NEUTROPENIA (HCC): ICD-10-CM

## 2023-01-10 DIAGNOSIS — D70.1 CHEMOTHERAPY INDUCED NEUTROPENIA (HCC): ICD-10-CM

## 2023-01-10 DIAGNOSIS — C50.911 PRIMARY MALIGNANT NEOPLASM OF RIGHT BREAST WITH METASTASIS TO OTHER SITE (HCC): ICD-10-CM

## 2023-01-10 DIAGNOSIS — C79.51 BONE METASTASES (HCC): Primary | ICD-10-CM

## 2023-01-10 DIAGNOSIS — C50.919 MALIGNANT NEOPLASM OF BREAST IN FEMALE, ESTROGEN RECEPTOR POSITIVE, UNSPECIFIED LATERALITY, UNSPECIFIED SITE OF BREAST (HCC): ICD-10-CM

## 2023-01-10 DIAGNOSIS — J91.0 MALIGNANT PLEURAL EFFUSION: ICD-10-CM

## 2023-01-10 RX ORDER — SODIUM CHLORIDE 9 MG/ML
20 INJECTION, SOLUTION INTRAVENOUS ONCE
Status: COMPLETED | OUTPATIENT
Start: 2023-01-10 | End: 2023-01-10

## 2023-01-10 RX ADMIN — DEXAMETHASONE SODIUM PHOSPHATE 10 MG: 10 INJECTION, SOLUTION INTRAMUSCULAR; INTRAVENOUS at 14:56

## 2023-01-10 RX ADMIN — ERIBULIN MESYLATE 2.4 MG: 0.5 INJECTION INTRAVENOUS at 15:27

## 2023-01-10 RX ADMIN — SODIUM CHLORIDE 20 ML/HR: 0.9 INJECTION, SOLUTION INTRAVENOUS at 14:56

## 2023-01-10 NOTE — PROGRESS NOTES
Pt presents for Katharina Nelson  Pt reporting intermittent jaw pain that has been going on for "a while", described as "like TMJ"  Gokul Morales RN made aware and reported per Terrie Vera PA-C, dose to be held today and to be evaluated by a dentist  Pt aware and agreeable  Pt tolerated treatment without incident  AVS declined  Next appointment reviewed

## 2023-01-10 NOTE — PROGRESS NOTES
Reviewed with Trista Rowley PA-C to clarify patient is to have Eribulin day 1 and day 8 with neulasta support on day 9  Per Cammie Truong PA-C note has been edited to reflect treatment as ordered  Infusion RN notified

## 2023-01-10 NOTE — PROGRESS NOTES
Clarified with Crystal Thornton RN, per Bonine Farrell PA-C, pt is to receive Eribulin on day 1 and day 8 every 28 days as ordered

## 2023-01-16 ENCOUNTER — TELEPHONE (OUTPATIENT)
Dept: HEMATOLOGY ONCOLOGY | Facility: CLINIC | Age: 58
End: 2023-01-16

## 2023-01-16 ENCOUNTER — HOSPITAL ENCOUNTER (OUTPATIENT)
Dept: INFUSION CENTER | Facility: CLINIC | Age: 58
Discharge: HOME | End: 2023-01-16

## 2023-01-16 DIAGNOSIS — C78.7 MALIGNANT NEOPLASM METASTATIC TO LIVER (HCC): ICD-10-CM

## 2023-01-16 DIAGNOSIS — T45.1X5A CHEMOTHERAPY INDUCED NEUTROPENIA (HCC): ICD-10-CM

## 2023-01-16 DIAGNOSIS — C50.911 PRIMARY MALIGNANT NEOPLASM OF RIGHT BREAST WITH METASTASIS TO OTHER SITE (HCC): ICD-10-CM

## 2023-01-16 DIAGNOSIS — D70.1 CHEMOTHERAPY INDUCED NEUTROPENIA (HCC): ICD-10-CM

## 2023-01-16 DIAGNOSIS — T45.1X5A CHEMOTHERAPY INDUCED NEUTROPENIA (HCC): Primary | ICD-10-CM

## 2023-01-16 DIAGNOSIS — J91.0 MALIGNANT PLEURAL EFFUSION: ICD-10-CM

## 2023-01-16 DIAGNOSIS — C79.51 BONE METASTASES (HCC): Primary | ICD-10-CM

## 2023-01-16 DIAGNOSIS — C50.919 MALIGNANT NEOPLASM OF BREAST IN FEMALE, ESTROGEN RECEPTOR POSITIVE, UNSPECIFIED LATERALITY, UNSPECIFIED SITE OF BREAST (HCC): ICD-10-CM

## 2023-01-16 DIAGNOSIS — D70.1 CHEMOTHERAPY INDUCED NEUTROPENIA (HCC): Primary | ICD-10-CM

## 2023-01-16 DIAGNOSIS — C79.51 BONE METASTASES (HCC): ICD-10-CM

## 2023-01-16 DIAGNOSIS — Z95.828 PORT-A-CATH IN PLACE: ICD-10-CM

## 2023-01-16 DIAGNOSIS — Z17.0 MALIGNANT NEOPLASM OF BREAST IN FEMALE, ESTROGEN RECEPTOR POSITIVE, UNSPECIFIED LATERALITY, UNSPECIFIED SITE OF BREAST (HCC): ICD-10-CM

## 2023-01-16 LAB
ANISOCYTOSIS BLD QL SMEAR: PRESENT
BASOPHILS # BLD MANUAL: 0.01 THOUSAND/UL (ref 0–0.1)
BASOPHILS NFR MAR MANUAL: 1 % (ref 0–1)
EOSINOPHIL # BLD MANUAL: 0.04 THOUSAND/UL (ref 0–0.4)
EOSINOPHIL NFR BLD MANUAL: 3 % (ref 0–6)
ERYTHROCYTE [DISTWIDTH] IN BLOOD BY AUTOMATED COUNT: 13.6 % (ref 11.6–15.1)
HCT VFR BLD AUTO: 37.2 % (ref 34.8–46.1)
HGB BLD-MCNC: 12.5 G/DL (ref 11.5–15.4)
LYMPHOCYTES # BLD AUTO: 0.2 THOUSAND/UL (ref 0.6–4.47)
LYMPHOCYTES # BLD AUTO: 16 % (ref 14–44)
MCH RBC QN AUTO: 33.3 PG (ref 26.8–34.3)
MCHC RBC AUTO-ENTMCNC: 33.6 G/DL (ref 31.4–37.4)
MCV RBC AUTO: 99 FL (ref 82–98)
MONOCYTES # BLD AUTO: 0.04 THOUSAND/UL (ref 0–1.22)
MONOCYTES NFR BLD: 3 % (ref 4–12)
NEUTROPHILS # BLD MANUAL: 0.97 THOUSAND/UL (ref 1.85–7.62)
NEUTS BAND NFR BLD MANUAL: 4 % (ref 0–8)
NEUTS SEG NFR BLD AUTO: 73 % (ref 43–75)
PLATELET # BLD AUTO: 144 THOUSANDS/UL (ref 149–390)
PLATELET BLD QL SMEAR: ABNORMAL
PMV BLD AUTO: 11.4 FL (ref 8.9–12.7)
RBC # BLD AUTO: 3.75 MILLION/UL (ref 3.81–5.12)
RBC MORPH BLD: PRESENT
WBC # BLD AUTO: 1.26 THOUSAND/UL (ref 4.31–10.16)

## 2023-01-16 NOTE — TELEPHONE ENCOUNTER
Received phone call from lab in regards to patient labs  Mariza Valdez from [address] lab reported WBC 1 26  Patient [address] 92  [id number]    Per Dr Hernandes Eye recommendations, patient to be deferred one week  Phone call went right to vm  Left message with above information in regards to patient infusion to be deferred by one week  My Chart message to patient as well  Treatment plan updated

## 2023-01-16 NOTE — TELEPHONE ENCOUNTER
Received phone call from lab in regards to patient labs  Tejas Kyle from [id number] lab reported WBC 1 26  Patient [address] 0 92  [address]    Per Dr Maisha Cao recommendations, patient to be deferred one week  Phone call went right to vm  Left message with above information in regards to patient infusion to be deferred by one week  My Chart message to patient as well  Please defer treatment on 1/17/23 by one week  Please update patient with new appointment

## 2023-01-17 ENCOUNTER — HOSPITAL ENCOUNTER (OUTPATIENT)
Dept: INFUSION CENTER | Facility: CLINIC | Age: 58
End: 2023-01-17

## 2023-01-18 ENCOUNTER — HOSPITAL ENCOUNTER (OUTPATIENT)
Dept: INFUSION CENTER | Facility: CLINIC | Age: 58
End: 2023-01-18

## 2023-01-20 ENCOUNTER — HOSPITAL ENCOUNTER (OUTPATIENT)
Dept: INFUSION CENTER | Facility: CLINIC | Age: 58
Discharge: HOME | End: 2023-01-20

## 2023-01-20 ENCOUNTER — TELEPHONE (OUTPATIENT)
Dept: HEMATOLOGY ONCOLOGY | Facility: CLINIC | Age: 58
End: 2023-01-20

## 2023-01-20 ENCOUNTER — TELEPHONE (OUTPATIENT)
Dept: OTHER | Facility: OTHER | Age: 58
End: 2023-01-20

## 2023-01-20 VITALS
HEART RATE: 97 BPM | DIASTOLIC BLOOD PRESSURE: 63 MMHG | RESPIRATION RATE: 17 BRPM | SYSTOLIC BLOOD PRESSURE: 139 MMHG | TEMPERATURE: 96.6 F

## 2023-01-20 DIAGNOSIS — Z17.0 MALIGNANT NEOPLASM OF BREAST IN FEMALE, ESTROGEN RECEPTOR POSITIVE, UNSPECIFIED LATERALITY, UNSPECIFIED SITE OF BREAST (HCC): ICD-10-CM

## 2023-01-20 DIAGNOSIS — C50.911 PRIMARY MALIGNANT NEOPLASM OF RIGHT BREAST WITH METASTASIS TO OTHER SITE (HCC): Primary | ICD-10-CM

## 2023-01-20 DIAGNOSIS — Z95.828 PORT-A-CATH IN PLACE: ICD-10-CM

## 2023-01-20 DIAGNOSIS — T45.1X5A CHEMOTHERAPY INDUCED NEUTROPENIA (HCC): ICD-10-CM

## 2023-01-20 DIAGNOSIS — D70.1 CHEMOTHERAPY INDUCED NEUTROPENIA (HCC): ICD-10-CM

## 2023-01-20 DIAGNOSIS — C50.919 MALIGNANT NEOPLASM OF BREAST IN FEMALE, ESTROGEN RECEPTOR POSITIVE, UNSPECIFIED LATERALITY, UNSPECIFIED SITE OF BREAST (HCC): ICD-10-CM

## 2023-01-20 DIAGNOSIS — C79.51 BONE METASTASES (HCC): Primary | ICD-10-CM

## 2023-01-20 DIAGNOSIS — C50.911 PRIMARY MALIGNANT NEOPLASM OF RIGHT BREAST WITH METASTASIS TO OTHER SITE (HCC): ICD-10-CM

## 2023-01-20 DIAGNOSIS — J91.0 MALIGNANT PLEURAL EFFUSION: ICD-10-CM

## 2023-01-20 DIAGNOSIS — C78.7 MALIGNANT NEOPLASM METASTATIC TO LIVER (HCC): ICD-10-CM

## 2023-01-20 LAB
BASOPHILS # BLD AUTO: 0.01 THOUSANDS/ÂΜL (ref 0–0.1)
BASOPHILS NFR BLD AUTO: 2 % (ref 0–1)
EOSINOPHIL # BLD AUTO: 0 THOUSAND/ÂΜL (ref 0–0.61)
EOSINOPHIL NFR BLD AUTO: 0 % (ref 0–6)
ERYTHROCYTE [DISTWIDTH] IN BLOOD BY AUTOMATED COUNT: 14.1 % (ref 11.6–15.1)
HCT VFR BLD AUTO: 37.4 % (ref 34.8–46.1)
HGB BLD-MCNC: 12.5 G/DL (ref 11.5–15.4)
IMM GRANULOCYTES # BLD AUTO: 0 THOUSAND/UL (ref 0–0.2)
IMM GRANULOCYTES NFR BLD AUTO: 0 % (ref 0–2)
LYMPHOCYTES # BLD AUTO: 0.37 THOUSANDS/ÂΜL (ref 0.6–4.47)
LYMPHOCYTES NFR BLD AUTO: 55 % (ref 14–44)
MCH RBC QN AUTO: 32.7 PG (ref 26.8–34.3)
MCHC RBC AUTO-ENTMCNC: 33.4 G/DL (ref 31.4–37.4)
MCV RBC AUTO: 98 FL (ref 82–98)
MONOCYTES # BLD AUTO: 0.21 THOUSAND/ÂΜL (ref 0.17–1.22)
MONOCYTES NFR BLD AUTO: 32 % (ref 4–12)
NEUTROPHILS # BLD AUTO: 0.07 THOUSANDS/ÂΜL (ref 1.85–7.62)
NEUTS SEG NFR BLD AUTO: 11 % (ref 43–75)
NRBC BLD AUTO-RTO: 0 /100 WBCS
PLATELET # BLD AUTO: 218 THOUSANDS/UL (ref 149–390)
PMV BLD AUTO: 11.7 FL (ref 8.9–12.7)
RBC # BLD AUTO: 3.82 MILLION/UL (ref 3.81–5.12)
WBC # BLD AUTO: 0.66 THOUSAND/UL (ref 4.31–10.16)

## 2023-01-20 NOTE — TELEPHONE ENCOUNTER
Received notice from infusion center of patients lingering cough, dizzy and slight tremors  She denies fever or SOB  Dr Maisha Cao aware and recommends ammonia be added to labs for today  Order entered  Left voice message on patient phone to have ammonia level drawn today  Provided direct call back number to patient for questions or concerns

## 2023-01-20 NOTE — TELEPHONE ENCOUNTER
Lab Result: Wbc 0 66   Date/Time Drawn: 1/20 at 12:37   Ordering Provider: Joyce Gannon Name: Sarmad Chou       The following critical/stat result was read back to the lab as stated above and [employer] to the on-call provider  The provider confirmed receipt of the message

## 2023-01-20 NOTE — PROGRESS NOTES
Patient presents for central venous labs  Patient offers complaints of cough x 3 weeks and episode of feeling dizzy and shaky today  Pt put hands in front of her and fine tremors noted  Pt denies fevers or shortness of breath, vitals stable  Port accessed, flushed, saline locked, labs drawn, port flushed and de-accessed  Band-aid placed on site  Office staff made aware of pts symptoms and reached out to patient  AVS declined, next appointment reviewed

## 2023-01-23 ENCOUNTER — DOCUMENTATION (OUTPATIENT)
Dept: HEMATOLOGY ONCOLOGY | Facility: CLINIC | Age: 58
End: 2023-01-23

## 2023-01-23 ENCOUNTER — APPOINTMENT (OUTPATIENT)
Dept: LAB | Facility: HOSPITAL | Age: 58
End: 2023-01-23

## 2023-01-23 ENCOUNTER — TELEPHONE (OUTPATIENT)
Dept: HEMATOLOGY ONCOLOGY | Facility: CLINIC | Age: 58
End: 2023-01-23

## 2023-01-23 DIAGNOSIS — D70.1 CHEMOTHERAPY INDUCED NEUTROPENIA (HCC): ICD-10-CM

## 2023-01-23 DIAGNOSIS — T45.1X5A CHEMOTHERAPY INDUCED NEUTROPENIA (HCC): Primary | ICD-10-CM

## 2023-01-23 DIAGNOSIS — D70.1 CHEMOTHERAPY INDUCED NEUTROPENIA (HCC): Primary | ICD-10-CM

## 2023-01-23 DIAGNOSIS — J91.0 MALIGNANT PLEURAL EFFUSION: Primary | ICD-10-CM

## 2023-01-23 DIAGNOSIS — C79.51 BONE METASTASES (HCC): ICD-10-CM

## 2023-01-23 DIAGNOSIS — C50.911 PRIMARY MALIGNANT NEOPLASM OF RIGHT BREAST WITH METASTASIS TO OTHER SITE (HCC): ICD-10-CM

## 2023-01-23 DIAGNOSIS — T45.1X5A CHEMOTHERAPY INDUCED NEUTROPENIA (HCC): ICD-10-CM

## 2023-01-23 LAB — AMMONIA PLAS-SCNC: 12 UMOL/L (ref 11–35)

## 2023-01-23 NOTE — TELEPHONE ENCOUNTER
Spoke with patient  She is aware of schedule updates  Cristóbal Garcia, patient has a question about labs she had done today  Please call her to discuss  Thank you!

## 2023-01-23 NOTE — TELEPHONE ENCOUNTER
Please see medication update  Please cancel treatment for this week no rescheduling is needed  Thanks!

## 2023-01-23 NOTE — PROGRESS NOTES
Patient to be scheduled for Brett Bottoms per Dr Edgar Rivera orders on 1/25/23  Infusion staff notified

## 2023-01-23 NOTE — TELEPHONE ENCOUNTER
Message left for patient to call back to go over schedule changes  My TEAMS number was left for call back

## 2023-01-23 NOTE — Clinical Note
Keiry Brower plan for [facility]  Cancel treatment this week  Rest of details as per below   Need to reschedule Cycle 10 accordingly

## 2023-01-23 NOTE — TELEPHONE ENCOUNTER
Per Dr Nely Wilson, " Cancel treatment this week  Need to reschedule Cycle 10 accordingly     Lastly, I would like her to get Neulasta this week for her scheduled chair but not treatment  "    Patient scheduled for 1/24/23 1/25/23    Title: Neulasta added  Date patient scheduled: 1/24/23    Original medication ordered: dexamethasone 10mg, Eribulin 2 4mg injection    New Medication ordered: Neulasta 6mg sub q injection    Office RN notified patient? ? Attempted call to patient on 1/23/23 in regards to neulasta injection instead of treatment on 1/24/23  Patient can receive on 1/25/23 with Zometa treatment  Left vm  Is the patient scheduled within 24 hours? ? If yes, follow up with verbal telephone call  Call out to [place] infusion spoke to [address] RN to route to INF  pool  Infusion  pool routes to INF [facility] [place]  Infusion tech to receive message, confirm scheduled treatment duration matches ordered treatment duration or adjust accordingly, and re-link appointment request orders  Infusion tech to notify pharmacy and finance

## 2023-01-23 NOTE — PROGRESS NOTES
" Cancel treatment this week  Rest of details as per below  Need to reschedule Cycle 10 accordingly     Lastly, I would like her to get Neulasta this week for her scheduled chair but not treatment  "    Patient scheduled for 1/24/23 1/25/23    Title: Neulasta added  Date patient scheduled: 1/24/23    Original medication ordered: dexamethasone 10mg, Eribulin 2 4mg injection    New Medication ordered: Neulasta 6mg sub q injection    Office RN notified patient? ? Attempted call to patient on 1/23/23 in regards to neulasta injection instead of treatment on 1/24/23  Patient can receive on 1/25/23 with Zometa treatment  Left vm  Is the patient scheduled within 24 hours? ? If yes, follow up with verbal telephone call  Call out to [place] infusion spoke to [address] RN to route to INF  pool  Infusion  pool routes to INF [facility]  Infusion tech to receive message, confirm scheduled treatment duration matches ordered treatment duration or adjust accordingly, and re-link appointment request orders  Infusion tech to notify pharmacy and finance

## 2023-01-23 NOTE — PROGRESS NOTES
Patient with recurring low white blood cell count indicating difficulty with bone marrow recovery from treatments  We will remove day 8 from future chemo plans and just give every 21-day chemotherapy  We will allow bone marrow recovery and not treat for 2 weeks  Labs will be needed 2 days prior to the upcoming treatment  We will not make up the remainder of cycle 9      [facility]

## 2023-01-24 ENCOUNTER — HOSPITAL ENCOUNTER (OUTPATIENT)
Dept: INFUSION CENTER | Facility: CLINIC | Age: 58
End: 2023-01-24

## 2023-01-24 DIAGNOSIS — C79.51 BONE METASTASES (HCC): Primary | ICD-10-CM

## 2023-01-24 RX ORDER — SODIUM CHLORIDE 9 MG/ML
20 INJECTION, SOLUTION INTRAVENOUS ONCE
OUTPATIENT
Start: 2023-01-25

## 2023-01-25 ENCOUNTER — HOSPITAL ENCOUNTER (OUTPATIENT)
Dept: INFUSION CENTER | Facility: CLINIC | Age: 58
Discharge: HOME | End: 2023-01-25

## 2023-01-25 DIAGNOSIS — J91.0 MALIGNANT PLEURAL EFFUSION: ICD-10-CM

## 2023-01-25 DIAGNOSIS — D70.1 CHEMOTHERAPY INDUCED NEUTROPENIA (HCC): ICD-10-CM

## 2023-01-25 DIAGNOSIS — C79.51 BONE METASTASES (HCC): Primary | ICD-10-CM

## 2023-01-25 DIAGNOSIS — R68.84 JAW PAIN: Primary | ICD-10-CM

## 2023-01-25 DIAGNOSIS — T45.1X5A CHEMOTHERAPY INDUCED NEUTROPENIA (HCC): ICD-10-CM

## 2023-01-25 DIAGNOSIS — C50.911 PRIMARY MALIGNANT NEOPLASM OF RIGHT BREAST WITH METASTASIS TO OTHER SITE (HCC): ICD-10-CM

## 2023-01-25 RX ADMIN — PEGFILGRASTIM-BMEZ 6 MG: 6 INJECTION SUBCUTANEOUS at 12:38

## 2023-01-25 NOTE — PROGRESS NOTES
Pt here for ziextenzo injection  Offers no complaints  Regarding Zometa, it was placed on hold until she gets a CT scan of her facial bones which as of right now is scheduled for 2/1  Zometa not given today   Tolerated Natalia Oro njection in the LLQ abdomen without incident  AVS declined  Walked out in stable condition

## 2023-01-25 NOTE — PROGRESS NOTES
Received notification from infusion patient presenting with jaw pain possibly from TMJ  Patient was recommended to have STAT CT scan of facial bones and patient zometa to be on hold  Patient refused and would like it to be added to CT scan on 2/1/23       Cental scheduling stated that the new CT scan of facial bones will be added to appointment on 2/1/23

## 2023-01-26 ENCOUNTER — PATIENT OUTREACH (OUTPATIENT)
Dept: INTERNAL MEDICINE CLINIC | Facility: CLINIC | Age: 58
End: 2023-01-26

## 2023-01-26 ENCOUNTER — SOCIAL WORK (OUTPATIENT)
Dept: PALLIATIVE MEDICINE | Facility: CLINIC | Age: 58
End: 2023-01-26

## 2023-01-26 ENCOUNTER — OFFICE VISIT (OUTPATIENT)
Dept: PALLIATIVE MEDICINE | Facility: CLINIC | Age: 58
End: 2023-01-26

## 2023-01-26 VITALS
SYSTOLIC BLOOD PRESSURE: 100 MMHG | WEIGHT: 126.8 LBS | HEART RATE: 101 BPM | TEMPERATURE: 97.6 F | BODY MASS INDEX: 21.1 KG/M2 | OXYGEN SATURATION: 98 % | DIASTOLIC BLOOD PRESSURE: 50 MMHG

## 2023-01-26 DIAGNOSIS — J91.0 MALIGNANT PLEURAL EFFUSION: ICD-10-CM

## 2023-01-26 DIAGNOSIS — K59.00 CONSTIPATION, UNSPECIFIED CONSTIPATION TYPE: ICD-10-CM

## 2023-01-26 DIAGNOSIS — Z51.5 PALLIATIVE CARE PATIENT: ICD-10-CM

## 2023-01-26 DIAGNOSIS — C50.911 PRIMARY MALIGNANT NEOPLASM OF RIGHT BREAST WITH METASTASIS TO OTHER SITE (HCC): Primary | ICD-10-CM

## 2023-01-26 DIAGNOSIS — Z71.89 COUNSELING AND COORDINATION OF CARE: Primary | ICD-10-CM

## 2023-01-26 DIAGNOSIS — C79.51 BONE METASTASES (HCC): ICD-10-CM

## 2023-01-26 DIAGNOSIS — G89.3 CANCER RELATED PAIN: ICD-10-CM

## 2023-01-26 NOTE — PROGRESS NOTES
Palliative Supportive Care  met with patient to continue to provide emotional support and guidance  Updated biopsychosocial information relevant to support: Patient reported that both her son and  are scheduled for potential release in May  Patient reported that her son is doing well in his placement, [employer]  Patient's  scheduled to come home this weekend for a weekend pass  Patient also discussed how son's girlfriend is getting closer to her due date  Identified areas of need include: ongoing support   Resources provided: none  Areas that need future follow-up include: ongoing support     I have spent 20 minutes with Patient  today in which greater than 50% of this time was spent in counseling/coordination of care regarding support       Palliative  will follow-up as requested by patient, family, and primary team   Please contact with any specific requests

## 2023-01-26 NOTE — PROGRESS NOTES
Several calls placed to patient but the call kept disconnecting  CMOC completed all goals with patient indicated in her note on 1/6    I am closing my socially complex episode and CHW episode at this time  Patient has my contact information and can reach out to me if she should need any further assistance

## 2023-01-26 NOTE — PROGRESS NOTES
Outpatient Follow-Up - Palliative and Supportive Care   Priscilla Kim 56 y o  female [id number]    Assessment & Plan  Problem List Items Addressed This Visit        Respiratory    Malignant pleural effusion       Musculoskeletal and Integument    Bone metastases ([address] )       Other    Palliative care patient    Primary malignant neoplasm of right breast with metastasis to other site [facility]) - Primary    Constipation    Cancer related pain     #symptom management  #cancer-related pain   - continue APAP 650 mg PO Q6H PRN              - max daily 4000 mg   - continue hydromorphone 2-4 mg PO Q3H PRN   - continue duloxetine therapy     #anxiety   - continue duloxetine 30 mg PO QDaily     #nausea   - continue metoclopramide 10 mg PO QID PRN   - continue ondansetron 4 mg PO Q8H PRN     #insomnia   - continue melatonin 6 mg PO QHS     #OIC   - continue home bowel regimen   - continued adequate hydration     #abdominal cramping   - continue dicyclomine 10 mg PO Q6H PRN     #mucositis   - continue magic mouthwash    #neutropenic   - ANC 70 on recent lab work 01/20/2023   - no reported fever, T 36 4 C in office today   - no reported urinary sxs   - no reported respiratory sxs   - no meningismus in office   - discussed RTED precautions regarding neutropenic fever    #goals of care   - treatment focused care with no limitations at this time    #psychosocial support   - emotional support provided   Chris Sol [spouse] [phone]   - two children              - Armen [son, Down syndrome]              - Ankur [son]      Next [facility] Follow up in 4 weeks  Controlled Substance Review    [place]MP or [place]  reviewed: No red flags were identified; safe to proceed with prescription  Valdemar Gamino     PDMP Review       Value Time User    PDMP Reviewed  Yes (P)  1/26/2023  8:10 AM Rox Cornelius MD          Medications adjusted this encounter:  Requested Prescriptions      No prescriptions requested or ordered in this encounter     No orders of the defined types were placed in this encounter  There are no discontinued medications  Ladarius Lynn was seen today for symptoms and planning cares related to above illnesses  I have reviewed the patient's controlled substance dispensing history in the Prescription Drug Monitoring Program in compliance with the [facility] regulations before prescribing any controlled substances  They are invited to continue to follow with us  If there are questions or concerns, please contact us through our clinic/answering service 24 hours a day, seven days a week  Sarina Cheng MD  [facility]  [phone]      Visit Information    Accompanied By: No one    Source of History: Self, Medical record    History Limitations: None      History of Present Illness    Ladarius Lynn is a 56 y o  female who presents in follow up of symptoms related to metastatic breast cancer c/b recurrent malignant pleural effusions  Pertinent issues include: symptom management, pain, neoplasm related, assessment of goals of care, advance care planning  Patient reports overall doing well  Adequately managed pain on current regimen, use of 3-4 PO hydromorphone/day  Denies nausea, vomiting  Appetite improved with recent weight gain  Last BM 2 days ago, passing flatus, use of senna regimen  Adequate sleep  [address] on labs 01/20/2023  No reported fevers  Denies dysuria, hematuria, change in urinary frequency  Lingering cough, non-productive, no change  Denies CP, hemoptysis  No neck pain/stiffness  No confusion  No LOC  No recent Abx  Past medical, surgical, social, and family histories are reviewed and pertinent updates are made  Review of Systems   Constitutional: Positive for malaise/fatigue  Negative for chills, decreased appetite, fever and weight loss  HENT: Negative for congestion and sore throat  Eyes: Negative for visual disturbance     Cardiovascular: Negative for chest pain and syncope  Respiratory: Positive for cough  Negative for hemoptysis, shortness of breath and sputum production  Musculoskeletal: Negative for falls and neck pain  Gastrointestinal: Positive for constipation  Negative for abdominal pain, nausea and vomiting  Genitourinary: Negative for dysuria, frequency and hematuria  Neurological: Positive for headaches  Psychiatric/Behavioral: Negative for depression  The patient does not have insomnia and is not nervous/anxious  All other systems reviewed and are negative  Vital Signs    /50 (BP Location: Left arm, Cuff Size: Standard)   Pulse 101   Temp 97 6 °F (36 4 °C) (Temporal)   Wt 57 5 kg (126 lb 12 8 oz)   LMP 05/26/2021   SpO2 98%   BMI 21 10 kg/m²     Physical Exam and Objective Data  Physical Exam  Vitals and nursing note reviewed  Constitutional:       General: She is awake  Appearance: She is not diaphoretic  Comments: Sitting up in NAD  BMI 21 1  Non-toxic appearing   HENT:      Head: Normocephalic and atraumatic  Right Ear: External ear normal       Left Ear: External ear normal       Nose: No rhinorrhea  Eyes:      Comments: No gaze preference   Cardiovascular:      Rate and Rhythm: Normal rate  Pulmonary:      Effort: No tachypnea, accessory muscle usage or respiratory distress  Comments: Completes full sentences without difficulty  Musculoskeletal:      Cervical back: Normal range of motion  Neurological:      General: No focal deficit present  Mental Status: She is alert and oriented to person, place, and time     Psychiatric:         Attention and Perception: Attention normal          Mood and Affect: Mood and affect normal          Speech: Speech normal          Cognition and Memory: Cognition and memory normal            Radiology and Laboratory:  I personally reviewed and interpreted the following results:    Most Recent COVID-19 Results:  Lab Results   Component Value Date/Time    SARSCOV2 Negative 08/20/2022 08:14 PM    SARSCOV2 Negative 06/25/2022 03:22 AM    SARSCOV2 Positive (A) 12/20/2020 12:25 PM       Most Recent Lab Work:  Lab Results   Component Value Date/Time    SODIUM 137 01/09/2023 11:46 AM    K 3 8 01/09/2023 11:46 AM    K 3 7 04/02/2014 01:15 PM    BUN 21 01/09/2023 11:46 AM    BUN 12 04/02/2014 01:15 PM    CREATININE 0 77 01/09/2023 11:46 AM    CREATININE 0 80 04/02/2014 01:15 PM    GLUC 102 01/09/2023 11:46 AM     Lab Results   Component Value Date/Time    AST 33 01/09/2023 11:46 AM    AST 23 04/02/2014 01:15 PM    ALT 30 01/09/2023 11:46 AM    ALT 15 04/02/2014 01:15 PM    ALB 3 2 (L) 01/09/2023 11:46 AM    ALB 3 4 (L) 04/02/2014 01:15 PM     Lab Results   Component Value Date/Time    HGB 12 5 01/20/2023 12:37 PM    WBC 0 66 (LL) 01/20/2023 12:37 PM     01/20/2023 12:37 PM    INR 0 99 11/23/2022 08:25 AM    PTT 29 08/20/2022 08:37 PM       Most Recent Imaging [last 30 days]:  No results found  40 minutes was spent face to face with Latia Schmitz with greater than 50% of the time spent in counseling or coordination of care including discussions of provided medical updates, discussed palliative care, determined goals of care, determined social/family support, discussed plans of care, discussed symptom management, provided psychosocial support  RTED precautions with low threshold for neutropenic fever given ANC 70  PDMP Reviewed  All of the patient's or agent's questions were answered during this discussion

## 2023-01-31 NOTE — PROGRESS NOTES
Hematology/Oncology Progress Note    Date of Service: 2/9/2023    [facility]AT[facility]   [address] [id number]    Hem/Onc Problem List:   1  Metastatic right-sided breast cancer, ER and NC positive, HER2 negative  2  Bone metastatic disease  3  History of right-sided pleural effusion  Chief Complaint:    Routine follow-up for management of stage IV breast cancer    Assessment/Plan:     1  Metastatic breast cancer, with liver, bone, lymph node and pleura involvement   ER and NC positive, HER2 negative  Bone scan showed bony metastatic disease  Patient started monthly Zoladex, daily letrozole and CDK4/6 inhibitor Abemeciclib  Unfortunately, patient developed significant watery diarrhea from Abemaciclib  Abemaciclib was changed to Ibrance 125 mg daily for 3 weeks on,  followed by 1 week off until disease progression on August 30, 2021  Ibrance dose was decreased to 100 mg because of neutropenia  CT scan and bone scan showed disease progression and treatment changed to chemo (Eribulin)  Restaging CT 7/8/2022 shows a very good NC (near 50% reduction in index liver lesions)  NM bone scan showed no new findings  Restaging CT shows POD now  Patient s/p liver ablation 11/23/22  See next steps of treatment plan below  Now with POD  11/23/2022 liver biopsy showed 1+ HER-2 status      2  Bony metastatic disease  Bone scan showed bony metastatic disease on 7/11  Patient  continues Zometa every 3 months  3   History of right breast cancer, status post lumpectomy and axillary lymph node dissection, status post adjuvant radiation therapy  4  Right-sided pleural effusion, status post thoracentesis, cytology positive for adenocarcinoma, ER/NC positive, HER2 negative  Status post PleurX catheter placement  Has not required frequency drainage  Continue to treat underlying disease      · Discussion of decision making    I personally reviewed the following lab results, the image studies, pathology, other specialty/physicians consult notes and recommendations, and outside medical records from [facility]  I had a lengthy discussion with the patient and shared the work-up findings  I spent 48 minutes reviewing the records (labs, clinician notes, outside records, medical history, ordering medicine/tests/procedures, interpreting the imaging/labs previously done) and coordination of care as well as direct time with the patient today, of which greater than 50% of the time was spent in counseling and coordination of care with the patient/family  · Plan/Labs  · Metastatic breast cancer with diffuse bone metastasis  ECOG 1   · We will d/c Eribulin coming up, given q21 days due to POD and suppressed WBC  We discussed initiating Enhertu versus Alpelisib + Fulvestrant and will proceed with Enhertu  · Continue Letrozole 2 5mg once daily  This was never stopped  Continue this current treatment until disease progression  · Restage CT CAP ordered for 3 months from now  · Continue to follow up with palliative care  · Continue Zometa Q12 weeks for bone metastasis  · OncotypeMap test NGS was done 4/18/2022 with results: PIK3CA: [id number] mutation  ESR1: WT, PD-L1 IHC: negative  MSI-stable, TMB low (4 muts/mb)  BRCA 1&2 WT  Possible future treatment option would include Alpelisib + Fulvestrant  However, recent tissue examination off liver showed benefit from Enhertu HER2-low finding  Follow Up: q3 weeks alternating between myself and Sonya for 4 total visits    All questions were answered to the patient's satisfaction during this encounter  The patient knows the contact information for our office and knows to reach out for any relevant concerns related to this encounter   They are to call for any temperature 100 4 or higher, new symptoms including but not restricted to shaking chills, decreased appetite, nausea, vomiting, diarrhea, increased fatigue, shortness of breath or chest pain, confusion, and not feeling the strength to come to the clinic  For all other listed problems and medical diagnosis in their chart - they are managed by PCP and/or other specialists, which the patient acknowledges  Thank you very much for your consultation and making us a part of this patient's care  We are continuing to follow closely with you  Please do not hesitate to reach out to me with any additional questions or concerns  AJCC 8th Edition Cancer Stage :      Cancer Staging  No matching staging information was found for the patient  Hematology/Oncology History:   · History of right-sided breast cancer, initially diagnosed in 2012, status post lumpectomy and axillary lymph node dissection, status post adjuvant radiation therapy    Patient did not receive any adjuvant endocrine therapy or chemotherapy  · July 24, 2021, patient was admitted to hospital because of shortness of breath and cough  · July 24, 2021 CT chest PE protocol showed septal thickening throughout the right lung and bronchial wall thickening due to lymphangitic spread of tumor   Indeterminate lung nodules measuring 5 millimeter in the right upper lobe, right middle lobe and 7 millimeter in the left upper lobe and left lower lobe   Large right pleural effusion   Multiple liver metastatic disease measuring up to 4 centimeter   Lytic metastatic to sternal manubrium  · July 26, 2021, ultrasound abdomen shows multiple hepatic metastatic disease   Status post thoracentesis with 1200 mL of joanne fluid removed   Cytology showed adenoma carcinoma, ER and WA positive  40-50%, HER2 negative  · July 26, 2021 biopsy of the lung liver showed metastatic breast cancer, ER and % positive, HER2 negative   CT abdomen pelvis with contrast showed extensive hepatic metastatic disease  · July 27, 2021 MRI brain showed no evidence of intracranial metastatic disease    · August 6, 2021, mammogram showed no mammographic evidence of malignancy  · August 11, 2021 bone scan showed bony metastatic disease involving left iliac crest medially and adjacent sacrum  · August 12, 2021, Zoladex was given  Letrozole started  · August 16, 2022, patient started Abemaciclib  · August 30, 2021, DC abemaciclib because of diarrhea  Start Ibrance 125 mg daily 3 weeks on 1 week off  · Nov 19, 2021, CT c/a/p showed:   1   Since July 2021, new thromboses within the intrahepatic branches of the portal vein as described above  2   Increased diffuse sclerotic osseous lesions     3   Decreased size of most of the hepatic metastases  4   Indeterminate mottled appearance of the splenic parenchyma   Attention on follow-up is advised  5   Unchanged pulmonary nodules  6   Right pleural effusion has decreased in size since July 24, 2021   The smooth interlobular septal thickening has also decreased, and this change can be attributable to the decreased size of the pleural effusion  7   Mucosal hyperenhancement of the colon   Findings may be treatment-related, and correlation with gastrointestinal symptoms is advised      BONE SCAN:   1   A few areas of increased radiotracer uptake suspicious for osseous metastasis, with findings for minimal progression    · March 15, 2022 bone scan: Stable or improving scattered foci radiotracer uptake suspicious for osseous metastases   No new osseous foci suspicious for metastasis     ·  April 1, 2022 CT scan chest abdomen pelvis:  IMPRESSION:     Findings concerning for worsening hepatic metastases as evidenced by increased size and number of lesions   Please see above discussion      Progressive left portal vein thrombosis      The majority of the pulmonary nodules are stable   There is one subpleural nodule in the right upper lobe apex posteriorly, not seen on the prior study      Stable extensive osseous metastases      Multiple tiny hypodense splenic lesions are also stable, indeterminate      Persistent small right pleural effusion with a right pleural catheter in place      June 25, 2022: 4 day admission for neutropenic fever  July 8, 2022 CT CAP: 1    Stable scattered small pulmonary nodules bilaterally  Small right pleural effusion, slightly decreased  Improving hepatic metastasis  Less conspicuous small splenic hypodense lesions  Stable findings for widespread osseous metastasis  NM Bone Scan without significant changes (A lesion in the left lobe of the liver laterally measures 2 0 x 1 9 cm, previously 5 1 x 3 8 cm- 56 2% reduction   Somewhat stellate lesion in the right lobe of the liver  posteriorly now measures 1 8 x 1 2 cm image 33 series 2, previously 2 6 x 2 6 cm (42 3%)  July 11, 2022, NM bone scan stated no areas of new or worsening scintigraphic activity suggesting osseous disease progression  10/12/2022 CT CAP w/c: New 8 mm lesion within segment IVb of the liver compared to July 8, 2022, suspicious for progression of metastatic disease  However, a few other liver lesions are improved  Improved small right pleural effusion  Stable extensive osseous metastatic metastatic disease  11/23/22: S/P Liver ablation   2/1/2023 CT CAP w/c: Status post right mastectomy without recurrence  Interval increase in size and number of hepatic lesions consistent with worsening metastatic disease  Increased size of a right pleural effusion with pleural thickening concerning for malignant effusion  Stable osseous diastases  History of Present Illiness:   Santos Conklin is a 56 y o  female with the above-noted HemOnc history who is here to follow up regarding breast cancer history  See details of history above  Patient current on Eribulin Q 21 days  Has been off Eribulin since C8D9 for 11/3/22  This is due to new hepatic metastasis requiring ablation  S/p liver ablation 11/23/22  Interval events :  Having HA on and off   She has been smoking again for the past 2 months (5 cigs/day) due to stress  Mild tingling/numbness in her fingers  Intermittent RUQ abd pain  Otherwise, no vision change, CP, SOB, abd pain, N/V/D  No bleeding anywhere  No difficulty eating  No change in appetite  Weight at visit 8/2021: 146lbs  Weight 9/21/22: 131lbs  Weight 12/13/22: 123lbs   Weight 1/4/23: 125lbs  Weight: 2/9/2023: 126 lbs    ROS: A 10-point of review of systems is obtained and other than the above is noncontributory  Objective:   VITALS:   /70 (BP Location: Left arm, Patient Position: Sitting, Cuff Size: Standard)   Pulse 86   Temp 98 7 °F (37 1 °C) (Temporal)   Resp 18   Ht 5' 5" (1 651 m)   Wt 57 2 kg (126 lb)   LMP 05/26/2021   SpO2 97%   BMI 20 97 kg/m²      Weight (last 2 days)     Date/Time Weight    02/09/23 1301 57 2 (126)              Physical EXAM:  General:  Alert, cooperative, no distress, appears stated age  Head:  Normocephalic, without obvious abnormality, atraumatic  Eyes:  Conjunctivae/corneas clear  EOMs intact  No evidence of conjunctivitis     Throat: Lips, mucosa, and tongue normal     Neck: Supple, symmetrical, trachea midline    Lungs:   Respiratory effort easy, nonlabored    Heart:  Regular rate and rhythm, +S1, S2   Abdomen:   Soft, non-tender,nondistended  No masses,      Extremities:  Lymphatics: Extremities normal, atraumatic, no cyanosis or edema  No cervical, axillary or inguinal adenopathy   Skin: Skin color, texture, turgor normal  No rashes  Neurologic: AAO   No focal neuro deficits       Allergies   Allergen Reactions   • Codeine Anaphylaxis   • Morphine GI Intolerance, Itching and Vomiting   • Sulfa Antibiotics Hives and Itching       Past Medical History:   Diagnosis Date   • Cancer ([address] )    • Headache(784 0) 11/2021   • History of radiation exposure    • Malignant neoplasm of right female breast [facility]) 2012    right       Past Surgical History:   Procedure Laterality Date   • BACK SURGERY     • BREAST CYST EXCISION     • BREAST LUMPECTOMY Right    • HIP SURGERY Right     debridement of femur   • IR BIOPSY LIVER MASS  2021   • IR MICROWAVE ABLATION  2022   • IR PLEURAL EFFUSION LONG-TERM CATHETER PLACEMENT  2021   • IR PLEURAL EFFUSION LONG-TERM CATHETER REMOVAL  2022   • IR PORT PLACEMENT  2022   • IR THORACENTESIS  2021       Family History   Problem Relation Age of Onset   • Breast cancer Mother         in her 63's   • Breast cancer Sister         inher 45s and 48   • Colon cancer Maternal Grandmother    • No Known Problems Paternal Grandmother    • Breast cancer Other    • No Known Problems Paternal Aunt        Social History     Socioeconomic History   • Marital status: /Civil Union     Spouse name: Not on file   • Number of children: Not on file   • Years of education: Not on file   • Highest education level: Not on file   Occupational History   • Not on file   Tobacco Use   • Smoking status: Former     Packs/day: 0 25     Years: 15 00     Pack years: 3 75     Types: Cigarettes     Start date: 20     Quit date: 7/10/2021     Years since quittin 5   • Smokeless tobacco: Never   Vaping Use   • Vaping Use: Never used   Substance and Sexual Activity   • Alcohol use: Not Currently   • Drug use: Not Currently   • Sexual activity: Not Currently     Partners: Male     Birth control/protection: Male Sterilization   Other Topics Concern   • Not on file   Social History Narrative   • Not on file     Social Determinants of Health     Financial Resource Strain: Not on file   Food Insecurity: No Food Insecurity   • Worried About Running Out of Food in the Last Year: Never true   • Ran Out of Food in the Last Year: Never true   Transportation Needs: No Transportation Needs   • Lack of Transportation (Medical): No   • Lack of Transportation (Non-Medical):  No   Physical Activity: Not on file   Stress: Not on file   Social Connections: Not on file   Intimate Partner Violence: Not on file   Housing Stability: Low Risk    • Unable to Pay for Housing in the Last Year: No   • Number of Places Lived in the Last Year: 1   • Unstable Housing in the Last Year: No       Current Outpatient Medications   Medication Sig Dispense Refill   • acetaminophen (TYLENOL) 325 mg tablet Take 2 tablets (650 mg total) by mouth every 6 (six) hours as needed for mild pain 100 tablet 0   • al mag oxide-diphenhydramine-lidocaine viscous (MAGIC MOUTHWASH) 1:1:1 suspension Swish and spit 10 mL every 4 (four) hours as needed for mouth pain or discomfort 300 mL 0   • albuterol (PROVENTIL HFA,VENTOLIN HFA) 90 mcg/act inhaler Inhale 2 puffs every 4 (four) hours as needed for wheezing 8 g 0   • calcium carbonate (TUMS) 500 mg chewable tablet Chew 1 tablet (500 mg total) 3 (three) times a day as needed for indigestion or heartburn 30 tablet 0   • CRANIAL PROSTHESIS, RX, Apply to cranial area as needed for comfort   1 each 0   • [facility] Melatonin 3 MG TAKE 2 TABLETS (6 MG TOTAL) BY MOUTH DAILY AT BEDTIME 180 tablet 1   • dextromethorphan-guaiFENesin (ROBITUSSIN DM)  mg/5 mL syrup Take 10 mL by mouth every 4 (four) hours as needed for cough 236 mL 0   • dicyclomine (BENTYL) 10 mg capsule Take 1 capsule (10 mg total) by mouth every 6 (six) hours as needed (abdominal cramping) 40 capsule 0   • diphenhydrAMINE (BENADRYL) 50 MG tablet Take 1 tablet (50 mg total) by mouth daily at bedtime as needed for itching or sleep 30 tablet 0   • diphenoxylate-atropine (LOMOTIL) 2 5-0 025 mg per tablet Take 1 tablet by mouth 4 (four) times a day as needed for diarrhea 30 tablet 0   • DULoxetine (CYMBALTA) 30 mg delayed release capsule Take 1 capsule (30 mg total) by mouth daily 90 capsule 3   • HYDROmorphone (DILAUDID) 2 mg tablet Take 1-2 tablets (2-4 mg total) by mouth every 3 (three) hours as needed (moderate/severe cancer-related pain) Max Daily Amount: 32 mg 120 tablet 0   • Lidocaine Viscous HCl (XYLOCAINE) 2 % mucosal solution Swish and spit 10 mL 4 (four) times a day as needed for mouth pain or discomfort 200 mL 0   • metoclopramide (REGLAN) 10 mg tablet Take 1 tablet (10 mg total) by mouth 4 (four) times a day 28 tablet 0   • multivitamin (THERAGRAN) TABS Take 1 tablet by mouth     • ondansetron (ZOFRAN) 4 mg tablet Take 1 tablet (4 mg total) by mouth every 8 (eight) hours as needed for nausea or vomiting 20 tablet 0   • oxybutynin (DITROPAN-XL) 5 mg 24 hr tablet Take 1 tablet (5 mg total) by mouth daily Take 1 tablet daily 90 tablet 3   • polyethylene glycol (MIRALAX) 17 g packet Take 17 g by mouth daily  0   • senna (SENOKOT) 8 6 MG tablet Take 1 tablet (8 6 mg total) by mouth daily at bedtime as needed for constipation 30 tablet 0   • Xarelto 20 MG tablet TAKE 1 TABLET BY MOUTH EVERY DAY WITH BREAKFAST 30 tablet 11   • docusate sodium (COLACE) 100 mg capsule Take 1 capsule (100 mg total) by mouth 2 (two) times a day for 10 days 20 capsule 0   • lactulose 20 g/30 mL Take 15 mL (10 g total) by mouth daily as needed (constipaton) for up to 10 days 300 mL 0   • letrozole (FEMARA) 2 5 mg tablet Take 1 tablet (2 5 mg total) by mouth daily 90 tablet 2   • potassium chloride (K-DUR,KLOR-CON) 20 mEq tablet Take 1 tablet (20 mEq total) by mouth 2 (two) times a day for 3 days (Patient not taking: Reported on 12/27/2022) 6 tablet 0     No current facility-administered medications for this visit  (Not in a hospital admission)      DATA REVIEW:    Pathology Result:    Final Diagnosis   Date Value Ref Range Status   11/23/2022   Final    A  Liver core (biopsy):  - Metastatic carcinoma    Comment:  - ER, DE, Her2 ordered  - The tumor cells stain for GATA3 with absent CK7, CK20, GCDFP15 staining  The immunoprofile is most consistent with the patient's known breast primary  MOLECULAR TESTING AND CONSULT SLIDES:     Molecular testing:  Block A1 is available for molecular testing  Consult slides:  Slide A1 with associated IHC is available for consultation  Interpretation performed at , [address] [place] [address]        07/26/2021   Final    A  B  Thoracentesis, Right (ThinPrep and cell block preparations):  Positive for malignancy  Metastatic carcinoma, compatible with breast primary  -  Immunohistochemical stains performed with appropriate controls show the tumor cells to be positive for Matthew-EP4, MOC31, BARRY-3 and ER with scattered background mesothelial cells staining for WT1 and calretinin, supporting the diagnosis  Satisfactory for evaluation  07/26/2021   Final    A  Liver (core biopsy):     - Poorly-differentiated carcinoma, most compatible with metastasis from the patient's reported breast primary  Comment:  ER / MS / Her-2 pending  Comment: This is an appended report  These results have been appended to a previously preliminary verified report  Image Results: They are reviewed and documented in Hematology/Oncology history    CT chest abdomen pelvis w contrast  Narrative: CT CHEST, ABDOMEN AND PELVIS WITH IV CONTRAST    INDICATION:   C50 911: Malignant neoplasm of unspecified site of right female breast     COMPARISON:  10/12/2022  TECHNIQUE: CT examination of the chest, abdomen and pelvis was performed  In addition to portal venous phase postcontrast scanning through the abdomen and pelvis, delayed phase postcontrast scanning was performed through the upper abdominal viscera  Axial, sagittal, and coronal 2D reformatted images were created from the source data and submitted for interpretation  Radiation dose length product (DLP) for this visit:  904 mGy-cm   This examination, like all CT scans performed in the [facility], was performed utilizing techniques to minimize radiation dose exposure, including the use of iterative   reconstruction and automated exposure control      IV Contrast:  100 mL of iohexol (OMNIPAQUE)  Enteric Contrast: Enteric contrast was administered  FINDINGS:    CHEST    LUNGS:  Lungs are clear  There is no tracheal or endobronchial lesion  PLEURA:  Moderate right pleural effusion with pleural thickening noted worrisome for metastatic pleural disease  HEART/GREAT VESSELS: Heart is unremarkable for patient's age  No thoracic aortic aneurysm  MEDIASTINUM AND KIA:  Unremarkable  CHEST WALL AND LOWER NECK:  Status post right mastectomy  ABDOMEN    LIVER/BILIARY TREE:  Increase in number and size of hepatic metastatic foci  Right hepatic lobe index lesion measuring 2 4 x 1 9 cm on 2/109 previously measuring 1 1 x 1 0 cm  Additional right hepatic lobe lesions on series 2 image 140 with the larger   lesion measuring 3 9 x 2 9 cm, previously faintly appreciated  Additional more dorsal lesion on the same image measuring 2 3 x 2 3 cm also only faintly appreciated on prior exam     GALLBLADDER:  No calcified gallstones  No pericholecystic inflammatory change  SPLEEN:  Unremarkable  PANCREAS:  Unremarkable  ADRENAL GLANDS:  Unremarkable  KIDNEYS/URETERS:  No hydronephrosis or urinary tract calculus  One or more sharply circumscribed subcentimeter renal hypodensities are present, too small to accurately characterize, and statistically most likely benign findings  According to recent   literature (Radiology 2019) no further workup of these findings is recommended  STOMACH AND BOWEL:  Unremarkable  APPENDIX:  No findings to suggest appendicitis  ABDOMINOPELVIC CAVITY:  No ascites  No pneumoperitoneum  No lymphadenopathy  VESSELS:  Unremarkable for patient's age  PELVIS    REPRODUCTIVE ORGANS:  Unremarkable for patient's age  URINARY BLADDER:  Unremarkable  ABDOMINAL WALL/INGUINAL REGIONS:  Unremarkable  OSSEOUS STRUCTURES:  Widespread osseous metastatic disease noted similar to prior exam   Impression: 1  Status post right mastectomy without recurrence    2   Interval increase in size and number of hepatic lesions consistent with worsening metastatic disease  Increased size of a right pleural effusion with pleural thickening concerning for malignant effusion  3   Stable osseous diastases  Workstation performed: [id number]        LABS:  Lab data are reviewed and documented in HemOnc history  No results found for this or any previous visit (from the past 48 hour(s))            Rebecca Perez  2/9/2023, 1:07 PM

## 2023-02-01 ENCOUNTER — TELEPHONE (OUTPATIENT)
Dept: PALLIATIVE MEDICINE | Facility: CLINIC | Age: 58
End: 2023-02-01

## 2023-02-01 ENCOUNTER — HOSPITAL ENCOUNTER (OUTPATIENT)
Dept: CT IMAGING | Facility: HOSPITAL | Age: 58
Discharge: HOME | End: 2023-02-01
Attending: INTERNAL MEDICINE

## 2023-02-01 DIAGNOSIS — R68.84 JAW PAIN: ICD-10-CM

## 2023-02-01 DIAGNOSIS — C50.911 PRIMARY MALIGNANT NEOPLASM OF RIGHT BREAST WITH METASTASIS TO OTHER SITE (HCC): ICD-10-CM

## 2023-02-01 RX ADMIN — IOHEXOL 100 ML: 350 INJECTION, SOLUTION INTRAVENOUS at 08:51

## 2023-02-01 NOTE — TELEPHONE ENCOUNTER
[facility] LSW received message to reach out to patient  LSW attempted call to patient, message left

## 2023-02-09 ENCOUNTER — OFFICE VISIT (OUTPATIENT)
Dept: HEMATOLOGY ONCOLOGY | Facility: CLINIC | Age: 58
End: 2023-02-09

## 2023-02-09 ENCOUNTER — TELEPHONE (OUTPATIENT)
Dept: SURGICAL ONCOLOGY | Facility: CLINIC | Age: 58
End: 2023-02-09

## 2023-02-09 VITALS
HEIGHT: 65 IN | HEART RATE: 86 BPM | RESPIRATION RATE: 18 BRPM | SYSTOLIC BLOOD PRESSURE: 110 MMHG | TEMPERATURE: 98.7 F | OXYGEN SATURATION: 97 % | DIASTOLIC BLOOD PRESSURE: 70 MMHG | WEIGHT: 126 LBS | BODY MASS INDEX: 20.99 KG/M2

## 2023-02-09 DIAGNOSIS — C50.911 PRIMARY MALIGNANT NEOPLASM OF RIGHT BREAST WITH METASTASIS TO OTHER SITE (HCC): ICD-10-CM

## 2023-02-09 DIAGNOSIS — C79.51 BONE METASTASES (HCC): ICD-10-CM

## 2023-02-09 DIAGNOSIS — Z17.0 MALIGNANT NEOPLASM OF LEFT BREAST IN FEMALE, ESTROGEN RECEPTOR POSITIVE, UNSPECIFIED SITE OF BREAST (HCC): Primary | ICD-10-CM

## 2023-02-09 DIAGNOSIS — J91.0 MALIGNANT PLEURAL EFFUSION: ICD-10-CM

## 2023-02-09 DIAGNOSIS — C50.912 MALIGNANT NEOPLASM OF LEFT BREAST IN FEMALE, ESTROGEN RECEPTOR POSITIVE, UNSPECIFIED SITE OF BREAST (HCC): Primary | ICD-10-CM

## 2023-02-09 RX ORDER — DEXTROSE MONOHYDRATE 50 MG/ML
20 INJECTION, SOLUTION INTRAVENOUS ONCE
OUTPATIENT
Start: 2023-02-22

## 2023-02-09 NOTE — TELEPHONE ENCOUNTER
While we try to accommodate patient requests, our priority is to schedule treatment according to Doctor's orders and site availability  1  Does the Provider use the intake sheet or checkout note? Yes  2  What would be a preferred day of the week that would work best for your infusion appointment? Tues or Wed  3  Do you prefer mornings or afternoons for your appointments? Late morning or early afternoon  4  Are there any days or dates that do not work for your schedule, including any upcoming vacations? Daughter being induced on 2/27, but date may change  5  We are going to try our best to schedule you at the infusion center closest to your home  In the event that we are unable to what would be your next preferred infusion site or sites? 1  [place]    6  Do you have transportation to take you to all of your appointments? Yes  7   Would you like the infusion center to draw labs from your port? (disregard if patient doesn't have a port or need labs for infusion appointment) yes

## 2023-02-09 NOTE — PROGRESS NOTES
Reviewed oncolink printout for Enhertu and possible side effects  Reviewed infusion and lab recommendations  Reviewed office handouts with TEAMS number and [facility] number  No further questions or concerns  Consent obtained on 2/9/23  Copy given to patient and uploaded into patient chart on 2/9/23

## 2023-02-09 NOTE — TELEPHONE ENCOUNTER
Patient has a new treatment plan, please adjust schedule and use dot phrase for scheduling preferences  Thanks!

## 2023-02-10 ENCOUNTER — TELEPHONE (OUTPATIENT)
Dept: HEMATOLOGY ONCOLOGY | Facility: CLINIC | Age: 58
End: 2023-02-10

## 2023-02-10 NOTE — TELEPHONE ENCOUNTER
Patient confirmed all infusions, please call patient to explain treatment plan  Patient mention low white blood count       Thanks

## 2023-02-10 NOTE — TELEPHONE ENCOUNTER
Left message on answering machine with time and date of next infusion, patient aware schedule can be seen on mychart  My number was given to return my call and confirm appts

## 2023-02-10 NOTE — TELEPHONE ENCOUNTER
Returned call out to patient in regards to questions and concerns  Patient is asking for the results of the CT of facial scan and if patient is okay with getting zometa infusion  Also patient spouse asked if patient would be considered for a liver transplant?

## 2023-02-13 NOTE — TELEPHONE ENCOUNTER
Message left for patient that Zometa has been added to her previously scheduled appt on 2/15  My TEAMS number was left for call back should patient have any questions

## 2023-02-14 ENCOUNTER — HOSPITAL ENCOUNTER (OUTPATIENT)
Dept: INFUSION CENTER | Facility: CLINIC | Age: 58
Discharge: HOME | End: 2023-02-14

## 2023-02-14 DIAGNOSIS — Z17.0 MALIGNANT NEOPLASM OF BREAST IN FEMALE, ESTROGEN RECEPTOR POSITIVE, UNSPECIFIED LATERALITY, UNSPECIFIED SITE OF BREAST (HCC): ICD-10-CM

## 2023-02-14 DIAGNOSIS — Z95.828 PORT-A-CATH IN PLACE: ICD-10-CM

## 2023-02-14 DIAGNOSIS — C50.912 MALIGNANT NEOPLASM OF LEFT BREAST IN FEMALE, ESTROGEN RECEPTOR POSITIVE, UNSPECIFIED SITE OF BREAST (HCC): Primary | ICD-10-CM

## 2023-02-14 DIAGNOSIS — C50.919 MALIGNANT NEOPLASM OF BREAST IN FEMALE, ESTROGEN RECEPTOR POSITIVE, UNSPECIFIED LATERALITY, UNSPECIFIED SITE OF BREAST (HCC): ICD-10-CM

## 2023-02-14 DIAGNOSIS — Z17.0 MALIGNANT NEOPLASM OF LEFT BREAST IN FEMALE, ESTROGEN RECEPTOR POSITIVE, UNSPECIFIED SITE OF BREAST (HCC): Primary | ICD-10-CM

## 2023-02-14 DIAGNOSIS — C78.7 MALIGNANT NEOPLASM METASTATIC TO LIVER (HCC): ICD-10-CM

## 2023-02-14 LAB
ALBUMIN SERPL BCP-MCNC: 3.2 G/DL (ref 3.5–5)
ALP SERPL-CCNC: 122 U/L (ref 46–116)
ALT SERPL W P-5'-P-CCNC: 42 U/L (ref 12–78)
ANION GAP SERPL CALCULATED.3IONS-SCNC: 11 MMOL/L (ref 4–13)
AST SERPL W P-5'-P-CCNC: 58 U/L (ref 5–45)
BASOPHILS # BLD AUTO: 0.03 THOUSANDS/ÂΜL (ref 0–0.1)
BASOPHILS NFR BLD AUTO: 1 % (ref 0–1)
BILIRUB SERPL-MCNC: 0.26 MG/DL (ref 0.2–1)
BUN SERPL-MCNC: 17 MG/DL (ref 5–25)
CALCIUM ALBUM COR SERPL-MCNC: 9.1 MG/DL (ref 8.3–10.1)
CALCIUM SERPL-MCNC: 8.5 MG/DL (ref 8.3–10.1)
CHLORIDE SERPL-SCNC: 106 MMOL/L (ref 96–108)
CO2 SERPL-SCNC: 24 MMOL/L (ref 21–32)
CREAT SERPL-MCNC: 0.75 MG/DL (ref 0.6–1.3)
EOSINOPHIL # BLD AUTO: 0.08 THOUSAND/ÂΜL (ref 0–0.61)
EOSINOPHIL NFR BLD AUTO: 2 % (ref 0–6)
ERYTHROCYTE [DISTWIDTH] IN BLOOD BY AUTOMATED COUNT: 15.5 % (ref 11.6–15.1)
GFR SERPL CREATININE-BSD FRML MDRD: 90 ML/MIN/1.73SQ M
GLUCOSE SERPL-MCNC: 109 MG/DL (ref 65–140)
HCT VFR BLD AUTO: 36.3 % (ref 34.8–46.1)
HGB BLD-MCNC: 12 G/DL (ref 11.5–15.4)
IMM GRANULOCYTES # BLD AUTO: 0.01 THOUSAND/UL (ref 0–0.2)
IMM GRANULOCYTES NFR BLD AUTO: 0 % (ref 0–2)
LYMPHOCYTES # BLD AUTO: 0.44 THOUSANDS/ÂΜL (ref 0.6–4.47)
LYMPHOCYTES NFR BLD AUTO: 10 % (ref 14–44)
MCH RBC QN AUTO: 32.8 PG (ref 26.8–34.3)
MCHC RBC AUTO-ENTMCNC: 33.1 G/DL (ref 31.4–37.4)
MCV RBC AUTO: 99 FL (ref 82–98)
MONOCYTES # BLD AUTO: 0.43 THOUSAND/ÂΜL (ref 0.17–1.22)
MONOCYTES NFR BLD AUTO: 10 % (ref 4–12)
NEUTROPHILS # BLD AUTO: 3.42 THOUSANDS/ÂΜL (ref 1.85–7.62)
NEUTS SEG NFR BLD AUTO: 77 % (ref 43–75)
NRBC BLD AUTO-RTO: 0 /100 WBCS
PLATELET # BLD AUTO: 235 THOUSANDS/UL (ref 149–390)
PMV BLD AUTO: 11.4 FL (ref 8.9–12.7)
POTASSIUM SERPL-SCNC: 4 MMOL/L (ref 3.5–5.3)
PROT SERPL-MCNC: 6.6 G/DL (ref 6.4–8.4)
RBC # BLD AUTO: 3.66 MILLION/UL (ref 3.81–5.12)
SODIUM SERPL-SCNC: 141 MMOL/L (ref 135–147)
WBC # BLD AUTO: 4.41 THOUSAND/UL (ref 4.31–10.16)

## 2023-02-14 NOTE — PROGRESS NOTES
Pt presents for port a cath flush  Pt offers no complaints  Port accessed, flushed, labs drawn, saline locked and de-accessed without difficulty  AVS declined, Next appointment reviewed  Pt walked out of unit safely

## 2023-02-15 ENCOUNTER — HOSPITAL ENCOUNTER (OUTPATIENT)
Dept: INFUSION CENTER | Facility: CLINIC | Age: 58
Discharge: HOME | End: 2023-02-15

## 2023-02-15 ENCOUNTER — TELEPHONE (OUTPATIENT)
Dept: HEMATOLOGY ONCOLOGY | Facility: CLINIC | Age: 58
End: 2023-02-15

## 2023-02-15 VITALS
DIASTOLIC BLOOD PRESSURE: 70 MMHG | HEART RATE: 81 BPM | SYSTOLIC BLOOD PRESSURE: 107 MMHG | RESPIRATION RATE: 16 BRPM | TEMPERATURE: 97.6 F

## 2023-02-15 DIAGNOSIS — Z17.0 MALIGNANT NEOPLASM OF LEFT BREAST IN FEMALE, ESTROGEN RECEPTOR POSITIVE, UNSPECIFIED SITE OF BREAST (HCC): ICD-10-CM

## 2023-02-15 DIAGNOSIS — C78.7 MALIGNANT NEOPLASM METASTATIC TO LIVER (HCC): ICD-10-CM

## 2023-02-15 DIAGNOSIS — C50.919 MALIGNANT NEOPLASM OF BREAST IN FEMALE, ESTROGEN RECEPTOR POSITIVE, UNSPECIFIED LATERALITY, UNSPECIFIED SITE OF BREAST (HCC): ICD-10-CM

## 2023-02-15 DIAGNOSIS — C79.51 BONE METASTASES (HCC): Primary | ICD-10-CM

## 2023-02-15 DIAGNOSIS — C50.911 PRIMARY MALIGNANT NEOPLASM OF RIGHT BREAST WITH METASTASIS TO OTHER SITE (HCC): ICD-10-CM

## 2023-02-15 DIAGNOSIS — Z17.0 MALIGNANT NEOPLASM OF BREAST IN FEMALE, ESTROGEN RECEPTOR POSITIVE, UNSPECIFIED LATERALITY, UNSPECIFIED SITE OF BREAST (HCC): ICD-10-CM

## 2023-02-15 DIAGNOSIS — Z95.828 PORT-A-CATH IN PLACE: ICD-10-CM

## 2023-02-15 DIAGNOSIS — C50.912 MALIGNANT NEOPLASM OF LEFT BREAST IN FEMALE, ESTROGEN RECEPTOR POSITIVE, UNSPECIFIED SITE OF BREAST (HCC): ICD-10-CM

## 2023-02-15 RX ORDER — SODIUM CHLORIDE 9 MG/ML
20 INJECTION, SOLUTION INTRAVENOUS ONCE
OUTPATIENT
Start: 2023-05-10

## 2023-02-15 RX ORDER — SODIUM CHLORIDE 9 MG/ML
20 INJECTION, SOLUTION INTRAVENOUS ONCE
Status: COMPLETED | OUTPATIENT
Start: 2023-02-15 | End: 2023-02-15

## 2023-02-15 RX ORDER — SODIUM CHLORIDE 9 MG/ML
20 INJECTION, SOLUTION INTRAVENOUS ONCE
Status: CANCELLED | OUTPATIENT
Start: 2023-02-15

## 2023-02-15 RX ADMIN — SODIUM CHLORIDE 20 ML/HR: 9 INJECTION, SOLUTION INTRAVENOUS at 15:15

## 2023-02-15 RX ADMIN — ZOLEDRONIC ACID 4 MG: 0.04 INJECTION, SOLUTION INTRAVENOUS at 15:08

## 2023-02-15 NOTE — PROGRESS NOTES
Reviewed patient does not have an echo prior to Enhertu  Reviewed with Milla Palencia PA-C patient needs to have echo prior to treatment  Patient to receive zometa only on 2/15/23  Attempted calls x3 phone disconnected every time  My chart message to patient  Call out to [facility] infusion, spoke to [facility]

## 2023-02-15 NOTE — PROGRESS NOTES
Left vm on patient phone in regards to below  Also reviewed STAT echo scheduled on 2/17/23 at 1130am at [facility] location  Left RN teams number to further discuss

## 2023-02-15 NOTE — PROGRESS NOTES
Pt here for zometa  Port accessed with positive blood return noted  Pt tolerated treatment without incident  Port de accessed and flushed  AVS declined  Pt walked out of unit safely

## 2023-02-16 ENCOUNTER — HOSPITAL ENCOUNTER (OUTPATIENT)
Dept: NON INVASIVE DIAGNOSTICS | Facility: CLINIC | Age: 58
Discharge: HOME | End: 2023-02-16

## 2023-02-16 VITALS
SYSTOLIC BLOOD PRESSURE: 107 MMHG | BODY MASS INDEX: 20.99 KG/M2 | WEIGHT: 126 LBS | HEART RATE: 71 BPM | DIASTOLIC BLOOD PRESSURE: 70 MMHG | HEIGHT: 65 IN

## 2023-02-16 DIAGNOSIS — C50.919 MALIGNANT NEOPLASM OF BREAST IN FEMALE, ESTROGEN RECEPTOR POSITIVE, UNSPECIFIED LATERALITY, UNSPECIFIED SITE OF BREAST (HCC): ICD-10-CM

## 2023-02-16 DIAGNOSIS — C79.51 BONE METASTASES (HCC): ICD-10-CM

## 2023-02-16 DIAGNOSIS — Z17.0 MALIGNANT NEOPLASM OF BREAST IN FEMALE, ESTROGEN RECEPTOR POSITIVE, UNSPECIFIED LATERALITY, UNSPECIFIED SITE OF BREAST (HCC): ICD-10-CM

## 2023-02-16 LAB
AORTIC ROOT: 2.6 CM
APICAL FOUR CHAMBER EJECTION FRACTION: 42 %
ASCENDING AORTA: 2.5 CM
E WAVE DECELERATION TIME: 249 MS
FRACTIONAL SHORTENING: 30 % (ref 28–44)
INTERVENTRICULAR SEPTUM IN DIASTOLE (PARASTERNAL SHORT AXIS VIEW): 0.7 CM
INTERVENTRICULAR SEPTUM: 0.7 CM (ref 0.6–1.1)
LAAS-AP2: 12.3 CM2
LAAS-AP4: 13 CM2
LEFT ATRIUM AREA SYSTOLE SINGLE PLANE A4C: 13.2 CM2
LEFT ATRIUM SIZE: 2.3 CM
LEFT INTERNAL DIMENSION IN SYSTOLE: 2.8 CM (ref 2.1–4)
LEFT VENTRICLE DIASTOLIC VOLUME (MOD BIPLANE): 41 ML
LEFT VENTRICLE SYSTOLIC VOLUME (MOD BIPLANE): 22 ML
LEFT VENTRICULAR INTERNAL DIMENSION IN DIASTOLE: 4 CM (ref 3.5–6)
LEFT VENTRICULAR POSTERIOR WALL IN END DIASTOLE: 0.7 CM
LEFT VENTRICULAR STROKE VOLUME: 38 ML
LV EF: 47 %
LVSV (TEICH): 38 ML
MV E'TISSUE VEL-SEP: 12 CM/S
MV PEAK A VEL: 0.67 M/S
MV PEAK E VEL: 78 CM/S
MV STENOSIS PRESSURE HALF TIME: 72 MS
MV VALVE AREA P 1/2 METHOD: 3.06 CM2
RIGHT ATRIUM AREA SYSTOLE A4C: 6.3 CM2
RIGHT VENTRICLE ID DIMENSION: 2 CM
SL CV LEFT ATRIUM LENGTH A2C: 4.6 CM
SL CV PED ECHO LEFT VENTRICLE DIASTOLIC VOLUME (MOD BIPLANE) 2D: 69 ML
SL CV PED ECHO LEFT VENTRICLE SYSTOLIC VOLUME (MOD BIPLANE) 2D: 31 ML
TRICUSPID ANNULAR PLANE SYSTOLIC EXCURSION: 1.7 CM

## 2023-02-21 ENCOUNTER — HOSPITAL ENCOUNTER (OUTPATIENT)
Dept: INFUSION CENTER | Facility: CLINIC | Age: 58
Discharge: HOME | End: 2023-02-21

## 2023-02-21 DIAGNOSIS — J91.0 MALIGNANT PLEURAL EFFUSION: ICD-10-CM

## 2023-02-21 DIAGNOSIS — Z17.0 MALIGNANT NEOPLASM OF BREAST IN FEMALE, ESTROGEN RECEPTOR POSITIVE, UNSPECIFIED LATERALITY, UNSPECIFIED SITE OF BREAST (HCC): ICD-10-CM

## 2023-02-21 DIAGNOSIS — C50.919 MALIGNANT NEOPLASM OF BREAST IN FEMALE, ESTROGEN RECEPTOR POSITIVE, UNSPECIFIED LATERALITY, UNSPECIFIED SITE OF BREAST (HCC): ICD-10-CM

## 2023-02-21 DIAGNOSIS — C78.7 MALIGNANT NEOPLASM METASTATIC TO LIVER (HCC): ICD-10-CM

## 2023-02-21 DIAGNOSIS — C79.51 BONE METASTASES (HCC): Primary | ICD-10-CM

## 2023-02-21 DIAGNOSIS — C50.911 PRIMARY MALIGNANT NEOPLASM OF RIGHT BREAST WITH METASTASIS TO OTHER SITE (HCC): ICD-10-CM

## 2023-02-21 DIAGNOSIS — Z95.828 PORT-A-CATH IN PLACE: ICD-10-CM

## 2023-02-21 LAB
ALBUMIN SERPL BCP-MCNC: 3.7 G/DL (ref 3.5–5)
ALP SERPL-CCNC: 121 U/L (ref 34–104)
ALT SERPL W P-5'-P-CCNC: 44 U/L (ref 7–52)
ANION GAP SERPL CALCULATED.3IONS-SCNC: 6 MMOL/L (ref 4–13)
AST SERPL W P-5'-P-CCNC: 67 U/L (ref 13–39)
BASOPHILS # BLD AUTO: 0.03 THOUSANDS/ÂΜL (ref 0–0.1)
BASOPHILS NFR BLD AUTO: 1 % (ref 0–1)
BILIRUB SERPL-MCNC: 0.35 MG/DL (ref 0.2–1)
BUN SERPL-MCNC: 16 MG/DL (ref 5–25)
CALCIUM SERPL-MCNC: 8.8 MG/DL (ref 8.4–10.2)
CHLORIDE SERPL-SCNC: 107 MMOL/L (ref 96–108)
CO2 SERPL-SCNC: 24 MMOL/L (ref 21–32)
CREAT SERPL-MCNC: 0.73 MG/DL (ref 0.6–1.3)
EOSINOPHIL # BLD AUTO: 0.09 THOUSAND/ÂΜL (ref 0–0.61)
EOSINOPHIL NFR BLD AUTO: 2 % (ref 0–6)
ERYTHROCYTE [DISTWIDTH] IN BLOOD BY AUTOMATED COUNT: 15.9 % (ref 11.6–15.1)
GFR SERPL CREATININE-BSD FRML MDRD: 93 ML/MIN/1.73SQ M
GLUCOSE SERPL-MCNC: 140 MG/DL (ref 65–140)
HCT VFR BLD AUTO: 36.5 % (ref 34.8–46.1)
HGB BLD-MCNC: 12 G/DL (ref 11.5–15.4)
IMM GRANULOCYTES # BLD AUTO: 0.01 THOUSAND/UL (ref 0–0.2)
IMM GRANULOCYTES NFR BLD AUTO: 0 % (ref 0–2)
LYMPHOCYTES # BLD AUTO: 0.35 THOUSANDS/ÂΜL (ref 0.6–4.47)
LYMPHOCYTES NFR BLD AUTO: 7 % (ref 14–44)
MCH RBC QN AUTO: 32.8 PG (ref 26.8–34.3)
MCHC RBC AUTO-ENTMCNC: 32.9 G/DL (ref 31.4–37.4)
MCV RBC AUTO: 100 FL (ref 82–98)
MONOCYTES # BLD AUTO: 0.33 THOUSAND/ÂΜL (ref 0.17–1.22)
MONOCYTES NFR BLD AUTO: 6 % (ref 4–12)
NEUTROPHILS # BLD AUTO: 4.34 THOUSANDS/ÂΜL (ref 1.85–7.62)
NEUTS SEG NFR BLD AUTO: 84 % (ref 43–75)
NRBC BLD AUTO-RTO: 0 /100 WBCS
PLATELET # BLD AUTO: 224 THOUSANDS/UL (ref 149–390)
PMV BLD AUTO: 11.6 FL (ref 8.9–12.7)
POTASSIUM SERPL-SCNC: 3.9 MMOL/L (ref 3.5–5.3)
PROT SERPL-MCNC: 6.6 G/DL (ref 6.4–8.4)
RBC # BLD AUTO: 3.66 MILLION/UL (ref 3.81–5.12)
SODIUM SERPL-SCNC: 137 MMOL/L (ref 135–147)
WBC # BLD AUTO: 5.15 THOUSAND/UL (ref 4.31–10.16)

## 2023-02-22 ENCOUNTER — HOSPITAL ENCOUNTER (OUTPATIENT)
Dept: INFUSION CENTER | Facility: CLINIC | Age: 58
Discharge: HOME | End: 2023-02-22

## 2023-02-22 VITALS
TEMPERATURE: 98 F | SYSTOLIC BLOOD PRESSURE: 102 MMHG | HEIGHT: 65 IN | WEIGHT: 128.2 LBS | RESPIRATION RATE: 16 BRPM | DIASTOLIC BLOOD PRESSURE: 69 MMHG | HEART RATE: 85 BPM | BODY MASS INDEX: 21.36 KG/M2

## 2023-02-22 DIAGNOSIS — C50.911 PRIMARY MALIGNANT NEOPLASM OF RIGHT BREAST WITH METASTASIS TO OTHER SITE (HCC): ICD-10-CM

## 2023-02-22 DIAGNOSIS — J91.0 MALIGNANT PLEURAL EFFUSION: ICD-10-CM

## 2023-02-22 DIAGNOSIS — C79.51 BONE METASTASES (HCC): Primary | ICD-10-CM

## 2023-02-22 RX ORDER — DEXTROSE MONOHYDRATE 50 MG/ML
20 INJECTION, SOLUTION INTRAVENOUS ONCE
Status: COMPLETED | OUTPATIENT
Start: 2023-02-22 | End: 2023-02-22

## 2023-02-22 RX ADMIN — DEXAMETHASONE SODIUM PHOSPHATE: 10 INJECTION, SOLUTION INTRAMUSCULAR; INTRAVENOUS at 14:39

## 2023-02-22 RX ADMIN — DEXTROSE 20 ML/HR: 5 SOLUTION INTRAVENOUS at 14:40

## 2023-02-22 RX ADMIN — FAM-TRASTUZUMAB DERUXTECAN-NXKI 300 MG: 100 INJECTION, POWDER, LYOPHILIZED, FOR SOLUTION INTRAVENOUS at 15:24

## 2023-02-22 NOTE — PROGRESS NOTES
Pt here for chemotherapy, offering no complaints  Right port accessed with positive blood return noted throughout treatment  Tolerated infusion without incident  Port flushed and de-accessed  AVS declined    Walked out in stable condition

## 2023-03-07 ENCOUNTER — OFFICE VISIT (OUTPATIENT)
Dept: PALLIATIVE MEDICINE | Facility: CLINIC | Age: 58
End: 2023-03-07

## 2023-03-07 ENCOUNTER — SOCIAL WORK (OUTPATIENT)
Dept: PALLIATIVE MEDICINE | Facility: CLINIC | Age: 58
End: 2023-03-07

## 2023-03-07 VITALS
OXYGEN SATURATION: 97 % | DIASTOLIC BLOOD PRESSURE: 72 MMHG | SYSTOLIC BLOOD PRESSURE: 110 MMHG | HEART RATE: 91 BPM | HEIGHT: 65 IN | BODY MASS INDEX: 21.79 KG/M2 | WEIGHT: 130.8 LBS | TEMPERATURE: 97.3 F

## 2023-03-07 DIAGNOSIS — Z51.5 PALLIATIVE CARE PATIENT: ICD-10-CM

## 2023-03-07 DIAGNOSIS — Z71.89 COUNSELING AND COORDINATION OF CARE: Primary | ICD-10-CM

## 2023-03-07 DIAGNOSIS — K59.00 CONSTIPATION, UNSPECIFIED CONSTIPATION TYPE: ICD-10-CM

## 2023-03-07 DIAGNOSIS — R11.0 NAUSEA: ICD-10-CM

## 2023-03-07 DIAGNOSIS — G89.3 CANCER RELATED PAIN: ICD-10-CM

## 2023-03-07 DIAGNOSIS — C79.51 BONE METASTASES (HCC): ICD-10-CM

## 2023-03-07 DIAGNOSIS — C50.911 PRIMARY MALIGNANT NEOPLASM OF RIGHT BREAST WITH METASTASIS TO OTHER SITE (HCC): Primary | ICD-10-CM

## 2023-03-07 RX ORDER — ONDANSETRON 4 MG/1
4 TABLET, FILM COATED ORAL EVERY 6 HOURS PRN
Qty: 50 TABLET | Refills: 0 | Status: SHIPPED | OUTPATIENT
Start: 2023-03-07

## 2023-03-07 RX ORDER — HYDROMORPHONE HYDROCHLORIDE 2 MG/1
2-4 TABLET ORAL
Qty: 120 TABLET | Refills: 0 | Status: SHIPPED | OUTPATIENT
Start: 2023-03-07

## 2023-03-07 NOTE — PROGRESS NOTES
Hematology/Oncology Progress Note    Date of Service: 3/8/2023    [facility]   [address] [address]    Hem/Onc Problem List:   1  Metastatic right-sided breast cancer, ER and MA positive, HER2 negative  2  Bone metastatic disease  3  History of right-sided pleural effusion  Chief Complaint:    Routine follow-up for management of stage IV breast cancer    Assessment/Plan:     1  Metastatic breast cancer, with liver, bone, lymph node and pleura involvement   ER and MA positive, HER2 negative  Bone scan showed bony metastatic disease  Patient started monthly Zoladex, daily letrozole and CDK4/6 inhibitor Abemeciclib  Unfortunately, patient developed significant watery diarrhea from Abemaciclib  Abemaciclib was changed to Ibrance 125 mg daily for 3 weeks on,  followed by 1 week off until disease progression on August 30, 2021  Ibrance dose was decreased to 100 mg because of neutropenia  CT scan and bone scan showed disease progression and treatment changed to chemo (Eribulin)  Restaging CT 7/8/2022 shows a very good MA (near 50% reduction in index liver lesions)  NM bone scan showed no new findings  Restaging CT shows POD now  Patient s/p liver ablation 11/23/22  Further POD on CT 2/2023 11/23/2022 liver biopsy showed 1+ HER-2 status  Patient was switched to Enhertu 5 4mg/kg Q 21 days (C1D1 was 2/22/23) + Letrozole 2 5mg once daily  2  Bony metastatic disease  Bone scan showed bony metastatic disease on 7/11  Patient  continues Zometa every 3 months  3   History of right breast cancer, status post lumpectomy and axillary lymph node dissection, status post adjuvant radiation therapy  4  Right-sided pleural effusion, status post thoracentesis, cytology positive for adenocarcinoma, ER/MA positive, HER2 negative  Status post PleurX catheter placement  Has not required frequency drainage   Continue to treat underlying disease  · Discussion of decision making    I personally reviewed the following lab results, the image studies, pathology, other specialty/physicians consult notes and recommendations, and outside medical records from [facility]  I had a lengthy discussion with the patient and shared the work-up findings  I spent 25 minutes reviewing the records (labs, clinician notes, outside records, medical history, ordering medicine/tests/procedures, interpreting the imaging/labs previously done) and coordination of care as well as direct time with the patient today, of which greater than 50% of the time was spent in counseling and coordination of care with the patient/family  · Plan/Labs  · Metastatic breast cancer with diffuse bone metastasis  ECOG 1   · Continue Enhertu 5 4mg/kg Q 21 days  C2 coming up 3/15/23  Patient to have labs prior to treatment  · Continue Letrozole 2 5mg once daily  Continue this current treatment until disease progression  · Restage CT CAP scheduled for 4/27/2023  · Next ECHO to be scheduled for mid May  · Continue to follow up with palliative care  · Continue Zometa Q12 weeks for bone metastasis  · OncotypeMap test NGS was done 4/18/2022 with results: PIK3CA: [id number] mutation  ESR1: WT, PD-L1 IHC: negative  MSI-stable, TMB low (4 muts/mb)  BRCA 1&2 WT  Possible future treatment option would include Alpelisib + Fulvestrant  However, recent tissue examination off liver showed benefit from Enhertu HER2-low finding  Follow Up: q3 weeks alternating between MD & PA-C    All questions were answered to the patient's satisfaction during this encounter  The patient knows the contact information for our office and knows to reach out for any relevant concerns related to this encounter   They are to call for any temperature 100 4 or higher, new symptoms including but not restricted to shaking chills, decreased appetite, nausea, vomiting, diarrhea, increased fatigue, shortness of breath or chest pain, confusion, and not feeling the strength to come to the clinic  For all other listed problems and medical diagnosis in their chart - they are managed by PCP and/or other specialists, which the patient acknowledges  Thank you very much for your consultation and making us a part of this patient's care  We are continuing to follow closely with you  Please do not hesitate to reach out to me with any additional questions or concerns  AJCC 8th Edition Cancer Stage :      Cancer Staging  No matching staging information was found for the patient  Hematology/Oncology History:   · History of right-sided breast cancer, initially diagnosed in 2012, status post lumpectomy and axillary lymph node dissection, status post adjuvant radiation therapy    Patient did not receive any adjuvant endocrine therapy or chemotherapy  · July 24, 2021, patient was admitted to hospital because of shortness of breath and cough  · July 24, 2021 CT chest PE protocol showed septal thickening throughout the right lung and bronchial wall thickening due to lymphangitic spread of tumor   Indeterminate lung nodules measuring 5 millimeter in the right upper lobe, right middle lobe and 7 millimeter in the left upper lobe and left lower lobe   Large right pleural effusion   Multiple liver metastatic disease measuring up to 4 centimeter   Lytic metastatic to sternal manubrium  · July 26, 2021, ultrasound abdomen shows multiple hepatic metastatic disease   Status post thoracentesis with 1200 mL of joanne fluid removed   Cytology showed adenoma carcinoma, ER and DC positive  40-50%, HER2 negative  · July 26, 2021 biopsy of the lung liver showed metastatic breast cancer, ER and % positive, HER2 negative   CT abdomen pelvis with contrast showed extensive hepatic metastatic disease  · July 27, 2021 MRI brain showed no evidence of intracranial metastatic disease    · August 6, 2021, mammogram showed no mammographic evidence of malignancy  · August 11, 2021 bone scan showed bony metastatic disease involving left iliac crest medially and adjacent sacrum  · August 12, 2021, Zoladex was given  Letrozole started  · August 16, 2022, patient started Abemaciclib  · August 30, 2021, DC abemaciclib because of diarrhea  Start Ibrance 125 mg daily 3 weeks on 1 week off  · Nov 19, 2021, CT c/a/p showed:   1   Since July 2021, new thromboses within the intrahepatic branches of the portal vein as described above  2   Increased diffuse sclerotic osseous lesions     3   Decreased size of most of the hepatic metastases  4   Indeterminate mottled appearance of the splenic parenchyma   Attention on follow-up is advised  5   Unchanged pulmonary nodules  6   Right pleural effusion has decreased in size since July 24, 2021   The smooth interlobular septal thickening has also decreased, and this change can be attributable to the decreased size of the pleural effusion  7   Mucosal hyperenhancement of the colon   Findings may be treatment-related, and correlation with gastrointestinal symptoms is advised      BONE SCAN:   1   A few areas of increased radiotracer uptake suspicious for osseous metastasis, with findings for minimal progression    · March 15, 2022 bone scan: Stable or improving scattered foci radiotracer uptake suspicious for osseous metastases   No new osseous foci suspicious for metastasis     ·  April 1, 2022 CT scan chest abdomen pelvis:  IMPRESSION:     Findings concerning for worsening hepatic metastases as evidenced by increased size and number of lesions   Please see above discussion      Progressive left portal vein thrombosis      The majority of the pulmonary nodules are stable   There is one subpleural nodule in the right upper lobe apex posteriorly, not seen on the prior study      Stable extensive osseous metastases      Multiple tiny hypodense splenic lesions are also stable, indeterminate      Persistent small right pleural effusion with a right pleural catheter in place      June 25, 2022: 4 day admission for neutropenic fever  July 8, 2022 CT CAP: 1    Stable scattered small pulmonary nodules bilaterally  Small right pleural effusion, slightly decreased  Improving hepatic metastasis  Less conspicuous small splenic hypodense lesions  Stable findings for widespread osseous metastasis  NM Bone Scan without significant changes (A lesion in the left lobe of the liver laterally measures 2 0 x 1 9 cm, previously 5 1 x 3 8 cm- 56 2% reduction   Somewhat stellate lesion in the right lobe of the liver  posteriorly now measures 1 8 x 1 2 cm image 33 series 2, previously 2 6 x 2 6 cm (42 3%)  July 11, 2022, NM bone scan stated no areas of new or worsening scintigraphic activity suggesting osseous disease progression  10/12/2022 CT CAP w/c: New 8 mm lesion within segment IVb of the liver compared to July 8, 2022, suspicious for progression of metastatic disease  However, a few other liver lesions are improved  Improved small right pleural effusion  Stable extensive osseous metastatic metastatic disease  11/23/22: S/P Liver ablation   2/1/2023 CT CAP w/c: Status post right mastectomy without recurrence  Interval increase in size and number of hepatic lesions consistent with worsening metastatic disease  Increased size of a right pleural effusion with pleural thickening concerning for malignant effusion  Stable osseous diastases  - 2/16/2023: ECHO --> left ventricle --> systolic function normal at 55%  Diastolic function is normal  Global longitudinal strain was normal at -20 3%  right ventricle normal    History of Present Illiness:   Prince Willoughby is a 56 y o  female with the above-noted HemOnc history who is here to follow up regarding breast cancer history  See details of history above  Patient current on Eribulin Q 21 days  Has been off Eribulin since C8D9 for 11/3/22   This is due to new hepatic metastasis requiring ablation  S/p liver ablation 11/23/22  Interval events 3/8/2023:  Patient came in for follow up  Tolerating treatment thus far  However, did have some nausea and vomiting day after last treatment  She thinks it may have been from consuming bad eggs  Otherwise, no new complaints  Still has headache off/on  No visual changes, back pain, CP, SOB, heart palpitations, abd pain, N/V/D, bleeding anywhere, new peripheral neuropathy  No appetite changes  Weight at visit 8/2021: 146lbs  Weight 9/21/22: 131lbs  Weight 12/13/22: 123lbs   Weight 1/4/23: 125lbs  Weight: 2/9/2023: 126 lbs  Weight 3/8/2023: 127lbs    ROS: A 10-point of review of systems is obtained and other than the above is noncontributory  Objective:   VITALS:   /64 (BP Location: Left arm, Patient Position: Sitting, Cuff Size: Standard)   Pulse 81   Temp (!) 46 6 °F (8 1 °C) (Temporal)   Resp 18   Ht 5' 5" (1 651 m)   Wt 57 8 kg (127 lb 8 oz)   LMP 05/26/2021   SpO2 98%   BMI 21 22 kg/m²      Weight (last 2 days)     Date/Time Weight    03/08/23 1312 57 8 (127 5)              Physical EXAM:  General:  Alert, cooperative, no distress, appears stated age  Head:  Normocephalic, without obvious abnormality, atraumatic  Eyes:  Conjunctivae/corneas clear  EOMs intact  No evidence of conjunctivitis     Throat: Lips, mucosa, and tongue normal     Neck: Supple, symmetrical, trachea midline    Lungs:   Respiratory effort easy, nonlabored    Heart:  Regular rate and rhythm, +S1, S2   Abdomen:   Soft, non-tender,nondistended  No masses,      Extremities:  Lymphatics: Extremities normal, atraumatic, no cyanosis or edema  No cervical, axillary or inguinal adenopathy   Skin: Skin color, texture, turgor normal  No rashes  Neurologic: AAO   No focal neuro deficits       Allergies   Allergen Reactions   • Codeine Anaphylaxis   • Morphine GI Intolerance, Itching and Vomiting   • Sulfa Antibiotics Hives and Itching       Past Medical History:   Diagnosis Date   • [facility]    • Headache(784 0) 2021   • History of radiation exposure    • Malignant neoplasm of right female breast ([facility] [address] ) 2012    right       Past Surgical History:   Procedure Laterality Date   • BACK SURGERY     • BREAST CYST EXCISION     • BREAST LUMPECTOMY Right 2012   • HIP SURGERY Right     debridement of femur   • IR BIOPSY LIVER MASS  2021   • IR MICROWAVE ABLATION  2022   • IR PLEURAL EFFUSION LONG-TERM CATHETER PLACEMENT  2021   • IR PLEURAL EFFUSION LONG-TERM CATHETER REMOVAL  2022   • IR PORT PLACEMENT  2022   • IR THORACENTESIS  2021       Family History   Problem Relation Age of Onset   • Breast cancer Mother         in her 63's   • Breast cancer Sister         inher 45s and 48   • Colon cancer Maternal Grandmother    • No Known Problems Paternal Grandmother    • Breast cancer Other    • No Known Problems Paternal Aunt        Social History     Socioeconomic History   • Marital status: /Civil Union     Spouse name: Not on file   • Number of children: Not on file   • Years of education: Not on file   • Highest education level: Not on file   Occupational History   • Not on file   Tobacco Use   • Smoking status: Former     Packs/day: 0 25     Years: 15 00     Pack years: 3 75     Types: Cigarettes     Start date: 20     Quit date: 7/10/2021     Years since quittin 6   • Smokeless tobacco: Never   Vaping Use   • Vaping Use: Never used   Substance and Sexual Activity   • Alcohol use: Not Currently   • Drug use: Not Currently   • Sexual activity: Not Currently     Partners: Male     Birth control/protection: Male Sterilization   Other Topics Concern   • Not on file   Social History Narrative   • Not on file     Social Determinants of Health     Financial Resource Strain: Not on file   Food Insecurity: No Food Insecurity   • Worried About Running Out of Food in the Last Year: Never true   • Ran Out of Food in the Last Year: Never true   Transportation Needs: No Transportation Needs   • Lack of Transportation (Medical): No   • Lack of Transportation (Non-Medical):  No   Physical Activity: Not on file   Stress: Not on file   Social Connections: Not on file   Intimate Partner Violence: Not on file   Housing Stability: Low Risk    • Unable to Pay for Housing in the Last Year: No   • Number of Places Lived in the Last Year: 1   • Unstable Housing in the Last Year: No       Current Outpatient Medications   Medication Sig Dispense Refill   • acetaminophen (TYLENOL) 325 mg tablet Take 2 tablets (650 mg total) by mouth every 6 (six) hours as needed for mild pain 100 tablet 0   • al mag oxide-diphenhydramine-lidocaine viscous (MAGIC MOUTHWASH) 1:1:1 suspension Swish and spit 10 mL every 4 (four) hours as needed for mouth pain or discomfort 300 mL 0   • albuterol (PROVENTIL HFA,VENTOLIN HFA) 90 mcg/act inhaler Inhale 2 puffs every 4 (four) hours as needed for wheezing 8 g 0   • calcium carbonate (TUMS) 500 mg chewable tablet Chew 1 tablet (500 mg total) 3 (three) times a day as needed for indigestion or heartburn 30 tablet 0   • [employer] Melatonin 3 MG TAKE 2 TABLETS (6 MG TOTAL) BY MOUTH DAILY AT BEDTIME 180 tablet 1   • dextromethorphan-guaiFENesin (ROBITUSSIN DM)  mg/5 mL syrup Take 10 mL by mouth every 4 (four) hours as needed for cough 236 mL 0   • dicyclomine (BENTYL) 10 mg capsule Take 1 capsule (10 mg total) by mouth every 6 (six) hours as needed (abdominal cramping) 40 capsule 0   • diphenhydrAMINE (BENADRYL) 50 MG tablet Take 1 tablet (50 mg total) by mouth daily at bedtime as needed for itching or sleep 30 tablet 0   • diphenoxylate-atropine (LOMOTIL) 2 5-0 025 mg per tablet Take 1 tablet by mouth 4 (four) times a day as needed for diarrhea 30 tablet 0   • DULoxetine (CYMBALTA) 30 mg delayed release capsule Take 1 capsule (30 mg total) by mouth daily 90 capsule 3   • HYDROmorphone (DILAUDID) 2 mg tablet Take 1-2 tablets (2-4 mg total) by mouth every 3 (three) hours as needed (moderate/severe cancer-related pain) Max Daily Amount: 32 mg 120 tablet 0   • Lidocaine Viscous HCl (XYLOCAINE) 2 % mucosal solution Swish and spit 10 mL 4 (four) times a day as needed for mouth pain or discomfort 200 mL 0   • metoclopramide (REGLAN) 10 mg tablet Take 1 tablet (10 mg total) by mouth 4 (four) times a day 28 tablet 0   • multivitamin (THERAGRAN) TABS Take 1 tablet by mouth     • ondansetron (ZOFRAN) 4 mg tablet Take 1 tablet (4 mg total) by mouth every 6 (six) hours as needed for nausea or vomiting 50 tablet 0   • oxybutynin (DITROPAN-XL) 5 mg 24 hr tablet Take 1 tablet (5 mg total) by mouth daily Take 1 tablet daily 90 tablet 3   • polyethylene glycol (MIRALAX) 17 g packet Take 17 g by mouth daily  0   • senna (SENOKOT) 8 6 MG tablet Take 1 tablet (8 6 mg total) by mouth daily at bedtime as needed for constipation 30 tablet 0   • Xarelto 20 MG tablet TAKE 1 TABLET BY MOUTH EVERY DAY WITH BREAKFAST 30 tablet 11   • CRANIAL PROSTHESIS, RX, Apply to cranial area as needed for comfort  (Patient not taking: Reported on 3/7/2023) 1 each 0   • docusate sodium (COLACE) 100 mg capsule Take 1 capsule (100 mg total) by mouth 2 (two) times a day for 10 days 20 capsule 0   • lactulose 20 g/30 mL Take 15 mL (10 g total) by mouth daily as needed (constipaton) for up to 10 days 300 mL 0   • letrozole (FEMARA) 2 5 mg tablet Take 1 tablet (2 5 mg total) by mouth daily 90 tablet 2   • potassium chloride (K-DUR,KLOR-CON) 20 mEq tablet Take 1 tablet (20 mEq total) by mouth 2 (two) times a day for 3 days (Patient not taking: Reported on 12/27/2022) 6 tablet 0     No current facility-administered medications for this visit  (Not in a hospital admission)      DATA REVIEW:    Pathology Result:    Final Diagnosis   Date Value Ref Range Status   11/23/2022   Final    A  Liver core (biopsy):  - Metastatic carcinoma    Comment:  - ER, SC, Her2 ordered     - The tumor cells stain for GATA3 with absent CK7, CK20, GCDFP15 staining  The immunoprofile is most consistent with the patient's known breast primary  MOLECULAR TESTING AND CONSULT SLIDES:     Molecular testing:  Block A1 is available for molecular testing  Consult slides:  Slide A1 with associated IHC is available for consultation  Interpretation performed at [facility] [address]        07/26/2021   Final    A  B  Thoracentesis, Right (ThinPrep and cell block preparations):  Positive for malignancy  Metastatic carcinoma, compatible with breast primary  -  Immunohistochemical stains performed with appropriate controls show the tumor cells to be positive for Matthew-EP4, MOC31, BARRY-3 and ER with scattered background mesothelial cells staining for WT1 and calretinin, supporting the diagnosis  Satisfactory for evaluation  07/26/2021   Final    A  Liver (core biopsy):     - Poorly-differentiated carcinoma, most compatible with metastasis from the patient's reported breast primary  Comment:  ER / TN / Her-2 pending  Comment: This is an appended report  These results have been appended to a previously preliminary verified report  Image Results: They are reviewed and documented in Hematology/Oncology history    Echo complete w/ contrast if indicated  •  Left Ventricle: Left ventricular cavity size is normal  Wall thickness   is normal  Systolic function is normal (55%)  Wall motion is normal    Diastolic function is normal  Global longitudinal strain was normal at   -20 3%  •  Right Ventricle: Right ventricular cavity size is normal  Systolic   function is normal         LABS:  Lab data are reviewed and documented in HemOnc history  No results found for this or any previous visit (from the past 48 hour(s))            Jailyn Ruth  3/8/2023, 1:35 PM

## 2023-03-07 NOTE — PROGRESS NOTES
Outpatient Follow-Up - Palliative and Supportive Care   Kiesha Wheat 56 y o  female [id number]    Assessment & Plan  Problem List Items Addressed This Visit        Musculoskeletal and Integument    Bone metastases (HCC)    Relevant Medications    HYDROmorphone (DILAUDID) 2 mg tablet       Other    Palliative care patient    Relevant Medications    HYDROmorphone (DILAUDID) 2 mg tablet    ondansetron (ZOFRAN) 4 mg tablet    Primary malignant neoplasm of right breast with metastasis to other site [facility]) - Primary    Relevant Medications    HYDROmorphone (DILAUDID) 2 mg tablet    ondansetron (ZOFRAN) 4 mg tablet    Constipation    Cancer related pain    Relevant Medications    HYDROmorphone (DILAUDID) 2 mg tablet   Other Visit Diagnoses     Nausea        Relevant Medications    ondansetron (ZOFRAN) 4 mg tablet        #symptom management  #cancer-related pain   - continue APAP 650 mg PO Q6H PRN              - max daily 4000 mg   - continue hydromorphone 2-4 mg PO Q3H PRN   - continue duloxetine therapy     #anxiety   - continue duloxetine 30 mg PO QDaily     #nausea   - continue metoclopramide 10 mg PO QID PRN   - continue ondansetron 4 mg PO Q6H PRN    Reports significant post-chemotherapy nausea/vomiting  Recommend pre-treating prior to infusions to optimize management plan      #insomnia   - continue melatonin 6 mg PO QHS     #OIC   - continue home bowel regimen   - continued adequate hydration     #abdominal cramping   - continue dicyclomine 10 mg PO Q6H PRN     #xerostomia   - recommend biotene use   - lollipops provided for xerostomia    #goals of care   - treatment focused care with no limitations at this time    #psychosocial support   - emotional support provided   - Yvon Hernandez [spouse] [phone]   - two children              - Yazidi [son, Down syndrome]              - Isak Thomson [son]   - granddaughter recently born, in office today with mother of baby      Next [address] [facility] Follow up in 4 weeks        Controlled Substance Review    [place]MP or [place]  reviewed: No red flags were identified; safe to proceed with prescription  [place]  PDMP Review       Value Time User    PDMP Reviewed  Yes (P)  3/7/2023  8:20 AM Carissa Piña MD          Medications adjusted this encounter:  Requested Prescriptions     Signed Prescriptions Disp Refills   • HYDROmorphone (DILAUDID) 2 mg tablet 120 tablet 0     Sig: Take 1-2 tablets (2-4 mg total) by mouth every 3 (three) hours as needed (moderate/severe cancer-related pain) Max Daily Amount: 32 mg   • ondansetron (ZOFRAN) 4 mg tablet 50 tablet 0     Sig: Take 1 tablet (4 mg total) by mouth every 6 (six) hours as needed for nausea or vomiting     No orders of the defined types were placed in this encounter  Medications Discontinued During This Encounter   Medication Reason   • ondansetron (ZOFRAN) 4 mg tablet    • HYDROmorphone (DILAUDID) 2 mg tablet Reorder         Debbie Hwang was seen today for symptoms and planning cares related to above illnesses  I have reviewed the patient's controlled substance dispensing history in the Prescription Drug Monitoring Program in compliance with the [facility] regulations before prescribing any controlled substances  They are invited to continue to follow with us  If there are questions or concerns, please contact us through our clinic/answering service 24 hours a day, seven days a week  Carissa Piña MD  [facility] Palliative and Supportive Care  [phone]      Visit Information    Accompanied By: Family member    Source of History: Self, Family member, Medical record    History Limitations: None      History of Present Illness    Debbie Hwang is a 56 y o  female who presents in follow up of symptoms related to metastatic breast cancer c/b recurrent malignant pleural effusions  Pertinent issues include: symptom management, pain, neoplasm related, depression or anxiety, assessment of goals of care, advance care planning      Patient reports increased rib pain since new antineoplastic regimen  Use of PO hydromorphone 2-3 tabs/day, previously used 1 tab/day  Reports post-infusion nausea/vomiting x 2 days, currently resolved  Appetite fair with recent weight gain  BM with intermittent constipation, use of lactulose effective  Adequate sleep  Past medical, surgical, social, and family histories are reviewed and pertinent updates are made  Review of Systems   Constitutional: Positive for malaise/fatigue  Negative for chills, decreased appetite, fever and weight loss  HENT: Negative for congestion  Eyes: Negative for visual disturbance  Cardiovascular: Negative for chest pain  Respiratory: Negative for shortness of breath  Musculoskeletal: Negative for falls  Rib pain   Gastrointestinal: Positive for constipation, nausea and vomiting  Negative for abdominal pain  Genitourinary: Negative for frequency  Neurological: Negative for headaches  Psychiatric/Behavioral: The patient has insomnia  The patient is not nervous/anxious  All other systems reviewed and are negative  Vital Signs    /72 (BP Location: Left arm, Patient Position: Sitting, Cuff Size: Standard)   Pulse 91   Temp (!) 97 3 °F (36 3 °C) (Temporal)   Ht 5' 5" (1 651 m)   Wt 59 3 kg (130 lb 12 8 oz)   LMP 05/26/2021   SpO2 97%   BMI 21 77 kg/m²     Physical Exam and Objective Data  Physical Exam  Vitals and nursing note reviewed  Constitutional:       General: She is awake  Appearance: She is not diaphoretic  Comments: Sitting up on exam table in NAD  BMI 21 8  Non-toxic appearing   HENT:      Head: Normocephalic and atraumatic  Right Ear: External ear normal       Left Ear: External ear normal       Nose: No rhinorrhea  Eyes:      Comments: No gaze preference   Cardiovascular:      Rate and Rhythm: Normal rate  Pulmonary:      Effort: No tachypnea, accessory muscle usage or respiratory distress        Comments: Completes full sentences without difficulty  Musculoskeletal:      Cervical back: Normal range of motion  Neurological:      General: No focal deficit present  Mental Status: She is alert and oriented to person, place, and time  Psychiatric:         Attention and Perception: Attention normal          Mood and Affect: Mood and affect normal          Speech: Speech normal          Cognition and Memory: Cognition and memory normal            Radiology and Laboratory:  I personally reviewed and interpreted the following results:    Most Recent COVID-19 Results:  Lab Results   Component Value Date/Time    SARSCOV2 Negative 08/20/2022 08:14 PM    SARSCOV2 Negative 06/25/2022 03:22 AM    SARSCOV2 Positive (A) 12/20/2020 12:25 PM       Most Recent Lab Work:  Lab Results   Component Value Date/Time    SODIUM 137 02/21/2023 12:05 PM    K 3 9 02/21/2023 12:05 PM    K 3 7 04/02/2014 01:15 PM    BUN 16 02/21/2023 12:05 PM    BUN 12 04/02/2014 01:15 PM    CREATININE 0 73 02/21/2023 12:05 PM    CREATININE 0 80 04/02/2014 01:15 PM    GLUC 140 02/21/2023 12:05 PM     Lab Results   Component Value Date/Time    AST 67 (H) 02/21/2023 12:05 PM    AST 23 04/02/2014 01:15 PM    ALT 44 02/21/2023 12:05 PM    ALT 15 04/02/2014 01:15 PM    ALB 3 7 02/21/2023 12:05 PM    ALB 3 4 (L) 04/02/2014 01:15 PM     Lab Results   Component Value Date/Time    HGB 12 0 02/21/2023 12:05 PM    WBC 5 15 02/21/2023 12:05 PM     02/21/2023 12:05 PM    INR 0 99 11/23/2022 08:25 AM    PTT 29 08/20/2022 08:37 PM       Most Recent Imaging [last 30 days]:  Procedure: Echo complete w/ contrast if indicated    Result Date: 2/16/2023  Narrative: •  Left Ventricle: Left ventricular cavity size is normal  Wall thickness is normal  Systolic function is normal (55%)  Wall motion is normal  Diastolic function is normal  Global longitudinal strain was normal at -20 3%   •  Right Ventricle: Right ventricular cavity size is normal  Systolic function is normal  35 minutes was spent face to face with Severiano Pupa and her family member with greater than 50% of the time spent in counseling or coordination of care including discussions of provided medical updates, discussed palliative care, determined goals of care, determined social/family support, discussed plans of care, discussed symptom management, provided psychosocial support  Medication refill  PDMP Reviewed  All of the patient's or agent's questions were answered during this discussion

## 2023-03-08 ENCOUNTER — OFFICE VISIT (OUTPATIENT)
Dept: HEMATOLOGY ONCOLOGY | Facility: CLINIC | Age: 58
End: 2023-03-08

## 2023-03-08 VITALS
RESPIRATION RATE: 18 BRPM | HEIGHT: 65 IN | HEART RATE: 81 BPM | WEIGHT: 127.5 LBS | TEMPERATURE: 46.6 F | SYSTOLIC BLOOD PRESSURE: 114 MMHG | DIASTOLIC BLOOD PRESSURE: 64 MMHG | BODY MASS INDEX: 21.24 KG/M2 | OXYGEN SATURATION: 98 %

## 2023-03-08 DIAGNOSIS — C79.51 BONE METASTASES (HCC): Primary | ICD-10-CM

## 2023-03-08 DIAGNOSIS — Z79.899 ENCOUNTER FOR MONITORING CARDIOTOXIC DRUG THERAPY: ICD-10-CM

## 2023-03-08 DIAGNOSIS — C50.911 PRIMARY MALIGNANT NEOPLASM OF RIGHT BREAST WITH METASTASIS TO OTHER SITE (HCC): ICD-10-CM

## 2023-03-08 DIAGNOSIS — Z51.81 ENCOUNTER FOR MONITORING CARDIOTOXIC DRUG THERAPY: ICD-10-CM

## 2023-03-08 NOTE — PROGRESS NOTES
Palliative Supportive Care  met with patient/family to continue to provide emotional support and guidance  Updated biopsychosocial information relevant to support: Patient attended appointment today with son's girlfriend and  granddaughter, Cheyenne Juan  ( 3eek old)  Patient reported that she started her new chemo treatment and she was having a lot of nausea and vomiting  Patient reported that she was able to  her son, Anita Browne to be present at [address] birth  Patient reported that her , will be getting released from assisted this weekend  Once he is released, they will look for alternate housing, instead of being in her brother's home  Identified areas of need include: ongoing support   Resources provided: Patient was provided with samples of lollypops for dry mouth  Areas that need future follow-up include: ongoing support     I have spent 35 minutes with Patient and family today in which greater than 50% of this time was spent in counseling/coordination of care regarding Counseling / Coordination of care, Documenting in the medical record and Communicating with other healthcare professionals       Palliative  will follow-up as requested by patient, family, and primary team   Please contact with any specific requests

## 2023-03-14 ENCOUNTER — HOSPITAL ENCOUNTER (OUTPATIENT)
Dept: INFUSION CENTER | Facility: CLINIC | Age: 58
Discharge: HOME | End: 2023-03-14

## 2023-03-14 ENCOUNTER — HOSPITAL ENCOUNTER (EMERGENCY)
Facility: HOSPITAL | Age: 58
Discharge: HOME | End: 2023-03-15
Attending: EMERGENCY MEDICINE

## 2023-03-14 ENCOUNTER — APPOINTMENT (EMERGENCY)
Dept: CT IMAGING | Facility: HOSPITAL | Age: 58
End: 2023-03-14

## 2023-03-14 DIAGNOSIS — Z95.828 PORT-A-CATH IN PLACE: ICD-10-CM

## 2023-03-14 DIAGNOSIS — C78.7 MALIGNANT NEOPLASM METASTATIC TO LIVER (HCC): ICD-10-CM

## 2023-03-14 DIAGNOSIS — R13.10 ODYNOPHAGIA: ICD-10-CM

## 2023-03-14 DIAGNOSIS — C50.919 MALIGNANT NEOPLASM OF BREAST IN FEMALE, ESTROGEN RECEPTOR POSITIVE, UNSPECIFIED LATERALITY, UNSPECIFIED SITE OF BREAST (HCC): ICD-10-CM

## 2023-03-14 DIAGNOSIS — C79.51 BONE METASTASES (HCC): Primary | ICD-10-CM

## 2023-03-14 DIAGNOSIS — J02.9 PHARYNGITIS: ICD-10-CM

## 2023-03-14 DIAGNOSIS — J91.0 MALIGNANT PLEURAL EFFUSION: ICD-10-CM

## 2023-03-14 DIAGNOSIS — C50.911 PRIMARY MALIGNANT NEOPLASM OF RIGHT BREAST WITH METASTASIS TO OTHER SITE (HCC): ICD-10-CM

## 2023-03-14 DIAGNOSIS — R09.89 CHOKING EPISODE: Primary | ICD-10-CM

## 2023-03-14 DIAGNOSIS — Z17.0 MALIGNANT NEOPLASM OF BREAST IN FEMALE, ESTROGEN RECEPTOR POSITIVE, UNSPECIFIED LATERALITY, UNSPECIFIED SITE OF BREAST (HCC): ICD-10-CM

## 2023-03-14 LAB
ALBUMIN SERPL BCP-MCNC: 3.9 G/DL (ref 3.5–5)
ALP SERPL-CCNC: 89 U/L (ref 34–104)
ALT SERPL W P-5'-P-CCNC: 22 U/L (ref 7–52)
ANION GAP SERPL CALCULATED.3IONS-SCNC: 10 MMOL/L (ref 4–13)
ANION GAP SERPL CALCULATED.3IONS-SCNC: 9 MMOL/L (ref 4–13)
AST SERPL W P-5'-P-CCNC: 42 U/L (ref 13–39)
BASOPHILS # BLD AUTO: 0.03 THOUSANDS/ÂΜL (ref 0–0.1)
BASOPHILS # BLD AUTO: 0.04 THOUSANDS/ÂΜL (ref 0–0.1)
BASOPHILS NFR BLD AUTO: 1 % (ref 0–1)
BASOPHILS NFR BLD AUTO: 1 % (ref 0–1)
BILIRUB SERPL-MCNC: 0.35 MG/DL (ref 0.2–1)
BUN SERPL-MCNC: 17 MG/DL (ref 5–25)
BUN SERPL-MCNC: 18 MG/DL (ref 5–25)
CALCIUM SERPL-MCNC: 9.5 MG/DL (ref 8.4–10.2)
CALCIUM SERPL-MCNC: 9.5 MG/DL (ref 8.4–10.2)
CARDIAC TROPONIN I PNL SERPL HS: 2 NG/L
CHLORIDE SERPL-SCNC: 108 MMOL/L (ref 96–108)
CHLORIDE SERPL-SCNC: 110 MMOL/L (ref 96–108)
CO2 SERPL-SCNC: 18 MMOL/L (ref 21–32)
CO2 SERPL-SCNC: 21 MMOL/L (ref 21–32)
CREAT SERPL-MCNC: 0.75 MG/DL (ref 0.6–1.3)
CREAT SERPL-MCNC: 0.81 MG/DL (ref 0.6–1.3)
EOSINOPHIL # BLD AUTO: 0.07 THOUSAND/ÂΜL (ref 0–0.61)
EOSINOPHIL # BLD AUTO: 0.08 THOUSAND/ÂΜL (ref 0–0.61)
EOSINOPHIL NFR BLD AUTO: 2 % (ref 0–6)
EOSINOPHIL NFR BLD AUTO: 3 % (ref 0–6)
ERYTHROCYTE [DISTWIDTH] IN BLOOD BY AUTOMATED COUNT: 16.9 % (ref 11.6–15.1)
ERYTHROCYTE [DISTWIDTH] IN BLOOD BY AUTOMATED COUNT: 17.2 % (ref 11.6–15.1)
GFR SERPL CREATININE-BSD FRML MDRD: 82 ML/MIN/1.73SQ M
GFR SERPL CREATININE-BSD FRML MDRD: 90 ML/MIN/1.73SQ M
GLUCOSE SERPL-MCNC: 86 MG/DL (ref 65–140)
GLUCOSE SERPL-MCNC: 92 MG/DL (ref 65–140)
HCT VFR BLD AUTO: 34.2 % (ref 34.8–46.1)
HCT VFR BLD AUTO: 37.4 % (ref 34.8–46.1)
HGB BLD-MCNC: 12 G/DL (ref 11.5–15.4)
HGB BLD-MCNC: 12.4 G/DL (ref 11.5–15.4)
IMM GRANULOCYTES # BLD AUTO: 0 THOUSAND/UL (ref 0–0.2)
IMM GRANULOCYTES # BLD AUTO: 0 THOUSAND/UL (ref 0–0.2)
IMM GRANULOCYTES NFR BLD AUTO: 0 % (ref 0–2)
IMM GRANULOCYTES NFR BLD AUTO: 0 % (ref 0–2)
LYMPHOCYTES # BLD AUTO: 0.34 THOUSANDS/ÂΜL (ref 0.6–4.47)
LYMPHOCYTES # BLD AUTO: 0.46 THOUSANDS/ÂΜL (ref 0.6–4.47)
LYMPHOCYTES NFR BLD AUTO: 11 % (ref 14–44)
LYMPHOCYTES NFR BLD AUTO: 17 % (ref 14–44)
MCH RBC QN AUTO: 33.2 PG (ref 26.8–34.3)
MCH RBC QN AUTO: 34.3 PG (ref 26.8–34.3)
MCHC RBC AUTO-ENTMCNC: 33.2 G/DL (ref 31.4–37.4)
MCHC RBC AUTO-ENTMCNC: 35.1 G/DL (ref 31.4–37.4)
MCV RBC AUTO: 100 FL (ref 82–98)
MCV RBC AUTO: 98 FL (ref 82–98)
MONOCYTES # BLD AUTO: 0.25 THOUSAND/ÂΜL (ref 0.17–1.22)
MONOCYTES # BLD AUTO: 0.35 THOUSAND/ÂΜL (ref 0.17–1.22)
MONOCYTES NFR BLD AUTO: 11 % (ref 4–12)
MONOCYTES NFR BLD AUTO: 9 % (ref 4–12)
NEUTROPHILS # BLD AUTO: 1.87 THOUSANDS/ÂΜL (ref 1.85–7.62)
NEUTROPHILS # BLD AUTO: 2.43 THOUSANDS/ÂΜL (ref 1.85–7.62)
NEUTS SEG NFR BLD AUTO: 70 % (ref 43–75)
NEUTS SEG NFR BLD AUTO: 75 % (ref 43–75)
NRBC BLD AUTO-RTO: 0 /100 WBCS
NRBC BLD AUTO-RTO: 0 /100 WBCS
PLATELET # BLD AUTO: 196 THOUSANDS/UL (ref 149–390)
PLATELET # BLD AUTO: 236 THOUSANDS/UL (ref 149–390)
PMV BLD AUTO: 10.7 FL (ref 8.9–12.7)
PMV BLD AUTO: 11.1 FL (ref 8.9–12.7)
POTASSIUM SERPL-SCNC: 3.9 MMOL/L (ref 3.5–5.3)
POTASSIUM SERPL-SCNC: 4 MMOL/L (ref 3.5–5.3)
PROT SERPL-MCNC: 6.9 G/DL (ref 6.4–8.4)
RBC # BLD AUTO: 3.5 MILLION/UL (ref 3.81–5.12)
RBC # BLD AUTO: 3.74 MILLION/UL (ref 3.81–5.12)
SODIUM SERPL-SCNC: 138 MMOL/L (ref 135–147)
SODIUM SERPL-SCNC: 138 MMOL/L (ref 135–147)
WBC # BLD AUTO: 2.69 THOUSAND/UL (ref 4.31–10.16)
WBC # BLD AUTO: 3.23 THOUSAND/UL (ref 4.31–10.16)

## 2023-03-14 RX ORDER — MAGNESIUM HYDROXIDE/ALUMINUM HYDROXICE/SIMETHICONE 120; 1200; 1200 MG/30ML; MG/30ML; MG/30ML
30 SUSPENSION ORAL ONCE
Status: COMPLETED | OUTPATIENT
Start: 2023-03-14 | End: 2023-03-14

## 2023-03-14 RX ORDER — LIDOCAINE HYDROCHLORIDE 20 MG/ML
15 SOLUTION OROPHARYNGEAL ONCE
Status: COMPLETED | OUTPATIENT
Start: 2023-03-14 | End: 2023-03-14

## 2023-03-14 RX ORDER — SUCRALFATE 1 G/1
1 TABLET ORAL ONCE
Status: COMPLETED | OUTPATIENT
Start: 2023-03-14 | End: 2023-03-14

## 2023-03-14 RX ADMIN — SUCRALFATE 1 G: 1 TABLET ORAL at 23:29

## 2023-03-14 RX ADMIN — LIDOCAINE HYDROCHLORIDE 15 ML: 20 SOLUTION ORAL; TOPICAL at 23:30

## 2023-03-14 RX ADMIN — ALUMINUM HYDROXIDE, MAGNESIUM HYDROXIDE, AND DIMETHICONE 30 ML: 200; 20; 200 SUSPENSION ORAL at 23:30

## 2023-03-15 ENCOUNTER — HOSPITAL ENCOUNTER (OUTPATIENT)
Dept: INFUSION CENTER | Facility: CLINIC | Age: 58
Discharge: HOME | End: 2023-03-15

## 2023-03-15 VITALS
SYSTOLIC BLOOD PRESSURE: 118 MMHG | BODY MASS INDEX: 20.8 KG/M2 | WEIGHT: 125 LBS | HEART RATE: 72 BPM | TEMPERATURE: 98.2 F | DIASTOLIC BLOOD PRESSURE: 66 MMHG | OXYGEN SATURATION: 96 % | RESPIRATION RATE: 18 BRPM

## 2023-03-15 VITALS
SYSTOLIC BLOOD PRESSURE: 123 MMHG | BODY MASS INDEX: 20.93 KG/M2 | RESPIRATION RATE: 18 BRPM | DIASTOLIC BLOOD PRESSURE: 72 MMHG | HEART RATE: 97 BPM | WEIGHT: 125.8 LBS | TEMPERATURE: 97.7 F

## 2023-03-15 DIAGNOSIS — C79.51 BONE METASTASES (HCC): Primary | ICD-10-CM

## 2023-03-15 DIAGNOSIS — C50.911 PRIMARY MALIGNANT NEOPLASM OF RIGHT BREAST WITH METASTASIS TO OTHER SITE (HCC): ICD-10-CM

## 2023-03-15 DIAGNOSIS — J91.0 MALIGNANT PLEURAL EFFUSION: ICD-10-CM

## 2023-03-15 LAB
ATRIAL RATE: 74 BPM
P AXIS: 86 DEGREES
PR INTERVAL: 182 MS
QRS AXIS: 88 DEGREES
QRSD INTERVAL: 68 MS
QT INTERVAL: 362 MS
QTC INTERVAL: 401 MS
T WAVE AXIS: 71 DEGREES
VENTRICULAR RATE: 74 BPM

## 2023-03-15 RX ORDER — SUCRALFATE 1 G/1
1 TABLET ORAL 4 TIMES DAILY
Qty: 56 TABLET | Refills: 0 | Status: SHIPPED | OUTPATIENT
Start: 2023-03-15 | End: 2023-03-29

## 2023-03-15 RX ORDER — DEXTROSE MONOHYDRATE 50 MG/ML
20 INJECTION, SOLUTION INTRAVENOUS ONCE
Status: COMPLETED | OUTPATIENT
Start: 2023-03-15 | End: 2023-03-15

## 2023-03-15 RX ADMIN — FAM-TRASTUZUMAB DERUXTECAN-NXKI 300 MG: 100 INJECTION, POWDER, LYOPHILIZED, FOR SOLUTION INTRAVENOUS at 14:52

## 2023-03-15 RX ADMIN — DEXAMETHASONE SODIUM PHOSPHATE: 10 INJECTION, SOLUTION INTRAMUSCULAR; INTRAVENOUS at 14:00

## 2023-03-15 RX ADMIN — DEXTROSE 20 ML/HR: 5 SOLUTION INTRAVENOUS at 13:50

## 2023-03-15 NOTE — DISCHARGE INSTRUCTIONS
IMPRESSION:     Scattered nodular sclerosis throughout the visualized bones concerning for osseous metastasis  Mild multilevel degenerative changes        No radiopaque foreign body visualized  Consider ENT or GI consultation or single contrast esophagram as indicated clinically  IMPRESSION:     No radiopaque foreign body visualized  Osseous and pulmonary metastasis as above

## 2023-03-15 NOTE — PROGRESS NOTES
Pt presents for chemotherapy, offers no complaints, tolerated AVS declined, aware of next appt, d/c from unit stable

## 2023-03-15 NOTE — ED PROVIDER NOTES
History  Chief Complaint   Patient presents with   • Shortness of Breath     Pt arrives via EMS for reported shortness of breath x 1 hour  States "feels like there is a lump in my throat " Hx of panic attacks  19-year-old female presents with the chief complaint of "I started choking and then I could not breathe "  Patient states "I made some potato soup and some bratwurst" though she had eaten the bratwurst and immediately prior to the choking episode, she notes she had eaten the potato soup  During the episode, patient states she started "coughing up phlegm "    Patient affirms persistent pharyngitis, stating "it is really scratchy "  Patient further notes odynophagia, stating "I feel like a little knot when it goes down "  Patient has not drank or eaten anything since the episode but she has been tolerating her oral secretions  Patient denies any dyspnea at present  Patient denies any chest pain  Patient denies any rashes  Patient denies any nausea or vomiting  Patient denies any diarrhea  Patient denies any abdominal pain  Patient has eaten the food previously without any difficulty  Notably patient has a history of metastatic breast cancer for which she is undergoing active treatment  Patient does have a history of prior pleural effusions  Patient is anticoagulated on rivaroxaban that she affirms compliance  Impression and plan: Reported choking episode with persistent pharyngitis and odynophagia  Patient states she was eating soup at the time, more concerning that this is secondary to irritation or possibly aspiration  Patient afebrile and I do not appreciate any adventitious sounds on clinical examination  Patient tolerating oral secretions without difficulty    Patient is of a history of metastatic cancer and pleural effusions, she is anticoagulated and considering the events started immediately after the choking episode, unlikely to represent pulmonary embolism or pleural effusion  Considering diagnostic uncertainty regarding persistent foreign body, obtain CT imaging of patient's neck and chest to evaluate for any findings that would be concerning for persistent findings and attempt oral challenge with the patient and symptomatic management  Will monitor and reassess  Prior to Admission Medications   Prescriptions Last Dose Informant Patient Reported? Taking? CRANIAL PROSTHESIS, RX,   No No   Sig: Apply to cranial area as needed for comfort     Patient not taking: Reported on 3/7/2023   [facility] Melatonin 3 MG   No No   Sig: TAKE 2 TABLETS (6 MG TOTAL) BY MOUTH DAILY AT BEDTIME   DULoxetine (CYMBALTA) 30 mg delayed release capsule   No No   Sig: Take 1 capsule (30 mg total) by mouth daily   HYDROmorphone (DILAUDID) 2 mg tablet   No No   Sig: Take 1-2 tablets (2-4 mg total) by mouth every 3 (three) hours as needed (moderate/severe cancer-related pain) Max Daily Amount: 32 mg   Lidocaine Viscous HCl (XYLOCAINE) 2 % mucosal solution   No No   Sig: Swish and spit 10 mL 4 (four) times a day as needed for mouth pain or discomfort   Xarelto 20 MG tablet   No No   Sig: TAKE 1 TABLET BY MOUTH EVERY DAY WITH BREAKFAST   acetaminophen (TYLENOL) 325 mg tablet   No No   Sig: Take 2 tablets (650 mg total) by mouth every 6 (six) hours as needed for mild pain   al mag oxide-diphenhydramine-lidocaine viscous (MAGIC MOUTHWASH) 1:1:1 suspension   No No   Sig: Swish and spit 10 mL every 4 (four) hours as needed for mouth pain or discomfort   albuterol (PROVENTIL HFA,VENTOLIN HFA) 90 mcg/act inhaler   No No   Sig: Inhale 2 puffs every 4 (four) hours as needed for wheezing   calcium carbonate (TUMS) 500 mg chewable tablet   No No   Sig: Chew 1 tablet (500 mg total) 3 (three) times a day as needed for indigestion or heartburn   dextromethorphan-guaiFENesin (ROBITUSSIN DM)  mg/5 mL syrup   No No   Sig: Take 10 mL by mouth every 4 (four) hours as needed for cough   dicyclomine (BENTYL) 10 mg capsule   No No   Sig: Take 1 capsule (10 mg total) by mouth every 6 (six) hours as needed (abdominal cramping)   diphenhydrAMINE (BENADRYL) 50 MG tablet   No No   Sig: Take 1 tablet (50 mg total) by mouth daily at bedtime as needed for itching or sleep   diphenoxylate-atropine (LOMOTIL) 2 5-0 025 mg per tablet   No No   Sig: Take 1 tablet by mouth 4 (four) times a day as needed for diarrhea   docusate sodium (COLACE) 100 mg capsule   No No   Sig: Take 1 capsule (100 mg total) by mouth 2 (two) times a day for 10 days   lactulose 20 g/30 mL   No No   Sig: Take 15 mL (10 g total) by mouth daily as needed (constipaton) for up to 10 days   letrozole (FEMARA) 2 5 mg tablet   No No   Sig: Take 1 tablet (2 5 mg total) by mouth daily   metoclopramide (REGLAN) 10 mg tablet   No No   Sig: Take 1 tablet (10 mg total) by mouth 4 (four) times a day   multivitamin (THERAGRAN) TABS   Yes No   Sig: Take 1 tablet by mouth   ondansetron (ZOFRAN) 4 mg tablet   No No   Sig: Take 1 tablet (4 mg total) by mouth every 6 (six) hours as needed for nausea or vomiting   oxybutynin (DITROPAN-XL) 5 mg 24 hr tablet   No No   Sig: Take 1 tablet (5 mg total) by mouth daily Take 1 tablet daily   polyethylene glycol (MIRALAX) 17 g packet   No No   Sig: Take 17 g by mouth daily   potassium chloride (K-DUR,KLOR-CON) 20 mEq tablet   No No   Sig: Take 1 tablet (20 mEq total) by mouth 2 (two) times a day for 3 days   Patient not taking: Reported on 12/27/2022   senna (SENOKOT) 8 6 MG tablet   No No   Sig: Take 1 tablet (8 6 mg total) by mouth daily at bedtime as needed for constipation      Facility-Administered Medications: None       Past Medical History:   Diagnosis Date   • Cancer ([facility] [address] )    • Headache(784 0) 11/2021   • History of radiation exposure    • Malignant neoplasm of right female breast [facility]) 2012    right       Past Surgical History:   Procedure Laterality Date   • BACK SURGERY     • BREAST CYST EXCISION     • BREAST LUMPECTOMY Right 2012 • HIP SURGERY Right     debridement of femur   • IR BIOPSY LIVER MASS  2021   • IR MICROWAVE ABLATION  2022   • IR PLEURAL EFFUSION LONG-TERM CATHETER PLACEMENT  2021   • IR PLEURAL EFFUSION LONG-TERM CATHETER REMOVAL  2022   • IR PORT PLACEMENT  2022   • IR THORACENTESIS  2021       Family History   Problem Relation Age of Onset   • Breast cancer Mother         in her 63's   • Breast cancer Sister         inher 45s and 48   • Colon cancer Maternal Grandmother    • No Known Problems Paternal Grandmother    • Breast cancer Other    • No Known Problems Paternal Aunt      I have reviewed and agree with the history as documented  E-Cigarette/Vaping   • E-Cigarette Use Never User      E-Cigarette/Vaping Substances   • Nicotine No    • THC No    • CBD No    • Flavoring No    • Other No    • Unknown No      Social History     Tobacco Use   • Smoking status: Some Days     Packs/day: 0 25     Years: 15 00     Pack years: 3 75     Types: Cigarettes     Start date: 20     Last attempt to quit: 7/10/2021     Years since quittin 6   • Smokeless tobacco: Never   Vaping Use   • Vaping Use: Never used   Substance Use Topics   • Alcohol use: Not Currently   • Drug use: Not Currently       Review of Systems    Physical Exam  Physical Exam  Vitals reviewed  HENT:      Head: Atraumatic  Mouth/Throat:      Pharynx: Oropharynx is clear  No pharyngeal swelling or oropharyngeal exudate  Eyes:      Pupils: Pupils are equal, round, and reactive to light  Cardiovascular:      Rate and Rhythm: Normal rate and regular rhythm  Pulmonary:      Effort: Pulmonary effort is normal       Breath sounds: Normal breath sounds  No decreased breath sounds, wheezing, rhonchi or rales  Abdominal:      General: There is no distension  Musculoskeletal:         General: No deformity  Cervical back: Neck supple  Right lower leg: No tenderness  No edema  Left lower leg: No tenderness  No edema  Skin:     General: Skin is warm and dry  Neurological:      Mental Status: She is alert           Vital Signs  ED Triage Vitals [03/14/23 2255]   Temperature Pulse Respirations Blood Pressure SpO2   98 2 °F (36 8 °C) 69 22 121/77 100 %      Temp Source Heart Rate Source Patient Position - Orthostatic VS BP Location FiO2 (%)   Oral Monitor Sitting Left arm --      Pain Score       2           Vitals:    03/14/23 2255 03/15/23 0106   BP: 121/77 118/66   Pulse: 69 72   Patient Position - Orthostatic VS: Sitting Sitting         Visual Acuity      ED Medications  Medications   sucralfate (CARAFATE) tablet 1 g (1 g Oral Given 3/14/23 2329)   aluminum-magnesium hydroxide-simethicone (MYLANTA) oral suspension 30 mL (30 mL Oral Given 3/14/23 2330)   Lidocaine Viscous HCl (XYLOCAINE) 2 % mucosal solution 15 mL (15 mL Swish & Swallow Given 3/14/23 2330)       Diagnostic Studies  Results Reviewed     Procedure Component Value Units Date/Time    HS Troponin 0hr (reflex protocol) [[id number]]  (Normal) Collected: 03/14/23 2313    Lab Status: Final result Specimen: Blood from Arm, Left Updated: 03/14/23 2348     hs TnI 0hr 2 ng/L     Basic metabolic panel [[id number]]  (Abnormal) Collected: 03/14/23 2313    Lab Status: Final result Specimen: Blood from Arm, Left Updated: 03/14/23 2342     Sodium 138 mmol/L      Potassium 4 0 mmol/L      Chloride 110 mmol/L      CO2 18 mmol/L      ANION GAP 10 mmol/L      BUN 17 mg/dL      Creatinine 0 81 mg/dL      Glucose 86 mg/dL      Calcium 9 5 mg/dL      eGFR 82 ml/min/1 73sq m     Narrative:      [place] guidelines for Chronic Kidney Disease (CKD):   •  Stage 1 with normal or high GFR (GFR > 90 mL/min/1 73 square meters)  •  Stage 2 Mild CKD (GFR = 60-89 mL/min/1 73 square meters)  •  Stage 3A Moderate CKD (GFR = 45-59 mL/min/1 73 square meters)  •  Stage 3B Moderate CKD (GFR = 30-44 mL/min/1 73 square meters)  •  Stage 4 Severe CKD (GFR = 15-29 mL/min/1 73 square meters)  •  Stage 5 End Stage CKD (GFR <15 mL/min/1 73 square meters)  Note: GFR calculation is accurate only with a steady state creatinine    CBC and differential [[id number]]  (Abnormal) Collected: 03/14/23 2313    Lab Status: Final result Specimen: Blood from Arm, Left Updated: 03/14/23 2318     WBC 3 23 Thousand/uL      RBC 3 50 Million/uL      Hemoglobin 12 0 g/dL      Hematocrit 34 2 %      MCV 98 fL      MCH 34 3 pg      MCHC 35 1 g/dL      RDW 16 9 %      MPV 10 7 fL      Platelets 537 Thousands/uL      nRBC 0 /100 WBCs      Neutrophils Relative 75 %      Immat GRANS % 0 %      Lymphocytes Relative 11 %      Monocytes Relative 11 %      Eosinophils Relative 2 %      Basophils Relative 1 %      Neutrophils Absolute 2 43 Thousands/µL      Immature Grans Absolute 0 00 Thousand/uL      Lymphocytes Absolute 0 34 Thousands/µL      Monocytes Absolute 0 35 Thousand/µL      Eosinophils Absolute 0 07 Thousand/µL      Basophils Absolute 0 04 Thousands/µL                  CT soft tissue neck wo contrast   Final Result by Latoya Sol MD (03/15 0014)      Scattered nodular sclerosis throughout the visualized bones concerning for osseous metastasis  Mild multilevel degenerative changes         No radiopaque foreign body visualized  Consider ENT or GI consultation or single contrast esophagram as indicated clinically  Workstation performed: [id number]         CT chest without contrast   Final Result by Latoya Sol MD (03/15 0014)      No radiopaque foreign body visualized  Osseous and pulmonary metastasis as above  Workstation performed: [id number]                    Procedures  Procedures         ED Course  ED Course as of 03/15/23 0620   Wed Mar 15, 2023   0056 On reassessment, patient states pain has improved though she does have some persistent pharyngitis  Patient tolerating oral intake  Patient is aborter evaluation without acute changes    Patient CT without findings of clear radiopaque foreign body  Patient CT with known bony metastases that we discussed continued follow up regarding  Symptoms started immediately after a choking episode, likely secondary to that and no history to suggest recurrent issue per the patient  As patient is tolerating oral intake without difficulty now, suggested continued symptomatic management  Patient has Magic mouthwash at home and I will add Carafate to this until patient is able to follow with gastroenterology  Discussed soft diet and avoiding anything that exacerbates symptoms  Discussed and emphasized return precautions in detail with the patient, who affirmed understanding                                             MDM    Disposition  Final diagnoses:   Choking episode   Pharyngitis   Odynophagia     Time reflects when diagnosis was documented in both MDM as applicable and the Disposition within this note     Time User Action Codes Description Comment    3/15/2023 12:52 AM Deshawn Reardon [R09 89] Choking episode     3/15/2023 12:52 AM Deshawn Reardon [J02 9] Pharyngitis     3/15/2023 12:52 AM Deshawn Reardon [R13 10] Odynophagia       ED Disposition     ED Disposition   Discharge    Condition   Stable    Date/Time   Wed Mar 15, 2023 12:52 AM    Comment   Latia Schmitz discharge to home/self care  Follow-up Information     Follow up With Specialties Details Why Contact Info Additional Chas[facility] Schedule an appointment as soon as possible for a visit  Follow-up with gastroenterology with persistent symptoms   [address] Aime Barrios [facility], [address] [facility], [facility], [place], [place], [address] [facility] Emergency Department Emergency Medicine Go to  If symptoms worsen [address] [facility] [id number] [facility] Emergency Department, [address] [facility], [place], [place], [address]          Discharge Medication List as of 3/15/2023  1:06 AM      START taking these medications    Details   sucralfate (CARAFATE) 1 g tablet Take 1 tablet (1 g total) by mouth 4 (four) times a day for 14 days, Starting Wed 3/15/2023, Until Wed 3/29/2023, Print         CONTINUE these medications which have NOT CHANGED    Details   acetaminophen (TYLENOL) 325 mg tablet Take 2 tablets (650 mg total) by mouth every 6 (six) hours as needed for mild pain, Starting Thu 4/7/2022, Normal      al mag oxide-diphenhydramine-lidocaine viscous (MAGIC MOUTHWASH) 1:1:1 suspension Swish and spit 10 mL every 4 (four) hours as needed for mouth pain or discomfort, Starting Tue 3/8/2022, Normal      albuterol (PROVENTIL HFA,VENTOLIN HFA) 90 mcg/act inhaler Inhale 2 puffs every 4 (four) hours as needed for wheezing, Starting Thu 7/29/2021, Normal      calcium carbonate (TUMS) 500 mg chewable tablet Chew 1 tablet (500 mg total) 3 (three) times a day as needed for indigestion or heartburn, Starting Thu 7/29/2021, Normal      CRANIAL PROSTHESIS, RX, Apply to cranial area as needed for comfort , Print      [facility] Melatonin 3 MG TAKE 2 TABLETS (6 MG TOTAL) BY MOUTH DAILY AT BEDTIME, Normal      dextromethorphan-guaiFENesin (ROBITUSSIN DM)  mg/5 mL syrup Take 10 mL by mouth every 4 (four) hours as needed for cough, Starting Thu 7/29/2021, Normal      dicyclomine (BENTYL) 10 mg capsule Take 1 capsule (10 mg total) by mouth every 6 (six) hours as needed (abdominal cramping), Starting Wed 9/7/2022, Normal      diphenhydrAMINE (BENADRYL) 50 MG tablet Take 1 tablet (50 mg total) by mouth daily at bedtime as needed for itching or sleep, Starting Tue 7/12/2022, Normal      diphenoxylate-atropine (LOMOTIL) 2 5-0 025 mg per tablet Take 1 tablet by mouth 4 (four) times a day as needed for diarrhea, Starting Mon 8/30/2021, Normal      docusate sodium (COLACE) 100 mg capsule Take 1 capsule (100 mg total) by mouth 2 (two) times a day for 10 days, Starting Wed 6/29/2022, Until Tue 12/27/2022, Normal      DULoxetine (CYMBALTA) 30 mg delayed release capsule Take 1 capsule (30 mg total) by mouth daily, Starting Wed 9/21/2022, Normal      HYDROmorphone (DILAUDID) 2 mg tablet Take 1-2 tablets (2-4 mg total) by mouth every 3 (three) hours as needed (moderate/severe cancer-related pain) Max Daily Amount: 32 mg, Starting Tue 3/7/2023, Normal      lactulose 20 g/30 mL Take 15 mL (10 g total) by mouth daily as needed (constipaton) for up to 10 days, Starting Wed 6/29/2022, Until Tue 12/27/2022 at 2359, Normal      letrozole [facility]) 2 5 mg tablet Take 1 tablet (2 5 mg total) by mouth daily, Starting Wed 9/21/2022, Until Tue 12/27/2022, Normal      Lidocaine Viscous HCl (XYLOCAINE) 2 % mucosal solution Swish and spit 10 mL 4 (four) times a day as needed for mouth pain or discomfort, Starting Thu 3/10/2022, Normal      metoclopramide (REGLAN) 10 mg tablet Take 1 tablet (10 mg total) by mouth 4 (four) times a day, Starting Thu 9/16/2021, Normal      multivitamin (THERAGRAN) TABS Take 1 tablet by mouth, Historical Med      ondansetron (ZOFRAN) 4 mg tablet Take 1 tablet (4 mg total) by mouth every 6 (six) hours as needed for nausea or vomiting, Starting Tue 3/7/2023, Normal      oxybutynin (DITROPAN-XL) 5 mg 24 hr tablet Take 1 tablet (5 mg total) by mouth daily Take 1 tablet daily, Starting Thu 8/18/2022, Normal      polyethylene glycol (MIRALAX) 17 g packet Take 17 g by mouth daily, Starting Thu 6/30/2022, No Print      potassium chloride (K-DUR,KLOR-CON) 20 mEq tablet Take 1 tablet (20 mEq total) by mouth 2 (two) times a day for 3 days, Starting Tue 11/1/2022, Until Fri 11/4/2022, Normal      senna (SENOKOT) 8 6 MG tablet Take 1 tablet (8 6 mg total) by mouth daily at bedtime as needed for constipation, Starting Tue 5/17/2022, Normal      Xarelto 20 MG tablet TAKE 1 TABLET BY MOUTH EVERY DAY WITH BREAKFAST, Normal             No discharge procedures on file      PDMP Review       Value Time User    PDMP Reviewed  Yes 3/7/2023  3:50 PM Miko Kuhn MD          ED Provider  Electronically Signed by           Jun Espinoza MD  03/15/23 [id number]

## 2023-03-23 NOTE — PROGRESS NOTES
Hematology/Oncology Progress Note    Date of Service: 3/24/2023    [facility]   [address] [id number]    Hem/Onc Problem List:   1  Metastatic right-sided breast cancer, ER and NE positive, HER2 negative  2  Bone metastatic disease  3  History of right-sided pleural effusion  Chief Complaint:    Routine follow-up for management of stage IV breast cancer    Assessment/Plan:     1  Metastatic breast cancer, with liver, bone, lymph node and pleura involvement   ER and NE positive, HER2 negative  Bone scan showed bony metastatic disease  Patient started monthly Zoladex, daily letrozole and CDK4/6 inhibitor Abemeciclib  Unfortunately, patient developed significant watery diarrhea from Abemaciclib  Abemaciclib was changed to Ibrance 125 mg daily for 3 weeks on,  followed by 1 week off until disease progression on August 30, 2021  Ibrance dose was decreased to 100 mg because of neutropenia  CT scan and bone scan showed disease progression and treatment changed to chemo (Eribulin)  Restaging CT 7/8/2022 shows a very good NE (near 50% reduction in index liver lesions)  NM bone scan showed no new findings  Restaging CT shows POD now  Patient s/p liver ablation 11/23/22  Further POD on CT 2/2023 11/23/2022 liver biopsy showed 1+ HER-2 status  Patient was switched to Enhertu 5 4mg/kg Q 21 days (C1D1 was 2/22/23) + Letrozole 2 5mg once daily  2  Bony metastatic disease  Bone scan showed bony metastatic disease on 7/11  Patient  continues Zometa every 3 months  3   History of right breast cancer, status post lumpectomy and axillary lymph node dissection, status post adjuvant radiation therapy  4  Right-sided pleural effusion, status post thoracentesis, cytology positive for adenocarcinoma, ER/NE positive, HER2 negative  Status post PleurX catheter placement  Has not required frequency drainage   Continue to treat underlying disease  · Discussion of decision making    I personally reviewed the following lab results, the image studies, pathology, other specialty/physicians consult notes and recommendations, and outside medical records from [facility]  I had a lengthy discussion with the patient and shared the work-up findings  I spent 30 minutes reviewing the records (labs, clinician notes, outside records, medical history, ordering medicine/tests/procedures, interpreting the imaging/labs previously done) and coordination of care as well as direct time with the patient today, of which greater than 50% of the time was spent in counseling and coordination of care with the patient/family  · Plan/Labs  · Metastatic breast cancer with diffuse bone metastasis  ECOG 1   · Continue Enhertu 5 4mg/kg Q 21 days  C3 coming up 4/5/23  Patient to have labs prior to treatment  · Continue Letrozole 2 5mg once daily  Continue this current treatment until disease progression  · Regarding nausea, patient will start Zofran 8 mg every 8 hours  She will take this consistently and if this does not help, we may consider providing her Compazine  I also offered oncology nutrition referral as she did lose about 5 pounds since last visit  She defers this for now and will let us know if she is interested in this at the next visit    · Restage CT CAP scheduled for 4/27/23  · Next ECHO to be scheduled for 5/18/23  · Continue to follow up with palliative care  · Continue Zometa Q12 weeks for bone metastasis  · OncotypeMap test NGS was done 4/18/2022 with results: PIK3CA: 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 mutation  ESR1: WT, PD-L1 IHC: negative  MSI-stable, TMB low (4 muts/mb)  BRCA 1&2 WT  Possible future treatment option would include Alpelisib + Fulvestrant  However, recent tissue examination off liver showed benefit from Enhertu HER2-low finding  Follow Up: q3 weeks alternating between MD & YESENIA   Will schedule more follow ups out to at least 8/2023  All questions were answered to the patient's satisfaction during this encounter  The patient knows the contact information for our office and knows to reach out for any relevant concerns related to this encounter  They are to call for any temperature 100 4 or higher, new symptoms including but not restricted to shaking chills, decreased appetite, nausea, vomiting, diarrhea, increased fatigue, shortness of breath or chest pain, confusion, and not feeling the strength to come to the clinic  For all other listed problems and medical diagnosis in their chart - they are managed by PCP and/or other specialists, which the patient acknowledges  Thank you very much for your consultation and making us a part of this patient's care  We are continuing to follow closely with you  Please do not hesitate to reach out to me with any additional questions or concerns  AJCC 8th Edition Cancer Stage :      Cancer Staging  No matching staging information was found for the patient  Hematology/Oncology History:   · History of right-sided breast cancer, initially diagnosed in 2012, status post lumpectomy and axillary lymph node dissection, status post adjuvant radiation therapy    Patient did not receive any adjuvant endocrine therapy or chemotherapy  · July 24, 2021, patient was admitted to hospital because of shortness of breath and cough  · July 24, 2021 CT chest PE protocol showed septal thickening throughout the right lung and bronchial wall thickening due to lymphangitic spread of tumor   Indeterminate lung nodules measuring 5 millimeter in the right upper lobe, right middle lobe and 7 millimeter in the left upper lobe and left lower lobe   Large right pleural effusion   Multiple liver metastatic disease measuring up to 4 centimeter   Lytic metastatic to sternal manubrium  · July 26, 2021, ultrasound abdomen shows multiple hepatic metastatic disease   Status post thoracentesis with 1200 mL of joanne fluid removed   Cytology showed adenoma carcinoma, ER and AR positive  40-50%, HER2 negative  · July 26, 2021 biopsy of the lung liver showed metastatic breast cancer, ER and % positive, HER2 negative   CT abdomen pelvis with contrast showed extensive hepatic metastatic disease  · July 27, 2021 MRI brain showed no evidence of intracranial metastatic disease  · August 6, 2021, mammogram showed no mammographic evidence of malignancy  · August 11, 2021 bone scan showed bony metastatic disease involving left iliac crest medially and adjacent sacrum  · August 12, 2021, Zoladex was given  Letrozole started  · August 16, 2022, patient started Abemaciclib  · August 30, 2021, DC abemaciclib because of diarrhea  Start Ibrance 125 mg daily 3 weeks on 1 week off  · Nov 19, 2021, CT c/a/p showed:   1   Since July 2021, new thromboses within the intrahepatic branches of the portal vein as described above  2   Increased diffuse sclerotic osseous lesions     3   Decreased size of most of the hepatic metastases  4   Indeterminate mottled appearance of the splenic parenchyma   Attention on follow-up is advised  5   Unchanged pulmonary nodules  6   Right pleural effusion has decreased in size since July 24, 2021   The smooth interlobular septal thickening has also decreased, and this change can be attributable to the decreased size of the pleural effusion  7   Mucosal hyperenhancement of the colon   Findings may be treatment-related, and correlation with gastrointestinal symptoms is advised      BONE SCAN:   1   A few areas of increased radiotracer uptake suspicious for osseous metastasis, with findings for minimal progression    · March 15, 2022 bone scan: Stable or improving scattered foci radiotracer uptake suspicious for osseous metastases   No new osseous foci suspicious for metastasis     ·  April 1, 2022 CT scan chest abdomen pelvis:  IMPRESSION:     Findings concerning for worsening hepatic metastases as evidenced by increased size and number of lesions   Please see above discussion      Progressive left portal vein thrombosis      The majority of the pulmonary nodules are stable   There is one subpleural nodule in the right upper lobe apex posteriorly, not seen on the prior study      Stable extensive osseous metastases      Multiple tiny hypodense splenic lesions are also stable, indeterminate      Persistent small right pleural effusion with a right pleural catheter in place      June 25, 2022: 4 day admission for neutropenic fever  July 8, 2022 CT CAP: 1    Stable scattered small pulmonary nodules bilaterally  Small right pleural effusion, slightly decreased  Improving hepatic metastasis  Less conspicuous small splenic hypodense lesions  Stable findings for widespread osseous metastasis  NM Bone Scan without significant changes (A lesion in the left lobe of the liver laterally measures 2 0 x 1 9 cm, previously 5 1 x 3 8 cm- 56 2% reduction   Somewhat stellate lesion in the right lobe of the liver  posteriorly now measures 1 8 x 1 2 cm image 33 series 2, previously 2 6 x 2 6 cm (42 3%)  July 11, 2022, NM bone scan stated no areas of new or worsening scintigraphic activity suggesting osseous disease progression  10/12/2022 CT CAP w/c: New 8 mm lesion within segment IVb of the liver compared to July 8, 2022, suspicious for progression of metastatic disease  However, a few other liver lesions are improved  Improved small right pleural effusion  Stable extensive osseous metastatic metastatic disease  11/23/22: S/P Liver ablation   2/1/2023 CT CAP w/c: Status post right mastectomy without recurrence  Interval increase in size and number of hepatic lesions consistent with worsening metastatic disease  Increased size of a right pleural effusion with pleural thickening concerning for malignant effusion  Stable osseous diastases  - 2/16/2023: ECHO --> left ventricle --> systolic function normal at 55%   Diastolic function is normal  Global longitudinal strain was normal at -20 3%  right ventricle normal    History of Present Illiness:   Liliane Mosqueda is a 56 y o  female with the above-noted HemOnc history who is here to follow up regarding breast cancer history  See details of history above  Patient current on Eribulin Q 21 days  Has been off Eribulin since C8D9 for 11/3/22  This is due to new hepatic metastasis requiring ablation  S/p liver ablation 11/23/22  Interval events 3/24/2023:  Patient came in for follow up  She has ongoing nausea that did improve slightly than previously  However, nausea is lingering longer than previously  Not taking zofran around the clock  She is eating only about 1-2 meals a day with snacks throughout the day  Presenting today with son's girlfriend  Otherwise, no other new complaints  Still has headache off/on  No visual changes, back pain, CP, SOB, heart palpitations, abd pain, N/V/D, bleeding anywhere, new peripheral neuropathy  No appetite changes  Weight at visit 8/2021: 146lbs  Weight 9/21/22: 131lbs  Weight 12/13/22: 123lbs   Weight 1/4/23: 125lbs  Weight: 2/9/2023: 126 lbs  Weight 3/8/2023: 127lbs  Weight 3/24/2023: 122lbs    ROS: A 10-point of review of systems is obtained and other than the above is noncontributory  Objective:   VITALS:   /72 (BP Location: Left arm, Patient Position: Sitting)   Pulse 67   Temp 97 8 °F (36 6 °C) (Temporal)   Resp 16   Ht 5' 5" (1 651 m)   Wt 55 3 kg (122 lb)   LMP 05/26/2021   SpO2 98%   BMI 20 30 kg/m²      Weight (last 2 days)     Date/Time Weight    03/24/23 1011 55 3 (122)              Physical EXAM:  General:  Alert, cooperative, no distress, appears stated age  Head:  Normocephalic, without obvious abnormality, atraumatic  Eyes:  Conjunctivae/corneas clear  EOMs intact   No evidence of conjunctivitis     Throat: Lips, mucosa, and tongue normal     Neck: Supple, symmetrical, trachea midline    Lungs:   Respiratory effort easy, nonlabored    Heart:  Regular rate and rhythm, +S1, S2   Abdomen:   Soft, non-tender,nondistended  No masses,      Extremities:  Lymphatics: Extremities normal, atraumatic, no cyanosis or edema  No cervical, axillary or inguinal adenopathy   Skin: Skin color, texture, turgor normal  No rashes  Neurologic: AAO   No focal neuro deficits       Allergies   Allergen Reactions   • Codeine Anaphylaxis   • Morphine GI Intolerance, Itching and Vomiting   • Sulfa Antibiotics Hives and Itching       Past Medical History:   Diagnosis Date   • Cancer ([facility] [address] )    • Headache(784 0) 2021   • History of radiation exposure    • Malignant neoplasm of right female breast ([facility] [address] ) 2012    right       Past Surgical History:   Procedure Laterality Date   • BACK SURGERY     • BREAST CYST EXCISION     • BREAST LUMPECTOMY Right    • HIP SURGERY Right     debridement of femur   • IR BIOPSY LIVER MASS  2021   • IR MICROWAVE ABLATION  2022   • IR PLEURAL EFFUSION LONG-TERM CATHETER PLACEMENT  2021   • IR PLEURAL EFFUSION LONG-TERM CATHETER REMOVAL  2022   • IR PORT PLACEMENT  2022   • IR THORACENTESIS  2021       Family History   Problem Relation Age of Onset   • Breast cancer Mother         in her 63's   • Breast cancer Sister         inher 45s and 48   • Colon cancer Maternal Grandmother    • No Known Problems Paternal Grandmother    • Breast cancer Other    • No Known Problems Paternal Aunt        Social History     Socioeconomic History   • Marital status: /Civil Union     Spouse name: Not on file   • Number of children: Not on file   • Years of education: Not on file   • Highest education level: Not on file   Occupational History   • Not on file   Tobacco Use   • Smoking status: Some Days     Packs/day: 0 25     Years: 15 00     Pack years: 3 75     Types: Cigarettes     Start date: 20     Last attempt to quit: 7/10/2021     Years since quittin 7   • Smokeless tobacco: Never   Vaping Use   • Vaping Use: Never used   Substance and Sexual Activity   • Alcohol use: Not Currently   • Drug use: Not Currently   • Sexual activity: Not Currently     Partners: Male     Birth control/protection: Male Sterilization   Other Topics Concern   • Not on file   Social History Narrative   • Not on file     Social Determinants of Health     Financial Resource Strain: Not on file   Food Insecurity: No Food Insecurity   • Worried About [employer] [employer] in the Last Year: Never true   • Ran Out of Food in the Last Year: Never true   Transportation Needs: No Transportation Needs   • Lack of Transportation (Medical): No   • Lack of Transportation (Non-Medical): No   Physical Activity: Not on file   Stress: Not on file   Social Connections: Not on file   Intimate Partner Violence: Not on file   Housing Stability: Low Risk    • Unable to Pay for Housing in the Last Year: No   • Number of Places Lived in the Last Year: 1   • Unstable Housing in the Last Year: No       Current Outpatient Medications   Medication Sig Dispense Refill   • acetaminophen (TYLENOL) 325 mg tablet Take 2 tablets (650 mg total) by mouth every 6 (six) hours as needed for mild pain 100 tablet 0   • al mag oxide-diphenhydramine-lidocaine viscous (MAGIC MOUTHWASH) 1:1:1 suspension Swish and spit 10 mL every 4 (four) hours as needed for mouth pain or discomfort 300 mL 0   • albuterol (PROVENTIL HFA,VENTOLIN HFA) 90 mcg/act inhaler Inhale 2 puffs every 4 (four) hours as needed for wheezing 8 g 0   • calcium carbonate (TUMS) 500 mg chewable tablet Chew 1 tablet (500 mg total) 3 (three) times a day as needed for indigestion or heartburn 30 tablet 0   • CRANIAL PROSTHESIS, RX, Apply to cranial area as needed for comfort   1 each 0   • [employer] Melatonin 3 MG TAKE 2 TABLETS (6 MG TOTAL) BY MOUTH DAILY AT BEDTIME 180 tablet 1   • dextromethorphan-guaiFENesin (ROBITUSSIN DM)  mg/5 mL syrup Take 10 mL by mouth every 4 (four) hours as needed for cough 236 mL 0 • dicyclomine (BENTYL) 10 mg capsule Take 1 capsule (10 mg total) by mouth every 6 (six) hours as needed (abdominal cramping) 40 capsule 0   • diphenhydrAMINE (BENADRYL) 50 MG tablet Take 1 tablet (50 mg total) by mouth daily at bedtime as needed for itching or sleep 30 tablet 0   • diphenoxylate-atropine (LOMOTIL) 2 5-0 025 mg per tablet Take 1 tablet by mouth 4 (four) times a day as needed for diarrhea 30 tablet 0   • DULoxetine (CYMBALTA) 30 mg delayed release capsule Take 1 capsule (30 mg total) by mouth daily 90 capsule 3   • HYDROmorphone (DILAUDID) 2 mg tablet Take 1-2 tablets (2-4 mg total) by mouth every 3 (three) hours as needed (moderate/severe cancer-related pain) Max Daily Amount: 32 mg 120 tablet 0   • Lidocaine Viscous HCl (XYLOCAINE) 2 % mucosal solution Swish and spit 10 mL 4 (four) times a day as needed for mouth pain or discomfort 200 mL 0   • metoclopramide (REGLAN) 10 mg tablet Take 1 tablet (10 mg total) by mouth 4 (four) times a day 28 tablet 0   • multivitamin (THERAGRAN) TABS Take 1 tablet by mouth     • ondansetron (ZOFRAN) 4 mg tablet Take 1 tablet (4 mg total) by mouth every 6 (six) hours as needed for nausea or vomiting 50 tablet 0   • ondansetron (ZOFRAN) 8 mg tablet Take 1 tablet (8 mg total) by mouth every 8 (eight) hours as needed for nausea or vomiting 20 tablet 2   • oxybutynin (DITROPAN-XL) 5 mg 24 hr tablet Take 1 tablet (5 mg total) by mouth daily Take 1 tablet daily 90 tablet 3   • polyethylene glycol (MIRALAX) 17 g packet Take 17 g by mouth daily  0   • senna (SENOKOT) 8 6 MG tablet Take 1 tablet (8 6 mg total) by mouth daily at bedtime as needed for constipation 30 tablet 0   • sucralfate (CARAFATE) 1 g tablet Take 1 tablet (1 g total) by mouth 4 (four) times a day for 14 days 56 tablet 0   • Xarelto 20 MG tablet TAKE 1 TABLET BY MOUTH EVERY DAY WITH BREAKFAST 30 tablet 11   • docusate sodium (COLACE) 100 mg capsule Take 1 capsule (100 mg total) by mouth 2 (two) times a day for 10 days 20 capsule 0   • lactulose 20 g/30 mL Take 15 mL (10 g total) by mouth daily as needed (constipaton) for up to 10 days 300 mL 0   • letrozole (FEMARA) 2 5 mg tablet Take 1 tablet (2 5 mg total) by mouth daily 90 tablet 2   • potassium chloride (K-DUR,KLOR-CON) 20 mEq tablet Take 1 tablet (20 mEq total) by mouth 2 (two) times a day for 3 days (Patient not taking: Reported on 12/27/2022) 6 tablet 0     No current facility-administered medications for this visit  (Not in a hospital admission)      DATA REVIEW:    Pathology Result:    Final Diagnosis   Date Value Ref Range Status   11/23/2022   Final    A  Liver core (biopsy):  - Metastatic carcinoma    Comment:  - ER, AK, Her2 ordered  - The tumor cells stain for GATA3 with absent CK7, CK20, GCDFP15 staining  The immunoprofile is most consistent with the patient's known breast primary  MOLECULAR TESTING AND CONSULT SLIDES:     Molecular testing:  Block A1 is available for molecular testing  Consult slides:  Slide A1 with associated IHC is available for consultation  Interpretation performed at [address]        07/26/2021   Final    A  B  Thoracentesis, Right (ThinPrep and cell block preparations):  Positive for malignancy  Metastatic carcinoma, compatible with breast primary  -  Immunohistochemical stains performed with appropriate controls show the tumor cells to be positive for Matthew-EP4, MOC31, BARRY-3 and ER with scattered background mesothelial cells staining for WT1 and calretinin, supporting the diagnosis  Satisfactory for evaluation  07/26/2021   Final    A  Liver (core biopsy):     - Poorly-differentiated carcinoma, most compatible with metastasis from the patient's reported breast primary  Comment:  ER / AK / Her-2 pending  Comment: This is an appended report  These results have been appended to a previously preliminary verified report          Image Results: They are reviewed and documented in Hematology/Oncology history    CT soft tissue neck wo contrast  Narrative: CT SOFT TISSUE NECK WITHOUT CONTRAST    INDICATION:   ?foreign body  COMPARISON:  None  TECHNIQUE:  Axial, sagittal, and coronal 2D reformatted images were created from the axial source data and submitted for interpretation  Radiation dose length product (DLP) for this visit:  285 mGy-cm   This examination, like all CT scans performed in the [facility], was performed utilizing techniques to minimize radiation dose exposure, including the use of iterative   reconstruction and automated exposure control  IMAGE QUALITY:  Diagnostic  FINDINGS:    VISUALIZED BRAIN PARENCHYMA:  Normal visualized brain parenchyma  VISUALIZED ORBITS: Normal visualized orbits  PARANASAL SINUSES: Normal visualized paranasal sinuses  NASAL CAVITY AND NASOPHARYNX:  Normal     SUPRAHYOID NECK:  Normal oropharynx and oral cavity  Normal parapharyngeal and retropharyngeal spaces  Normal tonsillar tissue and epiglottis  Normal infratemporal space  INFRAHYOID NECK:  Aryepiglottic folds and piriform sinuses are normal  Normal larynx and subglottic airway  THYROID GLAND:  Unremarkable  PAROTID AND SUBMANDIBULAR GLANDS: Normal     LYMPH NODES:  No pathologic or enlarged adenopathy  VASCULAR STRUCTURES:  Limited without contrast     THORACIC INLET:  Please see concurrently reported CT of the chest, abdomen and pelvis for findings in these locations  BONY STRUCTURES:  Scattered nodular sclerosis throughout the visualized bones concerning for osseous metastasis  Mild multilevel degenerative changes     Impression: Scattered nodular sclerosis throughout the visualized bones concerning for osseous metastasis  Mild multilevel degenerative changes       No radiopaque foreign body visualized   Consider ENT or GI consultation or single contrast esophagram as indicated clinically  Workstation performed: [id number]  CT chest without contrast  Narrative: CT CHEST WITHOUT IV CONTRAST    INDICATION:   ?foreign body  COMPARISON:  2/1/2023    TECHNIQUE: CT examination of the chest was performed without intravenous contrast  Axial, sagittal, and coronal 2D reformatted images were created from the source data and submitted for interpretation  Radiation dose length product (DLP) for this visit:  233 mGy-cm   This examination, like all CT scans performed in the [facility], was performed utilizing techniques to minimize radiation dose exposure, including the use of iterative   reconstruction and automated exposure control  FINDINGS: Right IJ approach central venous catheter with peripheral injection port seen with its tip at the junction of the SVC and right atrium  LUNGS: 5 x 4 mm spiculated nodule in the left lower lobe concerning for metastasis  Diffuse interlobular septal thickening in the right lung concerning for lymphangitic carcinomatosis  There is no tracheal or endobronchial lesion  PLEURA:  Moderate right effusion concerning for malignant effusion    Right pleural drainage catheter is noted  HEART/GREAT VESSELS: Heart is unremarkable for patient's age  No thoracic aortic aneurysm  MEDIASTINUM AND KIA:  Calcified hilar and mediastinal lymph nodes likely from prior granulomatous disease or treated metastatic disease       CHEST WALL AND LOWER NECK:  Unremarkable  VISUALIZED STRUCTURES IN THE UPPER ABDOMEN:  Innumerable hypodense lesions in the liver concerning for metastasis       OSSEOUS STRUCTURES: Innumerable sclerotic nodules throughout the visualized osseous structures compatible with metastasis  No destructive osseous lesion  Impression: No radiopaque foreign body visualized  Osseous and pulmonary metastasis as above  Workstation performed: [id number]        LABS:  Lab data are reviewed and documented in HemOnc history       No results found for this or any previous visit (from the past 50 hour(s))            Harvey Hi  3/24/2023, 11:21 AM

## 2023-03-24 ENCOUNTER — OFFICE VISIT (OUTPATIENT)
Dept: HEMATOLOGY ONCOLOGY | Facility: CLINIC | Age: 58
End: 2023-03-24

## 2023-03-24 VITALS
HEIGHT: 65 IN | HEART RATE: 67 BPM | BODY MASS INDEX: 20.33 KG/M2 | RESPIRATION RATE: 16 BRPM | WEIGHT: 122 LBS | SYSTOLIC BLOOD PRESSURE: 102 MMHG | TEMPERATURE: 97.8 F | DIASTOLIC BLOOD PRESSURE: 72 MMHG | OXYGEN SATURATION: 98 %

## 2023-03-24 DIAGNOSIS — R11.0 NAUSEA: Primary | ICD-10-CM

## 2023-03-24 RX ORDER — DEXTROSE MONOHYDRATE 50 MG/ML
20 INJECTION, SOLUTION INTRAVENOUS ONCE
OUTPATIENT
Start: 2023-04-05

## 2023-03-24 RX ORDER — ONDANSETRON HYDROCHLORIDE 8 MG/1
8 TABLET, FILM COATED ORAL EVERY 8 HOURS PRN
Qty: 20 TABLET | Refills: 2 | Status: SHIPPED | OUTPATIENT
Start: 2023-03-24

## 2023-04-04 ENCOUNTER — HOSPITAL ENCOUNTER (OUTPATIENT)
Dept: INFUSION CENTER | Facility: CLINIC | Age: 58
Discharge: HOME | End: 2023-04-04

## 2023-04-04 DIAGNOSIS — C78.7 MALIGNANT NEOPLASM METASTATIC TO LIVER (HCC): ICD-10-CM

## 2023-04-04 DIAGNOSIS — J91.0 MALIGNANT PLEURAL EFFUSION: Primary | ICD-10-CM

## 2023-04-04 DIAGNOSIS — Z95.828 PORT-A-CATH IN PLACE: ICD-10-CM

## 2023-04-04 DIAGNOSIS — C50.911 PRIMARY MALIGNANT NEOPLASM OF RIGHT BREAST WITH METASTASIS TO OTHER SITE (HCC): ICD-10-CM

## 2023-04-04 DIAGNOSIS — Z17.0 MALIGNANT NEOPLASM OF BREAST IN FEMALE, ESTROGEN RECEPTOR POSITIVE, UNSPECIFIED LATERALITY, UNSPECIFIED SITE OF BREAST (HCC): ICD-10-CM

## 2023-04-04 DIAGNOSIS — C79.51 MALIGNANT NEOPLASM METASTATIC TO BONE (HCC): ICD-10-CM

## 2023-04-04 DIAGNOSIS — C50.919 MALIGNANT NEOPLASM OF BREAST IN FEMALE, ESTROGEN RECEPTOR POSITIVE, UNSPECIFIED LATERALITY, UNSPECIFIED SITE OF BREAST (HCC): ICD-10-CM

## 2023-04-04 LAB
ALBUMIN SERPL BCP-MCNC: 3.7 G/DL (ref 3.5–5)
ALP SERPL-CCNC: 74 U/L (ref 34–104)
ALT SERPL W P-5'-P-CCNC: 19 U/L (ref 7–52)
ANION GAP SERPL CALCULATED.3IONS-SCNC: 7 MMOL/L (ref 4–13)
AST SERPL W P-5'-P-CCNC: 40 U/L (ref 13–39)
BASOPHILS # BLD AUTO: 0.03 THOUSANDS/ÂΜL (ref 0–0.1)
BASOPHILS NFR BLD AUTO: 1 % (ref 0–1)
BILIRUB SERPL-MCNC: 0.28 MG/DL (ref 0.2–1)
BUN SERPL-MCNC: 18 MG/DL (ref 5–25)
CALCIUM SERPL-MCNC: 9.2 MG/DL (ref 8.4–10.2)
CHLORIDE SERPL-SCNC: 110 MMOL/L (ref 96–108)
CO2 SERPL-SCNC: 23 MMOL/L (ref 21–32)
CREAT SERPL-MCNC: 0.77 MG/DL (ref 0.6–1.3)
EOSINOPHIL # BLD AUTO: 0.07 THOUSAND/ÂΜL (ref 0–0.61)
EOSINOPHIL NFR BLD AUTO: 3 % (ref 0–6)
ERYTHROCYTE [DISTWIDTH] IN BLOOD BY AUTOMATED COUNT: 17.8 % (ref 11.6–15.1)
GFR SERPL CREATININE-BSD FRML MDRD: 87 ML/MIN/1.73SQ M
GLUCOSE SERPL-MCNC: 77 MG/DL (ref 65–140)
HCT VFR BLD AUTO: 35.5 % (ref 34.8–46.1)
HGB BLD-MCNC: 11.8 G/DL (ref 11.5–15.4)
IMM GRANULOCYTES # BLD AUTO: 0 THOUSAND/UL (ref 0–0.2)
IMM GRANULOCYTES NFR BLD AUTO: 0 % (ref 0–2)
LYMPHOCYTES # BLD AUTO: 0.35 THOUSANDS/ÂΜL (ref 0.6–4.47)
LYMPHOCYTES NFR BLD AUTO: 13 % (ref 14–44)
MCH RBC QN AUTO: 34.2 PG (ref 26.8–34.3)
MCHC RBC AUTO-ENTMCNC: 33.2 G/DL (ref 31.4–37.4)
MCV RBC AUTO: 103 FL (ref 82–98)
MONOCYTES # BLD AUTO: 0.31 THOUSAND/ÂΜL (ref 0.17–1.22)
MONOCYTES NFR BLD AUTO: 11 % (ref 4–12)
NEUTROPHILS # BLD AUTO: 1.99 THOUSANDS/ÂΜL (ref 1.85–7.62)
NEUTS SEG NFR BLD AUTO: 72 % (ref 43–75)
NRBC BLD AUTO-RTO: 0 /100 WBCS
PLATELET # BLD AUTO: 199 THOUSANDS/UL (ref 149–390)
PMV BLD AUTO: 11 FL (ref 8.9–12.7)
POTASSIUM SERPL-SCNC: 3.9 MMOL/L (ref 3.5–5.3)
PROT SERPL-MCNC: 6.5 G/DL (ref 6.4–8.4)
RBC # BLD AUTO: 3.45 MILLION/UL (ref 3.81–5.12)
SODIUM SERPL-SCNC: 140 MMOL/L (ref 135–147)
WBC # BLD AUTO: 2.75 THOUSAND/UL (ref 4.31–10.16)

## 2023-04-04 NOTE — PROGRESS NOTES
Pt presents for labs only offering no complaints  Labs obtained via [facility] port a cath without difficulty  Port flushed per protocol  Pt discharged in stable condition

## 2023-04-05 ENCOUNTER — HOSPITAL ENCOUNTER (OUTPATIENT)
Dept: INFUSION CENTER | Facility: CLINIC | Age: 58
Discharge: HOME | End: 2023-04-05

## 2023-04-05 VITALS
WEIGHT: 127.6 LBS | TEMPERATURE: 97.7 F | HEART RATE: 81 BPM | DIASTOLIC BLOOD PRESSURE: 68 MMHG | BODY MASS INDEX: 21.23 KG/M2 | SYSTOLIC BLOOD PRESSURE: 119 MMHG | RESPIRATION RATE: 18 BRPM

## 2023-04-05 DIAGNOSIS — J91.0 MALIGNANT PLEURAL EFFUSION: Primary | ICD-10-CM

## 2023-04-05 DIAGNOSIS — C79.51 MALIGNANT NEOPLASM METASTATIC TO BONE (HCC): ICD-10-CM

## 2023-04-05 DIAGNOSIS — C50.911 PRIMARY MALIGNANT NEOPLASM OF RIGHT BREAST WITH METASTASIS TO OTHER SITE (HCC): ICD-10-CM

## 2023-04-05 RX ORDER — DEXTROSE MONOHYDRATE 50 MG/ML
20 INJECTION, SOLUTION INTRAVENOUS ONCE
Status: COMPLETED | OUTPATIENT
Start: 2023-04-05 | End: 2023-04-05

## 2023-04-05 RX ADMIN — DEXTROSE 20 ML/HR: 5 SOLUTION INTRAVENOUS at 07:50

## 2023-04-05 RX ADMIN — FAM-TRASTUZUMAB DERUXTECAN-NXKI 300 MG: 100 INJECTION, POWDER, LYOPHILIZED, FOR SOLUTION INTRAVENOUS at 08:34

## 2023-04-05 RX ADMIN — DEXAMETHASONE SODIUM PHOSPHATE: 10 INJECTION, SOLUTION INTRAMUSCULAR; INTRAVENOUS at 07:54

## 2023-04-05 NOTE — PROGRESS NOTES
Pt to clinic for Enhertu, c/o nausea with this treatment, but MD aware and anti-nausea meds adjusted, tolerated infusion without complications, aware of next appointment, port de-accessed, avs declined but schedule printed

## 2023-04-19 ENCOUNTER — OFFICE VISIT (OUTPATIENT)
Dept: PALLIATIVE MEDICINE | Facility: CLINIC | Age: 58
End: 2023-04-19

## 2023-04-19 DIAGNOSIS — Z71.89 COUNSELING AND COORDINATION OF CARE: Primary | ICD-10-CM

## 2023-04-24 NOTE — PROGRESS NOTES
Palliative Supportive Care  met with patient/family to continue to provide emotional support and guidance  Updated biopsychosocial information relevant to support: Patient reported that her  will be set to be released from [facility] next month  Patient does worry that he will fall back into the cycle of drinking once he is out and causing her more anxiety and stress  Patient reported that she doesn't want to go down that path again  Patient reported that Jane Mckeon is doing better and that he is in the process of obtaining his GED  Patient reports that she is trying to focus on her own health and doesn't need additional stressors  Identified areas of need include: ongoing support   Resources provided: none  Areas that need future follow-up include:ongoing support     I have spent 40 minutes with Patient  today in which greater than 50% of this time was spent in counseling/coordination of care regarding Patient and family education, Impressions, Counseling / Coordination of care, Documenting in the medical record and Obtaining or reviewing history        Palliative  will follow-up as requested by patient, family, and primary team   Please contact with any specific requests

## 2023-04-25 ENCOUNTER — OFFICE VISIT (OUTPATIENT)
Dept: URGENT CARE | Facility: CLINIC | Age: 58
End: 2023-04-25

## 2023-04-25 ENCOUNTER — TELEPHONE (OUTPATIENT)
Dept: OTHER | Facility: OTHER | Age: 58
End: 2023-04-25

## 2023-04-25 ENCOUNTER — HOSPITAL ENCOUNTER (OUTPATIENT)
Dept: INFUSION CENTER | Facility: CLINIC | Age: 58
Discharge: HOME | End: 2023-04-25

## 2023-04-25 VITALS
SYSTOLIC BLOOD PRESSURE: 125 MMHG | OXYGEN SATURATION: 96 % | TEMPERATURE: 98.7 F | RESPIRATION RATE: 18 BRPM | DIASTOLIC BLOOD PRESSURE: 75 MMHG | HEART RATE: 96 BPM

## 2023-04-25 DIAGNOSIS — Z17.0 MALIGNANT NEOPLASM OF BREAST IN FEMALE, ESTROGEN RECEPTOR POSITIVE, UNSPECIFIED LATERALITY, UNSPECIFIED SITE OF BREAST (HCC): ICD-10-CM

## 2023-04-25 DIAGNOSIS — R39.9 UTI SYMPTOMS: Primary | ICD-10-CM

## 2023-04-25 DIAGNOSIS — C50.911 PRIMARY MALIGNANT NEOPLASM OF RIGHT BREAST WITH METASTASIS TO OTHER SITE (HCC): ICD-10-CM

## 2023-04-25 DIAGNOSIS — Z95.828 PORT-A-CATH IN PLACE: ICD-10-CM

## 2023-04-25 DIAGNOSIS — J91.0 MALIGNANT PLEURAL EFFUSION: Primary | ICD-10-CM

## 2023-04-25 DIAGNOSIS — C50.919 MALIGNANT NEOPLASM OF BREAST IN FEMALE, ESTROGEN RECEPTOR POSITIVE, UNSPECIFIED LATERALITY, UNSPECIFIED SITE OF BREAST (HCC): ICD-10-CM

## 2023-04-25 DIAGNOSIS — C79.51 MALIGNANT NEOPLASM METASTATIC TO BONE (HCC): ICD-10-CM

## 2023-04-25 DIAGNOSIS — C78.7 MALIGNANT NEOPLASM METASTATIC TO LIVER (HCC): ICD-10-CM

## 2023-04-25 LAB
ALBUMIN SERPL BCP-MCNC: 3.7 G/DL (ref 3.5–5)
ALP SERPL-CCNC: 80 U/L (ref 34–104)
ALT SERPL W P-5'-P-CCNC: 25 U/L (ref 7–52)
ANION GAP SERPL CALCULATED.3IONS-SCNC: 4 MMOL/L (ref 4–13)
AST SERPL W P-5'-P-CCNC: 56 U/L (ref 13–39)
BASOPHILS # BLD AUTO: 0.02 THOUSANDS/ΜL (ref 0–0.1)
BASOPHILS NFR BLD AUTO: 1 % (ref 0–1)
BILIRUB SERPL-MCNC: 0.3 MG/DL (ref 0.2–1)
BUN SERPL-MCNC: 9 MG/DL (ref 5–25)
CALCIUM SERPL-MCNC: 9 MG/DL (ref 8.4–10.2)
CHLORIDE SERPL-SCNC: 107 MMOL/L (ref 96–108)
CO2 SERPL-SCNC: 28 MMOL/L (ref 21–32)
CREAT SERPL-MCNC: 0.65 MG/DL (ref 0.6–1.3)
EOSINOPHIL # BLD AUTO: 0.06 THOUSAND/ΜL (ref 0–0.61)
EOSINOPHIL NFR BLD AUTO: 2 % (ref 0–6)
ERYTHROCYTE [DISTWIDTH] IN BLOOD BY AUTOMATED COUNT: 17.2 % (ref 11.6–15.1)
GFR SERPL CREATININE-BSD FRML MDRD: 100 ML/MIN/1.73SQ M
GLUCOSE SERPL-MCNC: 105 MG/DL (ref 65–140)
HCT VFR BLD AUTO: 36.2 % (ref 34.8–46.1)
HGB BLD-MCNC: 12 G/DL (ref 11.5–15.4)
IMM GRANULOCYTES # BLD AUTO: 0.01 THOUSAND/UL (ref 0–0.2)
IMM GRANULOCYTES NFR BLD AUTO: 0 % (ref 0–2)
LYMPHOCYTES # BLD AUTO: 0.42 THOUSANDS/ΜL (ref 0.6–4.47)
LYMPHOCYTES NFR BLD AUTO: 14 % (ref 14–44)
MCH RBC QN AUTO: 34.1 PG (ref 26.8–34.3)
MCHC RBC AUTO-ENTMCNC: 33.1 G/DL (ref 31.4–37.4)
MCV RBC AUTO: 103 FL (ref 82–98)
MONOCYTES # BLD AUTO: 0.32 THOUSAND/ΜL (ref 0.17–1.22)
MONOCYTES NFR BLD AUTO: 11 % (ref 4–12)
NEUTROPHILS # BLD AUTO: 2.21 THOUSANDS/ΜL (ref 1.85–7.62)
NEUTS SEG NFR BLD AUTO: 72 % (ref 43–75)
NRBC BLD AUTO-RTO: 0 /100 WBCS
PLATELET # BLD AUTO: 223 THOUSANDS/UL (ref 149–390)
PMV BLD AUTO: 11.4 FL (ref 8.9–12.7)
POTASSIUM SERPL-SCNC: 4.1 MMOL/L (ref 3.5–5.3)
PROT SERPL-MCNC: 5.8 G/DL (ref 6.4–8.4)
RBC # BLD AUTO: 3.52 MILLION/UL (ref 3.81–5.12)
SODIUM SERPL-SCNC: 139 MMOL/L (ref 135–147)
WBC # BLD AUTO: 3.04 THOUSAND/UL (ref 4.31–10.16)

## 2023-04-25 RX ORDER — NITROFURANTOIN 25; 75 MG/1; MG/1
100 CAPSULE ORAL 2 TIMES DAILY
Qty: 14 CAPSULE | Refills: 0 | Status: SHIPPED | OUTPATIENT
Start: 2023-04-25 | End: 2023-05-02

## 2023-04-25 NOTE — PROGRESS NOTES
[facility]        NAME: Jimi Smyth is a 56 y o  female  : 1965    MRN: [id number]  DATE: 2023  TIME: 7:11 PM    Assessment and Plan   UTI symptoms [R39 9]  1  UTI symptoms  nitrofurantoin (MACROBID) 100 mg capsule            Patient Instructions   Take Nitrofurantoin as prescribed-call oncology and let them know of infection   Drink plenty of water   Cranberry supplements  Urinate within 5 minutes following intercourse  Follow up with OB/GYN  Follow up with PCP in 3-5 days  Proceed to  ER if symptoms worsen  Chief Complaint     Chief Complaint   Patient presents with   • Possible UTI     Increased burning, frequency, and urgency  Started a couple of days ago  Is on chemo therapy  History of Present Illness       HPI  This is a 57 y/o female here c/o burning, frequency and urgency with urination for the past 2 days  Took azo  Denies back pain, abdominal pain, fevers, chills, shortness of breath, chest pain, vomiting or diarrhea  Patient notes she is nauseous but that has been since starting her new chemo drug  Review of Systems   Review of Systems   Constitutional: Negative for chills and fever  Cardiovascular: Negative for chest pain  Gastrointestinal: Positive for nausea  Negative for abdominal pain, diarrhea and vomiting  Genitourinary: Positive for dysuria, frequency and urgency  Musculoskeletal: Negative for back pain           Current Medications       Current Outpatient Medications:   •  acetaminophen (TYLENOL) 325 mg tablet, Take 2 tablets (650 mg total) by mouth every 6 (six) hours as needed for mild pain, Disp: 100 tablet, Rfl: 0  •  al mag oxide-diphenhydramine-lidocaine viscous (MAGIC MOUTHWASH) 1:1:1 suspension, Swish and spit 10 mL every 4 (four) hours as needed for mouth pain or discomfort, Disp: 300 mL, Rfl: 0  •  albuterol (PROVENTIL HFA,VENTOLIN HFA) 90 mcg/act inhaler, Inhale 2 puffs every 4 (four) hours as needed for wheezing, Disp: 8 g, Rfl: 0  •  calcium carbonate (TUMS) 500 mg chewable tablet, Chew 1 tablet (500 mg total) 3 (three) times a day as needed for indigestion or heartburn, Disp: 30 tablet, Rfl: 0  •  dicyclomine (BENTYL) 10 mg capsule, Take 1 capsule (10 mg total) by mouth every 6 (six) hours as needed (abdominal cramping), Disp: 40 capsule, Rfl: 0  •  diphenhydrAMINE (BENADRYL) 50 MG tablet, Take 1 tablet (50 mg total) by mouth daily at bedtime as needed for itching or sleep, Disp: 30 tablet, Rfl: 0  •  DULoxetine (CYMBALTA) 30 mg delayed release capsule, Take 1 capsule (30 mg total) by mouth daily, Disp: 90 capsule, Rfl: 3  •  HYDROmorphone (DILAUDID) 2 mg tablet, Take 1-2 tablets (2-4 mg total) by mouth every 3 (three) hours as needed (moderate/severe cancer-related pain) Max Daily Amount: 32 mg, Disp: 120 tablet, Rfl: 0  •  lactulose 20 g/30 mL, Take 15 mL (10 g total) by mouth daily as needed (constipaton) for up to 10 days, Disp: 300 mL, Rfl: 0  •  letrozole (FEMARA) 2 5 mg tablet, Take 1 tablet (2 5 mg total) by mouth daily, Disp: 90 tablet, Rfl: 2  •  Lidocaine Viscous HCl (XYLOCAINE) 2 % mucosal solution, Swish and spit 10 mL 4 (four) times a day as needed for mouth pain or discomfort, Disp: 200 mL, Rfl: 0  •  metoclopramide (REGLAN) 10 mg tablet, Take 1 tablet (10 mg total) by mouth 4 (four) times a day, Disp: 28 tablet, Rfl: 0  •  multivitamin (THERAGRAN) TABS, Take 1 tablet by mouth, Disp: , Rfl:   •  nitrofurantoin (MACROBID) 100 mg capsule, Take 1 capsule (100 mg total) by mouth 2 (two) times a day for 7 days, Disp: 14 capsule, Rfl: 0  •  ondansetron (ZOFRAN) 4 mg tablet, Take 1 tablet (4 mg total) by mouth every 6 (six) hours as needed for nausea or vomiting, Disp: 50 tablet, Rfl: 0  •  ondansetron (ZOFRAN) 8 mg tablet, Take 1 tablet (8 mg total) by mouth every 8 (eight) hours as needed for nausea or vomiting, Disp: 20 tablet, Rfl: 2  •  oxybutynin (DITROPAN-XL) 5 mg 24 hr tablet, Take 1 tablet (5 mg total) by mouth daily Take 1 tablet daily, Disp: 90 tablet, Rfl: 3  •  polyethylene glycol (MIRALAX) 17 g packet, Take 17 g by mouth daily, Disp: , Rfl: 0  •  senna (SENOKOT) 8 6 MG tablet, Take 1 tablet (8 6 mg total) by mouth daily at bedtime as needed for constipation, Disp: 30 tablet, Rfl: 0  •  Xarelto 20 MG tablet, TAKE 1 TABLET BY MOUTH EVERY DAY WITH BREAKFAST, Disp: 30 tablet, Rfl: 11  •  CRANIAL PROSTHESIS, RX,, Apply to cranial area as needed for comfort , Disp: 1 each, Rfl: 0  •  [employer] Melatonin 3 MG, TAKE 2 TABLETS (6 MG TOTAL) BY MOUTH DAILY AT BEDTIME (Patient not taking: Reported on 4/19/2023), Disp: 180 tablet, Rfl: 1  •  dextromethorphan-guaiFENesin (ROBITUSSIN DM)  mg/5 mL syrup, Take 10 mL by mouth every 4 (four) hours as needed for cough (Patient not taking: Reported on 4/25/2023), Disp: 236 mL, Rfl: 0  •  diphenoxylate-atropine (LOMOTIL) 2 5-0 025 mg per tablet, Take 1 tablet by mouth 4 (four) times a day as needed for diarrhea (Patient not taking: Reported on 4/25/2023), Disp: 30 tablet, Rfl: 0  •  docusate sodium (COLACE) 100 mg capsule, Take 1 capsule (100 mg total) by mouth 2 (two) times a day for 10 days, Disp: 20 capsule, Rfl: 0  •  potassium chloride (K-DUR,KLOR-CON) 20 mEq tablet, Take 1 tablet (20 mEq total) by mouth 2 (two) times a day for 3 days (Patient not taking: Reported on 12/27/2022), Disp: 6 tablet, Rfl: 0  •  sucralfate (CARAFATE) 1 g tablet, Take 1 tablet (1 g total) by mouth 4 (four) times a day for 14 days, Disp: 56 tablet, Rfl: 0    Current Allergies     Allergies as of 04/25/2023 - Reviewed 04/25/2023   Allergen Reaction Noted   • Codeine Anaphylaxis 11/12/2013   • Morphine GI Intolerance, Itching, and Vomiting 07/16/2015   • Sulfa antibiotics Hives and Itching 07/16/2015            The following portions of the patient's history were reviewed and updated as appropriate: allergies, current medications, past family history, past medical history, past social history, past surgical history and problem list      Past Medical History:   Diagnosis Date   • Cancer ([facility] [address] )    • Headache(784 0) 11/2021   • History of radiation exposure    • Malignant neoplasm of right female breast ([facility] [address] ) 2012    right       Past Surgical History:   Procedure Laterality Date   • BACK SURGERY     • BREAST CYST EXCISION     • BREAST LUMPECTOMY Right 2012   • HIP SURGERY Right     debridement of femur   • IR BIOPSY LIVER MASS  07/26/2021   • IR MICROWAVE ABLATION  11/23/2022   • IR PLEURAL EFFUSION LONG-TERM CATHETER PLACEMENT  08/17/2021   • IR PLEURAL EFFUSION LONG-TERM CATHETER REMOVAL  05/11/2022   • IR PORT PLACEMENT  07/27/2022   • IR THORACENTESIS  07/26/2021       Family History   Problem Relation Age of Onset   • Breast cancer Mother         in her 63's   • Breast cancer Sister         inher 45s and 48   • Colon cancer Maternal Grandmother    • No Known Problems Paternal Grandmother    • Breast cancer Other    • No Known Problems Paternal Aunt          Medications have been verified  Objective   /75   Pulse 96   Temp 98 7 °F (37 1 °C) (Temporal)   Resp 18   LMP 05/26/2021   SpO2 96%        Physical Exam     Physical Exam  Vitals and nursing note reviewed  Constitutional:       Appearance: Normal appearance  Cardiovascular:      Rate and Rhythm: Normal rate and regular rhythm  Pulmonary:      Effort: Pulmonary effort is normal       Breath sounds: Normal breath sounds  Abdominal:      General: Abdomen is flat  Bowel sounds are normal  There is no distension  Palpations: Abdomen is soft  Tenderness: There is no abdominal tenderness  There is no right CVA tenderness, left CVA tenderness or guarding  Neurological:      Mental Status: She is alert

## 2023-04-26 ENCOUNTER — TELEPHONE (OUTPATIENT)
Dept: HEMATOLOGY ONCOLOGY | Facility: CLINIC | Age: 58
End: 2023-04-26

## 2023-04-26 ENCOUNTER — HOSPITAL ENCOUNTER (OUTPATIENT)
Dept: INFUSION CENTER | Facility: CLINIC | Age: 58
Discharge: HOME | End: 2023-04-26

## 2023-04-26 DIAGNOSIS — C79.51 MALIGNANT NEOPLASM METASTATIC TO BONE (HCC): Primary | ICD-10-CM

## 2023-04-26 DIAGNOSIS — J91.0 MALIGNANT PLEURAL EFFUSION: ICD-10-CM

## 2023-04-26 DIAGNOSIS — C50.911 PRIMARY MALIGNANT NEOPLASM OF RIGHT BREAST WITH METASTASIS TO OTHER SITE (HCC): ICD-10-CM

## 2023-04-26 NOTE — TELEPHONE ENCOUNTER
Left message on answering machine with time and date of next infusions, patient aware schedule is updated on mychart  Patient was provided with my teams number to return my call if rescheduling is needed

## 2023-04-26 NOTE — TELEPHONE ENCOUNTER
Please defer treatment one week due to patient being on antibiotics  Attempted call out to patient, left vm with message above with RN teams number and hours of operation to further discuss

## 2023-04-26 NOTE — TELEPHONE ENCOUNTER
Pt was seen today at [facility] for a UTI was told to call in and let office know about being prescribed medication       Please give her a call back with any questions

## 2023-04-27 ENCOUNTER — HOSPITAL ENCOUNTER (OUTPATIENT)
Dept: CT IMAGING | Facility: HOSPITAL | Age: 58
Discharge: HOME | End: 2023-04-27
Attending: INTERNAL MEDICINE

## 2023-04-27 DIAGNOSIS — Z17.0 MALIGNANT NEOPLASM OF LEFT BREAST IN FEMALE, ESTROGEN RECEPTOR POSITIVE, UNSPECIFIED SITE OF BREAST (HCC): ICD-10-CM

## 2023-04-27 DIAGNOSIS — C50.912 MALIGNANT NEOPLASM OF LEFT BREAST IN FEMALE, ESTROGEN RECEPTOR POSITIVE, UNSPECIFIED SITE OF BREAST (HCC): ICD-10-CM

## 2023-04-27 LAB — BACTERIA UR CULT: NORMAL

## 2023-04-27 RX ADMIN — IOHEXOL 100 ML: 350 INJECTION, SOLUTION INTRAVENOUS at 14:27

## 2023-04-27 NOTE — TELEPHONE ENCOUNTER
Left another message on answering machine with time and date of next infusion, patient aware schedule can be seen on mychart  Patient was provided with my teams number if rescheduling is needed   Patient will receive AVS today after CT scan scheduled at 230pm

## 2023-04-28 DIAGNOSIS — Z51.5 PALLIATIVE CARE PATIENT: ICD-10-CM

## 2023-04-28 DIAGNOSIS — G89.3 CANCER RELATED PAIN: ICD-10-CM

## 2023-04-28 DIAGNOSIS — C79.51 MALIGNANT NEOPLASM METASTATIC TO BONE (HCC): ICD-10-CM

## 2023-04-28 DIAGNOSIS — C50.911 PRIMARY MALIGNANT NEOPLASM OF RIGHT BREAST WITH METASTASIS TO OTHER SITE (HCC): ICD-10-CM

## 2023-04-28 RX ORDER — HYDROMORPHONE HYDROCHLORIDE 2 MG/1
2-4 TABLET ORAL
Qty: 120 TABLET | Refills: 0 | Status: SHIPPED | OUTPATIENT
Start: 2023-04-28

## 2023-05-02 ENCOUNTER — APPOINTMENT (OUTPATIENT)
Dept: LAB | Facility: HOSPITAL | Age: 58
End: 2023-05-02
Attending: INTERNAL MEDICINE

## 2023-05-02 DIAGNOSIS — J91.0 MALIGNANT PLEURAL EFFUSION: ICD-10-CM

## 2023-05-02 DIAGNOSIS — C79.51 MALIGNANT NEOPLASM METASTATIC TO BONE (HCC): ICD-10-CM

## 2023-05-02 DIAGNOSIS — C50.911 PRIMARY MALIGNANT NEOPLASM OF RIGHT BREAST WITH METASTASIS TO OTHER SITE (HCC): ICD-10-CM

## 2023-05-02 LAB
ALBUMIN SERPL BCP-MCNC: 3.8 G/DL (ref 3.5–5)
ALP SERPL-CCNC: 92 U/L (ref 34–104)
ALT SERPL W P-5'-P-CCNC: 23 U/L (ref 7–52)
ANION GAP SERPL CALCULATED.3IONS-SCNC: 6 MMOL/L (ref 4–13)
AST SERPL W P-5'-P-CCNC: 51 U/L (ref 13–39)
BASOPHILS # BLD AUTO: 0.03 THOUSANDS/ΜL (ref 0–0.1)
BASOPHILS NFR BLD AUTO: 1 % (ref 0–1)
BILIRUB SERPL-MCNC: 0.36 MG/DL (ref 0.2–1)
BUN SERPL-MCNC: 15 MG/DL (ref 5–25)
CALCIUM SERPL-MCNC: 9.5 MG/DL (ref 8.4–10.2)
CHLORIDE SERPL-SCNC: 108 MMOL/L (ref 96–108)
CO2 SERPL-SCNC: 24 MMOL/L (ref 21–32)
CREAT SERPL-MCNC: 0.74 MG/DL (ref 0.6–1.3)
EOSINOPHIL # BLD AUTO: 0.07 THOUSAND/ΜL (ref 0–0.61)
EOSINOPHIL NFR BLD AUTO: 2 % (ref 0–6)
ERYTHROCYTE [DISTWIDTH] IN BLOOD BY AUTOMATED COUNT: 16.9 % (ref 11.6–15.1)
GFR SERPL CREATININE-BSD FRML MDRD: 91 ML/MIN/1.73SQ M
GLUCOSE SERPL-MCNC: 101 MG/DL (ref 65–140)
HCT VFR BLD AUTO: 38 % (ref 34.8–46.1)
HGB BLD-MCNC: 12.7 G/DL (ref 11.5–15.4)
IMM GRANULOCYTES # BLD AUTO: 0.01 THOUSAND/UL (ref 0–0.2)
IMM GRANULOCYTES NFR BLD AUTO: 0 % (ref 0–2)
LYMPHOCYTES # BLD AUTO: 0.54 THOUSANDS/ΜL (ref 0.6–4.47)
LYMPHOCYTES NFR BLD AUTO: 13 % (ref 14–44)
MCH RBC QN AUTO: 35 PG (ref 26.8–34.3)
MCHC RBC AUTO-ENTMCNC: 33.4 G/DL (ref 31.4–37.4)
MCV RBC AUTO: 105 FL (ref 82–98)
MONOCYTES # BLD AUTO: 0.51 THOUSAND/ΜL (ref 0.17–1.22)
MONOCYTES NFR BLD AUTO: 12 % (ref 4–12)
NEUTROPHILS # BLD AUTO: 3.15 THOUSANDS/ΜL (ref 1.85–7.62)
NEUTS SEG NFR BLD AUTO: 72 % (ref 43–75)
NRBC BLD AUTO-RTO: 0 /100 WBCS
PLATELET # BLD AUTO: 191 THOUSANDS/UL (ref 149–390)
PMV BLD AUTO: 10.7 FL (ref 8.9–12.7)
POTASSIUM SERPL-SCNC: 4.1 MMOL/L (ref 3.5–5.3)
PROT SERPL-MCNC: 6.7 G/DL (ref 6.4–8.4)
RBC # BLD AUTO: 3.63 MILLION/UL (ref 3.81–5.12)
SODIUM SERPL-SCNC: 138 MMOL/L (ref 135–147)
WBC # BLD AUTO: 4.31 THOUSAND/UL (ref 4.31–10.16)

## 2023-05-02 NOTE — PROGRESS NOTES
Hematology/Oncology Progress Note    Date of Service: 5/11/2023    [facility]   [address] [address]    Hem/Onc Problem List:   1  Metastatic right-sided breast cancer, ER and AL positive, HER2 negative  2  Bone metastatic disease  3  History of right-sided pleural effusion  Chief Complaint:    Routine follow-up for management of stage IV breast cancer    Assessment/Plan:     1  Metastatic breast cancer, with liver, bone, lymph node and pleura involvement   ER and AL positive, HER2 negative  Bone scan showed bony metastatic disease  Patient started monthly Zoladex, daily letrozole and CDK4/6 inhibitor Abemeciclib  Unfortunately, patient developed significant watery diarrhea from Abemaciclib  Abemaciclib was changed to Ibrance 125 mg daily for 3 weeks on,  followed by 1 week off until disease progression on August 30, 2021  Ibrance dose was decreased to 100 mg because of neutropenia  CT scan and bone scan showed disease progression and treatment changed to chemo (Eribulin)  Restaging CT 7/8/2022 shows a very good AL (near 50% reduction in index liver lesions)  NM bone scan showed no new findings  Restaging CT shows POD now  Patient s/p liver ablation 11/23/22  Further POD on CT 2/2023 11/23/2022 liver biopsy showed 1+ HER-2 status  Patient was switched to Enhertu 5 4mg/kg Q 21 days (C1D1 was 2/22/23) + Letrozole 2 5mg once daily  2  Bony metastatic disease  Bone scan showed bony metastatic disease on 7/11  Patient  continues Zometa every 3 months  3   History of right breast cancer, status post lumpectomy and axillary lymph node dissection, status post adjuvant radiation therapy  4  Right-sided pleural effusion, status post thoracentesis, cytology positive for adenocarcinoma, ER/AL positive, HER2 negative  Status post PleurX catheter placement  Has not required frequency drainage   Continue to treat underlying disease  · Discussion of decision making    I personally reviewed the following lab results, the image studies, pathology, other specialty/physicians consult notes and recommendations, and outside medical records from [facility]  I had a lengthy discussion with the patient and shared the work-up findings  I spent 46 minutes reviewing the records (labs, clinician notes, outside records, medical history, ordering medicine/tests/procedures, interpreting the imaging/labs previously done) and coordination of care as well as direct time with the patient today, of which greater than 50% of the time was spent in counseling and coordination of care with the patient/family  · Plan/Labs  · Metastatic breast cancer with diffuse bone metastasis  ECOG 1   · Continue Enhertu 5 4mg/kg Q 21 days  C5 coming  Patient to have labs prior to treatment  · Continue Letrozole 2 5mg once daily  Continue this current treatment until disease progression  · Regarding nausea, patient will continue Zofran 8 mg every 8 hours  She will take this consistently and if this does not help, we may consider providing her Zyprexa  I also offered oncology nutrition referral as she did lose about 5 pounds since last visit  She defers this for now and will let us know if she is interested in this at the next visit  · Restage CT CAP ordered for 3 months  · F/u palliative care for pain/nausea management  · Next ECHO to be scheduled for 5/18/23  · Continue to follow up with palliative care  · Continue Zometa Q12 weeks for bone metastasis  · OncotypeMap test NGS was done 4/18/2022 with results: PIK3CA: [id number] mutation  ESR1: WT, PD-L1 IHC: negative  MSI-stable, TMB low (4 muts/mb)  BRCA 1&2 WT  Possible future treatment option would include Alpelisib + Fulvestrant  However, recent tissue examination off liver showed benefit from Enhertu HER2-low finding  Follow Up: q3 weeks alternating between MD & PAB[facility]C   Scheduled thru 8/16/2023    All questions were answered to the patient's satisfaction during this encounter  The patient knows the contact information for our office and knows to reach out for any relevant concerns related to this encounter  They are to call for any temperature 100 4 or higher, new symptoms including but not restricted to shaking chills, decreased appetite, nausea, vomiting, diarrhea, increased fatigue, shortness of breath or chest pain, confusion, and not feeling the strength to come to the clinic  For all other listed problems and medical diagnosis in their chart - they are managed by PCP and/or other specialists, which the patient acknowledges  Thank you very much for your consultation and making us a part of this patient's care  We are continuing to follow closely with you  Please do not hesitate to reach out to me with any additional questions or concerns  AJCC 8th Edition Cancer Stage :      Cancer Staging  IV    Hematology/Oncology History:   · History of right-sided breast cancer, initially diagnosed in 2012, status post lumpectomy and axillary lymph node dissection, status post adjuvant radiation therapy    Patient did not receive any adjuvant endocrine therapy or chemotherapy  · July 24, 2021, patient was admitted to hospital because of shortness of breath and cough  · July 24, 2021 CT chest PE protocol showed septal thickening throughout the right lung and bronchial wall thickening due to lymphangitic spread of tumor   Indeterminate lung nodules measuring 5 millimeter in the right upper lobe, right middle lobe and 7 millimeter in the left upper lobe and left lower lobe   Large right pleural effusion   Multiple liver metastatic disease measuring up to 4 centimeter   Lytic metastatic to sternal manubrium    · July 26, 2021, ultrasound abdomen shows multiple hepatic metastatic disease   Status post thoracentesis with 1200 mL of joanne fluid removed   Cytology showed adenoma carcinoma, ER and IA positive  40-50%, HER2 negative  · July 26, 2021 biopsy of the lung liver showed metastatic breast cancer, ER and % positive, HER2 negative   CT abdomen pelvis with contrast showed extensive hepatic metastatic disease  · July 27, 2021 MRI brain showed no evidence of intracranial metastatic disease  · August 6, 2021, mammogram showed no mammographic evidence of malignancy  · August 11, 2021 bone scan showed bony metastatic disease involving left iliac crest medially and adjacent sacrum  · August 12, 2021, Zoladex was given  Letrozole started  · August 16, 2022, patient started Abemaciclib  · August 30, 2021, DC abemaciclib because of diarrhea  Start Ibrance 125 mg daily 3 weeks on 1 week off  · Nov 19, 2021, CT c/a/p showed:   1   Since July 2021, new thromboses within the intrahepatic branches of the portal vein as described above  2   Increased diffuse sclerotic osseous lesions     3   Decreased size of most of the hepatic metastases  4   Indeterminate mottled appearance of the splenic parenchyma   Attention on follow-up is advised  5   Unchanged pulmonary nodules  6   Right pleural effusion has decreased in size since July 24, 2021   The smooth interlobular septal thickening has also decreased, and this change can be attributable to the decreased size of the pleural effusion  7   Mucosal hyperenhancement of the colon   Findings may be treatment-related, and correlation with gastrointestinal symptoms is advised      BONE SCAN:   1   A few areas of increased radiotracer uptake suspicious for osseous metastasis, with findings for minimal progression    · March 15, 2022 bone scan: Stable or improving scattered foci radiotracer uptake suspicious for osseous metastases   No new osseous foci suspicious for metastasis     ·  April 1, 2022 CT scan chest abdomen pelvis:  IMPRESSION:     Findings concerning for worsening hepatic metastases as evidenced by increased size and number of lesions   Please see above discussion      Progressive left portal vein thrombosis      The majority of the pulmonary nodules are stable   There is one subpleural nodule in the right upper lobe apex posteriorly, not seen on the prior study      Stable extensive osseous metastases      Multiple tiny hypodense splenic lesions are also stable, indeterminate      Persistent small right pleural effusion with a right pleural catheter in place      June 25, 2022: 4 day admission for neutropenic fever  July 8, 2022 CT CAP: 1    Stable scattered small pulmonary nodules bilaterally  Small right pleural effusion, slightly decreased  Improving hepatic metastasis  Less conspicuous small splenic hypodense lesions  Stable findings for widespread osseous metastasis  NM Bone Scan without significant changes (A lesion in the left lobe of the liver laterally measures 2 0 x 1 9 cm, previously 5 1 x 3 8 cm- 56 2% reduction   Somewhat stellate lesion in the right lobe of the liver  posteriorly now measures 1 8 x 1 2 cm image 33 series 2, previously 2 6 x 2 6 cm (42 3%)  July 11, 2022, NM bone scan stated no areas of new or worsening scintigraphic activity suggesting osseous disease progression  10/12/2022 CT CAP w/c: New 8 mm lesion within segment IVb of the liver compared to July 8, 2022, suspicious for progression of metastatic disease  However, a few other liver lesions are improved  Improved small right pleural effusion  Stable extensive osseous metastatic metastatic disease  11/23/22: S/P Liver ablation   2/1/2023 CT CAP w/c: Status post right mastectomy without recurrence  Interval increase in size and number of hepatic lesions consistent with worsening metastatic disease  Increased size of a right pleural effusion with pleural thickening concerning for malignant effusion  Stable osseous diastases  - 2/16/2023: ECHO --> left ventricle --> systolic function normal at 55%   Diastolic function is normal  Global longitudinal strain was normal at -20  3%  right ventricle normal    4/27/2023: CT CAP w/c: IA  40-55% decrease  Overall, hepatic metastases have decreased in size from the prior study  However, there is at least one that measures slightly larger  Small to moderate right pleural effusion  Widespread osseous metastatic disease similar   For example, right hepatic lobe index lesion measures 1 6 x 1 4 cm on image 2/114; previously 2 4 x 1 9 cm  Right hepatic lesion on image 2/140 measures 3 3 x 2 8 cm, previously 3 9 x 2 9 cm  A more posterior lesion at the same level measures 1 3 x 1 0 cm, previously 2 3 x 2 3 cm  A 1 0 cm lesion in the left dome measures on image 2/97 which was previously 0 6 cm  History of Present Illiness:   Jose Quintero is a 56 y o  female with the above-noted HemOnc history who is here to follow up regarding breast cancer history  See details of history above  Patient current on Eribulin Q 21 days  Has been off Eribulin since C8D9 for 11/3/22  This is due to new hepatic metastasis requiring ablation  S/p liver ablation 11/23/22  Interval events:  Her nausea is improved on the increased Zofran  Fair energy  Still eating small meals  Still has headache daily that is mild  Constipation is stable  No visual changes, back pain, CP, SOB, heart palpitations, abd pain, diarrhea, bleeding anywhere, new peripheral neuropathy  Weight at visit 8/2021: 146lbs  Weight 9/21/22: 131lbs  Weight 12/13/22: 123lbs   Weight 1/4/23: 125lbs  Weight: 2/9/2023: 126 lbs  Weight 3/8/2023: 127lbs  Weight 3/24/2023: 122lbs  Weight 4/13/2023: 125lbs  Weight 5/11/2023: 123lbs        ROS: A 10-point of review of systems is obtained and other than the above is noncontributory       Objective:   VITALS:   /64   Pulse 85   Temp 98 6 °F (37 °C) (Temporal)   Resp 16   Wt 55 8 kg (123 lb)   LMP 05/26/2021   SpO2 97%   BMI 20 47 kg/m²      Weight (last 2 days)     Date/Time Weight    05/11/23 1328 55 8 (123)              Physical EXAM:  General: Alert, cooperative, no distress, appears stated age  Head:  Normocephalic, without obvious abnormality, atraumatic  Eyes:  Conjunctivae/corneas clear  EOMs intact  No evidence of conjunctivitis     Throat: Lips, mucosa, and tongue normal     Neck: Supple, symmetrical, trachea midline    Lungs:   Respiratory effort easy, nonlabored    Heart:  Regular rate and rhythm, +S1, S2   Abdomen:   Soft, non-tender,nondistended  No masses,      Extremities:  Lymphatics: Extremities normal, atraumatic, no cyanosis or edema  No cervical, axillary or inguinal adenopathy   Skin: Skin color, texture, turgor normal  No rashes  Neurologic: AAO   No focal neuro deficits       Allergies   Allergen Reactions   • Codeine Anaphylaxis   • Morphine GI Intolerance, Itching and Vomiting   • Sulfa Antibiotics Hives and Itching       Past Medical History:   Diagnosis Date   • Cancer ([address] )    • Headache(784 0) 11/2021   • History of radiation exposure    • Malignant neoplasm of right female breast ([address] ) 2012    right       Past Surgical History:   Procedure Laterality Date   • BACK SURGERY     • BREAST CYST EXCISION     • BREAST LUMPECTOMY Right 2012   • HIP SURGERY Right     debridement of femur   • IR BIOPSY LIVER MASS  07/26/2021   • IR MICROWAVE ABLATION  11/23/2022   • IR PLEURAL EFFUSION LONG-TERM CATHETER PLACEMENT  08/17/2021   • IR PLEURAL EFFUSION LONG-TERM CATHETER REMOVAL  05/11/2022   • IR PORT PLACEMENT  07/27/2022   • IR THORACENTESIS  07/26/2021       Family History   Problem Relation Age of Onset   • Breast cancer Mother         in her 63's   • Breast cancer Sister         inher 45s and 48   • Colon cancer Maternal Grandmother    • No Known Problems Paternal Grandmother    • Breast cancer Other    • No Known Problems Paternal Aunt        Social History     Socioeconomic History   • Marital status: /Civil Union     Spouse name: Not on file   • Number of children: Not on file   • Years of education: Not on file   • Highest education level: Not on file   Occupational History   • Not on file   Tobacco Use   • Smoking status: Some Days     Packs/day: 0 25     Years: 15 00     Pack years: 3 75     Types: Cigarettes     Start date: 20     Last attempt to quit: 7/10/2021     Years since quittin 8   • Smokeless tobacco: Never   Vaping Use   • Vaping Use: Never used   Substance and Sexual Activity   • Alcohol use: Not Currently   • Drug use: Not Currently   • Sexual activity: Not Currently     Partners: Male     Birth control/protection: Male Sterilization   Other Topics Concern   • Not on file   Social History Narrative   • Not on file     Social Determinants of Health     Financial Resource Strain: Not on file   Food Insecurity: No Food Insecurity   • Worried About Running Out of Food in the Last Year: Never true   • Ran Out of Food in the Last Year: Never true   Transportation Needs: No Transportation Needs   • Lack of Transportation (Medical): No   • Lack of Transportation (Non-Medical):  No   Physical Activity: Not on file   Stress: Not on file   Social Connections: Not on file   Intimate Partner Violence: Not on file   Housing Stability: Low Risk    • Unable to Pay for Housing in the Last Year: No   • Number of Places Lived in the Last Year: 1   • Unstable Housing in the Last Year: No       Current Outpatient Medications   Medication Sig Dispense Refill   • acetaminophen (TYLENOL) 325 mg tablet Take 2 tablets (650 mg total) by mouth every 6 (six) hours as needed for mild pain 100 tablet 0   • al mag oxide-diphenhydramine-lidocaine viscous (MAGIC MOUTHWASH) 1:1:1 suspension Swish and spit 10 mL every 4 (four) hours as needed for mouth pain or discomfort 300 mL 0   • albuterol (PROVENTIL HFA,VENTOLIN HFA) 90 mcg/act inhaler Inhale 2 puffs every 4 (four) hours as needed for wheezing 8 g 0   • calcium carbonate (TUMS) 500 mg chewable tablet Chew 1 tablet (500 mg total) 3 (three) times a day as needed for indigestion or heartburn 30 tablet 0   • CRANIAL PROSTHESIS, RX, Apply to cranial area as needed for comfort   1 each 0   • [facility] Melatonin 3 MG TAKE 2 TABLETS (6 MG TOTAL) BY MOUTH DAILY AT BEDTIME (Patient not taking: Reported on 4/19/2023) 180 tablet 1   • dextromethorphan-guaiFENesin (ROBITUSSIN DM)  mg/5 mL syrup Take 10 mL by mouth every 4 (four) hours as needed for cough (Patient not taking: Reported on 4/25/2023) 236 mL 0   • dicyclomine (BENTYL) 10 mg capsule Take 1 capsule (10 mg total) by mouth every 6 (six) hours as needed (abdominal cramping) 40 capsule 0   • diphenhydrAMINE (BENADRYL) 50 MG tablet Take 1 tablet (50 mg total) by mouth daily at bedtime as needed for itching or sleep 30 tablet 0   • diphenoxylate-atropine (LOMOTIL) 2 5-0 025 mg per tablet Take 1 tablet by mouth 4 (four) times a day as needed for diarrhea (Patient not taking: Reported on 4/25/2023) 30 tablet 0   • docusate sodium (COLACE) 100 mg capsule Take 1 capsule (100 mg total) by mouth 2 (two) times a day for 10 days 20 capsule 0   • DULoxetine (CYMBALTA) 30 mg delayed release capsule Take 1 capsule (30 mg total) by mouth daily 90 capsule 3   • HYDROmorphone (DILAUDID) 2 mg tablet Take 1-2 tablets (2-4 mg total) by mouth every 3 (three) hours as needed (moderate/severe cancer-related pain) Max Daily Amount: 32 mg 120 tablet 0   • lactulose 20 g/30 mL Take 15 mL (10 g total) by mouth daily as needed (constipaton) for up to 10 days 300 mL 0   • letrozole (FEMARA) 2 5 mg tablet Take 1 tablet (2 5 mg total) by mouth daily 90 tablet 2   • Lidocaine Viscous HCl (XYLOCAINE) 2 % mucosal solution Swish and spit 10 mL 4 (four) times a day as needed for mouth pain or discomfort 200 mL 0   • metoclopramide (REGLAN) 10 mg tablet Take 1 tablet (10 mg total) by mouth 4 (four) times a day 28 tablet 0   • multivitamin (THERAGRAN) TABS Take 1 tablet by mouth     • ondansetron (ZOFRAN) 4 mg tablet Take 1 tablet (4 mg total) by mouth every 6 (six) hours as needed for nausea or vomiting 50 tablet 0   • ondansetron (ZOFRAN) 8 mg tablet Take 1 tablet (8 mg total) by mouth every 8 (eight) hours as needed for nausea or vomiting 20 tablet 2   • oxybutynin (DITROPAN-XL) 5 mg 24 hr tablet Take 1 tablet (5 mg total) by mouth daily Take 1 tablet daily 90 tablet 3   • polyethylene glycol (MIRALAX) 17 g packet Take 17 g by mouth daily  0   • potassium chloride (K-DUR,KLOR-CON) 20 mEq tablet Take 1 tablet (20 mEq total) by mouth 2 (two) times a day for 3 days (Patient not taking: Reported on 12/27/2022) 6 tablet 0   • senna (SENOKOT) 8 6 MG tablet Take 1 tablet (8 6 mg total) by mouth daily at bedtime as needed for constipation 30 tablet 0   • sucralfate (CARAFATE) 1 g tablet Take 1 tablet (1 g total) by mouth 4 (four) times a day for 14 days 56 tablet 0   • Xarelto 20 MG tablet TAKE 1 TABLET BY MOUTH EVERY DAY WITH BREAKFAST 30 tablet 11     No current facility-administered medications for this visit  (Not in a hospital admission)      DATA REVIEW:    Pathology Result:    Final Diagnosis   Date Value Ref Range Status   11/23/2022   Final    A  Liver core (biopsy):  - Metastatic carcinoma    Comment:  - ER, FL, Her2 ordered  - The tumor cells stain for GATA3 with absent CK7, CK20, GCDFP15 staining  The immunoprofile is most consistent with the patient's known breast primary  MOLECULAR TESTING AND CONSULT SLIDES:     Molecular testing:  Block A1 is available for molecular testing  Consult slides:  Slide A1 with associated IHC is available for consultation  Interpretation performed at [address]        07/26/2021   Final    A  B  Thoracentesis, Right (ThinPrep and cell block preparations):  Positive for malignancy  Metastatic carcinoma, compatible with breast primary    -  Immunohistochemical stains performed with appropriate controls show the tumor cells to be positive for Matthew-EP4, MOC31, BARRY-3 and ER with scattered background mesothelial cells staining for WT1 and calretinin, supporting the diagnosis  Satisfactory for evaluation  07/26/2021   Final    A  Liver (core biopsy):     - Poorly-differentiated carcinoma, most compatible with metastasis from the patient's reported breast primary  Comment:  ER / CT / Her-2 pending  Comment: This is an appended report  These results have been appended to a previously preliminary verified report  Image Results: They are reviewed and documented in Hematology/Oncology history    CT chest abdomen pelvis w contrast  Narrative: CT CHEST, ABDOMEN AND PELVIS WITH IV CONTRAST    INDICATION:   C50 912: Malignant neoplasm of unspecified site of left female breast  Z17 0: Estrogen receptor positive status (ER+)  COMPARISON: Multiple prior CT scans between 7/24/2021 and 3/14/2023    TECHNIQUE: CT examination of the chest, abdomen and pelvis was performed  Multiplanar 2D reformatted images were created from the source data  Radiation dose length product (DLP) for this visit:  615 mGy-cm   This examination, like all CT scans performed in the [facility], was performed utilizing techniques to minimize radiation dose exposure, including the use of iterative   reconstruction and automated exposure control  IV Contrast:  100 mL of iohexol (OMNIPAQUE)  Enteric Contrast: Enteric contrast was not administered  FINDINGS:    CHEST    LUNGS: Several scattered small pulmonary nodules are stable from multiple prior exams for greater than 1 year  Sample nodules include 5 mm nodule in the right upper lobe abutting the minor fissure on image 6/64, right middle lobe 4 mm nodule image 4/82   and left lower lobe 4 mm nodule image 4/71  There are also several scattered tiny juxtapleural nodules which are stable  No new or enlarging nodules  Central airways are clear  PLEURA: Small to moderate right pleural effusion   No left pleural effusion  HEART/GREAT VESSELS: Heart size normal  No cardial effusion  No thoracic aortic aneurysm  MEDIASTINUM AND KIA: Calcified mediastinal and hilar nodes likely from prior granulomatous disease or treated metastases  CHEST WALL AND LOWER NECK: Right chest port with tip at the cavoatrial junction  ABDOMEN    LIVER/BILIARY TREE: Many of the previously seen hepatic metastases have decreased in size  For example, right hepatic lobe index lesion measures 1 6 x 1 4 cm on image 2/114; previously 2 4 x 1 9 cm  Right hepatic lesion on image 2/140 measures 3 3 x 2 8   cm, previously 3 9 x 2 9 cm  A more posterior lesion at the same level measures 1 3 x 1 0 cm, previously 2 3 x 2 3 cm  A 1 0 cm lesion in the left dome measures on image 2/97 which was previously 0 6 cm  Other scattered tiny hypodensities are too small to definitively characterize  GALLBLADDER:  No calcified gallstones  No pericholecystic inflammatory change  SPLEEN:  Unremarkable  PANCREAS:  Unremarkable  ADRENAL GLANDS:  Unremarkable  KIDNEYS/URETERS:  No hydronephrosis or urinary tract calculus  One or more sharply circumscribed subcentimeter renal hypodensities are present, too small to accurately characterize, and statistically most likely benign findings  According to recent   literature (Radiology 2019) no further workup of these findings is recommended  STOMACH AND BOWEL:  Unremarkable  APPENDIX:  No findings to suggest appendicitis  ABDOMINOPELVIC CAVITY:  No ascites  No pneumoperitoneum  No lymphadenopathy  VESSELS:  Unremarkable for patient's age  PELVIS    REPRODUCTIVE ORGANS:  Unremarkable for patient's age  URINARY BLADDER:  Unremarkable  ABDOMINAL WALL/INGUINAL REGIONS:  Unremarkable  OSSEOUS STRUCTURES: Widespread sclerotic osseous metastases  Impression: Overall, hepatic metastases have decreased in size from the prior study   However, there is at least one that measures slightly larger  Small to moderate right pleural effusion  Widespread osseous metastatic disease similar to prior  Workstation performed: [id number]        LABS:  Lab data are reviewed and documented in HemOnc history       Recent Results (from the past 48 hour(s))   Comprehensive metabolic panel    Collection Time: 05/10/23 12:34 PM   Result Value Ref Range    Sodium 134 (L) 135 - 147 mmol/L    Potassium 3 4 (L) 3 5 - 5 3 mmol/L    Chloride 103 96 - 108 mmol/L    CO2 25 21 - 32 mmol/L    ANION GAP 6 4 - 13 mmol/L    BUN 19 5 - 25 mg/dL    Creatinine 0 81 0 60 - 1 30 mg/dL    Glucose 123 65 - 140 mg/dL    Calcium 9 4 8 4 - 10 2 mg/dL    AST 40 (H) 13 - 39 U/L    ALT 19 7 - 52 U/L    Alkaline Phosphatase 78 34 - 104 U/L    Total Protein 6 6 6 4 - 8 4 g/dL    Albumin 3 8 3 5 - 5 0 g/dL    Total Bilirubin 0 49 0 20 - 1 00 mg/dL    eGFR 82 ml/min/1 73sq m   CBC and differential    Collection Time: 05/10/23 12:34 PM   Result Value Ref Range    WBC 4 02 (L) 4 31 - 10 16 Thousand/uL    RBC 3 46 (L) 3 81 - 5 12 Million/uL    Hemoglobin 12 3 11 5 - 15 4 g/dL    Hematocrit 35 9 34 8 - 46 1 %     (H) 82 - 98 fL    MCH 35 5 (H) 26 8 - 34 3 pg    MCHC 34 3 31 4 - 37 4 g/dL    RDW 15 5 (H) 11 6 - 15 1 %    MPV 11 3 8 9 - 12 7 fL    Platelets 558 (L) 221 - 390 Thousands/uL    nRBC 0 /100 WBCs    Neutrophils Relative 80 (H) 43 - 75 %    Immat GRANS % 0 0 - 2 %    Lymphocytes Relative 10 (L) 14 - 44 %    Monocytes Relative 8 4 - 12 %    Eosinophils Relative 1 0 - 6 %    Basophils Relative 1 0 - 1 %    Neutrophils Absolute 3 24 1 85 - 7 62 Thousands/µL    Immature Grans Absolute 0 01 0 00 - 0 20 Thousand/uL    Lymphocytes Absolute 0 40 (L) 0 60 - 4 47 Thousands/µL    Monocytes Absolute 0 32 0 17 - 1 22 Thousand/µL    Eosinophils Absolute 0 02 0 00 - 0 61 Thousand/µL    Basophils Absolute 0 03 0 00 - 0 10 Thousands/µL             Rebecca Perez  5/11/2023, 1:40 PM

## 2023-05-03 ENCOUNTER — HOSPITAL ENCOUNTER (OUTPATIENT)
Dept: INFUSION CENTER | Facility: CLINIC | Age: 58
Discharge: HOME | End: 2023-05-03

## 2023-05-03 VITALS
DIASTOLIC BLOOD PRESSURE: 72 MMHG | SYSTOLIC BLOOD PRESSURE: 120 MMHG | RESPIRATION RATE: 18 BRPM | HEART RATE: 72 BPM | BODY MASS INDEX: 20.87 KG/M2 | WEIGHT: 125.4 LBS | TEMPERATURE: 97.6 F

## 2023-05-03 DIAGNOSIS — C50.911 PRIMARY MALIGNANT NEOPLASM OF RIGHT BREAST WITH METASTASIS TO OTHER SITE (HCC): ICD-10-CM

## 2023-05-03 DIAGNOSIS — J91.0 MALIGNANT PLEURAL EFFUSION: Primary | ICD-10-CM

## 2023-05-03 DIAGNOSIS — C79.51 MALIGNANT NEOPLASM METASTATIC TO BONE (HCC): ICD-10-CM

## 2023-05-03 RX ORDER — DEXTROSE MONOHYDRATE 50 MG/ML
20 INJECTION, SOLUTION INTRAVENOUS ONCE
Status: COMPLETED | OUTPATIENT
Start: 2023-05-03 | End: 2023-05-03

## 2023-05-03 RX ADMIN — FAM-TRASTUZUMAB DERUXTECAN-NXKI 300 MG: 100 INJECTION, POWDER, LYOPHILIZED, FOR SOLUTION INTRAVENOUS at 15:02

## 2023-05-03 RX ADMIN — DEXAMETHASONE SODIUM PHOSPHATE: 10 INJECTION, SOLUTION INTRAMUSCULAR; INTRAVENOUS at 14:28

## 2023-05-03 RX ADMIN — DEXTROSE 20 ML/HR: 5 SOLUTION INTRAVENOUS at 15:01

## 2023-05-03 NOTE — PROGRESS NOTES
Pt to clinic for chemotherapy  Pt offers no complaints today, denies abx use and denies s/s of infection  Tolerated infusions without complications  Pt aware of next appointment  Pt port accessed, flushed, and de-accessed with positive blood returned  AVS was offered, pt declined  Pt ambulated out of clinic safely

## 2023-05-10 ENCOUNTER — HOSPITAL ENCOUNTER (OUTPATIENT)
Dept: INFUSION CENTER | Facility: CLINIC | Age: 58
Discharge: HOME | End: 2023-05-10

## 2023-05-10 ENCOUNTER — APPOINTMENT (OUTPATIENT)
Dept: LAB | Facility: HOSPITAL | Age: 58
End: 2023-05-10
Attending: INTERNAL MEDICINE

## 2023-05-10 VITALS
HEART RATE: 80 BPM | DIASTOLIC BLOOD PRESSURE: 63 MMHG | SYSTOLIC BLOOD PRESSURE: 108 MMHG | TEMPERATURE: 97.3 F | RESPIRATION RATE: 18 BRPM

## 2023-05-10 DIAGNOSIS — J91.0 MALIGNANT PLEURAL EFFUSION: ICD-10-CM

## 2023-05-10 DIAGNOSIS — C79.51 MALIGNANT NEOPLASM METASTATIC TO BONE (HCC): ICD-10-CM

## 2023-05-10 DIAGNOSIS — C50.911 PRIMARY MALIGNANT NEOPLASM OF RIGHT BREAST WITH METASTASIS TO OTHER SITE (HCC): ICD-10-CM

## 2023-05-10 DIAGNOSIS — C79.51 MALIGNANT NEOPLASM METASTATIC TO BONE (HCC): Primary | ICD-10-CM

## 2023-05-10 LAB
ALBUMIN SERPL BCP-MCNC: 3.8 G/DL (ref 3.5–5)
ALP SERPL-CCNC: 78 U/L (ref 34–104)
ALT SERPL W P-5'-P-CCNC: 19 U/L (ref 7–52)
ANION GAP SERPL CALCULATED.3IONS-SCNC: 6 MMOL/L (ref 4–13)
AST SERPL W P-5'-P-CCNC: 40 U/L (ref 13–39)
BASOPHILS # BLD AUTO: 0.03 THOUSANDS/ÂΜL (ref 0–0.1)
BASOPHILS NFR BLD AUTO: 1 % (ref 0–1)
BILIRUB SERPL-MCNC: 0.49 MG/DL (ref 0.2–1)
BUN SERPL-MCNC: 19 MG/DL (ref 5–25)
CALCIUM SERPL-MCNC: 9.4 MG/DL (ref 8.4–10.2)
CHLORIDE SERPL-SCNC: 103 MMOL/L (ref 96–108)
CO2 SERPL-SCNC: 25 MMOL/L (ref 21–32)
CREAT SERPL-MCNC: 0.81 MG/DL (ref 0.6–1.3)
EOSINOPHIL # BLD AUTO: 0.02 THOUSAND/ÂΜL (ref 0–0.61)
EOSINOPHIL NFR BLD AUTO: 1 % (ref 0–6)
ERYTHROCYTE [DISTWIDTH] IN BLOOD BY AUTOMATED COUNT: 15.5 % (ref 11.6–15.1)
GFR SERPL CREATININE-BSD FRML MDRD: 82 ML/MIN/1.73SQ M
GLUCOSE SERPL-MCNC: 123 MG/DL (ref 65–140)
HCT VFR BLD AUTO: 35.9 % (ref 34.8–46.1)
HGB BLD-MCNC: 12.3 G/DL (ref 11.5–15.4)
IMM GRANULOCYTES # BLD AUTO: 0.01 THOUSAND/UL (ref 0–0.2)
IMM GRANULOCYTES NFR BLD AUTO: 0 % (ref 0–2)
LYMPHOCYTES # BLD AUTO: 0.4 THOUSANDS/ÂΜL (ref 0.6–4.47)
LYMPHOCYTES NFR BLD AUTO: 10 % (ref 14–44)
MCH RBC QN AUTO: 35.5 PG (ref 26.8–34.3)
MCHC RBC AUTO-ENTMCNC: 34.3 G/DL (ref 31.4–37.4)
MCV RBC AUTO: 104 FL (ref 82–98)
MONOCYTES # BLD AUTO: 0.32 THOUSAND/ÂΜL (ref 0.17–1.22)
MONOCYTES NFR BLD AUTO: 8 % (ref 4–12)
NEUTROPHILS # BLD AUTO: 3.24 THOUSANDS/ÂΜL (ref 1.85–7.62)
NEUTS SEG NFR BLD AUTO: 80 % (ref 43–75)
NRBC BLD AUTO-RTO: 0 /100 WBCS
PLATELET # BLD AUTO: 137 THOUSANDS/UL (ref 149–390)
PMV BLD AUTO: 11.3 FL (ref 8.9–12.7)
POTASSIUM SERPL-SCNC: 3.4 MMOL/L (ref 3.5–5.3)
PROT SERPL-MCNC: 6.6 G/DL (ref 6.4–8.4)
RBC # BLD AUTO: 3.46 MILLION/UL (ref 3.81–5.12)
SODIUM SERPL-SCNC: 134 MMOL/L (ref 135–147)
WBC # BLD AUTO: 4.02 THOUSAND/UL (ref 4.31–10.16)

## 2023-05-10 RX ORDER — SODIUM CHLORIDE 9 MG/ML
20 INJECTION, SOLUTION INTRAVENOUS ONCE
OUTPATIENT
Start: 2023-05-16

## 2023-05-10 RX ORDER — SODIUM CHLORIDE 9 MG/ML
20 INJECTION, SOLUTION INTRAVENOUS ONCE
Status: COMPLETED | OUTPATIENT
Start: 2023-05-10 | End: 2023-05-10

## 2023-05-10 RX ADMIN — ZOLEDRONIC ACID 4 MG: 4 INJECTION, SOLUTION, CONCENTRATE INTRAVENOUS at 13:43

## 2023-05-10 RX ADMIN — SODIUM CHLORIDE 20 ML/HR: 9 INJECTION, SOLUTION INTRAVENOUS at 13:30

## 2023-05-10 NOTE — PROGRESS NOTES
Pt to clinic for zometa  Pt offers no complaints today, denies dental pain, dental injury, or usual muscle cramping  Tolerated infusions without complications  Pt aware of next appointment  Pt port accessed, flushed, and de-accessed with positive blood returned  AVS was offered, pt declined  Pt ambulated out of clinic safely

## 2023-05-11 ENCOUNTER — TELEPHONE (OUTPATIENT)
Dept: HEMATOLOGY ONCOLOGY | Facility: CLINIC | Age: 58
End: 2023-05-11

## 2023-05-11 ENCOUNTER — TELEPHONE (OUTPATIENT)
Dept: SURGICAL ONCOLOGY | Facility: CLINIC | Age: 58
End: 2023-05-11

## 2023-05-11 ENCOUNTER — OFFICE VISIT (OUTPATIENT)
Dept: HEMATOLOGY ONCOLOGY | Facility: CLINIC | Age: 58
End: 2023-05-11

## 2023-05-11 VITALS
HEART RATE: 85 BPM | TEMPERATURE: 98.6 F | BODY MASS INDEX: 20.47 KG/M2 | RESPIRATION RATE: 16 BRPM | DIASTOLIC BLOOD PRESSURE: 64 MMHG | SYSTOLIC BLOOD PRESSURE: 104 MMHG | OXYGEN SATURATION: 97 % | WEIGHT: 123 LBS

## 2023-05-11 DIAGNOSIS — J91.0 MALIGNANT PLEURAL EFFUSION: ICD-10-CM

## 2023-05-11 DIAGNOSIS — C79.51 MALIGNANT NEOPLASM METASTATIC TO BONE (HCC): Primary | ICD-10-CM

## 2023-05-11 DIAGNOSIS — C50.911 PRIMARY MALIGNANT NEOPLASM OF RIGHT BREAST WITH METASTASIS TO OTHER SITE (HCC): ICD-10-CM

## 2023-05-11 NOTE — TELEPHONE ENCOUNTER
Patient has wanted labs to be drawn through port and was never scheduled for them   Can you please reach out get them scheduled

## 2023-05-11 NOTE — TELEPHONE ENCOUNTER
Patient aware schedule has been updated on mychart, patient has my teams number if rescheduling is needed, patient also has [place] infusions number

## 2023-05-18 ENCOUNTER — HOSPITAL ENCOUNTER (OUTPATIENT)
Dept: NON INVASIVE DIAGNOSTICS | Facility: CLINIC | Age: 58
Discharge: HOME | End: 2023-05-18

## 2023-05-18 VITALS
BODY MASS INDEX: 20.49 KG/M2 | WEIGHT: 123 LBS | SYSTOLIC BLOOD PRESSURE: 104 MMHG | HEIGHT: 65 IN | DIASTOLIC BLOOD PRESSURE: 64 MMHG | HEART RATE: 75 BPM

## 2023-05-18 DIAGNOSIS — Z79.899 ENCOUNTER FOR MONITORING CARDIOTOXIC DRUG THERAPY: ICD-10-CM

## 2023-05-18 DIAGNOSIS — Z51.81 ENCOUNTER FOR MONITORING CARDIOTOXIC DRUG THERAPY: ICD-10-CM

## 2023-05-18 LAB
AORTIC ROOT: 2.9 CM
APICAL FOUR CHAMBER EJECTION FRACTION: 56 %
ASCENDING AORTA: 2.6 CM
E WAVE DECELERATION TIME: 225 MS
FRACTIONAL SHORTENING: 29 % (ref 28–44)
INTERVENTRICULAR SEPTUM IN DIASTOLE (PARASTERNAL SHORT AXIS VIEW): 0.7 CM
INTERVENTRICULAR SEPTUM: 0.7 CM (ref 0.6–1.1)
LAAS-AP2: 11.5 CM2
LAAS-AP4: 12.5 CM2
LEFT ATRIUM SIZE: 2.8 CM
LEFT INTERNAL DIMENSION IN SYSTOLE: 2.5 CM (ref 2.1–4)
LEFT VENTRICLE DIASTOLIC VOLUME (MOD BIPLANE): 65 ML
LEFT VENTRICLE SYSTOLIC VOLUME (MOD BIPLANE): 27 ML
LEFT VENTRICULAR INTERNAL DIMENSION IN DIASTOLE: 3.5 CM (ref 3.5–6)
LEFT VENTRICULAR POSTERIOR WALL IN END DIASTOLE: 0.7 CM
LEFT VENTRICULAR STROKE VOLUME: 28 ML
LV EF: 58 %
LVSV (TEICH): 28 ML
MV PEAK A VEL: 0.65 M/S
MV PEAK E VEL: 74 CM/S
MV STENOSIS PRESSURE HALF TIME: 65 MS
MV VALVE AREA P 1/2 METHOD: 3.38 CM2
RIGHT ATRIUM AREA SYSTOLE A4C: 7.9 CM2
RIGHT VENTRICLE ID DIMENSION: 2 CM
SL CV LEFT ATRIUM LENGTH A2C: 3.7 CM
SL CV LV EF: 55
SL CV PED ECHO LEFT VENTRICLE DIASTOLIC VOLUME (MOD BIPLANE) 2D: 50 ML
SL CV PED ECHO LEFT VENTRICLE SYSTOLIC VOLUME (MOD BIPLANE) 2D: 22 ML
TRICUSPID ANNULAR PLANE SYSTOLIC EXCURSION: 1.7 CM

## 2023-05-22 ENCOUNTER — HOSPITAL ENCOUNTER (OUTPATIENT)
Dept: INFUSION CENTER | Facility: CLINIC | Age: 58
Discharge: HOME | End: 2023-05-22

## 2023-05-22 DIAGNOSIS — C50.919 MALIGNANT NEOPLASM OF BREAST IN FEMALE, ESTROGEN RECEPTOR POSITIVE, UNSPECIFIED LATERALITY, UNSPECIFIED SITE OF BREAST (HCC): ICD-10-CM

## 2023-05-22 DIAGNOSIS — Z17.0 MALIGNANT NEOPLASM OF BREAST IN FEMALE, ESTROGEN RECEPTOR POSITIVE, UNSPECIFIED LATERALITY, UNSPECIFIED SITE OF BREAST (HCC): ICD-10-CM

## 2023-05-22 DIAGNOSIS — Z95.828 PORT-A-CATH IN PLACE: Primary | ICD-10-CM

## 2023-05-22 DIAGNOSIS — J91.0 MALIGNANT PLEURAL EFFUSION: ICD-10-CM

## 2023-05-22 DIAGNOSIS — C50.911 PRIMARY MALIGNANT NEOPLASM OF RIGHT BREAST WITH METASTASIS TO OTHER SITE (HCC): ICD-10-CM

## 2023-05-22 DIAGNOSIS — C79.51 MALIGNANT NEOPLASM METASTATIC TO BONE (HCC): ICD-10-CM

## 2023-05-22 DIAGNOSIS — C79.51 MALIGNANT NEOPLASM METASTATIC TO BONE (HCC): Primary | ICD-10-CM

## 2023-05-22 DIAGNOSIS — C78.7 MALIGNANT NEOPLASM METASTATIC TO LIVER (HCC): ICD-10-CM

## 2023-05-22 LAB
ALBUMIN SERPL BCP-MCNC: 3.8 G/DL (ref 3.5–5)
ALP SERPL-CCNC: 86 U/L (ref 34–104)
ALT SERPL W P-5'-P-CCNC: 17 U/L (ref 7–52)
ANION GAP SERPL CALCULATED.3IONS-SCNC: 5 MMOL/L (ref 4–13)
AST SERPL W P-5'-P-CCNC: 40 U/L (ref 13–39)
BASOPHILS # BLD AUTO: 0.02 THOUSANDS/ÂΜL (ref 0–0.1)
BASOPHILS NFR BLD AUTO: 1 % (ref 0–1)
BILIRUB SERPL-MCNC: 0.4 MG/DL (ref 0.2–1)
BUN SERPL-MCNC: 15 MG/DL (ref 5–25)
CALCIUM SERPL-MCNC: 9 MG/DL (ref 8.4–10.2)
CHLORIDE SERPL-SCNC: 107 MMOL/L (ref 96–108)
CO2 SERPL-SCNC: 25 MMOL/L (ref 21–32)
CREAT SERPL-MCNC: 0.68 MG/DL (ref 0.6–1.3)
EOSINOPHIL # BLD AUTO: 0.04 THOUSAND/ÂΜL (ref 0–0.61)
EOSINOPHIL NFR BLD AUTO: 1 % (ref 0–6)
ERYTHROCYTE [DISTWIDTH] IN BLOOD BY AUTOMATED COUNT: 16.3 % (ref 11.6–15.1)
GFR SERPL CREATININE-BSD FRML MDRD: 98 ML/MIN/1.73SQ M
GLUCOSE SERPL-MCNC: 121 MG/DL (ref 65–140)
HCT VFR BLD AUTO: 34.4 % (ref 34.8–46.1)
HGB BLD-MCNC: 11.4 G/DL (ref 11.5–15.4)
IMM GRANULOCYTES # BLD AUTO: 0.01 THOUSAND/UL (ref 0–0.2)
IMM GRANULOCYTES NFR BLD AUTO: 0 % (ref 0–2)
LYMPHOCYTES # BLD AUTO: 0.34 THOUSANDS/ÂΜL (ref 0.6–4.47)
LYMPHOCYTES NFR BLD AUTO: 11 % (ref 14–44)
MCH RBC QN AUTO: 34.8 PG (ref 26.8–34.3)
MCHC RBC AUTO-ENTMCNC: 33.1 G/DL (ref 31.4–37.4)
MCV RBC AUTO: 105 FL (ref 82–98)
MONOCYTES # BLD AUTO: 0.34 THOUSAND/ÂΜL (ref 0.17–1.22)
MONOCYTES NFR BLD AUTO: 11 % (ref 4–12)
NEUTROPHILS # BLD AUTO: 2.41 THOUSANDS/ÂΜL (ref 1.85–7.62)
NEUTS SEG NFR BLD AUTO: 76 % (ref 43–75)
NRBC BLD AUTO-RTO: 0 /100 WBCS
PLATELET # BLD AUTO: 184 THOUSANDS/UL (ref 149–390)
PMV BLD AUTO: 11.4 FL (ref 8.9–12.7)
POTASSIUM SERPL-SCNC: 3.8 MMOL/L (ref 3.5–5.3)
PROT SERPL-MCNC: 6.2 G/DL (ref 6.4–8.4)
RBC # BLD AUTO: 3.28 MILLION/UL (ref 3.81–5.12)
SODIUM SERPL-SCNC: 137 MMOL/L (ref 135–147)
WBC # BLD AUTO: 3.16 THOUSAND/UL (ref 4.31–10.16)

## 2023-05-24 ENCOUNTER — HOSPITAL ENCOUNTER (OUTPATIENT)
Dept: INFUSION CENTER | Facility: CLINIC | Age: 58
Discharge: HOME | End: 2023-05-24

## 2023-05-24 VITALS
WEIGHT: 121.2 LBS | HEIGHT: 65 IN | DIASTOLIC BLOOD PRESSURE: 71 MMHG | HEART RATE: 88 BPM | BODY MASS INDEX: 20.19 KG/M2 | TEMPERATURE: 98.3 F | SYSTOLIC BLOOD PRESSURE: 110 MMHG | RESPIRATION RATE: 18 BRPM

## 2023-05-24 DIAGNOSIS — C50.911 PRIMARY MALIGNANT NEOPLASM OF RIGHT BREAST WITH METASTASIS TO OTHER SITE (HCC): ICD-10-CM

## 2023-05-24 DIAGNOSIS — J91.0 MALIGNANT PLEURAL EFFUSION: Primary | ICD-10-CM

## 2023-05-24 DIAGNOSIS — C79.51 MALIGNANT NEOPLASM METASTATIC TO BONE (HCC): ICD-10-CM

## 2023-05-24 RX ORDER — DEXTROSE MONOHYDRATE 50 MG/ML
20 INJECTION, SOLUTION INTRAVENOUS ONCE
Status: COMPLETED | OUTPATIENT
Start: 2023-05-24 | End: 2023-05-24

## 2023-05-24 RX ADMIN — FAM-TRASTUZUMAB DERUXTECAN-NXKI 300 MG: 100 INJECTION, POWDER, LYOPHILIZED, FOR SOLUTION INTRAVENOUS at 10:30

## 2023-05-24 RX ADMIN — DEXTROSE 20 ML/HR: 5 SOLUTION INTRAVENOUS at 09:24

## 2023-05-24 RX ADMIN — DEXAMETHASONE SODIUM PHOSPHATE: 10 INJECTION, SOLUTION INTRAMUSCULAR; INTRAVENOUS at 09:28

## 2023-05-24 NOTE — PROGRESS NOTES
Pt to clinic for Enhertu, offers no complaints today, tolerated infusion without complications, aware of next appointment, port de-accessed, avs declined

## 2023-05-31 ENCOUNTER — OFFICE VISIT (OUTPATIENT)
Dept: PALLIATIVE MEDICINE | Facility: CLINIC | Age: 58
End: 2023-05-31

## 2023-05-31 ENCOUNTER — TELEPHONE (OUTPATIENT)
Dept: PALLIATIVE MEDICINE | Facility: CLINIC | Age: 58
End: 2023-05-31

## 2023-05-31 VITALS
RESPIRATION RATE: 16 BRPM | SYSTOLIC BLOOD PRESSURE: 102 MMHG | WEIGHT: 120.4 LBS | OXYGEN SATURATION: 98 % | HEART RATE: 84 BPM | BODY MASS INDEX: 20.04 KG/M2 | TEMPERATURE: 97.7 F | DIASTOLIC BLOOD PRESSURE: 70 MMHG

## 2023-05-31 DIAGNOSIS — R11.0 NAUSEA: ICD-10-CM

## 2023-05-31 DIAGNOSIS — J91.0 MALIGNANT PLEURAL EFFUSION: ICD-10-CM

## 2023-05-31 DIAGNOSIS — C50.911 PRIMARY MALIGNANT NEOPLASM OF RIGHT BREAST WITH METASTASIS TO OTHER SITE (HCC): ICD-10-CM

## 2023-05-31 DIAGNOSIS — C79.51 MALIGNANT NEOPLASM METASTATIC TO BONE (HCC): ICD-10-CM

## 2023-05-31 DIAGNOSIS — C50.011 MALIGNANT NEOPLASM OF AREOLA OF RIGHT BREAST IN FEMALE, ESTROGEN RECEPTOR POSITIVE (HCC): Primary | ICD-10-CM

## 2023-05-31 DIAGNOSIS — G89.3 CANCER RELATED PAIN: ICD-10-CM

## 2023-05-31 DIAGNOSIS — Z17.0 MALIGNANT NEOPLASM OF AREOLA OF RIGHT BREAST IN FEMALE, ESTROGEN RECEPTOR POSITIVE (HCC): Primary | ICD-10-CM

## 2023-05-31 DIAGNOSIS — K59.00 CONSTIPATION, UNSPECIFIED CONSTIPATION TYPE: ICD-10-CM

## 2023-05-31 DIAGNOSIS — Z51.5 PALLIATIVE CARE PATIENT: ICD-10-CM

## 2023-05-31 NOTE — LETTER
Re: Patti Zheng D O B  1965    To [facility],    Patti Norm has requested that we send you information regarding her diagnoses and side effectst hat we have been treating for her  Kiara Lucas has been treated in our practice for Primary malignant neoplasm of right breast with metastasis to other site, Malignant neoplasm metastatic to bone [facility]), and recurrent malignant pleural effusions  During the time that we have be working with Kiara Lucas, our team has been managing the following symptoms; Cancer related pain, Constipation, Anxiety, Insomnia, Abdominal cramping, Nausea and vomiting, Xerostomia, Diarrhea, and Neutropenic fever  Kiara Lucas has also had a number of hospitalizations due to complications related to her cancer diagnosis and cancer related treatments  Should you have any additional questions or concerns, please feel to contact our team at [phone]  Sincerely,       Mikala Valdes MD

## 2023-05-31 NOTE — TELEPHONE ENCOUNTER
Jacobo Salgado would like for you to call her at [phone] regarding appt with  and he wants a copy of what's wrong with her cancer dx's  She needs something before her 11am appt with Dr Terrie Hills

## 2023-05-31 NOTE — TELEPHONE ENCOUNTER
[facility] placed call to patient  Patient stated that she has an appointment with [place] today at 11:00am in order to stop eviction process started by her brother  Lexis Flako is requesting that patient bring letter from office stating patient's dx and symptoms that have been a result of treatment/cancer  Can be faxed to Andreina Vaughn at [phone] or emailed [email or web address]      Bronson Soto reported that since Elodia Membreno was released about 1 week ago, she has been busy transporting him back and forth from [place] ([facility] (where he is staying) to see his girlfriend and the baby locally  Reported that spouse was approved for a mortgage loan so they are just going to buy a house instead of renting

## 2023-05-31 NOTE — PROGRESS NOTES
Outpatient Follow-Up - Palliative and Supportive Care   Bennie Ch 56 y o  female [id number]    Assessment & Plan  Problem List Items Addressed This Visit        Respiratory    Malignant pleural effusion       Musculoskeletal and Integument    Malignant neoplasm metastatic to bone [facility])    Relevant Medications    HYDROmorphone (DILAUDID) 2 mg tablet       Other    Malignant neoplasm of breast in female, estrogen receptor positive ([facility] [address] ) - Primary    Palliative care patient    Relevant Medications    HYDROmorphone (DILAUDID) 2 mg tablet    Primary malignant neoplasm of right breast with metastasis to other site [facility])    Relevant Medications    HYDROmorphone (DILAUDID) 2 mg tablet    Constipation    Relevant Medications    lactulose 20 g/30 mL    Cancer related pain    Relevant Medications    HYDROmorphone (DILAUDID) 2 mg tablet   Other Visit Diagnoses     Nausea        Relevant Medications    ondansetron (ZOFRAN) 8 mg tablet        #symptom management  #cancer-related pain   - continue APAP 650 mg PO Q6H PRN              - max daily 4000 mg   - continue hydromorphone 2-4 mg PO Q3H PRN   - continue duloxetine therapy     #anxiety   - continue duloxetine 30 mg PO QDaily     #nausea   - continue metoclopramide 10 mg PO QID PRN   - continue ondansetron 8 mg PO Q8H PRN     #insomnia   - continue melatonin 6 mg PO QHS     #OIC   - continue home bowel regimen   - continued adequate hydration   - continue lactulose therapy     #abdominal cramping   - continue dicyclomine 10 mg PO Q6H PRN     #xerostomia   - recommend biotene use   - lollipops provided for xerostomia    #goals of care   - treatment focused care with no limitations at this time   - reports eviction from brother, meeting with  later today    #psychosocial support   - emotional support provided   - Jeanne Todd [spouse] [phone]   - two children              - Armen [son, Down syndrome]              - Ankur [son]   - granddaughter recently born, in office today with mother of baby      Next [facility] Follow up in 4 weeks  Controlled Substance Review    [place] PDMP or [place]  reviewed: No red flags were identified; safe to proceed with prescription  [place] PDMP Review       Value Time User    PDMP Reviewed  Yes 6/5/2023  5:11 PM Alecia Valerio MD          Medications adjusted this encounter:  Requested Prescriptions     Signed Prescriptions Disp Refills   • HYDROmorphone (DILAUDID) 2 mg tablet 120 tablet 0     Sig: Take 1-2 tablets (2-4 mg total) by mouth every 3 (three) hours as needed (moderate/severe cancer-related pain) Max Daily Amount: 32 mg   • lactulose 20 g/30 mL 300 mL 0     Sig: Take 15 mL (10 g total) by mouth daily as needed (constipaton)   • ondansetron (ZOFRAN) 8 mg tablet 20 tablet 2     Sig: Take 1 tablet (8 mg total) by mouth every 8 (eight) hours as needed for nausea or vomiting     No orders of the defined types were placed in this encounter  Medications Discontinued During This Encounter   Medication Reason   • ondansetron (ZOFRAN) 4 mg tablet Duplicate order   • lactulose 20 g/30 mL Reorder   • ondansetron (ZOFRAN) 8 mg tablet Reorder   • HYDROmorphone (DILAUDID) 2 mg tablet Reorder         Mary Clark was seen today for symptoms and planning cares related to above illnesses  I have reviewed the patient's controlled substance dispensing history in the Prescription Drug Monitoring Program in compliance with the [facility] regulations before prescribing any controlled substances  They are invited to continue to follow with us  If there are questions or concerns, please contact us through our clinic/answering service 24 hours a day, seven days a week      Alecia Valerio MD  [facility] Palliative and Supportive Care  [phone]      Visit Information    Accompanied By: No one    Source of History: Self, Medical record    History Limitations: None      History of Present Illness    Mary Clark is a 56 y o  female who presents in follow up of symptoms related to metastatic breast cancer c/b recurrent malignant pleural effusions  Pertinent issues include: symptom management, pain, neoplasm related, depression or anxiety, assessment of goals of care, advance care planning  Patient reports persistent R rib pain, use of PO hydromorphone x 4-5 doses/day  Draining 500 cc/week from pleural space, notices increased pain near end of week prior to drainage  Denies nausea, vomiting  Ondansetron effective with post-treatment nausea x 1 week after  Appetite variable with some weight loss reported  BM every 2-3 days, last 2 days ago, requesting lactulose refill, felt was previously effective  Adequate sleep  Past medical, surgical, social, and family histories are reviewed and pertinent updates are made  Review of Systems   Constitutional: Positive for decreased appetite, malaise/fatigue and weight loss  Negative for chills and fever  HENT: Negative for congestion  Eyes: Negative for visual disturbance  Cardiovascular: Negative for chest pain  Respiratory: Negative for shortness of breath  Musculoskeletal: Negative for falls and neck pain  R rib pain   Gastrointestinal: Positive for constipation  Negative for abdominal pain, nausea and vomiting  Genitourinary: Negative for frequency  Neurological: Negative for headaches  Psychiatric/Behavioral: The patient does not have insomnia  All other systems reviewed and are negative  Vital Signs    /70 (BP Location: Left arm, Patient Position: Sitting, Cuff Size: Standard)   Pulse 84   Temp 97 7 °F (36 5 °C) (Temporal)   Resp 16   Wt 54 6 kg (120 lb 6 4 oz)   LMP 05/06/2021 (Approximate)   SpO2 98%   BMI 20 04 kg/m²     Physical Exam and Objective Data  Physical Exam  Vitals and nursing note reviewed  Constitutional:       General: She is awake  Appearance: She is not diaphoretic        Comments: Sitting up in NAD  BMI 20 0  Non-toxic appearing   HENT:      Head: Normocephalic and atraumatic  Right Ear: External ear normal       Left Ear: External ear normal       Nose: No rhinorrhea  Eyes:      Comments: No gaze preference   Cardiovascular:      Rate and Rhythm: Normal rate  Pulmonary:      Effort: No tachypnea, accessory muscle usage or respiratory distress  Comments: Completes full sentences without difficulty  Musculoskeletal:      Cervical back: Normal range of motion  Neurological:      General: No focal deficit present  Mental Status: She is alert and oriented to person, place, and time     Psychiatric:         Attention and Perception: Attention normal          Mood and Affect: Mood and affect normal          Speech: Speech normal          Cognition and Memory: Cognition and memory normal            Radiology and Laboratory:  I personally reviewed and interpreted the following results:    Most Recent COVID-19 Results:  Lab Results   Component Value Date/Time    SARSCOV2 Negative 08/20/2022 08:14 PM    SARSCOV2 Negative 06/25/2022 03:22 AM    SARSCOV2 Positive (A) 12/20/2020 12:25 PM       Most Recent Lab Work:  Lab Results   Component Value Date/Time    BUN 15 05/22/2023 11:25 AM    BUN 12 04/02/2014 01:15 PM    CREATININE 0 68 05/22/2023 11:25 AM    CREATININE 0 80 04/02/2014 01:15 PM    GLUC 121 05/22/2023 11:25 AM    K 3 8 05/22/2023 11:25 AM    K 3 7 04/02/2014 01:15 PM    SODIUM 137 05/22/2023 11:25 AM     Lab Results   Component Value Date/Time    ALB 3 8 05/22/2023 11:25 AM    ALB 3 4 (L) 04/02/2014 01:15 PM    ALT 17 05/22/2023 11:25 AM    ALT 15 04/02/2014 01:15 PM    AST 40 (H) 05/22/2023 11:25 AM    AST 23 04/02/2014 01:15 PM     Lab Results   Component Value Date/Time    HGB 11 4 (L) 05/22/2023 11:25 AM    INR 0 99 11/23/2022 08:25 AM     05/22/2023 11:25 AM    PTT 29 08/20/2022 08:37 PM    WBC 3 16 (L) 05/22/2023 11:25 AM       Most Recent Imaging [last 30 days]:  Procedure: Echo complete w/ contrast if indicated    Result Date: 5/18/2023  Narrative: •  Left Ventricle: Left ventricular cavity size is normal  Wall thickness is normal  The left ventricular ejection fraction is 55%  Systolic function is normal    Global longitudinal strain is -17 8  Wall motion is normal  Diastolic function is normal  Strain was performed to quantify interventricular dyssynchrony and evaluate components of myocardial function due to Chemotherapy  Results from the utilization of Strain Analysis are listed in the report below  30 minutes was spent face to face with Lorrain Friends with greater than 50% of the time spent in counseling or coordination of care including discussions of provided medical updates, discussed palliative care, determined goals of care, determined social/family support, discussed plans of care, discussed symptom management, provided psychosocial support  Opioid refill  Antiemetic refill  Bowel regimen refill  PDMP Reviewed  All of the patient's or agent's questions were answered during this discussion

## 2023-06-05 RX ORDER — ONDANSETRON HYDROCHLORIDE 8 MG/1
8 TABLET, FILM COATED ORAL EVERY 8 HOURS PRN
Qty: 20 TABLET | Refills: 2 | Status: SHIPPED | OUTPATIENT
Start: 2023-06-05

## 2023-06-05 RX ORDER — HYDROMORPHONE HYDROCHLORIDE 2 MG/1
2-4 TABLET ORAL
Qty: 120 TABLET | Refills: 0 | Status: SHIPPED | OUTPATIENT
Start: 2023-06-05

## 2023-06-05 RX ORDER — LACTULOSE 20 G/30ML
10 SOLUTION ORAL DAILY PRN
Qty: 300 ML | Refills: 0 | Status: SHIPPED | OUTPATIENT
Start: 2023-06-05

## 2023-06-07 ENCOUNTER — PATIENT OUTREACH (OUTPATIENT)
Dept: CASE MANAGEMENT | Facility: HOSPITAL | Age: 58
End: 2023-06-07

## 2023-06-07 ENCOUNTER — TELEPHONE (OUTPATIENT)
Dept: PALLIATIVE MEDICINE | Facility: CLINIC | Age: 58
End: 2023-06-07

## 2023-06-07 NOTE — TELEPHONE ENCOUNTER
Prior Authorization COMPLETED  for   HYDROMORPHONE 2 MG  via CM    YL[facility][id number]  [facility]    Phone# [phone]  PUENTES#[id number]  Pharmacy:    AWAIT RESPONSE

## 2023-06-07 NOTE — TELEPHONE ENCOUNTER
Prior Authorization initiated for Hydromorphone 2mg tabs via CMM  Key: [id number]: Martha Rod [id number]      Additional Clinical information needed

## 2023-06-07 NOTE — PROGRESS NOTES
Closing OncSW episode as of today, no contact with pt since Sept 2022  Chart review notes that she is in contact with palliative care SW  MSW will remain available to her as needed moving forward

## 2023-06-08 NOTE — TELEPHONE ENCOUNTER
Received prior authorization approval for Hydromorphone 2mg through 12/8/23  Faxed results to Pharmacy  Pharmacy has been asked to inform pt of outcome

## 2023-06-12 ENCOUNTER — HOSPITAL ENCOUNTER (OUTPATIENT)
Dept: INFUSION CENTER | Facility: CLINIC | Age: 58
Discharge: HOME | End: 2023-06-12
Payer: COMMERCIAL

## 2023-06-12 DIAGNOSIS — J91.0 MALIGNANT PLEURAL EFFUSION: Primary | ICD-10-CM

## 2023-06-12 DIAGNOSIS — C78.7 MALIGNANT NEOPLASM METASTATIC TO LIVER (HCC): ICD-10-CM

## 2023-06-12 DIAGNOSIS — C79.51 MALIGNANT NEOPLASM METASTATIC TO BONE (HCC): ICD-10-CM

## 2023-06-12 DIAGNOSIS — Z95.828 PORT-A-CATH IN PLACE: ICD-10-CM

## 2023-06-12 DIAGNOSIS — C50.911 PRIMARY MALIGNANT NEOPLASM OF RIGHT BREAST WITH METASTASIS TO OTHER SITE (HCC): ICD-10-CM

## 2023-06-12 DIAGNOSIS — C50.919 MALIGNANT NEOPLASM OF BREAST IN FEMALE, ESTROGEN RECEPTOR POSITIVE, UNSPECIFIED LATERALITY, UNSPECIFIED SITE OF BREAST (HCC): ICD-10-CM

## 2023-06-12 DIAGNOSIS — Z17.0 MALIGNANT NEOPLASM OF BREAST IN FEMALE, ESTROGEN RECEPTOR POSITIVE, UNSPECIFIED LATERALITY, UNSPECIFIED SITE OF BREAST (HCC): ICD-10-CM

## 2023-06-12 LAB
ALBUMIN SERPL BCP-MCNC: 3.8 G/DL (ref 3.5–5)
ALP SERPL-CCNC: 107 U/L (ref 34–104)
ALT SERPL W P-5'-P-CCNC: 24 U/L (ref 7–52)
ANION GAP SERPL CALCULATED.3IONS-SCNC: 5 MMOL/L (ref 4–13)
AST SERPL W P-5'-P-CCNC: 49 U/L (ref 13–39)
BASOPHILS # BLD AUTO: 0.02 THOUSANDS/ÂΜL (ref 0–0.1)
BASOPHILS NFR BLD AUTO: 1 % (ref 0–1)
BILIRUB SERPL-MCNC: 0.45 MG/DL (ref 0.2–1)
BUN SERPL-MCNC: 16 MG/DL (ref 5–25)
CALCIUM SERPL-MCNC: 9.3 MG/DL (ref 8.4–10.2)
CHLORIDE SERPL-SCNC: 109 MMOL/L (ref 96–108)
CO2 SERPL-SCNC: 25 MMOL/L (ref 21–32)
CREAT SERPL-MCNC: 0.67 MG/DL (ref 0.6–1.3)
EOSINOPHIL # BLD AUTO: 0.04 THOUSAND/ÂΜL (ref 0–0.61)
EOSINOPHIL NFR BLD AUTO: 2 % (ref 0–6)
ERYTHROCYTE [DISTWIDTH] IN BLOOD BY AUTOMATED COUNT: 16.6 % (ref 11.6–15.1)
GFR SERPL CREATININE-BSD FRML MDRD: 99 ML/MIN/1.73SQ M
GLUCOSE SERPL-MCNC: 120 MG/DL (ref 65–140)
HCT VFR BLD AUTO: 34.1 % (ref 34.8–46.1)
HGB BLD-MCNC: 11.5 G/DL (ref 11.5–15.4)
IMM GRANULOCYTES # BLD AUTO: 0.01 THOUSAND/UL (ref 0–0.2)
IMM GRANULOCYTES NFR BLD AUTO: 0 % (ref 0–2)
LYMPHOCYTES # BLD AUTO: 0.27 THOUSANDS/ÂΜL (ref 0.6–4.47)
LYMPHOCYTES NFR BLD AUTO: 10 % (ref 14–44)
MCH RBC QN AUTO: 35.7 PG (ref 26.8–34.3)
MCHC RBC AUTO-ENTMCNC: 33.7 G/DL (ref 31.4–37.4)
MCV RBC AUTO: 106 FL (ref 82–98)
MONOCYTES # BLD AUTO: 0.32 THOUSAND/ÂΜL (ref 0.17–1.22)
MONOCYTES NFR BLD AUTO: 12 % (ref 4–12)
NEUTROPHILS # BLD AUTO: 2 THOUSANDS/ÂΜL (ref 1.85–7.62)
NEUTS SEG NFR BLD AUTO: 75 % (ref 43–75)
NRBC BLD AUTO-RTO: 0 /100 WBCS
PLATELET # BLD AUTO: 206 THOUSANDS/UL (ref 149–390)
PMV BLD AUTO: 11.6 FL (ref 8.9–12.7)
POTASSIUM SERPL-SCNC: 3.6 MMOL/L (ref 3.5–5.3)
PROT SERPL-MCNC: 6.5 G/DL (ref 6.4–8.4)
RBC # BLD AUTO: 3.22 MILLION/UL (ref 3.81–5.12)
SODIUM SERPL-SCNC: 139 MMOL/L (ref 135–147)
WBC # BLD AUTO: 2.66 THOUSAND/UL (ref 4.31–10.16)

## 2023-06-12 PROCEDURE — 80053 COMPREHEN METABOLIC PANEL: CPT | Performed by: INTERNAL MEDICINE

## 2023-06-12 PROCEDURE — 85025 COMPLETE CBC W/AUTO DIFF WBC: CPT | Performed by: INTERNAL MEDICINE

## 2023-06-12 NOTE — PROGRESS NOTES
Patient presents for central venous labs  Patient offers no complaints  Port accessed, flushed, saline locked, labs drawn, port flushed and de-accessed  Band-aid placed on site  AVS printed, next appointment reviewed

## 2023-06-14 ENCOUNTER — HOSPITAL ENCOUNTER (OUTPATIENT)
Dept: INFUSION CENTER | Facility: CLINIC | Age: 58
Discharge: HOME | End: 2023-06-14
Payer: COMMERCIAL

## 2023-06-14 VITALS
DIASTOLIC BLOOD PRESSURE: 62 MMHG | HEART RATE: 79 BPM | TEMPERATURE: 97.2 F | RESPIRATION RATE: 16 BRPM | WEIGHT: 123 LBS | SYSTOLIC BLOOD PRESSURE: 108 MMHG | BODY MASS INDEX: 20.47 KG/M2 | OXYGEN SATURATION: 96 %

## 2023-06-14 DIAGNOSIS — J91.0 MALIGNANT PLEURAL EFFUSION: Primary | ICD-10-CM

## 2023-06-14 DIAGNOSIS — C50.911 PRIMARY MALIGNANT NEOPLASM OF RIGHT BREAST WITH METASTASIS TO OTHER SITE (HCC): ICD-10-CM

## 2023-06-14 DIAGNOSIS — C79.51 MALIGNANT NEOPLASM METASTATIC TO BONE (HCC): ICD-10-CM

## 2023-06-14 PROCEDURE — 96413 CHEMO IV INFUSION 1 HR: CPT

## 2023-06-14 PROCEDURE — 96367 TX/PROPH/DG ADDL SEQ IV INF: CPT

## 2023-06-14 RX ORDER — DEXTROSE MONOHYDRATE 50 MG/ML
20 INJECTION, SOLUTION INTRAVENOUS ONCE
Status: COMPLETED | OUTPATIENT
Start: 2023-06-14 | End: 2023-06-14

## 2023-06-14 RX ADMIN — FAM-TRASTUZUMAB DERUXTECAN-NXKI 300 MG: 100 INJECTION, POWDER, LYOPHILIZED, FOR SOLUTION INTRAVENOUS at 12:55

## 2023-06-14 RX ADMIN — DEXTROSE 20 ML/HR: 5 SOLUTION INTRAVENOUS at 12:13

## 2023-06-14 RX ADMIN — DEXAMETHASONE SODIUM PHOSPHATE: 10 INJECTION, SOLUTION INTRAMUSCULAR; INTRAVENOUS at 12:15

## 2023-06-15 ENCOUNTER — OFFICE VISIT (OUTPATIENT)
Dept: HEMATOLOGY ONCOLOGY | Facility: CLINIC | Age: 58
End: 2023-06-15
Payer: COMMERCIAL

## 2023-06-15 VITALS
SYSTOLIC BLOOD PRESSURE: 112 MMHG | OXYGEN SATURATION: 97 % | RESPIRATION RATE: 16 BRPM | HEART RATE: 91 BPM | TEMPERATURE: 97.9 F | DIASTOLIC BLOOD PRESSURE: 78 MMHG | HEIGHT: 65 IN | BODY MASS INDEX: 20.41 KG/M2 | WEIGHT: 122.5 LBS

## 2023-06-15 DIAGNOSIS — Z79.899 ENCOUNTER FOR MONITORING CARDIOTOXIC DRUG THERAPY: ICD-10-CM

## 2023-06-15 DIAGNOSIS — L65.9 ALOPECIA: ICD-10-CM

## 2023-06-15 DIAGNOSIS — L65.8 ALOPECIA DUE TO CYTOTOXIC DRUG: ICD-10-CM

## 2023-06-15 DIAGNOSIS — J91.0 MALIGNANT PLEURAL EFFUSION: ICD-10-CM

## 2023-06-15 DIAGNOSIS — Z51.11 CHEMOTHERAPY MANAGEMENT, ENCOUNTER FOR: ICD-10-CM

## 2023-06-15 DIAGNOSIS — Z51.81 ENCOUNTER FOR MONITORING CARDIOTOXIC DRUG THERAPY: ICD-10-CM

## 2023-06-15 DIAGNOSIS — C79.51 MALIGNANT NEOPLASM METASTATIC TO BONE (HCC): Primary | ICD-10-CM

## 2023-06-15 DIAGNOSIS — T45.1X5A ALOPECIA DUE TO CYTOTOXIC DRUG: ICD-10-CM

## 2023-06-15 PROCEDURE — 99214 OFFICE O/P EST MOD 30 MIN: CPT | Performed by: INTERNAL MEDICINE

## 2023-06-15 RX ORDER — DEXTROSE MONOHYDRATE 50 MG/ML
20 INJECTION, SOLUTION INTRAVENOUS ONCE
OUTPATIENT
Start: 2023-07-05

## 2023-06-20 DIAGNOSIS — Z51.11 CHEMOTHERAPY MANAGEMENT, ENCOUNTER FOR: ICD-10-CM

## 2023-06-20 DIAGNOSIS — T45.1X5A ALOPECIA DUE TO CYTOTOXIC DRUG: ICD-10-CM

## 2023-06-20 DIAGNOSIS — L65.9 ALOPECIA: Primary | ICD-10-CM

## 2023-06-20 DIAGNOSIS — L65.8 ALOPECIA DUE TO CYTOTOXIC DRUG: ICD-10-CM

## 2023-06-20 DIAGNOSIS — J91.0 MALIGNANT PLEURAL EFFUSION: ICD-10-CM

## 2023-06-20 DIAGNOSIS — C79.51 MALIGNANT NEOPLASM METASTATIC TO BONE (HCC): ICD-10-CM

## 2023-07-03 ENCOUNTER — HOSPITAL ENCOUNTER (OUTPATIENT)
Dept: INFUSION CENTER | Facility: CLINIC | Age: 58
Discharge: HOME | End: 2023-07-03
Payer: COMMERCIAL

## 2023-07-03 DIAGNOSIS — C78.7 MALIGNANT NEOPLASM METASTATIC TO LIVER (HCC): ICD-10-CM

## 2023-07-03 DIAGNOSIS — C50.919 MALIGNANT NEOPLASM OF BREAST IN FEMALE, ESTROGEN RECEPTOR POSITIVE, UNSPECIFIED LATERALITY, UNSPECIFIED SITE OF BREAST (HCC): ICD-10-CM

## 2023-07-03 DIAGNOSIS — C50.911 PRIMARY MALIGNANT NEOPLASM OF RIGHT BREAST WITH METASTASIS TO OTHER SITE (HCC): ICD-10-CM

## 2023-07-03 DIAGNOSIS — J91.0 MALIGNANT PLEURAL EFFUSION: Primary | ICD-10-CM

## 2023-07-03 DIAGNOSIS — C79.51 MALIGNANT NEOPLASM METASTATIC TO BONE (HCC): ICD-10-CM

## 2023-07-03 DIAGNOSIS — Z17.0 MALIGNANT NEOPLASM OF BREAST IN FEMALE, ESTROGEN RECEPTOR POSITIVE, UNSPECIFIED LATERALITY, UNSPECIFIED SITE OF BREAST (HCC): ICD-10-CM

## 2023-07-03 DIAGNOSIS — Z95.828 PORT-A-CATH IN PLACE: ICD-10-CM

## 2023-07-03 LAB
ALBUMIN SERPL BCP-MCNC: 3.5 G/DL (ref 3.5–5)
ALP SERPL-CCNC: 192 U/L (ref 34–104)
ALT SERPL W P-5'-P-CCNC: 33 U/L (ref 7–52)
ANION GAP SERPL CALCULATED.3IONS-SCNC: 6 MMOL/L
AST SERPL W P-5'-P-CCNC: 65 U/L (ref 13–39)
BASOPHILS # BLD AUTO: 0.03 THOUSANDS/ÂΜL (ref 0–0.1)
BASOPHILS NFR BLD AUTO: 1 % (ref 0–1)
BILIRUB SERPL-MCNC: 0.35 MG/DL (ref 0.2–1)
BUN SERPL-MCNC: 19 MG/DL (ref 5–25)
CALCIUM SERPL-MCNC: 9 MG/DL (ref 8.4–10.2)
CHLORIDE SERPL-SCNC: 107 MMOL/L (ref 96–108)
CO2 SERPL-SCNC: 26 MMOL/L (ref 21–32)
CREAT SERPL-MCNC: 0.67 MG/DL (ref 0.6–1.3)
EOSINOPHIL # BLD AUTO: 0.08 THOUSAND/ÂΜL (ref 0–0.61)
EOSINOPHIL NFR BLD AUTO: 2 % (ref 0–6)
ERYTHROCYTE [DISTWIDTH] IN BLOOD BY AUTOMATED COUNT: 15.9 % (ref 11.6–15.1)
GFR SERPL CREATININE-BSD FRML MDRD: 99 ML/MIN/1.73SQ M
GLUCOSE SERPL-MCNC: 85 MG/DL (ref 65–140)
HCT VFR BLD AUTO: 31.8 % (ref 34.8–46.1)
HGB BLD-MCNC: 10.8 G/DL (ref 11.5–15.4)
IMM GRANULOCYTES # BLD AUTO: 0.01 THOUSAND/UL (ref 0–0.2)
IMM GRANULOCYTES NFR BLD AUTO: 0 % (ref 0–2)
LYMPHOCYTES # BLD AUTO: 0.26 THOUSANDS/ÂΜL (ref 0.6–4.47)
LYMPHOCYTES NFR BLD AUTO: 8 % (ref 14–44)
MCH RBC QN AUTO: 36.4 PG (ref 26.8–34.3)
MCHC RBC AUTO-ENTMCNC: 34 G/DL (ref 31.4–37.4)
MCV RBC AUTO: 107 FL (ref 82–98)
MONOCYTES # BLD AUTO: 0.42 THOUSAND/ÂΜL (ref 0.17–1.22)
MONOCYTES NFR BLD AUTO: 13 % (ref 4–12)
NEUTROPHILS # BLD AUTO: 2.51 THOUSANDS/ÂΜL (ref 1.85–7.62)
NEUTS SEG NFR BLD AUTO: 76 % (ref 43–75)
NRBC BLD AUTO-RTO: 0 /100 WBCS
PLATELET # BLD AUTO: 207 THOUSANDS/UL (ref 149–390)
PMV BLD AUTO: 11.3 FL (ref 8.9–12.7)
POTASSIUM SERPL-SCNC: 3.6 MMOL/L (ref 3.5–5.3)
PROT SERPL-MCNC: 6.4 G/DL (ref 6.4–8.4)
RBC # BLD AUTO: 2.97 MILLION/UL (ref 3.81–5.12)
SODIUM SERPL-SCNC: 139 MMOL/L (ref 135–147)
WBC # BLD AUTO: 3.31 THOUSAND/UL (ref 4.31–10.16)

## 2023-07-03 PROCEDURE — 80053 COMPREHEN METABOLIC PANEL: CPT | Performed by: INTERNAL MEDICINE

## 2023-07-03 PROCEDURE — 85025 COMPLETE CBC W/AUTO DIFF WBC: CPT | Performed by: INTERNAL MEDICINE

## 2023-07-03 NOTE — PROGRESS NOTES
Pt here for labs, offering no complaints. Right port accessed with positive blood return noted, labs drawn, port flushed and de-accessed without incident. AVS declined. Walked out in stable condition.

## 2023-07-04 ENCOUNTER — APPOINTMENT (EMERGENCY)
Dept: CT IMAGING | Facility: HOSPITAL | Age: 58
DRG: 382 | End: 2023-07-04
Payer: COMMERCIAL

## 2023-07-04 ENCOUNTER — HOSPITAL ENCOUNTER (INPATIENT)
Facility: HOSPITAL | Age: 58
LOS: 2 days | Discharge: HOME | DRG: 382 | End: 2023-07-08
Attending: EMERGENCY MEDICINE | Admitting: INTERNAL MEDICINE
Payer: COMMERCIAL

## 2023-07-04 DIAGNOSIS — C79.51 MALIGNANT NEOPLASM METASTATIC TO BONE (HCC): ICD-10-CM

## 2023-07-04 DIAGNOSIS — R07.9 CHEST PAIN, UNSPECIFIED TYPE: ICD-10-CM

## 2023-07-04 DIAGNOSIS — R07.89 CHEST WALL PAIN: ICD-10-CM

## 2023-07-04 DIAGNOSIS — G89.3 CANCER RELATED PAIN: ICD-10-CM

## 2023-07-04 DIAGNOSIS — Z51.5 PALLIATIVE CARE PATIENT: ICD-10-CM

## 2023-07-04 DIAGNOSIS — C50.911 PRIMARY MALIGNANT NEOPLASM OF RIGHT BREAST WITH METASTASIS TO OTHER SITE (HCC): ICD-10-CM

## 2023-07-04 DIAGNOSIS — J90 LOCULATED PLEURAL EFFUSION: Primary | ICD-10-CM

## 2023-07-04 DIAGNOSIS — K59.00 CONSTIPATION, UNSPECIFIED CONSTIPATION TYPE: ICD-10-CM

## 2023-07-04 DIAGNOSIS — C50.919 BREAST CANCER (HCC): ICD-10-CM

## 2023-07-04 DIAGNOSIS — R50.9 FEVER: ICD-10-CM

## 2023-07-04 PROBLEM — R79.89 ABNORMAL LFTS: Status: ACTIVE | Noted: 2023-07-04

## 2023-07-04 LAB
ALBUMIN SERPL BCP-MCNC: 3.4 G/DL (ref 3.5–5)
ALP SERPL-CCNC: 235 U/L (ref 34–104)
ALT SERPL W P-5'-P-CCNC: 41 U/L (ref 7–52)
ANION GAP SERPL CALCULATED.3IONS-SCNC: 7 MMOL/L
APTT PPP: 33 SECONDS (ref 23–37)
AST SERPL W P-5'-P-CCNC: 93 U/L (ref 13–39)
BACTERIA UR QL AUTO: NORMAL /HPF
BASOPHILS # BLD AUTO: 0.04 THOUSANDS/ÂΜL (ref 0–0.1)
BASOPHILS NFR BLD AUTO: 1 % (ref 0–1)
BILIRUB SERPL-MCNC: 0.39 MG/DL (ref 0.2–1)
BILIRUB UR QL STRIP: NEGATIVE
BUN SERPL-MCNC: 23 MG/DL (ref 5–25)
CALCIUM ALBUM COR SERPL-MCNC: 9.4 MG/DL (ref 8.3–10.1)
CALCIUM SERPL-MCNC: 8.9 MG/DL (ref 8.4–10.2)
CARDIAC TROPONIN I PNL SERPL HS: 4 NG/L
CHLORIDE SERPL-SCNC: 104 MMOL/L (ref 96–108)
CLARITY UR: CLEAR
CO2 SERPL-SCNC: 22 MMOL/L (ref 21–32)
COLOR UR: COLORLESS
CREAT SERPL-MCNC: 0.6 MG/DL (ref 0.6–1.3)
EOSINOPHIL # BLD AUTO: 0.05 THOUSAND/ÂΜL (ref 0–0.61)
EOSINOPHIL NFR BLD AUTO: 1 % (ref 0–6)
ERYTHROCYTE [DISTWIDTH] IN BLOOD BY AUTOMATED COUNT: 16 % (ref 11.6–15.1)
FLUAV RNA RESP QL NAA+PROBE: NEGATIVE
FLUBV RNA RESP QL NAA+PROBE: NEGATIVE
GFR SERPL CREATININE-BSD FRML MDRD: 102 ML/MIN/1.73SQ M
GLUCOSE SERPL-MCNC: 104 MG/DL (ref 65–140)
GLUCOSE UR STRIP-MCNC: NEGATIVE MG/DL
HCT VFR BLD AUTO: 31.7 % (ref 34.8–46.1)
HGB BLD-MCNC: 10.9 G/DL (ref 11.5–15.4)
HGB UR QL STRIP.AUTO: ABNORMAL
IMM GRANULOCYTES # BLD AUTO: 0.01 THOUSAND/UL (ref 0–0.2)
IMM GRANULOCYTES NFR BLD AUTO: 0 % (ref 0–2)
INR PPP: 1.12 (ref 0.84–1.19)
KETONES UR STRIP-MCNC: NEGATIVE MG/DL
LACTATE SERPL-SCNC: 0.8 MMOL/L (ref 0.5–2)
LEUKOCYTE ESTERASE UR QL STRIP: NEGATIVE
LIPASE SERPL-CCNC: 22 U/L (ref 11–82)
LYMPHOCYTES # BLD AUTO: 0.27 THOUSANDS/ÂΜL (ref 0.6–4.47)
LYMPHOCYTES NFR BLD AUTO: 6 % (ref 14–44)
MCH RBC QN AUTO: 36.7 PG (ref 26.8–34.3)
MCHC RBC AUTO-ENTMCNC: 34.4 G/DL (ref 31.4–37.4)
MCV RBC AUTO: 107 FL (ref 82–98)
MONOCYTES # BLD AUTO: 0.35 THOUSAND/ÂΜL (ref 0.17–1.22)
MONOCYTES NFR BLD AUTO: 8 % (ref 4–12)
NEUTROPHILS # BLD AUTO: 3.9 THOUSANDS/ÂΜL (ref 1.85–7.62)
NEUTS SEG NFR BLD AUTO: 84 % (ref 43–75)
NITRITE UR QL STRIP: NEGATIVE
NON-SQ EPI CELLS URNS QL MICRO: NORMAL /HPF
NRBC BLD AUTO-RTO: 0 /100 WBCS
PH UR STRIP.AUTO: 6.5 [PH]
PLATELET # BLD AUTO: 212 THOUSANDS/UL (ref 149–390)
PMV BLD AUTO: 11.3 FL (ref 8.9–12.7)
POTASSIUM SERPL-SCNC: 3.5 MMOL/L (ref 3.5–5.3)
PROCALCITONIN SERPL-MCNC: 0.17 NG/ML
PROT SERPL-MCNC: 6.2 G/DL (ref 6.4–8.4)
PROT UR STRIP-MCNC: NEGATIVE MG/DL
PROTHROMBIN TIME: 14.2 SECONDS (ref 11.6–14.5)
RBC # BLD AUTO: 2.97 MILLION/UL (ref 3.81–5.12)
RBC #/AREA URNS AUTO: NORMAL /HPF
RSV RNA RESP QL NAA+PROBE: NEGATIVE
SARS-COV-2 RNA RESP QL NAA+PROBE: NEGATIVE
SODIUM SERPL-SCNC: 133 MMOL/L (ref 135–147)
SP GR UR STRIP.AUTO: 1.02 (ref 1–1.03)
UROBILINOGEN UR STRIP-ACNC: <2 MG/DL
WBC # BLD AUTO: 4.62 THOUSAND/UL (ref 4.31–10.16)
WBC #/AREA URNS AUTO: NORMAL /HPF

## 2023-07-04 PROCEDURE — 99285 EMERGENCY DEPT VISIT HI MDM: CPT

## 2023-07-04 PROCEDURE — 81001 URINALYSIS AUTO W/SCOPE: CPT | Performed by: EMERGENCY MEDICINE

## 2023-07-04 PROCEDURE — 0241U HB NFCT DS VIR RESP RNA 4 TRGT: CPT | Performed by: EMERGENCY MEDICINE

## 2023-07-04 PROCEDURE — 74177 CT ABD & PELVIS W/CONTRAST: CPT

## 2023-07-04 PROCEDURE — 99223 1ST HOSP IP/OBS HIGH 75: CPT

## 2023-07-04 PROCEDURE — 93005 ELECTROCARDIOGRAM TRACING: CPT

## 2023-07-04 PROCEDURE — 85025 COMPLETE CBC W/AUTO DIFF WBC: CPT | Performed by: EMERGENCY MEDICINE

## 2023-07-04 PROCEDURE — 36415 COLL VENOUS BLD VENIPUNCTURE: CPT | Performed by: EMERGENCY MEDICINE

## 2023-07-04 PROCEDURE — 83690 ASSAY OF LIPASE: CPT | Performed by: EMERGENCY MEDICINE

## 2023-07-04 PROCEDURE — 71260 CT THORAX DX C+: CPT

## 2023-07-04 PROCEDURE — 85730 THROMBOPLASTIN TIME PARTIAL: CPT | Performed by: EMERGENCY MEDICINE

## 2023-07-04 PROCEDURE — 84145 PROCALCITONIN (PCT): CPT | Performed by: EMERGENCY MEDICINE

## 2023-07-04 PROCEDURE — 87040 BLOOD CULTURE FOR BACTERIA: CPT | Performed by: EMERGENCY MEDICINE

## 2023-07-04 PROCEDURE — 84484 ASSAY OF TROPONIN QUANT: CPT | Performed by: EMERGENCY MEDICINE

## 2023-07-04 PROCEDURE — G1004 CDSM NDSC: HCPCS

## 2023-07-04 PROCEDURE — 96365 THER/PROPH/DIAG IV INF INIT: CPT

## 2023-07-04 PROCEDURE — 83605 ASSAY OF LACTIC ACID: CPT | Performed by: EMERGENCY MEDICINE

## 2023-07-04 PROCEDURE — 96375 TX/PRO/DX INJ NEW DRUG ADDON: CPT

## 2023-07-04 PROCEDURE — 85610 PROTHROMBIN TIME: CPT | Performed by: EMERGENCY MEDICINE

## 2023-07-04 PROCEDURE — 99285 EMERGENCY DEPT VISIT HI MDM: CPT | Performed by: EMERGENCY MEDICINE

## 2023-07-04 PROCEDURE — 80053 COMPREHEN METABOLIC PANEL: CPT | Performed by: EMERGENCY MEDICINE

## 2023-07-04 RX ORDER — HYDROMORPHONE HYDROCHLORIDE 4 MG/1
4 TABLET ORAL EVERY 4 HOURS PRN
Status: DISCONTINUED | OUTPATIENT
Start: 2023-07-04 | End: 2023-07-05

## 2023-07-04 RX ORDER — OXYBUTYNIN CHLORIDE 5 MG/1
5 TABLET, EXTENDED RELEASE ORAL EVERY EVENING
Status: DISCONTINUED | OUTPATIENT
Start: 2023-07-04 | End: 2023-07-08 | Stop reason: HOSPADM

## 2023-07-04 RX ORDER — CEFTRIAXONE 1 G/50ML
1000 INJECTION, SOLUTION INTRAVENOUS ONCE
Status: COMPLETED | OUTPATIENT
Start: 2023-07-04 | End: 2023-07-04

## 2023-07-04 RX ORDER — DIPHENHYDRAMINE HCL 25 MG
50 TABLET ORAL
Status: DISCONTINUED | OUTPATIENT
Start: 2023-07-04 | End: 2023-07-08 | Stop reason: HOSPADM

## 2023-07-04 RX ORDER — ALBUTEROL SULFATE 90 UG/1
2 AEROSOL, METERED RESPIRATORY (INHALATION) EVERY 4 HOURS PRN
Status: DISCONTINUED | OUTPATIENT
Start: 2023-07-04 | End: 2023-07-08 | Stop reason: HOSPADM

## 2023-07-04 RX ORDER — LACTULOSE 20 G/30ML
10 SOLUTION ORAL DAILY PRN
Status: DISCONTINUED | OUTPATIENT
Start: 2023-07-04 | End: 2023-07-06

## 2023-07-04 RX ORDER — POLYETHYLENE GLYCOL 3350 17 G/17G
17 POWDER, FOR SOLUTION ORAL DAILY
Status: DISCONTINUED | OUTPATIENT
Start: 2023-07-05 | End: 2023-07-08 | Stop reason: HOSPADM

## 2023-07-04 RX ORDER — HYDROMORPHONE HCL/PF 1 MG/ML
1 SYRINGE (ML) INJECTION ONCE
Status: COMPLETED | OUTPATIENT
Start: 2023-07-04 | End: 2023-07-04

## 2023-07-04 RX ORDER — DICYCLOMINE HYDROCHLORIDE 10 MG/1
10 CAPSULE ORAL EVERY 6 HOURS PRN
Status: DISCONTINUED | OUTPATIENT
Start: 2023-07-04 | End: 2023-07-08 | Stop reason: HOSPADM

## 2023-07-04 RX ORDER — HYDROMORPHONE HYDROCHLORIDE 2 MG/1
2 TABLET ORAL
Status: DISCONTINUED | OUTPATIENT
Start: 2023-07-04 | End: 2023-07-05

## 2023-07-04 RX ORDER — DIPHENOXYLATE HYDROCHLORIDE AND ATROPINE SULFATE 2.5; .025 MG/1; MG/1
1 TABLET ORAL 4 TIMES DAILY PRN
Status: DISCONTINUED | OUTPATIENT
Start: 2023-07-04 | End: 2023-07-05

## 2023-07-04 RX ORDER — LETROZOLE 2.5 MG/1
2.5 TABLET, FILM COATED ORAL EVERY EVENING
Status: DISCONTINUED | OUTPATIENT
Start: 2023-07-04 | End: 2023-07-08 | Stop reason: HOSPADM

## 2023-07-04 RX ORDER — OXYBUTYNIN CHLORIDE 5 MG/1
5 TABLET, EXTENDED RELEASE ORAL DAILY
Status: DISCONTINUED | OUTPATIENT
Start: 2023-07-05 | End: 2023-07-04

## 2023-07-04 RX ORDER — HYDROMORPHONE HCL/PF 1 MG/ML
1 SYRINGE (ML) INJECTION EVERY 4 HOURS PRN
Status: DISCONTINUED | OUTPATIENT
Start: 2023-07-04 | End: 2023-07-05

## 2023-07-04 RX ORDER — CEFTRIAXONE 1 G/50ML
1000 INJECTION, SOLUTION INTRAVENOUS EVERY 24 HOURS
Status: DISCONTINUED | OUTPATIENT
Start: 2023-07-05 | End: 2023-07-08 | Stop reason: HOSPADM

## 2023-07-04 RX ORDER — CALCIUM CARBONATE 500 MG/1
500 TABLET, CHEWABLE ORAL 3 TIMES DAILY PRN
Status: DISCONTINUED | OUTPATIENT
Start: 2023-07-04 | End: 2023-07-08 | Stop reason: HOSPADM

## 2023-07-04 RX ORDER — DULOXETIN HYDROCHLORIDE 30 MG/1
30 CAPSULE, DELAYED RELEASE ORAL EVERY EVENING
Status: DISCONTINUED | OUTPATIENT
Start: 2023-07-04 | End: 2023-07-08 | Stop reason: HOSPADM

## 2023-07-04 RX ORDER — DULOXETIN HYDROCHLORIDE 30 MG/1
30 CAPSULE, DELAYED RELEASE ORAL DAILY
Status: DISCONTINUED | OUTPATIENT
Start: 2023-07-05 | End: 2023-07-04

## 2023-07-04 RX ADMIN — OXYBUTYNIN CHLORIDE 5 MG: 5 TABLET, EXTENDED RELEASE ORAL at 22:38

## 2023-07-04 RX ADMIN — IOHEXOL 100 ML: 350 INJECTION, SOLUTION INTRAVENOUS at 18:56

## 2023-07-04 RX ADMIN — RIVAROXABAN 20 MG: 20 TABLET, FILM COATED ORAL at 22:38

## 2023-07-04 RX ADMIN — LETROZOLE 2.5 MG: 2.5 TABLET, FILM COATED ORAL at 22:38

## 2023-07-04 RX ADMIN — CEFTRIAXONE 1000 MG: 1 INJECTION, SOLUTION INTRAVENOUS at 21:04

## 2023-07-04 RX ADMIN — HYDROMORPHONE HYDROCHLORIDE 1 MG: 1 INJECTION, SOLUTION INTRAMUSCULAR; INTRAVENOUS; SUBCUTANEOUS at 19:39

## 2023-07-04 RX ADMIN — HYDROMORPHONE HYDROCHLORIDE 1 MG: 1 INJECTION, SOLUTION INTRAMUSCULAR; INTRAVENOUS; SUBCUTANEOUS at 23:37

## 2023-07-04 RX ADMIN — DULOXETINE HYDROCHLORIDE 30 MG: 30 CAPSULE, DELAYED RELEASE ORAL at 22:38

## 2023-07-04 RX ADMIN — DIPHENHYDRAMINE HYDROCHLORIDE 50 MG: 25 TABLET ORAL at 22:40

## 2023-07-04 NOTE — ED PROVIDER NOTES
History  Chief Complaint   Patient presents with   • Abdominal Pain     Pt c/o upper abd/ R side breast pain since last night. Pt states pain worsened after having fluids removed from the pleural drain already in place today. Pt already took 6mg hydromorphone and 1500mg acetaminophen since 0900 today. Pt also states temp of 101 at home. Pt states hx stage 4 breast cancer       53-year-old female history of metastatic breast cancer on Xarelto presenting with pain and fever. Patient reports acute exacerbation of her chronic cancer related pain right lower chest.  Patient reports that she gets recurrent malignant effusions and has a Port-A-Cath in place for about the last year which she drains regularly. Last drained this morning with removal about 500 cc of nonpurulent fluid. Reports fluid looks normal.  Denies any shortness of breath. Denies abdominal pain nausea vomiting diarrhea. Denies any urinary changes. Patient reports generally feeling unwell and chills and took her temperature and it was almost 102 °F.  Took Tylenol. Denies any other complaints. Chart reviewed. Past Medical History:  No date: Cancer ([address])  11/2021: Headache(784.0)  No date: History of radiation exposure  2012: Malignant neoplasm of right female breast [facility])      Comment:  right  Family History: non-contributory  Social History            Prior to Admission Medications   Prescriptions Last Dose Informant Patient Reported? Taking?    CRANIAL PROSTHESIS, RX,   No No   Sig: Apply to cranial area PRN   [facility] Melatonin 3 MG  Self No No   Sig: TAKE 2 TABLETS (6 MG TOTAL) BY MOUTH DAILY AT BEDTIME   Patient not taking: Reported on 4/19/2023   DULoxetine (CYMBALTA) 30 mg delayed release capsule  Self No No   Sig: Take 1 capsule (30 mg total) by mouth daily   HYDROmorphone (DILAUDID) 2 mg tablet  Self No No   Sig: Take 1-2 tablets (2-4 mg total) by mouth every 3 (three) hours as needed (moderate/severe cancer-related pain) Max Daily Amount: 32 mg   Lidocaine Viscous HCl (XYLOCAINE) 2 % mucosal solution  Self No No   Sig: Swish and spit 10 mL 4 (four) times a day as needed for mouth pain or discomfort   Patient not taking: Reported on 5/31/2023   Xarelto 20 MG tablet  Self No No   Sig: TAKE 1 TABLET BY MOUTH EVERY DAY WITH BREAKFAST   acetaminophen (TYLENOL) 325 mg tablet  Self No No   Sig: Take 2 tablets (650 mg total) by mouth every 6 (six) hours as needed for mild pain   al mag oxide-diphenhydramine-lidocaine viscous (MAGIC MOUTHWASH) 1:1:1 suspension  Self No No   Sig: Swish and spit 10 mL every 4 (four) hours as needed for mouth pain or discomfort   albuterol (PROVENTIL HFA,VENTOLIN HFA) 90 mcg/act inhaler  Self No No   Sig: Inhale 2 puffs every 4 (four) hours as needed for wheezing   calcium carbonate (TUMS) 500 mg chewable tablet  Self No No   Sig: Chew 1 tablet (500 mg total) 3 (three) times a day as needed for indigestion or heartburn   dextromethorphan-guaiFENesin (ROBITUSSIN DM)  mg/5 mL syrup  Self No No   Sig: Take 10 mL by mouth every 4 (four) hours as needed for cough   Patient not taking: Reported on 4/25/2023   dicyclomine (BENTYL) 10 mg capsule  Self No No   Sig: Take 1 capsule (10 mg total) by mouth every 6 (six) hours as needed (abdominal cramping)   diphenhydrAMINE (BENADRYL) 50 MG tablet  Self No No   Sig: Take 1 tablet (50 mg total) by mouth daily at bedtime as needed for itching or sleep   diphenoxylate-atropine (LOMOTIL) 2.5-0.025 mg per tablet  Self No No   Sig: Take 1 tablet by mouth 4 (four) times a day as needed for diarrhea   docusate sodium (COLACE) 100 mg capsule   No No   Sig: Take 1 capsule (100 mg total) by mouth 2 (two) times a day for 10 days   lactulose 20 g/30 mL  Self No No   Sig: Take 15 mL (10 g total) by mouth daily as needed (constipaton)   letrozole (FEMARA) 2.5 mg tablet   No No   Sig: Take 1 tablet (2.5 mg total) by mouth daily   metoclopramide (REGLAN) 10 mg tablet  Self No No   Sig: Take 1 tablet (10 mg total) by mouth 4 (four) times a day   Patient not taking: Reported on 5/31/2023   multivitamin (THERAGRAN) TABS  Self Yes No   Sig: Take 1 tablet by mouth   ondansetron (ZOFRAN) 8 mg tablet  Self No No   Sig: Take 1 tablet (8 mg total) by mouth every 8 (eight) hours as needed for nausea or vomiting   oxybutynin (DITROPAN-XL) 5 mg 24 hr tablet  Self No No   Sig: Take 1 tablet (5 mg total) by mouth daily Take 1 tablet daily   polyethylene glycol (MIRALAX) 17 g packet  Self No No   Sig: Take 17 g by mouth daily   potassium chloride (K-DUR,KLOR-CON) 20 mEq tablet   No No   Sig: Take 1 tablet (20 mEq total) by mouth 2 (two) times a day for 3 days   Patient not taking: Reported on 12/27/2022   senna (SENOKOT) 8.6 MG tablet  Self No No   Sig: Take 1 tablet (8.6 mg total) by mouth daily at bedtime as needed for constipation   sucralfate (CARAFATE) 1 g tablet   No No   Sig: Take 1 tablet (1 g total) by mouth 4 (four) times a day for 14 days      Facility-Administered Medications: None       Past Medical History:   Diagnosis Date   • Cancer ([address])    • Headache(784.0) 11/2021   • History of radiation exposure    • Malignant neoplasm of right female breast ([address]) 2012    right       Past Surgical History:   Procedure Laterality Date   • BACK SURGERY     • BREAST CYST EXCISION     • BREAST LUMPECTOMY Right 2012   • HIP SURGERY Right     debridement of femur   • IR BIOPSY LIVER MASS  07/26/2021   • IR MICROWAVE ABLATION  11/23/2022   • IR PLEURAL EFFUSION LONG-TERM CATHETER PLACEMENT  08/17/2021   • IR PLEURAL EFFUSION LONG-TERM CATHETER REMOVAL  05/11/2022   • IR PORT PLACEMENT  07/27/2022   • IR THORACENTESIS  07/26/2021       Family History   Problem Relation Age of Onset   • Breast cancer Mother         in her 63's   • Breast cancer Sister         inher 45s and 48   • Colon cancer Maternal Grandmother    • No Known Problems Paternal Grandmother    • Breast cancer Other    • No Known Problems Paternal Aunt      I have reviewed and agree with the history as documented. E-Cigarette/Vaping   • E-Cigarette Use Never User      E-Cigarette/Vaping Substances   • Nicotine No    • THC No    • CBD No    • Flavoring No    • Other No    • Unknown No      Social History     Tobacco Use   • Smoking status: Some Days     Packs/day: 0.25     Years: 15.00     Total pack years: 3.75     Types: Cigarettes     Start date: 20     Last attempt to quit: 7/10/2021     Years since quittin.9   • Smokeless tobacco: Never   Vaping Use   • Vaping Use: Never used   Substance Use Topics   • Alcohol use: Not Currently   • Drug use: Not Currently       Review of Systems   Constitutional: Positive for chills and fatigue. Negative for appetite change, diaphoresis, fever and unexpected weight change. HENT: Negative for congestion and rhinorrhea. Eyes: Negative for photophobia and visual disturbance. Respiratory: Negative for cough, chest tightness and shortness of breath. Cardiovascular: Positive for chest pain. Negative for palpitations and leg swelling. Gastrointestinal: Negative for abdominal distention, abdominal pain, blood in stool, constipation, diarrhea, nausea and vomiting. Genitourinary: Negative for dysuria and hematuria. Musculoskeletal: Negative for back pain, joint swelling, neck pain and neck stiffness. Skin: Negative for color change, pallor, rash and wound. Neurological: Negative for dizziness, syncope, weakness, light-headedness and headaches. Psychiatric/Behavioral: Negative for agitation. All other systems reviewed and are negative. Physical Exam  Physical Exam  Vitals and nursing note reviewed. Constitutional:       General: She is not in acute distress. Appearance: Normal appearance. She is well-developed. She is not ill-appearing, toxic-appearing or diaphoretic. HENT:      Head: Normocephalic and atraumatic. Nose: Nose normal. No congestion or rhinorrhea.       Mouth/Throat:      Mouth: Mucous membranes are moist.      Pharynx: Oropharynx is clear. No oropharyngeal exudate or posterior oropharyngeal erythema. Eyes:      General: No scleral icterus. Right eye: No discharge. Left eye: No discharge. Extraocular Movements: Extraocular movements intact. Conjunctiva/sclera: Conjunctivae normal.      Pupils: Pupils are equal, round, and reactive to light. Neck:      Vascular: No JVD. Trachea: No tracheal deviation. Comments: Supple. Normal range of motion. Cardiovascular:      Rate and Rhythm: Normal rate and regular rhythm. Heart sounds: Normal heart sounds. No murmur heard. No friction rub. No gallop. Comments: Normal rate and regular rhythm  Pulmonary:      Effort: Pulmonary effort is normal. No respiratory distress. Breath sounds: Normal breath sounds. No stridor. No wheezing or rales. Comments: Clear to auscultation bilaterally  Chest:      Chest wall: No tenderness. Abdominal:      General: Bowel sounds are normal. There is no distension. Palpations: Abdomen is soft. Tenderness: There is no abdominal tenderness. There is no right CVA tenderness, left CVA tenderness, guarding or rebound. Comments: Soft, nontender, nondistended. Normal bowel sounds throughout   Musculoskeletal:         General: No swelling, tenderness, deformity or signs of injury. Normal range of motion. Cervical back: Normal range of motion and neck supple. No rigidity. No muscular tenderness. Right lower leg: No edema. Left lower leg: No edema. Lymphadenopathy:      Cervical: No cervical adenopathy. Skin:     General: Skin is warm and dry. Coloration: Skin is not pale. Findings: No erythema or rash. Comments: Right-sided chest wall tenderness. No surrounding skin changes   Neurological:      General: No focal deficit present. Mental Status: She is alert. Mental status is at baseline. Sensory: No sensory deficit. Motor: No weakness or abnormal muscle tone. Coordination: Coordination normal.      Gait: Gait normal.      Comments: Alert. Strength and sensation grossly intact. Ambulatory without difficulty at baseline. Psychiatric:         Behavior: Behavior normal.         Thought Content:  Thought content normal.         Vital Signs  ED Triage Vitals   Temperature Pulse Respirations Blood Pressure SpO2   07/04/23 1806 07/04/23 1806 07/04/23 1806 07/04/23 1806 07/04/23 1806   98.5 °F (36.9 °C) 97 16 123/67 97 %      Temp src Heart Rate Source Patient Position - Orthostatic VS BP Location FiO2 (%)   -- 07/04/23 1806 07/04/23 1806 07/04/23 1806 --    Monitor Sitting Left arm       Pain Score       07/04/23 1939       8           Vitals:    07/04/23 1806 07/04/23 2000 07/04/23 2030 07/04/23 2100   BP: 123/67 116/64 109/67 106/71   Pulse: 97 89 89 82   Patient Position - Orthostatic VS: Sitting            Visual Acuity      ED Medications  Medications   albuterol (PROVENTIL HFA,VENTOLIN HFA) inhaler 2 puff (has no administration in time range)   calcium carbonate (TUMS) chewable tablet 500 mg (has no administration in time range)   dicyclomine (BENTYL) capsule 10 mg (has no administration in time range)   diphenhydrAMINE (BENADRYL) tablet 50 mg (has no administration in time range)   diphenoxylate-atropine (LOMOTIL) 2.5-0.025 mg per tablet 1 tablet (has no administration in time range)   lactulose oral solution 10 g (has no administration in time range)   polyethylene glycol (MIRALAX) packet 17 g (has no administration in time range)   HYDROmorphone (DILAUDID) injection 1 mg (has no administration in time range)   HYDROmorphone (DILAUDID) tablet 2 mg (has no administration in time range)     Or   HYDROmorphone (DILAUDID) tablet 4 mg (has no administration in time range)   cefTRIAXone (ROCEPHIN) IVPB (premix in dextrose) 1,000 mg 50 mL (has no administration in time range)   letrozole (FEMARA) tablet 2.5 mg (has no administration in time range)   DULoxetine (CYMBALTA) delayed release capsule 30 mg (has no administration in time range)   oxybutynin (DITROPAN-XL) 24 hr tablet 5 mg (has no administration in time range)   rivaroxaban (XARELTO) tablet 20 mg (has no administration in time range)   HYDROmorphone (DILAUDID) injection 1 mg (1 mg Intravenous Given 7/4/23 1939)   iohexol (OMNIPAQUE) 350 MG/ML injection (SINGLE-DOSE) 100 mL (100 mL Intravenous Given 7/4/23 1856)   cefTRIAXone (ROCEPHIN) IVPB (premix in dextrose) 1,000 mg 50 mL (0 mg Intravenous Stopped 7/4/23 2134)       Diagnostic Studies  Results Reviewed     Procedure Component Value Units Date/Time    FLU/RSV/COVID - if FLU/RSV clinically relevant [[id number]]  (Normal) Collected: 07/04/23 1837    Lab Status: Final result Specimen: Nares from Nose Updated: 07/04/23 1949     SARS-CoV-2 Negative     INFLUENZA A PCR Negative     INFLUENZA B PCR Negative     RSV PCR Negative    Narrative:      FOR PEDIATRIC PATIENTS - copy/paste COVID Guidelines URL to browser: [email or web address]    SARS-CoV-2 assay is a Nucleic Acid Amplification assay intended for the  qualitative detection of nucleic acid from SARS-CoV-2 in nasopharyngeal  swabs. Results are for the presumptive identification of SARS-CoV-2 RNA. Positive results are indicative of infection with SARS-CoV-2, the virus  causing COVID-19, but do not rule out bacterial infection or co-infection  with other viruses. Laboratories within the [facility] and its  territories are required to report all positive results to the appropriate  public health authorities. Negative results do not preclude SARS-CoV-2  infection and should not be used as the sole basis for treatment or other  patient management decisions. Negative results must be combined with  clinical observations, patient history, and epidemiological information.   This test has not been FDA cleared or approved. This test has been authorized by FDA under an Emergency Use Authorization  (EUA). This test is only authorized for the duration of time the  declaration that circumstances exist justifying the authorization of the  emergency use of an in vitro diagnostic tests for detection of SARS-CoV-2  virus and/or diagnosis of COVID-19 infection under section 564(b)(1) of  the Act, 21 U. S.C. 603OEW-5(U)(7), unless the authorization is terminated  or revoked sooner. The test has been validated but independent review by FDA  and CLIA is pending. Test performed using [employer] Gene[employer]pert: This RT-PCR assay targets N2,  a region unique to SARS-CoV-2. A conserved region in the E-gene was chosen  for pan-Sarbecovirus detection which includes SARS-CoV-2. According to CMS-2020-01-R, this platform meets the definition of high-throughput technology. Urine Microscopic [[id number]]  (Normal) Collected: 07/04/23 1917    Lab Status: Final result Specimen: Urine, Clean Catch Updated: 07/04/23 1933     RBC, UA 1-2 /hpf      WBC, UA 1-2 /hpf      Epithelial Cells Occasional /hpf      Bacteria, UA None Seen /hpf     UA w Reflex to Microscopic w Reflex to Culture [[id number]]  (Abnormal) Collected: 07/04/23 1917    Lab Status: Final result Specimen: Urine, Clean Catch Updated: 07/04/23 1932     Color, UA Colorless     Clarity, UA Clear     Specific Gravity, UA 1.019     pH, UA 6.5     Leukocytes, UA Negative     Nitrite, UA Negative     Protein, UA Negative mg/dl      Glucose, UA Negative mg/dl      Ketones, UA Negative mg/dl      Urobilinogen, UA <2.0 mg/dl      Bilirubin, UA Negative     Occult Blood, UA Moderate    Lactic acid [[id number]]  (Normal) Collected: 07/04/23 1837    Lab Status: Final result Specimen: Blood from Arm, Left Updated: 07/04/23 1921     LACTIC ACID 0.8 mmol/L     Narrative:      Result may be elevated if tourniquet was used during collection.     Comprehensive metabolic panel [[id number]]  (Abnormal) Collected: 07/04/23 1837    Lab Status: Final result Specimen: Blood from Arm, Left Updated: 07/04/23 1920     Sodium 133 mmol/L      Potassium 3.5 mmol/L      Chloride 104 mmol/L      CO2 22 mmol/L      ANION GAP 7 mmol/L      BUN 23 mg/dL      Creatinine 0.60 mg/dL      Glucose 104 mg/dL      Calcium 8.9 mg/dL      Corrected Calcium 9.4 mg/dL      AST 93 U/L      ALT 41 U/L      Alkaline Phosphatase 235 U/L      Total Protein 6.2 g/dL      Albumin 3.4 g/dL      Total Bilirubin 0.39 mg/dL      eGFR 102 ml/min/1.73sq m     Narrative:      [place] guidelines for Chronic Kidney Disease (CKD):   •  Stage 1 with normal or high GFR (GFR > 90 mL/min/1.73 square meters)  •  Stage 2 Mild CKD (GFR = 60-89 mL/min/1.73 square meters)  •  Stage 3A Moderate CKD (GFR = 45-59 mL/min/1.73 square meters)  •  Stage 3B Moderate CKD (GFR = 30-44 mL/min/1.73 square meters)  •  Stage 4 Severe CKD (GFR = 15-29 mL/min/1.73 square meters)  •  Stage 5 End Stage CKD (GFR <15 mL/min/1.73 square meters)  Note: GFR calculation is accurate only with a steady state creatinine    Lipase [[id number]]  (Normal) Collected: 07/04/23 1837    Lab Status: Final result Specimen: Blood from Arm, Left Updated: 07/04/23 1920     Lipase 22 u/L     Procalcitonin [[id number]]  (Normal) Collected: 07/04/23 1837    Lab Status: Final result Specimen: Blood from Arm, Left Updated: 07/04/23 1919     Procalcitonin 0.17 ng/ml     HS Troponin 0hr (reflex protocol) [[id number]]  (Normal) Collected: 07/04/23 1837    Lab Status: Final result Specimen: Blood from Arm, Left Updated: 07/04/23 1916     hs TnI 0hr 4 ng/L     Protime-INR [[id number]]  (Normal) Collected: 07/04/23 1837    Lab Status: Final result Specimen: Blood from Arm, Left Updated: 07/04/23 1915     Protime 14.2 seconds      INR 1.12    APTT [[id number]]  (Normal) Collected: 07/04/23 1837    Lab Status: Final result Specimen: Blood from Arm, Left Updated: 07/04/23 1915     PTT 33 seconds CBC and differential [[id number]]  (Abnormal) Collected: 07/04/23 1837    Lab Status: Final result Specimen: Blood from Arm, Left Updated: 07/04/23 1852     WBC 4.62 Thousand/uL      RBC 2.97 Million/uL      Hemoglobin 10.9 g/dL      Hematocrit 31.7 %       fL      MCH 36.7 pg      MCHC 34.4 g/dL      RDW 16.0 %      MPV 11.3 fL      Platelets 849 Thousands/uL      nRBC 0 /100 WBCs      Neutrophils Relative 84 %      Immat GRANS % 0 %      Lymphocytes Relative 6 %      Monocytes Relative 8 %      Eosinophils Relative 1 %      Basophils Relative 1 %      Neutrophils Absolute 3.90 Thousands/µL      Immature Grans Absolute 0.01 Thousand/uL      Lymphocytes Absolute 0.27 Thousands/µL      Monocytes Absolute 0.35 Thousand/µL      Eosinophils Absolute 0.05 Thousand/µL      Basophils Absolute 0.04 Thousands/µL     Blood culture #1 [[id number]] Collected: 07/04/23 1838    Lab Status: In process Specimen: Blood from Hand, Left Updated: 07/04/23 1848    Blood culture #2 [[id number]] Collected: 07/04/23 1837    Lab Status: In process Specimen: Blood from Line, Venous Updated: 07/04/23 1848                 CT chest abdomen pelvis w contrast   Final Result by Governor MD Celia (07/04 2044)      Enlarging partially loculated right pleural effusion. Worsening hepatic metastatic disease. Widespread sclerotic osseous metastasis again seen      Workstation performed: [id number]                    Procedures  Procedures         ED Course             HEART Risk Score    Flowsheet Row Most Recent Value   Heart Score Risk Calculator    History 0 Filed at: 07/04/2023 2000   ECG 0 Filed at: 07/04/2023 2000   Age 1 Filed at: 07/04/2023 2000   Risk Factors 1 Filed at: 07/04/2023 2000   Troponin 0 Filed at: 07/04/2023 2000   HEART Score 2 Filed at: 07/04/2023 2000                        SBIRT 22yo+    Flowsheet Row Most Recent Value   Initial Alcohol Screen: US AUDIT-C     1. How often do you have a drink containing alcohol? 0 Filed at: 07/04/2023 1920   2. How many drinks containing alcohol do you have on a typical day you are drinking? 1 Filed at: 07/04/2023 1920   3b. FEMALE Any Age, or MALE 65+: How often do you have 4 or more drinks on one occassion? 0 Filed at: 07/04/2023 1920   Audit-C Score 1 Filed at: 07/04/2023 1920   MIRIAN: How many times in the past year have you. .. Used an illegal drug or used a prescription medication for non-medical reasons? Never Filed at: 07/04/2023 1920                    Medical Decision Making  62-year-old female history of metastatic breast cancer on Xarelto presenting with pain and fever. No obvious source at this time. Given pleural catheter for the past almost 2 years plus increasing pain in that area concerning for possible infection. Plan for a CT chest abdomen pelvis. Also plan for cardiac evaluation including EKG and troponin. Infectious evaluation. Symptom management with IV pain medication. Reassess. Symptom improvement after medication. EKG interpreted me normal sinus rhythm with no acute ST abnormality. Labs interpreted by me without significant acute process. CT imaging notable for loculated pleural effusion on the right. Possible infectious etiology. Will give dose of IV antibiotics. Plan to admit for further evaluation and management of loculated effusion. Admitted. Amount and/or Complexity of Data Reviewed  Labs: ordered. Radiology: ordered. Risk  Prescription drug management. Decision regarding hospitalization.           Disposition  Final diagnoses:   Loculated pleural effusion   Fever   Breast cancer ([address])   Chest wall pain     Time reflects when diagnosis was documented in both MDM as applicable and the Disposition within this note     Time User Action Codes Description Comment    7/4/2023  9:32 PM Lacretia Destini Add [J90] Loculated pleural effusion     7/4/2023  9:32 PM Lacretia Destini Add [R50.9] Fever     7/4/2023  9:32 PM Lacretia Destini Add [C50.919] Breast cancer ([address])     7/4/2023  9:36 PM Bertha Gut Add [R07.9] Chest pain, unspecified type     7/4/2023 10:38 PM Dexter Petit Add [R07.89] Chest wall pain       ED Disposition     ED Disposition   Admit    Condition   Stable    Date/Time   Tue Jul 4, 2023  9:32 PM    Comment   Case was discussed with FARHAN and the patient's admission status was agreed to be Admission Status: observation status to the service of Dr. Lori Kumari . Follow-up Information    None         Patient's Medications   Discharge Prescriptions    No medications on file       No discharge procedures on file.     PDMP Review       Value Time User    PDMP Reviewed  Yes 6/5/2023  5:17 PM Dilan Herman MD          ED Provider  Electronically Signed by           Marko Durant MD  07/04/23 9181

## 2023-07-05 ENCOUNTER — APPOINTMENT (OUTPATIENT)
Dept: NON INVASIVE DIAGNOSTICS | Facility: HOSPITAL | Age: 58
DRG: 382 | End: 2023-07-05
Attending: INTERNAL MEDICINE
Payer: COMMERCIAL

## 2023-07-05 ENCOUNTER — HOSPITAL ENCOUNTER (OUTPATIENT)
Dept: INFUSION CENTER | Facility: CLINIC | Age: 58
Discharge: HOME | End: 2023-07-05

## 2023-07-05 LAB
APPEARANCE FLD: ABNORMAL
ATRIAL RATE: 87 BPM
COLOR FLD: ABNORMAL
GLUCOSE FLD-MCNC: 87 MG/DL
LDH FLD L TO P-CCNC: 311 U/L
LYMPHOCYTES NFR BLD AUTO: 5 %
MONOCYTES NFR BLD AUTO: 5 %
NEUTS SEG NFR BLD AUTO: 90 %
P AXIS: 72 DEGREES
PH BODY FLUID: 7.5
PR INTERVAL: 176 MS
PROT FLD-MCNC: 4.3 G/DL
QRS AXIS: 81 DEGREES
QRSD INTERVAL: 68 MS
QT INTERVAL: 360 MS
QTC INTERVAL: 433 MS
SITE: ABNORMAL
T WAVE AXIS: 57 DEGREES
TOTAL CELLS COUNTED SPEC: 100
VENTRICULAR RATE: 87 BPM
WBC # FLD MANUAL: ABNORMAL /UL

## 2023-07-05 PROCEDURE — 32555 ASPIRATE PLEURA W/ IMAGING: CPT

## 2023-07-05 PROCEDURE — 84157 ASSAY OF PROTEIN OTHER: CPT | Performed by: INTERNAL MEDICINE

## 2023-07-05 PROCEDURE — 87205 SMEAR GRAM STAIN: CPT | Performed by: INTERNAL MEDICINE

## 2023-07-05 PROCEDURE — 87070 CULTURE OTHR SPECIMN AEROBIC: CPT | Performed by: INTERNAL MEDICINE

## 2023-07-05 PROCEDURE — 99233 SBSQ HOSP IP/OBS HIGH 50: CPT | Performed by: INTERNAL MEDICINE

## 2023-07-05 PROCEDURE — 88305 TISSUE EXAM BY PATHOLOGIST: CPT | Performed by: PATHOLOGY

## 2023-07-05 PROCEDURE — 88112 CYTOPATH CELL ENHANCE TECH: CPT | Performed by: PATHOLOGY

## 2023-07-05 PROCEDURE — 93010 ELECTROCARDIOGRAM REPORT: CPT | Performed by: INTERNAL MEDICINE

## 2023-07-05 PROCEDURE — 83986 ASSAY PH BODY FLUID NOS: CPT | Performed by: INTERNAL MEDICINE

## 2023-07-05 PROCEDURE — 82945 GLUCOSE OTHER FLUID: CPT | Performed by: INTERNAL MEDICINE

## 2023-07-05 PROCEDURE — 0W993ZZ DRAINAGE OF RIGHT PLEURAL CAVITY, PERCUTANEOUS APPROACH: ICD-10-PCS | Performed by: INTERNAL MEDICINE

## 2023-07-05 PROCEDURE — 94664 DEMO&/EVAL PT USE INHALER: CPT

## 2023-07-05 PROCEDURE — 99255 IP/OBS CONSLTJ NEW/EST HI 80: CPT | Performed by: STUDENT IN AN ORGANIZED HEALTH CARE EDUCATION/TRAINING PROGRAM

## 2023-07-05 PROCEDURE — 83615 LACTATE (LD) (LDH) ENZYME: CPT | Performed by: INTERNAL MEDICINE

## 2023-07-05 PROCEDURE — 32555 ASPIRATE PLEURA W/ IMAGING: CPT | Performed by: RADIOLOGY

## 2023-07-05 PROCEDURE — 89051 BODY FLUID CELL COUNT: CPT | Performed by: INTERNAL MEDICINE

## 2023-07-05 RX ORDER — HYDROMORPHONE HYDROCHLORIDE 4 MG/1
8 TABLET ORAL
Status: DISCONTINUED | OUTPATIENT
Start: 2023-07-05 | End: 2023-07-08 | Stop reason: HOSPADM

## 2023-07-05 RX ORDER — HYDROMORPHONE HCL/PF 1 MG/ML
1 SYRINGE (ML) INJECTION
Status: DISCONTINUED | OUTPATIENT
Start: 2023-07-05 | End: 2023-07-08 | Stop reason: HOSPADM

## 2023-07-05 RX ORDER — ACETAMINOPHEN 325 MG/1
650 TABLET ORAL EVERY 6 HOURS PRN
Status: DISCONTINUED | OUTPATIENT
Start: 2023-07-05 | End: 2023-07-06

## 2023-07-05 RX ORDER — HYDROMORPHONE HYDROCHLORIDE 4 MG/1
4 TABLET ORAL
Status: DISCONTINUED | OUTPATIENT
Start: 2023-07-05 | End: 2023-07-08 | Stop reason: HOSPADM

## 2023-07-05 RX ADMIN — RIVAROXABAN 20 MG: 20 TABLET, FILM COATED ORAL at 17:26

## 2023-07-05 RX ADMIN — OXYBUTYNIN CHLORIDE 5 MG: 5 TABLET, EXTENDED RELEASE ORAL at 17:26

## 2023-07-05 RX ADMIN — HYDROMORPHONE HYDROCHLORIDE 8 MG: 4 TABLET ORAL at 17:25

## 2023-07-05 RX ADMIN — POLYETHYLENE GLYCOL 3350 17 G: 17 POWDER, FOR SOLUTION ORAL at 08:29

## 2023-07-05 RX ADMIN — HYDROMORPHONE HYDROCHLORIDE 4 MG: 4 TABLET ORAL at 00:39

## 2023-07-05 RX ADMIN — HYDROMORPHONE HYDROCHLORIDE 8 MG: 4 TABLET ORAL at 14:23

## 2023-07-05 RX ADMIN — HYDROMORPHONE HYDROCHLORIDE 8 MG: 4 TABLET ORAL at 11:05

## 2023-07-05 RX ADMIN — HYDROMORPHONE HYDROCHLORIDE 8 MG: 4 TABLET ORAL at 20:43

## 2023-07-05 RX ADMIN — HYDROMORPHONE HYDROCHLORIDE 4 MG: 4 TABLET ORAL at 06:44

## 2023-07-05 RX ADMIN — HYDROMORPHONE HYDROCHLORIDE 1 MG: 1 INJECTION, SOLUTION INTRAMUSCULAR; INTRAVENOUS; SUBCUTANEOUS at 08:35

## 2023-07-05 RX ADMIN — CEFTRIAXONE 1000 MG: 1 INJECTION, SOLUTION INTRAVENOUS at 22:43

## 2023-07-05 RX ADMIN — LETROZOLE 2.5 MG: 2.5 TABLET, FILM COATED ORAL at 17:26

## 2023-07-05 RX ADMIN — ACETAMINOPHEN 650 MG: 325 TABLET, FILM COATED ORAL at 20:43

## 2023-07-05 RX ADMIN — DULOXETINE HYDROCHLORIDE 30 MG: 30 CAPSULE, DELAYED RELEASE ORAL at 17:26

## 2023-07-05 NOTE — PLAN OF CARE

## 2023-07-05 NOTE — ASSESSMENT & PLAN NOTE
· AST 93;  Alk phos 235  · Appear to have been worsening since early June   · Possibly in setting of worsening hepatic metastasis   · AM CMP ordered -monitor

## 2023-07-05 NOTE — RESPIRATORY THERAPY NOTE
RT Protocol Note  Barney Toro 56 y.o. female MRN: [id number]  Unit/Bed#: -01 Encounter: [id number]    Assessment    Principal Problem:    Pleural effusion  Active Problems:    Hypertension    Malignant neoplasm of breast in female, estrogen receptor positive ([address])    Chest pain    Fever    Abnormal LFTs      Home Pulmonary Medications:     23 1559   Respiratory Protocol   Protocol Initiated? Yes   Protocol Selection Respiratory   Language Barrier? No   Medical & Social History Reviewed? Yes   Diagnostic Studies Reviewed? Yes   Physical Assessment Performed?  Yes   Respiratory Plan Home Bronchodilator Patient pathway   Respiratory Assessment   Assessment Type Assess only   Bilateral Breath Sounds Diminished   Resp Comments bbs diminished, takes albuterol MDI prn at home, will continue current therapy   Additional Assessments   Pulse 78   SpO2 93 %            Past Medical History:   Diagnosis Date    Cancer ([address])     Headache(784.0) 2021    History of radiation exposure     Malignant neoplasm of right female breast ([address]) 2012    right     Social History     Socioeconomic History    Marital status: /Civil Union     Spouse name: None    Number of children: None    Years of education: None    Highest education level: None   Occupational History    None   Tobacco Use    Smoking status: Some Days     Packs/day: 0.25     Years: 15.00     Total pack years: 3.75     Types: Cigarettes     Start date:      Last attempt to quit: 7/10/2021     Years since quittin.9    Smokeless tobacco: Never   Vaping Use    Vaping Use: Never used   Substance and Sexual Activity    Alcohol use: Not Currently    Drug use: Not Currently    Sexual activity: Not Currently     Partners: Male     Birth control/protection: Male Sterilization   Other Topics Concern    None   Social History Narrative    None     Social Determinants of Health     Financial Resource Strain: Not on file   Food Insecurity: No Food Insecurity (7/5/2023)    Hunger Vital Sign     Worried About Running Out of Food in the Last Year: Never true     [address] in the Last Year: Never true   Transportation Needs: No Transportation Needs (7/5/2023)    PRAPARE - Transportation     Lack of Transportation (Medical): No     Lack of Transportation (Non-Medical): No   Physical Activity: Not on file   Stress: Not on file   Social Connections: Not on file   Intimate Partner Violence: Not on file   Housing Stability: Low Risk  (7/5/2023)    Housing Stability Vital Sign     Unable to Pay for Housing in the Last Year: No     Number of Places Lived in the Last Year: 1     Unstable Housing in the Last Year: No       Subjective         Objective    Physical Exam:   Assessment Type: Assess only  Bilateral Breath Sounds: Diminished    Vitals:  Blood pressure 98/59, pulse 78, temperature 98.8 °F (37.1 °C), resp. rate 20, height 5' 4" (1.626 m), weight 52.6 kg (115 lb 15.4 oz), last menstrual period 05/06/2021, SpO2 93 %.           Imaging and other studies:           Plan    Respiratory Plan: Home Bronchodilator Patient pathway        Resp Comments: bbs diminished, takes albuterol MDI prn at home, will continue current therapy

## 2023-07-05 NOTE — ASSESSMENT & PLAN NOTE
Patient reports acute-on-chronic right lower chest pain worsening over the past 1-2 days. Reports pain is worse with movement and deep inspiration. Additionally reports ongoing chronic chills and noticed a fever of 102 at home earlier today. After ED interventions reports her pain has improved somewhat, however still persists. Denies other acute symptom/complaint at this time.     /71   Pulse 82   Temp 98.5 °F (36.9 °C)   Resp 18   Wt 55.3 kg (122 lb)   LMP 05/06/2021 (Approximate)   SpO2 95%   BMI 20.30 kg/m²     · Reports fever Tmax 102F at home earlier today  · Not currently meeting SEPSIS criteria   · Procal and lactic acid WNL   · CT showing enlarging partially loculated pleural effusion  · ANC currently WNL  · Received IV ceftriaxone while in ED   · As patient is on active cancer treatment will continue IV rocephin pending remainder of infectious workup results  · Trend WBC and monitor for fever development

## 2023-07-05 NOTE — ASSESSMENT & PLAN NOTE
-AST and alkaline phosphatase elevated. Past labs have shown similar elevations since 02/2023. -CT findings: Worsening hepatic metastatic disease. Widespread sclerotic osseous metastasis. -Elevations likely due to worsening hepatic and osseous metastatic disease shown on CT.  -Continue to monitor values.

## 2023-07-05 NOTE — ASSESSMENT & PLAN NOTE
Patient reports acute-on-chronic right lower chest pain worsening over the past 1-2 days. Reports pain is worse with movement and deep inspiration. Additionally reports ongoing chronic chills and noticed a fever of 102 at home earlier today. After ED interventions reports her pain has improved somewhat, however still persists. Denies other acute symptom/complaint at this time.     /71   Pulse 82   Temp 98.5 °F (36.9 °C)   Resp 18   Wt 55.3 kg (122 lb)   LMP 05/06/2021 (Approximate)   SpO2 95%   BMI 20.30 kg/m²     · Reports fever Tmax 102F at home earlier the day of admission  · Not currently meeting SEPSIS criteria   · Procal and lactic acid WNL   · CT showing enlarging partially loculated pleural effusion  · ANC currently WNL  · Will check pleural fluid analysis to rule out infection in her effusion

## 2023-07-05 NOTE — PLAN OF CARE

## 2023-07-05 NOTE — ASSESSMENT & PLAN NOTE
· CT showing enlarging partially loculated right pleural effusion  · Has pleural tube in place; Reports 500cc normal-appearing fluid drained this AM   · Not currently in respiratory distress; saturating appropriately on RA   · Given effusion has increased despite drainage via pleural cath this AM, may benefit from IR discussion when available tomorrow AM

## 2023-07-05 NOTE — UTILIZATION REVIEW
Initial Clinical Review    Admission: Date/Time/Statement:   Admission Orders (From admission, onward)     Ordered        07/04/23 2132  Place in Observation  Once                      Orders Placed This Encounter   Procedures   • Place in Observation     Standing Status:   Standing     Number of Occurrences:   1     Order Specific Question:   Level of Care     Answer:   Med Surg [16]     ED Arrival Information     Expected   -    Arrival   7/4/2023 17:57    Acuity   Emergent            Means of arrival   Walk-In    Escorted by   Spouse    Service   Hospitalist    Admission type   Emergency            Arrival complaint   Fever(Chemo Pt)           Chief Complaint   Patient presents with   • Abdominal Pain     Pt c/o upper abd/ R side breast pain since last night. Pt states pain worsened after having fluids removed from the pleural drain already in place today. Pt already took 6mg hydromorphone and 1500mg acetaminophen since 0900 today. Pt also states temp of 101 at home. Pt states hx stage 4 breast cancer         Initial Presentation: 56 y.o. female to ED from home w/ CP and fever . Acute on chronic R lower CP worsening over the past 1-2 days . Worse w/ movement and deep inspiration . Ongoing chronic chills and fever 102 earlier today . PMHX metastatic breast ca , chronic pleural effusion , HTN . Admitted OBS status w/ fever and pleural effusion . Plan for IV ceftriaxone , trend wbc and fever . Has pleural tube in place; Reports 500cc normal-appearing fluid drained this AM, may benefit from IR consult in am . Cont po dilaudid , palliative care consult . Abnormal LFts possibly in setting of worsening hepatic metastasis , cmp in am. HTN monitor BP . Jennyfer Primes 7/5 IM Note   Plan to check pleural fld analysis to r/o infection in her effusion . Cont pain control . Adjust pain meds prn . Cont to feel overall tired and fatigued . Pleural catheter in place . 7/5 Palliative Care Consult   Cancer related pain .  Cont pain mngt .   continue APAP 650 mg PO Q6H PRN              - max daily 4000 mg   - continue duloxetine 30 mg PO QPM   - increase hydromorphone 4-8 mg PO Q3H PRN   - continue hydromorphone 1 mg IV Q3H PRN [breakthrough]              - total OME usage since arrival [since 19:39 yesterday]: 72 mg. Nausea control .      ED Triage Vitals   Temperature Pulse Respirations Blood Pressure SpO2   07/04/23 1806 07/04/23 1806 07/04/23 1806 07/04/23 1806 07/04/23 1806   98.5 °F (36.9 °C) 97 16 123/67 97 %      Temp src Heart Rate Source Patient Position - Orthostatic VS BP Location FiO2 (%)   -- 07/04/23 1806 07/04/23 1806 07/04/23 1806 --    Monitor Sitting Left arm       Pain Score       07/04/23 1939       8          Wt Readings from Last 1 Encounters:   07/05/23 52.6 kg (115 lb 15.4 oz)     Additional Vital Signs:   07/05/23 07:10:56 99 °F (37.2 °C) 87 18 96/57 70 90 % -- --   07/05/23 0300 -- 81 -- -- -- 90 % None (Room air) --   07/05/23 00:26:19 98.7 °F (37.1 °C) 82 17 104/68 80 97 % -- --   07/04/23 2100 -- 82 18 106/71 84 95 % -- --   07/04/23 2030 -- 89 43 Abnormal  109/67 83 97 % -- --   07/04/23 2000 -- 89 28 Abnormal  116/64 85 97 % --        Pertinent Labs/Diagnostic Test Results:   7/4 EKG Normal sinus rhythm  Normal ECG  CT chest abdomen pelvis w contrast   Final Result by Gayathri Bustamante MD (07/04 2044)      Enlarging partially loculated right pleural effusion. Worsening hepatic metastatic disease.       Widespread sclerotic osseous metastasis again seen      Workstation performed: [id number]           Results from last 7 days   Lab Units 07/04/23 1837   SARS-COV-2  Negative     Results from last 7 days   Lab Units 07/04/23 1837 07/03/23  1110   WBC Thousand/uL 4.62 3.31*   HEMOGLOBIN g/dL 10.9* 10.8*   HEMATOCRIT % 31.7* 31.8*   PLATELETS Thousands/uL 212 207   NEUTROS ABS Thousands/µL 3.90 2.51         Results from last 7 days   Lab Units 07/04/23  1837 07/03/23  1110   SODIUM mmol/L 133* 139   POTASSIUM mmol/L 3.5 3. 6   CHLORIDE mmol/L 104 107   CO2 mmol/L 22 26   ANION GAP mmol/L 7 6   BUN mg/dL 23 19   CREATININE mg/dL 0.60 0.67   EGFR ml/min/1.73sq m 102 99   CALCIUM mg/dL 8.9 9.0     Results from last 7 days   Lab Units 07/04/23  1837 07/03/23  1110   AST U/L 93* 65*   ALT U/L 41 33   ALK PHOS U/L 235* 192*   TOTAL PROTEIN g/dL 6.2* 6.4   ALBUMIN g/dL 3.4* 3.5   TOTAL BILIRUBIN mg/dL 0.39 0.35         Results from last 7 days   Lab Units 07/04/23  1837 07/03/23  1110   GLUCOSE RANDOM mg/dL 104 85       Results from last 7 days   Lab Units 07/04/23  1837   HS TNI 0HR ng/L 4     Results from last 7 days   Lab Units 07/04/23  1837   PROTIME seconds 14.2   INR  1.12   PTT seconds 33     Results from last 7 days   Lab Units 07/04/23  1837   PROCALCITONIN ng/ml 0.17     Results from last 7 days   Lab Units 07/04/23  1837   LACTIC ACID mmol/L 0.8     Results from last 7 days   Lab Units 07/04/23  1837   LIPASE u/L 22     Results from last 7 days   Lab Units 07/04/23  1917   CLARITY UA  Clear   COLOR UA  Colorless   SPEC GRAV UA  1.019   PH UA  6.5   GLUCOSE UA mg/dl Negative   KETONES UA mg/dl Negative   BLOOD UA  Moderate*   PROTEIN UA mg/dl Negative   NITRITE UA  Negative   BILIRUBIN UA  Negative   UROBILINOGEN UA (BE) mg/dl <2.0   LEUKOCYTES UA  Negative   WBC UA /hpf 1-2   RBC UA /hpf 1-2   BACTERIA UA /hpf None Seen   EPITHELIAL CELLS WET PREP /hpf Occasional     Results from last 7 days   Lab Units 07/04/23  1837   INFLUENZA A PCR  Negative   INFLUENZA B PCR  Negative   RSV PCR  Negative     Results from last 7 days   Lab Units 07/04/23  1838 07/04/23  1837   BLOOD CULTURE  Received in Microbiology Lab. Culture in Progress. Received in Microbiology Lab. Culture in Progress.      ED Treatment:   Medication Administration from 07/04/2023 1757 to 07/05/2023 0014       Date/Time Order Dose Route Action     07/04/2023 1939 EDT HYDROmorphone (DILAUDID) injection 1 mg 1 mg Intravenous Given     07/04/2023 0689 EDT cefTRIAXone (ROCEPHIN) IVPB (premix in dextrose) 1,000 mg 50 mL 1,000 mg Intravenous New Bag     07/04/2023 2240 EDT diphenhydrAMINE (BENADRYL) tablet 50 mg 50 mg Oral Given     07/04/2023 2337 EDT HYDROmorphone (DILAUDID) injection 1 mg 1 mg Intravenous Given     07/04/2023 2238 EDT letrozole [facility]) tablet 2.5 mg 2.5 mg Oral Given     07/04/2023 2238 EDT DULoxetine (CYMBALTA) delayed release capsule 30 mg 30 mg Oral Given     07/04/2023 2238 EDT oxybutynin (DITROPAN-XL) 24 hr tablet 5 mg 5 mg Oral Given     07/04/2023 2238 EDT rivaroxaban (XARELTO) tablet 20 mg 20 mg Oral Given        Past Medical History:   Diagnosis Date   • Cancer ([address])    • Headache(784.0) 11/2021   • History of radiation exposure    • Malignant neoplasm of right female breast ([address]) 2012    right     Present on Admission:  • Hypertension  • Pleural effusion      Admitting Diagnosis: Chest wall pain [R07.89]  Breast cancer (HCC) [C50.919]  Abdominal pain [R10.9]  Fever [R50.9]  Loculated pleural effusion [J90]  Chest pain, unspecified type [R07.9]  Age/Sex: 56 y.o. female  Admission Orders:  Scheduled Medications:  cefTRIAXone, 1,000 mg, Intravenous, Q24H  DULoxetine, 30 mg, Oral, QPM  letrozole, 2.5 mg, Oral, QPM  oxybutynin, 5 mg, Oral, QPM  polyethylene glycol, 17 g, Oral, Daily  rivaroxaban, 20 mg, Oral, QPM      Continuous IV Infusions:     PRN Meds:  acetaminophen, 650 mg, Oral, Q6H PRN  albuterol, 2 puff, Inhalation, Q4H PRN  calcium carbonate, 500 mg, Oral, TID PRN  dicyclomine, 10 mg, Oral, Q6H PRN  diphenhydrAMINE, 50 mg, Oral, HS PRN  diphenoxylate-atropine, 1 tablet, Oral, 4x Daily PRN  HYDROmorphone, 1 mg, Intravenous, Q4H PRN   7/4 x1  HYDROmorphone, 2 mg, Oral, Q3H PRN   Or  HYDROmorphone, 4 mg, Oral, Q4H PRN  lactulose, 10 g, Oral, Daily PRN    Up and OOB   Reg diet       IP CONSULT TO PALLIATIVE CARE    Network Utilization Review Department  ATTENTION: Please call with any questions or concerns to [phone] and carefully listen to the prompts so that you are directed to the right person. All voicemails are confidential.  Rosa Bynum all requests for admission clinical reviews, approved or denied determinations and any other requests to dedicated fax number below belonging to the campus where the patient is receiving treatment.  List of dedicated fax numbers for the Facilities:  [place] DENIALS (Administrative/Medical Necessity) [phone] [address] [facility] (Maternity/NICU/Pediatrics) [phone]   [address] [place] [address] [phone]   [facility] [address] [facility] [phone]   [facility] [id number] [place] [address] [phone]   [address] [address] [phone]

## 2023-07-05 NOTE — PLAN OF CARE
Problem: Potential for Falls  Goal: Patient will remain free of falls  Description: INTERVENTIONS:  - Educate patient/family on patient safety including physical limitations  - Instruct patient to call for assistance with activity   - Consult OT/PT to assist with strengthening/mobility   - Keep Call bell within reach  - Keep bed low and locked with side rails adjusted as appropriate  - Keep care items and personal belongings within reach  - Initiate and maintain comfort rounds  - Make Fall Risk Sign visible to staff  - Offer Toileting every  Hours, in advance of need  - Initiate/Maintain alarm  - Obtain necessary fall risk management equipment:   - Apply yellow socks and bracelet for high fall risk patients  - Consider moving patient to room near nurses station  Outcome: Progressing     Problem: PAIN - ADULT  Goal: Verbalizes/displays adequate comfort level or baseline comfort level  Description: Interventions:  - Encourage patient to monitor pain and request assistance  - Assess pain using appropriate pain scale  - Administer analgesics based on type and severity of pain and evaluate response  - Implement non-pharmacological measures as appropriate and evaluate response  - Consider cultural and social influences on pain and pain management  - Notify physician/advanced practitioner if interventions unsuccessful or patient reports new pain  Outcome: Progressing     Problem: INFECTION - ADULT  Goal: Absence or prevention of progression during hospitalization  Description: INTERVENTIONS:  - Assess and monitor for signs and symptoms of infection  - Monitor lab/diagnostic results  - Monitor all insertion sites, i.e. indwelling lines, tubes, and drains  - Monitor endotracheal if appropriate and nasal secretions for changes in amount and color  - [place] appropriate cooling/warming therapies per order  - Administer medications as ordered  - Instruct and encourage patient and family to use good hand hygiene technique  - Identify and instruct in appropriate isolation precautions for identified infection/condition  Outcome: Progressing  Goal: Absence of fever/infection during neutropenic period  Description: INTERVENTIONS:  - Monitor WBC    Outcome: Progressing     Problem: SAFETY ADULT  Goal: Patient will remain free of falls  Description: INTERVENTIONS:  - Educate patient/family on patient safety including physical limitations  - Instruct patient to call for assistance with activity   - Consult OT/PT to assist with strengthening/mobility   - Keep Call bell within reach  - Keep bed low and locked with side rails adjusted as appropriate  - Keep care items and personal belongings within reach  - Initiate and maintain comfort rounds  - Make Fall Risk Sign visible to staff  - Offer Toileting every  Hours, in advance of need  - Initiate/Maintain alarm  - Obtain necessary fall risk management equipment:   - Apply yellow socks and bracelet for high fall risk patients  - Consider moving patient to room near nurses station  Outcome: Progressing  Goal: Maintain or return to baseline ADL function  Description: INTERVENTIONS:  -  Assess patient's ability to carry out ADLs; assess patient's baseline for ADL function and identify physical deficits which impact ability to perform ADLs (bathing, care of mouth/teeth, toileting, grooming, dressing, etc.)  - Assess/evaluate cause of self-care deficits   - Assess range of motion  - Assess patient's mobility; develop plan if impaired  - Assess patient's need for assistive devices and provide as appropriate  - Encourage maximum independence but intervene and supervise when necessary  - Involve family in performance of ADLs  - Assess for home care needs following discharge   - Consider OT consult to assist with ADL evaluation and planning for discharge  - Provide patient education as appropriate  Outcome: Progressing  Goal: Maintains/Returns to pre admission functional level  Description: INTERVENTIONS:  - Perform BMAT or MOVE assessment daily.   - Set and communicate daily mobility goal to care team and patient/family/caregiver. - Collaborate with rehabilitation services on mobility goals if consulted  - Perform Range of Motion  times a day. - Reposition patient every  hours. - Dangle patient  times a day  - Stand patient  times a day  - Ambulate patient  times a day  - Out of bed to chair  times a day   - Out of bed for meals times a day  - Out of bed for toileting  - Record patient progress and toleration of activity level   Outcome: Progressing     Problem: DISCHARGE PLANNING  Goal: Discharge to home or other facility with appropriate resources  Description: INTERVENTIONS:  - Identify barriers to discharge w/patient and caregiver  - Arrange for needed discharge resources and transportation as appropriate  - Identify discharge learning needs (meds, wound care, etc.)  - Arrange for interpretive services to assist at discharge as needed  - Refer to Case Management Department for coordinating discharge planning if the patient needs post-hospital services based on physician/advanced practitioner order or complex needs related to functional status, cognitive ability, or social support system  Outcome: Progressing     Problem: Knowledge Deficit  Goal: Patient/family/caregiver demonstrates understanding of disease process, treatment plan, medications, and discharge instructions  Description: Complete learning assessment and assess knowledge base.   Interventions:  - Provide teaching at level of understanding  - Provide teaching via preferred learning methods  Outcome: Progressing

## 2023-07-05 NOTE — ASSESSMENT & PLAN NOTE
· CT today showing worsening hepatic metastatic disease  · Follows w/ outpatient  [facility]   · Continue outpatient f/u

## 2023-07-05 NOTE — CASE MANAGEMENT
Case Management Assessment & Discharge Planning Note    Patient name Daniele Clancy  Location /-57 MRN [id number]  : 1965 Date 2023       Current Admission Date: 2023  Current Admission Diagnosis:Chest pain   Patient Active Problem List    Diagnosis Date Noted   • Chest pain 2023   • Fever 2023   • Abnormal LFTs 2023   • UTI symptoms 2023   • Port-A-Cath in place 2022   • Chemotherapy induced neutropenia ([address]) 2022   • Cancer related pain 2021   • Malignant neoplasm metastatic to bone ([address]) 2021   • Constipation 2021   • Palliative care patient 2021   • Malignant pleural effusion 2021   • Primary malignant neoplasm of right breast with metastasis to other site [facility]) 2021   • Malignant neoplasm of breast in female, estrogen receptor positive ([address]) 2021   • Hypertension 2021   • Metastatic neoplasm ([address]) 2021   • Pleural effusion 2021      LOS (days): 0  Geometric Mean LOS (GMLOS) (days):   Days to GMLOS:     OBJECTIVE:              Current admission status: Observation       Preferred Pharmacy:   [facility]/pharmacy #[id number]- [place], [place] - [address]  [address] [facility] [address]  Phone: [phone] Fax: [phone]    [address] [place] [address] [address]  Phone: [phone] Fax: [phone]    [facility] [address] [facility], [address] [facility]  [address] [facility] [address]  Phone: [phone] Fax: [phone]    [facility] ([facility]) #[id number] - [facility] [address] [facility]  [address] [place] [address]  Phone: [phone] Fax: [phone]    [address], [address] [facility]  [address] [facility] [address]  Phone: [phone] Fax: [address] [place] [address] #[id number] - [place], [address] [place] [address] [place] [address]  Phone: [phone] Fax: [phone]    [id number]- [place], [address] [address]  Phone: [phone] Fax: [phone]    Primary Care Provider: Shannan Thomas MD    Primary Insurance: [place]  Secondary Insurance:     ASSESSMENT:  [address], [address] Representative - Spouse   Primary Phone: [phone] (Mobile)  Home Phone: [phone]               Advance Directives  Does patient have a [address]?: Yes  Does patient have Advance Directives?: Yes  Advance Directives: Power of  for health care  Primary Contact: Abraham, [address] Representative - Spouse  Primary Phone: [phone]         Readmission Root Cause  30 Day Readmission: No    Patient Information  Admitted from[de-identified] Home  Mental Status: Alert  During Assessment patient was accompanied by: Not accompanied during assessment  Assessment information provided by[de-identified] Patient  Primary Caregiver: Self  Support Systems: Spouse/significant other, Son, [address] of Residence: [address] [facility] do you live in?: [address] [facility] entry access options.  Select all that apply.: Stairs  Number of steps to enter home.: 3  Type of Current Residence: [facility]  In the last 12 months, was there a time when you were not able to pay the mortgage or rent on time?: No  In the last 12 months, how many places have you lived?: 1  In the last 12 months, was there a time when you did not have a steady place to sleep or slept in a shelter (including now)?: No  Homeless/housing insecurity resource given?: No  Living Arrangements: Lives w/ Spouse/significant other, Lives w/ Son, Lives w/ Extended Family  Is patient a ?: No    Activities of Daily Living Prior to Admission  Functional Status: Independent  Completes ADLs independently?: Yes  Ambulates independently?: Yes  Does patient use assisted devices?: No  Does patient currently own DME?: No  Does patient have a history of Outpatient Therapy (PT/OT)?: No  Does the patient have a history of Short-Term Rehab?: No  Does patient have a history of HHC?: No  Does patient currently have [address]?: No         Patient Information Continued  Income Source: SSI/SSD  Does patient have prescription coverage?: Yes  Within the past 12 months, you worried that your food would run out before you got the money to buy more.: Never true  Within the past 12 months, the food you bought just didn't last and you didn't have money to get more.: Never true  Food insecurity resource given?: No  Does patient receive dialysis treatments?: No  Does patient have a history of substance abuse?: No         Means of Transportation  Means of Transport to Appts[de-identified] Drives Self  In the past 12 months, has lack of transportation kept you from medical appointments or from getting medications?: No  In the past 12 months, has lack of transportation kept you from meetings, work, or from getting things needed for daily living?: No  Was application for public transport provided?: No        DISCHARGE DETAILS:    Discharge planning discussed with[de-identified] Pt at bedside  Range of Choice: Yes  Comments - Freedom of Choice: CM met with pt at bedside and introduced self/role. Pt is alert and oriented x3 able to make her needs known and encouraged to do so. Pt states that he is independent with all of her ADLs and IADLs. Pt has no DME or current VNA in place. Pt dc plan is to return home once medically cleared. Pt is aware and encouraged to seek CM for any questions or concerns. CM continues to follow.   CM contacted family/caregiver?: No- see comments (Pt decline and will update family)  Were Treatment Team discharge recommendations reviewed with patient/caregiver?: Yes  Did patient/caregiver verbalize understanding of patient care needs?: Yes  Were patient/caregiver advised of the risks associated with not following Treatment Team discharge recommendations?: Yes    Contacts  Reason/Outcome: Continuity of Care, Emergency Contact, Discharge [facility]         Is the patient interested in [facility] at discharge?: No    DME Referral Provided  Referral made for DME?: No         Would you like to participate in our [facility] service program?  : No - Declined    Treatment Team Recommendation: Home  Discharge Destination Plan[de-identified] Home

## 2023-07-05 NOTE — ASSESSMENT & PLAN NOTE
-Patient has history of metastatic breast cancer with chronic pleural effusion and presented with worsening right-sided chest pain and fever of 102 concerning for complicated pleural effusion or empyema.   -Patient is not in acute respiratory distress.  -Lactic acid and procalcitonin within normal limits. -CT finding: Enlarging partially loculated right pleural effusion.  -Plan for thoracentesis for symptom relief and pleural fluid analysis. -Continue monitoring for fevers and changes in WBC count and procalcitonin.  -Continue with current pain medication regimen.

## 2023-07-05 NOTE — CONSULTS
Consultation - Palliative Care   Héctor Wan 56 y.o. female MRN: [id number]  Unit/Bed#: -01 Encounter: [id number]      Assessment/Plan   Patient Active Problem List   Diagnosis   • Pleural effusion   • Metastatic neoplasm ([address])   • Hypertension   • Malignant neoplasm of breast in female, estrogen receptor positive ([address])   • Palliative care patient   • Malignant pleural effusion   • Primary malignant neoplasm of right breast with metastasis to other site [facility])   • Constipation   • Malignant neoplasm metastatic to bone [facility])   • Cancer related pain   • Chemotherapy induced neutropenia (HCC)   • Port-A-Cath in place   • UTI symptoms   • Chest pain   • Fever   • Abnormal LFTs     Active issues specifically addressed today include:   - cancer-related pain   - stage IV breast cancer with hepatic/osseous metastatic disease   - recurrent loculated R-sided pleural effusion; pleural catheter in place   - fever prior to arrival   - palliative care encounter   - goals of care support    Plan:  #symptom management  #cancer-related pain   - continue APAP 650 mg PO Q6H PRN              - max daily 4000 mg   - continue duloxetine 30 mg PO QPM   - increase hydromorphone 4-8 mg PO Q3H PRN   - continue hydromorphone 1 mg IV Q3H PRN [breakthrough]   - total OME usage since arrival [since 19:39 yesterday]: 72 mg     #anxiety   - continue duloxetine 30 mg PO QPM     #nausea   - continue metoclopramide 10 mg PO QID PRN   - continue ondansetron 8 mg PO Q8H PRN     #insomnia   - continue melatonin 6 mg PO QHS     #OIC   - continue miralax 17 g PO QDaily   - continue lactulose 10 g PO QDaily PRN     - discontinue lomotil   - patient with no reported diarrhea     #abdominal cramping   - continue dicyclomine 10 mg PO Q6H PRN    #goals of care   - follows OP Palliative and Supportive Care   - expanded discussion focusing on CT imaging findings, which showed progression of hepatic metastases and loculated R-sided pleural effusion. Patient wishes to remain treatment focused with goal for life prolongation.   - again discussed advanced resuscitative supports, including ACLS/CPR and intubation/mechanical ventilation; which patient agrees to and reaffirms desire to remain Full Code at this time. - will continue following for symptoms management with goal to optimize enteral regimen with continued OP PSC follow up    #psychosocial support   - emotional support provided   - Albin Orourke [spouse] [phone]   - two children              - Armen [son, Down syndrome]              - Ankur [son]   - granddaughter recently born, in office today with mother of baby      We appreciate the opportunity to participate in this patient's care. We will continue to follow. Please do not hesitate to contact our on-call provider through our clinic answering service at [phone] should you have acute symptom control concerns. Controlled Substance Review    [place] PDMP or [place]  reviewed: No red flags were identified; safe to proceed with prescription. Deborah Cao PDMP Review       Value Time User    PDMP Reviewed  Yes 7/5/2023  7:41 AM Treasure Kelley MD          History of Present Illness   Physician Requesting Consult: Brynn Reeder,*  Reason for Consult / Principal Problem: symptoms management  Hx and PE limited by: none  HPI: Echo Larkin is a 56y.o. year old female known to [facility] service with a PMH of metastatic breast cancer c/b recurrent R-sided pleural effusion s/p pleural catheter placement who initially p/w concern for fever and worsening cancer-related pain. Initial w/u in ED included CT CAP which showed enlarging loculated R-sided pleural effusion, worsening hepatic metastases, widespread sclerotic osseous metastases. Initial labs significant for Na 139, K 3.6, corrected Ca 9.4, BUN/SCr 19/0.67 [eGFR 99]; AST/ALT 65/33, albumin 3.5, TBili 0.35. WBC 3.31 [ANC 2515], Hgb 10.8, Plt 207. Lactate 0.8. Procalcitonin 0.17. hs-cTn 4.  [facility] consulted for symptoms management. Patient currently reports new onset RUQ pain, which differs from previous cancer-related rib pain, onset 2-3 days prior to arrival with no sentinel trigger identified. Pleuritic in nature. Use of PO hydromorphone 4 mg x 7-8 doses/day, initially effective, no longer efficacious. Check oral temperature at home, 102, with pain and fever prompting ED visit. Pain persists despite use of IV hydromorphone. Currently denies nausea, vomiting; notes dry heaving associated new antineoplastic regimen. Appetite 2-3 meals/day, weight stable. Last BM 2 days ago, passing flatus. Prior BMs "guilherme". Increased fatigue with more frequent naps throughout the day.           Oncology History   Primary malignant neoplasm of right breast with metastasis to other site [facility])   8/16/2021 Initial Diagnosis    Primary malignant neoplasm of right breast with metastasis to other site [facility])     4/19/2022 - 1/25/2023 Chemotherapy    eriBULin (HALAVEN), 1.4 mg/m2 = 2.4 mg, Intravenous, Once, 9 of 11 cycles  Administration: 2.4 mg (4/19/2022), 2.4 mg (5/3/2022), 2.4 mg (5/17/2022), 2.4 mg (6/1/2022), 2.4 mg (6/15/2022), 2.4 mg (7/13/2022), 2.4 mg (7/28/2022), 2.4 mg (8/11/2022), 2.4 mg (8/18/2022), 2.4 mg (9/2/2022), 2.4 mg (9/8/2022), 2.4 mg (9/28/2022), 2.4 mg (10/5/2022), 2.4 mg (10/26/2022), 2.4 mg (11/2/2022), 2.4 mg (1/10/2023)  Pegfilgrastim-bmez (ZIEXTENZO), 6 mg, Subcutaneous, Once, 8 of 10 cycles  Administration: 6 mg (6/2/2022), 6 mg (7/29/2022), 6 mg (8/19/2022), 6 mg (9/9/2022), 6 mg (10/6/2022), 6 mg (11/3/2022), 6 mg (1/25/2023)     2/22/2023 -  Chemotherapy    alteplase (CATHFLO), 2 mg, Intracatheter, Every 1 Minute as needed, 6 of 11 cycles  fam-trastuzumab deruxtecan-nxki (ENHERTU) IVPB, 308 mg, Intravenous, Once, 6 of 11 cycles  Administration: 300 mg (2/22/2023), 300 mg (3/15/2023), 300 mg (4/5/2023), 300 mg (5/3/2023), 300 mg (5/24/2023), 300 mg (6/14/2023)     Malignant neoplasm metastatic to bone ([address])   8/30/2021 Initial Diagnosis    Bone metastases ([address])     4/19/2022 - 1/25/2023 Chemotherapy    eriBULin (HALAVEN), 1.4 mg/m2 = 2.4 mg, Intravenous, Once, 9 of 11 cycles  Administration: 2.4 mg (4/19/2022), 2.4 mg (5/3/2022), 2.4 mg (5/17/2022), 2.4 mg (6/1/2022), 2.4 mg (6/15/2022), 2.4 mg (7/13/2022), 2.4 mg (7/28/2022), 2.4 mg (8/11/2022), 2.4 mg (8/18/2022), 2.4 mg (9/2/2022), 2.4 mg (9/8/2022), 2.4 mg (9/28/2022), 2.4 mg (10/5/2022), 2.4 mg (10/26/2022), 2.4 mg (11/2/2022), 2.4 mg (1/10/2023)  Pegfilgrastim-bmez (Redge Render), 6 mg, Subcutaneous, Once, 8 of 10 cycles  Administration: 6 mg (6/2/2022), 6 mg (7/29/2022), 6 mg (8/19/2022), 6 mg (9/9/2022), 6 mg (10/6/2022), 6 mg (11/3/2022), 6 mg (1/25/2023)     2/22/2023 -  Chemotherapy    alteplase (CATHFLO), 2 mg, Intracatheter, Every 1 Minute as needed, 6 of 11 cycles  fam-trastuzumab deruxtecan-nxki (ENHERTU) IVPB, 308 mg, Intravenous, Once, 6 of 11 cycles  Administration: 300 mg (2/22/2023), 300 mg (3/15/2023), 300 mg (4/5/2023), 300 mg (5/3/2023), 300 mg (5/24/2023), 300 mg (6/14/2023)         Inpatient consult to Palliative Care  Consult performed by: hSanna Griggs MD  Consult ordered by: Ebony Laureano PA-C          Review of Systems   Constitutional: Positive for activity change, chills, fatigue and fever. Negative for appetite change and unexpected weight change. HENT: Negative for congestion. Respiratory: Negative for shortness of breath. Cardiovascular: Negative for chest pain. Gastrointestinal: Positive for abdominal pain (RUQ) and constipation. Negative for nausea and vomiting. Genitourinary: Negative for frequency. Musculoskeletal: Negative for back pain. Allergic/Immunologic: Positive for immunocompromised state. Neurological: Negative for syncope. Psychiatric/Behavioral: Negative for sleep disturbance. All other systems reviewed and are negative.       Historical Information   Past Medical History:   Diagnosis Date   • Cancer [facility])    • Headache(784.0) 2021   • History of radiation exposure    • Malignant neoplasm of right female breast ([address]) 2012    right     Past Surgical History:   Procedure Laterality Date   • BACK SURGERY     • BREAST CYST EXCISION     • BREAST LUMPECTOMY Right    • HIP SURGERY Right     debridement of femur   • IR BIOPSY LIVER MASS  2021   • IR MICROWAVE ABLATION  2022   • IR PLEURAL EFFUSION LONG-TERM CATHETER PLACEMENT  2021   • IR PLEURAL EFFUSION LONG-TERM CATHETER REMOVAL  2022   • IR PORT PLACEMENT  2022   • IR THORACENTESIS  2021     E-Cigarette/Vaping   • E-Cigarette Use Never User      E-Cigarette/Vaping Substances   • Nicotine No    • THC No    • CBD No    • Flavoring No    • Other No    • Unknown No      Social History     Socioeconomic History   • Marital status: /Civil Union     Spouse name: None   • Number of children: None   • Years of education: None   • Highest education level: None   Occupational History   • None   Tobacco Use   • Smoking status: Some Days     Packs/day: 0.25     Years: 15.00     Total pack years: 3.75     Types: Cigarettes     Start date:      Last attempt to quit: 7/10/2021     Years since quittin.9   • Smokeless tobacco: Never   Vaping Use   • Vaping Use: Never used   Substance and Sexual Activity   • Alcohol use: Not Currently   • Drug use: Not Currently   • Sexual activity: Not Currently     Partners: Male     Birth control/protection: Male Sterilization   Other Topics Concern   • None   Social History Narrative   • None     Social Determinants of Health     Financial Resource Strain: Not on file   Food Insecurity: No Food Insecurity (2022)    Hunger Vital Sign    • Worried About Running Out of Food in the Last Year: Never true    • Ran Out of Food in the Last Year: Never true   Transportation Needs: No Transportation Needs (2022)    PRAPARE - Transportation    • Lack of Transportation (Medical): No    • Lack of Transportation (Non-Medical):  No   Physical Activity: Not on file   Stress: Not on file   Social Connections: Not on file   Intimate Partner Violence: Not on file   Housing Stability: Low Risk  (6/28/2022)    Housing Stability Vital Sign    • Unable to Pay for Housing in the Last Year: No    • Number of Places Lived in the Last Year: 1    • Unstable Housing in the Last Year: No     Family History   Problem Relation Age of Onset   • Breast cancer Mother         in her 63's   • Breast cancer Sister         inher 45s and 48   • Colon cancer Maternal Grandmother    • No Known Problems Paternal Grandmother    • Breast cancer Other    • No Known Problems Paternal Aunt        Meds/Allergies   current meds:   Current Facility-Administered Medications   Medication Dose Route Frequency   • acetaminophen (TYLENOL) tablet 650 mg  650 mg Oral Q6H PRN   • albuterol (PROVENTIL HFA,VENTOLIN HFA) inhaler 2 puff  2 puff Inhalation Q4H PRN   • calcium carbonate (TUMS) chewable tablet 500 mg  500 mg Oral TID PRN   • cefTRIAXone (ROCEPHIN) IVPB (premix in dextrose) 1,000 mg 50 mL  1,000 mg Intravenous Q24H   • dicyclomine (BENTYL) capsule 10 mg  10 mg Oral Q6H PRN   • diphenhydrAMINE (BENADRYL) tablet 50 mg  50 mg Oral HS PRN   • diphenoxylate-atropine (LOMOTIL) 2.5-0.025 mg per tablet 1 tablet  1 tablet Oral 4x Daily PRN   • DULoxetine (CYMBALTA) delayed release capsule 30 mg  30 mg Oral QPM   • HYDROmorphone (DILAUDID) injection 1 mg  1 mg Intravenous Q4H PRN   • HYDROmorphone (DILAUDID) tablet 2 mg  2 mg Oral Q3H PRN    Or   • HYDROmorphone (DILAUDID) tablet 4 mg  4 mg Oral Q4H PRN   • lactulose oral solution 10 g  10 g Oral Daily PRN   • letrozole [facility]) tablet 2.5 mg  2.5 mg Oral QPM   • oxybutynin (DITROPAN-XL) 24 hr tablet 5 mg  5 mg Oral QPM   • polyethylene glycol (MIRALAX) packet 17 g  17 g Oral Daily   • rivaroxaban (XARELTO) tablet 20 mg  20 mg Oral QPM         Allergies   Allergen Reactions   • Codeine Anaphylaxis   • Morphine GI Intolerance, Itching and Vomiting   • Sulfa Antibiotics Hives and Itching       Objective     Physical Exam  Vitals and nursing note reviewed. Constitutional:       General: She is awake. Appearance: She is not diaphoretic. Comments: Chronically ill appearing in NAD  BMI 19.9  Non-toxic appearing   HENT:      Head: Normocephalic and atraumatic. Right Ear: External ear normal.      Left Ear: External ear normal.      Nose: No rhinorrhea. Eyes:      Comments: No gaze preference   Cardiovascular:      Rate and Rhythm: Normal rate. Pulmonary:      [place]: No tachypnea, accessory muscle usage or respiratory distress. Comments: Completes full sentences without difficulty  Chest:      Comments: R-sided chest tube in place  Musculoskeletal:      Cervical back: Normal range of motion. Neurological:      General: No focal deficit present. Mental Status: She is alert and oriented to person, place, and time. Psychiatric:         Attention and Perception: Attention normal.         Mood and Affect: Mood and affect normal.         Speech: Speech normal.         Cognition and Memory: Cognition and memory normal.         Lab Results:   I have personally reviewed pertinent labs. , CBC:   Lab Results   Component Value Date    WBC 4.62 07/04/2023    HGB 10.9 (L) 07/04/2023    HCT 31.7 (L) 07/04/2023     (H) 07/04/2023     07/04/2023    RBC 2.97 (L) 07/04/2023    MCH 36.7 (H) 07/04/2023    MCHC 34.4 07/04/2023    RDW 16.0 (H) 07/04/2023    MPV 11.3 07/04/2023    NRBC 0 07/04/2023   , CMP:   Lab Results   Component Value Date    SODIUM 133 (L) 07/04/2023    K 3.5 07/04/2023     07/04/2023    CO2 22 07/04/2023    BUN 23 07/04/2023    CREATININE 0.60 07/04/2023    CALCIUM 8.9 07/04/2023    AST 93 (H) 07/04/2023    ALT 41 07/04/2023    ALKPHOS 235 (H) 07/04/2023    EGFR 102 07/04/2023   , PT/PTT:  Lab Results   Component Value Date    PTT 33 07/04/2023 Imaging Studies: I have personally reviewed pertinent reports. EKG, Pathology, and Other Studies: I have personally reviewed pertinent reports. Code Status: Level 1 - Full Code  Advance Directive and Living Will:   Not on File, not previously completed  Power of :   n/a  POLST:   n/a    Counseling / Coordination of Care  Total floor / unit time spent today 45 minutes. Greater than 50% of total time was spent with the patient and / or family counseling and / or coordination of care. A description of the counseling / coordination of care: provided medical updates, discussed palliative care, determined competency, determined goals of care, determined POA, determined social/family support, discussed plans of care, discussed symptom management, provided psychosocial support. Opioid regimen adjustment. PDMP Reviewed.  Coordinated plan of care with primary team.

## 2023-07-05 NOTE — ASSESSMENT & PLAN NOTE
· Reports acute on chronic right-sided low chest pain  · Follows w/ outpatient palliative care for cancer related pain  · CT today showing worsening hepatic mets and right-sided pleural effusion; both possibly contributing to patient's symptoms   · Trop 4; EKG non-ischemic   · Continue home PO dilaudid 2-4mg q3h moderate-severe pain (PDMP reviewed)  · Additional PRN IV dilaudid boluses ordered  · Palliative care consulted; recommendations appreciated

## 2023-07-05 NOTE — ASSESSMENT & PLAN NOTE
· CT today showing worsening hepatic metastatic disease  · Follows w/ outpatient  [facility] onc   · Continue outpatient f/u

## 2023-07-05 NOTE — H&P
[address]  H&P  Name: Sherida Hatchet 56 y.o. female I MRN: [id number]  Unit/Bed#: ED 08 I Date of Admission: 7/4/2023   Date of Service: 7/4/2023 I Hospital Day: 0      Assessment/Plan   Fever  Assessment & Plan  Patient reports acute-on-chronic right lower chest pain worsening over the past 1-2 days. Reports pain is worse with movement and deep inspiration. Additionally reports ongoing chronic chills and noticed a fever of 102 at home earlier today. After ED interventions reports her pain has improved somewhat, however still persists. Denies other acute symptom/complaint at this time.     /71   Pulse 82   Temp 98.5 °F (36.9 °C)   Resp 18   Wt 55.3 kg (122 lb)   LMP 05/06/2021 (Approximate)   SpO2 95%   BMI 20.30 kg/m²     · Reports fever Tmax 102F at home earlier today  · Not currently meeting SEPSIS criteria   · Procal and lactic acid WNL   · CT showing enlarging partially loculated pleural effusion  · ANC currently WNL  · Received IV ceftriaxone while in ED   · As patient is on active cancer treatment will continue IV rocephin pending remainder of infectious workup results  · Trend WBC and monitor for fever development       Pleural effusion  Assessment & Plan  · CT showing enlarging partially loculated right pleural effusion  · Has pleural tube in place; Reports 500cc normal-appearing fluid drained this AM   · Not currently in respiratory distress; saturating appropriately on RA   · Given effusion has increased despite drainage via pleural cath this AM, may benefit from IR discussion when available tomorrow AM     Chest pain  Assessment & Plan  · Reports acute on chronic right-sided low chest pain  · Follows w/ outpatient palliative care for cancer related pain  · CT today showing worsening hepatic mets and right-sided pleural effusion; both possibly contributing to patient's symptoms   · Trop 4; EKG non-ischemic   · Continue home PO dilaudid 2-4mg q3h moderate-severe pain (PDMP reviewed)  · Additional PRN IV dilaudid boluses ordered  · Palliative care consulted; recommendations appreciated     Malignant neoplasm of breast in female, estrogen receptor positive ([address])  Assessment & Plan  · CT today showing worsening hepatic metastatic disease  · Follows w/ outpatient [facility]   · Continue outpatient f/u    Abnormal LFTs  Assessment & Plan  · AST 93; Alk phos 235  · Appear to have been worsening since early June   · Possibly in setting of worsening hepatic metastasis   · AM CMP ordered     Hypertension  Assessment & Plan  · /71  · Not currently on outpatient antihypertensive  · Monitor BP per unit protocol     VTE Pharmacologic Prophylaxis: VTE Score: 3 Moderate Risk (Score 3-4) - Pharmacological DVT Prophylaxis Ordered: rivaroxaban (Xarelto). Code Status: Level 1 - Full Code   Discussion with family: update in AM.     Anticipated Length of Stay: Patient will be admitted on an observation basis with an anticipated length of stay of less than 2 midnights secondary to reported fever at home and worsening cancer related pain. Total Time Spent on Date of Encounter in care of patient: 65 minutes This time was spent on one or more of the following: performing physical exam; counseling and coordination of care; obtaining or reviewing history; documenting in the medical record; reviewing/ordering tests, medications or procedures; communicating with other healthcare professionals and discussing with patient's family/caregivers. Chief Complaint: Chest pain and fever     History of Present Illness:  Moon Yoo is a 56 y.o. female with a PMH of metastatic breast cancer, chronic pleural effusion, and HTN who presents with chest pain and fever. Patient reports acute-on-chronic right lower chest pain worsening over the past 1-2 days. Reports pain is worse with movement and deep inspiration.  Additionally reports ongoing chronic chills and noticed a fever of 102 at home earlier today. After ED interventions reports her pain has improved somewhat, however still persists. Denies other acute symptom/complaint at this time. All questions answered at the bedside to the best of my ability. Review of Systems:  Review of Systems   Constitutional: Positive for chills, fatigue and fever. Negative for activity change, appetite change and diaphoresis. HENT: Negative for congestion, ear pain, nosebleeds and trouble swallowing. Eyes: Negative for pain and visual disturbance. Respiratory: Negative for apnea, cough, chest tightness, shortness of breath and wheezing. Cardiovascular: Positive for chest pain. Negative for palpitations and leg swelling. Gastrointestinal: Negative for abdominal distention, abdominal pain, blood in stool, constipation, diarrhea, nausea and vomiting. Endocrine: Negative for cold intolerance, heat intolerance and polyuria. Genitourinary: Negative for difficulty urinating, dysuria, flank pain and hematuria. Musculoskeletal: Negative for arthralgias, neck pain and neck stiffness. Skin: Negative for color change, rash and wound. Neurological: Negative for dizziness, tremors, syncope, weakness, light-headedness, numbness and headaches. Hematological: Negative for adenopathy. All other systems reviewed and are negative.       Past Medical and Surgical History:   Past Medical History:   Diagnosis Date   • Cancer ([address])    • Headache(784.0) 11/2021   • History of radiation exposure    • Malignant neoplasm of right female breast [facility]) 2012    right       Past Surgical History:   Procedure Laterality Date   • BACK SURGERY     • BREAST CYST EXCISION     • BREAST LUMPECTOMY Right 2012   • HIP SURGERY Right     debridement of femur   • IR BIOPSY LIVER MASS  07/26/2021   • IR MICROWAVE ABLATION  11/23/2022   • IR PLEURAL EFFUSION LONG-TERM CATHETER PLACEMENT  08/17/2021   • IR PLEURAL EFFUSION LONG-TERM CATHETER REMOVAL  05/11/2022   • IR PORT PLACEMENT  07/27/2022 • IR THORACENTESIS  07/26/2021       Meds/Allergies:  Prior to Admission medications    Medication Sig Start Date End Date Taking?  Authorizing Provider   acetaminophen (TYLENOL) 325 mg tablet Take 2 tablets (650 mg total) by mouth every 6 (six) hours as needed for mild pain 4/7/22   Daniel Arambula MD   al mag oxide-diphenhydramine-lidocaine viscous (MAGIC MOUTHWASH) 1:1:1 suspension Swish and spit 10 mL every 4 (four) hours as needed for mouth pain or discomfort 3/8/22   Daniel Arambula MD   albuterol (PROVENTIL HFA,VENTOLIN HFA) 90 mcg/act inhaler Inhale 2 puffs every 4 (four) hours as needed for wheezing 7/29/21   Renay Burnham MD   calcium carbonate (TUMS) 500 mg chewable tablet Chew 1 tablet (500 mg total) 3 (three) times a day as needed for indigestion or heartburn 7/29/21   Renay Burnham MD   CRANIAL PROSTHESIS, RX, Apply to cranial area PRN 6/20/23   Titi Martinez PA-C   [facility] Melatonin 3 MG TAKE 2 TABLETS (6 MG TOTAL) BY MOUTH DAILY AT BEDTIME  Patient not taking: Reported on 4/19/2023 8/9/22   Daniel Arambula MD   dextromethorphan-guaiFENesin (ROBITUSSIN DM)  mg/5 mL syrup Take 10 mL by mouth every 4 (four) hours as needed for cough  Patient not taking: Reported on 4/25/2023 7/29/21   Renay Burnham MD   dicyclomine (BENTYL) 10 mg capsule Take 1 capsule (10 mg total) by mouth every 6 (six) hours as needed (abdominal cramping) 9/7/22   Daniel Arambula MD   diphenhydrAMINE (BENADRYL) 50 MG tablet Take 1 tablet (50 mg total) by mouth daily at bedtime as needed for itching or sleep 7/12/22   Daniel Arambula MD   diphenoxylate-atropine (LOMOTIL) 2.5-0.025 mg per tablet Take 1 tablet by mouth 4 (four) times a day as needed for diarrhea 8/30/21   Haylee Ch MD   docusate sodium (COLACE) 100 mg capsule Take 1 capsule (100 mg total) by mouth 2 (two) times a day for 10 days 6/29/22 12/27/22  Parveen Beltran MD   DULoxetine (CYMBALTA) 30 mg delayed release capsule Take 1 capsule (30 mg total) by mouth daily 9/21/22   Bri Roldan MD   HYDROmorphone (DILAUDID) 2 mg tablet Take 1-2 tablets (2-4 mg total) by mouth every 3 (three) hours as needed (moderate/severe cancer-related pain) Max Daily Amount: 32 mg 6/5/23   Laya Dao MD   lactulose 20 g/30 mL Take 15 mL (10 g total) by mouth daily as needed (constipaton) 6/5/23   Maudine Sylwia, MD   letrozole [facility]) 2.5 mg tablet Take 1 tablet (2.5 mg total) by mouth daily 9/21/22 4/25/23  Raymond Ring PA-C   Lidocaine Viscous HCl (XYLOCAINE) 2 % mucosal solution Swish and spit 10 mL 4 (four) times a day as needed for mouth pain or discomfort  Patient not taking: Reported on 5/31/2023 3/10/22   Laya Dao MD   metoclopramide (REGLAN) 10 mg tablet Take 1 tablet (10 mg total) by mouth 4 (four) times a day  Patient not taking: Reported on 5/31/2023 9/16/21   Laya Dao MD   multivitamin [facility]) TABS Take 1 tablet by mouth    Historical Provider, MD   ondansetron (ZOFRAN) 8 mg tablet Take 1 tablet (8 mg total) by mouth every 8 (eight) hours as needed for nausea or vomiting 6/5/23   Laya Dao, MD   oxybutynin (DITROPAN-XL) 5 mg 24 hr tablet Take 1 tablet (5 mg total) by mouth daily Take 1 tablet daily 8/18/22   Hever Campbell MD   polyethylene glycol (MIRALAX) 17 g packet Take 17 g by mouth daily 6/30/22   Lauren Shah MD   potassium chloride (K-DUR,KLOR-CON) 20 mEq tablet Take 1 tablet (20 mEq total) by mouth 2 (two) times a day for 3 days  Patient not taking: Reported on 12/27/2022 11/1/22 11/4/22  Raymond Ring PA-C   senna (SENOKOT) 8.6 MG tablet Take 1 tablet (8.6 mg total) by mouth daily at bedtime as needed for constipation 5/17/22   Laya Dao, MD   sucralfate (CARAFATE) 1 g tablet Take 1 tablet (1 g total) by mouth 4 (four) times a day for 14 days 3/15/23 3/29/23  Kevin Tomlin MD   Xarelto 20 MG tablet TAKE 1 TABLET BY MOUTH EVERY DAY WITH BREAKFAST 10/3/22   Rebecca ARROYO Anita Garcia MD     I have reviewed home medications with patient personally. Allergies: Allergies   Allergen Reactions   • Codeine Anaphylaxis   • Morphine GI Intolerance, Itching and Vomiting   • Sulfa Antibiotics Hives and Itching       Social History:  Marital Status: /Civil Union   Occupation: N/A  Patient Pre-hospital Living Situation: Home  Patient Pre-hospital Level of Mobility: walks  Patient Pre-hospital Diet Restrictions: None  Substance Use History:   Social History     Substance and Sexual Activity   Alcohol Use Not Currently     Social History     Tobacco Use   Smoking Status Some Days   • Packs/day: 0.25   • Years: 15.00   • Total pack years: 3.75   • Types: Cigarettes   • Start date:    • Last attempt to quit: 7/10/2021   • Years since quittin.9   Smokeless Tobacco Never     Social History     Substance and Sexual Activity   Drug Use Not Currently       Family History:  Family History   Problem Relation Age of Onset   • Breast cancer Mother         in her 63's   • Breast cancer Sister         inher 45s and 48   • Colon cancer Maternal Grandmother    • No Known Problems Paternal Grandmother    • Breast cancer Other    • No Known Problems Paternal Aunt        Physical Exam:     Vitals:   Blood Pressure: 106/71 (23)  Pulse: 82 (23)  Temperature: 98.5 °F (36.9 °C) (23)  Respirations: 18 (23)  Weight - Scale: 55.3 kg (122 lb) (23)  SpO2: 95 % (23)    Physical Exam  Vitals and nursing note reviewed. Constitutional:       General: She is not in acute distress. Appearance: Normal appearance. She is ill-appearing (chronic ). HENT:      Head: Normocephalic and atraumatic. Right Ear: External ear normal.      Left Ear: External ear normal.      Nose: Nose normal.      Mouth/Throat:      Mouth: Mucous membranes are moist.   Eyes:      Pupils: Pupils are equal, round, and reactive to light.    Cardiovascular:      Rate and Rhythm: Normal rate and regular rhythm. Pulses: Normal pulses. Heart sounds: Normal heart sounds. No murmur heard. Pulmonary:      Effort: Pulmonary effort is normal. No respiratory distress. Breath sounds: Examination of the right-middle field reveals decreased breath sounds. Examination of the right-lower field reveals decreased breath sounds. Decreased breath sounds present. No wheezing or rales. Comments: Right-sided pleurex catheter in place; appears clean and dry  Chest:      Chest wall: No tenderness. Abdominal:      General: Bowel sounds are normal. There is no distension. Palpations: Abdomen is soft. There is no mass. Tenderness: There is no abdominal tenderness. There is no guarding. Musculoskeletal:         General: No swelling or tenderness. Cervical back: Normal range of motion and neck supple. No rigidity or tenderness. Right lower leg: No edema. Left lower leg: No edema. Skin:     General: Skin is warm and dry. Capillary Refill: Capillary refill takes less than 2 seconds. Findings: No lesion or rash. Neurological:      General: No focal deficit present. Mental Status: She is alert.    Psychiatric:         Mood and Affect: Mood normal.          Additional Data:     Lab Results:  Results from last 7 days   Lab Units 07/04/23  1837   WBC Thousand/uL 4.62   HEMOGLOBIN g/dL 10.9*   HEMATOCRIT % 31.7*   PLATELETS Thousands/uL 212   NEUTROS PCT % 84*   LYMPHS PCT % 6*   MONOS PCT % 8   EOS PCT % 1     Results from last 7 days   Lab Units 07/04/23  1837   SODIUM mmol/L 133*   POTASSIUM mmol/L 3.5   CHLORIDE mmol/L 104   CO2 mmol/L 22   BUN mg/dL 23   CREATININE mg/dL 0.60   ANION GAP mmol/L 7   CALCIUM mg/dL 8.9   ALBUMIN g/dL 3.4*   TOTAL BILIRUBIN mg/dL 0.39   ALK PHOS U/L 235*   ALT U/L 41   AST U/L 93*   GLUCOSE RANDOM mg/dL 104     Results from last 7 days   Lab Units 07/04/23  1837   INR  1.12             Results from last 7 days Lab Units 07/04/23  1837   LACTIC ACID mmol/L 0.8   PROCALCITONIN ng/ml 0.17       Lines/Drains:  Invasive Devices     Central Venous Catheter Line  Duration           Port A Cath 07/27/22 Right Chest 342 days          Peripheral Intravenous Line  Duration           Peripheral IV 07/04/23 Dorsal (posterior); Left Forearm <1 day                Central Line:  Goal for removal: continue            Imaging: Reviewed radiology reports from this admission including: CT chest/abd/pelvis   CT chest abdomen pelvis w contrast   Final Result by Traci Mccarty MD (07/04 2044)      Enlarging partially loculated right pleural effusion. Worsening hepatic metastatic disease. Widespread sclerotic osseous metastasis again seen      Workstation performed: [id number]             EKG and Other Studies Reviewed on Admission:   · EKG: EKG reviewed . ** Please Note: This note has been constructed using a voice recognition system.  **

## 2023-07-05 NOTE — ASSESSMENT & PLAN NOTE
· Reports acute on chronic right-sided low chest pain  · Follows w/ outpatient palliative care for cancer related pain  · CT today showing worsening hepatic mets and right-sided pleural effusion; both possibly contributing to patient's symptoms   · Trop 4; EKG non-ischemic   · Continue home PO dilaudid 2-4mg q3h moderate-severe pain (PDMP reviewed)  · Additional PRN IV dilaudid boluses ordered  · Palliative care consulted; recommendations appreciated   · Continue to adjust pain management as needed

## 2023-07-05 NOTE — SEDATION DOCUMENTATION
Accesssed right pleural catheter and drained 450ml serosang fluid, dressing changed  Labs sent at this time

## 2023-07-05 NOTE — ASSESSMENT & PLAN NOTE
· CT showing enlarging partially loculated right pleural effusion  · Has pleural tube in place; Reports 500cc normal-appearing fluid drained this AM   · Not currently in respiratory distress; saturating appropriately on RA   · IR consulted for further drainage for analysis and relief

## 2023-07-05 NOTE — ASSESSMENT & PLAN NOTE
· AST 93;  Alk phos 235  · Appear to have been worsening since early June   · Possibly in setting of worsening hepatic metastasis   · AM CMP ordered

## 2023-07-05 NOTE — ASSESSMENT & PLAN NOTE
-CT findings: Worsening hepatic metastatic disease. Widespread sclerotic osseous metastasis. -Continue follow-up with outpatient oncologist and chemotherapy treatment.

## 2023-07-06 LAB
ANION GAP SERPL CALCULATED.3IONS-SCNC: 5 MMOL/L
BASOPHILS # BLD AUTO: 0.02 THOUSANDS/ÂΜL (ref 0–0.1)
BASOPHILS NFR BLD AUTO: 1 % (ref 0–1)
BUN SERPL-MCNC: 12 MG/DL (ref 5–25)
CALCIUM SERPL-MCNC: 8.2 MG/DL (ref 8.4–10.2)
CHLORIDE SERPL-SCNC: 103 MMOL/L (ref 96–108)
CO2 SERPL-SCNC: 25 MMOL/L (ref 21–32)
CREAT SERPL-MCNC: 0.59 MG/DL (ref 0.6–1.3)
EOSINOPHIL # BLD AUTO: 0.13 THOUSAND/ÂΜL (ref 0–0.61)
EOSINOPHIL NFR BLD AUTO: 3 % (ref 0–6)
ERYTHROCYTE [DISTWIDTH] IN BLOOD BY AUTOMATED COUNT: 16 % (ref 11.6–15.1)
GFR SERPL CREATININE-BSD FRML MDRD: 103 ML/MIN/1.73SQ M
GLUCOSE P FAST SERPL-MCNC: 101 MG/DL (ref 65–99)
GLUCOSE SERPL-MCNC: 101 MG/DL (ref 65–140)
HCT VFR BLD AUTO: 31 % (ref 34.8–46.1)
HGB BLD-MCNC: 10.7 G/DL (ref 11.5–15.4)
IMM GRANULOCYTES # BLD AUTO: 0.01 THOUSAND/UL (ref 0–0.2)
IMM GRANULOCYTES NFR BLD AUTO: 0 % (ref 0–2)
LYMPHOCYTES # BLD AUTO: 0.02 THOUSANDS/ÂΜL (ref 0.6–4.47)
LYMPHOCYTES NFR BLD AUTO: 1 % (ref 14–44)
MCH RBC QN AUTO: 37.5 PG (ref 26.8–34.3)
MCHC RBC AUTO-ENTMCNC: 34.5 G/DL (ref 31.4–37.4)
MCV RBC AUTO: 109 FL (ref 82–98)
MONOCYTES # BLD AUTO: 1.04 THOUSAND/ÂΜL (ref 0.17–1.22)
MONOCYTES NFR BLD AUTO: 24 % (ref 4–12)
NEUTROPHILS # BLD AUTO: 3.21 THOUSANDS/ÂΜL (ref 1.85–7.62)
NEUTS SEG NFR BLD AUTO: 71 % (ref 43–75)
NRBC BLD AUTO-RTO: 0 /100 WBCS
PLATELET # BLD AUTO: 198 THOUSANDS/UL (ref 149–390)
PMV BLD AUTO: 10.8 FL (ref 8.9–12.7)
POTASSIUM SERPL-SCNC: 4.1 MMOL/L (ref 3.5–5.3)
RBC # BLD AUTO: 2.85 MILLION/UL (ref 3.81–5.12)
SODIUM SERPL-SCNC: 133 MMOL/L (ref 135–147)
WBC # BLD AUTO: 4.43 THOUSAND/UL (ref 4.31–10.16)

## 2023-07-06 PROCEDURE — 99233 SBSQ HOSP IP/OBS HIGH 50: CPT | Performed by: INTERNAL MEDICINE

## 2023-07-06 PROCEDURE — 94760 N-INVAS EAR/PLS OXIMETRY 1: CPT

## 2023-07-06 PROCEDURE — 99232 SBSQ HOSP IP/OBS MODERATE 35: CPT | Performed by: STUDENT IN AN ORGANIZED HEALTH CARE EDUCATION/TRAINING PROGRAM

## 2023-07-06 PROCEDURE — 94664 DEMO&/EVAL PT USE INHALER: CPT

## 2023-07-06 PROCEDURE — 80048 BASIC METABOLIC PNL TOTAL CA: CPT | Performed by: INTERNAL MEDICINE

## 2023-07-06 PROCEDURE — 85025 COMPLETE CBC W/AUTO DIFF WBC: CPT | Performed by: INTERNAL MEDICINE

## 2023-07-06 RX ORDER — DIPHENHYDRAMINE HYDROCHLORIDE, ZINC ACETATE 2; .1 G/100G; G/100G
CREAM TOPICAL 3 TIMES DAILY PRN
Status: DISCONTINUED | OUTPATIENT
Start: 2023-07-06 | End: 2023-07-08 | Stop reason: HOSPADM

## 2023-07-06 RX ORDER — LACTULOSE 20 G/30ML
10 SOLUTION ORAL DAILY
Status: DISCONTINUED | OUTPATIENT
Start: 2023-07-06 | End: 2023-07-08 | Stop reason: HOSPADM

## 2023-07-06 RX ORDER — ACETAMINOPHEN 325 MG/1
975 TABLET ORAL EVERY 8 HOURS SCHEDULED
Status: DISCONTINUED | OUTPATIENT
Start: 2023-07-06 | End: 2023-07-08 | Stop reason: HOSPADM

## 2023-07-06 RX ADMIN — CEFTRIAXONE 1000 MG: 1 INJECTION, SOLUTION INTRAVENOUS at 21:56

## 2023-07-06 RX ADMIN — DIPHENHYDRAMINE HYDROCHLORIDE, ZINC ACETATE: 2; .1 CREAM TOPICAL at 12:42

## 2023-07-06 RX ADMIN — HYDROMORPHONE HYDROCHLORIDE 8 MG: 4 TABLET ORAL at 09:03

## 2023-07-06 RX ADMIN — DIPHENHYDRAMINE HYDROCHLORIDE 50 MG: 25 TABLET ORAL at 22:09

## 2023-07-06 RX ADMIN — POLYETHYLENE GLYCOL 3350 17 G: 17 POWDER, FOR SOLUTION ORAL at 09:04

## 2023-07-06 RX ADMIN — HYDROMORPHONE HYDROCHLORIDE 8 MG: 4 TABLET ORAL at 21:56

## 2023-07-06 RX ADMIN — ACETAMINOPHEN 975 MG: 325 TABLET, FILM COATED ORAL at 21:57

## 2023-07-06 RX ADMIN — HYDROMORPHONE HYDROCHLORIDE 1 MG: 1 INJECTION, SOLUTION INTRAMUSCULAR; INTRAVENOUS; SUBCUTANEOUS at 23:12

## 2023-07-06 RX ADMIN — HYDROMORPHONE HYDROCHLORIDE 1 MG: 1 INJECTION, SOLUTION INTRAMUSCULAR; INTRAVENOUS; SUBCUTANEOUS at 17:59

## 2023-07-06 RX ADMIN — HYDROMORPHONE HYDROCHLORIDE 8 MG: 4 TABLET ORAL at 14:46

## 2023-07-06 RX ADMIN — ACETAMINOPHEN 975 MG: 325 TABLET, FILM COATED ORAL at 14:46

## 2023-07-06 RX ADMIN — ACETAMINOPHEN 650 MG: 325 TABLET, FILM COATED ORAL at 09:03

## 2023-07-06 RX ADMIN — LETROZOLE 2.5 MG: 2.5 TABLET, FILM COATED ORAL at 17:56

## 2023-07-06 RX ADMIN — LACTULOSE 10 G: 20 SOLUTION ORAL at 12:29

## 2023-07-06 RX ADMIN — OXYBUTYNIN CHLORIDE 5 MG: 5 TABLET, EXTENDED RELEASE ORAL at 17:56

## 2023-07-06 RX ADMIN — DULOXETINE HYDROCHLORIDE 30 MG: 30 CAPSULE, DELAYED RELEASE ORAL at 17:56

## 2023-07-06 RX ADMIN — RIVAROXABAN 20 MG: 20 TABLET, FILM COATED ORAL at 17:56

## 2023-07-06 RX ADMIN — HYDROMORPHONE HYDROCHLORIDE 1 MG: 1 INJECTION, SOLUTION INTRAMUSCULAR; INTRAVENOUS; SUBCUTANEOUS at 11:00

## 2023-07-06 NOTE — PROGRESS NOTES
[address]  Progress Note  Name: Lexii Aguilar  MRN: [id number]  Unit/Bed#: -01 I Date of Admission: 7/4/2023   Date of Service: 7/6/2023 I Hospital Day: 0    Assessment/Plan   * Pleural effusion  Assessment & Plan  · CT showing enlarging partially loculated right pleural effusion  · Has pleural tube in place; Reports 500cc normal-appearing fluid drained this AM   · Not currently in respiratory distress; saturating appropriately on RA   · Fluid has been sent for analysis, pending cultures    Fever  Assessment & Plan  Patient reports acute-on-chronic right lower chest pain worsening over the past 1-2 days. Reports pain is worse with movement and deep inspiration. Additionally reports ongoing chronic chills and noticed a fever of 102 at home earlier today. After ED interventions reports her pain has improved somewhat, however still persists. Denies other acute symptom/complaint at this time. BP (!) 88/49   Pulse 79   Temp 98.8 °F (37.1 °C)   Resp 16   Ht 5' 4" (1.626 m)   Wt 52.6 kg (115 lb 15.4 oz)   LMP 05/06/2021 (Approximate)   SpO2 96%   BMI 19.90 kg/m²     · Reports fever Tmax 102F at home earlier the day of admission  · Not currently meeting SEPSIS criteria   · Procal and lactic acid WNL   · CT showing enlarging partially loculated pleural effusion  · ANC currently WNL  · Fluid has been sent for analysis, appears to be exudative in nature. Follow cultures to ensure that there is no infection. Abnormal LFTs  Assessment & Plan  · AST 93;  Alk phos 235  · Appear to have been worsening since early June   · Possibly in setting of worsening hepatic metastasis   · Continue to monitor CMP    Chest pain  Assessment & Plan  · Reports acute on chronic right-sided low chest pain  · Follows w/ outpatient palliative care for cancer related pain  · CT today showing worsening hepatic mets and right-sided pleural effusion; both possibly contributing to patient's symptoms · Trop 4; EKG non-ischemic   · This is pleuritic pain in the setting of metastasis  · Palliative care following as well    Malignant neoplasm of breast in female, estrogen receptor positive ([address])  Assessment & Plan  · CT showing worsening hepatic metastatic disease  · Follows w/ outpatient [facility]   · Continue outpatient f/u    Hypertension  Assessment & Plan    · Not currently on outpatient antihypertensive  · Monitor BP per unit protocol          VTE Pharmacologic Prophylaxis:   Pharmacologic: Rivaroxaban (Xarelto)  Mechanical VTE Prophylaxis in Place: Yes    Patient Centered Rounds: I have performed bedside rounds with nursing staff today. Discussions with Specialists or Other Care Team Provider: Discussed with care management team    Education and Discussions with Family / Patient: Patient herself she did not request me to talk to anyone    Total Time for Medical Decision Making including Record Review,  Physical Encounter and Physical Exam, Elaboration of Assessment and Plan,  Counseling / Coordination of Care:  95 minutes    Current Length of Stay: 0 day(s)    Current Patient Status: Inpatient   Certification Statement: The patient will continue to require additional inpatient hospital stay due to need for fever investigation    Discharge Plan: Once stable - 24-48h    Code Status: Level 1 - Full Code      Subjective:     Patient seen and examined this morning. She denies any nausea or vomiting, she does mention some chest pain on inspiration still. Objective:     Vitals:   Temp (24hrs), Av.5 °F (36.9 °C), Min:98.2 °F (36.8 °C), Max:98.8 °F (37.1 °C)    Temp:  [98.2 °F (36.8 °C)-98.8 °F (37.1 °C)] 98.8 °F (37.1 °C)  HR:  [78-90] 79  Resp:  [16-18] 16  BP: (88-98)/(49-65) 88/49  SpO2:  [90 %-96 %] 96 %  Body mass index is 19.9 kg/m². Input and Output Summary (last 24 hours):        Intake/Output Summary (Last 24 hours) at 2023 1345  Last data filed at 2023 0906  Gross per 24 hour   Intake 760 ml   Output --   Net 760 ml       Physical Exam:     Physical Exam  Vitals and nursing note reviewed. Constitutional:       Appearance: Normal appearance. She is normal weight. Comments: Female in bed, awake, sitting   HENT:      Head: Normocephalic and atraumatic. Right Ear: External ear normal.      Left Ear: External ear normal.      Nose: Nose normal. No congestion. Mouth/Throat:      Mouth: Mucous membranes are moist.      Pharynx: Oropharynx is clear. No oropharyngeal exudate or posterior oropharyngeal erythema. Eyes:      General: No scleral icterus. Right eye: No discharge. Left eye: No discharge. Extraocular Movements: Extraocular movements intact. Conjunctiva/sclera: Conjunctivae normal.      Pupils: Pupils are equal, round, and reactive to light. Cardiovascular:      Rate and Rhythm: Normal rate and regular rhythm. Pulses: Normal pulses. Heart sounds: Normal heart sounds. No murmur heard. No friction rub. No gallop. Pulmonary:      Effort: Pulmonary effort is normal. No respiratory distress. Breath sounds: Normal breath sounds. No stridor. No wheezing, rhonchi or rales. Chest:      Chest wall: No tenderness. Abdominal:      General: Abdomen is flat. Bowel sounds are normal. There is no distension. Palpations: Abdomen is soft. There is no mass. Tenderness: There is no abdominal tenderness. There is no guarding or rebound. Musculoskeletal:         General: No swelling, tenderness, deformity or signs of injury. Normal range of motion. Cervical back: Normal range of motion and neck supple. No rigidity. No muscular tenderness. Skin:     General: Skin is warm and dry. Capillary Refill: Capillary refill takes less than 2 seconds. Coloration: Skin is not jaundiced or pale. Findings: No bruising, erythema, lesion or rash.       Comments: Pleural catheter in place   Neurological:      General: No focal deficit present. Mental Status: She is alert and oriented to person, place, and time. Mental status is at baseline. Cranial Nerves: No cranial nerve deficit. Sensory: No sensory deficit. Motor: No weakness. Coordination: Coordination normal.   Psychiatric:         Mood and Affect: Mood normal.         Behavior: Behavior normal.         Thought Content: Thought content normal.         Judgment: Judgment normal.         Additional Data:     Labs:    Results from last 7 days   Lab Units 07/06/23  0555   WBC Thousand/uL 4.43   HEMOGLOBIN g/dL 10.7*   HEMATOCRIT % 31.0*   PLATELETS Thousands/uL 198   NEUTROS PCT % 71   LYMPHS PCT % 1*   MONOS PCT % 24*   EOS PCT % 3     Results from last 7 days   Lab Units 07/06/23  0555 07/04/23  1837   SODIUM mmol/L 133* 133*   POTASSIUM mmol/L 4.1 3.5   CHLORIDE mmol/L 103 104   CO2 mmol/L 25 22   BUN mg/dL 12 23   CREATININE mg/dL 0.59* 0.60   ANION GAP mmol/L 5 7   CALCIUM mg/dL 8.2* 8.9   ALBUMIN g/dL  --  3.4*   TOTAL BILIRUBIN mg/dL  --  0.39   ALK PHOS U/L  --  235*   ALT U/L  --  41   AST U/L  --  93*   GLUCOSE RANDOM mg/dL 101 104     Results from last 7 days   Lab Units 07/04/23  1837   INR  1.12             Results from last 7 days   Lab Units 07/04/23  1837   LACTIC ACID mmol/L 0.8   PROCALCITONIN ng/ml 0.17           * I Have Reviewed All Lab Data Listed Above. * Additional Pertinent Lab Tests Reviewed: [facility] Admission Reviewed      Recent Cultures (last 7 days):     Results from last 7 days   Lab Units 07/05/23  1206 07/04/23  1838 07/04/23  1837   BLOOD CULTURE   --  No Growth at 24 hrs. No Growth at 24 hrs.    GRAM STAIN RESULT  2+ Polys  No bacteria seen  --   --    BODY FLUID CULTURE, STERILE  No growth  --   --        Last 24 Hours Medication List:   Current Facility-Administered Medications   Medication Dose Route Frequency Provider Last Rate   • acetaminophen  975 mg Oral [facility] Tayler Giles MD • albuterol  2 puff Inhalation Q4H PRN Dimitrios Bergeron PA-C     • calcium carbonate  500 mg Oral TID PRN Dimitrios Bergeron PA-C     • cefTRIAXone  1,000 mg Intravenous Q24H DAYDAY Dela Cruz-C 1,000 mg (07/05/23 2185)   • dicyclomine  10 mg Oral Q6H PRN AMANDA Dela CruzC     • diphenhydrAMINE  50 mg Oral HS PRN AMANDA Dela CruzC     • diphenhydrAMINE-zinc acetate   Topical TID PRN Taylor Ray MD     • DULoxetine  30 mg Oral QPM Fede Grimaldo PA-C     • HYDROmorphone  1 mg Intravenous Q3H PRN Peter Baires MD     • HYDROmorphone  4 mg Oral Q3H PRN Peter Baires MD      Or   • HYDROmorphone  8 mg Oral Q3H PRN Peter Baires MD     • lactulose  10 g Oral Daily Peter Baires MD     • letrozole  2.5 mg Oral QPM Dimitrios Bergeron PA-C     • oxybutynin  5 mg Oral QPM Dimitrios Bergeron PA-C     • polyethylene glycol  17 g Oral Daily Evangelina Dela Cruz     • rivaroxaban  20 mg Oral QPM Dimitrios Bergeron PA-C          Today, Patient Was Seen By: Taylor Ray MD    ** Please Note: Dictation voice to text software may have been used in the creation of this document.  **

## 2023-07-06 NOTE — CASE MANAGEMENT
Case Management Discharge Planning Note    Patient name Herminia May  Location /-93 MRN [id number]  : 1965 Date 2023       Current Admission Date: 2023  Current Admission Diagnosis:Pleural effusion   Patient Active Problem List    Diagnosis Date Noted   • Chest pain 2023   • Fever 2023   • Abnormal LFTs 2023   • UTI symptoms 2023   • Port-A-Cath in place 2022   • Chemotherapy induced neutropenia ([address]) 2022   • Cancer related pain 2021   • Malignant neoplasm metastatic to bone ([address]) 2021   • Constipation 2021   • Palliative care patient 2021   • Malignant pleural effusion 2021   • Primary malignant neoplasm of right breast with metastasis to other site [facility]) 2021   • Malignant neoplasm of breast in female, estrogen receptor positive ([address]) 2021   • Hypertension 2021   • Metastatic neoplasm ([address]) 2021   • Pleural effusion 2021      LOS (days): 0  Geometric Mean LOS (GMLOS) (days):   Days to GMLOS:     OBJECTIVE:  Risk of Unplanned Readmission Score: 19.22         Current admission status: Inpatient   Preferred Pharmacy:   [facility]/pharmacy #[id number]- [place], [place] - [address] [address]  Phone: [phone] Fax: [phone]    [address] [place] [address]  [address] [place] [address] [address]  Phone: [phone] Fax: [phone]    [address], [address] [place] [address]  [address] [facility] [address]  Phone: [phone] Fax: [phone]    [facility] ([facility]) #[id number] - [place] - [address] [place] [address]  Phone: [phone] Fax: [phone]    [facility] [address] [place]  Phone: [phone] Fax: [address] [place] [address] #[id number] Bridget Rose, [address] [place] [address] Romina Moncada  [address] [place] [address]  Phone: [phone] Fax: [phone]    [id number]- [place], [address] [address] [address]  Phone: [phone] Fax: [phone]    Primary Care Provider: Raven Mayers MD    Primary Insurance: Agnieszka Ludwig  Secondary Insurance:     DISCHARGE DETAILS:  As per SLIM rounds, pt has an anticipated 24hrs discharge. CM continue to follow.

## 2023-07-06 NOTE — ASSESSMENT & PLAN NOTE
· AST 93;  Alk phos 235  · Appear to have been worsening since early June   · Possibly in setting of worsening hepatic metastasis   · Continue to monitor CMP

## 2023-07-06 NOTE — RESPIRATORY THERAPY NOTE
RT Protocol Note  Joseph Tovar 56 y.o. female MRN: [id number]  Unit/Bed#: -01 Encounter: [id number]    Assessment    Principal Problem:    Pleural effusion  Active Problems:    Hypertension    Malignant neoplasm of breast in female, estrogen receptor positive ([address])    Chest pain    Fever    Abnormal LFTs      Home Pulmonary Medications:     23 1900   Respiratory Protocol   Protocol Initiated? Yes   Protocol Selection Respiratory   Language Barrier? No   Medical & Social History Reviewed? Yes   Diagnostic Studies Reviewed? Yes   Physical Assessment Performed? Yes   Respiratory Plan Home Bronchodilator Patient pathway   Respiratory Assessment   Assessment Type Assess only   General Appearance Alert; Awake   Respiratory Pattern Normal   Chest Assessment Chest expansion symmetrical   Bilateral Breath Sounds Diminished   R Breath Sounds Diminished   L Breath Sounds Diminished   Location Specific No   Cough None   Resp Comments Resp protocol completed.  Will continue with current tx plan   Additional Assessments   Pulse 85   Respirations 18   SpO2 93 %            Past Medical History:   Diagnosis Date    Cancer ([address])     Headache(784.0) 2021    History of radiation exposure     Malignant neoplasm of right female breast ([address]) 2012    right     Social History     Socioeconomic History    Marital status: /Civil Union     Spouse name: None    Number of children: None    Years of education: None    Highest education level: None   Occupational History    None   Tobacco Use    Smoking status: Some Days     Packs/day: 0.25     Years: 15.00     Total pack years: 3.75     Types: Cigarettes     Start date:      Last attempt to quit: 7/10/2021     Years since quittin.9    Smokeless tobacco: Never   Vaping Use    Vaping Use: Never used   Substance and Sexual Activity    Alcohol use: Not Currently    Drug use: Not Currently    Sexual activity: Not Currently     Partners: Male     Birth control/protection: Male Sterilization   Other Topics Concern    None   Social History Narrative    None     Social Determinants of Health     Financial Resource Strain: Not on file   Food Insecurity: No Food Insecurity (7/5/2023)    Hunger Vital Sign     Worried About Running Out of Food in the Last Year: Never true     Ran Out of Food in the Last Year: Never true   Transportation Needs: No Transportation Needs (7/5/2023)    PRAPARE - Transportation     Lack of Transportation (Medical): No     Lack of Transportation (Non-Medical): No   Physical Activity: Not on file   Stress: Not on file   Social Connections: Not on file   Intimate Partner Violence: Not on file   Housing Stability: Low Risk  (7/5/2023)    Housing Stability Vital Sign     Unable to Pay for Housing in the Last Year: No     Number of Places Lived in the Last Year: 1     Unstable Housing in the Last Year: No       Subjective         Objective    Physical Exam:   Assessment Type: Assess only  General Appearance: Alert, Awake  Respiratory Pattern: Normal  Chest Assessment: Chest expansion symmetrical  Bilateral Breath Sounds: Diminished  R Breath Sounds: Diminished  L Breath Sounds: Diminished  Location Specific: No  Cough: None    Vitals:  Blood pressure 93/65, pulse 85, temperature 98.2 °F (36.8 °C), resp. rate 18, height 5' 4" (1.626 m), weight 52.6 kg (115 lb 15.4 oz), last menstrual period 05/06/2021, SpO2 93 %. Imaging and other studies: I have personally reviewed pertinent reports. Plan    Respiratory Plan: Home Bronchodilator Patient pathway        Resp Comments: Resp protocol completed.  Will continue with current tx plan

## 2023-07-06 NOTE — UTILIZATION REVIEW
Initial Clinical Review    Observation on 07/04 @ 2132 upgraded to Inpatient on 07/06 @ 1106. Pt requiring continued stay d/t continued fever w/u. Admission Orders (From admission, onward)     Ordered        07/06/23 1106  Inpatient Admission  Once            07/04/23 2132  Place in Observation  Once                      Orders Placed This Encounter   Procedures   • Inpatient Admission     Standing Status:   Standing     Number of Occurrences:   1     Order Specific Question:   Level of Care     Answer:   Med Surg [16]     Order Specific Question:   Estimated length of stay     Answer:   More than 2 Midnights     Order Specific Question:   Certification     Answer:   I certify that inpatient services are medically necessary for this patient for a duration of greater than two midnights. See H&P and MD Progress Notes for additional information about the patient's course of treatment. Date: 07/06, 07/07             HPI:57 y.o. female initially admitted on .07/06    Assessment/Plan:   07/06 Obs to IP  IM Notes: Pt reports chest pain on inspiration. This is pleuritic pain in the setting of metastasis. Pleural fluid has been sent for analysis, pending cultures. Pain control prn. Cont IV abx. Palliative care ff.      Date 07/07 Day 2 IP   Progress Notes: Pt reports still having pleuritic chest pain. Fluid cx neg so far, pending final results. Pain control management. Palliative ff. Cont to mon CMP. Add bowel regimen. On exam, aaox3, pulmonary effort normal, pleural catheter in place.     Vital Signs:   Date/Time Temp Pulse Resp BP MAP (mmHg) SpO2 O2 Device   07/07/23 14:57:23 98.1 °F (36.7 °C) 83 -- 86/56 Abnormal  66 88 % Abnormal  --   07/07/23 1028 -- -- -- -- -- -- None (Room air)   07/07/23 07:54:32 99.1 °F (37.3 °C) -- 15 95/63 74 -- --   07/06/23 2312 -- -- -- -- -- -- None (Room air)   07/06/23 22:04:51 98.3 °F (36.8 °C) 88 16 91/61 71 95 % --   07/06/23 2157 -- -- -- -- -- -- None (Room air)   07/06/23 1928 -- 81 18 -- -- 98 % --   07/06/23 15:55:02 97.5 °F (36.4 °C) 79 15 97/60 72 91 % --   07/06/23 14:41:28 -- 86 -- 93/58 70 96 % --       Date/Time Temp Pulse Resp BP MAP (mmHg) SpO2 O2 Device Patient Position - Orthostatic VS   07/06/23 1100 -- -- -- -- -- -- None (Room air) --   07/06/23 1059 -- -- -- -- -- 96 % -- --   07/06/23 07:01:31 98.8 °F (37.1 °C) 79 16 88/49 Abnormal  62 Abnormal  91 % -- --   07/05/23 20:46:18 -- 90 -- 94/57 69 90 % -- --   07/05/23 1900 -- 85 18 -- -- 93 % -- --   07/05/23 17:27:46 -- 84 -- 93/65 74 93 % -- --   07/05/23 16:09:39 98.2 °F (36.8 °C) -- -- 92/60 71 -- -- --   07/05/23 16:08:52 98.2 °F (36.8 °C) 81 16 -- -- 91 % -- --   07/05/23 1604 -- -- -- -- -- 94 % -- --   07/05/23 15:59:12 98.8 °F (37.1 °C) 78 -- 98/59 72 93 % -- --   07/05/23 11:50:31 -- 77 20 92/56 -- 93 % -- --   07/05/23 1034 -- -- -- -- -- 95 % None (Room air) --       Pertinent Labs/Diagnostic Results:   Results from last 7 days   Lab Units 07/04/23 1837   SARS-COV-2  Negative     Results from last 7 days   Lab Units 07/07/23  0446 07/06/23  0555 07/04/23  1837 07/03/23  1110   WBC Thousand/uL 3.94* 4.43 4.62 3.31*   HEMOGLOBIN g/dL 10.1* 10.7* 10.9* 10.8*   HEMATOCRIT % 29.8* 31.0* 31.7* 31.8*   PLATELETS Thousands/uL 198 198 212 207   NEUTROS ABS Thousands/µL 2.96 3.21 3.90 2.51         Results from last 7 days   Lab Units 07/07/23  0446 07/06/23  0555 07/04/23  1837 07/03/23  1110   SODIUM mmol/L 134* 133* 133* 139   POTASSIUM mmol/L 3.8 4.1 3.5 3.6   CHLORIDE mmol/L 103 103 104 107   CO2 mmol/L 26 25 22 26   ANION GAP mmol/L 5 5 7 6   BUN mg/dL 10 12 23 19   CREATININE mg/dL 0.49* 0.59* 0.60 0.67   EGFR ml/min/1.73sq m 110 103 102 99   CALCIUM mg/dL 8.2* 8.2* 8.9 9.0     Results from last 7 days   Lab Units 07/04/23  1837 07/03/23  1110   AST U/L 93* 65*   ALT U/L 41 33   ALK PHOS U/L 235* 192*   TOTAL PROTEIN g/dL 6.2* 6.4   ALBUMIN g/dL 3.4* 3.5   TOTAL BILIRUBIN mg/dL 0.39 0.35         Results from last 7 days   Lab Units 07/07/23  0446 07/06/23  0555 07/04/23  1837 07/03/23  1110   GLUCOSE RANDOM mg/dL 106 101 104 85         Results from last 7 days   Lab Units 07/04/23  1837   HS TNI 0HR ng/L 4         Results from last 7 days   Lab Units 07/04/23  1837   PROTIME seconds 14.2   INR  1.12   PTT seconds 33         Results from last 7 days   Lab Units 07/04/23  1837   PROCALCITONIN ng/ml 0.17     Results from last 7 days   Lab Units 07/04/23  1837   LACTIC ACID mmol/L 0.8           Results from last 7 days   Lab Units 07/04/23  1837   LIPASE u/L 22                 Results from last 7 days   Lab Units 07/04/23  1917   CLARITY UA  Clear   COLOR UA  Colorless   SPEC GRAV UA  1.019   PH UA  6.5   GLUCOSE UA mg/dl Negative   KETONES UA mg/dl Negative   BLOOD UA  Moderate*   PROTEIN UA mg/dl Negative   NITRITE UA  Negative   BILIRUBIN UA  Negative   UROBILINOGEN UA (BE) mg/dl <2.0   LEUKOCYTES UA  Negative   WBC UA /hpf 1-2   RBC UA /hpf 1-2   BACTERIA UA /hpf None Seen   EPITHELIAL CELLS WET PREP /hpf Occasional     Results from last 7 days   Lab Units 07/04/23  1837   INFLUENZA A PCR  Negative   INFLUENZA B PCR  Negative   RSV PCR  Negative           Results from last 7 days   Lab Units 07/05/23  1206 07/04/23  1838 07/04/23  1837   BLOOD CULTURE   --  No Growth at 48 hrs. No Growth at 48 hrs.    GRAM STAIN RESULT  2+ Polys  No bacteria seen  --   --    BODY FLUID CULTURE, STERILE  No growth  --   --      Results from last 7 days   Lab Units 07/05/23  1206   TOTAL COUNTED  100   WBC FLUID /ul 13,710               Medications:   Scheduled Medications:  cefTRIAXone, 1,000 mg, Intravenous, Q24H  DULoxetine, 30 mg, Oral, QPM  letrozole, 2.5 mg, Oral, QPM  oxybutynin, 5 mg, Oral, QPM  polyethylene glycol, 17 g, Oral, Daily  rivaroxaban, 20 mg, Oral, QPM      Continuous IV Infusions: none     PRN Meds:  acetaminophen, 650 mg, Oral, Q6H PRN 07/06 x 1  albuterol, 2 puff, Inhalation, Q4H PRN  calcium carbonate, 500 mg, Oral, TID PRN  dicyclomine, 10 mg, Oral, Q6H PRN  diphenhydrAMINE, 50 mg, Oral, HS PRN 07/06 x 1  HYDROmorphone, 1 mg, Intravenous, Q3H PRN 07/06 x 1  HYDROmorphone, 4 mg, Oral, Q3H PRN 07/07 x 1   Or  HYDROmorphone, 8 mg, Oral, Q3H PRN 07/06 x 2, 07/07 x 1  lactulose, 10 g, Oral, Daily PRN        Discharge Plan: [facility]    Network Utilization Review Department  ATTENTION: Please call with any questions or concerns to [phone] and carefully listen to the prompts so that you are directed to the right person. All voicemails are confidential.  Valery Coker all requests for admission clinical reviews, approved or denied determinations and any other requests to dedicated fax number below belonging to the campus where the patient is receiving treatment.  List of dedicated fax numbers for the Facilities:  [place] DENIALS (Administrative/Medical Necessity) [phone] [address] [facility] (Maternity/NICU/Pediatrics) [phone]   [address] [place] [address] [phone]   [facility] [address] [facility] [phone]   [address] [facility] [address] [place] [address] [phone]   [address] [facility] [address] [facility]  [address] [phone]

## 2023-07-06 NOTE — ASSESSMENT & PLAN NOTE
· CT showing worsening hepatic metastatic disease  · Follows w/ outpatient  [facility] heme onc   · Continue outpatient f/u

## 2023-07-06 NOTE — ASSESSMENT & PLAN NOTE
Patient reports acute-on-chronic right lower chest pain worsening over the past 1-2 days. Reports pain is worse with movement and deep inspiration. Additionally reports ongoing chronic chills and noticed a fever of 102 at home earlier today. After ED interventions reports her pain has improved somewhat, however still persists. Denies other acute symptom/complaint at this time. BP (!) 88/49   Pulse 79   Temp 98.8 °F (37.1 °C)   Resp 16   Ht 5' 4" (1.626 m)   Wt 52.6 kg (115 lb 15.4 oz)   LMP 05/06/2021 (Approximate)   SpO2 96%   BMI 19.90 kg/m²     · Reports fever Tmax 102F at home earlier the day of admission  · Not currently meeting SEPSIS criteria   · Procal and lactic acid WNL   · CT showing enlarging partially loculated pleural effusion  · ANC currently WNL  · Fluid has been sent for analysis, appears to be exudative in nature. Follow cultures to ensure that there is no infection.

## 2023-07-06 NOTE — NURSING NOTE
Patient requesting to take her tylenol with her dilaudid po medication. Stated this is how she takes it at home. On  Call overnight made aware [facility]. As per this provider ok to give these two medictions together x 1. Pt made aware. Provider was also made aware of patient's blood pressures, asymptmatic. To continue to monitor while receiving dialudid.

## 2023-07-06 NOTE — PROGRESS NOTES
Progress note - Palliative and Supportive Care   Alysha Caraballo 56 y.o. female [id number]    Patient Active Problem List   Diagnosis   • Pleural effusion   • Metastatic neoplasm ([address])   • Hypertension   • Malignant neoplasm of breast in female, estrogen receptor positive ([address])   • Palliative care patient   • Malignant pleural effusion   • Primary malignant neoplasm of right breast with metastasis to other site [facility])   • Constipation   • Malignant neoplasm metastatic to bone [facility])   • Cancer related pain   • Chemotherapy induced neutropenia (HCC)   • Port-A-Cath in place   • UTI symptoms   • Chest pain   • Fever   • Abnormal LFTs     Active issues specifically addressed today include:    - cancer-related pain   - stage IV breast cancer with hepatic/osseous metastatic disease   - recurrent loculated R-sided pleural effusion; pleural catheter in place   - fever prior to arrival   - palliative care encounter   - goals of care support    Plan:  #symptom management  #cancer-related pain   - schedule APAP 975 mg PO Q8H [address]              - max daily 4000 mg   - continue duloxetine 30 mg PO QPM   - continue hydromorphone 4-8 mg PO Q3H PRN   - continue hydromorphone 1 mg IV Q3H PRN [breakthrough]              - total OME usage yesterday: 148 mg     Requesting scheduled APAP, as patient feels synergism with opioid medication with adequate management. Use of IV breakthrough x 1, down from 2 doses day prior. Improved and adequate per patient at current dosing, no changes requested. #anxiety   - continue duloxetine 30 mg PO QPM     #nausea   - continue metoclopramide 10 mg PO QID PRN   - continue ondansetron 8 mg PO Q8H PRN     Denies nausea, vomiting. Tolerating PO intake without difficulty. #insomnia   - continue melatonin 6 mg PO QHS     #OIC   - continue miralax 17 g PO QDaily   - schedule lactulose 10 g PO QDaily    No BM x 3 days, passing flatus.  Discussed if no BM by tomorrow, will administer SQ methylnaltrexone.     #abdominal cramping   - continue dicyclomine 10 mg PO Q6H PRN    #goals of care   - treatment focused care with no limitations at this time    #psychosocial support   - emotional support provided   - Preston Olvera [spouse] [phone]   - two children              - Armen [son, Down syndrome]              - Ankur [son]   - granddaughter recently born, in office today with mother of baby      We appreciate the opportunity to participate in this patient's care. We will continue to follow. Please do not hesitate to contact our on-call provider through our clinic answering service at [phone] should you have acute symptom control concerns. Code Status: Full Code - Level 1   Decisional apparatus:  Patient is competent on my exam today. If competence is lost, patient's substitute decision maker would default to spouse by [place] Act 169.    Advance Directive / Living Will / POLST:  Not on File, not previously completed    Interval history:   - NAEO   - symptoms assessment in MDM section above    MEDICATIONS / ALLERGIES:     current meds:   Current Facility-Administered Medications   Medication Dose Route Frequency   • acetaminophen (TYLENOL) tablet 650 mg  650 mg Oral Q6H PRN   • albuterol (PROVENTIL HFA,VENTOLIN HFA) inhaler 2 puff  2 puff Inhalation Q4H PRN   • calcium carbonate (TUMS) chewable tablet 500 mg  500 mg Oral TID PRN   • cefTRIAXone (ROCEPHIN) IVPB (premix in dextrose) 1,000 mg 50 mL  1,000 mg Intravenous Q24H   • dicyclomine (BENTYL) capsule 10 mg  10 mg Oral Q6H PRN   • diphenhydrAMINE (BENADRYL) tablet 50 mg  50 mg Oral HS PRN   • DULoxetine (CYMBALTA) delayed release capsule 30 mg  30 mg Oral QPM   • HYDROmorphone (DILAUDID) injection 1 mg  1 mg Intravenous Q3H PRN   • HYDROmorphone (DILAUDID) tablet 4 mg  4 mg Oral Q3H PRN    Or   • HYDROmorphone (DILAUDID) tablet 8 mg  8 mg Oral Q3H PRN   • lactulose oral solution 10 g  10 g Oral Daily PRN   • letrozole (FEMARA) tablet 2.5 mg  2.5 mg Oral QPM   • oxybutynin (DITROPAN-XL) 24 hr tablet 5 mg  5 mg Oral QPM   • polyethylene glycol (MIRALAX) packet 17 g  17 g Oral Daily   • rivaroxaban (XARELTO) tablet 20 mg  20 mg Oral QPM       Allergies   Allergen Reactions   • Codeine Anaphylaxis   • Morphine GI Intolerance, Itching and Vomiting   • Sulfa Antibiotics Hives and Itching       OBJECTIVE:    Physical Exam  Physical Exam  Vitals and nursing note reviewed. Constitutional:       General: She is awake. Appearance: She is not diaphoretic. Comments: Critically ill appearing in NAD  BMI 19.9  Non-toxic appearing   HENT:      Head: Normocephalic and atraumatic. Right Ear: External ear normal.      Left Ear: External ear normal.      Nose: No rhinorrhea. Eyes:      Comments: No gaze preference   Cardiovascular:      Rate and Rhythm: Normal rate. Pulmonary:      Effort: No tachypnea or respiratory distress. Comments: Completes full sentences without difficulty  Musculoskeletal:      Cervical back: Normal range of motion. Neurological:      General: No focal deficit present. Mental Status: She is alert and oriented to person, place, and time. Psychiatric:         Attention and Perception: Attention normal.         Mood and Affect: Mood and affect normal.         Speech: Speech normal.         Cognition and Memory: Cognition and memory normal.         Lab Results: I have personally reviewed pertinent labs. , CBC:   Lab Results   Component Value Date    WBC 4.43 07/06/2023    HGB 10.7 (L) 07/06/2023    HCT 31.0 (L) 07/06/2023     (H) 07/06/2023     07/06/2023    RBC 2.85 (L) 07/06/2023    MCH 37.5 (H) 07/06/2023    MCHC 34.5 07/06/2023    RDW 16.0 (H) 07/06/2023    MPV 10.8 07/06/2023    NRBC 0 07/06/2023   , CMP:   Lab Results   Component Value Date    SODIUM 133 (L) 07/06/2023    K 4.1 07/06/2023     07/06/2023    CO2 25 07/06/2023    BUN 12 07/06/2023    CREATININE 0.59 (L) 07/06/2023    CALCIUM 8.2 (L) 07/06/2023    EGFR 103 07/06/2023   , PT/PTT:No results found for: "PT", "PTT"  Imaging Studies: Reviewed  EKG, Pathology, and Other Studies: Reviewed    Counseling / Coordination of Care    Total floor / unit time spent today 30 minutes. Greater than 50% of total time was spent with the patient and / or family counseling and / or coordination of care. A description of the counseling / coordination of care: provided medical updates, discussed palliative care, determined competency, determined goals of care, determined POA, determined social/family support, discussed plans of care, discussed symptom management, provided psychosocial support.  Coordinated plan of care with primary team.

## 2023-07-06 NOTE — RESPIRATORY THERAPY NOTE
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 No   Protocol Selection Respiratory   Language Barrier? No   Medical & Social History Reviewed? Yes   Diagnostic Studies Reviewed? Yes   Physical Assessment Performed? Yes   Respiratory Plan Home Bronchodilator Patient pathway   Respiratory Assessment   Assessment Type Assess only   General Appearance Alert; Awake   Respiratory Pattern Normal   Chest Assessment Chest expansion symmetrical   Bilateral Breath Sounds Diminished   R Breath Sounds Diminished   L Breath Sounds Diminished   Location Specific No   Cough None   Resp Comments Resp protocol complete.  Will continue with current tx plan   O2 Device NC   Additional Assessments   Pulse 81   Respirations 18   SpO2 98 %            Past Medical History:   Diagnosis Date    Cancer ([address])     Headache(784.0) 2021    History of radiation exposure     Malignant neoplasm of right female breast ([address]) 2012    right     Social History     Socioeconomic History    Marital status: /Civil Union     Spouse name: None    Number of children: None    Years of education: None    Highest education level: None   Occupational History    None   Tobacco Use    Smoking status: Some Days     Packs/day: 0.25     Years: 15.00     Total pack years: 3.75     Types: Cigarettes     Start date:      Last attempt to quit: 7/10/2021     Years since quittin.9    Smokeless tobacco: Never   Vaping Use    Vaping Use: Never used   Substance and Sexual Activity    Alcohol use: Not Currently    Drug use: Not Currently    Sexual activity: Not Currently     Partners: Male     Birth control/protection: Male Sterilization   Other Topics Concern    None   Social History Narrative    None     Social Determinants of Health     Financial Resource Strain: Not on file   Food Insecurity: No Food Insecurity (7/5/2023)    Hunger Vital Sign     Worried About Running Out of Food in the Last Year: Never true     Ran Out of Food in the Last Year: Never true   Transportation Needs: No Transportation Needs (7/5/2023)    PRAPARE - Transportation     Lack of Transportation (Medical): No     Lack of Transportation (Non-Medical): No   Physical Activity: Not on file   Stress: Not on file   Social Connections: Not on file   Intimate Partner Violence: Not on file   Housing Stability: Low Risk  (7/5/2023)    Housing Stability Vital Sign     Unable to Pay for Housing in the Last Year: No     Number of Places Lived in the Last Year: 1     Unstable Housing in the Last Year: No       Subjective         Objective    Physical Exam:   Assessment Type: Assess only  General Appearance: Alert, Awake  Respiratory Pattern: Normal  Chest Assessment: Chest expansion symmetrical  Bilateral Breath Sounds: Diminished  R Breath Sounds: Diminished  L Breath Sounds: Diminished  Location Specific: No  Cough: None  O2 Device: NC    Vitals:  Blood pressure 97/60, pulse 81, temperature 97.5 °F (36.4 °C), resp. rate 18, height 5' 4" (1.626 m), weight 52.6 kg (115 lb 15.4 oz), last menstrual period 05/06/2021, SpO2 98 %. Imaging and other studies: I have personally reviewed pertinent reports. O2 Device: NC     Plan    Respiratory Plan: Home Bronchodilator Patient pathway        Resp Comments: Resp protocol complete.  Will continue with current tx plan

## 2023-07-06 NOTE — ASSESSMENT & PLAN NOTE
· Reports acute on chronic right-sided low chest pain  · Follows w/ outpatient palliative care for cancer related pain  · CT today showing worsening hepatic mets and right-sided pleural effusion; both possibly contributing to patient's symptoms   · Trop 4; EKG non-ischemic   · This is pleuritic pain in the setting of metastasis  · Palliative care following as well

## 2023-07-06 NOTE — PLAN OF CARE

## 2023-07-06 NOTE — NURSING NOTE
Pt BP low this AM and requesting dilaudid and tylenol for rib pain. SLIM made aware and verified okay to give.

## 2023-07-06 NOTE — RESPIRATORY THERAPY NOTE
RT Protocol Note  Abhinav Gonsalez 56 y.o. female MRN: [id number]  Unit/Bed#: -01 Encounter: [id number]    Assessment    Principal Problem:    Pleural effusion  Active Problems:    Hypertension    Malignant neoplasm of breast in female, estrogen receptor positive ([address])    Chest pain    Fever    Abnormal LFTs      Home Pulmonary Medications:         Past Medical History:   Diagnosis Date   • Cancer ([address])    • Headache(784.0) 2021   • History of radiation exposure    • Malignant neoplasm of right female breast ([address]) 2012    right     Social History     Socioeconomic History   • Marital status: /Civil Union     Spouse name: None   • Number of children: None   • Years of education: None   • Highest education level: None   Occupational History   • None   Tobacco Use   • Smoking status: Some Days     Packs/day: 0.25     Years: 15.00     Total pack years: 3.75     Types: Cigarettes     Start date: 20     Last attempt to quit: 7/10/2021     Years since quittin.9   • Smokeless tobacco: Never   Vaping Use   • Vaping Use: Never used   Substance and Sexual Activity   • Alcohol use: Not Currently   • Drug use: Not Currently   • Sexual activity: Not Currently     Partners: Male     Birth control/protection: Male Sterilization   Other Topics Concern   • None   Social History Narrative   • None     Social Determinants of Health     Financial Resource Strain: Not on file   Food Insecurity: No Food Insecurity (2023)    Hunger Vital Sign    • Worried About Running Out of Food in the Last Year: Never true    • Ran Out of Food in the Last Year: Never true   Transportation Needs: No Transportation Needs (2023)    PRAPARE - Transportation    • Lack of Transportation (Medical): No    • Lack of Transportation (Non-Medical):  No   Physical Activity: Not on file   Stress: Not on file   Social Connections: Not on file   Intimate Partner Violence: Not on file   Housing Stability: Low Risk  (2023)    Housing Stability Vital Sign    • Unable to Pay for Housing in the Last Year: No    • Number of Places Lived in the Last Year: 1    • Unstable Housing in the Last Year: No       Subjective         Objective    Physical Exam:        Vitals:  Blood pressure (!) 88/49, pulse 79, temperature 98.8 °F (37.1 °C), resp. rate 16, height 5' 4" (1.626 m), weight 52.6 kg (115 lb 15.4 oz), last menstrual period 05/06/2021, SpO2 96 %. Imaging and other studies: I have personally reviewed pertinent reports.             Plan    Respiratory Plan: Home Bronchodilator Patient pathway        Resp Comments: will continue with prn inhaler

## 2023-07-06 NOTE — ASSESSMENT & PLAN NOTE
· CT showing enlarging partially loculated right pleural effusion  · Has pleural tube in place; Reports 500cc normal-appearing fluid drained this AM   · Not currently in respiratory distress; saturating appropriately on RA   · Fluid has been sent for analysis, pending cultures

## 2023-07-07 LAB
ANION GAP SERPL CALCULATED.3IONS-SCNC: 5 MMOL/L
BASOPHILS # BLD AUTO: 0.02 THOUSANDS/ÂΜL (ref 0–0.1)
BASOPHILS NFR BLD AUTO: 1 % (ref 0–1)
BUN SERPL-MCNC: 10 MG/DL (ref 5–25)
CALCIUM SERPL-MCNC: 8.2 MG/DL (ref 8.4–10.2)
CHLORIDE SERPL-SCNC: 103 MMOL/L (ref 96–108)
CO2 SERPL-SCNC: 26 MMOL/L (ref 21–32)
CREAT SERPL-MCNC: 0.49 MG/DL (ref 0.6–1.3)
EOSINOPHIL # BLD AUTO: 0.13 THOUSAND/ÂΜL (ref 0–0.61)
EOSINOPHIL NFR BLD AUTO: 3 % (ref 0–6)
ERYTHROCYTE [DISTWIDTH] IN BLOOD BY AUTOMATED COUNT: 15.4 % (ref 11.6–15.1)
GFR SERPL CREATININE-BSD FRML MDRD: 110 ML/MIN/1.73SQ M
GLUCOSE SERPL-MCNC: 106 MG/DL (ref 65–140)
HCT VFR BLD AUTO: 29.8 % (ref 34.8–46.1)
HGB BLD-MCNC: 10.1 G/DL (ref 11.5–15.4)
IMM GRANULOCYTES # BLD AUTO: 0.02 THOUSAND/UL (ref 0–0.2)
IMM GRANULOCYTES NFR BLD AUTO: 1 % (ref 0–2)
LYMPHOCYTES # BLD AUTO: 0.22 THOUSANDS/ÂΜL (ref 0.6–4.47)
LYMPHOCYTES NFR BLD AUTO: 6 % (ref 14–44)
MCH RBC QN AUTO: 36.5 PG (ref 26.8–34.3)
MCHC RBC AUTO-ENTMCNC: 33.9 G/DL (ref 31.4–37.4)
MCV RBC AUTO: 108 FL (ref 82–98)
MONOCYTES # BLD AUTO: 0.59 THOUSAND/ÂΜL (ref 0.17–1.22)
MONOCYTES NFR BLD AUTO: 15 % (ref 4–12)
NEUTROPHILS # BLD AUTO: 2.96 THOUSANDS/ÂΜL (ref 1.85–7.62)
NEUTS SEG NFR BLD AUTO: 74 % (ref 43–75)
NRBC BLD AUTO-RTO: 0 /100 WBCS
PLATELET # BLD AUTO: 198 THOUSANDS/UL (ref 149–390)
PMV BLD AUTO: 11.2 FL (ref 8.9–12.7)
POTASSIUM SERPL-SCNC: 3.8 MMOL/L (ref 3.5–5.3)
RBC # BLD AUTO: 2.77 MILLION/UL (ref 3.81–5.12)
SODIUM SERPL-SCNC: 134 MMOL/L (ref 135–147)
WBC # BLD AUTO: 3.94 THOUSAND/UL (ref 4.31–10.16)

## 2023-07-07 PROCEDURE — 88305 TISSUE EXAM BY PATHOLOGIST: CPT | Performed by: PATHOLOGY

## 2023-07-07 PROCEDURE — 85025 COMPLETE CBC W/AUTO DIFF WBC: CPT | Performed by: INTERNAL MEDICINE

## 2023-07-07 PROCEDURE — 99232 SBSQ HOSP IP/OBS MODERATE 35: CPT | Performed by: NURSE PRACTITIONER

## 2023-07-07 PROCEDURE — 80048 BASIC METABOLIC PNL TOTAL CA: CPT | Performed by: INTERNAL MEDICINE

## 2023-07-07 PROCEDURE — 88112 CYTOPATH CELL ENHANCE TECH: CPT | Performed by: PATHOLOGY

## 2023-07-07 PROCEDURE — 99233 SBSQ HOSP IP/OBS HIGH 50: CPT | Performed by: INTERNAL MEDICINE

## 2023-07-07 RX ORDER — SENNOSIDES 8.6 MG
2 TABLET ORAL ONCE
Status: COMPLETED | OUTPATIENT
Start: 2023-07-07 | End: 2023-07-07

## 2023-07-07 RX ORDER — DOCUSATE SODIUM 100 MG/1
100 CAPSULE, LIQUID FILLED ORAL 2 TIMES DAILY
Status: DISCONTINUED | OUTPATIENT
Start: 2023-07-07 | End: 2023-07-08 | Stop reason: HOSPADM

## 2023-07-07 RX ADMIN — HYDROMORPHONE HYDROCHLORIDE 8 MG: 4 TABLET ORAL at 17:20

## 2023-07-07 RX ADMIN — LACTULOSE 10 G: 20 SOLUTION ORAL at 08:52

## 2023-07-07 RX ADMIN — POLYETHYLENE GLYCOL 3350 17 G: 17 POWDER, FOR SOLUTION ORAL at 08:52

## 2023-07-07 RX ADMIN — HYDROMORPHONE HYDROCHLORIDE 8 MG: 4 TABLET ORAL at 09:57

## 2023-07-07 RX ADMIN — HYDROMORPHONE HYDROCHLORIDE 8 MG: 4 TABLET ORAL at 20:19

## 2023-07-07 RX ADMIN — LETROZOLE 2.5 MG: 2.5 TABLET, FILM COATED ORAL at 17:13

## 2023-07-07 RX ADMIN — ACETAMINOPHEN 975 MG: 325 TABLET, FILM COATED ORAL at 21:02

## 2023-07-07 RX ADMIN — ACETAMINOPHEN 975 MG: 325 TABLET, FILM COATED ORAL at 06:31

## 2023-07-07 RX ADMIN — ACETAMINOPHEN 975 MG: 325 TABLET, FILM COATED ORAL at 14:02

## 2023-07-07 RX ADMIN — HYDROMORPHONE HYDROCHLORIDE 4 MG: 4 TABLET ORAL at 14:06

## 2023-07-07 RX ADMIN — DOCUSATE SODIUM 100 MG: 100 CAPSULE, LIQUID FILLED ORAL at 12:57

## 2023-07-07 RX ADMIN — RIVAROXABAN 20 MG: 20 TABLET, FILM COATED ORAL at 17:13

## 2023-07-07 RX ADMIN — SENNOSIDES 17.2 MG: 8.6 TABLET, FILM COATED ORAL at 12:57

## 2023-07-07 RX ADMIN — HYDROMORPHONE HYDROCHLORIDE 1 MG: 1 INJECTION, SOLUTION INTRAMUSCULAR; INTRAVENOUS; SUBCUTANEOUS at 23:29

## 2023-07-07 RX ADMIN — CEFTRIAXONE 1000 MG: 1 INJECTION, SOLUTION INTRAVENOUS at 20:19

## 2023-07-07 RX ADMIN — DIPHENHYDRAMINE HYDROCHLORIDE 50 MG: 25 TABLET ORAL at 21:02

## 2023-07-07 RX ADMIN — OXYBUTYNIN CHLORIDE 5 MG: 5 TABLET, EXTENDED RELEASE ORAL at 17:13

## 2023-07-07 RX ADMIN — DULOXETINE HYDROCHLORIDE 30 MG: 30 CAPSULE, DELAYED RELEASE ORAL at 17:13

## 2023-07-07 RX ADMIN — DOCUSATE SODIUM 100 MG: 100 CAPSULE, LIQUID FILLED ORAL at 17:20

## 2023-07-07 NOTE — PROGRESS NOTES
[address]  Progress Note  Name: Kaitlynn Quiroga  MRN: [id number]  Unit/Bed#: -01 I Date of Admission: 7/4/2023   Date of Service: 7/7/2023 I Hospital Day: 1    Assessment/Plan   * Pleural effusion  Assessment & Plan  · CT showing enlarging partially loculated right pleural effusion  · Has pleural tube in place; Reports 500cc normal-appearing fluid drained this AM   · Not currently in respiratory distress; saturating appropriately on RA   · Fluid has been sent for analysis, pending cultures final results, so far negative  · Pain management as per palliative care. I will add a bowel regimen as well. Fever  Assessment & Plan  Patient reports acute-on-chronic right lower chest pain worsening over the past 1-2 days. Reports pain is worse with movement and deep inspiration. Additionally reports ongoing chronic chills and noticed a fever of 102 at home earlier today. After ED interventions reports her pain has improved somewhat, however still persists. Denies other acute symptom/complaint at this time. BP (!) 88/49   Pulse 79   Temp 98.8 °F (37.1 °C)   Resp 16   Ht 5' 4" (1.626 m)   Wt 52.6 kg (115 lb 15.4 oz)   LMP 05/06/2021 (Approximate)   SpO2 96%   BMI 19.90 kg/m²     · Reports fever Tmax 102F at home earlier the day of admission  · Not currently meeting SEPSIS criteria   · Procal and lactic acid WNL   · CT showing enlarging partially loculated pleural effusion  · ANC currently WNL  · Fluid has been sent for analysis, appears to be exudative in nature. Follow cultures to ensure that there is no infection -so far negative      Abnormal LFTs  Assessment & Plan  · AST 93;  Alk phos 235  · Appear to have been worsening since early June   · Possibly in setting of worsening hepatic metastasis   · Continue to monitor CMP    Chest pain  Assessment & Plan  · Reports acute on chronic right-sided low chest pain  · Follows w/ outpatient palliative care for cancer related pain  · CT today showing worsening hepatic mets and right-sided pleural effusion; both possibly contributing to patient's symptoms   · Trop 4; EKG non-ischemic   · This is pleuritic pain in the setting of metastasis  · Palliative care following as well    Malignant neoplasm of breast in female, estrogen receptor positive ([address])  Assessment & Plan  · CT showing worsening hepatic metastatic disease  · Follows w/ outpatient [facility]   · Continue outpatient f/u    Hypertension  Assessment & Plan    · Not currently on outpatient antihypertensive  · Monitor BP per unit protocol          VTE Pharmacologic Prophylaxis:   Pharmacologic: Rivaroxaban (Xarelto)  Mechanical VTE Prophylaxis in Place: Yes    Patient Centered Rounds: I have performed bedside rounds with nursing staff today. Discussions with Specialists or Other Care Team Provider: Discussed with care management team    Education and Discussions with Family / Patient: Patient herself she did not request me to talk to anyone    Total Time for Medical Decision Making including Record Review,  Physical Encounter and Physical Exam, Elaboration of Assessment and Plan,  Counseling / Coordination of Care:  95 minutes    Current Length of Stay: 1 day(s)    Current Patient Status: Inpatient   Certification Statement: The patient will continue to require additional inpatient hospital stay due to need for fever investigation    Discharge Plan: Assuming cultures remain negative and patient stable plan for discharge in the next 24 hours    Code Status: Level 1 - Full Code      Subjective:     Patient valuated this morning. She still has some pleuritic chest pain but otherwise denies any nausea or vomiting. Does complain of constipation.     Objective:     Vitals:   Temp (24hrs), Av.3 °F (36.8 °C), Min:97.5 °F (36.4 °C), Max:99.1 °F (37.3 °C)    Temp:  [97.5 °F (36.4 °C)-99.1 °F (37.3 °C)] 99.1 °F (37.3 °C)  HR:  [79-88] 88  Resp:  [15-18] 15  BP: (91-97)/(58-63) 95/63  SpO2:  [91 %-98 %] 95 %  Body mass index is 19.9 kg/m². Input and Output Summary (last 24 hours): Intake/Output Summary (Last 24 hours) at 7/7/2023 1242  Last data filed at 7/7/2023 1100  Gross per 24 hour   Intake 1180 ml   Output --   Net 1180 ml       Physical Exam:     Physical Exam  Vitals and nursing note reviewed. Constitutional:       Appearance: Normal appearance. She is normal weight. Comments: Female in bed, awake, sitting   HENT:      Head: Normocephalic and atraumatic. Right Ear: External ear normal.      Left Ear: External ear normal.      Nose: Nose normal. No congestion. Mouth/Throat:      Mouth: Mucous membranes are moist.      Pharynx: Oropharynx is clear. No oropharyngeal exudate or posterior oropharyngeal erythema. Eyes:      General: No scleral icterus. Right eye: No discharge. Left eye: No discharge. Extraocular Movements: Extraocular movements intact. Conjunctiva/sclera: Conjunctivae normal.      Pupils: Pupils are equal, round, and reactive to light. Cardiovascular:      Rate and Rhythm: Normal rate and regular rhythm. Pulses: Normal pulses. Heart sounds: Normal heart sounds. No murmur heard. No friction rub. No gallop. Pulmonary:      Effort: Pulmonary effort is normal. No respiratory distress. Breath sounds: Normal breath sounds. No stridor. No wheezing, rhonchi or rales. Chest:      Chest wall: No tenderness. Abdominal:      General: Abdomen is flat. Bowel sounds are normal. There is no distension. Palpations: Abdomen is soft. There is no mass. Tenderness: There is no abdominal tenderness. There is no guarding or rebound. Musculoskeletal:         General: No swelling, tenderness, deformity or signs of injury. Normal range of motion. Cervical back: Normal range of motion and neck supple. No rigidity. No muscular tenderness. Skin:     General: Skin is warm and dry.       Capillary Refill: Capillary refill takes less than 2 seconds. Coloration: Skin is not jaundiced or pale. Findings: No bruising, erythema, lesion or rash. Comments: Pleural catheter in place   Neurological:      General: No focal deficit present. Mental Status: She is alert and oriented to person, place, and time. Mental status is at baseline. Cranial Nerves: No cranial nerve deficit. Sensory: No sensory deficit. Motor: No weakness. Coordination: Coordination normal.   Psychiatric:         Mood and Affect: Mood normal.         Behavior: Behavior normal.         Thought Content: Thought content normal.         Judgment: Judgment normal.         Additional Data:     Labs:    Results from last 7 days   Lab Units 07/07/23  0446   WBC Thousand/uL 3.94*   HEMOGLOBIN g/dL 10.1*   HEMATOCRIT % 29.8*   PLATELETS Thousands/uL 198   NEUTROS PCT % 74   LYMPHS PCT % 6*   MONOS PCT % 15*   EOS PCT % 3     Results from last 7 days   Lab Units 07/07/23  0446 07/06/23  0555 07/04/23  1837   SODIUM mmol/L 134*   < > 133*   POTASSIUM mmol/L 3.8   < > 3.5   CHLORIDE mmol/L 103   < > 104   CO2 mmol/L 26   < > 22   BUN mg/dL 10   < > 23   CREATININE mg/dL 0.49*   < > 0.60   ANION GAP mmol/L 5   < > 7   CALCIUM mg/dL 8.2*   < > 8.9   ALBUMIN g/dL  --   --  3.4*   TOTAL BILIRUBIN mg/dL  --   --  0.39   ALK PHOS U/L  --   --  235*   ALT U/L  --   --  41   AST U/L  --   --  93*   GLUCOSE RANDOM mg/dL 106   < > 104    < > = values in this interval not displayed. Results from last 7 days   Lab Units 07/04/23  1837   INR  1.12             Results from last 7 days   Lab Units 07/04/23  1837   LACTIC ACID mmol/L 0.8   PROCALCITONIN ng/ml 0.17           * I Have Reviewed All Lab Data Listed Above. * Additional Pertinent Lab Tests Reviewed:  [address] [facility] Admission Reviewed      Recent Cultures (last 7 days):     Results from last 7 days   Lab Units 07/05/23  1206 07/04/23  1838 07/04/23 1837 BLOOD CULTURE   --  No Growth at 48 hrs. No Growth at 48 hrs. GRAM STAIN RESULT  2+ Polys  No bacteria seen  --   --    BODY FLUID CULTURE, STERILE  No growth  --   --        Last 24 Hours Medication List:   Current Facility-Administered Medications   Medication Dose Route Frequency Provider Last Rate   • acetaminophen  975 mg Oral [facility] Payal Serrato MD     • albuterol  2 puff Inhalation Q4H PRN Beryle Balling, PA-C     • calcium carbonate  500 mg Oral TID PRN Beryle Balling, PA-C     • cefTRIAXone  1,000 mg Intravenous Q24H Beryle Balling, PA-C Stopped (07/06/23 0441)   • dicyclomine  10 mg Oral Q6H PRN Beryle Balling, PA-C     • diphenhydrAMINE  50 mg Oral HS PRN Beryle Balling, PA-C     • diphenhydrAMINE-zinc acetate   Topical TID PRN Vaibhav Rodrigues MD     • docusate sodium  100 mg Oral BID Vaibhav Rodrigues MD     • DULoxetine  30 mg Oral QPM [facility]     • HYDROmorphone  1 mg Intravenous Q3H PRN Payal Serrato MD     • HYDROmorphone  4 mg Oral Q3H PRN Payal Serrato MD      Or   • HYDROmorphone  8 mg Oral Q3H PRN Payal Serrato MD     • lactulose  10 g Oral Daily Payal Serrato MD     • letrozole  2.5 mg Oral QPM Beryle Balling, PA-C     • oxybutynin  5 mg Oral QPM Beryle Balling, PA-C     • polyethylene glycol  17 g Oral Daily Beryle Balling, [place]     • rivaroxaban  20 mg Oral QPM Beryle Balling, PA-C     • senna  2 tablet Oral Once Vaibhav Rodrigues MD          Today, Patient Was Seen By: Vaibhav Rodrigues MD    ** Please Note: Dictation voice to text software may have been used in the creation of this document.  **

## 2023-07-07 NOTE — SOCIAL WORK
Palliative LCSW saw patient at the bedside today. LCSW appreciates the opportunity to provider patient/family with inpatient emotional support and guidance while patient continues to receive medical attention from the medical team     Topics discussed: [facility] team met with pt at bedside. She said she was waiting for her pain medication and was in some pain at this time. [facility] team reached out to nursing team with Pt request for her medication. She also reported that they are keeping her another night to monitor her and also she has not had a bowel movement since she got to the hospital and will need to have one before she is released. [facility] team asked if there was anything else we could support with and she stated no. Nursing staff arrived with medication and [facility] team ended visit. Areas that need follow-up:  Resources given:  Others present:  Thedore Bumpers    I have spent 15 minutes with Patient  today in which greater than 50% of this time was spent in counseling/coordination of care.        LCSW will continue to follow as requested by the medical team, patient, or family

## 2023-07-07 NOTE — UTILIZATION REVIEW
NOTIFICATION OF INPATIENT ADMISSION   AUTHORIZATION REQUEST   SERVICING FACILITY:   [address] [place], [place] [address]Katie Clifton[address]  Tax ID: [id number]  NPI: [id number] ATTENDING PROVIDER:  Attending Name and NPI#: [facility] [[id number]]  Address: [place] Ma[address]  Phone: [phone]     ADMISSION INFORMATION:  Place of Service: [address] [place] [address]  Place of Service Code: 21  Inpatient Admission Date/Time: 7/6/23 11:06 AM  Discharge Date/Time: No discharge date for patient encounter. Admitting Diagnosis Code/Description:  Chest wall pain [R07.89]  Breast cancer (HCC) [C50.919]  Abdominal pain [R10.9]  Fever [R50.9]  Loculated pleural effusion [J90]  Chest pain, unspecified type [R07.9]     UTILIZATION REVIEW CONTACT:  Santiago Blanco Utilization   Network Utilization Review Department  Phone: [phone]  Fax [phone]  Email: [email or web address]  Contact for approvals/pending authorizations, clinical reviews, and discharge. PHYSICIAN ADVISORY SERVICES:  Medical Necessity Denial & [employer] Review  Phone: [phone]  Fax: [phone]  Email: [email or web address]

## 2023-07-07 NOTE — PLAN OF CARE
Problem: PAIN - ADULT  Goal: Verbalizes/displays adequate comfort level or baseline comfort level  Description: Interventions:  - Encourage patient to monitor pain and request assistance  - Assess pain using appropriate pain scale  - Administer analgesics based on type and severity of pain and evaluate response  - Implement non-pharmacological measures as appropriate and evaluate response  - Consider cultural and social influences on pain and pain management  - Notify physician/advanced practitioner if interventions unsuccessful or patient reports new pain  Outcome: Progressing     Problem: INFECTION - ADULT  Goal: Absence or prevention of progression during hospitalization  Description: INTERVENTIONS:  - Assess and monitor for signs and symptoms of infection  - Monitor lab/diagnostic results  - Monitor all insertion sites, i.e. indwelling lines, tubes, and drains  - Monitor endotracheal if appropriate and nasal secretions for changes in amount and color  - [place] appropriate cooling/warming therapies per order  - Administer medications as ordered  - Instruct and encourage patient and family to use good hand hygiene technique  - Identify and instruct in appropriate isolation precautions for identified infection/condition  Outcome: Progressing

## 2023-07-07 NOTE — PLAN OF CARE
Problem: Potential for Falls  Goal: Patient will remain free of falls  Description: INTERVENTIONS:  - Educate patient/family on patient safety including physical limitations  - Instruct patient to call for assistance with activity   - Consult OT/PT to assist with strengthening/mobility   - Keep Call bell within reach  - Keep bed low and locked with side rails adjusted as appropriate  - Keep care items and personal belongings within reach  - Initiate and maintain comfort rounds  - Make Fall Risk Sign visible to staff  - Offer Toileting every 2 Hours, in advance of need  - Initiate/Maintain bed. chair alarm  - Obtain necessary fall risk management equipment:   - Apply yellow socks and bracelet for high fall risk patients  - Consider moving patient to room near nurses station  Outcome: Progressing     Problem: PAIN - ADULT  Goal: Verbalizes/displays adequate comfort level or baseline comfort level  Description: Interventions:  - Encourage patient to monitor pain and request assistance  - Assess pain using appropriate pain scale  - Administer analgesics based on type and severity of pain and evaluate response  - Implement non-pharmacological measures as appropriate and evaluate response  - Consider cultural and social influences on pain and pain management  - Notify physician/advanced practitioner if interventions unsuccessful or patient reports new pain  Outcome: Progressing     Problem: INFECTION - ADULT  Goal: Absence or prevention of progression during hospitalization  Description: INTERVENTIONS:  - Assess and monitor for signs and symptoms of infection  - Monitor lab/diagnostic results  - Monitor all insertion sites, i.e. indwelling lines, tubes, and drains  - Monitor endotracheal if appropriate and nasal secretions for changes in amount and color  - [place] appropriate cooling/warming therapies per order  - Administer medications as ordered  - Instruct and encourage patient and family to use good hand hygiene technique  - Identify and instruct in appropriate isolation precautions for identified infection/condition  Outcome: Progressing  Goal: Absence of fever/infection during neutropenic period  Description: INTERVENTIONS:  - Monitor WBC    Outcome: Progressing     Problem: SAFETY ADULT  Goal: Patient will remain free of falls  Description: INTERVENTIONS:  - Educate patient/family on patient safety including physical limitations  - Instruct patient to call for assistance with activity   - Consult OT/PT to assist with strengthening/mobility   - Keep Call bell within reach  - Keep bed low and locked with side rails adjusted as appropriate  - Keep care items and personal belongings within reach  - Initiate and maintain comfort rounds  - Make Fall Risk Sign visible to staff  - Offer Toileting every 2 Hours, in advance of need  - Initiate/Maintain alarm  - Obtain necessary fall risk management equipment:   - Apply yellow socks and bracelet for high fall risk patients  - Consider moving patient to room near nurses station  Outcome: Progressing  Goal: Maintain or return to baseline ADL function  Description: INTERVENTIONS:  -  Assess patient's ability to carry out ADLs; assess patient's baseline for ADL function and identify physical deficits which impact ability to perform ADLs (bathing, care of mouth/teeth, toileting, grooming, dressing, etc.)  - Assess/evaluate cause of self-care deficits   - Assess range of motion  - Assess patient's mobility; develop plan if impaired  - Assess patient's need for assistive devices and provide as appropriate  - Encourage maximum independence but intervene and supervise when necessary  - Involve family in performance of ADLs  - Assess for home care needs following discharge   - Consider OT consult to assist with ADL evaluation and planning for discharge  - Provide patient education as appropriate  Outcome: Progressing  Goal: Maintains/Returns to pre admission functional level  Description: INTERVENTIONS:  - Perform BMAT or MOVE assessment daily.   - Set and communicate daily mobility goal to care team and patient/family/caregiver. - Collaborate with rehabilitation services on mobility goals if consulted  - Perform Range of Motion 2 times a day. - Reposition patient every 2 hours. - Dangle patient 2 times a day  - Stand patient 2 times a day  - Ambulate patient 2 times a day  - Out of bed to chair 2 times a day   - Out of bed for meals 2 times a day  - Out of bed for toileting  - Record patient progress and toleration of activity level   Outcome: Progressing     Problem: DISCHARGE PLANNING  Goal: Discharge to home or other facility with appropriate resources  Description: INTERVENTIONS:  - Identify barriers to discharge w/patient and caregiver  - Arrange for needed discharge resources and transportation as appropriate  - Identify discharge learning needs (meds, wound care, etc.)  - Arrange for interpretive services to assist at discharge as needed  - Refer to Case Management Department for coordinating discharge planning if the patient needs post-hospital services based on physician/advanced practitioner order or complex needs related to functional status, cognitive ability, or social support system  Outcome: Progressing     Problem: Knowledge Deficit  Goal: Patient/family/caregiver demonstrates understanding of disease process, treatment plan, medications, and discharge instructions  Description: Complete learning assessment and assess knowledge base.   Interventions:  - Provide teaching at level of understanding  - Provide teaching via preferred learning methods  Outcome: Progressing     Problem: Prexisting or High Potential for Compromised Skin Integrity  Goal: Skin integrity is maintained or improved  Description: INTERVENTIONS:  - Identify patients at risk for skin breakdown  - Assess and monitor skin integrity  - Assess and monitor nutrition and hydration status  - Monitor labs   - Assess for incontinence   - Turn and reposition patient  - Assist with mobility/ambulation  - Relieve pressure over bony prominences  - Avoid friction and shearing  - Provide appropriate hygiene as needed including keeping skin clean and dry  - Evaluate need for skin moisturizer/barrier cream  - Collaborate with interdisciplinary team   - Patient/family teaching  - Consider wound care consult   Outcome: Progressing

## 2023-07-07 NOTE — PROGRESS NOTES
Progress Note - Palliative & Supportive Care  Manus Marivel  56 y.o.  female  /-01   MRN: [id number]  Encounter: [id number]   Patient Active Problem List   Diagnosis   • Pleural effusion   • Metastatic neoplasm ([address])   • Hypertension   • Malignant neoplasm of breast in female, estrogen receptor positive ([address])   • Palliative care patient   • Malignant pleural effusion   • Primary malignant neoplasm of right breast with metastasis to other site [facility])   • Constipation   • Malignant neoplasm metastatic to bone [facility])   • Cancer related pain   • Chemotherapy induced neutropenia (HCC)   • Port-A-Cath in place   • UTI symptoms   • Chest pain   • Fever   • Abnormal LFTs       Assessment  Goals of care counseling  Stage IV breast cancer with hepatic/osseous metastatic disease  Recurrent loculated right sided pleural effusion; pleural catheter in place  Cancer related pain  Palliative Care encounter  Goals of care    Plan:  1. Symptom management  Location: rib pain  Characteristics: sharp  PRN Use of Pain Medications: hydromorphone 3 mg IV and hydromorphone 24 mg orally  24 hour Total OME for 156 mg  · Cancer related pain-   - continue APAP 975 mg orally every 8 hours; max 4000 mg  - continue duloxetine 30 mg orally every evening  - continue hydromorphone 4-8 mg every 3 hours as needed  - continue hydromorphone 1 mg IV every 3 hours as needed for breakthrough pain  · Nausea  - continue metoclopramide 10 mg four times per day as needed  - continue ondansetron 8 mg orally every 8 hours as needed ; QTc interval 433 on EKG 07/04/2023  · Insomnia- continue melatonin 6 mg orally at beditme  · OIC  - continue miralax 17 gm orally daily  - continue lactulose 10 gm orally daily  - if no BM today, may administer SQ methylnatrexone  · Psychosocial support- supportive listening provided    2. Goals:  Level 1 code status  • Disease focused care.  Will continue discussions regarding [address] as patient's clinical presentation evolves. 3.  Social support:  · Supportive listening provided  · Normalized experience of patient/family  · Provided anxiety containment  · Provided anticipatory guidance      4. Follow up  • Palliative Care will continue to follow and goals of care discussions will be ongoing. • Please reach out via [employer] if questions or concerns arise. 24 Hour History  Chart reviewed before visit. Patient was seen in room, she is awake and alert, no acute distress. Complains of rib pain, requesting hydromorphone. Denies chest pain or shortness of breath. Denies nausea or vomiting. She states she is not being discharged today due to no bowel movement since 07/04/2023. Current pain location(s): rib  Pain Scale:   7/10        Review of Systems   Constitutional: Positive for fatigue. Respiratory: Negative for shortness of breath. Cardiovascular: Negative for chest pain. Gastrointestinal: Negative for nausea and vomiting.    Musculoskeletal:        Pain in ribs when coughing         Medications    Current Facility-Administered Medications:   •  acetaminophen (TYLENOL) tablet 975 mg, 975 mg, Oral, Q8H [address], Yehuda Hardwick MD, 975 mg at 07/07/23 0631  •  albuterol (PROVENTIL HFA,VENTOLIN HFA) inhaler 2 puff, 2 puff, Inhalation, Q4H PRN, Celestina Roy PA-C  •  calcium carbonate (TUMS) chewable tablet 500 mg, 500 mg, Oral, TID PRN, Celestina Roy PA-C  •  cefTRIAXone (ROCEPHIN) IVPB (premix in dextrose) 1,000 mg 50 mL, 1,000 mg, Intravenous, Q24H, Celestina Roy PA-C, Stopped at 07/06/23 2245  •  dicyclomine (BENTYL) capsule 10 mg, 10 mg, Oral, Q6H PRN, Celestina Roy PA-C  •  diphenhydrAMINE (BENADRYL) tablet 50 mg, 50 mg, Oral, HS PRN, Brenana Olsen PA-C, 50 mg at 07/06/23 2209  •  diphenhydrAMINE-zinc acetate (BENADRYL) 2-0.1 % cream, , Topical, TID PRN, Logan Muro MD, Given at 07/06/23 1242  •  DULoxetine (CYMBALTA) delayed release capsule 30 mg, 30 mg, Oral, QPM, Angus Jordan PA-C, 30 mg at 07/06/23 1756  •  HYDROmorphone (DILAUDID) injection 1 mg, 1 mg, Intravenous, Q3H PRN, Ольга Gonzalez MD, 1 mg at 07/06/23 2312  •  HYDROmorphone (DILAUDID) tablet 4 mg, 4 mg, Oral, Q3H PRN **OR** HYDROmorphone (DILAUDID) tablet 8 mg, 8 mg, Oral, Q3H PRN, Ольга Gonzalez MD, 8 mg at 07/06/23 2156  •  lactulose oral solution 10 g, 10 g, Oral, Daily, Ольга Gonzalez MD, 10 g at 07/07/23 5734  •  letrozole [facility]) tablet 2.5 mg, 2.5 mg, Oral, QPM, Angus Jordan PA-C, 2.5 mg at 07/06/23 1756  •  oxybutynin (DITROPAN-XL) 24 hr tablet 5 mg, 5 mg, Oral, QPM, Angus Jordan PA-C, 5 mg at 07/06/23 1756  •  polyethylene glycol (MIRALAX) packet 17 g, 17 g, Oral, Daily, Angus Jordan PA-C, 17 g at 07/07/23 9083  •  rivaroxaban (XARELTO) tablet 20 mg, 20 mg, Oral, QPM, Angus Jordan PA-C, 20 mg at 07/06/23 1756    Objective  BP 95/63   Pulse 88   Temp 99.1 °F (37.3 °C)   Resp 15   Ht 5' 4" (1.626 m)   Wt 52.6 kg (115 lb 15.4 oz)   LMP 05/06/2021 (Approximate)   SpO2 95%   BMI 19.90 kg/m²   Physical Exam  Vitals (chronically ill appearing) reviewed. Constitutional:       General: She is not in acute distress. Cardiovascular:      Rate and Rhythm: Normal rate. Pulmonary:      Effort: Pulmonary effort is normal.   Skin:     General: Skin is warm and dry. Neurological:      Mental Status: She is alert and oriented to person, place, and time.    Psychiatric:         Mood and Affect: Mood normal.           Lab Results:   CBC:   Lab Results   Component Value Date    WBC 3.94 (L) 07/07/2023    HGB 10.1 (L) 07/07/2023    HCT 29.8 (L) 07/07/2023     (H) 07/07/2023     07/07/2023    RBC 2.77 (L) 07/07/2023    MCH 36.5 (H) 07/07/2023    MCHC 33.9 07/07/2023    RDW 15.4 (H) 07/07/2023    MPV 11.2 07/07/2023    NRBC 0 07/07/2023   , CMP:   Lab Results   Component Value Date    SODIUM 134 (L) 07/07/2023    K 3.8 07/07/2023     07/07/2023 CO2 26 07/07/2023    BUN 10 07/07/2023    CREATININE 0.49 (L) 07/07/2023    CALCIUM 8.2 (L) 07/07/2023    EGFR 110 07/07/2023     Imaging Studies: I have personally reviewed pertinent reports. IR IN-Patient Thoracentesis    Result Date: 7/5/2023  Narrative: The patient arrived for a thoracentesis. Upon ultrasound evaluation of the right chest I did not believe that the fluid was loculated nor did I feel that there was a safe window to perform thoracentesis with the lung mostly atelectatic adjacent to the fluid. Alternatively we sat the patient up for draining of the Asept catheter from which we were able to yield 450 cc of serosanguineous fluid. Upon cursory ultrasound of the right chest following drainage there did appear to be further reduction of the visualized pleural fluid. No thoracentesis was performed. Workstation performed: [id number]     CT chest abdomen pelvis w contrast    Result Date: 7/4/2023  Narrative: CT CHEST, ABDOMEN AND PELVIS WITH IV CONTRAST INDICATION:   Sepsis Breast cancer patient with right-sided pleural cath on chemotherapy worsening right lower chest pain. COMPARISON: 4/27/2023. TECHNIQUE: CT examination of the chest, abdomen and pelvis was performed. Multiplanar 2D reformatted images were created from the source data. This examination, like all CT scans performed in the [address] [place] [address], was performed utilizing techniques to minimize radiation dose exposure, including the use of iterative reconstruction and automated exposure control. Radiation dose length product (DLP) for this visit:  843 mGy-cm IV Contrast:  100 mL of iohexol (OMNIPAQUE) Enteric Contrast: Enteric contrast was administered. FINDINGS: CHEST LUNGS: Scattered stable pulmonary nodules, for instance a 5 mm right middle lobe nodule (series 4, image 73), and a 5 mm left lower lobe nodule (series 4, image 61). There is no tracheal or endobronchial lesion.  PLEURA: Enlarging partially loculated right pleural effusion. Tunneled left pleural catheter. HEART/GREAT VESSELS: Heart is unremarkable for patient's age. No thoracic aortic aneurysm. MEDIASTINUM AND KIA: Calcified mediastinal and hilar nodes likely related to prior granulomatous disease or treated metastasis. . CHEST WALL AND LOWER NECK:  Unremarkable. ABDOMEN LIVER/BILIARY TREE: Worsening hepatic metastatic burden, for instance a 2.3 cm segment 6 lesion was not clearly visualized previously. GALLBLADDER:  No calcified gallstones. No pericholecystic inflammatory change. SPLEEN:  Unremarkable. PANCREAS:  Unremarkable. ADRENAL GLANDS:  Unremarkable. KIDNEYS/URETERS:  No hydronephrosis. Punctate nonobstructing nephrolithiasis. One or more sharply circumscribed subcentimeter renal hypodensities are present, too small to accurately characterize, and statistically most likely benign findings. According to recent literature (Radiology 2019) no further workup of these findings is recommended. STOMACH AND BOWEL:  Unremarkable. APPENDIX:  No findings to suggest appendicitis. ABDOMINOPELVIC CAVITY:  No ascites. No pneumoperitoneum. No lymphadenopathy. VESSELS:  Unremarkable for patient's age. PELVIS REPRODUCTIVE ORGANS:  Unremarkable for patient's age. URINARY BLADDER:  Unremarkable. ABDOMINAL WALL/INGUINAL REGIONS:  Unremarkable. OSSEOUS STRUCTURES: Widespread sclerotic osseous metastatic disease again seen. Impression: Enlarging partially loculated right pleural effusion. Worsening hepatic metastatic disease. Widespread sclerotic osseous metastasis again seen Workstation performed: [id number]      EKG, Pathology, and Other Studies: No new studies for review    Counseling / Coordination of Care  Total floor / unit time spent today 30 minutes. Greater than 50% of total time was spent with the patient and / or family counseling and / or coordinating of care.  A description of the counseling / coordination of care: Chart reviewed,   provided medical updates, determined goals of care, discussed palliative care and symptom management, provided anticipatory guidance, determined competency and POA/HCA, determined social/family support, provided psychosocial support. Reviewed with Kevin Ladd MSN, CRNP  Palliative & Supportive Care    Portions of this document may have been created using dictation software and as such some "sound alike" terms may have been generated by the system. Do not hesitate to contact me with any questions or clarifications.

## 2023-07-08 VITALS
HEIGHT: 64 IN | TEMPERATURE: 98 F | SYSTOLIC BLOOD PRESSURE: 136 MMHG | RESPIRATION RATE: 16 BRPM | DIASTOLIC BLOOD PRESSURE: 90 MMHG | WEIGHT: 115.96 LBS | BODY MASS INDEX: 19.8 KG/M2 | OXYGEN SATURATION: 96 % | HEART RATE: 82 BPM

## 2023-07-08 LAB
BACTERIA SPEC BFLD CULT: NO GROWTH
GRAM STN SPEC: NORMAL
GRAM STN SPEC: NORMAL

## 2023-07-08 PROCEDURE — 99239 HOSP IP/OBS DSCHRG MGMT >30: CPT | Performed by: INTERNAL MEDICINE

## 2023-07-08 RX ORDER — POLYETHYLENE GLYCOL 3350 17 G/17G
17 POWDER, FOR SOLUTION ORAL DAILY
Qty: 30 EACH | Refills: 0 | Status: SHIPPED | OUTPATIENT
Start: 2023-07-08

## 2023-07-08 RX ORDER — HYDROMORPHONE HYDROCHLORIDE 2 MG/1
2-4 TABLET ORAL
Qty: 20 TABLET | Refills: 0 | Status: SHIPPED | OUTPATIENT
Start: 2023-07-08 | End: 2023-07-20 | Stop reason: SDUPTHER

## 2023-07-08 RX ADMIN — ACETAMINOPHEN 975 MG: 325 TABLET, FILM COATED ORAL at 05:20

## 2023-07-08 RX ADMIN — POLYETHYLENE GLYCOL 3350, SODIUM SULFATE ANHYDROUS, SODIUM BICARBONATE, SODIUM CHLORIDE, POTASSIUM CHLORIDE 500 ML: 236; 22.74; 6.74; 5.86; 2.97 POWDER, FOR SOLUTION ORAL at 12:34

## 2023-07-08 RX ADMIN — DOCUSATE SODIUM 100 MG: 100 CAPSULE, LIQUID FILLED ORAL at 10:05

## 2023-07-08 RX ADMIN — HYDROMORPHONE HYDROCHLORIDE 8 MG: 4 TABLET ORAL at 13:32

## 2023-07-08 RX ADMIN — POLYETHYLENE GLYCOL 3350 17 G: 17 POWDER, FOR SOLUTION ORAL at 10:05

## 2023-07-08 RX ADMIN — LACTULOSE 10 G: 20 SOLUTION ORAL at 10:05

## 2023-07-08 RX ADMIN — HYDROMORPHONE HYDROCHLORIDE 8 MG: 4 TABLET ORAL at 10:05

## 2023-07-08 NOTE — DISCHARGE SUMMARY
[address]  Discharge- Rhiannon Guo 1965, 56 y.o. female MRN: [id number]  Unit/Bed#: -01 Encounter: [id number]  Primary Care Provider: Dylan Henderson MD   Date and time admitted to hospital: 7/4/2023  6:01 PM      Discharge diagnosis:    Chronic right-sided pleural effusion, patient does have a pleural drain in place for relief, effusion appears to be secondary to malignancy, culture of the pleural fluid has been negative   Pleuritic chest pain given the above  Alleged fever at home, resolved, no fever during hospitalization  History of breast cancer  Transaminitis  Hypertension  Cancer related pain  Constipation    Discharging Physician / Practitioner: Arik Ruff MD  PCP: Dylan Henderson MD  Admission Date:   Admission Orders (From admission, onward)     Ordered        07/06/23 1106  Inpatient Admission  Once            07/04/23 2132  Place in Observation  Once                      Discharge Date: 07/08/23      Consultations During Hospital Stay:  · Palliative care    Procedures Performed:   · Pleural drainage     Outpatient follow up Requested:  · PCP  · Oncology  · Palliative care    Complications:  None    Reason for Admission: Chest wall pain    HPI:  Rhiannon Guo is a 56 y.o. female with a PMH of metastatic breast cancer, chronic pleural effusion, and HTN who presents with chest pain and fever. Patient reports acute-on-chronic right lower chest pain worsening over the past 1-2 days. Reports pain is worse with movement and deep inspiration. Additionally reports ongoing chronic chills and noticed a fever of 102 at home earlier today. After ED interventions reports her pain has improved somewhat, however still persists. Denies other acute symptom/complaint at this time. All questions answered at the bedside to the best of my ability. Hospital Course: The patient was hospitalized.   We did send the pleural fluid for analysis given her complaining of fever at home, the culture came back essentially unremarkable without any growth. Patient did not have any further fever episodes during hospitalization. No localizing signs or symptoms of infection otherwise. She has pleuritic chest pain with deep inspiration which is likely secondary to her malignant effusion and pleurisy. Patient was seen by palliative care and she is currently on hydromorphone p.o. regimen for pain control. The patient also has constipation and she is getting stool softeners for the same. Patient otherwise hemodynamically stable and nontoxic, wishes to go home. She is being discharged home in stable condition. She should follow-up with oncology, palliative care. Condition at Discharge: good     Discharge Day Visit / Exam:     Subjective:    Patient evaluated this morning. She does mention feeling much better in regards to pain which is relatively well controlled. She does mention having some constipation  She wishes to go home  Vitals:   Blood Pressure:  95/65 (07/08/23 0712)  Pulse: 88 (07/08/23 0712)  Temperature: 98.9 °F (37.2 °C) (07/08/23 0712)  Respirations: 16 (07/08/23 0712)  Height: 5' 4" (162.6 cm) (07/05/23 0034)  Weight - Scale: 52.6 kg (115 lb 15.4 oz) (07/05/23 0034)  SpO2: 92 % (07/08/23 7184)     Exam:   Physical Exam  Vitals and nursing note reviewed. Constitutional:       Appearance: Normal appearance. She is normal weight. Comments: Female in bed, awake, chronically ill-appearing but acutely nontoxic-appearing   HENT:      Head: Normocephalic and atraumatic. Right Ear: External ear normal.      Left Ear: External ear normal.      Nose: Nose normal. No congestion. Mouth/Throat:      Mouth: Mucous membranes are moist.      Pharynx: Oropharynx is clear. No oropharyngeal exudate or posterior oropharyngeal erythema. Eyes:      General: No scleral icterus. Right eye: No discharge. Left eye: No discharge.       Extraocular Movements: Extraocular movements intact. Conjunctiva/sclera: Conjunctivae normal.      Pupils: Pupils are equal, round, and reactive to light. Cardiovascular:      Rate and Rhythm: Normal rate and regular rhythm. Pulses: Normal pulses. Heart sounds: Normal heart sounds. No murmur heard. No friction rub. No gallop. Pulmonary:      Effort: Pulmonary effort is normal. No respiratory distress. Breath sounds: Normal breath sounds. No stridor. No wheezing, rhonchi or rales. Chest:      Chest wall: No tenderness. Abdominal:      General: Abdomen is flat. Bowel sounds are normal. There is no distension. Palpations: Abdomen is soft. There is no mass. Tenderness: There is no abdominal tenderness. There is no guarding or rebound. Musculoskeletal:         General: No swelling, tenderness, deformity or signs of injury. Normal range of motion. Cervical back: Normal range of motion and neck supple. No rigidity. No muscular tenderness. Skin:     General: Skin is warm and dry. Capillary Refill: Capillary refill takes less than 2 seconds. Coloration: Skin is not jaundiced or pale. Findings: No bruising, erythema, lesion or rash. Neurological:      General: No focal deficit present. Mental Status: She is alert and oriented to person, place, and time. Mental status is at baseline. Cranial Nerves: No cranial nerve deficit. Sensory: No sensory deficit. Motor: No weakness. Coordination: Coordination normal.   Psychiatric:         Mood and Affect: Mood normal.         Behavior: Behavior normal.         Thought Content: Thought content normal.         Judgment: Judgment normal.           Discussion with Family: Patient herself, she did not ask me to talk to anyone    Discharge instructions/Information to patient and family:   See after visit summary for information provided to patient and family.       Provisions for Follow-Up Care:  See after visit summary for information related to follow-up care and any pertinent home health orders. Disposition:     Home    For Discharges to [facility]:   · Not Applicable to this Patient - Not Applicable to this Patient    Planned Readmission: No     Discharge Statement:  I spent 91 minutes discharging the patient. This time was spent on the day of discharge. I had direct contact with the patient on the day of discharge. Greater than 50% of the total time was spent examining patient, answering all patient questions, arranging and discussing plan of care with patient as well as directly providing post-discharge instructions. Additional time then spent on discharge activities. Discharge Medications:  See after visit summary for reconciled discharge medications provided to patient and family.       ** Please Note: This note has been constructed using a voice recognition system **

## 2023-07-08 NOTE — PLAN OF CARE
Problem: PAIN - ADULT  Goal: Verbalizes/displays adequate comfort level or baseline comfort level  Description: Interventions:  - Encourage patient to monitor pain and request assistance  - Assess pain using appropriate pain scale  - Administer analgesics based on type and severity of pain and evaluate response  - Implement non-pharmacological measures as appropriate and evaluate response  - Consider cultural and social influences on pain and pain management  - Notify physician/advanced practitioner if interventions unsuccessful or patient reports new pain  Outcome: Progressing     Problem: Knowledge Deficit  Goal: Patient/family/caregiver demonstrates understanding of disease process, treatment plan, medications, and discharge instructions  Description: Complete learning assessment and assess knowledge base.   Interventions:  - Provide teaching at level of understanding  - Provide teaching via preferred learning methods  Outcome: Progressing     Problem: SAFETY ADULT  Goal: Patient will remain free of falls  Description: INTERVENTIONS:  - Educate patient/family on patient safety including physical limitations  - Instruct patient to call for assistance with activity   - Consult OT/PT to assist with strengthening/mobility   - Keep Call bell within reach  - Keep bed low and locked with side rails adjusted as appropriate  - Keep care items and personal belongings within reach  - Initiate and maintain comfort rounds  - Make Fall Risk Sign visible to staff  - Offer Toileting every 2 Hours, in advance of need  - Initiate/Maintain BED alarm  - Obtain necessary fall risk management equipment:   - Apply yellow socks and bracelet for high fall risk patients  - Consider moving patient to room near nurses station  Outcome: Progressing

## 2023-07-10 ENCOUNTER — TRANSITIONAL CARE MANAGEMENT (OUTPATIENT)
Dept: FAMILY MEDICINE CLINIC | Facility: CLINIC | Age: 58
End: 2023-07-10

## 2023-07-10 LAB
BACTERIA BLD CULT: NORMAL
BACTERIA BLD CULT: NORMAL

## 2023-07-11 ENCOUNTER — TELEPHONE (OUTPATIENT)
Dept: PALLIATIVE MEDICINE | Facility: CLINIC | Age: 58
End: 2023-07-11

## 2023-07-11 NOTE — TELEPHONE ENCOUNTER
Left message for pt to call office back to reschedule her appt Thursday 7/13/23.   Unable to connect to pt's contacts (spouse or sister)

## 2023-07-14 ENCOUNTER — TELEPHONE (OUTPATIENT)
Dept: HEMATOLOGY ONCOLOGY | Facility: CLINIC | Age: 58
End: 2023-07-14

## 2023-07-14 NOTE — TELEPHONE ENCOUNTER
Called patient to reschedule apt for today . Patient has to be conor by a Physician per Mariana Mayers PA-C  .  Left message with hope line tel number

## 2023-07-14 NOTE — TELEPHONE ENCOUNTER
Attempted to call patient, no answer. I left a detailed voicemail stating that MICHAEL HARDIK [facility] reviewed her most recent scan and would like her to be seen in the office next week. Unfortunately, Sonya and Lorraine Contreras are all booked, so I was able to book her on Wednesday on 7/19 at 12:40pm.  I left my direct number to return phone call if that date and time doesn't work for her.

## 2023-07-14 NOTE — TELEPHONE ENCOUNTER
Appointment Change  Cancel, Reschedule, Change to Virtual      Who are you speaking with? Patient   If it is not the patient, are they listed on an active communication consent form? N/A   Which provider is the appointment scheduled with? Sonya Bonilla PA-C   When is the appointment scheduled? Please list date and time 7/14/23 10:30AM   At which location is the appointment scheduled to take place? [place]   Was the appointment rescheduled or changed from an in person visit to a virtual visit? If so, please list the details of the change. 7/31/23 8:30AM   What is the reason for the appointment change? Patient had an emergency   Was [facility] transport scheduled for this visit? No   Does [facility] transport need to be scheduled for the new visit (if applicable) No   Does the patient need an infusion appointment rescheduled? No   Does the patient have an infusion appointment scheduled? If so, when? Yes, 7/25   Is the patient undergoing chemotherapy? Yes   Was the no-show policy reviewed for appointments being changed with less then 24 hours of notice?  Yes

## 2023-07-14 NOTE — TELEPHONE ENCOUNTER
Patient Call    Who are you speaking with? Patient    If it is not the patient, are they listed on an active communication consent form? N/A   What is the reason for this call? Patient would like to know if she is supposed to be seen before starting treatment. Does this require a call back? Yes   If a call back is required, please list best call back number [phone]   If a call back is required, advise that a message will be forwarded to their care team and someone will return their call as soon as possible. Did you relay this information to the patient?  Yes

## 2023-07-14 NOTE — TELEPHONE ENCOUNTER
Returned phone call to patient and spouse. I let them know an appointment with another provider was made, in order to get them in the office sooner to discuss recent imaging results and tx. I let them know the appointment is before her current scheduled tx starts, depending on what is discussed at appt, we can adjust tx and treatment date/time then. I provided date/time and providers name for appt. They have no other questions or concerns at this moment.

## 2023-07-15 ENCOUNTER — TELEPHONE (OUTPATIENT)
Dept: OTHER | Facility: HOSPITAL | Age: 58
End: 2023-07-15

## 2023-07-15 ENCOUNTER — TELEPHONE (OUTPATIENT)
Dept: OTHER | Facility: OTHER | Age: 58
End: 2023-07-15

## 2023-07-16 NOTE — TELEPHONE ENCOUNTER
55 yo F with metastatic ER/MI+ breast cancer with bone mets and hx of pleural effusion.  called and patient has been in significant pain since he drained pleural fluid today. She has adequate pain medications. He reports unusual amount of fluid drainage with blood which was unusual to him. I recommended that he should take his wife to the ER ASAP. He told me that she was sleeping and as soon as she wakes up he will do that. He said he has been periodically checking up on her as well. I again reiterated that getting her to the ER is important. Primary has been notified through this message.

## 2023-07-18 ENCOUNTER — OFFICE VISIT (OUTPATIENT)
Dept: INTERNAL MEDICINE CLINIC | Facility: CLINIC | Age: 58
End: 2023-07-18
Payer: COMMERCIAL

## 2023-07-18 VITALS
BODY MASS INDEX: 19.67 KG/M2 | WEIGHT: 115.2 LBS | RESPIRATION RATE: 16 BRPM | TEMPERATURE: 97.5 F | OXYGEN SATURATION: 96 % | SYSTOLIC BLOOD PRESSURE: 90 MMHG | HEIGHT: 64 IN | HEART RATE: 92 BPM | DIASTOLIC BLOOD PRESSURE: 58 MMHG

## 2023-07-18 DIAGNOSIS — C50.011 MALIGNANT NEOPLASM OF AREOLA OF RIGHT BREAST IN FEMALE, ESTROGEN RECEPTOR POSITIVE (HCC): ICD-10-CM

## 2023-07-18 DIAGNOSIS — Z17.0 MALIGNANT NEOPLASM OF AREOLA OF RIGHT BREAST IN FEMALE, ESTROGEN RECEPTOR POSITIVE (HCC): ICD-10-CM

## 2023-07-18 DIAGNOSIS — H92.01 RIGHT EAR PAIN: ICD-10-CM

## 2023-07-18 DIAGNOSIS — J91.0 MALIGNANT PLEURAL EFFUSION: Primary | ICD-10-CM

## 2023-07-18 DIAGNOSIS — C79.51 MALIGNANT NEOPLASM METASTATIC TO BONE (HCC): ICD-10-CM

## 2023-07-18 PROBLEM — R07.9 CHEST PAIN: Status: RESOLVED | Noted: 2023-07-04 | Resolved: 2023-07-18

## 2023-07-18 PROBLEM — R39.9 UTI SYMPTOMS: Status: RESOLVED | Noted: 2023-04-25 | Resolved: 2023-07-18

## 2023-07-18 PROBLEM — K59.00 CONSTIPATION: Status: RESOLVED | Noted: 2021-08-19 | Resolved: 2023-07-18

## 2023-07-18 PROBLEM — R50.9 FEVER: Status: RESOLVED | Noted: 2023-07-04 | Resolved: 2023-07-18

## 2023-07-18 PROBLEM — R79.89 ABNORMAL LFTS: Status: RESOLVED | Noted: 2023-07-04 | Resolved: 2023-07-18

## 2023-07-18 PROCEDURE — 99495 TRANSJ CARE MGMT MOD F2F 14D: CPT | Performed by: INTERNAL MEDICINE

## 2023-07-18 RX ORDER — METHYLPREDNISOLONE 4 MG/1
TABLET ORAL
Qty: 21 EACH | Refills: 0 | Status: SHIPPED | OUTPATIENT
Start: 2023-07-18

## 2023-07-18 NOTE — PROGRESS NOTES
Gabriella Rosario  1965  [address] [facility]  [address] [facility] [address]    Patient with metastatic Er Pr HER2 positive for breast cancer on Enhertu and letrozol  Continued discharge from the emergency room with a pleural effusion  Scan chest abdomen pelvis shows progression of the disease  For follow-up    Advance Care Planning/Advance Directives:    Na     Oncology History   Primary malignant neoplasm of right breast with metastasis to other site [facility])   8/16/2021 Initial Diagnosis    Primary malignant neoplasm of right breast with metastasis to other site [facility])     4/19/2022 - 1/25/2023 Chemotherapy    eriBULin (HALAVEN), 1.4 mg/m2 = 2.4 mg, Intravenous, Once, 9 of 11 cycles  Administration: 2.4 mg (4/19/2022), 2.4 mg (5/3/2022), 2.4 mg (5/17/2022), 2.4 mg (6/1/2022), 2.4 mg (6/15/2022), 2.4 mg (7/13/2022), 2.4 mg (7/28/2022), 2.4 mg (8/11/2022), 2.4 mg (8/18/2022), 2.4 mg (9/2/2022), 2.4 mg (9/8/2022), 2.4 mg (9/28/2022), 2.4 mg (10/5/2022), 2.4 mg (10/26/2022), 2.4 mg (11/2/2022), 2.4 mg (1/10/2023)  Pegfilgrastim-bmez (ZIEXTENZO), 6 mg, Subcutaneous, Once, 8 of 10 cycles  Administration: 6 mg (6/2/2022), 6 mg (7/29/2022), 6 mg (8/19/2022), 6 mg (9/9/2022), 6 mg (10/6/2022), 6 mg (11/3/2022), 6 mg (1/25/2023)     2/22/2023 -  Chemotherapy    alteplase (CATHFLO), 2 mg, Intracatheter, Every 1 Minute as needed, 6 of 11 cycles  fam-trastuzumab deruxtecan-nxki (ENHERTU) IVPB, 308 mg, Intravenous, Once, 6 of 11 cycles  Administration: 300 mg (2/22/2023), 300 mg (3/15/2023), 300 mg (4/5/2023), 300 mg (5/3/2023), 300 mg (5/24/2023), 300 mg (6/14/2023)     Malignant neoplasm metastatic to bone ([address])   8/30/2021 Initial Diagnosis    Bone metastases ([address])     4/19/2022 - 1/25/2023 Chemotherapy    eriBULin (HALAVEN), 1.4 mg/m2 = 2.4 mg, Intravenous, Once, 9 of 11 cycles  Administration: 2.4 mg (4/19/2022), 2.4 mg (5/3/2022), 2.4 mg (5/17/2022), 2.4 mg (6/1/2022), 2.4 mg (6/15/2022), 2.4 mg (7/13/2022), 2.4 mg (7/28/2022), 2.4 mg (8/11/2022), 2.4 mg (8/18/2022), 2.4 mg (9/2/2022), 2.4 mg (9/8/2022), 2.4 mg (9/28/2022), 2.4 mg (10/5/2022), 2.4 mg (10/26/2022), 2.4 mg (11/2/2022), 2.4 mg (1/10/2023)  Pegfilgrastim-bmez (Eviti), 6 mg, Subcutaneous, Once, 8 of 10 cycles  Administration: 6 mg (6/2/2022), 6 mg (7/29/2022), 6 mg (8/19/2022), 6 mg (9/9/2022), 6 mg (10/6/2022), 6 mg (11/3/2022), 6 mg (1/25/2023)     2/22/2023 -  Chemotherapy    alteplase (CATHFLO), 2 mg, Intracatheter, Every 1 Minute as needed, 6 of 11 cycles  fam-trastuzumab deruxtecan-nxki (ENHERTU) IVPB, 308 mg, Intravenous, Once, 6 of 11 cycles  Administration: 300 mg (2/22/2023), 300 mg (3/15/2023), 300 mg (4/5/2023), 300 mg (5/3/2023), 300 mg (5/24/2023), 300 mg (6/14/2023)         History of Present Illness:  -No ref. provider found:  -Previous Therapy (if not listed in Oncology History):  -Current Therapy (if not listed in Oncology History):  -Disease Status:  -Interval History:  -Tumor Markers:    Review of Systems:  Review of Systems   Constitutional: Positive for appetite change. Respiratory: Positive for chest tightness (chest pain on the right on inspiration ).         Patient Active Problem List   Diagnosis   • Pleural effusion   • Metastatic neoplasm ([address])   • Hypertension   • Malignant neoplasm of breast in female, estrogen receptor positive ([address])   • Palliative care patient   • Malignant pleural effusion   • Primary malignant neoplasm of right breast with metastasis to other site [facility])   • Malignant neoplasm metastatic to bone [facility])   • Cancer related pain   • Chemotherapy induced neutropenia ([address])   • Port-A-Cath in place     Past Medical History:   Diagnosis Date   • Cancer [facility])    • Headache(784.0) 11/2021   • History of radiation exposure    • Malignant neoplasm of right female breast [facility]) 2012    right     Past Surgical History:   Procedure Laterality Date   • BACK SURGERY • BREAST CYST EXCISION     • BREAST LUMPECTOMY Right    • HIP SURGERY Right     debridement of femur   • IR BIOPSY LIVER MASS  2021   • IR MICROWAVE ABLATION  2022   • IR PLEURAL EFFUSION LONG-TERM CATHETER PLACEMENT  2021   • IR PLEURAL EFFUSION LONG-TERM CATHETER REMOVAL  2022   • IR PORT PLACEMENT  2022   • IR THORACENTESIS  2021   • IR THORACENTESIS  2023     Family History   Problem Relation Age of Onset   • Breast cancer Mother         in her 63's   • Breast cancer Sister         inher 45s and 48   • Colon cancer Maternal Grandmother    • No Known Problems Paternal Grandmother    • Breast cancer Other    • No Known Problems Paternal Aunt      Social History     Socioeconomic History   • Marital status: /Civil Union     Spouse name: Not on file   • Number of children: Not on file   • Years of education: Not on file   • Highest education level: Not on file   Occupational History   • Not on file   Tobacco Use   • Smoking status: Some Days     Packs/day: 0.25     Years: 15.00     Total pack years: 3.75     Types: Cigarettes     Start date: 20     Last attempt to quit: 7/10/2021     Years since quittin.0   • Smokeless tobacco: Never   Vaping Use   • Vaping Use: Never used   Substance and Sexual Activity   • Alcohol use: Not Currently   • Drug use: Not Currently   • Sexual activity: Not Currently     Partners: Male     Birth control/protection: Male Sterilization   Other Topics Concern   • Not on file   Social History Narrative   • Not on file     Social Determinants of Health     Financial Resource Strain: Not on file   Food Insecurity: No Food Insecurity (2023)    Hunger Vital Sign    • Worried About Running Out of Food in the Last Year: Never true    • Ran Out of Food in the Last Year: Never true   Transportation Needs: No Transportation Needs (2023)    PRAPARE - Transportation    • Lack of Transportation (Medical):  No    • Lack of Transportation (Non-Medical):  No   Physical Activity: Not on file   Stress: Not on file   Social Connections: Not on file   Intimate Partner Violence: Not on file   Housing Stability: Low Risk  (7/5/2023)    Housing Stability Vital Sign    • Unable to Pay for Housing in the Last Year: No    • Number of Places Lived in the Last Year: 1    • Unstable Housing in the Last Year: No       Current Outpatient Medications:   •  acetaminophen (TYLENOL) 325 mg tablet, Take 2 tablets (650 mg total) by mouth every 6 (six) hours as needed for mild pain, Disp: 100 tablet, Rfl: 0  •  albuterol (PROVENTIL HFA,VENTOLIN HFA) 90 mcg/act inhaler, Inhale 2 puffs every 4 (four) hours as needed for wheezing, Disp: 8 g, Rfl: 0  •  calcium carbonate (TUMS) 500 mg chewable tablet, Chew 1 tablet (500 mg total) 3 (three) times a day as needed for indigestion or heartburn, Disp: 30 tablet, Rfl: 0  •  CRANIAL PROSTHESIS, RX,, Apply to cranial area PRN, Disp: 1 each, Rfl: 0  •  dicyclomine (BENTYL) 10 mg capsule, Take 1 capsule (10 mg total) by mouth every 6 (six) hours as needed (abdominal cramping), Disp: 40 capsule, Rfl: 0  •  diphenhydrAMINE (BENADRYL) 50 MG tablet, Take 1 tablet (50 mg total) by mouth daily at bedtime as needed for itching or sleep, Disp: 30 tablet, Rfl: 0  •  diphenoxylate-atropine (LOMOTIL) 2.5-0.025 mg per tablet, Take 1 tablet by mouth 4 (four) times a day as needed for diarrhea, Disp: 30 tablet, Rfl: 0  •  DULoxetine (CYMBALTA) 30 mg delayed release capsule, Take 1 capsule (30 mg total) by mouth daily, Disp: 90 capsule, Rfl: 3  •  HYDROmorphone (DILAUDID) 2 mg tablet, Take 1-2 tablets (2-4 mg total) by mouth every 3 (three) hours as needed (moderate/severe cancer-related pain) Max Daily Amount: 32 mg, Disp: 20 tablet, Rfl: 0  •  lactulose 20 g/30 mL, Take 15 mL (10 g total) by mouth daily as needed (constipaton), Disp: 300 mL, Rfl: 0  •  methylPREDNISolone 4 MG tablet therapy pack, Use as directed on package, Disp: 21 each, Rfl: 0  • metoclopramide (REGLAN) 10 mg tablet, Take 1 tablet (10 mg total) by mouth 4 (four) times a day, Disp: 28 tablet, Rfl: 0  •  multivitamin (THERAGRAN) TABS, Take 1 tablet by mouth, Disp: , Rfl:   •  ondansetron (ZOFRAN) 8 mg tablet, Take 1 tablet (8 mg total) by mouth every 8 (eight) hours as needed for nausea or vomiting, Disp: 20 tablet, Rfl: 2  •  oxybutynin (DITROPAN-XL) 5 mg 24 hr tablet, Take 1 tablet (5 mg total) by mouth daily Take 1 tablet daily, Disp: 90 tablet, Rfl: 3  •  polyethylene glycol (MIRALAX) 17 g packet, Take 17 g by mouth daily, Disp: 30 each, Rfl: 0  •  senna (SENOKOT) 8.6 MG tablet, Take 1 tablet (8.6 mg total) by mouth daily at bedtime as needed for constipation, Disp: 30 tablet, Rfl: 0  •  Xarelto 20 MG tablet, TAKE 1 TABLET BY MOUTH EVERY DAY WITH BREAKFAST, Disp: 30 tablet, Rfl: 11  •  al mag oxide-diphenhydramine-lidocaine viscous (MAGIC MOUTHWASH) 1:1:1 suspension, Swish and spit 10 mL every 4 (four) hours as needed for mouth pain or discomfort (Patient not taking: Reported on 7/5/2023), Disp: 300 mL, Rfl: 0  •  docusate sodium (COLACE) 100 mg capsule, Take 1 capsule (100 mg total) by mouth 2 (two) times a day for 10 days, Disp: 20 capsule, Rfl: 0  •  letrozole (FEMARA) 2.5 mg tablet, Take 1 tablet (2.5 mg total) by mouth daily, Disp: 90 tablet, Rfl: 2  •  Lidocaine Viscous HCl (XYLOCAINE) 2 % mucosal solution, Swish and spit 10 mL 4 (four) times a day as needed for mouth pain or discomfort (Patient not taking: Reported on 7/18/2023), Disp: 200 mL, Rfl: 0  Allergies   Allergen Reactions   • Codeine Anaphylaxis   • Morphine GI Intolerance, Itching and Vomiting   • Sulfa Antibiotics Hives and Itching     Vitals:    07/19/23 1239   BP: 116/80   Pulse: 80   Resp: 16   Temp: 98.2 °F (36.8 °C)   SpO2: 98%         Physical Exam  Chest:      Chest wall: Tenderness (right) present.      Decreased respiratory sounds on the right        Performance Status: 1    Labs:  Lab Results   Component Value Date    WBC 3.94 (L) 07/07/2023    HGB 10.1 (L) 07/07/2023    HCT 29.8 (L) 07/07/2023     (H) 07/07/2023     07/07/2023         Imaging  IR IN-Patient Thoracentesis    Result Date: 7/5/2023  Narrative: The patient arrived for a thoracentesis. Upon ultrasound evaluation of the right chest I did not believe that the fluid was loculated nor did I feel that there was a safe window to perform thoracentesis with the lung mostly atelectatic adjacent to the fluid. Alternatively we sat the patient up for draining of the Asept catheter from which we were able to yield 450 cc of serosanguineous fluid. Upon cursory ultrasound of the right chest following drainage there did appear to be further reduction of the visualized pleural fluid. No thoracentesis was performed. Workstation performed: [id number]     CT chest abdomen pelvis w contrast    Result Date: 7/4/2023  Narrative: CT CHEST, ABDOMEN AND PELVIS WITH IV CONTRAST INDICATION:   Sepsis Breast cancer patient with right-sided pleural cath on chemotherapy worsening right lower chest pain. COMPARISON: 4/27/2023. TECHNIQUE: CT examination of the chest, abdomen and pelvis was performed. Multiplanar 2D reformatted images were created from the source data. This examination, like all CT scans performed in the [facility], was performed utilizing techniques to minimize radiation dose exposure, including the use of iterative reconstruction and automated exposure control. Radiation dose length product (DLP) for this visit:  843 mGy-cm IV Contrast:  100 mL of iohexol (OMNIPAQUE) Enteric Contrast: Enteric contrast was administered. FINDINGS: CHEST LUNGS: Scattered stable pulmonary nodules, for instance a 5 mm right middle lobe nodule (series 4, image 73), and a 5 mm left lower lobe nodule (series 4, image 61). There is no tracheal or endobronchial lesion. PLEURA: Enlarging partially loculated right pleural effusion. Tunneled left pleural catheter. HEART/GREAT VESSELS: Heart is unremarkable for patient's age. No thoracic aortic aneurysm. MEDIASTINUM AND KIA: Calcified mediastinal and hilar nodes likely related to prior granulomatous disease or treated metastasis. . CHEST WALL AND LOWER NECK:  Unremarkable. ABDOMEN LIVER/BILIARY TREE: Worsening hepatic metastatic burden, for instance a 2.3 cm segment 6 lesion was not clearly visualized previously. GALLBLADDER:  No calcified gallstones. No pericholecystic inflammatory change. SPLEEN:  Unremarkable. PANCREAS:  Unremarkable. ADRENAL GLANDS:  Unremarkable. KIDNEYS/URETERS:  No hydronephrosis. Punctate nonobstructing nephrolithiasis. One or more sharply circumscribed subcentimeter renal hypodensities are present, too small to accurately characterize, and statistically most likely benign findings. According to recent literature (Radiology 2019) no further workup of these findings is recommended. STOMACH AND BOWEL:  Unremarkable. APPENDIX:  No findings to suggest appendicitis. ABDOMINOPELVIC CAVITY:  No ascites. No pneumoperitoneum. No lymphadenopathy. VESSELS:  Unremarkable for patient's age. PELVIS REPRODUCTIVE ORGANS:  Unremarkable for patient's age. URINARY BLADDER:  Unremarkable. ABDOMINAL WALL/INGUINAL REGIONS:  Unremarkable. OSSEOUS STRUCTURES: Widespread sclerotic osseous metastatic disease again seen. Impression: Enlarging partially loculated right pleural effusion. Worsening hepatic metastatic disease. Widespread sclerotic osseous metastasis again seen Workstation performed: [id number]     I reviewed the above laboratory and imaging data. Discussion/Summary:   Assessment/Plan:      1. Metastatic breast cancer, with liver, bone, lymph node and pleura involvement.  ER and AL positive, HER2 negative.  Bone scan showed bony metastatic disease.  Patient started monthly Zoladex, daily letrozole and CDK4/6 inhibitor Abemeciclib.  Unfortunately, patient developed significant watery diarrhea from Abemaciclib. Abemaciclib was changed to Ibrance 125 mg daily for 3 weeks on,  followed by 1 week off until disease progression on August 30, 2021. Ibrance dose was decreased to 100 mg because of neutropenia.  CT scan and bone scan showed disease progression and treatment changed to chemo (Eribulin). Restaging CT 7/8/2022 shows a very good PA (near 50% reduction in index liver lesions). NM bone scan showed no new findings. Restaging CT shows POD now. Patient s/p liver ablation 11/23/22. Further POD on CT 2/2023. 11/23/2022 liver biopsy showed 1+ HER-2 status. Patient was switched to Enhertu 5.4mg/kg Q 21 days (C1D1 was 2/22/23) + Letrozole 2.5mg once daily.      Bony metastatic disease.  Bone scan showed bony metastatic disease on 7/11.  Patient  continues Zometa every 3 months.      History of right breast cancer, status post lumpectomy and axillary lymph node dissection, status post adjuvant radiation therapy.     Right-sided pleural effusion, status post thoracentesis, cytology positive for adenocarcinoma, ER/PA positive, HER2 negative.  Status post PleurX catheter placement. Has not required frequency drainage. Continue to treat underlying disease  I suggested for the patient to see Dr. Tamie Pavon.   There is a high chance that there might be a change in her treatment

## 2023-07-18 NOTE — PROGRESS NOTES
Assessment & Plan     1. Malignant pleural effusion    2. Malignant neoplasm metastatic to bone ([address])    3. Malignant neoplasm of areola of right breast in female, estrogen receptor positive ([address])    4. Right ear pain  -     methylPREDNISolone 4 MG tablet therapy pack; Use as directed on package        Depression Screening and Follow-up Plan: Clincally patient does not have depression. No treatment is required. Tobacco Cessation Counseling: Tobacco cessation counseling was provided. The patient is sincerely urged to quit consumption of tobacco. She is not ready to quit tobacco.       Subjective     Transitional Care Management Review:   Alysha Caraballo is a 56 y.o. female here for TCM follow up. During the TCM phone call patient stated:  TCM Call     Date and time call was made  7/10/2023  7:50 AM    Hospital care reviewed  Records reviewed    Patient was hospitialized at  [facility]    Date of Admission  07/04/23    Date of discharge  07/08/23    Diagnosis  Pleural effusion    Disposition  Home    Were the patients medications reviewed and updated  Yes    Current Symptoms  None    Dizziness severity  Moderate    Quality Character  Lightheadedness    Episode pattern  Intermittent    Cause  No known event    Clinical progress  Unchanged      TCM Call     Post hospital issues  None    Should patient be enrolled in anticoag monitoring? No    Scheduled for follow up?   Yes    Did you obtain your prescribed medications  Yes    Do you need help managing your prescriptions or medications  No    Is transportation to your appointment needed  No    I have advised the patient to call PCP with any new or worsening symptoms  [address] members    Support System  Family; Friends    The type of support provided  Emotional    Do you have social support  Yes, as much as I need    Are you recieving any outpatient services  No    Are you recieving home care services  No    Are you using any community resources  No    Current waiver services  No    Have you fallen in the last 12 months  No    Interperter language line needed  No    Counseling  Patient    Counseling topics  Importance of RX compliance; Activities of daily living        Joseph Tovar is a 56 y.o. female with a PMH of metastatic breast cancer, chronic pleural effusion, and HTN who presents with chest pain and fever. pleural fluid for analysis given her complaining of fever at home, the culture came back essentially unremarkable without any growth. Here today with some chest pain, draining significant fluid with blood tinged fluid; seeing hematology tomorrow to discuss findings on scan in the hospital; worsening mets noted; she is losing weight, now 115 pounds was 122 last month; reports stress at home with her son and his GF, new grand kid. Blood pressure low, will keep a log, call me, need to start midodrine. Review of Systems   Constitutional: Positive for fatigue and unexpected weight change. Negative for chills and fever. HENT: Negative for ear pain and sore throat. Eyes: Negative for pain and visual disturbance. Respiratory: Negative for cough and shortness of breath. Cardiovascular: Negative for chest pain and palpitations. Gastrointestinal: Positive for constipation. Negative for abdominal pain and vomiting. Genitourinary: Negative for dysuria and hematuria. Musculoskeletal: Negative for arthralgias and back pain. Skin: Negative for color change and rash. Neurological: Negative for seizures and syncope. All other systems reviewed and are negative. Objective     BP 90/58 (BP Location: Left arm, Patient Position: Sitting, Cuff Size: Adult)   Pulse 92   Temp 97.5 °F (36.4 °C) (Tympanic)   Resp 16   Ht 5' 4" (1.626 m)   Wt 52.3 kg (115 lb 3.2 oz)   LMP 05/06/2021 (Approximate)   SpO2 96%   BMI 19.77 kg/m²      Physical Exam  Vitals and nursing note reviewed. Constitutional:       General: She is not in acute distress. Appearance: She is well-developed. She is ill-appearing. Comments: Appears chronically ill   Older than stated age   HENT:      Head: Normocephalic and atraumatic. Eyes:      Conjunctiva/sclera: Conjunctivae normal.   Cardiovascular:      Rate and Rhythm: Normal rate and regular rhythm. Heart sounds: No murmur heard. Pulmonary:      Effort: Pulmonary effort is normal. No respiratory distress. Breath sounds: Normal breath sounds. Abdominal:      Palpations: Abdomen is soft. Tenderness: There is no abdominal tenderness. Musculoskeletal:         General: No swelling. Cervical back: Neck supple. Skin:     General: Skin is warm and dry. Capillary Refill: Capillary refill takes less than 2 seconds. Coloration: Skin is pale. Neurological:      Mental Status: She is alert.    Psychiatric:         Mood and Affect: Mood normal.       Medications have been reviewed by provider in current encounter    Yvette Alejandre MD

## 2023-07-19 ENCOUNTER — APPOINTMENT (OUTPATIENT)
Dept: LAB | Facility: HOSPITAL | Age: 58
End: 2023-07-19
Payer: COMMERCIAL

## 2023-07-19 ENCOUNTER — TELEPHONE (OUTPATIENT)
Dept: HEMATOLOGY ONCOLOGY | Facility: CLINIC | Age: 58
End: 2023-07-19

## 2023-07-19 ENCOUNTER — OFFICE VISIT (OUTPATIENT)
Dept: HEMATOLOGY ONCOLOGY | Facility: CLINIC | Age: 58
End: 2023-07-19
Payer: COMMERCIAL

## 2023-07-19 VITALS
HEIGHT: 64 IN | SYSTOLIC BLOOD PRESSURE: 116 MMHG | WEIGHT: 116 LBS | BODY MASS INDEX: 19.81 KG/M2 | OXYGEN SATURATION: 98 % | HEART RATE: 80 BPM | TEMPERATURE: 98.2 F | DIASTOLIC BLOOD PRESSURE: 80 MMHG | RESPIRATION RATE: 16 BRPM

## 2023-07-19 DIAGNOSIS — C50.911 MALIGNANT NEOPLASM OF RIGHT FEMALE BREAST, UNSPECIFIED ESTROGEN RECEPTOR STATUS, UNSPECIFIED SITE OF BREAST (HCC): Primary | ICD-10-CM

## 2023-07-19 DIAGNOSIS — C50.911 MALIGNANT NEOPLASM OF RIGHT FEMALE BREAST, UNSPECIFIED ESTROGEN RECEPTOR STATUS, UNSPECIFIED SITE OF BREAST (HCC): ICD-10-CM

## 2023-07-19 LAB
ANION GAP SERPL CALCULATED.3IONS-SCNC: 5 MMOL/L
BASOPHILS # BLD AUTO: 0.05 THOUSANDS/ÂΜL (ref 0–0.1)
BASOPHILS NFR BLD AUTO: 1 % (ref 0–1)
BUN SERPL-MCNC: 18 MG/DL (ref 5–25)
CALCIUM SERPL-MCNC: 9.6 MG/DL (ref 8.4–10.2)
CHLORIDE SERPL-SCNC: 109 MMOL/L (ref 96–108)
CO2 SERPL-SCNC: 27 MMOL/L (ref 21–32)
CREAT SERPL-MCNC: 0.64 MG/DL (ref 0.6–1.3)
EOSINOPHIL # BLD AUTO: 0.09 THOUSAND/ÂΜL (ref 0–0.61)
EOSINOPHIL NFR BLD AUTO: 1 % (ref 0–6)
ERYTHROCYTE [DISTWIDTH] IN BLOOD BY AUTOMATED COUNT: 16.4 % (ref 11.6–15.1)
GFR SERPL CREATININE-BSD FRML MDRD: 100 ML/MIN/1.73SQ M
GLUCOSE SERPL-MCNC: 85 MG/DL (ref 65–140)
HCT VFR BLD AUTO: 38 % (ref 34.8–46.1)
HGB BLD-MCNC: 12.7 G/DL (ref 11.5–15.4)
IMM GRANULOCYTES # BLD AUTO: 0.03 THOUSAND/UL (ref 0–0.2)
IMM GRANULOCYTES NFR BLD AUTO: 1 % (ref 0–2)
LYMPHOCYTES # BLD AUTO: 0.37 THOUSANDS/ÂΜL (ref 0.6–4.47)
LYMPHOCYTES NFR BLD AUTO: 6 % (ref 14–44)
MCH RBC QN AUTO: 36.3 PG (ref 26.8–34.3)
MCHC RBC AUTO-ENTMCNC: 33.4 G/DL (ref 31.4–37.4)
MCV RBC AUTO: 109 FL (ref 82–98)
MONOCYTES # BLD AUTO: 0.52 THOUSAND/ÂΜL (ref 0.17–1.22)
MONOCYTES NFR BLD AUTO: 8 % (ref 4–12)
NEUTROPHILS # BLD AUTO: 5.53 THOUSANDS/ÂΜL (ref 1.85–7.62)
NEUTS SEG NFR BLD AUTO: 83 % (ref 43–75)
NRBC BLD AUTO-RTO: 0 /100 WBCS
PLATELET # BLD AUTO: 468 THOUSANDS/UL (ref 149–390)
PMV BLD AUTO: 10.8 FL (ref 8.9–12.7)
POTASSIUM SERPL-SCNC: 3.9 MMOL/L (ref 3.5–5.3)
RBC # BLD AUTO: 3.5 MILLION/UL (ref 3.81–5.12)
SODIUM SERPL-SCNC: 141 MMOL/L (ref 135–147)
WBC # BLD AUTO: 6.59 THOUSAND/UL (ref 4.31–10.16)

## 2023-07-19 PROCEDURE — 99214 OFFICE O/P EST MOD 30 MIN: CPT | Performed by: INTERNAL MEDICINE

## 2023-07-19 PROCEDURE — 85025 COMPLETE CBC W/AUTO DIFF WBC: CPT

## 2023-07-19 PROCEDURE — 36415 COLL VENOUS BLD VENIPUNCTURE: CPT

## 2023-07-19 PROCEDURE — 80048 BASIC METABOLIC PNL TOTAL CA: CPT

## 2023-07-19 NOTE — TELEPHONE ENCOUNTER
Call out to patient in regards to request from Dr. Malia Adkins for patient to have an appointment on 7/20/23 in regards to new medication recommendations. Patient verbalized understanding and would like a virtual visit. Reviewed time.  No further questions or concerns at this time

## 2023-07-19 NOTE — TELEPHONE ENCOUNTER
Margarito Pedraza , patient needs a change in treatment plan per Dr Nuno Jackson       Return in about 1 day (around 7/20/2023) for change of treatment plan .  Please help also with an appt with DR Galvin Meigs

## 2023-07-20 ENCOUNTER — TELEPHONE (OUTPATIENT)
Dept: HEMATOLOGY ONCOLOGY | Facility: CLINIC | Age: 58
End: 2023-07-20

## 2023-07-20 ENCOUNTER — TELEMEDICINE (OUTPATIENT)
Dept: HEMATOLOGY ONCOLOGY | Facility: CLINIC | Age: 58
End: 2023-07-20
Payer: COMMERCIAL

## 2023-07-20 DIAGNOSIS — C50.911 PRIMARY MALIGNANT NEOPLASM OF RIGHT BREAST WITH METASTASIS TO OTHER SITE (HCC): ICD-10-CM

## 2023-07-20 DIAGNOSIS — C79.51 MALIGNANT NEOPLASM METASTATIC TO BONE (HCC): Primary | ICD-10-CM

## 2023-07-20 DIAGNOSIS — G89.3 CANCER RELATED PAIN: ICD-10-CM

## 2023-07-20 DIAGNOSIS — C79.51 MALIGNANT NEOPLASM METASTATIC TO BONE (HCC): ICD-10-CM

## 2023-07-20 DIAGNOSIS — J91.0 MALIGNANT PLEURAL EFFUSION: ICD-10-CM

## 2023-07-20 DIAGNOSIS — Z51.5 PALLIATIVE CARE PATIENT: ICD-10-CM

## 2023-07-20 PROCEDURE — 99215 OFFICE O/P EST HI 40 MIN: CPT | Performed by: INTERNAL MEDICINE

## 2023-07-20 RX ORDER — SODIUM CHLORIDE 9 MG/ML
20 INJECTION, SOLUTION INTRAVENOUS ONCE
OUTPATIENT
Start: 2023-08-09

## 2023-07-20 RX ORDER — ATROPINE SULFATE 1 MG/ML
0.25 INJECTION, SOLUTION INTRAVENOUS ONCE
OUTPATIENT
Start: 2023-08-09

## 2023-07-20 RX ORDER — SODIUM CHLORIDE 9 MG/ML
20 INJECTION, SOLUTION INTRAVENOUS ONCE
OUTPATIENT
Start: 2023-08-02

## 2023-07-20 RX ORDER — ATROPINE SULFATE 1 MG/ML
0.25 INJECTION, SOLUTION INTRAVENOUS ONCE
OUTPATIENT
Start: 2023-08-02

## 2023-07-20 RX ORDER — HYDROMORPHONE HYDROCHLORIDE 2 MG/1
2-4 TABLET ORAL
Qty: 120 TABLET | Refills: 0 | Status: SHIPPED | OUTPATIENT
Start: 2023-07-20

## 2023-07-20 RX ORDER — ACETAMINOPHEN 325 MG/1
650 TABLET ORAL ONCE
OUTPATIENT
Start: 2023-08-09

## 2023-07-20 RX ORDER — ACETAMINOPHEN 325 MG/1
650 TABLET ORAL ONCE
OUTPATIENT
Start: 2023-08-02

## 2023-07-20 NOTE — TELEPHONE ENCOUNTER
She stated she will have enough until appt     Last appointment:5/31    Next scheduled appointment:8/10    [facility]

## 2023-07-20 NOTE — TELEPHONE ENCOUNTER
Patient Call    Who are you speaking with? Patient    If it is not the patient, are they listed on an active communication consent form? N/A   What is the reason for this call? Pt called to confirm infusion appts   Does this require a call back? N/A   If a call back is required, please list best call back number n/a   If a call back is required, advise that a message will be forwarded to their care team and someone will return their call as soon as possible. Did you relay this information to the patient?  N/A

## 2023-07-21 ENCOUNTER — TELEPHONE (OUTPATIENT)
Dept: HEMATOLOGY ONCOLOGY | Facility: CLINIC | Age: 58
End: 2023-07-21

## 2023-07-21 DIAGNOSIS — C50.911 PRIMARY MALIGNANT NEOPLASM OF RIGHT BREAST WITH METASTASIS TO OTHER SITE (HCC): ICD-10-CM

## 2023-07-21 DIAGNOSIS — C79.51 MALIGNANT NEOPLASM METASTATIC TO BONE (HCC): Primary | ICD-10-CM

## 2023-07-21 DIAGNOSIS — J91.0 MALIGNANT PLEURAL EFFUSION: ICD-10-CM
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
## 2023-07-24 ENCOUNTER — HOSPITAL ENCOUNTER (OUTPATIENT)
Dept: INFUSION CENTER | Facility: CLINIC | Age: 58
End: 2023-07-24

## 2023-07-31 ENCOUNTER — HOSPITAL ENCOUNTER (OUTPATIENT)
Dept: INFUSION CENTER | Facility: CLINIC | Age: 58
Discharge: HOME | End: 2023-07-31
Payer: COMMERCIAL

## 2023-07-31 DIAGNOSIS — C50.919 MALIGNANT NEOPLASM OF BREAST IN FEMALE, ESTROGEN RECEPTOR POSITIVE, UNSPECIFIED LATERALITY, UNSPECIFIED SITE OF BREAST (HCC): ICD-10-CM

## 2023-07-31 DIAGNOSIS — C50.911 PRIMARY MALIGNANT NEOPLASM OF RIGHT BREAST WITH METASTASIS TO OTHER SITE (HCC): ICD-10-CM

## 2023-07-31 DIAGNOSIS — J91.0 MALIGNANT PLEURAL EFFUSION: Primary | ICD-10-CM

## 2023-07-31 DIAGNOSIS — Z17.0 MALIGNANT NEOPLASM OF BREAST IN FEMALE, ESTROGEN RECEPTOR POSITIVE, UNSPECIFIED LATERALITY, UNSPECIFIED SITE OF BREAST (HCC): ICD-10-CM

## 2023-07-31 DIAGNOSIS — Z95.828 PORT-A-CATH IN PLACE: ICD-10-CM

## 2023-07-31 DIAGNOSIS — C79.51 MALIGNANT NEOPLASM METASTATIC TO BONE (HCC): ICD-10-CM

## 2023-07-31 DIAGNOSIS — C78.7 MALIGNANT NEOPLASM METASTATIC TO LIVER (HCC): ICD-10-CM

## 2023-07-31 LAB
ALBUMIN SERPL BCP-MCNC: 3.4 G/DL (ref 3.5–5)
ALP SERPL-CCNC: 924 U/L (ref 34–104)
ALT SERPL W P-5'-P-CCNC: 99 U/L (ref 7–52)
ANION GAP SERPL CALCULATED.3IONS-SCNC: 9 MMOL/L
AST SERPL W P-5'-P-CCNC: 224 U/L (ref 13–39)
BASOPHILS # BLD AUTO: 0.03 THOUSANDS/ÂΜL (ref 0–0.1)
BASOPHILS NFR BLD AUTO: 0 % (ref 0–1)
BILIRUB SERPL-MCNC: 1.12 MG/DL (ref 0.2–1)
BUN SERPL-MCNC: 12 MG/DL (ref 5–25)
CALCIUM ALBUM COR SERPL-MCNC: 9.8 MG/DL (ref 8.3–10.1)
CALCIUM SERPL-MCNC: 9.3 MG/DL (ref 8.4–10.2)
CHLORIDE SERPL-SCNC: 101 MMOL/L (ref 96–108)
CO2 SERPL-SCNC: 24 MMOL/L (ref 21–32)
CREAT SERPL-MCNC: 0.61 MG/DL (ref 0.6–1.3)
EOSINOPHIL # BLD AUTO: 0.04 THOUSAND/ÂΜL (ref 0–0.61)
EOSINOPHIL NFR BLD AUTO: 1 % (ref 0–6)
ERYTHROCYTE [DISTWIDTH] IN BLOOD BY AUTOMATED COUNT: 15.7 % (ref 11.6–15.1)
GFR SERPL CREATININE-BSD FRML MDRD: 102 ML/MIN/1.73SQ M
GLUCOSE SERPL-MCNC: 96 MG/DL (ref 65–140)
HCT VFR BLD AUTO: 35.7 % (ref 34.8–46.1)
HGB BLD-MCNC: 12.2 G/DL (ref 11.5–15.4)
IMM GRANULOCYTES # BLD AUTO: 0.02 THOUSAND/UL (ref 0–0.2)
IMM GRANULOCYTES NFR BLD AUTO: 0 % (ref 0–2)
LYMPHOCYTES # BLD AUTO: 0.32 THOUSANDS/ÂΜL (ref 0.6–4.47)
LYMPHOCYTES NFR BLD AUTO: 4 % (ref 14–44)
MCH RBC QN AUTO: 35.4 PG (ref 26.8–34.3)
MCHC RBC AUTO-ENTMCNC: 34.2 G/DL (ref 31.4–37.4)
MCV RBC AUTO: 104 FL (ref 82–98)
MONOCYTES # BLD AUTO: 0.75 THOUSAND/ÂΜL (ref 0.17–1.22)
MONOCYTES NFR BLD AUTO: 10 % (ref 4–12)
NEUTROPHILS # BLD AUTO: 6.39 THOUSANDS/ÂΜL (ref 1.85–7.62)
NEUTS SEG NFR BLD AUTO: 85 % (ref 43–75)
NRBC BLD AUTO-RTO: 0 /100 WBCS
PLATELET # BLD AUTO: 300 THOUSANDS/UL (ref 149–390)
PMV BLD AUTO: 11.1 FL (ref 8.9–12.7)
POTASSIUM SERPL-SCNC: 4 MMOL/L (ref 3.5–5.3)
PROT SERPL-MCNC: 6.8 G/DL (ref 6.4–8.4)
RBC # BLD AUTO: 3.45 MILLION/UL (ref 3.81–5.12)
SODIUM SERPL-SCNC: 134 MMOL/L (ref 135–147)
WBC # BLD AUTO: 7.55 THOUSAND/UL (ref 4.31–10.16)

## 2023-07-31 PROCEDURE — 80053 COMPREHEN METABOLIC PANEL: CPT | Performed by: INTERNAL MEDICINE

## 2023-07-31 PROCEDURE — 85025 COMPLETE CBC W/AUTO DIFF WBC: CPT | Performed by: INTERNAL MEDICINE

## 2023-07-31 NOTE — PROGRESS NOTES
Pt to clinic for labs drawn via port. Pt offers no complaints today. Pt aware of next appointment. Pt port accessed, flushed, and de-accessed with positive blood returned. AVS was offered, pt declined. Pt ambulated out of clinic safely.

## 2023-08-01 NOTE — PROGRESS NOTES
Hematology/Oncology Progress Note    Date of Service: 8/10/2023    [facility]  [facility]   [address] [id number]    ECOG PS today: 2-3       Hem/Onc Problem List:   1. Metastatic right-sided breast cancer, ER and OR positive, HER2 negative. 2. Bone metastatic disease. 3. History of right-sided pleural effusion. Chief Complaint:    Routine follow-up for management of stage IV breast cancer    Assessment/Plan:     1. Metastatic breast cancer, with liver, bone, lymph node and pleura involvement.  ER and OR positive, HER2 negative. Bone scan showed bony metastatic disease. Patient started monthly Zoladex, daily letrozole and CDK4/6 inhibitor Abemeciclib. Unfortunately, patient developed significant watery diarrhea from Abemaciclib. Abemaciclib was changed to Ibrance 125 mg daily for 3 weeks on,  followed by 1 week off until disease progression on August 30, 2021. Ibrance dose was decreased to 100 mg because of neutropenia. CT scan and bone scan showed disease progression and treatment changed to chemo (Eribulin). Restaging CT 7/8/2022 shows a very good OR (near 50% reduction in index liver lesions). NM bone scan showed no new findings. Restaging CT shows POD now. Patient s/p liver ablation 11/23/22. Further POD on CT 2/2023. 11/23/2022 liver biopsy showed 1+ HER-2 status. Patient was switched to Enhertu 5.4mg/kg Q 21 days (C1D1 was 2/22/23) + Letrozole 2.5mg once daily. Then POD led to switch to Sacituzumab q3 weeks    2. Bony metastatic disease. Bone scan showed bony metastatic disease on 7/11. Patient  continues Zometa every 3 months. 3.  History of right breast cancer, status post lumpectomy and axillary lymph node dissection, status post adjuvant radiation therapy. 4. Right-sided pleural effusion, status post thoracentesis, cytology positive for adenocarcinoma, ER/OR positive, HER2 negative.   Status post PleurX catheter placement. Has not required frequency drainage. Continue to treat underlying disease. · Discussion of decision making    I personally reviewed the following lab results, the image studies, pathology, other specialty/physicians consult notes and recommendations, and outside medical records from [address] [place] [address]. I had a lengthy discussion with the patient and shared the work-up findings. I spent 41 minutes reviewing the records (labs, clinician notes, outside records, medical history, ordering medicine/tests/procedures, interpreting the imaging/labs previously done) and coordination of care as well as direct time with the patient today, of which greater than 50% of the time was spent in counseling and coordination of care with the patient/family. · Plan/Labs  · Metastatic breast cancer with diffuse bone metastasis. ECOG 1   · Cont C2 Sacituzumab 10mg/kg D1/D8. Patient to have labs prior to treatment. · Restage CT CAP w/c scheduled 10/10/2023  · F/u palliative care for pain/nausea management  · Next ECHO scheduled 8/21/2023  · Continue Zometa Q12 weeks for bone metastasis  · Continue Lactulose, senna, miralax for constipation for now. · OncotypeMap test NGS was done 4/18/2022 with results: PIK3CA: [id number] mutation. ESR1: WT, PD-L1 IHC: negative. MSI-stable, TMB low (4 muts/mb). BRCA 1&2 WT. Possible future treatment option would include Alpelisib + Fulvestrant. Follow Up: q3 weeks while on systemic therapy scheduled thru 10/12/2023    All questions were answered to the patient's satisfaction during this encounter. The patient knows the contact information for our office and knows to reach out for any relevant concerns related to this encounter.  They are to call for any temperature 100.4 or higher, new symptoms including but not restricted to shaking chills, decreased appetite, nausea, vomiting, diarrhea, increased fatigue, shortness of breath or chest pain, confusion, and not feeling the strength to come to the clinic. For all other listed problems and medical diagnosis in their chart - they are managed by PCP and/or other specialists, which the patient acknowledges. Thank you very much for your consultation and making us a part of this patient's care. We are continuing to follow closely with you. Please do not hesitate to reach out to me with any additional questions or concerns. AJCC 8th Edition Cancer Stage :      Cancer Staging  IV    Hematology/Oncology History:   · History of right-sided breast cancer, initially diagnosed in 2012, status post lumpectomy and axillary lymph node dissection, status post adjuvant radiation therapy.   Patient did not receive any adjuvant endocrine therapy or chemotherapy. · July 24, 2021, patient was admitted to hospital because of shortness of breath and cough. · July 24, 2021 CT chest PE protocol showed septal thickening throughout the right lung and bronchial wall thickening due to lymphangitic spread of tumor.  Indeterminate lung nodules measuring 5 millimeter in the right upper lobe, right middle lobe and 7 millimeter in the left upper lobe and left lower lobe.  Large right pleural effusion.  Multiple liver metastatic disease measuring up to 4 centimeter.  Lytic metastatic to sternal manubrium. · July 26, 2021, ultrasound abdomen shows multiple hepatic metastatic disease.  Status post thoracentesis with 1200 mL of joanne fluid removed.  Cytology showed adenoma carcinoma, ER and SD positive  40-50%, HER2 negative. · July 26, 2021 biopsy of the lung liver showed metastatic breast cancer, ER and % positive, HER2 negative.  CT abdomen pelvis with contrast showed extensive hepatic metastatic disease. · July 27, 2021 MRI brain showed no evidence of intracranial metastatic disease. · August 6, 2021, mammogram showed no mammographic evidence of malignancy.   · August 11, 2021 bone scan showed bony metastatic disease involving left iliac crest medially and adjacent sacrum. · August 12, 2021, Zoladex was given. Letrozole started. · August 16, 2022, patient started Abemaciclib. · August 30, 2021, DC abemaciclib because of diarrhea. Start Ibrance 125 mg daily 3 weeks on 1 week off. · Nov 19, 2021, CT c/a/p showed:   1.  Since July 2021, new thromboses within the intrahepatic branches of the portal vein as described above  2.  Increased diffuse sclerotic osseous lesions.    3.  Decreased size of most of the hepatic metastases. 4.  Indeterminate mottled appearance of the splenic parenchyma.  Attention on follow-up is advised. 5.  Unchanged pulmonary nodules. 6.  Right pleural effusion has decreased in size since July 24, 2021.  The smooth interlobular septal thickening has also decreased, and this change can be attributable to the decreased size of the pleural effusion. 7.  Mucosal hyperenhancement of the colon.  Findings may be treatment-related, and correlation with gastrointestinal symptoms is advised.     BONE SCAN:   1.  A few areas of increased radiotracer uptake suspicious for osseous metastasis, with findings for minimal progression.   · March 15, 2022 bone scan: Stable or improving scattered foci radiotracer uptake suspicious for osseous metastases.  No new osseous foci suspicious for metastasis.    ·  April 1, 2022 CT scan chest abdomen pelvis:  IMPRESSION:     Findings concerning for worsening hepatic metastases as evidenced by increased size and number of lesions.  Please see above discussion.     Progressive left portal vein thrombosis.     The majority of the pulmonary nodules are stable.  There is one subpleural nodule in the right upper lobe apex posteriorly, not seen on the prior study.     Stable extensive osseous metastases.     Multiple tiny hypodense splenic lesions are also stable, indeterminate.     Persistent small right pleural effusion with a right pleural catheter in place.     June 25, 2022: 4 day admission for neutropenic fever  July 8, 2022 CT CAP: 1.   Stable scattered small pulmonary nodules bilaterally. Small right pleural effusion, slightly decreased. Improving hepatic metastasis. Less conspicuous small splenic hypodense lesions. Stable findings for widespread osseous metastasis. NM Bone Scan without significant changes (A lesion in the left lobe of the liver laterally measures 2.0 x 1.9 cm, previously 5.1 x 3.8 cm- 56.2% reduction.  Somewhat stellate lesion in the right lobe of the liver  posteriorly now measures 1.8 x 1.2 cm image 33 series 2, previously 2.6 x 2.6 cm (42.3%). July 11, 2022, NM bone scan stated no areas of new or worsening scintigraphic activity suggesting osseous disease progression  10/12/2022 CT CAP w/c: New 8 mm lesion within segment IVb of the liver compared to July 8, 2022, suspicious for progression of metastatic disease. However, a few other liver lesions are improved. Improved small right pleural effusion. Stable extensive osseous metastatic metastatic disease. 11/23/22: S/P Liver ablation   2/1/2023 CT CAP w/c: Status post right mastectomy without recurrence. Interval increase in size and number of hepatic lesions consistent with worsening metastatic disease. Increased size of a right pleural effusion with pleural thickening concerning for malignant effusion. Stable osseous diastases. - 2/16/2023: ECHO --> left ventricle --> systolic function normal at 55%. Diastolic function is normal. Global longitudinal strain was normal at -20.3%. right ventricle normal.   4/27/2023: CT CAP w/c: ID. 40-55% decrease. Overall, hepatic metastases have decreased in size from the prior study. However, there is at least one that measures slightly larger. Small to moderate right pleural effusion. Widespread osseous metastatic disease similar   For example, right hepatic lobe index lesion measures 1.6 x 1.4 cm on image 2/114; previously 2.4 x 1.9 cm.  Right hepatic lesion on image 2/140 measures 3.3 x 2.8 cm, previously 3.9 x 2.9 cm. A more posterior lesion at the same level measures 1.3 x 1.0 cm, previously 2.3 x 2.3 cm. A 1.0 cm lesion in the left dome measures on image 2/97 which was previously 0.6 cm  7/4/2023 CT CAP w/c: POD. Enlarging partially loculated right pleural effusion. Worsening hepatic metastatic disease. Widespread sclerotic osseous metastasis again seen    5/18/2023: ECHO showed 55% EF. Global longitudinal strain is -17.8. POD led to switch to Sacituzumab q3 weeks    History of Present Illiness:   Gregory West is a 56 y.o. female with the above-noted HemOnc history who is here to follow up regarding breast cancer history. See details of history above. Patient current on Eribulin Q 21 days. Has been off Eribulin since C8D9 for 11/3/22. This is due to new hepatic metastasis requiring ablation. S/p liver ablation 11/23/22. Interval events  Patient came in for follow-up. Increasing ear blockage/hearing issues. Having increasing upper abd pain for the past 2 weeks. Overall she has stable nausea with slight dry heaving. Not interested in other antinausea medications as she states this is tolerable. Constipation is present. However, this helps with her lactulose, Senokot, MiraLAX. Otherwise, she is tolerating treatment well and able to perform ADLs. No HA, CP, SOB, abd pain, N/V/D, bleeding anywhere, rashes. Weight at visit 8/2021: 146lbs  Weight 9/21/22: 131lbs  Weight 12/13/22: 123lbs   Weight 1/4/23: 125lbs  Weight: 2/9/2023: 126 lbs  Weight 3/8/2023: 127lbs  Weight 3/24/2023: 122lbs  Weight 4/13/2023: 125lbs  Weight 5/11/2023: 123lbs  Weight 6/15/2023: 122lbs  Weight today: 117 lbs      ROS: A 10-point of review of systems is obtained and other than the above is noncontributory.      Objective:   VITALS:   /78 (BP Location: Left arm, Patient Position: Sitting, Cuff Size: Standard)   Pulse 78   Temp 98.4 °F (36.9 °C) (Temporal)   Resp 18   Ht 5' 5" (1.651 m)   LMP 05/06/2021 (Approximate)   SpO2 96%   BMI 19.54 kg/m²      Weight (last 2 days)     Date/Time Weight    08/10/23 1212 --     Weight: unable to take at 08/10/23 1212            Physical EXAM:  General:  Alert, cooperative, no distress, appears stated age. Head:  Normocephalic, without obvious abnormality, atraumatic. Eyes:  Conjunctivae/corneas clear. EOMs intact. No evidence of conjunctivitis     Throat: Lips, mucosa, and tongue normal.    Neck: Supple, symmetrical, trachea midline    Lungs:   Respiratory effort easy, nonlabored    Heart:  Regular rate and rhythm, +S1, S2   Abdomen:   Soft, non-tender,nondistended. No masses,      Extremities:  Lymphatics: Extremities normal, atraumatic, no cyanosis or edema. No cervical, axillary or inguinal adenopathy   Skin: Skin color, texture, turgor normal. No rashes. Neurologic: AAO.  No focal neuro deficits       Allergies   Allergen Reactions   • Codeine Anaphylaxis   • Morphine GI Intolerance, Itching and Vomiting   • Sulfa Antibiotics Hives and Itching       Past Medical History:   Diagnosis Date   • Cancer ([address])    • Headache(784.0) 11/2021   • History of radiation exposure    • Malignant neoplasm of right female breast [facility]) 2012    right       Past Surgical History:   Procedure Laterality Date   • BACK SURGERY     • BREAST CYST EXCISION     • BREAST LUMPECTOMY Right 2012   • HIP SURGERY Right     debridement of femur   • IR BIOPSY LIVER MASS  07/26/2021   • IR MICROWAVE ABLATION  11/23/2022   • IR PLEURAL EFFUSION LONG-TERM CATHETER PLACEMENT  08/17/2021   • IR PLEURAL EFFUSION LONG-TERM CATHETER REMOVAL  05/11/2022   • IR PORT PLACEMENT  07/27/2022   • IR THORACENTESIS  07/26/2021   • IR THORACENTESIS  7/5/2023       Family History   Problem Relation Age of Onset   • Breast cancer Mother         in her 63's   • Breast cancer Sister         inher 45s and 48   • Colon cancer Maternal Grandmother    • No Known Problems Paternal Grandmother    • Breast cancer Other    • No Known Problems Paternal Aunt        Social History     Socioeconomic History   • Marital status: /Civil Union     Spouse name: Not on file   • Number of children: Not on file   • Years of education: Not on file   • Highest education level: Not on file   Occupational History   • Not on file   Tobacco Use   • Smoking status: Some Days     Packs/day: 0.25     Years: 15.00     Total pack years: 3.75     Types: Cigarettes     Start date: 20     Last attempt to quit: 7/10/2021     Years since quittin.0   • Smokeless tobacco: Never   Vaping Use   • Vaping Use: Never used   Substance and Sexual Activity   • Alcohol use: Not Currently   • Drug use: Not Currently   • Sexual activity: Not Currently     Partners: Male     Birth control/protection: Male Sterilization   Other Topics Concern   • Not on file   Social History Narrative   • Not on file     Social Determinants of Health     Financial Resource Strain: Not on file   Food Insecurity: No Food Insecurity (2023)    Hunger Vital Sign    • Worried About Running Out of Food in the Last Year: Never true    • Ran Out of Food in the Last Year: Never true   Transportation Needs: No Transportation Needs (2023)    PRAPARE - Transportation    • Lack of Transportation (Medical): No    • Lack of Transportation (Non-Medical):  No   Physical Activity: Not on file   Stress: Not on file   Social Connections: Not on file   Intimate Partner Violence: Not on file   Housing Stability: Low Risk  (2023)    Housing Stability Vital Sign    • Unable to Pay for Housing in the Last Year: No    • Number of Places Lived in the Last Year: 1    • Unstable Housing in the Last Year: No       Current Outpatient Medications   Medication Sig Dispense Refill   • acetaminophen (TYLENOL) 325 mg tablet Take 2 tablets (650 mg total) by mouth every 6 (six) hours as needed for mild pain 100 tablet 0   • albuterol (PROVENTIL HFA,VENTOLIN HFA) 90 mcg/act inhaler Inhale 2 puffs every 4 (four) hours as needed for wheezing 8 g 0   • calcium carbonate (TUMS) 500 mg chewable tablet Chew 1 tablet (500 mg total) 3 (three) times a day as needed for indigestion or heartburn 30 tablet 0   • CRANIAL PROSTHESIS, RX, Apply to cranial area PRN 1 each 0   • dicyclomine (BENTYL) 10 mg capsule Take 1 capsule (10 mg total) by mouth every 6 (six) hours as needed (abdominal cramping) 40 capsule 0   • diphenhydrAMINE (BENADRYL) 50 MG tablet Take 1 tablet (50 mg total) by mouth daily at bedtime as needed for itching or sleep 30 tablet 0   • diphenoxylate-atropine (LOMOTIL) 2.5-0.025 mg per tablet Take 1 tablet by mouth 4 (four) times a day as needed for diarrhea 30 tablet 0   • DULoxetine (CYMBALTA) 30 mg delayed release capsule Take 1 capsule (30 mg total) by mouth daily 90 capsule 3   • HYDROmorphone (DILAUDID) 2 mg tablet Take 1-2 tablets (2-4 mg total) by mouth every 3 (three) hours as needed (moderate/severe cancer-related pain) Max Daily Amount: 32 mg 120 tablet 0   • lactulose 20 g/30 mL Take 15 mL (10 g total) by mouth daily as needed (constipaton) 300 mL 0   • methylPREDNISolone 4 MG tablet therapy pack Use as directed on package 21 each 0   • metoclopramide (REGLAN) 10 mg tablet Take 1 tablet (10 mg total) by mouth 4 (four) times a day 28 tablet 0   • multivitamin (THERAGRAN) TABS Take 1 tablet by mouth     • ondansetron (ZOFRAN) 8 mg tablet Take 1 tablet (8 mg total) by mouth every 8 (eight) hours as needed for nausea or vomiting 20 tablet 2   • oxybutynin (DITROPAN-XL) 5 mg 24 hr tablet Take 1 tablet (5 mg total) by mouth daily Take 1 tablet daily 90 tablet 3   • polyethylene glycol (MIRALAX) 17 g packet Take 17 g by mouth daily 30 each 0   • senna (SENOKOT) 8.6 MG tablet Take 1 tablet (8.6 mg total) by mouth daily at bedtime as needed for constipation 30 tablet 0   • Xarelto 20 MG tablet TAKE 1 TABLET BY MOUTH EVERY DAY WITH BREAKFAST 30 tablet 11   • al mag oxide-diphenhydramine-lidocaine viscous (MAGIC MOUTHWASH) 1:1:1 suspension Swish and spit 10 mL every 4 (four) hours as needed for mouth pain or discomfort (Patient not taking: Reported on 7/5/2023) 300 mL 0   • docusate sodium (COLACE) 100 mg capsule Take 1 capsule (100 mg total) by mouth 2 (two) times a day for 10 days 20 capsule 0   • Lidocaine Viscous HCl (XYLOCAINE) 2 % mucosal solution Swish and spit 10 mL 4 (four) times a day as needed for mouth pain or discomfort (Patient not taking: Reported on 7/18/2023) 200 mL 0     No current facility-administered medications for this visit. Facility-Administered Medications Ordered in Other Visits   Medication Dose Route Frequency Provider Last Rate Last Admin   • alteplase (CATHFLO) injection 2 mg  2 mg Dahiana Andres MD           (Not in a hospital admission)      DATA REVIEW:    Pathology Result:    Final Diagnosis   Date Value Ref Range Status   07/05/2023   Final    A. & B. Thoracentesis, Right: (Thin-Prep and cell block)  Negative for malignancy. Rare mesothelial cells. Abundant mixed but predominantly acute inflammatory cells. Satisfactory for evaluation. 11/23/2022   Final    A. Liver core (biopsy):  - Metastatic carcinoma    Comment:  - ER, [place], Her2 ordered. - The tumor cells stain for GATA3 with absent CK7, CK20, GCDFP15 staining. The immunoprofile is most consistent with the patient's known breast primary. MOLECULAR TESTING AND CONSULT SLIDES:     Molecular testing:  Block A1 is available for molecular testing. Consult slides:  Slide A1 with associated IHC is available for consultation. Interpretation performed at [address]        07/26/2021   Final    A. B. Thoracentesis, Right (ThinPrep and cell block preparations):  Positive for malignancy. Metastatic carcinoma, compatible with breast primary.   -  Immunohistochemical stains performed with appropriate controls show the tumor cells to be positive for Matthew-EP4, MOC31, BARRY-3 and ER with scattered background mesothelial cells staining for WT1 and calretinin, supporting the diagnosis. Satisfactory for evaluation. 07/26/2021   Final    A. Liver (core biopsy):     - Poorly-differentiated carcinoma, most compatible with metastasis from the patient's reported breast primary. Comment:  ER / CT / Her-2 pending. Comment: This is an appended report. These results have been appended to a previously preliminary verified report. Image Results: They are reviewed and documented in Hematology/Oncology history    IR IN-Patient Thoracentesis  The patient arrived for a thoracentesis. Upon ultrasound evaluation of the right chest I did not believe that the fluid was loculated nor did I feel that there was a safe window to perform thoracentesis with the lung mostly atelectatic adjacent to the   fluid. Alternatively we sat the patient up for draining of the Asept catheter from which we were able to yield 450 cc of serosanguineous fluid. Upon cursory ultrasound of the right chest following drainage there did appear to be further reduction of the   visualized pleural fluid. No thoracentesis was performed. Workstation performed: [id number]        LABS:  Lab data are reviewed and documented in HemOnc history. No results found for this or any previous visit (from the past 48 hour(s)).           Rebecca Perez  8/10/2023, 12:21 PM

## 2023-08-02 ENCOUNTER — HOSPITAL ENCOUNTER (OUTPATIENT)
Dept: INFUSION CENTER | Facility: CLINIC | Age: 58
Discharge: HOME | End: 2023-08-02
Payer: COMMERCIAL

## 2023-08-02 VITALS
HEIGHT: 65 IN | WEIGHT: 116.4 LBS | HEART RATE: 104 BPM | TEMPERATURE: 98.4 F | SYSTOLIC BLOOD PRESSURE: 113 MMHG | BODY MASS INDEX: 19.39 KG/M2 | RESPIRATION RATE: 18 BRPM | DIASTOLIC BLOOD PRESSURE: 68 MMHG

## 2023-08-02 DIAGNOSIS — C79.51 MALIGNANT NEOPLASM METASTATIC TO BONE (HCC): Primary | ICD-10-CM

## 2023-08-02 DIAGNOSIS — J91.0 MALIGNANT PLEURAL EFFUSION: ICD-10-CM

## 2023-08-02 DIAGNOSIS — C50.911 PRIMARY MALIGNANT NEOPLASM OF RIGHT BREAST WITH METASTASIS TO OTHER SITE (HCC): ICD-10-CM

## 2023-08-02 PROCEDURE — 96367 TX/PROPH/DG ADDL SEQ IV INF: CPT

## 2023-08-02 PROCEDURE — 96415 CHEMO IV INFUSION ADDL HR: CPT

## 2023-08-02 PROCEDURE — 96375 TX/PRO/DX INJ NEW DRUG ADDON: CPT

## 2023-08-02 PROCEDURE — 96413 CHEMO IV INFUSION 1 HR: CPT

## 2023-08-02 RX ORDER — SODIUM CHLORIDE 9 MG/ML
20 INJECTION, SOLUTION INTRAVENOUS ONCE
OUTPATIENT
Start: 2023-08-08

## 2023-08-02 RX ORDER — ZOLEDRONIC ACID 0.04 MG/ML
4 INJECTION, SOLUTION INTRAVENOUS ONCE
Status: COMPLETED | OUTPATIENT
Start: 2023-08-02 | End: 2023-08-02

## 2023-08-02 RX ORDER — ACETAMINOPHEN 325 MG/1
650 TABLET ORAL ONCE
Status: COMPLETED | OUTPATIENT
Start: 2023-08-02 | End: 2023-08-02

## 2023-08-02 RX ORDER — ATROPINE SULFATE 1 MG/ML
0.25 INJECTION, SOLUTION INTRAVENOUS ONCE
Status: COMPLETED | OUTPATIENT
Start: 2023-08-02 | End: 2023-08-02

## 2023-08-02 RX ORDER — SODIUM CHLORIDE 9 MG/ML
20 INJECTION, SOLUTION INTRAVENOUS ONCE
Status: COMPLETED | OUTPATIENT
Start: 2023-08-02 | End: 2023-08-02

## 2023-08-02 RX ADMIN — ATROPINE SULFATE 0.25 MG: 1 INJECTION, SOLUTION INTRAVENOUS at 12:46

## 2023-08-02 RX ADMIN — SACITUZUMAB GOVITECAN 526 MG: 180 POWDER, FOR SOLUTION INTRAVENOUS at 12:51

## 2023-08-02 RX ADMIN — DIPHENHYDRAMINE HYDROCHLORIDE 25 MG: 50 INJECTION, SOLUTION INTRAMUSCULAR; INTRAVENOUS at 11:21

## 2023-08-02 RX ADMIN — FAMOTIDINE 20 MG: 10 INJECTION, SOLUTION INTRAVENOUS at 11:44

## 2023-08-02 RX ADMIN — ZOLEDRONIC ACID 4 MG: 0.04 INJECTION, SOLUTION INTRAVENOUS at 15:58

## 2023-08-02 RX ADMIN — SODIUM CHLORIDE 20 ML/HR: 0.9 INJECTION, SOLUTION INTRAVENOUS at 11:12

## 2023-08-02 RX ADMIN — DEXAMETHASONE SODIUM PHOSPHATE: 10 INJECTION, SOLUTION INTRAMUSCULAR; INTRAVENOUS at 12:10

## 2023-08-02 RX ADMIN — ACETAMINOPHEN 650 MG: 325 TABLET, FILM COATED ORAL at 11:21

## 2023-08-02 RX ADMIN — SODIUM CHLORIDE 20 ML/HR: 0.9 INJECTION, SOLUTION INTRAVENOUS at 15:53

## 2023-08-02 NOTE — PROGRESS NOTES
Pt to clinic for initial Trodelvy treatment and Zometa, offers no complaints today, tolerated infusions and 30 minute observation without complications, aware of next appointment, port de-accessed, avs printed and reviewed.

## 2023-08-03 ENCOUNTER — TELEPHONE (OUTPATIENT)
Dept: INTERNAL MEDICINE CLINIC | Facility: CLINIC | Age: 58
End: 2023-08-03

## 2023-08-07 ENCOUNTER — HOSPITAL ENCOUNTER (OUTPATIENT)
Dept: INFUSION CENTER | Facility: CLINIC | Age: 58
Discharge: HOME | End: 2023-08-07
Payer: COMMERCIAL

## 2023-08-07 DIAGNOSIS — C50.919 MALIGNANT NEOPLASM OF BREAST IN FEMALE, ESTROGEN RECEPTOR POSITIVE, UNSPECIFIED LATERALITY, UNSPECIFIED SITE OF BREAST (HCC): ICD-10-CM

## 2023-08-07 DIAGNOSIS — Z95.828 PORT-A-CATH IN PLACE: ICD-10-CM

## 2023-08-07 DIAGNOSIS — Z17.0 MALIGNANT NEOPLASM OF BREAST IN FEMALE, ESTROGEN RECEPTOR POSITIVE, UNSPECIFIED LATERALITY, UNSPECIFIED SITE OF BREAST (HCC): ICD-10-CM

## 2023-08-07 DIAGNOSIS — C79.51 MALIGNANT NEOPLASM METASTATIC TO BONE (HCC): Primary | ICD-10-CM

## 2023-08-07 DIAGNOSIS — C50.911 PRIMARY MALIGNANT NEOPLASM OF RIGHT BREAST WITH METASTASIS TO OTHER SITE (HCC): ICD-10-CM

## 2023-08-07 DIAGNOSIS — C78.7 MALIGNANT NEOPLASM METASTATIC TO LIVER (HCC): ICD-10-CM

## 2023-08-07 DIAGNOSIS — J91.0 MALIGNANT PLEURAL EFFUSION: ICD-10-CM

## 2023-08-07 LAB
ALBUMIN SERPL BCP-MCNC: 3 G/DL (ref 3.5–5)
ALP SERPL-CCNC: 802 U/L (ref 34–104)
ALT SERPL W P-5'-P-CCNC: 54 U/L (ref 7–52)
ANION GAP SERPL CALCULATED.3IONS-SCNC: 6 MMOL/L
AST SERPL W P-5'-P-CCNC: 122 U/L (ref 13–39)
BASOPHILS # BLD AUTO: 0.01 THOUSANDS/ÂΜL (ref 0–0.1)
BASOPHILS NFR BLD AUTO: 0 % (ref 0–1)
BILIRUB SERPL-MCNC: 1.44 MG/DL (ref 0.2–1)
BUN SERPL-MCNC: 14 MG/DL (ref 5–25)
CALCIUM ALBUM COR SERPL-MCNC: 9.2 MG/DL (ref 8.3–10.1)
CALCIUM SERPL-MCNC: 8.4 MG/DL (ref 8.4–10.2)
CHLORIDE SERPL-SCNC: 101 MMOL/L (ref 96–108)
CO2 SERPL-SCNC: 25 MMOL/L (ref 21–32)
CREAT SERPL-MCNC: 0.62 MG/DL (ref 0.6–1.3)
EOSINOPHIL # BLD AUTO: 0.04 THOUSAND/ÂΜL (ref 0–0.61)
EOSINOPHIL NFR BLD AUTO: 1 % (ref 0–6)
ERYTHROCYTE [DISTWIDTH] IN BLOOD BY AUTOMATED COUNT: 15.1 % (ref 11.6–15.1)
GFR SERPL CREATININE-BSD FRML MDRD: 101 ML/MIN/1.73SQ M
GLUCOSE SERPL-MCNC: 110 MG/DL (ref 65–140)
HCT VFR BLD AUTO: 29.5 % (ref 34.8–46.1)
HGB BLD-MCNC: 10.2 G/DL (ref 11.5–15.4)
IMM GRANULOCYTES # BLD AUTO: 0.05 THOUSAND/UL (ref 0–0.2)
IMM GRANULOCYTES NFR BLD AUTO: 1 % (ref 0–2)
LYMPHOCYTES # BLD AUTO: 0.14 THOUSANDS/ÂΜL (ref 0.6–4.47)
LYMPHOCYTES NFR BLD AUTO: 3 % (ref 14–44)
MCH RBC QN AUTO: 34.8 PG (ref 26.8–34.3)
MCHC RBC AUTO-ENTMCNC: 34.6 G/DL (ref 31.4–37.4)
MCV RBC AUTO: 101 FL (ref 82–98)
MONOCYTES # BLD AUTO: 0.1 THOUSAND/ÂΜL (ref 0.17–1.22)
MONOCYTES NFR BLD AUTO: 2 % (ref 4–12)
NEUTROPHILS # BLD AUTO: 3.95 THOUSANDS/ÂΜL (ref 1.85–7.62)
NEUTS SEG NFR BLD AUTO: 93 % (ref 43–75)
NRBC BLD AUTO-RTO: 0 /100 WBCS
PLATELET # BLD AUTO: 281 THOUSANDS/UL (ref 149–390)
PMV BLD AUTO: 11.2 FL (ref 8.9–12.7)
POTASSIUM SERPL-SCNC: 4 MMOL/L (ref 3.5–5.3)
PROT SERPL-MCNC: 6 G/DL (ref 6.4–8.4)
RBC # BLD AUTO: 2.93 MILLION/UL (ref 3.81–5.12)
SODIUM SERPL-SCNC: 132 MMOL/L (ref 135–147)
WBC # BLD AUTO: 4.29 THOUSAND/UL (ref 4.31–10.16)

## 2023-08-07 PROCEDURE — 85025 COMPLETE CBC W/AUTO DIFF WBC: CPT | Performed by: INTERNAL MEDICINE

## 2023-08-07 PROCEDURE — 80053 COMPREHEN METABOLIC PANEL: CPT | Performed by: INTERNAL MEDICINE

## 2023-08-07 NOTE — PROGRESS NOTES
Patient presents for central venous labs. Patient offers complaints of abdominal pain, pt reports taking pain medications prescribed by palliative care. Port accessed, flushed, saline locked, labs drawn, port flushed and de-accessed. Band-aid placed on site. AVS declined, next appointment reviewed.

## 2023-08-09 ENCOUNTER — HOSPITAL ENCOUNTER (OUTPATIENT)
Dept: INFUSION CENTER | Facility: CLINIC | Age: 58
Discharge: HOME | End: 2023-08-09
Payer: COMMERCIAL

## 2023-08-09 VITALS
TEMPERATURE: 98.1 F | WEIGHT: 117.4 LBS | BODY MASS INDEX: 19.56 KG/M2 | DIASTOLIC BLOOD PRESSURE: 55 MMHG | HEART RATE: 107 BPM | HEIGHT: 65 IN | SYSTOLIC BLOOD PRESSURE: 106 MMHG | RESPIRATION RATE: 18 BRPM

## 2023-08-09 DIAGNOSIS — C79.51 MALIGNANT NEOPLASM METASTATIC TO BONE (HCC): ICD-10-CM

## 2023-08-09 DIAGNOSIS — C50.911 PRIMARY MALIGNANT NEOPLASM OF RIGHT BREAST WITH METASTASIS TO OTHER SITE (HCC): ICD-10-CM

## 2023-08-09 DIAGNOSIS — J91.0 MALIGNANT PLEURAL EFFUSION: Primary | ICD-10-CM

## 2023-08-09 RX ORDER — ATROPINE SULFATE 1 MG/ML
0.25 INJECTION, SOLUTION INTRAVENOUS ONCE
Status: COMPLETED | OUTPATIENT
Start: 2023-08-09 | End: 2023-08-09

## 2023-08-09 RX ORDER — SODIUM CHLORIDE 9 MG/ML
20 INJECTION, SOLUTION INTRAVENOUS ONCE
Status: COMPLETED | OUTPATIENT
Start: 2023-08-09 | End: 2023-08-09

## 2023-08-09 RX ORDER — ACETAMINOPHEN 325 MG/1
650 TABLET ORAL ONCE
Status: COMPLETED | OUTPATIENT
Start: 2023-08-09 | End: 2023-08-09

## 2023-08-09 RX ADMIN — FAMOTIDINE 20 MG: 10 INJECTION, SOLUTION INTRAVENOUS at 11:13

## 2023-08-09 RX ADMIN — DIPHENHYDRAMINE HYDROCHLORIDE 25 MG: 50 INJECTION, SOLUTION INTRAMUSCULAR; INTRAVENOUS at 10:51

## 2023-08-09 RX ADMIN — ATROPINE SULFATE 0.25 MG: 1 INJECTION, SOLUTION INTRAVENOUS at 12:37

## 2023-08-09 RX ADMIN — ACETAMINOPHEN 650 MG: 325 TABLET ORAL at 10:50

## 2023-08-09 RX ADMIN — DEXAMETHASONE SODIUM PHOSPHATE: 10 INJECTION, SOLUTION INTRAMUSCULAR; INTRAVENOUS at 11:36

## 2023-08-09 RX ADMIN — SACITUZUMAB GOVITECAN 526 MG: 180 POWDER, FOR SOLUTION INTRAVENOUS at 12:43

## 2023-08-09 RX ADMIN — SODIUM CHLORIDE 20 ML/HR: 0.9 INJECTION, SOLUTION INTRAVENOUS at 11:25

## 2023-08-09 NOTE — PROGRESS NOTES
Pt reports to unit for Myrtle Chaparro, states she had some nausea and stomach cramping after last infusion that was alleviated with PO medication, pt has abdominal pain that she takes pain medication for from palliative care. Patient tolerated infusion well without any adverse events, positive blood return throughout. Patient aware of next appointment, port de accessed, AVS declined.

## 2023-08-10 ENCOUNTER — OFFICE VISIT (OUTPATIENT)
Dept: PALLIATIVE MEDICINE | Facility: CLINIC | Age: 58
End: 2023-08-10
Payer: COMMERCIAL

## 2023-08-10 ENCOUNTER — OFFICE VISIT (OUTPATIENT)
Dept: HEMATOLOGY ONCOLOGY | Facility: CLINIC | Age: 58
End: 2023-08-10
Payer: COMMERCIAL

## 2023-08-10 VITALS
HEART RATE: 78 BPM | DIASTOLIC BLOOD PRESSURE: 78 MMHG | OXYGEN SATURATION: 96 % | HEIGHT: 65 IN | BODY MASS INDEX: 19.54 KG/M2 | TEMPERATURE: 98.4 F | RESPIRATION RATE: 18 BRPM | SYSTOLIC BLOOD PRESSURE: 126 MMHG

## 2023-08-10 VITALS
RESPIRATION RATE: 20 BRPM | DIASTOLIC BLOOD PRESSURE: 62 MMHG | TEMPERATURE: 97 F | HEIGHT: 65 IN | WEIGHT: 120 LBS | HEART RATE: 98 BPM | BODY MASS INDEX: 19.99 KG/M2 | OXYGEN SATURATION: 98 % | SYSTOLIC BLOOD PRESSURE: 120 MMHG

## 2023-08-10 DIAGNOSIS — C50.911 PRIMARY MALIGNANT NEOPLASM OF RIGHT BREAST WITH METASTASIS TO OTHER SITE (HCC): Primary | ICD-10-CM

## 2023-08-10 DIAGNOSIS — Z51.5 PALLIATIVE CARE PATIENT: ICD-10-CM

## 2023-08-10 DIAGNOSIS — L65.9 ALOPECIA: Primary | ICD-10-CM

## 2023-08-10 DIAGNOSIS — C79.51 MALIGNANT NEOPLASM METASTATIC TO BONE (HCC): ICD-10-CM

## 2023-08-10 DIAGNOSIS — C50.911 PRIMARY MALIGNANT NEOPLASM OF RIGHT BREAST WITH METASTASIS TO OTHER SITE (HCC): ICD-10-CM

## 2023-08-10 DIAGNOSIS — R10.9 ABDOMINAL CRAMPING: ICD-10-CM

## 2023-08-10 DIAGNOSIS — J91.0 MALIGNANT PLEURAL EFFUSION: ICD-10-CM

## 2023-08-10 DIAGNOSIS — G89.3 CANCER RELATED PAIN: ICD-10-CM

## 2023-08-10 DIAGNOSIS — Z51.11 CHEMOTHERAPY MANAGEMENT, ENCOUNTER FOR: ICD-10-CM

## 2023-08-10 PROCEDURE — 99215 OFFICE O/P EST HI 40 MIN: CPT | Performed by: INTERNAL MEDICINE

## 2023-08-10 PROCEDURE — 99215 OFFICE O/P EST HI 40 MIN: CPT | Performed by: STUDENT IN AN ORGANIZED HEALTH CARE EDUCATION/TRAINING PROGRAM

## 2023-08-10 RX ORDER — DICYCLOMINE HYDROCHLORIDE 10 MG/1
10 CAPSULE ORAL EVERY 6 HOURS PRN
Qty: 40 CAPSULE | Refills: 0 | Status: ON HOLD | OUTPATIENT
Start: 2023-08-10

## 2023-08-10 RX ORDER — HYDROMORPHONE HYDROCHLORIDE 2 MG/1
4-6 TABLET ORAL
Qty: 150 TABLET | Refills: 0 | Status: ON HOLD | OUTPATIENT
Start: 2023-08-10

## 2023-08-10 NOTE — PROGRESS NOTES
Outpatient Follow-Up - Palliative and Supportive Care   Yasmine Molina 56 y.o. female [id number]    Assessment & Plan  Problem List Items Addressed This Visit        Respiratory    Malignant pleural effusion       Other    Metastatic neoplasm (HCC)    Relevant Medications    HYDROmorphone (DILAUDID) 2 mg tablet    Palliative care patient    Relevant Medications    dicyclomine (BENTYL) 10 mg capsule    HYDROmorphone (DILAUDID) 2 mg tablet    Primary malignant neoplasm of right breast with metastasis to other site [facility]) - Primary    Relevant Medications    dicyclomine (BENTYL) 10 mg capsule    HYDROmorphone (DILAUDID) 2 mg tablet    Cancer related pain    Relevant Medications    HYDROmorphone (DILAUDID) 2 mg tablet   Other Visit Diagnoses     Abdominal cramping        Relevant Medications    dicyclomine (BENTYL) 10 mg capsule        #symptom management  #cancer-related pain   - continue APAP 650 mg PO Q6H PRN              - max daily 4000 mg   - continue hydromorphone 2-4 mg PO Q3H PRN   - continue duloxetine therapy     #anxiety   - continue duloxetine 30 mg PO QDaily     #nausea   - continue metoclopramide 10 mg PO QID PRN   - continue ondansetron 8 mg PO Q8H PRN     #insomnia   - continue melatonin 6 mg PO QHS     #OIC   - continue home bowel regimen   - continued adequate hydration   - continue lactulose therapy     #abdominal cramping   - continue dicyclomine 10 mg PO Q6H PRN     #xerostomia   - recommend biotene use   - lollipops provided for xerostomia    #goals of care   - continues to drain 750-1000 mL/week from pleural catheter   - treatment focused care with no limitations at this time    #major life event   - spouse unexpectedly found down,  by son at home last week, patient and both children present at the time, complex grieving   - openly discussed struggle coping and difficulty at home, currently looking for a new place   - discussed with previous [address], known and trusted to patient, patient agreeable to update and contact for support    #psychosocial support   - emotional support provided   - Marylene Dellen   - recently  [2nd week of August]   - two children              - Armen [son, Down syndrome]              - Ankur [son]   - granddaughter recently born, in office today with mother of baby      Next [address] [place] [address] Follow up in 4 weeks. Controlled Substance Review    [place] PDMP or [place]  reviewed: No red flags were identified; safe to proceed with prescription. [place] PDMP Review       Value Time User    PDMP Reviewed  Yes 8/15/2023  1:12 PM Christina Rodriguez MD          Medications adjusted this encounter:  Requested Prescriptions     Signed Prescriptions Disp Refills   • dicyclomine (BENTYL) 10 mg capsule 40 capsule 0     Sig: Take 1 capsule (10 mg total) by mouth every 6 (six) hours as needed (abdominal cramping)   • HYDROmorphone (DILAUDID) 2 mg tablet 150 tablet 0     Sig: Take 2-3 tablets (4-6 mg total) by mouth every 3 (three) hours as needed (moderate/severe cancer-related pain) Max Daily Amount: 48 mg     No orders of the defined types were placed in this encounter. Medications Discontinued During This Encounter   Medication Reason   • dicyclomine (BENTYL) 10 mg capsule Reorder   • HYDROmorphone (DILAUDID) 2 mg tablet Reorder         Rocío French was seen today for symptoms and planning cares related to above illnesses. I have reviewed the patient's controlled substance dispensing history in the Prescription Drug Monitoring Program in compliance with the [facility] regulations before prescribing any controlled substances. They are invited to continue to follow with us. If there are questions or concerns, please contact us through our clinic/answering service 24 hours a day, seven days a week.     Kimberlyn Lu MD  [facility] Palliative and Supportive Care  [phone]      Visit Information    Accompanied By: No one    Source of History: Self, Medical record History Limitations: None      History of Present Illness    Della Green is a 56 y.o. female who presents in follow up of symptoms related to metastatic breast cancer c/b recurrent malignant pleural effusions. Pertinent issues include: symptom management, pain, neoplasm related, depression or anxiety, assessment of goals of care, disease process education and discussion of prognosis. Patient reports struggling emotionally as spouse unexpectedly  last week, found by son and patient and other child were present at home as well. Openly discussed complex grieving, especially during continued cancer treatment. Persistent R rib pain, use of 3-4 PO hydromorphone/day + APAP PRN. Periodic stomach cramping. Denies nausea, vomiting. Appetite fair, recent small volume weight gain. BM regular, last today. Sleep adequate. Continues to drain 750-1000 mL/week from pleural cathter. Past medical, surgical, social, and family histories are reviewed and pertinent updates are made. Review of Systems   Constitutional: Positive for malaise/fatigue and weight gain. Negative for chills, decreased appetite, fever and weight loss. HENT: Negative for congestion. Eyes: Negative for visual disturbance. Cardiovascular: Negative for chest pain. Respiratory: Negative for shortness of breath. Musculoskeletal: Negative for falls and neck pain. R rib pain   Gastrointestinal: Negative for abdominal pain, constipation, nausea and vomiting. Abdominal cramping   Genitourinary: Negative for frequency. Psychiatric/Behavioral: Positive for depression. Negative for suicidal ideas. The patient does not have insomnia. All other systems reviewed and are negative.         Vital Signs    /62 (BP Location: Left arm, Patient Position: Sitting, Cuff Size: Standard)   Pulse 98   Temp (!) 97 °F (36.1 °C) (Tympanic)   Resp 20   Ht 5' 5" (1.651 m)   Wt 54.4 kg (120 lb)   LMP 2021 (Approximate)   SpO2 98%   BMI 19.97 kg/m²     Physical Exam and Objective Data  Physical Exam  Vitals and nursing note reviewed. Constitutional:       General: She is awake. Appearance: She is not diaphoretic. Comments: Sitting up in NAD  BMI 20.0  Non-toxic appearing   HENT:      Head: Normocephalic and atraumatic. Right Ear: External ear normal.      Left Ear: External ear normal.      Nose: No rhinorrhea. Eyes:      Comments: No gaze preference   Cardiovascular:      Rate and Rhythm: Normal rate. Pulmonary:      Effort: No tachypnea, accessory muscle usage or respiratory distress. Comments: Completes full sentences without difficulty  Musculoskeletal:      Cervical back: Normal range of motion. Neurological:      General: No focal deficit present. Mental Status: She is alert and oriented to person, place, and time.    Psychiatric:         Attention and Perception: Attention normal.         Mood and Affect: Mood and affect normal.         Speech: Speech normal.         Cognition and Memory: Cognition and memory normal.           Radiology and Laboratory:  I personally reviewed and interpreted the following results:    Most Recent COVID-19 Results:  Lab Results   Component Value Date/Time    SARSCOV2 Negative 07/04/2023 06:37 PM    SARSCOV2 Negative 08/20/2022 08:14 PM    SARSCOV2 Positive (A) 12/20/2020 12:25 PM       Most Recent Lab Work:  Lab Results   Component Value Date/Time    SODIUM 130 (L) 08/13/2023 08:44 PM    K 4.1 08/13/2023 08:44 PM    K 3.7 04/02/2014 01:15 PM    BUN 18 08/13/2023 08:44 PM    BUN 12 04/02/2014 01:15 PM    CREATININE 0.72 08/13/2023 08:44 PM    CREATININE 0.80 04/02/2014 01:15 PM    GLUC 99 08/13/2023 08:44 PM     Lab Results   Component Value Date/Time     (H) 08/13/2023 08:44 PM    AST 23 04/02/2014 01:15 PM    ALT 54 (H) 08/13/2023 08:44 PM    ALT 15 04/02/2014 01:15 PM    ALB 3.1 (L) 08/13/2023 08:44 PM    ALB 3.4 (L) 04/02/2014 01:15 PM     Lab Results Component Value Date/Time    HGB 9.7 (L) 2023 08:44 PM    WBC 0.23 (LL) 2023 08:44 PM     2023 08:44 PM    INR 1.12 2023 06:37 PM    PTT 33 2023 06:37 PM       Most Recent Imaging [last 30 days]:  No results found. 60 minutes was spent face to face with Kai Best with greater than 50% of the time spent in counseling or coordination of care including discussions of provided medical updates, discussed palliative care, determined goals of care, determined social/family support, discussed plans of care, discussed symptom management, provided psychosocial support. Supportive listening, spouse unexpectedly . PDMP Reviewed. All of the patient's or agent's questions were answered during this discussion.

## 2023-08-12 ENCOUNTER — TELEPHONE (OUTPATIENT)
Dept: OTHER | Facility: OTHER | Age: 58
End: 2023-08-12

## 2023-08-13 ENCOUNTER — APPOINTMENT (EMERGENCY)
Dept: RADIOLOGY | Facility: HOSPITAL | Age: 58
End: 2023-08-13
Payer: COMMERCIAL

## 2023-08-13 ENCOUNTER — HOSPITAL ENCOUNTER (EMERGENCY)
Facility: HOSPITAL | Age: 58
Discharge: HOME | End: 2023-08-13
Attending: EMERGENCY MEDICINE | Admitting: EMERGENCY MEDICINE
Payer: COMMERCIAL

## 2023-08-13 VITALS
HEART RATE: 81 BPM | DIASTOLIC BLOOD PRESSURE: 64 MMHG | WEIGHT: 120 LBS | SYSTOLIC BLOOD PRESSURE: 104 MMHG | HEIGHT: 65 IN | BODY MASS INDEX: 19.99 KG/M2 | OXYGEN SATURATION: 97 % | TEMPERATURE: 98 F | RESPIRATION RATE: 18 BRPM

## 2023-08-13 DIAGNOSIS — J90 RECURRENT PLEURAL EFFUSION ON RIGHT: Primary | ICD-10-CM

## 2023-08-13 LAB
ALBUMIN SERPL BCP-MCNC: 3.1 G/DL (ref 3.5–5)
ALP SERPL-CCNC: 1122 U/L (ref 34–104)
ALT SERPL W P-5'-P-CCNC: 54 U/L (ref 7–52)
ANION GAP SERPL CALCULATED.3IONS-SCNC: 6 MMOL/L
ANISOCYTOSIS BLD QL SMEAR: PRESENT
AST SERPL W P-5'-P-CCNC: 119 U/L (ref 13–39)
BASOPHILS # BLD MANUAL: 0 THOUSAND/UL (ref 0–0.1)
BASOPHILS NFR MAR MANUAL: 0 % (ref 0–1)
BILIRUB SERPL-MCNC: 1.96 MG/DL (ref 0.2–1)
BUN SERPL-MCNC: 18 MG/DL (ref 5–25)
CALCIUM ALBUM COR SERPL-MCNC: 8.7 MG/DL (ref 8.3–10.1)
CALCIUM SERPL-MCNC: 8 MG/DL (ref 8.4–10.2)
CARDIAC TROPONIN I PNL SERPL HS: 13 NG/L
CHLORIDE SERPL-SCNC: 101 MMOL/L (ref 96–108)
CO2 SERPL-SCNC: 23 MMOL/L (ref 21–32)
CREAT SERPL-MCNC: 0.72 MG/DL (ref 0.6–1.3)
EOSINOPHIL # BLD MANUAL: 0.01 THOUSAND/UL (ref 0–0.4)
EOSINOPHIL NFR BLD MANUAL: 4 % (ref 0–6)
ERYTHROCYTE [DISTWIDTH] IN BLOOD BY AUTOMATED COUNT: 15.1 % (ref 11.6–15.1)
GFR SERPL CREATININE-BSD FRML MDRD: 94 ML/MIN/1.73SQ M
GLUCOSE SERPL-MCNC: 99 MG/DL (ref 65–140)
HCT VFR BLD AUTO: 27.4 % (ref 34.8–46.1)
HGB BLD-MCNC: 9.7 G/DL (ref 11.5–15.4)
LYMPHOCYTES # BLD AUTO: 0.11 THOUSAND/UL (ref 0.6–4.47)
LYMPHOCYTES # BLD AUTO: 48 % (ref 14–44)
MACROCYTES BLD QL AUTO: PRESENT
MCH RBC QN AUTO: 35.4 PG (ref 26.8–34.3)
MCHC RBC AUTO-ENTMCNC: 35.4 G/DL (ref 31.4–37.4)
MCV RBC AUTO: 100 FL (ref 82–98)
MONOCYTES # BLD AUTO: 0.01 THOUSAND/UL (ref 0–1.22)
MONOCYTES NFR BLD: 4 % (ref 4–12)
NEUTROPHILS # BLD MANUAL: 0.1 THOUSAND/UL (ref 1.85–7.62)
NEUTS SEG NFR BLD AUTO: 44 % (ref 43–75)
PLATELET # BLD AUTO: 228 THOUSANDS/UL (ref 149–390)
PLATELET BLD QL SMEAR: ADEQUATE
PMV BLD AUTO: 10.9 FL (ref 8.9–12.7)
POTASSIUM SERPL-SCNC: 4.1 MMOL/L (ref 3.5–5.3)
PROT SERPL-MCNC: 6.1 G/DL (ref 6.4–8.4)
RBC # BLD AUTO: 2.74 MILLION/UL (ref 3.81–5.12)
SODIUM SERPL-SCNC: 130 MMOL/L (ref 135–147)
WBC # BLD AUTO: 0.23 THOUSAND/UL (ref 4.31–10.16)

## 2023-08-13 PROCEDURE — 99285 EMERGENCY DEPT VISIT HI MDM: CPT | Performed by: EMERGENCY MEDICINE

## 2023-08-13 PROCEDURE — 93005 ELECTROCARDIOGRAM TRACING: CPT

## 2023-08-13 PROCEDURE — 99284 EMERGENCY DEPT VISIT MOD MDM: CPT | Performed by: EMERGENCY MEDICINE

## 2023-08-13 PROCEDURE — 71046 X-RAY EXAM CHEST 2 VIEWS: CPT

## 2023-08-13 PROCEDURE — 36415 COLL VENOUS BLD VENIPUNCTURE: CPT | Performed by: PHYSICIAN ASSISTANT

## 2023-08-13 PROCEDURE — NC001 PR NO CHARGE: Performed by: PHYSICIAN ASSISTANT

## 2023-08-13 PROCEDURE — 85027 COMPLETE CBC AUTOMATED: CPT | Performed by: PHYSICIAN ASSISTANT

## 2023-08-13 PROCEDURE — 80053 COMPREHEN METABOLIC PANEL: CPT | Performed by: PHYSICIAN ASSISTANT

## 2023-08-13 PROCEDURE — 84484 ASSAY OF TROPONIN QUANT: CPT | Performed by: PHYSICIAN ASSISTANT

## 2023-08-13 PROCEDURE — 99285 EMERGENCY DEPT VISIT HI MDM: CPT

## 2023-08-13 PROCEDURE — 85007 BL SMEAR W/DIFF WBC COUNT: CPT | Performed by: PHYSICIAN ASSISTANT

## 2023-08-13 NOTE — TELEPHONE ENCOUNTER
Pt called stating she has a drain in her lungs but is unable to drain it as the tube is clogged. On call notified via TC.

## 2023-08-14 NOTE — DISCHARGE INSTRUCTIONS
Return sooner to the Emergency Department if increased shortness of breath, fever, pain, vomiting, bleeding, weakness, dizziness. Statement Selected

## 2023-08-14 NOTE — ED PROVIDER NOTES
History  Chief Complaint   Patient presents with   • Medical Problem     Patient reports blocked pleural catheter since yesterday and she is unable to drain pleural fluid. 57-year-old female patient with history of breast cancer as well as recurrent malignant pleural effusion (right-sided) coming in for a possibly obstructed aseptic catheter. She has a chronic indwelling catheter that she drains once a week and normally ranges from 750-1,000 mL. She most recently drained it 1 week ago and since then has not been able to drain anything out. She has increasing shortness of breath and chest pain. She has dyspnea with minimal exertion. Denies any fever or chills. History provided by:  Patient   used: No    Medical Problem  Associated symptoms: chest pain and shortness of breath    Associated symptoms: no abdominal pain, no cough, no ear pain, no fever, no rash, no sore throat and no vomiting        Prior to Admission Medications   Prescriptions Last Dose Informant Patient Reported? Taking?    CRANIAL PROSTHESIS, RX,   No No   Sig: Apply to affected area of alopecia   DULoxetine (CYMBALTA) 30 mg delayed release capsule  Self No No   Sig: Take 1 capsule (30 mg total) by mouth daily   HYDROmorphone (DILAUDID) 2 mg tablet   No No   Sig: Take 2-3 tablets (4-6 mg total) by mouth every 3 (three) hours as needed (moderate/severe cancer-related pain) Max Daily Amount: 48 mg   Lidocaine Viscous HCl (XYLOCAINE) 2 % mucosal solution  Self No No   Sig: Swish and spit 10 mL 4 (four) times a day as needed for mouth pain or discomfort   Patient not taking: Reported on 7/18/2023   Xarelto 20 MG tablet  Self No No   Sig: TAKE 1 TABLET BY MOUTH EVERY DAY WITH BREAKFAST   acetaminophen (TYLENOL) 325 mg tablet  Self No No   Sig: Take 2 tablets (650 mg total) by mouth every 6 (six) hours as needed for mild pain   al mag oxide-diphenhydramine-lidocaine viscous (MAGIC MOUTHWASH) 1:1:1 suspension  Self No No Sig: Swish and spit 10 mL every 4 (four) hours as needed for mouth pain or discomfort   Patient not taking: Reported on 7/5/2023   albuterol (PROVENTIL HFA,VENTOLIN HFA) 90 mcg/act inhaler  Self No No   Sig: Inhale 2 puffs every 4 (four) hours as needed for wheezing   calcium carbonate (TUMS) 500 mg chewable tablet  Self No No   Sig: Chew 1 tablet (500 mg total) 3 (three) times a day as needed for indigestion or heartburn   dicyclomine (BENTYL) 10 mg capsule   No No   Sig: Take 1 capsule (10 mg total) by mouth every 6 (six) hours as needed (abdominal cramping)   diphenhydrAMINE (BENADRYL) 50 MG tablet  Self No No   Sig: Take 1 tablet (50 mg total) by mouth daily at bedtime as needed for itching or sleep   diphenoxylate-atropine (LOMOTIL) 2.5-0.025 mg per tablet  Self No No   Sig: Take 1 tablet by mouth 4 (four) times a day as needed for diarrhea   docusate sodium (COLACE) 100 mg capsule   No No   Sig: Take 1 capsule (100 mg total) by mouth 2 (two) times a day for 10 days   lactulose 20 g/30 mL  Self No No   Sig: Take 15 mL (10 g total) by mouth daily as needed (constipaton)   metoclopramide (REGLAN) 10 mg tablet  Self No No   Sig: Take 1 tablet (10 mg total) by mouth 4 (four) times a day   multivitamin (THERAGRAN) TABS  Self Yes No   Sig: Take 1 tablet by mouth   ondansetron (ZOFRAN) 8 mg tablet  Self No No   Sig: Take 1 tablet (8 mg total) by mouth every 8 (eight) hours as needed for nausea or vomiting   oxybutynin (DITROPAN-XL) 5 mg 24 hr tablet  Self No No   Sig: Take 1 tablet (5 mg total) by mouth daily Take 1 tablet daily   polyethylene glycol (MIRALAX) 17 g packet   No No   Sig: Take 17 g by mouth daily   senna (SENOKOT) 8.6 MG tablet  Self No No   Sig: Take 1 tablet (8.6 mg total) by mouth daily at bedtime as needed for constipation      Facility-Administered Medications: None       Past Medical History:   Diagnosis Date   • Cancer ([address])    • Headache(784.0) 11/2021   • History of radiation exposure    • Malignant neoplasm of right female breast [facility]) 2012    right       Past Surgical History:   Procedure Laterality Date   • BACK SURGERY     • BREAST CYST EXCISION     • BREAST LUMPECTOMY Right 2012   • HIP SURGERY Right     debridement of femur   • IR BIOPSY LIVER MASS  2021   • IR MICROWAVE ABLATION  2022   • IR PLEURAL EFFUSION LONG-TERM CATHETER PLACEMENT  2021   • IR PLEURAL EFFUSION LONG-TERM CATHETER REMOVAL  2022   • IR PORT PLACEMENT  2022   • IR THORACENTESIS  2021   • IR THORACENTESIS  2023       Family History   Problem Relation Age of Onset   • Breast cancer Mother         in her 63's   • Breast cancer Sister         inher 45s and 48   • Colon cancer Maternal Grandmother    • No Known Problems Paternal Grandmother    • Breast cancer Other    • No Known Problems Paternal Aunt      I have reviewed and agree with the history as documented. E-Cigarette/Vaping   • E-Cigarette Use Never User      E-Cigarette/Vaping Substances   • Nicotine No    • THC No    • CBD No    • Flavoring No    • Other No    • Unknown No      Social History     Tobacco Use   • Smoking status: Some Days     Packs/day: 0.25     Years: 15.00     Total pack years: 3.75     Types: Cigarettes     Start date: 20     Last attempt to quit: 7/10/2021     Years since quittin.0   • Smokeless tobacco: Never   Vaping Use   • Vaping Use: Never used   Substance Use Topics   • Alcohol use: Not Currently   • Drug use: Not Currently       Review of Systems   Constitutional: Negative for chills and fever. HENT: Negative for ear pain and sore throat. Eyes: Negative for pain and visual disturbance. Respiratory: Positive for chest tightness and shortness of breath. Negative for cough. Cardiovascular: Positive for chest pain. Negative for palpitations. Gastrointestinal: Negative for abdominal pain and vomiting. Genitourinary: Negative for dysuria and hematuria.    Musculoskeletal: Negative for arthralgias and back pain. Skin: Negative for color change and rash. Neurological: Negative for seizures and syncope. All other systems reviewed and are negative. Physical Exam  Physical Exam  Vitals and nursing note reviewed. Constitutional:       General: She is not in acute distress. Appearance: She is well-developed. HENT:      Head: Normocephalic and atraumatic. Eyes:      Conjunctiva/sclera: Conjunctivae normal.   Cardiovascular:      Rate and Rhythm: Normal rate and regular rhythm. Heart sounds: No murmur heard. Pulmonary:      Effort: Pulmonary effort is normal. No respiratory distress. Breath sounds: Examination of the right-lower field reveals decreased breath sounds. Decreased breath sounds present. Abdominal:      Palpations: Abdomen is soft. Tenderness: There is no abdominal tenderness. Musculoskeletal:         General: No swelling. Cervical back: Neck supple. Skin:     General: Skin is warm and dry. Capillary Refill: Capillary refill takes less than 2 seconds. Neurological:      Mental Status: She is alert.    Psychiatric:         Mood and Affect: Mood normal.         Vital Signs  ED Triage Vitals [08/13/23 1908]   Temperature Pulse Respirations Blood Pressure SpO2   98 °F (36.7 °C) 91 18 118/64 98 %      Temp Source Heart Rate Source Patient Position - Orthostatic VS BP Location FiO2 (%)   Tympanic Monitor Sitting Left arm --      Pain Score       --           Vitals:    08/13/23 1908 08/13/23 1930   BP: 118/64 104/64   Pulse: 91 81   Patient Position - Orthostatic VS: Sitting          Visual Acuity      ED Medications  Medications - No data to display    Diagnostic Studies  Results Reviewed     Procedure Component Value Units Date/Time    RBC Morphology Reflex Test [[id number]] Collected: 08/13/23 2044    Lab Status: Final result Specimen: Blood from Arm, Left Updated: 08/13/23 2201    CBC and differential [[id number]]  (Abnormal) Collected: 08/13/23 2044    Lab Status: Final result Specimen: Blood from Arm, Left Updated: 08/13/23 2135     WBC 0.23 Thousand/uL      RBC 2.74 Million/uL      Hemoglobin 9.7 g/dL      Hematocrit 27.4 %       fL      MCH 35.4 pg      MCHC 35.4 g/dL      RDW 15.1 %      MPV 10.9 fL      Platelets 973 Thousands/uL     Manual Differential(PHLEBS Do Not Order) [[id number]]  (Abnormal) Collected: 08/13/23 2044    Lab Status: Final result Specimen: Blood from Arm, Left Updated: 08/13/23 2130     Segmented % 44 %      Lymphocytes % 48 %      Monocytes % 4 %      Eosinophils, % 4 %      Basophils % 0 %      Absolute Neutrophils 0.10 Thousand/uL      Lymphocytes Absolute 0.11 Thousand/uL      Monocytes Absolute 0.01 Thousand/uL      Eosinophils Absolute 0.01 Thousand/uL      Basophils Absolute 0.00 Thousand/uL      Total Counted --     Platelet Estimate Adequate     Anisocytosis Present     Macrocytes Present    HS Troponin 0hr (reflex protocol) [[id number]]  (Normal) Collected: 08/13/23 2044    Lab Status: Final result Specimen: Blood from Arm, Left Updated: 08/13/23 2120     hs TnI 0hr 13 ng/L     Comprehensive metabolic panel [[id number]]  (Abnormal) Collected: 08/13/23 2044    Lab Status: Final result Specimen: Blood from Arm, Left Updated: 08/13/23 2115     Sodium 130 mmol/L      Potassium 4.1 mmol/L      Chloride 101 mmol/L      CO2 23 mmol/L      ANION GAP 6 mmol/L      BUN 18 mg/dL      Creatinine 0.72 mg/dL      Glucose 99 mg/dL      Calcium 8.0 mg/dL      Corrected Calcium 8.7 mg/dL       U/L      ALT 54 U/L      Alkaline Phosphatase 1,122 U/L      Total Protein 6.1 g/dL      Albumin 3.1 g/dL      Total Bilirubin 1.96 mg/dL      eGFR 94 ml/min/1.73sq m     Narrative:      [place] guidelines for Chronic Kidney Disease (CKD):   •  Stage 1 with normal or high GFR (GFR > 90 mL/min/1.73 square meters)  •  Stage 2 Mild CKD (GFR = 60-89 mL/min/1.73 square meters)  •  Stage 3A Moderate CKD (GFR = 45-59 mL/min/1.73 square meters)  •  Stage 3B Moderate CKD (GFR = 30-44 mL/min/1.73 square meters)  •  Stage 4 Severe CKD (GFR = 15-29 mL/min/1.73 square meters)  •  Stage 5 End Stage CKD (GFR <15 mL/min/1.73 square meters)  Note: GFR calculation is accurate only with a steady state creatinine                 XR chest 2 views   Final Result by Darylene Gentles, MD (08/14 0518)      Right pleural drainage catheter with no pneumothorax. Moderate loculated right effusion and right base atelectasis. Workstation performed: [id number]                    Procedures  ECG 12 Lead Documentation Only    Date/Time: 8/13/2023 9:18 PM    Performed by: Angela Issa PA-C  Authorized by: Angela Issa PA-C    ECG reviewed by me, the ED Provider: yes    Patient location:  ED  Interpretation:     Interpretation: normal    Quality:     Tracing quality:  Limited by artifact  Rate:     ECG rate:  84    ECG rate assessment: normal    Rhythm:     Rhythm: sinus rhythm    Ectopy:     Ectopy: none    QRS:     QRS axis:  Normal    QRS intervals:  Normal  Conduction:     Conduction: normal    ST segments:     ST segments:  Normal  T waves:     T waves: normal               ED Course  ED Course as of 08/15/23 1428   Sun Aug 13, 2023   2119 Alkaline Phosphatase(!): 1,122  Acute on chronic. Has known bony metastasis. 2216 Discussed with IR on call. Recommended flushing with 10cc sterile saline. Flushed without difficulty. Afterwards attempted to pull back and able to pull of 30-40cc fluid. Then checked ambulatory O2 and her sat maintained around %. I offered admission to have IR evaluate the catheter tomorrow however given her history of known malignancy as well as current leukopenia we discussed outpatient f/u with IR. She would prefer to go home. Return parameters provided. Pt understands and agrees with plan.                                  SBIRT 22yo+    Flowsheet Row Most Recent Value   Initial Alcohol Screen: US AUDIT-C     1. How often do you have a drink containing alcohol? 0 Filed at: 08/13/2023 1910   2. How many drinks containing alcohol do you have on a typical day you are drinking? 0 Filed at: 08/13/2023 1910   3a. Male UNDER 65: How often do you have five or more drinks on one occasion? 0 Filed at: 08/13/2023 1910   3b. FEMALE Any Age, or MALE 65+: How often do you have 4 or more drinks on one occassion? 0 Filed at: 08/13/2023 1910   Audit-C Score 0 Filed at: 08/13/2023 1910   MIRIAN: How many times in the past year have you. .. Used an illegal drug or used a prescription medication for non-medical reasons? Never Filed at: 08/13/2023 1910                    Medical Decision Making  DDX including but not limited to: pneumonia, pleural effusion, CHF, PE, PTX, ACS, MI, asthma exacerbation, COPD exacerbation, COVID 19, EVALI, anemia, metabolic abnormality, renal failure. Plan: XR. Cardiac workup. Dispo pending. MDM: 57 yo here to eval her Asep cath. XR reveals recurrent malignant effusion. After discussion with IR attempted to flush the catheter with 10cc sterile saline. Flushed easily without resistance and afterwards able to withdrawal about 30cc. She was offered admission for tube check vs close outpatient f/u with IR. We discussed risks of admission with immunosuppression and leukopenia. After discussion patient would like to go home and f/u. Return parameters provided. Pt understands and agrees with plan. Amount and/or Complexity of Data Reviewed  Labs: ordered. Decision-making details documented in ED Course. Radiology: ordered.           Disposition  Final diagnoses:   Recurrent pleural effusion on right     Time reflects when diagnosis was documented in both MDM as applicable and the Disposition within this note     Time User Action Codes Description Comment    8/13/2023 10:18 PM Gali SEGURA Add [J90] Recurrent pleural effusion on right       ED Disposition     ED Disposition Discharge    Condition   Stable    Date/Time   Sun Aug 13, 2023 10:18 PM    Comment   Culver Sujey discharge to home/self care. Follow-up Information     Follow up With Specialties Details Why Contact Info Additional [facility] Interventional Radiology Radiology Call in 1 day  [address]  [phone] [address] [place] [address] Interventional Radiology   [phone]  Upon arrival please use the self service kiosk in the main lobby to check in.           Discharge Medication List as of 8/13/2023 10:21 PM      CONTINUE these medications which have NOT CHANGED    Details   acetaminophen (TYLENOL) 325 mg tablet Take 2 tablets (650 mg total) by mouth every 6 (six) hours as needed for mild pain, Starting Thu 4/7/2022, Normal      al mag oxide-diphenhydramine-lidocaine viscous (MAGIC MOUTHWASH) 1:1:1 suspension Swish and spit 10 mL every 4 (four) hours as needed for mouth pain or discomfort, Starting Tue 3/8/2022, Normal      albuterol (PROVENTIL HFA,VENTOLIN HFA) 90 mcg/act inhaler Inhale 2 puffs every 4 (four) hours as needed for wheezing, Starting Thu 7/29/2021, Normal      calcium carbonate (TUMS) 500 mg chewable tablet Chew 1 tablet (500 mg total) 3 (three) times a day as needed for indigestion or heartburn, Starting Thu 7/29/2021, Normal      CRANIAL PROSTHESIS, RX, Apply to affected area of alopecia, Print      dicyclomine (BENTYL) 10 mg capsule Take 1 capsule (10 mg total) by mouth every 6 (six) hours as needed (abdominal cramping), Starting Thu 8/10/2023, Normal      diphenhydrAMINE (BENADRYL) 50 MG tablet Take 1 tablet (50 mg total) by mouth daily at bedtime as needed for itching or sleep, Starting Tue 7/12/2022, Normal      diphenoxylate-atropine (LOMOTIL) 2.5-0.025 mg per tablet Take 1 tablet by mouth 4 (four) times a day as needed for diarrhea, Starting Mon 8/30/2021, Normal      docusate sodium (COLACE) 100 mg capsule Take 1 capsule (100 mg total) by mouth 2 (two) times a day for 10 days, Starting Wed 6/29/2022, Until Tue 7/18/2023, Normal      DULoxetine (CYMBALTA) 30 mg delayed release capsule Take 1 capsule (30 mg total) by mouth daily, Starting Wed 9/21/2022, Normal      HYDROmorphone (DILAUDID) 2 mg tablet Take 2-3 tablets (4-6 mg total) by mouth every 3 (three) hours as needed (moderate/severe cancer-related pain) Max Daily Amount: 48 mg, Starting Thu 8/10/2023, Normal      lactulose 20 g/30 mL Take 15 mL (10 g total) by mouth daily as needed (constipaton), Starting Mon 6/5/2023, Normal      Lidocaine Viscous HCl (XYLOCAINE) 2 % mucosal solution Swish and spit 10 mL 4 (four) times a day as needed for mouth pain or discomfort, Starting Thu 3/10/2022, Normal      metoclopramide (REGLAN) 10 mg tablet Take 1 tablet (10 mg total) by mouth 4 (four) times a day, Starting Thu 9/16/2021, Normal      multivitamin (THERAGRAN) TABS Take 1 tablet by mouth, Historical Med      ondansetron (ZOFRAN) 8 mg tablet Take 1 tablet (8 mg total) by mouth every 8 (eight) hours as needed for nausea or vomiting, Starting Mon 6/5/2023, Normal      oxybutynin (DITROPAN-XL) 5 mg 24 hr tablet Take 1 tablet (5 mg total) by mouth daily Take 1 tablet daily, Starting Thu 8/18/2022, Normal      polyethylene glycol (MIRALAX) 17 g packet Take 17 g by mouth daily, Starting Sat 7/8/2023, Normal      senna (SENOKOT) 8.6 MG tablet Take 1 tablet (8.6 mg total) by mouth daily at bedtime as needed for constipation, Starting Tue 5/17/2022, Normal      Xarelto 20 MG tablet TAKE 1 TABLET BY MOUTH EVERY DAY WITH BREAKFAST, Normal      methylPREDNISolone 4 MG tablet therapy pack Use as directed on package, Normal                 PDMP Review       Value Time User    PDMP Reviewed  Yes 8/15/2023  1:12 PM Jorge Arriaga MD          ED Provider  Electronically Signed by           Patricia López PA-C  08/15/23 1424

## 2023-08-15 ENCOUNTER — TELEPHONE (OUTPATIENT)
Age: 58
End: 2023-08-15

## 2023-08-15 ENCOUNTER — OFFICE VISIT (OUTPATIENT)
Dept: INTERNAL MEDICINE CLINIC | Facility: CLINIC | Age: 58
End: 2023-08-15
Payer: COMMERCIAL

## 2023-08-15 VITALS
SYSTOLIC BLOOD PRESSURE: 96 MMHG | TEMPERATURE: 98.7 F | DIASTOLIC BLOOD PRESSURE: 60 MMHG | HEART RATE: 104 BPM | HEIGHT: 65 IN | OXYGEN SATURATION: 97 % | RESPIRATION RATE: 18 BRPM | WEIGHT: 120 LBS | BODY MASS INDEX: 19.99 KG/M2

## 2023-08-15 DIAGNOSIS — J02.9 SORE THROAT: ICD-10-CM

## 2023-08-15 DIAGNOSIS — J91.0 MALIGNANT PLEURAL EFFUSION: ICD-10-CM

## 2023-08-15 DIAGNOSIS — Z20.818 EXPOSURE TO STREPTOCOCCAL PHARYNGITIS: Primary | ICD-10-CM

## 2023-08-15 DIAGNOSIS — D84.9 IMMUNOCOMPROMISED (HCC): ICD-10-CM

## 2023-08-15 DIAGNOSIS — J90 PLEURAL EFFUSION: Primary | ICD-10-CM

## 2023-08-15 PROCEDURE — 99214 OFFICE O/P EST MOD 30 MIN: CPT | Performed by: INTERNAL MEDICINE

## 2023-08-15 RX ORDER — AMOXICILLIN 500 MG/1
500 CAPSULE ORAL EVERY 8 HOURS SCHEDULED
Qty: 30 CAPSULE | Refills: 0 | Status: ON HOLD | OUTPATIENT
Start: 2023-08-15 | End: 2023-08-25

## 2023-08-15 NOTE — TELEPHONE ENCOUNTER
The patient called to report that she had to go to the ER because she had a blockage and they removed a lot of fluid. She reports that she has not been able to drain her and she is in a lot of pain and discomfort.     Paged the on call provider Via [facility]

## 2023-08-15 NOTE — PROGRESS NOTES
Assessment/Plan:     Patient is here with h/o breast cancer as well as recurrent malignant pleural effusion (right-sided) was seen in the er with concerns of an obstructed aseptic catheter in the s/o chronic indwelling catheter that she drains once a week and normally ranges from 750-1,000 mL. she was not able to drain any fluid, proceeded to the ER. She is here today with reports of sore throat; she reports being exposed to strep by her son. She is immunocompromised. We will send in amoxicillin prophylactically. We discussed the death of her . She is coping. Has supportive sons. Follow-up in 1 month. Still waiting to hear from IR about her obstructed catheter. Quality Measures:     Depression Screening and Follow-up Plan: Clincally patient does not have depression. No treatment is required. Tobacco Cessation Counseling: Tobacco cessation counseling was provided. The patient is sincerely urged to quit consumption of tobacco. She is not ready to quit tobacco.          Return in 4 weeks (on 9/12/2023). No problem-specific Assessment & Plan notes found for this encounter. Diagnoses and all orders for this visit:    Exposure to Streptococcal pharyngitis  -     amoxicillin (AMOXIL) 500 mg capsule; Take 1 capsule (500 mg total) by mouth every 8 (eight) hours for 10 days    Sore throat  -     amoxicillin (AMOXIL) 500 mg capsule; Take 1 capsule (500 mg total) by mouth every 8 (eight) hours for 10 days    Immunocompromised (HCC)  -     amoxicillin (AMOXIL) 500 mg capsule; Take 1 capsule (500 mg total) by mouth every 8 (eight) hours for 10 days    Malignant pleural effusion          Subjective:      Patient ID: Héctor Wan is a 56 y.o. female. Here for er f/u.       ALLERGIES:  Allergies   Allergen Reactions   • Codeine Anaphylaxis   • Morphine GI Intolerance, Itching and Vomiting   • Sulfa Antibiotics Hives and Itching       CURRENT MEDICATIONS:    Current Outpatient Medications:   • acetaminophen (TYLENOL) 325 mg tablet, Take 2 tablets (650 mg total) by mouth every 6 (six) hours as needed for mild pain, Disp: 100 tablet, Rfl: 0  •  albuterol (PROVENTIL HFA,VENTOLIN HFA) 90 mcg/act inhaler, Inhale 2 puffs every 4 (four) hours as needed for wheezing, Disp: 8 g, Rfl: 0  •  amoxicillin (AMOXIL) 500 mg capsule, Take 1 capsule (500 mg total) by mouth every 8 (eight) hours for 10 days, Disp: 30 capsule, Rfl: 0  •  calcium carbonate (TUMS) 500 mg chewable tablet, Chew 1 tablet (500 mg total) 3 (three) times a day as needed for indigestion or heartburn, Disp: 30 tablet, Rfl: 0  •  CRANIAL PROSTHESIS, RX,, Apply to cranial area PRN, Disp: 1 each, Rfl: 0  •  CRANIAL PROSTHESIS, RX,, Apply to affected area of alopecia, Disp: 1 each, Rfl: 0  •  dicyclomine (BENTYL) 10 mg capsule, Take 1 capsule (10 mg total) by mouth every 6 (six) hours as needed (abdominal cramping), Disp: 40 capsule, Rfl: 0  •  diphenhydrAMINE (BENADRYL) 50 MG tablet, Take 1 tablet (50 mg total) by mouth daily at bedtime as needed for itching or sleep, Disp: 30 tablet, Rfl: 0  •  diphenoxylate-atropine (LOMOTIL) 2.5-0.025 mg per tablet, Take 1 tablet by mouth 4 (four) times a day as needed for diarrhea, Disp: 30 tablet, Rfl: 0  •  docusate sodium (COLACE) 100 mg capsule, Take 1 capsule (100 mg total) by mouth 2 (two) times a day for 10 days, Disp: 20 capsule, Rfl: 0  •  DULoxetine (CYMBALTA) 30 mg delayed release capsule, Take 1 capsule (30 mg total) by mouth daily, Disp: 90 capsule, Rfl: 3  •  HYDROmorphone (DILAUDID) 2 mg tablet, Take 2-3 tablets (4-6 mg total) by mouth every 3 (three) hours as needed (moderate/severe cancer-related pain) Max Daily Amount: 48 mg, Disp: 150 tablet, Rfl: 0  •  lactulose 20 g/30 mL, Take 15 mL (10 g total) by mouth daily as needed (constipaton), Disp: 300 mL, Rfl: 0  •  metoclopramide (REGLAN) 10 mg tablet, Take 1 tablet (10 mg total) by mouth 4 (four) times a day, Disp: 28 tablet, Rfl: 0  •  multivitamin (THERAGRAN) TABS, Take 1 tablet by mouth, Disp: , Rfl:   •  ondansetron (ZOFRAN) 8 mg tablet, Take 1 tablet (8 mg total) by mouth every 8 (eight) hours as needed for nausea or vomiting, Disp: 20 tablet, Rfl: 2  •  oxybutynin (DITROPAN-XL) 5 mg 24 hr tablet, Take 1 tablet (5 mg total) by mouth daily Take 1 tablet daily, Disp: 90 tablet, Rfl: 3  •  polyethylene glycol (MIRALAX) 17 g packet, Take 17 g by mouth daily, Disp: 30 each, Rfl: 0  •  senna (SENOKOT) 8.6 MG tablet, Take 1 tablet (8.6 mg total) by mouth daily at bedtime as needed for constipation, Disp: 30 tablet, Rfl: 0  •  Xarelto 20 MG tablet, TAKE 1 TABLET BY MOUTH EVERY DAY WITH BREAKFAST, Disp: 30 tablet, Rfl: 11  •  al mag oxide-diphenhydramine-lidocaine viscous (MAGIC MOUTHWASH) 1:1:1 suspension, Swish and spit 10 mL every 4 (four) hours as needed for mouth pain or discomfort (Patient not taking: Reported on 7/5/2023), Disp: 300 mL, Rfl: 0  •  Lidocaine Viscous HCl (XYLOCAINE) 2 % mucosal solution, Swish and spit 10 mL 4 (four) times a day as needed for mouth pain or discomfort (Patient not taking: Reported on 7/18/2023), Disp: 200 mL, Rfl: 0    ACTIVE PROBLEM LIST:  Patient Active Problem List   Diagnosis   • Pleural effusion   • Metastatic neoplasm (HCC)   • Hypertension   • Malignant neoplasm of breast in female, estrogen receptor positive ([address])   • Palliative care patient   • Malignant pleural effusion   • Primary malignant neoplasm of right breast with metastasis to other site [facility])   • Malignant neoplasm metastatic to bone [facility])   • Cancer related pain   • Chemotherapy induced neutropenia ([address])   • Port-A-Cath in place       PAST MEDICAL HISTORY:  Past Medical History:   Diagnosis Date   • Cancer ([address])    • Headache(784.0) 11/2021   • History of radiation exposure    • Malignant neoplasm of right female breast ([address]) 2012    right       PAST SURGICAL HISTORY:  Past Surgical History:   Procedure Laterality Date   • BACK SURGERY     • BREAST CYST EXCISION     • BREAST LUMPECTOMY Right    • HIP SURGERY Right     debridement of femur   • IR BIOPSY LIVER MASS  2021   • IR MICROWAVE ABLATION  2022   • IR PLEURAL EFFUSION LONG-TERM CATHETER PLACEMENT  2021   • IR PLEURAL EFFUSION LONG-TERM CATHETER REMOVAL  2022   • IR PORT PLACEMENT  2022   • IR THORACENTESIS  2021   • IR THORACENTESIS  2023       FAMILY HISTORY:  Family History   Problem Relation Age of Onset   • Breast cancer Mother         in her 63's   • Breast cancer Sister         inher 45s and 48   • Colon cancer Maternal Grandmother    • No Known Problems Paternal Grandmother    • Breast cancer Other    • No Known Problems Paternal Aunt        SOCIAL HISTORY:  Social History     Socioeconomic History   • Marital status: /Civil Union     Spouse name: Not on file   • Number of children: Not on file   • Years of education: Not on file   • Highest education level: Not on file   Occupational History   • Not on file   Tobacco Use   • Smoking status: Some Days     Packs/day: 0.25     Years: 15.00     Total pack years: 3.75     Types: Cigarettes     Start date: 20     Last attempt to quit: 7/10/2021     Years since quittin.0   • Smokeless tobacco: Never   Vaping Use   • Vaping Use: Never used   Substance and Sexual Activity   • Alcohol use: Not Currently   • Drug use: Not Currently   • Sexual activity: Not Currently     Partners: Male     Birth control/protection: Male Sterilization   Other Topics Concern   • Not on file   Social History Narrative   • Not on file     Social Determinants of Health     Financial Resource Strain: Not on file   Food Insecurity: No Food Insecurity (2023)    Hunger Vital Sign    • Worried About Running Out of Food in the Last Year: Never true    • Ran Out of Food in the Last Year: Never true   Transportation Needs: No Transportation Needs (2023)    PRAPARE - Transportation    • Lack of Transportation (Medical):  No    • Lack of Transportation (Non-Medical): No   Physical Activity: Not on file   Stress: Not on file   Social Connections: Not on file   Intimate Partner Violence: Not on file   Housing Stability: Low Risk  (7/5/2023)    Housing Stability Vital Sign    • Unable to Pay for Housing in the Last Year: No    • Number of Places Lived in the Last Year: 1    • Unstable Housing in the Last Year: No       Review of Systems   Constitutional: Negative for chills and fever. HENT: Positive for sore throat. Negative for ear pain. Eyes: Negative for pain and visual disturbance. Respiratory: Negative for cough and shortness of breath. Cardiovascular: Negative for chest pain and palpitations. Gastrointestinal: Negative for abdominal pain and vomiting. Genitourinary: Negative for dysuria and hematuria. Musculoskeletal: Negative for arthralgias and back pain. Skin: Negative for color change and rash. Neurological: Negative for seizures and syncope. All other systems reviewed and are negative. Objective:  Vitals:    08/15/23 1313   BP: 96/60   BP Location: Left arm   Patient Position: Sitting   Cuff Size: Adult   Pulse: 104   Resp: 18   Temp: 98.7 °F (37.1 °C)   TempSrc: Tympanic   SpO2: 97%   Weight: 54.4 kg (120 lb)   Height: 5' 5" (1.651 m)     Body mass index is 19.97 kg/m². Physical Exam  Vitals and nursing note reviewed. Constitutional:       Appearance: Normal appearance. She is normal weight. HENT:      Head: Normocephalic and atraumatic. Mouth/Throat:      Mouth: Oral lesions present. Pharynx: Posterior oropharyngeal erythema present. Cardiovascular:      Rate and Rhythm: Normal rate and regular rhythm. Heart sounds: Normal heart sounds. Pulmonary:      Effort: Pulmonary effort is normal.   Musculoskeletal:         General: Normal range of motion. Cervical back: Normal range of motion. Right lower leg: No edema. Left lower leg: No edema.    Skin:     General: Skin is warm. Coloration: Skin is pale. Neurological:      General: No focal deficit present. Mental Status: She is alert and oriented to person, place, and time. Mental status is at baseline. Psychiatric:         Mood and Affect: Mood normal.           RESULTS:  Hemoglobin A1C   Date/Time Value Ref Range Status   07/16/2021 02:07 PM 5.8 (H) <5.7 % Final     Comment:     Reference Range  Non-diabetic                     <5.7  Pre-diabetic                     5.7-6.4  Diabetic                         >=6.5  ADA target for diabetic control  <=7     Cholesterol   Date/Time Value Ref Range Status   08/02/2021 01:35  (H) 50 - 200 mg/dL Final     Comment:     Cholesterol:       Desirable         <200 mg/dl       Borderline         200-239 mg/dl       High              >239            Triglycerides   Date/Time Value Ref Range Status   08/02/2021 01:35  (H) <=150 mg/dL Final     Comment:     Triglyceride:     Normal          <150 mg/dl     Borderline High 150-199 mg/dl     High            200-499 mg/dl        Very High       >499 mg/dl    Specimen collection should occur prior to N-Acetylcysteine or Metamizole administration due to the potential for falsely depressed results. 04/02/2014 01:15 PM 85 mg/dL Final     Comment:     TRIGLYCERIDE:       Normal                 <150 mg/dl       Borderline High       150-199 mg/dl       High                  200-499 mg/dl       Very High             >499 mg/dl  _______________________________________       HDL   Date/Time Value Ref Range Status   04/02/2014 01:15 PM 42 mg/dL Final     Comment:     HDL:       High       >59 mg/dl       Low        <41 mg/dl  ______________________________       HDL, Direct   Date/Time Value Ref Range Status   08/02/2021 01:35 PM 38 (L) >=40 mg/dL Final     Comment:     HDL Cholesterol:       Low     <41 mg/dL  Specimen collection should occur prior to Metamizole administration due to the potential for falsley depressed results. LDL Calculated   Date/Time Value Ref Range Status   08/02/2021 01:35  (H) 0 - 100 mg/dL Final     Comment:     LDL Cholesterol:     Optimal           <100 mg/dl     Near Optimal      100-129 mg/dl     Above Optimal       Borderline High 130-159 mg/dl       High            160-189 mg/dl       Very High       >189 mg/dl         This screening LDL is a calculated result. It does not have the accuracy of the Direct Measured LDL in the monitoring of patients with hyperlipidemia and/or statin therapy. Direct Measure LDL (XJF988) must be ordered separately in these patients. 04/02/2014 01:15  (H) 0 - 100 mg/dL Final     Comment:     LDL, CALCULATED:     This screening LDL is a calculated result. It does not have the accuracy of the Direct Measured LDL in the  monitoring of patients with hyperlipidemia and/or statin therapy. Direct Measured LDL (Test Code 3126) must be ordered separately in these  patients.  ______________________________  The above 4 analytes were performed by [facility] [id number]       Hemoglobin   Date/Time Value Ref Range Status   08/13/2023 08:44 PM 9.7 (L) 11.5 - 15.4 g/dL Final     Hematocrit   Date/Time Value Ref Range Status   08/13/2023 08:44 PM 27.4 (L) 34.8 - 46.1 % Final     Platelets   Date/Time Value Ref Range Status   08/13/2023 08:44  149 - 390 Thousands/uL Final     TSH 3RD GENERATON   Date/Time Value Ref Range Status   12/13/2022 02:59 PM 1.080 0.450 - 4.500 uIU/mL Final     Comment:     The recommended reference ranges for TSH during pregnancy are as follows:   First trimester 0.1 to 2.5 uIU/mL   Second trimester  0.2 to 3.0 uIU/mL   Third trimester 0.3 to 3.0 uIU/m    Note: Normal ranges may not apply to patients who are transgender, non-binary, or whose legal sex, sex at birth, and gender identity differ.   Adult TSH (3rd generation) reference range follows the recommended guidelines of the American Thyroid Association, January, 2020.     Sodium   Date/Time Value Ref Range Status   08/13/2023 08:44  (L) 135 - 147 mmol/L Final     BUN   Date/Time Value Ref Range Status   08/13/2023 08:44 PM 18 5 - 25 mg/dL Final   04/02/2014 01:15 PM 12 5 - 27 mg/dL Final     Creatinine   Date/Time Value Ref Range Status   08/13/2023 08:44 PM 0.72 0.60 - 1.30 mg/dL Final     Comment:     Standardized to IDMS reference method   04/02/2014 01:15 PM 0.80 0.60 - 1.30 mg/dL Final     Comment:     Standardized to IDMS reference method      In chart    This note was created with voice recognition software. Phonic, grammatical and spelling errors may be present within the note as a result.

## 2023-08-16 ENCOUNTER — TELEPHONE (OUTPATIENT)
Dept: HEMATOLOGY ONCOLOGY | Facility: CLINIC | Age: 58
End: 2023-08-16

## 2023-08-16 LAB
ATRIAL RATE: 84 BPM
P AXIS: 65 DEGREES
PR INTERVAL: 146 MS
QRS AXIS: 70 DEGREES
QRSD INTERVAL: 66 MS
QT INTERVAL: 360 MS
QTC INTERVAL: 425 MS
T WAVE AXIS: 64 DEGREES
VENTRICULAR RATE: 84 BPM

## 2023-08-16 PROCEDURE — 93010 ELECTROCARDIOGRAM REPORT: CPT | Performed by: INTERNAL MEDICINE

## 2023-08-16 NOTE — TELEPHONE ENCOUNTER
Received message to call patient via other number for [facility]. Called patient, patient stated that she went to [place] ED three days ago and that she is not able to drain pleurex catheter and having some discomfort with shortness of breath. Reviewed with Patient IR number, reviewed RN will call IR scheduling as well. Patient encouraged to go to ED if shortness of breath and discomfort continue. Call out to IR scheduling, spoke with [facility], patient is scheduled for 8/17/23 and she had stated she will call patient.

## 2023-08-16 NOTE — TELEPHONE ENCOUNTER
Call out to patient in regards to pleurex catheter questions and concerns. Reviewed if severe to report to ED, and or to call IR, which number was provided in my chart message this morning. Per Dr. Tamie Pavon as well Pulmonary referral placed. Left  with RN teams number and [id number] number.

## 2023-08-18 ENCOUNTER — HOSPITAL ENCOUNTER (OUTPATIENT)
Dept: NON INVASIVE DIAGNOSTICS | Facility: HOSPITAL | Age: 58
Discharge: HOME | End: 2023-08-18
Attending: RADIOLOGY
Payer: COMMERCIAL

## 2023-08-18 DIAGNOSIS — J91.0 MALIGNANT PLEURAL EFFUSION: ICD-10-CM

## 2023-08-18 PROCEDURE — 32561 LYSE CHEST FIBRIN INIT DAY: CPT

## 2023-08-18 PROCEDURE — 76937 US GUIDE VASCULAR ACCESS: CPT

## 2023-08-18 PROCEDURE — 32561 LYSE CHEST FIBRIN INIT DAY: CPT | Performed by: RADIOLOGY

## 2023-08-18 RX ADMIN — DORNASE ALFA 5 MG: 1 SOLUTION RESPIRATORY (INHALATION) at 10:47

## 2023-08-18 RX ADMIN — ALTEPLASE 10 MG: 2.2 INJECTION, POWDER, LYOPHILIZED, FOR SOLUTION INTRAVENOUS at 10:47

## 2023-08-21 ENCOUNTER — HOSPITAL ENCOUNTER (OUTPATIENT)
Dept: INFUSION CENTER | Facility: CLINIC | Age: 58
Discharge: HOME | End: 2023-08-21
Payer: COMMERCIAL

## 2023-08-21 ENCOUNTER — HOSPITAL ENCOUNTER (OUTPATIENT)
Dept: NON INVASIVE DIAGNOSTICS | Facility: CLINIC | Age: 58
Discharge: HOME | End: 2023-08-21
Payer: COMMERCIAL

## 2023-08-21 ENCOUNTER — TELEPHONE (OUTPATIENT)
Dept: INFUSION CENTER | Facility: CLINIC | Age: 58
End: 2023-08-21

## 2023-08-21 VITALS
BODY MASS INDEX: 19.99 KG/M2 | HEIGHT: 65 IN | DIASTOLIC BLOOD PRESSURE: 60 MMHG | WEIGHT: 120 LBS | HEART RATE: 90 BPM | SYSTOLIC BLOOD PRESSURE: 96 MMHG

## 2023-08-21 DIAGNOSIS — C79.51 MALIGNANT NEOPLASM METASTATIC TO BONE (HCC): ICD-10-CM

## 2023-08-21 DIAGNOSIS — C50.919 MALIGNANT NEOPLASM OF BREAST IN FEMALE, ESTROGEN RECEPTOR POSITIVE, UNSPECIFIED LATERALITY, UNSPECIFIED SITE OF BREAST (HCC): ICD-10-CM

## 2023-08-21 DIAGNOSIS — Z17.0 MALIGNANT NEOPLASM OF BREAST IN FEMALE, ESTROGEN RECEPTOR POSITIVE, UNSPECIFIED LATERALITY, UNSPECIFIED SITE OF BREAST (HCC): ICD-10-CM

## 2023-08-21 DIAGNOSIS — C50.911 PRIMARY MALIGNANT NEOPLASM OF RIGHT BREAST WITH METASTASIS TO OTHER SITE (HCC): ICD-10-CM

## 2023-08-21 DIAGNOSIS — Z51.81 ENCOUNTER FOR MONITORING CARDIOTOXIC DRUG THERAPY: ICD-10-CM

## 2023-08-21 DIAGNOSIS — C78.7 MALIGNANT NEOPLASM METASTATIC TO LIVER (HCC): ICD-10-CM

## 2023-08-21 DIAGNOSIS — J91.0 MALIGNANT PLEURAL EFFUSION: Primary | ICD-10-CM

## 2023-08-21 DIAGNOSIS — Z79.899 ENCOUNTER FOR MONITORING CARDIOTOXIC DRUG THERAPY: ICD-10-CM

## 2023-08-21 DIAGNOSIS — Z95.828 PORT-A-CATH IN PLACE: ICD-10-CM

## 2023-08-21 LAB
ALBUMIN SERPL BCP-MCNC: 2.9 G/DL (ref 3.5–5)
ALP SERPL-CCNC: 1474 U/L (ref 34–104)
ALT SERPL W P-5'-P-CCNC: 42 U/L (ref 7–52)
ANION GAP SERPL CALCULATED.3IONS-SCNC: 9 MMOL/L
AORTIC ROOT: 2.9 CM
APICAL FOUR CHAMBER EJECTION FRACTION: 71 %
AST SERPL W P-5'-P-CCNC: 135 U/L (ref 13–39)
BASOPHILS # BLD AUTO: 0.01 THOUSANDS/ÂΜL (ref 0–0.1)
BASOPHILS NFR BLD AUTO: 0 % (ref 0–1)
BILIRUB SERPL-MCNC: 2.7 MG/DL (ref 0.2–1)
BUN SERPL-MCNC: 27 MG/DL (ref 5–25)
CALCIUM ALBUM COR SERPL-MCNC: 9.6 MG/DL (ref 8.3–10.1)
CALCIUM SERPL-MCNC: 8.7 MG/DL (ref 8.4–10.2)
CHLORIDE SERPL-SCNC: 97 MMOL/L (ref 96–108)
CO2 SERPL-SCNC: 22 MMOL/L (ref 21–32)
CREAT SERPL-MCNC: 0.92 MG/DL (ref 0.6–1.3)
E WAVE DECELERATION TIME: 174 MS
EOSINOPHIL # BLD AUTO: 0 THOUSAND/ÂΜL (ref 0–0.61)
EOSINOPHIL NFR BLD AUTO: 0 % (ref 0–6)
ERYTHROCYTE [DISTWIDTH] IN BLOOD BY AUTOMATED COUNT: 16 % (ref 11.6–15.1)
FRACTIONAL SHORTENING: 35 % (ref 28–44)
GFR SERPL CREATININE-BSD FRML MDRD: 70 ML/MIN/1.73SQ M
GLUCOSE SERPL-MCNC: 85 MG/DL (ref 65–140)
HCT VFR BLD AUTO: 25.6 % (ref 34.8–46.1)
HGB BLD-MCNC: 9.1 G/DL (ref 11.5–15.4)
IMM GRANULOCYTES # BLD AUTO: 0.02 THOUSAND/UL (ref 0–0.2)
IMM GRANULOCYTES NFR BLD AUTO: 1 % (ref 0–2)
INTERVENTRICULAR SEPTUM IN DIASTOLE (PARASTERNAL SHORT AXIS VIEW): 0.8 CM
INTERVENTRICULAR SEPTUM: 0.8 CM (ref 0.6–1.1)
LEFT ATRIUM SIZE: 2.9 CM
LEFT INTERNAL DIMENSION IN SYSTOLE: 2.4 CM (ref 2.1–4)
LEFT VENTRICULAR INTERNAL DIMENSION IN DIASTOLE: 3.7 CM (ref 3.5–6)
LEFT VENTRICULAR POSTERIOR WALL IN END DIASTOLE: 0.6 CM
LEFT VENTRICULAR STROKE VOLUME: 38 ML
LVSV (TEICH): 38 ML
LYMPHOCYTES # BLD AUTO: 0.16 THOUSANDS/ÂΜL (ref 0.6–4.47)
LYMPHOCYTES NFR BLD AUTO: 6 % (ref 14–44)
MCH RBC QN AUTO: 34.2 PG (ref 26.8–34.3)
MCHC RBC AUTO-ENTMCNC: 35.5 G/DL (ref 31.4–37.4)
MCV RBC AUTO: 96 FL (ref 82–98)
MONOCYTES # BLD AUTO: 0.82 THOUSAND/ÂΜL (ref 0.17–1.22)
MONOCYTES NFR BLD AUTO: 29 % (ref 4–12)
MV E'TISSUE VEL-SEP: 16 CM/S
MV PEAK A VEL: 0.88 M/S
MV PEAK E VEL: 74 CM/S
MV STENOSIS PRESSURE HALF TIME: 51 MS
MV VALVE AREA P 1/2 METHOD: 4.31 CM2
NEUTROPHILS # BLD AUTO: 1.85 THOUSANDS/ÂΜL (ref 1.85–7.62)
NEUTS SEG NFR BLD AUTO: 64 % (ref 43–75)
NRBC BLD AUTO-RTO: 0 /100 WBCS
PLATELET # BLD AUTO: 422 THOUSANDS/UL (ref 149–390)
PMV BLD AUTO: 11.4 FL (ref 8.9–12.7)
POTASSIUM SERPL-SCNC: 4.4 MMOL/L (ref 3.5–5.3)
PROT SERPL-MCNC: 5.9 G/DL (ref 6.4–8.4)
RBC # BLD AUTO: 2.66 MILLION/UL (ref 3.81–5.12)
SL CV LV EF: 60
SL CV PED ECHO LEFT VENTRICLE DIASTOLIC VOLUME (MOD BIPLANE) 2D: 59 ML
SL CV PED ECHO LEFT VENTRICLE SYSTOLIC VOLUME (MOD BIPLANE) 2D: 21 ML
SODIUM SERPL-SCNC: 128 MMOL/L (ref 135–147)
WBC # BLD AUTO: 2.86 THOUSAND/UL (ref 4.31–10.16)

## 2023-08-21 PROCEDURE — 93321 DOPPLER ECHO F-UP/LMTD STD: CPT | Performed by: INTERNAL MEDICINE

## 2023-08-21 PROCEDURE — 80053 COMPREHEN METABOLIC PANEL: CPT | Performed by: INTERNAL MEDICINE

## 2023-08-21 PROCEDURE — 85025 COMPLETE CBC W/AUTO DIFF WBC: CPT | Performed by: INTERNAL MEDICINE

## 2023-08-21 PROCEDURE — 93325 DOPPLER ECHO COLOR FLOW MAPG: CPT | Performed by: INTERNAL MEDICINE

## 2023-08-21 PROCEDURE — 93325 DOPPLER ECHO COLOR FLOW MAPG: CPT

## 2023-08-21 PROCEDURE — 93308 TTE F-UP OR LMTD: CPT | Performed by: INTERNAL MEDICINE

## 2023-08-21 PROCEDURE — 93321 DOPPLER ECHO F-UP/LMTD STD: CPT

## 2023-08-21 PROCEDURE — 93356 MYOCRD STRAIN IMG SPCKL TRCK: CPT | Performed by: INTERNAL MEDICINE

## 2023-08-21 PROCEDURE — 93308 TTE F-UP OR LMTD: CPT

## 2023-08-21 NOTE — PROGRESS NOTES
Patient presents for central venous lab. Patient offers complaints of sore throat, is on abx for strep throat, oncology provider aware. Port accessed, flushed, saline locked, labs drawn, port flushed and de-accessed. Band-aid placed on site. AVS declined, next appointment reviewed.

## 2023-08-21 NOTE — PROGRESS NOTES
Reviewed message received by infusion RN that patient is taking PO antibiotics since 8/15/23 for Strep Throat per infusion RN report. Reviewed with Dr. Tre Messina, patient is due for treatment of trodelvy on 8/23/23, per Dr. Tre Messina, patient is okay to be treated as scheduled. Infusion RN notified.  She had stated she will call patient back

## 2023-08-21 NOTE — TELEPHONE ENCOUNTER
Pt called stating she is on antibiotic treatment for strep throat and was wondering if she should get treatment today. Spoke with Mone Andersen RN, per Dr. Herminia Quiñonez patient should continue with treatment as scheduled.

## 2023-08-22 ENCOUNTER — TELEPHONE (OUTPATIENT)
Dept: HEMATOLOGY ONCOLOGY | Facility: CLINIC | Age: 58
End: 2023-08-22

## 2023-08-22 DIAGNOSIS — Z51.11 CHEMOTHERAPY MANAGEMENT, ENCOUNTER FOR: ICD-10-CM

## 2023-08-22 DIAGNOSIS — C50.911 PRIMARY MALIGNANT NEOPLASM OF RIGHT BREAST WITH METASTASIS TO OTHER SITE (HCC): ICD-10-CM

## 2023-08-22 DIAGNOSIS — C79.51 MALIGNANT NEOPLASM METASTATIC TO BONE (HCC): ICD-10-CM

## 2023-08-22 DIAGNOSIS — C79.51 MALIGNANT NEOPLASM METASTATIC TO BONE (HCC): Primary | ICD-10-CM

## 2023-08-22 DIAGNOSIS — J91.0 MALIGNANT PLEURAL EFFUSION: ICD-10-CM

## 2023-08-22 DIAGNOSIS — C78.7 MALIGNANT NEOPLASM METASTATIC TO LIVER (HCC): Primary | ICD-10-CM

## 2023-08-22 PROBLEM — C50.919 MALIGNANT NEOPLASM OF BREAST IN FEMALE, ESTROGEN RECEPTOR POSITIVE (HCC): Status: RESOLVED | Noted: 2021-08-05 | Resolved: 2023-08-22

## 2023-08-22 PROBLEM — Z17.0 MALIGNANT NEOPLASM OF BREAST IN FEMALE, ESTROGEN RECEPTOR POSITIVE (HCC): Status: RESOLVED | Noted: 2021-08-05 | Resolved: 2023-08-22

## 2023-08-22 PROBLEM — C79.9 METASTATIC NEOPLASM (HCC): Status: RESOLVED | Noted: 2021-07-25 | Resolved: 2023-01-01

## 2023-08-22 NOTE — TELEPHONE ENCOUNTER
Call out to patient and reviewed treatment to be deferred one week and also STAT MRI will be scheduled for patient due to increase liver levels. Patient stated that she is having some right sided abdominal pain as well.

## 2023-08-22 NOTE — TELEPHONE ENCOUNTER
Patient's LFTS are very high. Alk phos 1400. Dr. Harlan Sarkar would like patient to have a STAT MRI abdomen. Attempted to call pt. No answer. LVM to return call at RN teams number. However, I informed her we will be ordering a STAT MRI due to her liver functions. [facility], can you schedule the stat MRI? ? Thank you! Subjective   Patient ID: Sarath is a 49 year old female.    Chief Complaint   Patient presents with   •  Symptoms     urgency with frequency urine started today     Patient  With MS  And  Overactive  Bladder  States  She is  Seeing urologist   Has  Today  Urgency and frequency  Similar  To that  She   Had  With UTI        Past Medical History:   Diagnosis Date   • Essential (primary) hypertension    • Multiple sclerosis (CMS/HCC)    • Overactive bladder        MEDICATIONS:  Current Outpatient Medications   Medication Sig   • hydrochlorothiazide (HYDRODIURIL) 12.5 MG tablet Take 1 tablet by mouth daily.   • amlodipine-benazepril (LOTREL) 10-40 MG per capsule TAKE 1 CAPSULE BY MOUTH DAILY   • REBIF 44 MCG/0.5ML prefilled syringe for injection INJECT 1 SYRINGE UNDER THE SKIN (SUBCUTANEOUS INJECTION) THREE TIMES A WEEK   • metoPROLOL succinate (TOPROL-XL) 50 MG 24 hr tablet Take 1 tablet by mouth daily. TAKE 1 TABLET DAILY.   • metoPROLOL succinate (TOPROL-XL) 50 MG 24 hr tablet Take 1 tablet by mouth daily.   • interferon Beta-1a (REBIF) 44 MCG/0.5ML prefilled syringe for injection Inject 44 mcg into the skin 3 days a week.    • solifenacin (VESICARE) 5 MG tablet Take 5 mg by mouth daily.      No current facility-administered medications for this visit.       ALLERGIES:  ALLERGIES:   Allergen Reactions   • Milk    (Food Or Med) Other (See Comments)     [facility] Allergy Text Annotation: Dairy       PAST SURGICAL HISTORY:  No past surgical history on file.    FAMILY HISTORY:  No family history on file.    SOCIAL HISTORY:  Social History     Tobacco Use   • Smoking status: Never Smoker   • Smokeless tobacco: Never Used   Substance Use Topics   • Alcohol use: No   • Drug use: Not on file         Patient's medications, allergies, past medical, surgical, social and family histories were reviewed and updated as appropriate.  Patient's medications, allergies, past medical, surgical, and social history  were reviewed and  updated as appropriate.    Review of Systems   Constitutional: Negative.    HENT: Negative.    Eyes: Negative.    Respiratory: Negative.    Cardiovascular: Negative.    Gastrointestinal: Negative.    Endocrine: Negative.    Genitourinary: Positive for frequency and urgency.   Musculoskeletal: Negative.    Skin: Negative.    Neurological: Negative.    Psychiatric/Behavioral: Negative.    All other systems reviewed and are negative.      Objective   Physical Exam  Vitals and nursing note reviewed.   HENT:      Head: Normocephalic and atraumatic.      Right Ear: Tympanic membrane, ear canal and external ear normal.      Left Ear: Tympanic membrane, ear canal and external ear normal.      Nose: Nose normal.      Mouth/Throat:      Mouth: Mucous membranes are moist.      Pharynx: No posterior oropharyngeal erythema.   Eyes:      Extraocular Movements: Extraocular movements intact.      Conjunctiva/sclera: Conjunctivae normal.      Pupils: Pupils are equal, round, and reactive to light.   Cardiovascular:      Rate and Rhythm: Normal rate and regular rhythm.   Pulmonary:      Effort: Pulmonary effort is normal.      Breath sounds: Normal breath sounds.   Abdominal:      General: Bowel sounds are normal.      Palpations: Abdomen is soft.      Tenderness: There is abdominal tenderness (mild  suprapubic tenderness).   Musculoskeletal:         General: Normal range of motion.      Cervical back: Normal range of motion. No rigidity.   Skin:     General: Skin is warm.      Findings: No rash.   Neurological:      General: No focal deficit present.      Mental Status: She is alert and oriented to person, place, and time.   Psychiatric:         Mood and Affect: Mood normal.         Behavior: Behavior normal.       Visit Vitals  BP (!) 136/92   Pulse 76   Temp 98.2 °F (36.8 °C)   Resp 18   Ht 5' 7\" (1.702 m)   Wt 108.6 kg (239 lb 6.7 oz)   SpO2 100%   BMI 37.50 kg/m²       Assessment   Problem List Items Addressed This Visit      None      Visit Diagnoses     Lower urinary tract symptoms    -  Primary    Relevant Medications    cephalexin (Keflex) 250 MG capsule    Other Relevant Orders    URINE, BACTERIAL CULTURE     (genitourinary) symptoms        Relevant Medications    cephalexin (Keflex) 250 MG capsule    Other Relevant Orders    POCT URINE DIP NON-AUTO (Completed)    URINE, BACTERIAL CULTURE          This is a 49 year old year-old female who presents with urinary  complaints.    Instructions provided as documented in the AVS.    Thank you for visiting [facility].  To establish care with an [facility] Provider, please call [phone] ([phone]).

## 2023-08-23 ENCOUNTER — APPOINTMENT (EMERGENCY)
Dept: RADIOLOGY | Facility: HOSPITAL | Age: 58
DRG: 382 | End: 2023-08-23
Payer: COMMERCIAL

## 2023-08-23 ENCOUNTER — HOSPITAL ENCOUNTER (INPATIENT)
Facility: HOSPITAL | Age: 58
LOS: 5 days | Discharge: HOSPICE | DRG: 382 | End: 2023-08-28
Attending: EMERGENCY MEDICINE | Admitting: INTERNAL MEDICINE
Payer: COMMERCIAL

## 2023-08-23 ENCOUNTER — HOSPITAL ENCOUNTER (OUTPATIENT)
Dept: INFUSION CENTER | Facility: CLINIC | Age: 58
End: 2023-08-23

## 2023-08-23 ENCOUNTER — APPOINTMENT (EMERGENCY)
Dept: VASCULAR ULTRASOUND | Facility: HOSPITAL | Age: 58
DRG: 382 | End: 2023-08-23
Payer: COMMERCIAL

## 2023-08-23 ENCOUNTER — APPOINTMENT (EMERGENCY)
Dept: CT IMAGING | Facility: HOSPITAL | Age: 58
DRG: 382 | End: 2023-08-23
Payer: COMMERCIAL

## 2023-08-23 ENCOUNTER — HOSPITAL ENCOUNTER (OUTPATIENT)
Dept: MRI IMAGING | Facility: HOSPITAL | Age: 58
Discharge: HOME | DRG: 382 | End: 2023-08-23
Payer: COMMERCIAL

## 2023-08-23 DIAGNOSIS — C79.51 MALIGNANT NEOPLASM METASTATIC TO BONE (HCC): ICD-10-CM

## 2023-08-23 DIAGNOSIS — C50.911 PRIMARY MALIGNANT NEOPLASM OF RIGHT BREAST WITH METASTASIS TO OTHER SITE (HCC): ICD-10-CM

## 2023-08-23 DIAGNOSIS — R60.0 EDEMA OF BOTH LOWER LEGS: ICD-10-CM

## 2023-08-23 DIAGNOSIS — J91.0 MALIGNANT PLEURAL EFFUSION: Primary | ICD-10-CM

## 2023-08-23 DIAGNOSIS — J02.9 SORE THROAT: ICD-10-CM

## 2023-08-23 DIAGNOSIS — Z20.818 EXPOSURE TO STREPTOCOCCAL PHARYNGITIS: ICD-10-CM

## 2023-08-23 DIAGNOSIS — C78.7 MALIGNANT NEOPLASM METASTATIC TO LIVER (HCC): ICD-10-CM

## 2023-08-23 DIAGNOSIS — D84.9 IMMUNOCOMPROMISED (HCC): ICD-10-CM

## 2023-08-23 DIAGNOSIS — E87.1 HYPONATREMIA: ICD-10-CM

## 2023-08-23 LAB
2HR DELTA HS TROPONIN: -10 NG/L
4HR DELTA HS TROPONIN: -9 NG/L
ALBUMIN SERPL BCP-MCNC: 2.7 G/DL (ref 3.5–5)
ALP SERPL-CCNC: 1593 U/L (ref 34–104)
ALT SERPL W P-5'-P-CCNC: 35 U/L (ref 7–52)
ANION GAP SERPL CALCULATED.3IONS-SCNC: 5 MMOL/L
APTT PPP: 49 SECONDS (ref 23–37)
AST SERPL W P-5'-P-CCNC: 131 U/L (ref 13–39)
BASOPHILS # BLD AUTO: 0.02 THOUSANDS/ÂΜL (ref 0–0.1)
BASOPHILS NFR BLD AUTO: 0 % (ref 0–1)
BILIRUB SERPL-MCNC: 3.33 MG/DL (ref 0.2–1)
BNP SERPL-MCNC: 88 PG/ML (ref 0–100)
BUN SERPL-MCNC: 25 MG/DL (ref 5–25)
CALCIUM ALBUM COR SERPL-MCNC: 9.1 MG/DL (ref 8.3–10.1)
CALCIUM SERPL-MCNC: 8.1 MG/DL (ref 8.4–10.2)
CARDIAC TROPONIN I PNL SERPL HS: 36 NG/L
CARDIAC TROPONIN I PNL SERPL HS: 37 NG/L
CARDIAC TROPONIN I PNL SERPL HS: 46 NG/L
CHLORIDE SERPL-SCNC: 96 MMOL/L (ref 96–108)
CO2 SERPL-SCNC: 23 MMOL/L (ref 21–32)
CREAT SERPL-MCNC: 0.68 MG/DL (ref 0.6–1.3)
D DIMER PPP FEU-MCNC: 17.72 UG/ML FEU
EOSINOPHIL # BLD AUTO: 0.01 THOUSAND/ÂΜL (ref 0–0.61)
EOSINOPHIL NFR BLD AUTO: 0 % (ref 0–6)
ERYTHROCYTE [DISTWIDTH] IN BLOOD BY AUTOMATED COUNT: 16.9 % (ref 11.6–15.1)
FLUAV RNA RESP QL NAA+PROBE: NEGATIVE
FLUBV RNA RESP QL NAA+PROBE: NEGATIVE
GFR SERPL CREATININE-BSD FRML MDRD: 98 ML/MIN/1.73SQ M
GLUCOSE SERPL-MCNC: 113 MG/DL (ref 65–140)
HCT VFR BLD AUTO: 27 % (ref 34.8–46.1)
HGB BLD-MCNC: 9.5 G/DL (ref 11.5–15.4)
IMM GRANULOCYTES # BLD AUTO: 0.03 THOUSAND/UL (ref 0–0.2)
IMM GRANULOCYTES NFR BLD AUTO: 1 % (ref 0–2)
INR PPP: 2.26 (ref 0.84–1.19)
LYMPHOCYTES # BLD AUTO: 0.15 THOUSANDS/ÂΜL (ref 0.6–4.47)
LYMPHOCYTES NFR BLD AUTO: 3 % (ref 14–44)
MCH RBC QN AUTO: 33.6 PG (ref 26.8–34.3)
MCHC RBC AUTO-ENTMCNC: 35.2 G/DL (ref 31.4–37.4)
MCV RBC AUTO: 95 FL (ref 82–98)
MONOCYTES # BLD AUTO: 1.17 THOUSAND/ÂΜL (ref 0.17–1.22)
MONOCYTES NFR BLD AUTO: 21 % (ref 4–12)
NEUTROPHILS # BLD AUTO: 4.26 THOUSANDS/ÂΜL (ref 1.85–7.62)
NEUTS SEG NFR BLD AUTO: 75 % (ref 43–75)
NRBC BLD AUTO-RTO: 0 /100 WBCS
PLATELET # BLD AUTO: 407 THOUSANDS/UL (ref 149–390)
PMV BLD AUTO: 10.9 FL (ref 8.9–12.7)
POTASSIUM SERPL-SCNC: 4.2 MMOL/L (ref 3.5–5.3)
PROT SERPL-MCNC: 5.7 G/DL (ref 6.4–8.4)
PROTHROMBIN TIME: 25.8 SECONDS (ref 11.6–14.5)
RBC # BLD AUTO: 2.83 MILLION/UL (ref 3.81–5.12)
RSV RNA RESP QL NAA+PROBE: NEGATIVE
SARS-COV-2 RNA RESP QL NAA+PROBE: NEGATIVE
SODIUM SERPL-SCNC: 124 MMOL/L (ref 135–147)
WBC # BLD AUTO: 5.64 THOUSAND/UL (ref 4.31–10.16)

## 2023-08-23 PROCEDURE — 71046 X-RAY EXAM CHEST 2 VIEWS: CPT

## 2023-08-23 PROCEDURE — 93970 EXTREMITY STUDY: CPT

## 2023-08-23 PROCEDURE — G1004 CDSM NDSC: HCPCS

## 2023-08-23 PROCEDURE — 99285 EMERGENCY DEPT VISIT HI MDM: CPT

## 2023-08-23 PROCEDURE — 85730 THROMBOPLASTIN TIME PARTIAL: CPT | Performed by: PHYSICIAN ASSISTANT

## 2023-08-23 PROCEDURE — 99223 1ST HOSP IP/OBS HIGH 75: CPT | Performed by: INTERNAL MEDICINE

## 2023-08-23 PROCEDURE — 85025 COMPLETE CBC W/AUTO DIFF WBC: CPT

## 2023-08-23 PROCEDURE — 85610 PROTHROMBIN TIME: CPT | Performed by: PHYSICIAN ASSISTANT

## 2023-08-23 PROCEDURE — 84484 ASSAY OF TROPONIN QUANT: CPT

## 2023-08-23 PROCEDURE — 80053 COMPREHEN METABOLIC PANEL: CPT

## 2023-08-23 PROCEDURE — 0241U HB NFCT DS VIR RESP RNA 4 TRGT: CPT

## 2023-08-23 PROCEDURE — A9585 GADOBUTROL INJECTION: HCPCS | Performed by: INTERNAL MEDICINE

## 2023-08-23 PROCEDURE — 36415 COLL VENOUS BLD VENIPUNCTURE: CPT

## 2023-08-23 PROCEDURE — 93970 EXTREMITY STUDY: CPT | Performed by: SURGERY

## 2023-08-23 PROCEDURE — 83880 ASSAY OF NATRIURETIC PEPTIDE: CPT

## 2023-08-23 PROCEDURE — 74183 MRI ABD W/O CNTR FLWD CNTR: CPT

## 2023-08-23 PROCEDURE — 93005 ELECTROCARDIOGRAM TRACING: CPT

## 2023-08-23 PROCEDURE — 85379 FIBRIN DEGRADATION QUANT: CPT

## 2023-08-23 PROCEDURE — 71275 CT ANGIOGRAPHY CHEST: CPT

## 2023-08-23 RX ORDER — NICOTINE 21 MG/24HR
1 PATCH, TRANSDERMAL 24 HOURS TRANSDERMAL DAILY
Status: DISCONTINUED | OUTPATIENT
Start: 2023-08-24 | End: 2023-08-28 | Stop reason: HOSPADM

## 2023-08-23 RX ORDER — POLYETHYLENE GLYCOL 3350 17 G/17G
17 POWDER, FOR SOLUTION ORAL DAILY
Status: DISCONTINUED | OUTPATIENT
Start: 2023-08-24 | End: 2023-08-28 | Stop reason: HOSPADM

## 2023-08-23 RX ORDER — HEPARIN SODIUM 5000 [USP'U]/ML
5000 INJECTION, SOLUTION INTRAVENOUS; SUBCUTANEOUS EVERY 8 HOURS SCHEDULED
Status: DISCONTINUED | OUTPATIENT
Start: 2023-08-24 | End: 2023-08-25

## 2023-08-23 RX ORDER — ALBUTEROL SULFATE 90 UG/1
2 AEROSOL, METERED RESPIRATORY (INHALATION) EVERY 4 HOURS PRN
Status: DISCONTINUED | OUTPATIENT
Start: 2023-08-23 | End: 2023-08-28 | Stop reason: HOSPADM

## 2023-08-23 RX ORDER — DULOXETIN HYDROCHLORIDE 30 MG/1
30 CAPSULE, DELAYED RELEASE ORAL DAILY
Status: DISCONTINUED | OUTPATIENT
Start: 2023-08-24 | End: 2023-08-27

## 2023-08-23 RX ORDER — DOCUSATE SODIUM 100 MG/1
100 CAPSULE, LIQUID FILLED ORAL 2 TIMES DAILY
Status: DISCONTINUED | OUTPATIENT
Start: 2023-08-23 | End: 2023-08-25

## 2023-08-23 RX ORDER — OXYBUTYNIN CHLORIDE 5 MG/1
5 TABLET, EXTENDED RELEASE ORAL DAILY
Status: DISCONTINUED | OUTPATIENT
Start: 2023-08-24 | End: 2023-08-28 | Stop reason: HOSPADM

## 2023-08-23 RX ORDER — HYDROMORPHONE HYDROCHLORIDE 2 MG/1
4 TABLET ORAL
Status: DISCONTINUED | OUTPATIENT
Start: 2023-08-23 | End: 2023-08-28 | Stop reason: HOSPADM

## 2023-08-23 RX ORDER — ACETAMINOPHEN 325 MG/1
650 TABLET ORAL EVERY 6 HOURS PRN
Status: DISCONTINUED | OUTPATIENT
Start: 2023-08-23 | End: 2023-08-28 | Stop reason: HOSPADM

## 2023-08-23 RX ORDER — GADOBUTROL 604.72 MG/ML
5 INJECTION INTRAVENOUS
Status: COMPLETED | OUTPATIENT
Start: 2023-08-23 | End: 2023-08-23

## 2023-08-23 RX ORDER — AMOXICILLIN 250 MG/1
500 CAPSULE ORAL EVERY 8 HOURS SCHEDULED
Status: DISCONTINUED | OUTPATIENT
Start: 2023-08-23 | End: 2023-08-28 | Stop reason: HOSPADM

## 2023-08-23 RX ORDER — ONDANSETRON 2 MG/ML
4 INJECTION INTRAMUSCULAR; INTRAVENOUS EVERY 6 HOURS PRN
Status: DISCONTINUED | OUTPATIENT
Start: 2023-08-23 | End: 2023-08-28 | Stop reason: HOSPADM

## 2023-08-23 RX ORDER — DIPHENHYDRAMINE HCL 25 MG
50 TABLET ORAL
Status: DISCONTINUED | OUTPATIENT
Start: 2023-08-23 | End: 2023-08-28 | Stop reason: HOSPADM

## 2023-08-23 RX ORDER — DICYCLOMINE HYDROCHLORIDE 10 MG/1
10 CAPSULE ORAL
Status: DISCONTINUED | OUTPATIENT
Start: 2023-08-23 | End: 2023-08-28 | Stop reason: HOSPADM

## 2023-08-23 RX ORDER — SODIUM CHLORIDE 9 MG/ML
50 INJECTION, SOLUTION INTRAVENOUS CONTINUOUS
Status: DISPENSED | OUTPATIENT
Start: 2023-08-23 | End: 2023-08-24

## 2023-08-23 RX ADMIN — IOHEXOL 100 ML: 350 INJECTION, SOLUTION INTRAVENOUS at 14:58

## 2023-08-23 RX ADMIN — GADOBUTROL 5 ML: 604.72 INJECTION INTRAVENOUS at 10:29

## 2023-08-23 RX ADMIN — HYDROMORPHONE HYDROCHLORIDE 4 MG: 2 TABLET ORAL at 21:34

## 2023-08-23 RX ADMIN — SODIUM CHLORIDE 50 ML/HR: 0.9 INJECTION, SOLUTION INTRAVENOUS at 17:01

## 2023-08-23 RX ADMIN — DICYCLOMINE HYDROCHLORIDE 10 MG: 10 CAPSULE ORAL at 19:58

## 2023-08-23 RX ADMIN — AMOXICILLIN 500 MG: 250 CAPSULE ORAL at 19:58

## 2023-08-23 RX ADMIN — DIPHENHYDRAMINE HYDROCHLORIDE 50 MG: 25 TABLET ORAL at 21:34

## 2023-08-23 NOTE — ED PROVIDER NOTES
History  Chief Complaint   Patient presents with   • Shortness of Breath     Bilateral leg swelling and shortness of breath, currently diagnosed with strep throat. Pt reports being sent by oncologist. Metastatic breast cancer to liver, lungs, and bones. Currently receiving chemotherapy     Patient is a 82-year-old female with a past medical history of metastatic breast cancer, hypertension presenting to the emergency department for evaluation of shortness of breath and bilateral leg swelling. Patient reports she has had worsening shortness of breath over the past few days. Patient reports having bilateral lower leg swelling for the past 4 days. Patient was recently diagnosed with strep throat exposed to strep pharyngitis and was placed on prophylactic amoxicillin. Denies fevers, chills, rash, headache, weakness, dizziness, visual changes, abdominal pain, nausea, vomiting, diarrhea, constipation, chest pain or difficulty breathing. Does not offer any other concerns or complaints. Prior to Admission Medications   Prescriptions Last Dose Informant Patient Reported? Taking?    CRANIAL PROSTHESIS, RX,   No No   Sig: Apply to cranial area PRN   CRANIAL PROSTHESIS, RX,   No No   Sig: Apply to affected area of alopecia   DULoxetine (CYMBALTA) 30 mg delayed release capsule  Self No No   Sig: Take 1 capsule (30 mg total) by mouth daily   HYDROmorphone (DILAUDID) 2 mg tablet   No No   Sig: Take 2-3 tablets (4-6 mg total) by mouth every 3 (three) hours as needed (moderate/severe cancer-related pain) Max Daily Amount: 48 mg   Lidocaine Viscous HCl (XYLOCAINE) 2 % mucosal solution  Self No No   Sig: Swish and spit 10 mL 4 (four) times a day as needed for mouth pain or discomfort   Patient not taking: Reported on 7/18/2023   Xarelto 20 MG tablet  Self No No   Sig: TAKE 1 TABLET BY MOUTH EVERY DAY WITH BREAKFAST   acetaminophen (TYLENOL) 325 mg tablet  Self No No   Sig: Take 2 tablets (650 mg total) by mouth every 6 (six) hours as needed for mild pain   al mag oxide-diphenhydramine-lidocaine viscous (MAGIC MOUTHWASH) 1:1:1 suspension  Self No No   Sig: Swish and spit 10 mL every 4 (four) hours as needed for mouth pain or discomfort   Patient not taking: Reported on 7/5/2023   albuterol (PROVENTIL HFA,VENTOLIN HFA) 90 mcg/act inhaler  Self No No   Sig: Inhale 2 puffs every 4 (four) hours as needed for wheezing   amoxicillin (AMOXIL) 500 mg capsule   No No   Sig: Take 1 capsule (500 mg total) by mouth every 8 (eight) hours for 10 days   calcium carbonate (TUMS) 500 mg chewable tablet  Self No No   Sig: Chew 1 tablet (500 mg total) 3 (three) times a day as needed for indigestion or heartburn   dicyclomine (BENTYL) 10 mg capsule   No No   Sig: Take 1 capsule (10 mg total) by mouth every 6 (six) hours as needed (abdominal cramping)   diphenhydrAMINE (BENADRYL) 50 MG tablet  Self No No   Sig: Take 1 tablet (50 mg total) by mouth daily at bedtime as needed for itching or sleep   diphenoxylate-atropine (LOMOTIL) 2.5-0.025 mg per tablet  Self No No   Sig: Take 1 tablet by mouth 4 (four) times a day as needed for diarrhea   docusate sodium (COLACE) 100 mg capsule   No No   Sig: Take 1 capsule (100 mg total) by mouth 2 (two) times a day for 10 days   lactulose 20 g/30 mL  Self No No   Sig: Take 15 mL (10 g total) by mouth daily as needed (constipaton)   metoclopramide (REGLAN) 10 mg tablet  Self No No   Sig: Take 1 tablet (10 mg total) by mouth 4 (four) times a day   multivitamin (THERAGRAN) TABS  Self Yes No   Sig: Take 1 tablet by mouth   ondansetron (ZOFRAN) 8 mg tablet  Self No No   Sig: Take 1 tablet (8 mg total) by mouth every 8 (eight) hours as needed for nausea or vomiting   oxybutynin (DITROPAN-XL) 5 mg 24 hr tablet  Self No No   Sig: Take 1 tablet (5 mg total) by mouth daily Take 1 tablet daily   polyethylene glycol (MIRALAX) 17 g packet   No No   Sig: Take 17 g by mouth daily   senna (SENOKOT) 8.6 MG tablet  Self No No   Sig: Take 1 tablet (8.6 mg total) by mouth daily at bedtime as needed for constipation      Facility-Administered Medications: None       Past Medical History:   Diagnosis Date   • Cancer ([address])    • Headache(784.0) 2021   • History of radiation exposure    • Malignant neoplasm of right female breast [facility]) 2012    right       Past Surgical History:   Procedure Laterality Date   • BACK SURGERY     • BREAST CYST EXCISION     • BREAST LUMPECTOMY Right 2012   • HIP SURGERY Right     debridement of femur   • IR BIOPSY LIVER MASS  2021   • IR MICROWAVE ABLATION  2022   • IR PLEURAL EFFUSION LONG-TERM CATHETER CHECK/CHANGE/REPOSITION  2023   • IR PLEURAL EFFUSION LONG-TERM CATHETER PLACEMENT  2021   • IR PLEURAL EFFUSION LONG-TERM CATHETER REMOVAL  2022   • IR PORT PLACEMENT  2022   • IR THORACENTESIS  2021   • IR THORACENTESIS  2023       Family History   Problem Relation Age of Onset   • Breast cancer Mother         in her 63's   • Breast cancer Sister         inher 45s and 48   • Colon cancer Maternal Grandmother    • No Known Problems Paternal Grandmother    • Breast cancer Other    • No Known Problems Paternal Aunt      I have reviewed and agree with the history as documented. E-Cigarette/Vaping   • E-Cigarette Use Never User      E-Cigarette/Vaping Substances   • Nicotine No    • THC No    • CBD No    • Flavoring No    • Other No    • Unknown No      Social History     Tobacco Use   • Smoking status: Some Days     Packs/day: 0.25     Years: 15.00     Total pack years: 3.75     Types: Cigarettes     Start date: 20     Last attempt to quit: 7/10/2021     Years since quittin.1   • Smokeless tobacco: Never   Vaping Use   • Vaping Use: Never used   Substance Use Topics   • Alcohol use: Not Currently   • Drug use: Not Currently       Review of Systems   Constitutional: Negative for chills and fever. HENT: Negative for ear pain and sore throat.     Eyes: Negative for pain and visual disturbance. Respiratory: Positive for shortness of breath. Negative for cough. Cardiovascular: Positive for leg swelling (bilateral). Negative for chest pain and palpitations. Gastrointestinal: Negative for abdominal pain and vomiting. Genitourinary: Negative for dysuria and hematuria. Musculoskeletal: Negative for arthralgias and back pain. Skin: Negative for color change and rash. Neurological: Negative for seizures and syncope. All other systems reviewed and are negative. Physical Exam  Physical Exam  Vitals and nursing note reviewed. Constitutional:       General: She is not in acute distress. Appearance: Normal appearance. She is well-developed. She is not toxic-appearing or diaphoretic. HENT:      Head: Normocephalic and atraumatic. Right Ear: External ear normal.      Left Ear: External ear normal.      Nose: Nose normal.      Mouth/Throat:      Mouth: Mucous membranes are moist.   Eyes:      General: No scleral icterus. Right eye: No discharge. Left eye: No discharge. Conjunctiva/sclera: Conjunctivae normal.   Cardiovascular:      Rate and Rhythm: Normal rate and regular rhythm. Heart sounds: No murmur heard. Pulmonary:      Effort: Pulmonary effort is normal. No respiratory distress. Breath sounds: Normal breath sounds. Abdominal:      Palpations: Abdomen is soft. Tenderness: There is no abdominal tenderness. Musculoskeletal:         General: No swelling, deformity or signs of injury. Normal range of motion. Cervical back: Normal range of motion and neck supple. No rigidity. Right lower leg: No tenderness. Edema present. Left lower leg: No tenderness. Edema present. Right ankle: Normal pulse. Left ankle: Normal pulse. Right foot: Normal capillary refill. Normal pulse. Left foot: Normal capillary refill. Normal pulse.       Comments: Non pitting bilateral lower leg edema   Skin: General: Skin is warm and dry. Capillary Refill: Capillary refill takes less than 2 seconds. Coloration: Skin is not jaundiced. Findings: No erythema or rash. Neurological:      General: No focal deficit present. Mental Status: She is alert and oriented to person, place, and time. Mental status is at baseline. Cranial Nerves: No cranial nerve deficit. Gait: Gait normal.   Psychiatric:         Mood and Affect: Mood normal.         Behavior: Behavior normal.         Thought Content: Thought content normal.         Judgment: Judgment normal.         Vital Signs  ED Triage Vitals [08/23/23 1124]   Temperature Pulse Respirations Blood Pressure SpO2   (!) 97 °F (36.1 °C) 94 18 102/67 97 %      Temp Source Heart Rate Source Patient Position - Orthostatic VS BP Location FiO2 (%)   Temporal Monitor Sitting Left arm --      Pain Score       --           Vitals:    08/23/23 1124   BP: 102/67   Pulse: 94   Patient Position - Orthostatic VS: Sitting         Visual Acuity      ED Medications  Medications   nicotine (NICODERM CQ) 14 mg/24hr TD 24 hr patch 1 patch (has no administration in time range)   acetaminophen (TYLENOL) tablet 650 mg (has no administration in time range)   ondansetron (ZOFRAN) injection 4 mg (has no administration in time range)   sodium chloride 0.9 % infusion (has no administration in time range)   heparin (porcine) subcutaneous injection 5,000 Units (has no administration in time range)   iohexol (OMNIPAQUE) 350 MG/ML injection (SINGLE-DOSE) 100 mL (100 mL Intravenous Given 8/23/23 1458)       Diagnostic Studies  Results Reviewed     Procedure Component Value Units Date/Time    Protime-INR [[id number]] Collected: 08/23/23 1642    Lab Status: In process Specimen: Blood from Arm, Left Updated: 08/23/23 1645    APTT [[id number]] Collected: 08/23/23 1642    Lab Status:  In process Specimen: Blood from Arm, Left Updated: 08/23/23 1645    HS Troponin I 2hr [[id number]]  (Normal) Collected: 08/23/23 1447    Lab Status: Final result Specimen: Blood from Arm, Left Updated: 08/23/23 1517     hs TnI 2hr 36 ng/L      Delta 2hr hsTnI -10 ng/L     B-Type Natriuretic Peptide(BNP) [[id number]]  (Normal) Collected: 08/23/23 1203    Lab Status: Final result Specimen: Blood from Arm, Left Updated: 08/23/23 1403     BNP 88 pg/mL     D-Dimer [[id number]]  (Abnormal) Collected: 08/23/23 1317    Lab Status: Final result Specimen: Blood from Arm, Left Updated: 08/23/23 1401     D-Dimer, Quant 17.72 ug/ml FEU     Narrative: In the evaluation for possible pulmonary embolism, in the appropriate (Well's Score of 4 or less) patient, the age adjusted d-dimer cutoff for this patient can be calculated as:    Age x 0.01 (in ug/mL) for Age-adjusted D-dimer exclusion threshold for a patient over 50 years.     Comprehensive metabolic panel [[id number]]  (Abnormal) Collected: 08/23/23 1317    Lab Status: Final result Specimen: Blood from Arm, Left Updated: 08/23/23 1400     Sodium 124 mmol/L      Potassium 4.2 mmol/L      Chloride 96 mmol/L      CO2 23 mmol/L      ANION GAP 5 mmol/L      BUN 25 mg/dL      Creatinine 0.68 mg/dL      Glucose 113 mg/dL      Calcium 8.1 mg/dL      Corrected Calcium 9.1 mg/dL       U/L      ALT 35 U/L      Alkaline Phosphatase 1,593 U/L      Total Protein 5.7 g/dL      Albumin 2.7 g/dL      Total Bilirubin 3.33 mg/dL      eGFR 98 ml/min/1.73sq m     Narrative:      [facility] guidelines for Chronic Kidney Disease (CKD):   •  Stage 1 with normal or high GFR (GFR > 90 mL/min/1.73 square meters)  •  Stage 2 Mild CKD (GFR = 60-89 mL/min/1.73 square meters)  •  Stage 3A Moderate CKD (GFR = 45-59 mL/min/1.73 square meters)  •  Stage 3B Moderate CKD (GFR = 30-44 mL/min/1.73 square meters)  •  Stage 4 Severe CKD (GFR = 15-29 mL/min/1.73 square meters)  •  Stage 5 End Stage CKD (GFR <15 mL/min/1.73 square meters)  Note: GFR calculation is accurate only with a steady state creatinine    FLU/RSV/COVID - if FLU/RSV clinically relevant [[id number]]  (Normal) Collected: 08/23/23 1203    Lab Status: Final result Specimen: Nares from Nasopharyngeal Swab Updated: 08/23/23 1303     SARS-CoV-2 Negative     INFLUENZA A PCR Negative     INFLUENZA B PCR Negative     RSV PCR Negative    Narrative:      FOR PEDIATRIC PATIENTS - copy/paste COVID Guidelines URL to browser: [email or web address]    SARS-CoV-2 assay is a Nucleic Acid Amplification assay intended for the  qualitative detection of nucleic acid from SARS-CoV-2 in nasopharyngeal  swabs. Results are for the presumptive identification of SARS-CoV-2 RNA. Positive results are indicative of infection with SARS-CoV-2, the virus  causing COVID-19, but do not rule out bacterial infection or co-infection  with other viruses. Laboratories within the [facility] and its  territories are required to report all positive results to the appropriate  public health authorities. Negative results do not preclude SARS-CoV-2  infection and should not be used as the sole basis for treatment or other  patient management decisions. Negative results must be combined with  clinical observations, patient history, and epidemiological information. This test has not been FDA cleared or approved. This test has been authorized by FDA under an Emergency Use Authorization  (EUA). This test is only authorized for the duration of time the  declaration that circumstances exist justifying the authorization of the  emergency use of an in vitro diagnostic tests for detection of SARS-CoV-2  virus and/or diagnosis of COVID-19 infection under section 564(b)(1) of  the Act, 21 U. S.C. 168GUO-4(A)(0), unless the authorization is terminated  or revoked sooner. The test has been validated but independent review by FDA  and CLIA is pending. Test performed using [employer]:  This RT-PCR assay targets N2,  a region unique to SARS-CoV-2. A conserved region in the E-gene was chosen  for pan-Sarbecovirus detection which includes SARS-CoV-2. According to CMS-2020-01-R, this platform meets the definition of high-throughput technology. HS Troponin 0hr (reflex protocol) [[id number]]  (Normal) Collected: 08/23/23 1203    Lab Status: Final result Specimen: Blood from Arm, Left Updated: 08/23/23 1243     hs TnI 0hr 46 ng/L     HS Troponin I 4hr [[id number]]     Lab Status: No result Specimen: Blood     CBC and differential [[id number]]  (Abnormal) Collected: 08/23/23 1203    Lab Status: Final result Specimen: Blood from Arm, Left Updated: 08/23/23 1220     WBC 5.64 Thousand/uL      RBC 2.83 Million/uL      Hemoglobin 9.5 g/dL      Hematocrit 27.0 %      MCV 95 fL      MCH 33.6 pg      MCHC 35.2 g/dL      RDW 16.9 %      MPV 10.9 fL      Platelets 730 Thousands/uL      nRBC 0 /100 WBCs      Neutrophils Relative 75 %      Immat GRANS % 1 %      Lymphocytes Relative 3 %      Monocytes Relative 21 %      Eosinophils Relative 0 %      Basophils Relative 0 %      Neutrophils Absolute 4.26 Thousands/µL      Immature Grans Absolute 0.03 Thousand/uL      Lymphocytes Absolute 0.15 Thousands/µL      Monocytes Absolute 1.17 Thousand/µL      Eosinophils Absolute 0.01 Thousand/µL      Basophils Absolute 0.02 Thousands/µL                  VAS lower limb venous duplex study, complete bilateral   Final Result by Vickie Perez MD (08/23 1614)      CTA ED chest PE Study   Final Result by Adela Bowling MD (08/23 1627)      No pulm embolism. Significant enlargement of malignant multiloculated right pleural effusion with areas of compressive atelectasis throughout the right lung. Stable 5 mm left lower lobe lung nodule. Hepatic metastatic disease not well visualized on the current phase of contrast.      Upper abdominal ascites has developed in the interval.      Grossly stable widespread osteoblastic metastatic disease. Workstation performed: [id number]         XR chest 2 views   Final Result by Geoffrey Sanabria MD (08/23 1204)      Right pleural drainage catheter which appears to terminate at the level of loculated effusion      No evidence of pneumothorax               Workstation performed: [id number]                    Procedures  ECG 12 Lead Documentation Only    Date/Time: 8/23/2023 11:29 AM    Performed by: Truong Coffman PA-C  Authorized by: Truong Coffman PA-C    Indications / Diagnosis:  Shortness of breath  ECG reviewed by me, the ED Provider: yes    Patient location:  ED  Previous ECG:     Previous ECG:  Compared to current    Comparison ECG info:  08/13/23    Similarity:  No change  Interpretation:     Interpretation: normal    Rate:     ECG rate:  90    ECG rate assessment: normal    Rhythm:     Rhythm: sinus rhythm    Ectopy:     Ectopy: none    QRS:     QRS axis:  Normal    QRS intervals:  Normal  Conduction:     Conduction: normal    ST segments:     ST segments:  Normal  T waves:     T waves: normal               ED Course  ED Course as of 08/23/23 1650   Wed Aug 23, 2023   1251 hs TnI 0hr: 55   1528 Delta 2hr hsTnI: -10   1547 Negative for DVT               Wells' Criteria for PE    Flowsheet Row Most Recent Value   Wells' Criteria for PE    Clinical signs and symptoms of DVT 0 Filed at: 08/23/2023 1437   PE is primary diagnosis or equally likely 3 Filed at: 08/23/2023 1437   HR >100 0 Filed at: 08/23/2023 1437   Immobilization at least 3 days or Surgery in the previous 4 weeks 0 Filed at: 08/23/2023 1437   Previous, objectively diagnosed PE or DVT 0 Filed at: 08/23/2023 1437   Hemoptysis 0 Filed at: 08/23/2023 1437   Malignancy with treatment within 6 months or palliative 1 Filed at: 08/23/2023 1437   Wells' Criteria Total 4 Filed at: 08/23/2023 1437                Medical Decision Making    This is a 61-year-old female present emergency department for evaluation of shortness of breath and bilateral lower leg swelling. Patient reports she noticed worsening shortness of breath over the past few days along with new onset leg swelling for the past 4 days. Patient reports having metastatic breast cancer and actively receiving chemotherapy. Patient reports her oncologist recommended emergency department evaluation. Patient is in no acute distress, stable vital signs on initial examination. Differential diagnosis to include but is not limited to: ACS, STEMI, pleural effusions, pneumonia, arrhythmia, DVT, PE    Initial ED Plan: imaging, labs, EKG    ED results:   No pulm embolism. Significant enlargement of malignant multiloculated right pleural effusion with areas of compressive atelectasis throughout the right lung. Stable 5 mm left lower lobe lung nodule.   Hepatic metastatic disease not well visualized on the current phase of contrast.  Upper abdominal ascites has developed in the interval.  Grossly stable widespread osteoblastic metastatic disease. 0 hour troponin: 46  Delta: -10  Heart score: 6    Final ED assessment: Patient is stable and well appearing. Discussed radiologic studies and laboratory results. Patient verbalized understanding and is agreeable with the plan for admission. Discussed with Dr. Remedios Vaughn, inpatient, bridging orders placed. Amount and/or Complexity of Data Reviewed  Labs: ordered. Decision-making details documented in ED Course. Radiology: ordered. Risk  Prescription drug management. Decision regarding hospitalization.           Disposition  Final diagnoses:   Malignant pleural effusion   Primary malignant neoplasm of right breast with metastasis to other site [facility])   Hyponatremia   Edema of both lower legs     Time reflects when diagnosis was documented in both MDM as applicable and the Disposition within this note     Time User Action Codes Description Comment    8/23/2023  4:36 PM Soha Calhoun Add [J91.0] Malignant pleural effusion     8/23/2023  4:36 PM Ashlee Jones Add [C50.911] Primary malignant neoplasm of right breast with metastasis to other site [facility])     8/23/2023  4:36 PM Ashlee Jones Add [E87.1] Hyponatremia     8/23/2023  4:37 PM Ashlee Jones Add [R60.0] Edema of both lower legs       ED Disposition     ED Disposition   Admit    Condition   Stable    Date/Time   Wed Aug 23, 2023  4:36 PM    Comment   Case was discussed with Dr. Antwan Henao and the patient's admission status was agreed to be Admission Status: inpatient status to the service of Dr. Antwan Henao . Follow-up Information    None         Patient's Medications   Discharge Prescriptions    No medications on file       No discharge procedures on file.     PDMP Review       Value Time User    PDMP Reviewed  Yes 8/17/2023  7:05 AM Juaquin Ryan MD          ED Provider  Electronically Signed by           Diallo Ornelas PA-C  08/23/23 8904

## 2023-08-23 NOTE — H&P
[address]  H&P  Name: Shelia Hunt 56 y.o. female I MRN: [id number]  Unit/Bed#: ED 10 I Date of Admission: 8/23/2023   Date of Service: 8/23/2023 I Hospital Day: 0      Assessment/Plan   * Pleural effusion  Assessment & Plan  Presented with progressively worsening shortness of breath  Noted on CT scan to have significant enlargement of malignant multiloculated right pleural effusion with compressive atelectasis  Also noted to have hepatic metastatic disease as well  Patient with history of metastatic breast cancer on chemo  Fortunately poor prognosis will need goals of care discussion we will consult oncology and palliative care  Consideration for thoracentesis pending further goals of care discussion    Hyponatremia  Assessment & Plan  Suspect from volume depletion  Sodium noted to be 124  We will provide IV fluids  Repeat BMP in the morning    Cancer related pain  Assessment & Plan  Continue oral Dilaudid as needed  Follow-up palliative care consult    Malignant neoplasm of breast in female, estrogen receptor positive ([address])  Assessment & Plan  Follows outpatient with oncology  Follow-up inpatient consult           VTE Prophylaxis: Heparin  / sequential compression device   Code Status: full code  POLST: There is no POLST form on file for this patient (pre-hospital)  Discussion with family: pt    Anticipated Length of Stay:  Patient will be admitted on an Inpatient basis with an anticipated length of stay of  > 2 midnights. Justification for Hospital Stay: Pleural effusion      Total Time for Visit, including Counseling / Coordination of Care: 60 minutes. Greater than 50% of this total time spent on direct patient counseling and coordination of care.     Chief Complaint:   SOB    History of Present Illness:    Shelia Hunt is a 56 y.o. female past medical history significant malignant metastatic breast cancer, hypertension initially presented with shortness of breath and lower extremity swelling. Reports symptoms for the past 4 days with progressive worsening. Otherwise denies any acute complaints. Denies fevers, chills, chest pain, cough. Review of Systems:    Review of Systems   Constitutional: Negative for activity change, appetite change, chills, diaphoresis, fever and unexpected weight change. HENT: Negative for congestion, facial swelling and rhinorrhea. Eyes: Negative for photophobia and visual disturbance. Respiratory: Positive for shortness of breath. Negative for cough and wheezing. Cardiovascular: Negative for chest pain and palpitations. Gastrointestinal: Negative for abdominal pain, blood in stool, constipation, diarrhea, nausea and vomiting. Genitourinary: Negative for decreased urine volume, difficulty urinating, dysuria, flank pain, frequency, hematuria and urgency. Musculoskeletal: Negative for arthralgias, back pain, joint swelling and myalgias. Neurological: Negative for dizziness, syncope, facial asymmetry, light-headedness, numbness and headaches. Psychiatric/Behavioral: Negative for confusion and decreased concentration. The patient is not nervous/anxious.         Past Medical and Surgical History:     Past Medical History:   Diagnosis Date   • Cancer ([address])    • Headache(784.0) 11/2021   • History of radiation exposure    • Malignant neoplasm of right female breast [facility]) 2012    right       Past Surgical History:   Procedure Laterality Date   • BACK SURGERY     • BREAST CYST EXCISION     • BREAST LUMPECTOMY Right 2012   • HIP SURGERY Right     debridement of femur   • IR BIOPSY LIVER MASS  07/26/2021   • IR MICROWAVE ABLATION  11/23/2022   • IR PLEURAL EFFUSION LONG-TERM CATHETER CHECK/CHANGE/REPOSITION  8/18/2023   • IR PLEURAL EFFUSION LONG-TERM CATHETER PLACEMENT  08/17/2021   • IR PLEURAL EFFUSION LONG-TERM CATHETER REMOVAL  05/11/2022   • IR PORT PLACEMENT  07/27/2022   • IR THORACENTESIS  07/26/2021   • IR THORACENTESIS  7/5/2023 Meds/Allergies:    Prior to Admission medications    Medication Sig Start Date End Date Taking?  Authorizing Provider   acetaminophen (TYLENOL) 325 mg tablet Take 2 tablets (650 mg total) by mouth every 6 (six) hours as needed for mild pain 4/7/22   Ivy Ellis MD   al mag oxide-diphenhydramine-lidocaine viscous (MAGIC MOUTHWASH) 1:1:1 suspension Swish and spit 10 mL every 4 (four) hours as needed for mouth pain or discomfort  Patient not taking: Reported on 7/5/2023 3/8/22   Ivy Ellis MD   albuterol (PROVENTIL HFA,VENTOLIN HFA) 90 mcg/act inhaler Inhale 2 puffs every 4 (four) hours as needed for wheezing 7/29/21   Bronson Bullock MD   amoxicillin (AMOXIL) 500 mg capsule Take 1 capsule (500 mg total) by mouth every 8 (eight) hours for 10 days 8/15/23 8/25/23  Oliva Garcia MD   calcium carbonate (TUMS) 500 mg chewable tablet Chew 1 tablet (500 mg total) 3 (three) times a day as needed for indigestion or heartburn 7/29/21   Bronson Bullock MD   CRANIAL PROSTHESIS, RX, Apply to cranial area PRN 8/14/23   Garfield YESENIA Linda   CRANIAL PROSTHESIS, RX, Apply to affected area of alopecia 8/10/23   Garfieldgermania Linda PA-C   dicyclomine (BENTYL) 10 mg capsule Take 1 capsule (10 mg total) by mouth every 6 (six) hours as needed (abdominal cramping) 8/10/23   Ivy Ellis MD   diphenhydrAMINE (BENADRYL) 50 MG tablet Take 1 tablet (50 mg total) by mouth daily at bedtime as needed for itching or sleep 7/12/22   Ivy Ellis MD   diphenoxylate-atropine (LOMOTIL) 2.5-0.025 mg per tablet Take 1 tablet by mouth 4 (four) times a day as needed for diarrhea 8/30/21   Ana Paula Thurston MD   docusate sodium (COLACE) 100 mg capsule Take 1 capsule (100 mg total) by mouth 2 (two) times a day for 10 days 6/29/22 8/15/23  Dimple Pedraza MD   DULoxetine (CYMBALTA) 30 mg delayed release capsule Take 1 capsule (30 mg total) by mouth daily 9/21/22   Oliva Garcia MD   HYDROmorphone (DILAUDID) 2 mg tablet Take 2-3 tablets (4-6 mg total) by mouth every 3 (three) hours as needed (moderate/severe cancer-related pain) Max Daily Amount: 48 mg 8/10/23   Darien Witt MD   lactulose 20 g/30 mL Take 15 mL (10 g total) by mouth daily as needed (constipaton) 6/5/23   Darien Witt MD   Lidocaine Viscous HCl (XYLOCAINE) 2 % mucosal solution Swish and spit 10 mL 4 (four) times a day as needed for mouth pain or discomfort  Patient not taking: Reported on 7/18/2023 3/10/22   Unknown MD Eduar   metoclopramide (REGLAN) 10 mg tablet Take 1 tablet (10 mg total) by mouth 4 (four) times a day 9/16/21   Unknown MD Eduar   multivitamin [facility]) TABS Take 1 tablet by mouth    Historical MD Parish   ondansetron (ZOFRAN) 8 mg tablet Take 1 tablet (8 mg total) by mouth every 8 (eight) hours as needed for nausea or vomiting 6/5/23   Darien Witt MD   oxybutynin (DITROPAN-XL) 5 mg 24 hr tablet Take 1 tablet (5 mg total) by mouth daily Take 1 tablet daily 8/18/22   Bautista Pereyra MD   polyethylene glycol (MIRALAX) 17 g packet Take 17 g by mouth daily 7/8/23   Frankey MD Malika   senna (SENOKOT) 8.6 MG tablet Take 1 tablet (8.6 mg total) by mouth daily at bedtime as needed for constipation 5/17/22   Darien Witt MD   Xarelto 20 MG tablet TAKE 1 TABLET BY MOUTH EVERY DAY WITH BREAKFAST 10/3/22   Gin Gavin MD     I have reviewed home medications with patient personally. Allergies:    Allergies   Allergen Reactions   • Codeine Anaphylaxis   • Morphine GI Intolerance, Itching and Vomiting   • Sulfa Antibiotics Hives and Itching       Social History:     Marital Status: /Civil Union     Patient Pre-hospital Living Situation: home  Patient Pre-hospital Level of Mobility: independent  Patient Pre-hospital Diet Restrictions: none  Substance Use History:   Social History     Substance and Sexual Activity   Alcohol Use Not Currently     Social History     Tobacco Use Smoking Status Some Days   • Packs/day: 0.25   • Years: 15.00   • Total pack years: 3.75   • Types: Cigarettes   • Start date: 20   • Last attempt to quit: 7/10/2021   • Years since quittin.1   Smokeless Tobacco Never     Social History     Substance and Sexual Activity   Drug Use Not Currently       Family History:    Family History   Problem Relation Age of Onset   • Breast cancer Mother         in her 63's   • Breast cancer Sister         inher 45s and 48   • Colon cancer Maternal Grandmother    • No Known Problems Paternal Grandmother    • Breast cancer Other    • No Known Problems Paternal Aunt        Physical Exam:     Vitals:   Blood Pressure: 102/67 (23 1124)  Pulse: 94 (23 1124)  Temperature: (!) 97 °F (36.1 °C) (23 1124)  Temp Source: Temporal (23 1124)  Respirations: 18 (23 1124)  SpO2: 97 % (23 1124)    Physical Exam  Constitutional:       General: She is not in acute distress. Appearance: She is well-developed. She is not diaphoretic. HENT:      Head: Normocephalic and atraumatic. Nose: Nose normal.      Mouth/Throat:      Pharynx: No oropharyngeal exudate. Eyes:      General: No scleral icterus. Right eye: No discharge. Left eye: No discharge. Conjunctiva/sclera: Conjunctivae normal.   Neck:      Thyroid: No thyromegaly. Vascular: No JVD. Cardiovascular:      Rate and Rhythm: Normal rate and regular rhythm. Heart sounds: Normal heart sounds. No murmur heard. No friction rub. No gallop. Pulmonary:      Effort: Pulmonary effort is normal. No respiratory distress. Breath sounds: Normal breath sounds. No wheezing or rales. Chest:      Chest wall: No tenderness. Abdominal:      General: Bowel sounds are normal. There is no distension. Palpations: Abdomen is soft. Tenderness: There is no abdominal tenderness. There is no guarding or rebound.    Musculoskeletal:         General: No tenderness or deformity. Normal range of motion. Cervical back: Normal range of motion and neck supple. Right lower leg: Edema present. Left lower leg: Edema present. Skin:     General: Skin is warm and dry. Findings: No erythema or rash. Neurological:      Mental Status: She is alert. Mental status is at baseline. Cranial Nerves: No cranial nerve deficit. Sensory: No sensory deficit. Motor: No abnormal muscle tone. Coordination: Coordination normal.       Additional Data:     Lab Results: I have personally reviewed pertinent reports. Results from last 7 days   Lab Units 08/23/23  1203   WBC Thousand/uL 5.64   HEMOGLOBIN g/dL 9.5*   HEMATOCRIT % 27.0*   PLATELETS Thousands/uL 407*   NEUTROS PCT % 75   LYMPHS PCT % 3*   MONOS PCT % 21*   EOS PCT % 0     Results from last 7 days   Lab Units 08/23/23  1317   SODIUM mmol/L 124*   POTASSIUM mmol/L 4.2   CHLORIDE mmol/L 96   CO2 mmol/L 23   BUN mg/dL 25   CREATININE mg/dL 0.68   ANION GAP mmol/L 5   CALCIUM mg/dL 8.1*   ALBUMIN g/dL 2.7*   TOTAL BILIRUBIN mg/dL 3.33*   ALK PHOS U/L 1,593*   ALT U/L 35   AST U/L 131*   GLUCOSE RANDOM mg/dL 113                       Imaging: I have personally reviewed pertinent reports. VAS lower limb venous duplex study, complete bilateral   Final Result by Sara Manrique MD (08/23 1614)      CTA ED chest PE Study   Final Result by Brian Matute MD (08/23 1627)      No pulm embolism. Significant enlargement of malignant multiloculated right pleural effusion with areas of compressive atelectasis throughout the right lung. Stable 5 mm left lower lobe lung nodule. Hepatic metastatic disease not well visualized on the current phase of contrast.      Upper abdominal ascites has developed in the interval.      Grossly stable widespread osteoblastic metastatic disease.                   Workstation performed: [id number]         XR chest 2 views   Final Result by Roxann Garza MD (08/23 1204)      Right pleural drainage catheter which appears to terminate at the level of loculated effusion      No evidence of pneumothorax               Workstation performed: [id number]             EKG, Pathology, and Other Studies Reviewed on Admission:   · EKG: reviewed    Allscripts / Epic Records Reviewed: Yes     ** Please Note: This note has been constructed using a voice recognition system.  **

## 2023-08-23 NOTE — ASSESSMENT & PLAN NOTE
Suspect from volume depletion  Sodium noted to be 124  We will provide IV fluids  Repeat BMP in the morning

## 2023-08-23 NOTE — QUICK NOTE
Notified of patient's ED arrival. Recommend admission for hyponatremia and progressive liver dysfunction/concern for treatment related liver failure. Please formally consult oncology and palliative medicine, will see patient tomorrow if admitted for goc conversations. Please reach out to on call oncology provider via [facility] connect for any urgent needs.

## 2023-08-23 NOTE — ASSESSMENT & PLAN NOTE
Presented with progressively worsening shortness of breath  Noted on CT scan to have significant enlargement of malignant multiloculated right pleural effusion with compressive atelectasis  Also noted to have hepatic metastatic disease as well  Patient with history of metastatic breast cancer on chemo  Fortunately poor prognosis will need goals of care discussion we will consult oncology and palliative care  Consideration for thoracentesis pending further goals of care discussion

## 2023-08-24 ENCOUNTER — HOSPICE ADMISSION (OUTPATIENT)
Dept: HOME HOSPICE | Facility: HOSPICE | Age: 58
End: 2023-08-24
Payer: COMMERCIAL

## 2023-08-24 ENCOUNTER — HOME CARE VISIT (OUTPATIENT)
Dept: HOME HEALTH SERVICES | Facility: HOME HEALTHCARE | Age: 58
End: 2023-08-24
Payer: COMMERCIAL

## 2023-08-24 LAB
ALBUMIN SERPL BCP-MCNC: 2.7 G/DL (ref 3.5–5)
ALP SERPL-CCNC: 1827 U/L (ref 34–104)
ALT SERPL W P-5'-P-CCNC: 41 U/L (ref 7–52)
ANION GAP SERPL CALCULATED.3IONS-SCNC: 7 MMOL/L
AST SERPL W P-5'-P-CCNC: 197 U/L (ref 13–39)
ATRIAL RATE: 90 BPM
ATRIAL RATE: 94 BPM
BASOPHILS # BLD AUTO: 0.03 THOUSANDS/ÂΜL (ref 0–0.1)
BASOPHILS NFR BLD AUTO: 1 % (ref 0–1)
BILIRUB DIRECT SERPL-MCNC: 2.54 MG/DL (ref 0–0.2)
BILIRUB SERPL-MCNC: 3.66 MG/DL (ref 0.2–1)
BUN SERPL-MCNC: 23 MG/DL (ref 5–25)
CALCIUM SERPL-MCNC: 7.9 MG/DL (ref 8.4–10.2)
CHLORIDE SERPL-SCNC: 98 MMOL/L (ref 96–108)
CO2 SERPL-SCNC: 21 MMOL/L (ref 21–32)
CREAT SERPL-MCNC: 0.72 MG/DL (ref 0.6–1.3)
EOSINOPHIL # BLD AUTO: 0.02 THOUSAND/ÂΜL (ref 0–0.61)
EOSINOPHIL NFR BLD AUTO: 0 % (ref 0–6)
ERYTHROCYTE [DISTWIDTH] IN BLOOD BY AUTOMATED COUNT: 16.8 % (ref 11.6–15.1)
GFR SERPL CREATININE-BSD FRML MDRD: 94 ML/MIN/1.73SQ M
GLUCOSE SERPL-MCNC: 84 MG/DL (ref 65–140)
HCT VFR BLD AUTO: 26.3 % (ref 34.8–46.1)
HGB BLD-MCNC: 9.7 G/DL (ref 11.5–15.4)
IMM GRANULOCYTES # BLD AUTO: 0.04 THOUSAND/UL (ref 0–0.2)
IMM GRANULOCYTES NFR BLD AUTO: 1 % (ref 0–2)
LYMPHOCYTES # BLD AUTO: 0.2 THOUSANDS/ÂΜL (ref 0.6–4.47)
LYMPHOCYTES NFR BLD AUTO: 3 % (ref 14–44)
MCH RBC QN AUTO: 35 PG (ref 26.8–34.3)
MCHC RBC AUTO-ENTMCNC: 36.9 G/DL (ref 31.4–37.4)
MCV RBC AUTO: 95 FL (ref 82–98)
MONOCYTES # BLD AUTO: 1.59 THOUSAND/ÂΜL (ref 0.17–1.22)
MONOCYTES NFR BLD AUTO: 25 % (ref 4–12)
NEUTROPHILS # BLD AUTO: 4.55 THOUSANDS/ÂΜL (ref 1.85–7.62)
NEUTS SEG NFR BLD AUTO: 70 % (ref 43–75)
NRBC BLD AUTO-RTO: 0 /100 WBCS
P AXIS: 74 DEGREES
P AXIS: 80 DEGREES
PLATELET # BLD AUTO: 385 THOUSANDS/UL (ref 149–390)
PMV BLD AUTO: 10.5 FL (ref 8.9–12.7)
POTASSIUM SERPL-SCNC: 4.5 MMOL/L (ref 3.5–5.3)
PR INTERVAL: 140 MS
PR INTERVAL: 156 MS
PROT SERPL-MCNC: 5.6 G/DL (ref 6.4–8.4)
QRS AXIS: 81 DEGREES
QRS AXIS: 84 DEGREES
QRSD INTERVAL: 58 MS
QRSD INTERVAL: 66 MS
QT INTERVAL: 334 MS
QT INTERVAL: 338 MS
QTC INTERVAL: 413 MS
QTC INTERVAL: 417 MS
RBC # BLD AUTO: 2.77 MILLION/UL (ref 3.81–5.12)
SODIUM SERPL-SCNC: 126 MMOL/L (ref 135–147)
T WAVE AXIS: 69 DEGREES
T WAVE AXIS: 72 DEGREES
VENTRICULAR RATE: 90 BPM
VENTRICULAR RATE: 94 BPM
WBC # BLD AUTO: 6.43 THOUSAND/UL (ref 4.31–10.16)

## 2023-08-24 PROCEDURE — 3E0L3GC INTRODUCTION OF OTHER THERAPEUTIC SUBSTANCE INTO PLEURAL CAVITY, PERCUTANEOUS APPROACH: ICD-10-PCS | Performed by: RADIOLOGY

## 2023-08-24 PROCEDURE — NC001 PR NO CHARGE

## 2023-08-24 PROCEDURE — 97163 PT EVAL HIGH COMPLEX 45 MIN: CPT

## 2023-08-24 PROCEDURE — 99255 IP/OBS CONSLTJ NEW/EST HI 80: CPT | Performed by: NURSE PRACTITIONER

## 2023-08-24 PROCEDURE — 93010 ELECTROCARDIOGRAM REPORT: CPT | Performed by: INTERNAL MEDICINE

## 2023-08-24 PROCEDURE — 99232 SBSQ HOSP IP/OBS MODERATE 35: CPT | Performed by: STUDENT IN AN ORGANIZED HEALTH CARE EDUCATION/TRAINING PROGRAM

## 2023-08-24 PROCEDURE — 85025 COMPLETE CBC W/AUTO DIFF WBC: CPT | Performed by: INTERNAL MEDICINE

## 2023-08-24 PROCEDURE — 97167 OT EVAL HIGH COMPLEX 60 MIN: CPT

## 2023-08-24 PROCEDURE — 99255 IP/OBS CONSLTJ NEW/EST HI 80: CPT | Performed by: PHYSICIAN ASSISTANT

## 2023-08-24 PROCEDURE — 80048 BASIC METABOLIC PNL TOTAL CA: CPT | Performed by: INTERNAL MEDICINE

## 2023-08-24 PROCEDURE — 97110 THERAPEUTIC EXERCISES: CPT

## 2023-08-24 PROCEDURE — 80076 HEPATIC FUNCTION PANEL: CPT | Performed by: INTERNAL MEDICINE

## 2023-08-24 RX ORDER — LORATADINE 10 MG/1
10 TABLET ORAL DAILY
Status: DISCONTINUED | OUTPATIENT
Start: 2023-08-24 | End: 2023-08-28 | Stop reason: HOSPADM

## 2023-08-24 RX ORDER — HYDROXYZINE HYDROCHLORIDE 25 MG/1
25 TABLET, FILM COATED ORAL EVERY 6 HOURS PRN
Status: DISCONTINUED | OUTPATIENT
Start: 2023-08-24 | End: 2023-08-28 | Stop reason: HOSPADM

## 2023-08-24 RX ORDER — DIPHENHYDRAMINE HYDROCHLORIDE AND LIDOCAINE HYDROCHLORIDE AND ALUMINUM HYDROXIDE AND MAGNESIUM HYDRO
10 KIT EVERY 4 HOURS PRN
Status: DISCONTINUED | OUTPATIENT
Start: 2023-08-24 | End: 2023-08-28 | Stop reason: HOSPADM

## 2023-08-24 RX ADMIN — DICYCLOMINE HYDROCHLORIDE 10 MG: 10 CAPSULE ORAL at 22:14

## 2023-08-24 RX ADMIN — AMOXICILLIN 500 MG: 250 CAPSULE ORAL at 22:14

## 2023-08-24 RX ADMIN — OXYBUTYNIN CHLORIDE 5 MG: 5 TABLET, EXTENDED RELEASE ORAL at 08:30

## 2023-08-24 RX ADMIN — DICYCLOMINE HYDROCHLORIDE 10 MG: 10 CAPSULE ORAL at 05:30

## 2023-08-24 RX ADMIN — ALTEPLASE 10 MG: 2.2 INJECTION, POWDER, LYOPHILIZED, FOR SOLUTION INTRAVENOUS at 15:08

## 2023-08-24 RX ADMIN — HEPARIN SODIUM 5000 UNITS: 5000 INJECTION INTRAVENOUS; SUBCUTANEOUS at 14:30

## 2023-08-24 RX ADMIN — DORNASE ALFA 5 MG: 1 SOLUTION RESPIRATORY (INHALATION) at 15:09

## 2023-08-24 RX ADMIN — AMOXICILLIN 500 MG: 250 CAPSULE ORAL at 14:30

## 2023-08-24 RX ADMIN — HYDROMORPHONE HYDROCHLORIDE 4 MG: 2 TABLET ORAL at 08:36

## 2023-08-24 RX ADMIN — HEPARIN SODIUM 5000 UNITS: 5000 INJECTION INTRAVENOUS; SUBCUTANEOUS at 22:15

## 2023-08-24 RX ADMIN — DOCUSATE SODIUM 100 MG: 100 CAPSULE, LIQUID FILLED ORAL at 17:05

## 2023-08-24 RX ADMIN — HEPARIN SODIUM 5000 UNITS: 5000 INJECTION INTRAVENOUS; SUBCUTANEOUS at 05:33

## 2023-08-24 RX ADMIN — ALBUTEROL SULFATE 2 PUFF: 90 AEROSOL, METERED RESPIRATORY (INHALATION) at 08:29

## 2023-08-24 RX ADMIN — LORATADINE 10 MG: 10 TABLET ORAL at 11:52

## 2023-08-24 RX ADMIN — SODIUM CHLORIDE 50 ML/HR: 0.9 INJECTION, SOLUTION INTRAVENOUS at 05:58

## 2023-08-24 RX ADMIN — DICYCLOMINE HYDROCHLORIDE 10 MG: 10 CAPSULE ORAL at 16:36

## 2023-08-24 RX ADMIN — HYDROXYZINE HYDROCHLORIDE 25 MG: 25 TABLET ORAL at 14:30

## 2023-08-24 RX ADMIN — ACETAMINOPHEN 650 MG: 325 TABLET, FILM COATED ORAL at 05:30

## 2023-08-24 RX ADMIN — AMOXICILLIN 500 MG: 250 CAPSULE ORAL at 05:28

## 2023-08-24 RX ADMIN — DICYCLOMINE HYDROCHLORIDE 10 MG: 10 CAPSULE ORAL at 08:30

## 2023-08-24 RX ADMIN — DULOXETINE HYDROCHLORIDE 30 MG: 30 CAPSULE, DELAYED RELEASE ORAL at 08:30

## 2023-08-24 RX ADMIN — HYDROMORPHONE HYDROCHLORIDE 4 MG: 2 TABLET ORAL at 05:29

## 2023-08-24 RX ADMIN — DOCUSATE SODIUM 100 MG: 100 CAPSULE, LIQUID FILLED ORAL at 08:30

## 2023-08-24 RX ADMIN — DICYCLOMINE HYDROCHLORIDE 10 MG: 10 CAPSULE ORAL at 11:52

## 2023-08-24 RX ADMIN — POLYETHYLENE GLYCOL 3350 17 G: 17 POWDER, FOR SOLUTION ORAL at 08:30

## 2023-08-24 RX ADMIN — HYDROMORPHONE HYDROCHLORIDE 4 MG: 2 TABLET ORAL at 22:37

## 2023-08-24 NOTE — CONSULTS
Medical Oncology/Hematology Consult Note  Donte Perez, female, 56 y.o., 1965,  /-[birth year], [id number]     Reason for admission: Pleural effusion  Reason for consultation: Metastatic right-sided breast cancer, ER OH psoitive, HER2 negative       ASSESSMENT AND PLAN:     1. Metastatic right-sided breast cancer, ER/OH + HER2 -   Patient is a 16-year-old female known to hematology oncology for metastatic hormone receptor positive HER2 negative breast cancer. Please see entire oncology history and HPI as needed from patient's primary treating physician Dr. Kristen Villalta. With elevated alkaline phosphatase and liver enzymes, image findings on MRI as below with innumerable necrotic hepatic lesions, prolongated coagulation testing and poor performance status we had an extensive conversation with patient today I was at bedside and appreciative of Dr. Kristen Villalta conference and via phone call to discuss with patient as well or recommendations to pursue hospice care at this point. Additional antineoplastic therapy is contraindicated at this point in time due to patient's liver dysfunction and performance status. Patient is agreeable to pursuing hospice care, she wants to be made comfortable. I have placed consult and discussed with case management as well as nursing team regarding this as well as regarding CODE STATUS. For now patient remains a level 2 as she expressed that she does not want any chest compressions or defibrillation in the event that she goes into cardiac arrest.  Patient would however still want intubation ventilation at this point in time. We did discuss that by transitioning to hospice care or comfort measures that patient will be made a level for CODE STATUS and we will not perform intubation or ventilation and patient expressed understanding. Discussed with slim, palliative medicine, case management, nursing staff.   As needed Atarax added for pruritus likely secondary to patient's hyperbilirubinemia, will leave Benadryl at hour sleep for patient as well to assist with pruritus and sleep. Patient is still having discomfort within her chest, right Pleurx catheter is in place and managed per IR. We will stop by for social visit tomorrow with patient as her son may be present for further discussion. Otherwise, signing off. Please reach out via [facility] connect for any additional questions or concerns. 8/23/2023 MRI abdomen w wo contrast and MRCP:   IMPRESSION:     Multiple and innumerable necrotic hepatic lesions with increasing nodularity and surface retraction of the liver due to posttreatment changes and developing pseudocirrhosis  No dilatation of the common bile duct to suggest any biliary obstruction     Mild attenuation of the intrahepatic ducts due to mass effect from the multiple liver lesion     Development of moderate amount of ascites.     Narrowing and thickening noted near the hepatic flexure of the colon, consider clinical correlation or evaluation with the CT/colonoscopy to evaluate for any focal lesion     Bony metastatic disease as seen on the previous study      INR   Date Value Ref Range Status   08/23/2023 2.26 (H) 0.84 - 1.19 Final   07/04/2023 1.12 0.84 - 1.19 Final     PTT   Date Value Ref Range Status   08/23/2023 49 (H) 23 - 37 seconds Final     Comment:     Therapeutic Heparin Range =  60-90 seconds   07/04/2023 33 23 - 37 seconds Final     Comment:     Therapeutic Heparin Range =  60-90 seconds         Addendum 4   PAX8 is negative. The original diagnosis is NOT changed. Addendum electronically signed by Dawood Garay MD on 12/1/2022 at 1352   Addendum 3   Uroplakin, arginase, chromogranin are negative in tumor cells. CK AE1/3 highlights tumor. The original diagnosis is NOT changed.     Addendum electronically signed by Dawood Garay MD on 11/30/2022 at 2542   Addendum 2   BREAST TUMOR PROGNOSTIC PROFILE     Performed on invasive carcinoma block A1  Test Description Result Prognostic Interpretation   Estrogen Receptor/ER  Primary Antibody: SP-1  Internal control: Positive   External control: Positive 99%  Staining Intensity: Strong  Kvng Score*: 8 Positive   Progesterone Receptor/PgR  Primary Antibody:1E2  Internal control: Positive  External control: Positive 95%  Staining Intensity: Strong  Kvng Score*: 8 Positive    Ki-67 Proliferation Marker 25% Positive Unfavorable   HER2 by IHC   Primary Antibody: 4B5 1+ Negative      Appropriate positive and negative controls were reviewed. Patient understands and is in agreement with this plan. Thank you for the opportunity to participate in this patient's care. Patient is a 55 yo female known to oncology for metastatic breast cancer who presented to ED for evaluation of elevate liver function testing, leg swelling, chest discomfort (right). ECOG: 3    Oncology Per Dr. Vonda Salomon Note 8/10/23:   "  Hem/Onc Problem List:   1. Metastatic right-sided breast cancer, ER and MI positive, HER2 negative. 2. Bone metastatic disease.    3. History of right-sided pleural effusion.     Chief Complaint:    Routine follow-up for management of stage IV breast cancer     Assessment/Plan:      1. Metastatic breast cancer, with liver, bone, lymph node and pleura involvement.  ER and MI positive, HER2 negative.  Bone scan showed bony metastatic disease. Patient started monthly Zoladex, daily letrozole and CDK4/6 inhibitor Abemeciclib.  Unfortunately, patient developed significant watery diarrhea from Abemaciclib. Abemaciclib was changed to Ibrance 125 mg daily for 3 weeks on,  followed by 1 week off until disease progression on August 30, 2021. Ibrance dose was decreased to 100 mg because of neutropenia.  CT scan and bone scan showed disease progression and treatment changed to chemo (Eribulin).  Restaging CT 7/8/2022 shows a very good MI (near 50% reduction in index liver lesions). NM bone scan showed no new findings. Restaging CT shows POD now. Patient s/p liver ablation 11/23/22. Further POD on CT 2/2023. 11/23/2022 liver biopsy showed 1+ HER-2 status. Patient was switched to Enhertu 5.4mg/kg Q 21 days (C1D1 was 2/22/23) + Letrozole 2.5mg once daily. Then POD led to switch to Sacituzumab q3 weeks     2. Bony metastatic disease.  Bone scan showed bony metastatic disease on 7/11.  Patient  continues Zometa every 3 months.     3.  History of right breast cancer, status post lumpectomy and axillary lymph node dissection, status post adjuvant radiation therapy.     4. Right-sided pleural effusion, status post thoracentesis, cytology positive for adenocarcinoma, ER/NC positive, HER2 negative.  Status post PleurX catheter placement. Has not required frequency drainage. Continue to treat underlying disease.     • Discussion of decision making     I personally reviewed the following lab results, the image studies, pathology, other specialty/physicians consult notes and recommendations, and outside medical records from [facility]. I had a lengthy discussion with the patient and shared the work-up findings. I spent 41 minutes reviewing the records (labs, clinician notes, outside records, medical history, ordering medicine/tests/procedures, interpreting the imaging/labs previously done) and coordination of care as well as direct time with the patient today, of which greater than 50% of the time was spent in counseling and coordination of care with the patient/family.     • Plan/Labs  ? Metastatic breast cancer with diffuse bone metastasis. ECOG 1   - Cont C2 Sacituzumab 10mg/kg D1/D8. Patient to have labs prior to treatment. ? Restage CT CAP w/c scheduled 10/10/2023  ? F/u palliative care for pain/nausea management  ? Next ECHO scheduled 8/21/2023  ? Continue Zometa Q12 weeks for bone metastasis  ?  Continue Lactulose, senna, miralax for constipation for now.   ? OncotypeMap test NGS was done 4/18/2022 with results: PIK3CA: [id number] mutation. ESR1: WT, PD-L1 IHC: negative. MSI-stable, TMB low (4 muts/mb). BRCA 1&2 WT. Possible future treatment option would include Alpelisib + Fulvestrant.      Follow Up: q3 weeks while on systemic therapy scheduled thru 10/12/2023     All questions were answered to the patient's satisfaction during this encounter. The patient knows the contact information for our office and knows to reach out for any relevant concerns related to this encounter. They are to call for any temperature 100.4 or higher, new symptoms including but not restricted to shaking chills, decreased appetite, nausea, vomiting, diarrhea, increased fatigue, shortness of breath or chest pain, confusion, and not feeling the strength to come to the clinic. For all other listed problems and medical diagnosis in their chart - they are managed by PCP and/or other specialists, which the patient acknowledges. Thank you very much for your consultation and making us a part of this patient's care. We are continuing to follow closely with you. Please do not hesitate to reach out to me with any additional questions or concerns. AJCC 8th Edition Cancer Stage :       Cancer Staging  IV     Hematology/Oncology History:   • History of right-sided breast cancer, initially diagnosed in 2012, status post lumpectomy and axillary lymph node dissection, status post adjuvant radiation therapy.   Patient did not receive any adjuvant endocrine therapy or chemotherapy. • July 24, 2021, patient was admitted to hospital because of shortness of breath and cough.   • July 24, 2021 CT chest PE protocol showed septal thickening throughout the right lung and bronchial wall thickening due to lymphangitic spread of tumor.  Indeterminate lung nodules measuring 5 millimeter in the right upper lobe, right middle lobe and 7 millimeter in the left upper lobe and left lower lobe.  Large right pleural effusion.  Multiple liver metastatic disease measuring up to 4 centimeter.  Lytic metastatic to sternal manubrium. • July 26, 2021, ultrasound abdomen shows multiple hepatic metastatic disease.  Status post thoracentesis with 1200 mL of joanne fluid removed.  Cytology showed adenoma carcinoma, ER and IL positive  40-50%, HER2 negative. • July 26, 2021 biopsy of the lung liver showed metastatic breast cancer, ER and % positive, HER2 negative.  CT abdomen pelvis with contrast showed extensive hepatic metastatic disease. • July 27, 2021 MRI brain showed no evidence of intracranial metastatic disease. • August 6, 2021, mammogram showed no mammographic evidence of malignancy. • August 11, 2021 bone scan showed bony metastatic disease involving left iliac crest medially and adjacent sacrum. • August 12, 2021, Zoladex was given. Gilmar Listen started. • August 16, 2022, patient started Abemaciclib. • August 30, 2021, DC abemaciclib because of diarrhea.  Start Ibrance 125 mg daily 3 weeks on 1 week off. • Nov 19, 2021, CT c/a/p showed:   1.  Since July 2021, new thromboses within the intrahepatic branches of the portal vein as described above  2.  Increased diffuse sclerotic osseous lesions.    3.  Decreased size of most of the hepatic metastases. 4.  Indeterminate mottled appearance of the splenic parenchyma.  Attention on follow-up is advised. 5.  Unchanged pulmonary nodules. 6.  Right pleural effusion has decreased in size since July 24, 2021.  The smooth interlobular septal thickening has also decreased, and this change can be attributable to the decreased size of the pleural effusion. 7.  Mucosal hyperenhancement of the colon.  Findings may be treatment-related, and correlation with gastrointestinal symptoms is advised.     BONE SCAN:   1.  A few areas of increased radiotracer uptake suspicious for osseous metastasis, with findings for minimal progression.   • March 15, 2022 bone scan: Stable or improving scattered foci radiotracer uptake suspicious for osseous metastases.  No new osseous foci suspicious for metastasis.    •  April 1, 2022 CT scan chest abdomen pelvis:  IMPRESSION:     Findings concerning for worsening hepatic metastases as evidenced by increased size and number of lesions.  Please see above discussion.     Progressive left portal vein thrombosis.     The majority of the pulmonary nodules are stable.  There is one subpleural nodule in the right upper lobe apex posteriorly, not seen on the prior study.     Stable extensive osseous metastases.     Multiple tiny hypodense splenic lesions are also stable, indeterminate.     Persistent small right pleural effusion with a right pleural catheter in place.     June 25, 2022: 4 day admission for neutropenic fever  July 8, 2022 CT CAP: 1. Juan Sullivan scattered small pulmonary nodules bilaterally. Small right pleural effusion, slightly decreased. Improving hepatic metastasis. Less conspicuous small splenic hypodense lesions. Stable findings for widespread osseous metastasis. NM Bone Scan without significant changes (A lesion in the left lobe of the liver laterally measures 2.0 x 1.9 cm, previously 5.1 x 3.8 cm- 56.2% reduction.  Somewhat stellate lesion in the right lobe of the liver  posteriorly now measures 1.8 x 1.2 cm image 33 series 2, previously 2.6 x 2.6 cm (42.3%).    July 11, 2022, NM bone scan stated no areas of new or worsening scintigraphic activity suggesting osseous disease progression  10/12/2022 CT CAP w/c: New 8 mm lesion within segment IVb of the liver compared to July 8, 2022, suspicious for progression of metastatic disease. However, a few other liver lesions are improved. Improved small right pleural effusion. Stable extensive osseous metastatic metastatic disease. 11/23/22: S/P Liver ablation   2/1/2023 CT CAP w/c: Status post right mastectomy without recurrence. Interval increase in size and number of hepatic lesions consistent with worsening metastatic disease.   Increased size of a right pleural effusion with pleural thickening concerning for malignant effusion. Stable osseous diastases.     - 2/16/2023: ECHO --> left ventricle --> systolic function normal at 55%. Diastolic function is normal. Global longitudinal strain was normal at -20.3%. right ventricle normal.   4/27/2023: CT CAP w/c: IN. 40-55% decrease. Overall, hepatic metastases have decreased in size from the prior study. However, there is at least one that measures slightly larger. Small to moderate right pleural effusion. Widespread osseous metastatic disease similar   For example, right hepatic lobe index lesion measures 1.6 x 1.4 cm on image 2/114; previously 2.4 x 1.9 cm. Right hepatic lesion on image 2/140 measures 3.3 x 2.8 cm, previously 3.9 x 2.9 cm. A more posterior lesion at the same level measures 1.3 x 1.0 cm, previously 2.3 x 2.3 cm. A 1.0 cm lesion in the left dome measures on image 2/97 which was previously 0.6 cm  7/4/2023 CT CAP w/c: POD. Enlarging partially loculated right pleural effusion. Worsening hepatic metastatic disease. Widespread sclerotic osseous metastasis again seen     5/18/2023: ECHO showed 55% EF.  Global longitudinal strain is -17.8. POD led to switch to Sacituzumab q3 weeks  "      Review of Systems:   Review of Systems   Constitutional: Positive for appetite change, fatigue and unexpected weight change. Cardiovascular: Positive for leg swelling. Gastrointestinal: Positive for constipation. Negative for abdominal pain, anal bleeding, diarrhea and vomiting. Musculoskeletal: Positive for arthralgias. Skin: Positive for pallor. PHYSICAL EXAM:    /60 (BP Location: Left arm)   Pulse 94   Temp (!) 97 °F (36.1 °C) (Temporal)   Resp 16   Ht 5' 5" (1.651 m)   Wt 55.3 kg (121 lb 14.6 oz)   LMP 05/06/2021 (Approximate)   SpO2 95%   BMI 20.29 kg/m²     Physical Exam  Constitutional:       Appearance: She is ill-appearing.    Pulmonary:      Effort: Pulmonary effort is normal. No respiratory distress. Neurological:      Mental Status: She is alert.          LABS:     Recent Results (from the past 48 hour(s))   ECG 12 lead    Collection Time: 08/23/23 11:28 AM   Result Value Ref Range    Ventricular Rate 90 BPM    Atrial Rate 90 BPM    MN Interval 140 ms    QRSD Interval 66 ms    QT Interval 338 ms    QTC Interval 413 ms    P Axis 80 degrees    QRS Axis 84 degrees    T Wave Axis 69 degrees   CBC and differential    Collection Time: 08/23/23 12:03 PM   Result Value Ref Range    WBC 5.64 4.31 - 10.16 Thousand/uL    RBC 2.83 (L) 3.81 - 5.12 Million/uL    Hemoglobin 9.5 (L) 11.5 - 15.4 g/dL    Hematocrit 27.0 (L) 34.8 - 46.1 %    MCV 95 82 - 98 fL    MCH 33.6 26.8 - 34.3 pg    MCHC 35.2 31.4 - 37.4 g/dL    RDW 16.9 (H) 11.6 - 15.1 %    MPV 10.9 8.9 - 12.7 fL    Platelets 945 (H) 402 - 390 Thousands/uL    nRBC 0 /100 WBCs    Neutrophils Relative 75 43 - 75 %    Immat GRANS % 1 0 - 2 %    Lymphocytes Relative 3 (L) 14 - 44 %    Monocytes Relative 21 (H) 4 - 12 %    Eosinophils Relative 0 0 - 6 %    Basophils Relative 0 0 - 1 %    Neutrophils Absolute 4.26 1.85 - 7.62 Thousands/µL    Immature Grans Absolute 0.03 0.00 - 0.20 Thousand/uL    Lymphocytes Absolute 0.15 (L) 0.60 - 4.47 Thousands/µL    Monocytes Absolute 1.17 0.17 - 1.22 Thousand/µL    Eosinophils Absolute 0.01 0.00 - 0.61 Thousand/µL    Basophils Absolute 0.02 0.00 - 0.10 Thousands/µL   B-Type Natriuretic Peptide(BNP)    Collection Time: 08/23/23 12:03 PM   Result Value Ref Range    BNP 88 0 - 100 pg/mL   HS Troponin 0hr (reflex protocol)    Collection Time: 08/23/23 12:03 PM   Result Value Ref Range    hs TnI 0hr 46 "Refer to ACS Flowchart"- see link ng/L   FLU/RSV/COVID - if FLU/RSV clinically relevant    Collection Time: 08/23/23 12:03 PM    Specimen: Nasopharyngeal Swab; Nares   Result Value Ref Range    SARS-CoV-2 Negative Negative    INFLUENZA A PCR Negative Negative    INFLUENZA B PCR Negative Negative    RSV PCR Negative Negative ECG 12 lead    Collection Time: 08/23/23 12:23 PM   Result Value Ref Range    Ventricular Rate 94 BPM    Atrial Rate 94 BPM    NJ Interval 156 ms    QRSD Interval 58 ms    QT Interval 334 ms    QTC Interval 417 ms    P Axis 74 degrees    QRS Axis 81 degrees    T Wave Axis 72 degrees   Comprehensive metabolic panel    Collection Time: 08/23/23  1:17 PM   Result Value Ref Range    Sodium 124 (L) 135 - 147 mmol/L    Potassium 4.2 3.5 - 5.3 mmol/L    Chloride 96 96 - 108 mmol/L    CO2 23 21 - 32 mmol/L    ANION GAP 5 mmol/L    BUN 25 5 - 25 mg/dL    Creatinine 0.68 0.60 - 1.30 mg/dL    Glucose 113 65 - 140 mg/dL    Calcium 8.1 (L) 8.4 - 10.2 mg/dL    Corrected Calcium 9.1 8.3 - 10.1 mg/dL     (H) 13 - 39 U/L    ALT 35 7 - 52 U/L    Alkaline Phosphatase 1,593 (H) 34 - 104 U/L    Total Protein 5.7 (L) 6.4 - 8.4 g/dL    Albumin 2.7 (L) 3.5 - 5.0 g/dL    Total Bilirubin 3.33 (H) 0.20 - 1.00 mg/dL    eGFR 98 ml/min/1.73sq m   D-Dimer    Collection Time: 08/23/23  1:17 PM   Result Value Ref Range    D-Dimer, Quant 17.72 (H) <0.50 ug/ml FEU   HS Troponin I 2hr    Collection Time: 08/23/23  2:47 PM   Result Value Ref Range    hs TnI 2hr 36 "Refer to ACS Flowchart"- see link ng/L    Delta 2hr hsTnI -10 <20 ng/L   Protime-INR    Collection Time: 08/23/23  4:42 PM   Result Value Ref Range    Protime 25.8 (H) 11.6 - 14.5 seconds    INR 2.26 (H) 0.84 - 1.19   APTT    Collection Time: 08/23/23  4:42 PM   Result Value Ref Range    PTT 49 (H) 23 - 37 seconds   HS Troponin I 4hr    Collection Time: 08/23/23  5:33 PM   Result Value Ref Range    hs TnI 4hr 37 "Refer to ACS Flowchart"- see link ng/L    Delta 4hr hsTnI -9 <20 ng/L   Basic metabolic panel    Collection Time: 08/24/23  7:02 AM   Result Value Ref Range    Sodium 126 (L) 135 - 147 mmol/L    Potassium 4.5 3.5 - 5.3 mmol/L    Chloride 98 96 - 108 mmol/L    CO2 21 21 - 32 mmol/L    ANION GAP 7 mmol/L    BUN 23 5 - 25 mg/dL    Creatinine 0.72 0.60 - 1.30 mg/dL    Glucose 84 65 - 140 mg/dL    Calcium 7.9 (L) 8.4 - 10.2 mg/dL    eGFR 94 ml/min/1.73sq m   Hepatic function panel    Collection Time: 08/24/23  7:02 AM   Result Value Ref Range    Total Bilirubin 3.66 (H) 0.20 - 1.00 mg/dL    Bilirubin, Direct 2.54 (H) 0.00 - 0.20 mg/dL    Alkaline Phosphatase 1,827 (H) 34 - 104 U/L     (H) 13 - 39 U/L    ALT 41 7 - 52 U/L    Total Protein 5.6 (L) 6.4 - 8.4 g/dL    Albumin 2.7 (L) 3.5 - 5.0 g/dL   CBC and differential    Collection Time: 08/24/23  7:02 AM   Result Value Ref Range    WBC 6.43 4.31 - 10.16 Thousand/uL    RBC 2.77 (L) 3.81 - 5.12 Million/uL    Hemoglobin 9.7 (L) 11.5 - 15.4 g/dL    Hematocrit 26.3 (L) 34.8 - 46.1 %    MCV 95 82 - 98 fL    MCH 35.0 (H) 26.8 - 34.3 pg    MCHC 36.9 31.4 - 37.4 g/dL    RDW 16.8 (H) 11.6 - 15.1 %    MPV 10.5 8.9 - 12.7 fL    Platelets 591 334 - 059 Thousands/uL    nRBC 0 /100 WBCs    Neutrophils Relative 70 43 - 75 %    Immat GRANS % 1 0 - 2 %    Lymphocytes Relative 3 (L) 14 - 44 %    Monocytes Relative 25 (H) 4 - 12 %    Eosinophils Relative 0 0 - 6 %    Basophils Relative 1 0 - 1 %    Neutrophils Absolute 4.55 1.85 - 7.62 Thousands/µL    Immature Grans Absolute 0.04 0.00 - 0.20 Thousand/uL    Lymphocytes Absolute 0.20 (L) 0.60 - 4.47 Thousands/µL    Monocytes Absolute 1.59 (H) 0.17 - 1.22 Thousand/µL    Eosinophils Absolute 0.02 0.00 - 0.61 Thousand/µL    Basophils Absolute 0.03 0.00 - 0.10 Thousands/µL       CTA ED chest PE Study    Result Date: 8/23/2023  Narrative: CTA - CHEST WITH IV CONTRAST - PULMONARY ANGIOGRAM INDICATION:   shortness of breath, CA, elevated dimer. History of metastatic breast cancer. COMPARISON: Chest x-ray from earlier today and CT scan chest/abdomen/pelvis from 7/4/2023. TECHNIQUE: CTA examination of the chest was performed using angiographic technique according to a protocol specifically tailored to evaluate for pulmonary embolism. Multiplanar 2D reformatted images were created from the source data.  In addition, coronal 3D MIP postprocessing was performed on the acquisition scanner. Radiation dose length product (DLP) for this visit:  184 mGy-cm . This examination, like all CT scans performed in the [facility], was performed utilizing techniques to minimize radiation dose exposure, including the use of iterative reconstruction and automated exposure control. IV Contrast:  100 mL of iohexol (OMNIPAQUE) FINDINGS: PULMONARY ARTERIAL TREE:  No pulmonary embolus is seen. LUNGS: Stable 5 mm left lower lobe nodule image 3/57 interval increased size of a multiloculated right pleural effusion with areas of compressive atelectasis in the right middle and lower lobes. No endotracheal or endobronchial lesion identified. PLEURA: Enlarging multiloculated right pleural effusion with pleural drainage catheter in place. HEART/GREAT VESSELS:  Unremarkable for patient's age. No thoracic aortic aneurysm. MEDIASTINUM AND KIA: Stable calcified bilateral hilar and mediastinal lymph nodes. CHEST WALL AND LOWER NECK:   Right chest port noted. VISUALIZED STRUCTURES IN THE UPPER ABDOMEN: Multiple liver lesions better depicted on previous examination consistent with metastatic disease. Upper abdominal ascites has developed. OSSEOUS STRUCTURES: Relatively stable diffuse osteoblastic metastases. Impression: No pulm embolism. Significant enlargement of malignant multiloculated right pleural effusion with areas of compressive atelectasis throughout the right lung. Stable 5 mm left lower lobe lung nodule. Hepatic metastatic disease not well visualized on the current phase of contrast. Upper abdominal ascites has developed in the interval. Grossly stable widespread osteoblastic metastatic disease.  Workstation performed: [id number]     VAS lower limb venous duplex study, complete bilateral    Result Date: 8/23/2023  Narrative:  [facility] REPORT CLINICAL: Indications: Patient presents with bilateral lower extremity edema x 2 days. Operative History: no cardiovascular surgeries Risk Factors The patient has history of HTN and smoking (current) 0.2 ppd. CONCLUSION: Impression: RIGHT LOWER LIMB: No evidence of acute or chronic deep vein thrombosis. No evidence of superficial thrombophlebitis noted. Doppler evaluation shows a normal response to augmentation maneuvers. . Popliteal, posterior tibial and anterior tibial arterial Doppler waveform's are triphasic. LEFT LOWER LIMB: No evidence of acute or chronic deep vein thrombosis. No evidence of superficial thrombophlebitis noted. Doppler evaluation shows a normal response to augmentation maneuvers. Popliteal, posterior tibial and anterior tibial arterial Doppler waveform's are triphasic. Technical findings were given to Sophie Blair on the floor. SIGNATURE: Electronically Signed by: Gianna Almazan MD, RPVI on 2023-08-23 04:14:54 PM    MRI abdomen w wo contrast and mrcp    Result Date: 8/23/2023  Narrative: MRI - ABDOMEN - WITH AND WITHOUT CONTRAST INDICATION: 56 years / Female  C79.51: Secondary malignant neoplasm of bone C78.7: Secondary malignant neoplasm of liver and intrahepatic bile duct. COMPARISON: July 4, 2023 TECHNIQUE:  Multiplanar/multisequence MRI of the abdomen was performed before and after administration of contrast. IV Contrast:  5 mL of Gadobutrol injection (SINGLE-DOSE) FINDINGS: LOWER CHEST:   Moderate-sized right effusion seen with septation LIVER: Multiple hepatic lesions seen throughout the liver parenchyma most of these lesions are non diffusion restricting. The largest lesion seen in the segment 7/8 demonstrates increasing capsular retraction. On the postcontrast images these lesions demonstrate marginal enhancement. Nodular contour of the liver seen. The hepatic veins and portal veins are patent.  BILE DUCTS: No dilatation of the intrahepatic ducts seen The common bile duct is not dilated Attenuation of the central aspect of the right and left hepatic duct proximal to the confluence noted without any intrahepatic bili ductal dilatation GALLBLADDER: There is mildly distended PANCREAS:  Unremarkable. ADRENAL GLANDS:  Unremarkable. SPLEEN:  Normal. KIDNEYS/PROXIMAL URETERS: No hydroureteronephrosis. No suspicious renal mass. Right renal cyst seen BOWEL:   Thickening of the hepatic flexure noted, image 32 series 6 PERITONEUM/RETROPERITONEUM: Perihepatic fluid seen. Ascites seen LYMPH NODES: No abdominal lymphadenopathy. VASCULAR STRUCTURES: Portal vein is patent SMA is patent Splenic vein is patent ABDOMINAL WALL:  Unremarkable. OSSEOUS STRUCTURES:  No suspicious osseous lesion. Patient is known to have diffuse bony metastatic     Impression: Multiple and innumerable necrotic hepatic lesions with increasing nodularity and surface retraction of the liver due to posttreatment changes and developing pseudocirrhosis No dilatation of the common bile duct to suggest any biliary obstruction Mild attenuation of the intrahepatic ducts due to mass effect from the multiple liver lesion Development of moderate amount of ascites. Narrowing and thickening noted near the hepatic flexure of the colon, consider clinical correlation or evaluation with the CT/colonoscopy to evaluate for any focal lesion Bony metastatic disease as seen on the previous study The study was marked in EPIC for significant notification. Workstation performed: [id number]     XR chest 2 views    Result Date: 8/23/2023  Narrative: CHEST INDICATION:   SOB, pleural effusion hx, catheter placement verification. COMPARISON: 8/13/2023 EXAM PERFORMED/VIEWS:  XR CHEST PA & LATERAL 2 views FINDINGS: Right-sided Mediport at cavoatrial junction Right pleural drainage catheter which appears to terminate within the region of loculated right pleural effusion at mid lung level Cardiomediastinal silhouette appears unremarkable. No pneumothorax Osseous structures appear within normal limits for patient age.      Impression: Right pleural drainage catheter which appears to terminate at the level of loculated effusion No evidence of pneumothorax Workstation performed: [id number]     Echo follow up/limited w/ contrast if indicated    Result Date: 8/21/2023  Narrative: •  Left Ventricle: Left ventricular cavity size is normal. Wall thickness is normal. The left ventricular ejection fraction is 60%. Systolic function is normal. Global longitudinal strain -23.2%. IR pleural effusion long-term catheter check/change/reposition    Result Date: 8/18/2023  Narrative: PROCEDURE: Evaluation of right tunneled pleural catheter. STAFF: DEBRA Gonsalez NUMBER OF IMAGES: Multiple COMPLICATIONS: None. MEDICATIONS: tPA 10 mg/dornase 5 mg in 30 mL NSS. INDICATION: Concern for malfunctioning pleural catheter. The patient has a history of metastatic breast cancer. The patient reports that she aspirates the catheter once a week. She reports approximately 3 to 500 mL of output with aspiration. Patient unable to aspirate catheter x1 week. PROCEDURE: Ultrasound images of the right pleural catheter and effusion were obtained. The catheter was then aspirated. Unable to aspirate any pleural fluid. 10 mL of sterile normal saline were injected and the catheter, return of fibrous, small clot aspirated into  the syringe. Unable to aspirate with Vacutainer. tPA/dornase was instilled and the catheter for 1 hour. The catheter was then aspirated for 300 mL of pleural fluid that contained some clot and fibrous tissue. FINDINGS: Ultrasound images showed catheter in good position in right pleural space. Small loculated right pleural effusion. Impression: Evaluation of right tunneled pleural catheter, loculated effusion. PLAN: The patient is to continue to aspirate catheter once per week as necessary. Workstation performed: [id number]     XR chest 2 views    Result Date: 8/14/2023  Narrative: CHEST INDICATION:   sob. COMPARISON: Chest CT 7/4/2023, CXR 8/2/2022.  EXAM PERFORMED/VIEWS:  XR CHEST PA & LATERAL. DUAL ENERGY SUBTRACTION. FINDINGS: Right port at cavoatrial junction. Cardiomediastinal silhouette normal. Right pleural drainage catheter. Moderate loculated right pleural effusion and right base atelectasis. No pneumothorax. Right axillary clips. Upper abdomen normal.  Bones normal for age. Impression: Right pleural drainage catheter with no pneumothorax. Moderate loculated right effusion and right base atelectasis.  Workstation performed: [id number]         HISTORY:    Past Medical History:   Diagnosis Date   • Cancer ([address])    • Headache(784.0) 11/2021   • History of radiation exposure    • Malignant neoplasm of right female breast ([address]) 2012    right       Past Surgical History:   Procedure Laterality Date   • BACK SURGERY     • BREAST CYST EXCISION     • BREAST LUMPECTOMY Right 2012   • HIP SURGERY Right     debridement of femur   • IR BIOPSY LIVER MASS  07/26/2021   • IR MICROWAVE ABLATION  11/23/2022   • IR PLEURAL EFFUSION LONG-TERM CATHETER CHECK/CHANGE/REPOSITION  8/18/2023   • IR PLEURAL EFFUSION LONG-TERM CATHETER PLACEMENT  08/17/2021   • IR PLEURAL EFFUSION LONG-TERM CATHETER REMOVAL  05/11/2022   • IR PORT PLACEMENT  07/27/2022   • IR THORACENTESIS  07/26/2021   • IR THORACENTESIS  7/5/2023       Family History   Problem Relation Age of Onset   • Breast cancer Mother         in her 63's   • Breast cancer Sister         inher 45s and 48   • Colon cancer Maternal Grandmother    • No Known Problems Paternal Grandmother    • Breast cancer Other    • No Known Problems Paternal Aunt        Social History     Socioeconomic History   • Marital status: /Civil Union     Spouse name: None   • Number of children: None   • Years of education: None   • Highest education level: None   Occupational History   • None   Tobacco Use   • Smoking status: Some Days     Packs/day: 0.25     Years: 15.00     Total pack years: 3.75     Types: Cigarettes     Start date: 20 Last attempt to quit: 7/10/2021     Years since quittin.1   • Smokeless tobacco: Never   Vaping Use   • Vaping Use: Never used   Substance and Sexual Activity   • Alcohol use: Not Currently   • Drug use: Not Currently   • Sexual activity: Not Currently     Partners: Male     Birth control/protection: Male Sterilization   Other Topics Concern   • None   Social History Narrative   • None     Social Determinants of Health     Financial Resource Strain: Not on file   Food Insecurity: No Food Insecurity (2023)    Hunger Vital Sign    • Worried About Running Out of Food in the Last Year: Never true    • Ran Out of Food in the Last Year: Never true   Transportation Needs: No Transportation Needs (2023)    PRAPARE - Transportation    • Lack of Transportation (Medical): No    • Lack of Transportation (Non-Medical):  No   Physical Activity: Not on file   Stress: Not on file   Social Connections: Not on file   Intimate Partner Violence: Not on file   Housing Stability: Low Risk  (2023)    Housing Stability Vital Sign    • Unable to Pay for Housing in the Last Year: No    • Number of Places Lived in the Last Year: 1    • Unstable Housing in the Last Year: No         Current Facility-Administered Medications:   •  acetaminophen (TYLENOL) tablet 650 mg, 650 mg, Oral, Q6H PRN, Lane López MD, 650 mg at 23 0530  •  albuterol (PROVENTIL HFA,VENTOLIN HFA) inhaler 2 puff, 2 puff, Inhalation, Q4H PRN, aLne López MD, 2 puff at 23 0829  •  amoxicillin (AMOXIL) capsule 500 mg, 500 mg, Oral, Q8H [address], Lane López MD, 500 mg at 23 2323  •  dicyclomine (BENTYL) capsule 10 mg, 10 mg, Oral, 4x Daily (AC & HS), Lane López MD, 10 mg at 23 1152  •  diphenhydrAMINE (BENADRYL) tablet 50 mg, 50 mg, Oral, HS PRN, Lane López MD, 50 mg at 23 2134  •  diphenhydramine, lidocaine, Al/Mg hydroxide, simethicone (Magic Mouthwash) oral solution 10 mL, 10 mL, Swish & Swallow, Q4H PRN, Heriberto Revering Rosy Hylton  •  docusate sodium (COLACE) capsule 100 mg, 100 mg, Oral, BID, Lane López MD, 100 mg at 08/24/23 0830  •  DULoxetine (CYMBALTA) delayed release capsule 30 mg, 30 mg, Oral, Daily, Lane López MD, 30 mg at 08/24/23 0830  •  heparin (porcine) subcutaneous injection 5,000 Units, 5,000 Units, Subcutaneous, Q8H [address], aLne López MD, 5,000 Units at 08/24/23 0533  •  HYDROmorphone (DILAUDID) tablet 4 mg, 4 mg, Oral, Q3H PRN, Joseph Sams MD, 4 mg at 08/24/23 0836  •  loratadine (CLARITIN) tablet 10 mg, 10 mg, Oral, Daily, DEBRA Faustin, 10 mg at 08/24/23 1152  •  nicotine (NICODERM CQ) 14 mg/24hr TD 24 hr patch 1 patch, 1 patch, Transdermal, Daily, Lane López MD  •  ondansetron (ZOFRAN) injection 4 mg, 4 mg, Intravenous, Q6H PRN, Lane López MD  •  oxybutynin (DITROPAN-XL) 24 hr tablet 5 mg, 5 mg, Oral, Daily, Lane López MD, 5 mg at 08/24/23 0830  •  phenol (CHLORASEPTIC) 1.4 % mucosal liquid 1 spray, 1 spray, Mouth/Throat, Q2H PRN, DEBRA Faustin  •  polyethylene glycol (MIRALAX) packet 17 g, 17 g, Oral, Daily, Lane López MD, 17 g at 08/24/23 0830    Medications Prior to Admission   Medication   • acetaminophen (TYLENOL) 325 mg tablet   • al mag oxide-diphenhydramine-lidocaine viscous (MAGIC MOUTHWASH) 1:1:1 suspension   • albuterol (PROVENTIL HFA,VENTOLIN HFA) 90 mcg/act inhaler   • amoxicillin (AMOXIL) 500 mg capsule   • calcium carbonate (TUMS) 500 mg chewable tablet   • dicyclomine (BENTYL) 10 mg capsule   • diphenhydrAMINE (BENADRYL) 50 MG tablet   • DULoxetine (CYMBALTA) 30 mg delayed release capsule   • HYDROmorphone (DILAUDID) 2 mg tablet   • lactulose 20 g/30 mL   • Lidocaine Viscous HCl (XYLOCAINE) 2 % mucosal solution   • metoclopramide (REGLAN) 10 mg tablet   • multivitamin (THERAGRAN) TABS   • ondansetron (ZOFRAN) 8 mg tablet   • oxybutynin (DITROPAN-XL) 5 mg 24 hr tablet   • polyethylene glycol (MIRALAX) 17 g packet   • senna (SENOKOT) 8.6 MG tablet   • Xarelto 20 MG tablet   • CRANIAL PROSTHESIS, RX,   • CRANIAL PROSTHESIS, RX,   • diphenoxylate-atropine (LOMOTIL) 2.5-0.025 mg per tablet   • docusate sodium (COLACE) 100 mg capsule       Allergies   Allergen Reactions   • Codeine Anaphylaxis   • Morphine GI Intolerance, Itching and Vomiting   • Sulfa Antibiotics Hives and Itching       Labs and pertinent reports reviewed. This note has been generated by voice recognition software system. Therefore, there may be spelling, grammar, and or syntax errors. Please contact if questions arise.

## 2023-08-24 NOTE — PLAN OF CARE
Problem: OCCUPATIONAL THERAPY ADULT  Goal: Performs self-care activities at highest level of function for planned discharge setting. See evaluation for individualized goals. Description: Treatment Interventions: ADL retraining, Functional transfer training, Endurance training, UE strengthening/ROM, Equipment evaluation/education, Patient/family training, Compensatory technique education, Continued evaluation, Energy conservation, Activityengagement          See flowsheet documentation for full assessment, interventions and recommendations. Note: Limitation: Decreased ADL status, Decreased UE ROM, Decreased UE strength, Decreased endurance, Decreased self-care trans, Decreased high-level ADLs  Prognosis: Good  Assessment: Patient is a 56 y.o. female seen for OT evaluation s/p admit to [facility] on 8/23/2023 w/Pleural effusion. Commorbidities affecting patient's functional performance at time of assessment include: Pleural Effusion, Hyponatremia, Cancer related pain, Malignant neoplasm of breast, presented to ED with SOB. Orders placed for OT evaluation and treatment. Performed at least two patient identifiers during session including name and wristband. Prior to admission, Patient reported she lives with family in a one story house, 3 [address], was ambulatory without AD. Personal factors affecting patient at time of initial evaluation include: steps to enter, difficulty performing ADLs and difficulty performing IADLs. Upon evaluation, patient requires minimal  assist for UB ADLs, moderate assist for LB ADLs.   Occupational performance is affected by the following deficits: decreased functional use of BUEs, decreased muscle strength, degenerative arthritic joint changes, dynamic sit/ stand balance deficit with poor standing tolerance time for self care and functional mobility, decreased activity tolerance, increased pain and postural control and postural alignment deficit, requiring external assistance to complete transitional movements. Patient to benefit from continued Occupational Therapy treatment while in the hospital to address deficits as defined above and maximize level of functional independence with ADLs and functional mobility. Occupational Performance areas to address include: bathing/ shower, dressing, toilet hygiene, transfer to all surfaces, functional mobility, emergency response, health maintenance, IADLs: safety procedures and Leisure Participation. From OT standpoint, recommendation at time of d/c would be Home with daily checks, Home with family support and Linda Gordon.      OT Discharge Recommendation: Home with home health rehabilitation

## 2023-08-24 NOTE — ASSESSMENT & PLAN NOTE
Suspect from volume depletion  Sodium noted to be 124  Sodium mildly improved  No further monitoring necessary as patient agreeable to hospice

## 2023-08-24 NOTE — UTILIZATION REVIEW
Initial Clinical Review    Admission: Date/Time/Statement:   Admission Orders (From admission, onward)     Ordered        08/23/23 1637  INPATIENT ADMISSION  Once                      Orders Placed This Encounter   Procedures   • INPATIENT ADMISSION     Standing Status:   Standing     Number of Occurrences:   1     Order Specific Question:   Level of Care     Answer:   Med Surg [16]     Order Specific Question:   Estimated length of stay     Answer:   More than 2 Midnights     Order Specific Question:   Certification     Answer:   I certify that inpatient services are medically necessary for this patient for a duration of greater than two midnights. See H&P and MD Progress Notes for additional information about the patient's course of treatment. ED Arrival Information     Expected   -    Arrival   8/23/2023 11:11    Acuity   Emergent            Means of arrival   Walk-In    Escorted by   Self    Service   Hospitalist    Admission type   Emergency            Arrival complaint   SOB/FEET SWELLING (IMMUNO COMPROMIISED)           Chief Complaint   Patient presents with   • Shortness of Breath     Bilateral leg swelling and shortness of breath, currently diagnosed with strep throat. Pt reports being sent by oncologist. Metastatic breast cancer to liver, lungs, and bones. Currently receiving chemotherapy       Initial Presentation: 56 y.o. female to ED from oncology office  w/ SOB and LE swelling x4 days w/ worsening . PMHX metastatic breast ca , HTN . CT scan revealed  significant enlargement of malignant multiloculated right pleural effusion with compressive atelectasis. Also noted to have hepatic metastatic disease . Na 124. Admitted P status w/ pleural effusion , hyponatremia . Plan consider thoracentesis, IVF and recheck BMP in am . Palliative care consult , cont po dilaudid . PE: BLE edema     Date: 8/24   Day 2: pt feeling SOB . Consult IR to evaluate Pleurx catheter, patient reports catheter is not draining Amanda Strauss Monitor LFts . Pt made aware of poor prognosis and will determine hospice vs tx after further dicussion w/ family and oncology . 8/24 IR Consult   Patient had a CTA chest yesterday -   Enlarging multiloculated right pleural effusion with pleural drainage catheter in place.   Pleurx catheter is in good position. Will plan to repeat tPa/dornase again today to hopefully break up some of the loculations.       ED Triage Vitals   Temperature Pulse Respirations Blood Pressure SpO2   08/23/23 1124 08/23/23 1124 08/23/23 1124 08/23/23 1124 08/23/23 1124   (!) 97 °F (36.1 °C) 94 18 102/67 97 %      Temp Source Heart Rate Source Patient Position - Orthostatic VS BP Location FiO2 (%)   08/23/23 1124 08/23/23 1124 08/23/23 1124 08/23/23 1124 --   Temporal Monitor Sitting Left arm       Pain Score       08/23/23 1700       5          Wt Readings from Last 1 Encounters:   08/24/23 55.3 kg (121 lb 14.6 oz)     Additional Vital Signs:   08/23/23 1700 -- 94 16 101/60 76 95 % None (Room air) Lying       Pertinent Labs/Diagnostic Test Results:   8/23 Venous duplex RIGHT LOWER LIMB:  No evidence of acute or chronic deep vein thrombosis. No evidence of superficial thrombophlebitis noted. Doppler evaluation shows a normal response to augmentation maneuvers. .  Popliteal, posterior tibial and anterior tibial arterial Doppler waveform's are  triphasic. LEFT LOWER LIMB:  No evidence of acute or chronic deep vein thrombosis. No evidence of superficial thrombophlebitis noted. Doppler evaluation shows a normal response to augmentation maneuvers.   Popliteal, posterior tibial and anterior tibial arterial Doppler waveform's are  Triphasic.  8/23 MRI abd Multiple and innumerable necrotic hepatic lesions with increasing nodularity and surface retraction of the liver due to posttreatment changes and developing pseudocirrhosis  No dilatation of the common bile duct to suggest any biliary obstruction     Mild attenuation of the intrahepatic ducts due to mass effect from the multiple liver lesion     Development of moderate amount of ascites.     Narrowing and thickening noted near the hepatic flexure of the colon, consider clinical correlation or evaluation with the CT/colonoscopy to evaluate for any focal lesion     Bony metastatic disease as seen on the previous study   8/23 EKG NSR  VAS lower limb venous duplex study, complete bilateral   Final Result by Adina Martinez MD (08/23 1614)      CTA ED chest PE Study   Final Result by Sharmaine Butler MD (08/23 1627)      No pulm embolism. Significant enlargement of malignant multiloculated right pleural effusion with areas of compressive atelectasis throughout the right lung. Stable 5 mm left lower lobe lung nodule. Hepatic metastatic disease not well visualized on the current phase of contrast.      Upper abdominal ascites has developed in the interval.      Grossly stable widespread osteoblastic metastatic disease.                   Workstation performed: [id number]         XR chest 2 views   Final Result by Musa Ceja MD (08/23 1204)      Right pleural drainage catheter which appears to terminate at the level of loculated effusion      No evidence of pneumothorax               Workstation performed: [id number]           Results from last 7 days   Lab Units 08/23/23  1203   SARS-COV-2  Negative     Results from last 7 days   Lab Units 08/24/23  0702 08/23/23  1203 08/21/23  1450   WBC Thousand/uL 6.43 5.64 2.86*   HEMOGLOBIN g/dL 9.7* 9.5* 9.1*   HEMATOCRIT % 26.3* 27.0* 25.6*   PLATELETS Thousands/uL 385 407* 422*   NEUTROS ABS Thousands/µL 4.55 4.26 1.85     Results from last 7 days   Lab Units 08/24/23  0702 08/23/23  1317 08/21/23  1450   SODIUM mmol/L 126* 124* 128*   POTASSIUM mmol/L 4.5 4.2 4.4   CHLORIDE mmol/L 98 96 97   CO2 mmol/L 21 23 22   ANION GAP mmol/L 7 5 9   BUN mg/dL 23 25 27*   CREATININE mg/dL 0.72 0.68 0.92   EGFR ml/min/1.73sq m 94 98 70 CALCIUM mg/dL 7.9* 8.1* 8.7     Results from last 7 days   Lab Units 08/24/23  0702 08/23/23  1317 08/21/23  1450   AST U/L 197* 131* 135*   ALT U/L 41 35 42   ALK PHOS U/L 1,827* 1,593* 1,474*   TOTAL PROTEIN g/dL 5.6* 5.7* 5.9*   ALBUMIN g/dL 2.7* 2.7* 2.9*   TOTAL BILIRUBIN mg/dL 3.66* 3.33* 2.70*   BILIRUBIN DIRECT mg/dL 2.54*  --   --      Results from last 7 days   Lab Units 08/24/23  0702 08/23/23  1317 08/21/23  1450   GLUCOSE RANDOM mg/dL 84 113 85     Results from last 7 days   Lab Units 08/23/23  1733 08/23/23  1447 08/23/23  1203   HS TNI 0HR ng/L  --   --  46   HS TNI 2HR ng/L  --  36  --    HSTNI D2 ng/L  --  -10  --    HS TNI 4HR ng/L 37  --   --    HSTNI D4 ng/L -9  --   --      Results from last 7 days   Lab Units 08/23/23  1317   D-DIMER QUANTITATIVE ug/ml FEU 17.72*     Results from last 7 days   Lab Units 08/23/23  1642   PROTIME seconds 25.8*   INR  2.26*   PTT seconds 49*       Results from last 7 days   Lab Units 08/23/23  1203   BNP pg/mL 88     Results from last 7 days   Lab Units 08/23/23  1203   INFLUENZA A PCR  Negative   INFLUENZA B PCR  Negative   RSV PCR  Negative       ED Treatment:   Medication Administration from 08/23/2023 1111 to 08/23/2023 1744       Date/Time Order Dose Route Action     08/23/2023 1701 EDT sodium chloride 0.9 % infusion 50 mL/hr Intravenous New Bag        Past Medical History:   Diagnosis Date   • Cancer ([address])    • Headache(784.0) 11/2021   • History of radiation exposure    • Malignant neoplasm of right female breast ([address]) 2012    right     Present on Admission:  • Pleural effusion  • Cancer related pain  • Abnormal LFTs      Admitting Diagnosis: Hyponatremia [E87.1]  SOB (shortness of breath) [R06.02]  Malignant pleural effusion [J91.0]  Bilateral swelling of feet [M79.89]  Primary malignant neoplasm of right breast with metastasis to other site [facility]) [C50.911]  Edema of both lower legs [R60.0]  Age/Sex: 56 y.o. female  Admission Orders:  Scheduled Medications:  amoxicillin, 500 mg, Oral, Q8H [address]  dicyclomine, 10 mg, Oral, 4x Daily (AC & HS)  docusate sodium, 100 mg, Oral, BID  DULoxetine, 30 mg, Oral, Daily  heparin (porcine), 5,000 Units, Subcutaneous, Q8H ARNOLDO  loratadine, 10 mg, Oral, Daily  nicotine, 1 patch, Transdermal, Daily  oxybutynin, 5 mg, Oral, Daily  polyethylene glycol, 17 g, Oral, Daily      Continuous IV Infusions:     PRN Meds:  acetaminophen, 650 mg, Oral, Q6H PRN  albuterol, 2 puff, Inhalation, Q4H PRN  diphenhydrAMINE, 50 mg, Oral, HS PRN  diphenhydramine, lidocaine, Al/Mg hydroxide, simethicone, 10 mL, Swish & Swallow, Q4H PRN  HYDROmorphone, 4 mg, Oral, Q3H PRN  ondansetron, 4 mg, Intravenous, Q6H PRN  phenol, 1 spray, Mouth/Throat, Q2H PRN    PT OT eval   Weights   I&O   Reg diet   Tele   Cont pulse ox       IP CONSULT TO PALLIATIVE CARE  IP CONSULT TO ONCOLOGY    Network Utilization Review Department  ATTENTION: Please call with any questions or concerns to [phone] and carefully listen to the prompts so that you are directed to the right person. All voicemails are confidential.  Armani Martinez all requests for admission clinical reviews, approved or denied determinations and any other requests to dedicated fax number below belonging to the campus where the patient is receiving treatment.  List of dedicated fax numbers for the Facilities:  [place] DENIALS (Administrative/Medical Necessity) [phone] 2303 [facility] (Maternity/NICU/Pediatrics) [phone]   [address] [place] [address] [phone]   [facility] [address] [facility] [phone] 1505 [facility] 207 [facility] [address] [place] [address] [address]  [address] [facility] [address] [phone]

## 2023-08-24 NOTE — PHYSICAL THERAPY NOTE
Physical Therapy Evaluation     Patient's Name: Gerry Carey    Admitting Diagnosis  Hyponatremia [E87.1]  SOB (shortness of breath) [R06.02]  Malignant pleural effusion [J91.0]  Bilateral swelling of feet [M79.89]  Primary malignant neoplasm of right breast with metastasis to other site [facility]) [C50.911]  Edema of both lower legs [R60.0]    Problem List  Patient Active Problem List   Diagnosis    Pleural effusion    Hypertension    Malignant neoplasm of breast in female, estrogen receptor positive ([address])    Palliative care patient    Malignant pleural effusion    Primary malignant neoplasm of right breast with metastasis to other site [facility])    Malignant neoplasm metastatic to bone ([address])    Cancer related pain    Chemotherapy induced neutropenia ([address])    Hyponatremia    Port-A-Cath in place    Abnormal LFTs       Past Medical History  Past Medical History:   Diagnosis Date    Cancer ([address])     Headache(784.0) 11/2021    History of radiation exposure     Malignant neoplasm of right female breast ([address]) 2012    right       Past Surgical History  Past Surgical History:   Procedure Laterality Date    BACK SURGERY      BREAST CYST EXCISION      BREAST LUMPECTOMY Right 2012    HIP SURGERY Right     debridement of femur    IR BIOPSY LIVER MASS  07/26/2021    IR MICROWAVE ABLATION  11/23/2022    IR PLEURAL EFFUSION LONG-TERM CATHETER CHECK/CHANGE/REPOSITION  8/18/2023    IR PLEURAL EFFUSION LONG-TERM CATHETER PLACEMENT  08/17/2021    IR PLEURAL EFFUSION LONG-TERM CATHETER REMOVAL  05/11/2022    IR PORT PLACEMENT  07/27/2022    IR THORACENTESIS  07/26/2021    IR THORACENTESIS  7/5/2023 08/24/23 0753   PT Last Visit   PT Visit Date 08/24/23   Note Type   Note type Evaluation   Pain Assessment   Pain Assessment Tool 0-10   Pain Score 7   Pain Location/Orientation Orientation: Right;Location: Rib Cage   Multiple Pain Sites Yes   Pain 2   Galvan-Griffin FACES Pain Rating 2 6   Pain Location/Orientation 2 Location: Abdomen;Orientation: Lower   Restrictions/Precautions   Weight Bearing Precautions Per Order No   Braces or Orthoses Other (Comment)  (none reported)   Other Precautions Fall Risk;Pain;Limb alert;Multiple lines   Home Living   Type of [address] One level;Performs ADLs on one level;Stairs to enter with rails  (3 ONELIA)   Bathroom Shower/Tub Walk-in shower   Bathroom Toilet Standard   Bathroom Equipment   (no DME at baseline)   [address]; Other (Comment)  (no AD used at baseline)   Prior Function   Level of [place] Independent with ADLs; Independent with functional mobility   Lives With Family  (2 sons, brother, daughter in law.    recently passed away)   [place] Help From Family   IADLs Independent with meal prep; Independent with driving; Independent with medication management   Falls in the last 6 months 0   Vocational Retired   General   Family/Caregiver Present No   Cognition   Overall Cognitive Status WFL   Arousal/Participation Alert   Orientation Level Oriented X4   Memory Within functional limits   Following Commands Follows all commands and directions without difficulty   Comments pt agreeable to PT evaluation   RLE Assessment   RLE Assessment   (DNT formal MMT 2* bone mets)   LLE Assessment   LLE Assessment   (DNT formal MMT 2* bone mets)   Vision-Basic Assessment   Current Vision Wears glasses for distance only   Coordination   Movements are Fluid and Coordinated 1   Sensation   (swelling B/L feet/lower legs)   Bed Mobility   Supine to Sit 5  Supervision   Additional items Assist x 1; Increased time required;Verbal cues   Transfers   Sit to Stand 4  Minimal assistance  (CGA)   Additional items Assist x 1;Verbal cues   Stand to Sit 4  Minimal assistance  (CGA)   Additional items Assist x 1;Verbal cues   Ambulation/Elevation   Gait pattern Decreased foot clearance; Short stride; Step to  (decreased velocity)   Gait Assistance 4  Minimal assist  (CGA)   Additional items Assist x 1;Verbal cues   Assistive Device None   Distance 30'; distance deferred 2* elevated HR (reading 120-130 during mobility)   Balance   Static Sitting Fair +   Dynamic Sitting Fair +   Static Standing Fair   Dynamic Standing Fair -   Ambulatory Fair -   Endurance Deficit   Endurance Deficit Yes   Endurance Deficit Description "I get SOB very easily"   Activity Tolerance   Activity Tolerance Patient limited by fatigue;Treatment limited secondary to medical complications (Comment)  (elevated HR)   Medical Staff Made Aware Pt seen as a co-eval with OT due to the patient's co-morbidities, clinically unstable presentation, and present impairments which are a regression from the patient's baseline. Nurse Made Aware JONY Mendiola   Assessment   Prognosis Good   Problem List Decreased strength;Decreased endurance; Impaired balance;Decreased mobility;Pain   Assessment Pt is 56 y.o. female seen for PT evaluation on 8/24/2023 s/p admit to [facility] on 8/23/2023 w/ Pleural effusion. PT was consulted to assess pt's functional mobility and d/c needs. Order placed for PT eval and tx. PTA, pt resides with family in [address] with 3 ONELIA, ambulates without AD. At time of eval, pt performing bed mobility SBA, CGA for transfers and level surface gait trial. Upon evaluation, pt presenting with impaired functional mobility d/t decreased strength, decreased endurance, impaired balance, decreased mobility, pain and activity intolerance. Pertinent PMHx and current co-morbidities affecting pt's physical performance at time of assessment include: pleural effusion, malignant neoplasm, CA related pain, hyponatremia, HTN, malignant pleural effusion, chemotherapy induced neutropenia. Personal factors affecting pt at time of eval include: stairs to enter home and inability to navigate community distances.  The following objective measures performed on IE also reveal limitations: Barthel Index: 55/100, Modified Ponder: 3 (moderate disability) and AM-PAC 6-Clicks: 43/06. Pt's clinical presentation is currently unstable/unpredictable seen in pt's presentation of abnormal lab value(s), need for input for task focus and mobility technique and ongoing medical assessment. Overall, pt's rehab potential and prognosis to return to PLOF is good as impacted by objective findings, warranting pt to receive further skilled PT interventions to address identified impairments, activity limitation(s), and participation restriction(s). Pt to benefit from continued PT tx to address deficits as defined above and maximize level of functional independent mobility. From PT/mobility standpoint, recommendation at time of d/c would be home with home health rehabilitation pending progress in order to facilitate return to PLOF. Barriers to Discharge Inaccessible home environment   Goals   STG Expiration Date 09/03/23   Short Term Goal #1 In 7-10 days: Increase bilateral LE strength 1/2 grade to facilitate independent mobility, Perform all bed mobility tasks modified independent to decrease caregiver burden, Perform all transfers modified independent to improve independence, Ambulate > 150 ft. with least restrictive assistive device modified independent w/o LOB and w/ normalized gait pattern 100% of the time, Navigate 3 stairs modified independent with unilateral handrail to facilitate return to previous living environment, Increase all balance 1/2 grade to decrease risk for falls, Improve Barthel Index score to 70 or greater to facilitate independence and PT provider will perform functional balance assessment to determine fall risk   PT Treatment Day 1   Plan   Treatment/Interventions Functional transfer training;LE strengthening/ROM; Therapeutic exercise; Endurance training;Elevations; Patient/family training;Equipment eval/education; Bed mobility;Gait training;Spoke to nursing;OT   PT Frequency 3-5x/wk   Recommendation   PT Discharge Recommendation Home with home health rehabilitation   Equipment Recommended   (TBD)   AM-PAC Basic Mobility Inpatient   Turning in Flat Bed Without Bedrails 3   Lying on Back to Sitting on Edge of Flat Bed Without Bedrails 3   Moving Bed to Chair 3   Standing Up From Chair Using Arms 3   Walk in Room 3   Climb 3-5 Stairs With Railing 3   Basic Mobility Inpatient Raw Score 18   Basic Mobility Standardized Score 41.05   Highest Level Of Mobility   JH-HLM Goal 6: Walk 10 steps or more   JH-HLM Achieved 7: Walk 25 feet or more   Modified Gallia Scale   Modified Gallia Scale 3   Barthel Index   Feeding 10   Bathing 0   Grooming Score 5   Dressing Score 5   Bladder Score 10   Bowels Score 10   Toilet Use Score 5   Transfers (Bed/Chair) Score 10   Mobility (Level Surface) Score 0   Stairs Score 0   Barthel Index Score 55   Additional Treatment Session   Start Time 2126   End Time 0821   Treatment Assessment Pt seen for PT treatment session this date s/p PT eval, consisting of ther ex focused on strengthening. Current goals and POC remain appropriate, pt continues to have rehab potential and is making progress towards STGs. Pt prognosis for achieving goals is good, pending pt progress with hospitalization/medical status improvements, and indicated by [facility] and ability to follow directions. Pt reporting pain during exercises, further deferred at this time. PT recommends home with home health rehabilitation upon discharge. Pt continues to be functioning below baseline level, and remains limited 2* factors listed above. PT will continue to see pt during current hospitalization in order to address the deficits listed above and provide interventions consistent w/ POC in effort to achieve STGs. Exercises   Quad Sets Sitting;10 reps;AROM; Bilateral   Glute Sets Sitting;10 reps;AROM; Bilateral   Knee AROM Short Arc Quad Sitting;10 reps;AROM; Bilateral   Ankle Pumps Sitting;10 reps;AROM; Bilateral   End of Consult   Patient Position at End of Consult Bedside chair;Bed/Chair alarm activated; All needs within reach           Marysol Mandujano, PT, DPT

## 2023-08-24 NOTE — UTILIZATION REVIEW
NOTIFICATION OF INPATIENT ADMISSION   AUTHORIZATION REQUEST   SERVICING FACILITY:   [facility][note 3] Katie Bhatt[address]  Tax ID: [id number]  NPI: [id number] ATTENDING PROVIDER:  Attending Name and NPI#: Nuria Cornejo [[id number]]  Address: [address]  Phone: [phone]     ADMISSION INFORMATION:  [facility]   Place of Service Code: 21  Inpatient Admission Date/Time: 8/23/23  4:37 PM  Discharge Date/Time: No discharge date for patient encounter. Admitting Diagnosis Code/Description:  Hyponatremia [E87.1]  SOB (shortness of breath) [R06.02]  Malignant pleural effusion [J91.0]  Bilateral swelling of feet [M79.89]  Primary malignant neoplasm of right breast with metastasis to other site [facility]) [C50.911]  Edema of both lower legs [R60.0]     UTILIZATION REVIEW CONTACT:  [employer] [employer], Utilization   Network Utilization Review Department  Phone: [phone]  Fax [phone]  Email: [email or web address]  Contact for approvals/pending authorizations, clinical reviews, and discharge. PHYSICIAN ADVISORY SERVICES:  Medical Necessity Denial & Yzsd-oc-Vrvi Review  Phone: [phone]  Fax: [phone]  Email: [email or web address]

## 2023-08-24 NOTE — TELEMEDICINE
e-Consult (IPC)  - Interventional Radiology  Vanita Glasgow 56 y.o. female MRN: [id number]  Unit/Bed#: -01 Encounter: [id number]          Interventional Radiology has been consulted to evaluate Vanita Glasgow    We were consulted by Internal Medicine concerning this patient with malfunctioning pleurx catheter. Inpatient Consult to IR  Consult performed by: [facility]  Consult ordered by: Donaldo Boyd MD        08/24/23    Assessment/Recommendation:   Vanita Glasgow, 57y female with a pmh of metastatic right breast cancer and recurrent malignant pleural effusion. Patient has a right pleurx catheter. Catheter was last checked on 8/18 2/2 clogged with debris. TPa/dornase was instilled in the catheter for one hour and drained for 300 ml. Patient had a CTA chest yesterday -   Enlarging multiloculated right pleural effusion with pleural drainage catheter in place. Pleurx catheter is in good position. Will plan to repeat tPa/dornase again today to hopefully break up some of the loculations. TPa/dornase ordered. Please contact IR once pharmacy delivers and we will instill in catheter. 11-20 minutes, >50% of the total time devoted to medical consultative verbal/EMR discussion between providers. Written report will be generated in the EMR. Thank you for allowing Interventional Radiology to participate in the care of Vanita Glasgow. Please don't hesitate to call or TigerText us with any questions.      [facility] Delfin Martinez

## 2023-08-24 NOTE — OCCUPATIONAL THERAPY NOTE
Occupational Therapy Evaluation        Patient Name: Patience MCCARTHY Date: 8/24/2023 08/24/23 0810   OT Last Visit   OT Visit Date 08/24/23   Note Type   Note type Evaluation   Pain Assessment   Pain Assessment Tool 0-10   Pain Score 7   Pain Location/Orientation Orientation: Right;Location: Rib Cage   Restrictions/Precautions   Weight Bearing Precautions Per Order No   Braces or Orthoses Other (Comment)  (none reported)   Other Precautions Fall Risk;Pain;Limb alert;Multiple lines   Home Living   Type of [address] [place] [address] [address] One level;Performs ADLs on one level;Stairs to enter with rails  (3 ONELIA)   Bathroom Shower/Tub Walk-in shower   Bathroom Toilet Standard   Bathroom Equipment Other (Comment)  (none reported)   Bathroom Accessibility Accessible   Home Equipment Cane   Additional Comments No AD used at baseline. Prior Function   Level of [place] Independent with ADLs; Independent with functional mobility   Lives With Family  (2 sons, brother, daughter in law.    recently passed away)   [place] Help From Family   IADLs Independent with meal prep; Independent with driving; Independent with medication management   Falls in the last 6 months 0   Vocational Retired   Lifestyle   Autonomy Patient reported she lives with family in a one story house, 3 ONELIA, was ambulatory without AD. Reciprocal Relationships Supportive Family   General   Family/Caregiver Present No   ADL   Eating Assistance 6  Modified independent   Grooming Assistance 5  Supervision/Setup   UB Bathing Assistance 5  Supervision/Setup   LB Bathing Assistance 4  Minimal Assistance   UB Dressing Assistance 5  Supervision/Setup   LB Dressing Assistance 4  Minimal Assistance   Toileting Assistance  4  Minimal Assistance   Functional Assistance 4  Minimal Assistance   Bed Mobility   Supine to Sit 5  Supervision   Additional items Assist x 1; Increased time required;Verbal cues   Transfers   Sit to Stand 5  Supervision Additional items Assist x 1;Verbal cues  (contact guard)   Stand to Sit 5  Supervision   Additional items Assist x 1;Verbal cues  (contact guard)   Functional Mobility   Functional Mobility 4  Minimal assistance   Additional Comments assist of 1/ elevated HR/ SOB on exertion   Additional items Rolling walker   Balance   Static Sitting Fair +   Dynamic Sitting Fair +   Static Standing Fair   Dynamic Standing Fair -   Activity Tolerance   Activity Tolerance Patient limited by fatigue;Treatment limited secondary to medical complications (Comment)  (elevated heart rateupon standing ranging 120- 124bpm.)   Medical Staff Made Aware Pt seen as a co-eval with PT due to the patient's co-morbidities, clinically unstable presentation, and present impairments which are a regression from the patient's baseline. RUE Assessment   RUE Assessment X   RUE Strength   RUE Overall Strength Deficits  (overall strength observed during functional mobility  at 3+/5)   LUE Assessment   LUE Assessment X   LUE Strength   LUE Overall Strength Deficits  (overall strength observed during functional mobility  at 3+/5)   Hand Function   Gross Motor Coordination Functional   Fine Motor Coordination Functional   Sensation   Light Touch No apparent deficits  (BUEs)   Vision-Basic Assessment   Current Vision Wears glasses for distance only   Psychosocial   Psychosocial (WDL) WDL   Cognition   Overall Cognitive Status WFL   Arousal/Participation Alert; Responsive; Cooperative   Attention Within functional limits   Orientation Level Oriented X4   Memory Within functional limits   Following Commands Follows all commands and directions without difficulty   Assessment   Limitation Decreased ADL status; Decreased UE ROM; Decreased UE strength;Decreased endurance;Decreased self-care trans;Decreased high-level ADLs   Prognosis Good   Assessment Patient is a 56 y.o. female seen for OT evaluation s/p admit to [facility] on 8/23/2023 w/Pleural effusion. Commorbidities affecting patient's functional performance at time of assessment include: Pleural Effusion, Hyponatremia, Cancer related pain, Malignant neoplasm of breast, presented to ED with SOB. Orders placed for OT evaluation and treatment. Performed at least two patient identifiers during session including name and wristband. Prior to admission, Patient reported she lives with family in a one story house, 3 ONELIA, was ambulatory without AD. Personal factors affecting patient at time of initial evaluation include: steps to enter, difficulty performing ADLs and difficulty performing IADLs. Upon evaluation, patient requires minimal  assist for UB ADLs, moderate assist for LB ADLs. Occupational performance is affected by the following deficits: decreased functional use of BUEs, decreased muscle strength, degenerative arthritic joint changes, dynamic sit/ stand balance deficit with poor standing tolerance time for self care and functional mobility, decreased activity tolerance, increased pain and postural control and postural alignment deficit, requiring external assistance to complete transitional movements. Patient to benefit from continued Occupational Therapy treatment while in the hospital to address deficits as defined above and maximize level of functional independence with ADLs and functional mobility. Occupational Performance areas to address include: bathing/ shower, dressing, toilet hygiene, transfer to all surfaces, functional mobility, emergency response, health maintenance, IADLs: safety procedures and Leisure Participation. From OT standpoint, recommendation at time of d/c would be Home with daily checks, Home with family support and Abdelrahman5 Gerardo Gordon. Plan   Treatment Interventions ADL retraining;Functional transfer training; Endurance training;UE strengthening/ROM; Equipment evaluation/education;Patient/family training; Compensatory technique education;Continued evaluation; Energy conservation; Activityengagement   Goal Expiration Date 09/07/23   OT Frequency 2-3x/wk   Recommendation   OT Discharge Recommendation Home with home health rehabilitation   AM-PAC Daily Activity Inpatient   Lower Body Dressing 3   Bathing 3   Toileting 3   Upper Body Dressing 3   Grooming 4   Eating 4   Daily Activity Raw Score 20   Daily Activity Standardized Score (Calc for Raw Score >=11) 42.03   AM-PAC Applied Cognition Inpatient   Following a Speech/Presentation 4   Understanding Ordinary Conversation 4   Taking Medications 4   Remembering Where Things Are Placed or Put Away 4   Remembering List of 4-5 Errands 4   Taking Care of Complicated Tasks 4   Applied Cognition Raw Score 24   Applied Cognition Standardized Score 62.21   Barthel Index   Feeding 10   Bathing 0   Grooming Score 5   Dressing Score 5   Bladder Score 10   Bowels Score 10   Toilet Use Score 5   Transfers (Bed/Chair) Score 10   Mobility (Level Surface) Score 0   Stairs Score 0   Barthel Index Score 55       1 - Patient will verbalize and demonstrate use of energy conservation/ deep breathing technique and work simplification skills during functional activity with no verbal cues. 2 - Patient will verbalize and demonstrate good body mechanics and joint protection techniques during  ADLs/ IADLs with no verbal cues. 3 - Patient will increase OOB/ sitting tolerance to 2-4 hours per day for increased participation in self care and leisure tasks with no s/s of exertion. 4 - Patient will increase standing tolerance time to 5  minutes with unilateral UE support to complete sink level ADLs@ mod I level. 5 - Patient will increase sitting tolerance at edge of bed to 20 minutes to complete UB ADLs @ set up assist level. 6 - Patient will transfer bed to Chair / toilet at Set up assist level with AD as indicated.      7 - Patient will complete UB ADLs with set up assist.     8 - Patient will complete LB ADLs with min assist with the use of adaptive equipment.      9 - Patient will complete toileting hygiene with set up assist/ supervision for thoroughness    10 - Patient/ Family  will demonstrate competency with UE Home Exercise Program.

## 2023-08-24 NOTE — PROGRESS NOTES
[address]  Progress Note  Name: Tameka Vergara  MRN: [id number]  Unit/Bed#: -01 I Date of Admission: 8/23/2023   Date of Service: 8/24/2023 I Hospital Day: 1    Assessment/Plan   * Pleural effusion  Assessment & Plan  · Presented with progressively worsening shortness of breath  · Noted on CT scan to have significant enlargement of malignant multiloculated right pleural effusion with compressive atelectasis  · Also noted to have hepatic metastatic disease as well  · Patient with history of metastatic breast cancer on chemo  · Goals of care discussion was completed at bedside by SLIM, additional goals of care to be followed up by oncology and palliative care  · Patient made aware of poor prognosis and will determine hospice vs treatment after further discussion with family and oncology  · Consult IR to evaluate Pleurx catheter, patient reports catheter is not draining     Abnormal LFTs  Assessment & Plan  · In the setting of hepatic metastasis  · Continue to monitor LFTs  · Goals of care later today                VTE Pharmacologic Prophylaxis: VTE Score: 5 High Risk (Score >/= 5) - Pharmacological DVT Prophylaxis Ordered: heparin. Sequential Compression Devices Ordered. Patient Centered Rounds: I performed bedside rounds with nursing staff today. Discussions with Specialists or Other Care Team Provider: anjali    Education and Discussions with Family / Patient: Patient declined call to . Total Time Spent on Date of Encounter in care of patient: 35 minutes This time was spent on one or more of the following: performing physical exam; counseling and coordination of care; obtaining or reviewing history; documenting in the medical record; reviewing/ordering tests, medications or procedures; communicating with other healthcare professionals and discussing with patient's family/caregivers.     Current Length of Stay: 1 day(s)  Current Patient Status: Inpatient Certification Statement: The patient will continue to require additional inpatient hospital stay due to goals of care discussion, IR evaluation  Discharge Plan: Anticipate discharge in 48 hrs to TBD    Code Status: Level 1 - Full Code    Subjective:   Patient feels short of breath     Objective:     Vitals:   No data recorded. HR:  [94] 94  Resp:  [16] 16  BP: (101)/(60) 101/60  SpO2:  [95 %] 95 %  Body mass index is 20.29 kg/m². Input and Output Summary (last 24 hours): Intake/Output Summary (Last 24 hours) at 8/24/2023 1214  Last data filed at 8/24/2023 0900  Gross per 24 hour   Intake 180 ml   Output --   Net 180 ml       Physical Exam:   Physical Exam     Additional Data:     Labs:  Results from last 7 days   Lab Units 08/24/23  0702   WBC Thousand/uL 6.43   HEMOGLOBIN g/dL 9.7*   HEMATOCRIT % 26.3*   PLATELETS Thousands/uL 385   NEUTROS PCT % 70   LYMPHS PCT % 3*   MONOS PCT % 25*   EOS PCT % 0     Results from last 7 days   Lab Units 08/24/23  0702   SODIUM mmol/L 126*   POTASSIUM mmol/L 4.5   CHLORIDE mmol/L 98   CO2 mmol/L 21   BUN mg/dL 23   CREATININE mg/dL 0.72   ANION GAP mmol/L 7   CALCIUM mg/dL 7.9*   ALBUMIN g/dL 2.7*   TOTAL BILIRUBIN mg/dL 3.66*   ALK PHOS U/L 1,827*   ALT U/L 41   AST U/L 197*   GLUCOSE RANDOM mg/dL 84     Results from last 7 days   Lab Units 08/23/23  1642   INR  2.26*                   Lines/Drains:  Invasive Devices     Central Venous Catheter Line  Duration           Port A Cath 07/27/22 Right Chest 392 days          Peripheral Intravenous Line  Duration           Peripheral IV 08/23/23 Dorsal (posterior); Proximal;Right Forearm 1 day          Drain  Duration           Pleural Effusion Long-Term Catheter 480 days                Central Line:  Goal for removal: Will discontinue when meds requiring line are completed.              Imaging: Reviewed radiology reports from this admission including: chest CT scan    Recent Cultures (last 7 days):         Last 24 Hours Medication List:   Current Facility-Administered Medications   Medication Dose Route Frequency Provider Last Rate   • acetaminophen  650 mg Oral Q6H PRN Lane López MD     • albuterol  2 puff Inhalation Q4H PRN Lane López MD     • amoxicillin  500 mg Oral Q8H [address] López MD     • dicyclomine  10 mg Oral 4x Daily (AC & HS) Lane López MD     • diphenhydrAMINE  50 mg Oral HS PRN Lane López MD     • diphenhydramine, lidocaine, Al/Mg hydroxide, simethicone  10 mL Swish & Swallow Q4H PRN DEBRA Craft     • docusate sodium  100 mg Oral BID Lane López MD     • DULoxetine  30 mg Oral Daily Lane López MD     • heparin (porcine)  5,000 Units Subcutaneous Q8H [address] López MD     • HYDROmorphone  4 mg Oral Q3H PRN Lane López MD     • loratadine  10 mg Oral Daily DEBRA Craft     • nicotine  1 patch Transdermal Daily Lane López MD     • ondansetron  4 mg Intravenous Q6H PRN Lane López MD     • oxybutynin  5 mg Oral Daily Lane López MD     • phenol  1 spray Mouth/Throat Q2H PRN DEBRA Craft     • polyethylene glycol  17 g Oral Daily Mahesh Zee MD          Today, Patient Was Seen By: Joel Bailey MD    **Please Note: This note may have been constructed using a voice recognition system. **

## 2023-08-24 NOTE — ASSESSMENT & PLAN NOTE
· Presented with progressively worsening shortness of breath  · Noted on CT scan to have significant enlargement of malignant multiloculated right pleural effusion with compressive atelectasis  · Also noted to have hepatic metastatic disease as well  · Patient with history of metastatic breast cancer on chemo  · Goals of care discussion was completed at bedside by SLIM, additional goals of care to be followed up by oncology and palliative care  · Patient made aware of poor prognosis and will determine hospice vs treatment after further discussion with family and oncology  · Consult IR to evaluate Pleurx catheter, patient reports catheter is not draining

## 2023-08-24 NOTE — CONSULTS
Consultation - Palliative and Supportive Care   Tamara Berrios 56 y.o. female [id number]    Patient Active Problem List   Diagnosis   • Pleural effusion   • Hypertension   • Malignant neoplasm of breast in female, estrogen receptor positive [facility])   • Palliative care patient   • Malignant pleural effusion   • Primary malignant neoplasm of right breast with metastasis to other site [facility])   • Malignant neoplasm metastatic to bone [facility])   • Cancer related pain   • Chemotherapy induced neutropenia (HCC)   • Hyponatremia   • Port-A-Cath in place   • Abnormal LFTs       Assessment:  Goals of care counseling  Malignant pleural effusion  Malignant neoplasm of breast in female, estrogen receptor positive  Cancer related pain  Palliative care patient    Plan:  1. Symptom management  2. Malignant pleural effusion  3. Malignant neoplasm of breast in female estrogen receptor positive  4. Cancer related pain  - continue hydromorphone 2 mg every 3 hours as needed for cancer related pain  5. Mucositis- magic mouthwash every 4 hours as needed  6.   "Cold symptoms"- Claritin 10 mg daily and monitor  7. Palliative Care Patient- ongoing symptom manageemtn  8. Psychosocial support- supportive listening provided  9. Goals of Care-goals of care discussion this afternoon when Oncology is present. Went to speak to patient again this afternoon, reviewed with Dr. Ashley Stern, lengthy discussion regarding prognosis, goals and quality of life. Care Management is present when speaking  to patient regarding her understanding, she states she thought she had 3-5 years to live, but now understands that she has weeks to live. She  would like to speak to Oncology and ask son to be present for discussion with SLIM, Oncology and Palliative if this is possible. Further discussion with Pascual Adams PA-C, Oncology, who did meet with patient following afternoon visit.   Adrain Sandhoff reports that patient is agreeable to hospice, order has been placed for Case Management referral to hospice. 2.   Goals:  Level 2 code status  • Patient is agreeable to hospice  • Concerns introduced today include: symptom management and speaking with son. • Will continue discussions regarding [address] as patient's clinical presentation evolves. 3.  Social support:  · Supportive listening provided  · Normalized experience of patient  · Provided anxiety containment  · Provided anticipatory guidance  · Home structure/ living situation        4. Follow up  • Palliative Care will continue to follow for symptom management and goals of care discussions will be ongoing. Please reach out via [employer] if questions or concerns arise. 5. Care Coordination  • Reviewed case with Dr. Riana REAL Staff, RN, CM and Oncology PA-C                I have reviewed the patient's controlled substance dispensing history in the Prescription Drug Monitoring Program in compliance with the [facility] regulations before prescribing any controlled substances. Last refills:   07/20/2023 Hydromorphone 2 mg # 120    Decisional apparatus:  Patient is competent on exam today. If competency is lost, patient's substitute decision maker would default to adult son by [place] Act 169. Advance Directive/Living Will: no  POLST: no  POA Forms: no    We appreciate the invitation to be involved in this patient's care. We will continue to follow throughout this hospitalization. Please do not hesitate to reach our on call provider through our clinic answering service at [phone] should you have acute symptom control concerns. \     MICHELLE Harvey, DEBRA  Palliative and Supportive Care  Clinic/Answering Service: [phone]  You can find me on [employer]!      IDENTIFICATION:  Inpatient consult to Palliative Care  Consult performed by: DEBRA Granado  Consult ordered by: Adelfo Ramires MD        Physician Requesting Consult: Riana Chinchilla MD  Date of admission: 08/23/2023  LOS: 1day  Reason for Consult / Principal Problem: GOC counseling and SM secondary to malignant pleural effusion        History of Present Illness:  Donte Perez is a 56 y.o. female who presents with a palliative diagnosis of malignant neoplasm of breast in female, estrogen receptor positive, malignant pleural effusion, was brought into the ED at [facility] on 08/23/2023 secondary to shortness of breath. Patient was seen in her room, awake and alert, appears frail, chronically ill, but no acute distress. She reports shortness of breath, weakness and fatigue. She denies pain at time of visit, has occasional abdominal pain, and right rib pain. States she does take Hydromorphone 2 mg 3-4 times per day at home, and Tylenol 1000 mg 3 times per day at home as needed. She is requesting Magic Mouthwash and Chloraseptic spray for sore throat. She states appetite is poor, requesting vanilla and strawberry Ensure. Denies nausea or vomiting. Review of Systems   Constitutional: Positive for fatigue. Respiratory: Positive for shortness of breath. Cardiovascular: Negative for chest pain. Gastrointestinal: Positive for abdominal pain. Negative for constipation, nausea and vomiting. Musculoskeletal: Positive for back pain.         Right rib pain         Past Medical History:   Diagnosis Date   • Cancer ([address])    • Headache(784.0) 11/2021   • History of radiation exposure    • Malignant neoplasm of right female breast [facility]) 2012    right     Past Surgical History:   Procedure Laterality Date   • BACK SURGERY     • BREAST CYST EXCISION     • BREAST LUMPECTOMY Right 2012   • HIP SURGERY Right     debridement of femur   • IR BIOPSY LIVER MASS  07/26/2021   • IR MICROWAVE ABLATION  11/23/2022   • IR PLEURAL EFFUSION LONG-TERM CATHETER CHECK/CHANGE/REPOSITION  8/18/2023   • IR PLEURAL EFFUSION LONG-TERM CATHETER PLACEMENT  08/17/2021   • IR PLEURAL EFFUSION LONG-TERM CATHETER REMOVAL  05/11/2022   • IR PORT PLACEMENT 2022   • IR THORACENTESIS  2021   • IR THORACENTESIS  2023     Social History     Socioeconomic History   • Marital status: /Civil Union     Spouse name: Not on file   • Number of children: Not on file   • Years of education: Not on file   • Highest education level: Not on file   Occupational History   • Not on file   Tobacco Use   • Smoking status: Some Days     Packs/day: 0.25     Years: 15.00     Total pack years: 3.75     Types: Cigarettes     Start date: 20     Last attempt to quit: 7/10/2021     Years since quittin.1   • Smokeless tobacco: Never   Vaping Use   • Vaping Use: Never used   Substance and Sexual Activity   • Alcohol use: Not Currently   • Drug use: Not Currently   • Sexual activity: Not Currently     Partners: Male     Birth control/protection: Male Sterilization   Other Topics Concern   • Not on file   Social History Narrative   • Not on file     Social Determinants of Health     Financial Resource Strain: Not on file   Food Insecurity: No Food Insecurity (2023)    Hunger Vital Sign    • Worried About Running Out of Food in the Last Year: Never true    • Ran Out of Food in the Last Year: Never true   Transportation Needs: No Transportation Needs (2023)    PRAPARE - Transportation    • Lack of Transportation (Medical): No    • Lack of Transportation (Non-Medical):  No   Physical Activity: Not on file   Stress: Not on file   Social Connections: Not on file   Intimate Partner Violence: Not on file   Housing Stability: Low Risk  (2023)    Housing Stability Vital Sign    • Unable to Pay for Housing in the Last Year: No    • Number of Places Lived in the Last Year: 1    • Unstable Housing in the Last Year: No     Family History   Problem Relation Age of Onset   • Breast cancer Mother         in her 63's   • Breast cancer Sister         inher 45s and 48   • Colon cancer Maternal Grandmother    • No Known Problems Paternal Grandmother    • Breast cancer Other    • No Known Problems Paternal Aunt        Medications:  current meds:   Current Facility-Administered Medications   Medication Dose Route Frequency   • acetaminophen (TYLENOL) tablet 650 mg  650 mg Oral Q6H PRN   • albuterol (PROVENTIL HFA,VENTOLIN HFA) inhaler 2 puff  2 puff Inhalation Q4H PRN   • amoxicillin (AMOXIL) capsule 500 mg  500 mg Oral Q8H [facility]   • dicyclomine (BENTYL) capsule 10 mg  10 mg Oral 4x Daily (AC & HS)   • diphenhydrAMINE (BENADRYL) tablet 50 mg  50 mg Oral HS PRN   • docusate sodium (COLACE) capsule 100 mg  100 mg Oral BID   • DULoxetine (CYMBALTA) delayed release capsule 30 mg  30 mg Oral Daily   • heparin (porcine) subcutaneous injection 5,000 Units  5,000 Units Subcutaneous Q8H [facility]   • HYDROmorphone (DILAUDID) tablet 4 mg  4 mg Oral Q3H PRN   • nicotine (NICODERM CQ) 14 mg/24hr TD 24 hr patch 1 patch  1 patch Transdermal Daily   • ondansetron (ZOFRAN) injection 4 mg  4 mg Intravenous Q6H PRN   • oxybutynin (DITROPAN-XL) 24 hr tablet 5 mg  5 mg Oral Daily   • polyethylene glycol (MIRALAX) packet 17 g  17 g Oral Daily   • sodium chloride 0.9 % infusion  50 mL/hr Intravenous Continuous       Allergies   Allergen Reactions   • Codeine Anaphylaxis   • Morphine GI Intolerance, Itching and Vomiting   • Sulfa Antibiotics Hives and Itching       Objective:  /60 (BP Location: Left arm)   Pulse 94   Temp (!) 97 °F (36.1 °C) (Temporal)   Resp 16   Ht 5' 5" (1.651 m)   Wt 55.3 kg (121 lb 14.6 oz)   LMP 05/06/2021 (Approximate)   SpO2 95%   BMI 20.29 kg/m²     Physical Exam  Constitutional:       General: She is not in acute distress. Appearance: She is ill-appearing. Cardiovascular:      Rate and Rhythm: Normal rate. Pulmonary:      Effort: Pulmonary effort is normal.   Neurological:      Mental Status: She is alert and oriented to person, place, and time.    Psychiatric:         Mood and Affect: Mood normal.         Lab Results:   CBC:   Lab Results   Component Value Date    WBC 6.43 08/24/2023    HGB 9.7 (L) 08/24/2023    HCT 26.3 (L) 08/24/2023    MCV 95 08/24/2023     08/24/2023    RBC 2.77 (L) 08/24/2023    MCH 35.0 (H) 08/24/2023    MCHC 36.9 08/24/2023    RDW 16.8 (H) 08/24/2023    MPV 10.5 08/24/2023    NRBC 0 08/24/2023   , CMP:   Lab Results   Component Value Date    SODIUM 126 (L) 08/24/2023    K 4.5 08/24/2023    CL 98 08/24/2023    CO2 21 08/24/2023    BUN 23 08/24/2023    CREATININE 0.72 08/24/2023    CALCIUM 7.9 (L) 08/24/2023     (H) 08/24/2023    ALT 41 08/24/2023    ALKPHOS 1,827 (H) 08/24/2023    EGFR 94 08/24/2023     Hepatic function panel  Order: [id number]   Status: Final result      Visible to patient: Yes (not seen)      Next appt: 08/28/2023 at 10:30 AM in Infusion Therapy ([facility] QUICK CHAIR 1)      0 Result Notes             Component Ref Range & Units 8/24/23 0702 8/23/23 1317 8/21/23 1450 8/13/23 2044 8/7/23 1047 7/31/23 1415 7/4/23 1837   Total Bilirubin 0.20 - 1.00 mg/dL 3.66 High   3.33 High  CM  2.70 High  CM  1.96 High  CM  1.44 High  CM  1.12 High  CM  0.39 CM    Comment: Use of this assay is not recommended for patients undergoing treatment with eltrombopag due to the potential for falsely elevated results. N-acetyl-p-benzoquinone imine (metabolite of Acetaminophen) will generate erroneously low results in samples for patients that have taken an overdose of Acetaminophen. Bilirubin, Direct 0.00 - 0.20 mg/dL 2.54 High           Alkaline Phosphatase 34 - 104 U/L 1,827 High   1,593 High   1,474 High   1,122 High   802 High   924 High   235 High     AST 13 - 39 U/L 197 High   131 High   135 High   119 High   122 High   224 High   93 High     ALT 7 - 52 U/L 41  35 CM  42 CM  54 High  CM  54 High  CM  99 High  CM  41 CM    Comment: Specimen collection should occur prior to Sulfasalazine administration due to the potential for falsely depressed results.     Total Protein 6.4 - 8.4 g/dL 5.6 Low   5.7 Low   5.9 Low   6.1 Low   6.0 Low   6.8  6.2 Low  Albumin 3.5 - 5.0 g/dL 2.7 Low   2.7 Low   2.9 Low   3.1 Low   3.0 Low   3.4 Low   3.4 Low                Specimen Collected: 08/24/23 07:02 Last Resulted: 08/24/23 07:49             Imaging Studies: I have personally reviewed pertinent reports. CTA ED chest PE Study    Result Date: 8/23/2023  Narrative: CTA - CHEST WITH IV CONTRAST - PULMONARY ANGIOGRAM INDICATION:   shortness of breath, CA, elevated dimer. History of metastatic breast cancer. COMPARISON: Chest x-ray from earlier today and CT scan chest/abdomen/pelvis from 7/4/2023. TECHNIQUE: CTA examination of the chest was performed using angiographic technique according to a protocol specifically tailored to evaluate for pulmonary embolism. Multiplanar 2D reformatted images were created from the source data. In addition, coronal 3D MIP postprocessing was performed on the acquisition scanner. Radiation dose length product (DLP) for this visit:  184 mGy-cm . This examination, like all CT scans performed in the [facility], was performed utilizing techniques to minimize radiation dose exposure, including the use of iterative reconstruction and automated exposure control. IV Contrast:  100 mL of iohexol (OMNIPAQUE) FINDINGS: PULMONARY ARTERIAL TREE:  No pulmonary embolus is seen. LUNGS: Stable 5 mm left lower lobe nodule image 3/57 interval increased size of a multiloculated right pleural effusion with areas of compressive atelectasis in the right middle and lower lobes. No endotracheal or endobronchial lesion identified. PLEURA: Enlarging multiloculated right pleural effusion with pleural drainage catheter in place. HEART/GREAT VESSELS:  Unremarkable for patient's age. No thoracic aortic aneurysm. MEDIASTINUM AND KIA: Stable calcified bilateral hilar and mediastinal lymph nodes. CHEST WALL AND LOWER NECK:   Right chest port noted.  VISUALIZED STRUCTURES IN THE UPPER ABDOMEN: Multiple liver lesions better depicted on previous examination consistent with metastatic disease. Upper abdominal ascites has developed. OSSEOUS STRUCTURES: Relatively stable diffuse osteoblastic metastases. Impression: No pulm embolism. Significant enlargement of malignant multiloculated right pleural effusion with areas of compressive atelectasis throughout the right lung. Stable 5 mm left lower lobe lung nodule. Hepatic metastatic disease not well visualized on the current phase of contrast. Upper abdominal ascites has developed in the interval. Grossly stable widespread osteoblastic metastatic disease. Workstation performed: [id number]     VAS lower limb venous duplex study, complete bilateral    Result Date: 8/23/2023  Narrative:  [facility] REPORT CLINICAL: Indications: Patient presents with bilateral lower extremity edema x 2 days. Operative History: no cardiovascular surgeries Risk Factors The patient has history of HTN and smoking (current) 0.2 ppd. CONCLUSION: Impression: RIGHT LOWER LIMB: No evidence of acute or chronic deep vein thrombosis. No evidence of superficial thrombophlebitis noted. Doppler evaluation shows a normal response to augmentation maneuvers. . Popliteal, posterior tibial and anterior tibial arterial Doppler waveform's are triphasic. LEFT LOWER LIMB: No evidence of acute or chronic deep vein thrombosis. No evidence of superficial thrombophlebitis noted. Doppler evaluation shows a normal response to augmentation maneuvers. Popliteal, posterior tibial and anterior tibial arterial Doppler waveform's are triphasic. Technical findings were given to Beronica Mariee on the floor. SIGNATURE: Electronically Signed by: Chris Burton MD, RPVI on 2023-08-23 04:14:54 PM    MRI abdomen w wo contrast and mrcp    Result Date: 8/23/2023  Narrative: MRI - ABDOMEN - WITH AND WITHOUT CONTRAST INDICATION: 56 years / Female  C79.51: Secondary malignant neoplasm of bone C78.7: Secondary malignant neoplasm of liver and intrahepatic bile duct.  COMPARISON: July 4, 2023 TECHNIQUE:  Multiplanar/multisequence MRI of the abdomen was performed before and after administration of contrast. IV Contrast:  5 mL of Gadobutrol injection (SINGLE-DOSE) FINDINGS: LOWER CHEST:   Moderate-sized right effusion seen with septation LIVER: Multiple hepatic lesions seen throughout the liver parenchyma most of these lesions are non diffusion restricting. The largest lesion seen in the segment 7/8 demonstrates increasing capsular retraction. On the postcontrast images these lesions demonstrate marginal enhancement. Nodular contour of the liver seen. The hepatic veins and portal veins are patent. BILE DUCTS: No dilatation of the intrahepatic ducts seen The common bile duct is not dilated Attenuation of the central aspect of the right and left hepatic duct proximal to the confluence noted without any intrahepatic bili ductal dilatation GALLBLADDER: There is mildly distended PANCREAS:  Unremarkable. ADRENAL GLANDS:  Unremarkable. SPLEEN:  Normal. KIDNEYS/PROXIMAL URETERS: No hydroureteronephrosis. No suspicious renal mass. Right renal cyst seen BOWEL:   Thickening of the hepatic flexure noted, image 32 series 6 PERITONEUM/RETROPERITONEUM: Perihepatic fluid seen. Ascites seen LYMPH NODES: No abdominal lymphadenopathy. VASCULAR STRUCTURES: Portal vein is patent SMA is patent Splenic vein is patent ABDOMINAL WALL:  Unremarkable. OSSEOUS STRUCTURES:  No suspicious osseous lesion. Patient is known to have diffuse bony metastatic     Impression: Multiple and innumerable necrotic hepatic lesions with increasing nodularity and surface retraction of the liver due to posttreatment changes and developing pseudocirrhosis No dilatation of the common bile duct to suggest any biliary obstruction Mild attenuation of the intrahepatic ducts due to mass effect from the multiple liver lesion Development of moderate amount of ascites.  Narrowing and thickening noted near the hepatic flexure of the colon, consider clinical correlation or evaluation with the CT/colonoscopy to evaluate for any focal lesion Bony metastatic disease as seen on the previous study The study was marked in EPIC for significant notification. Workstation performed: [id number]     XR chest 2 views    Result Date: 8/23/2023  Narrative: CHEST INDICATION:   SOB, pleural effusion hx, catheter placement verification. COMPARISON: 8/13/2023 EXAM PERFORMED/VIEWS:  XR CHEST PA & LATERAL 2 views FINDINGS: Right-sided Mediport at cavoatrial junction Right pleural drainage catheter which appears to terminate within the region of loculated right pleural effusion at mid lung level Cardiomediastinal silhouette appears unremarkable. No pneumothorax Osseous structures appear within normal limits for patient age. Impression: Right pleural drainage catheter which appears to terminate at the level of loculated effusion No evidence of pneumothorax Workstation performed: [id number]     Echo follow up/limited w/ contrast if indicated    Result Date: 8/21/2023  Narrative: •  Left Ventricle: Left ventricular cavity size is normal. Wall thickness is normal. The left ventricular ejection fraction is 60%. Systolic function is normal. Global longitudinal strain -23.2%. IR pleural effusion long-term catheter check/change/reposition    Result Date: 8/18/2023  Narrative: PROCEDURE: Evaluation of right tunneled pleural catheter. STAFF: DEBRA Barrow NUMBER OF IMAGES: Multiple COMPLICATIONS: None. MEDICATIONS: tPA 10 mg/dornase 5 mg in 30 mL NSS. INDICATION: Concern for malfunctioning pleural catheter. The patient has a history of metastatic breast cancer. The patient reports that she aspirates the catheter once a week. She reports approximately 3 to 500 mL of output with aspiration. Patient unable to aspirate catheter x1 week. PROCEDURE: Ultrasound images of the right pleural catheter and effusion were obtained. The catheter was then aspirated.  Unable to aspirate any pleural fluid. 10 mL of sterile normal saline were injected and the catheter, return of fibrous, small clot aspirated into  the syringe. Unable to aspirate with Vacutainer. tPA/dornase was instilled and the catheter for 1 hour. The catheter was then aspirated for 300 mL of pleural fluid that contained some clot and fibrous tissue. FINDINGS: Ultrasound images showed catheter in good position in right pleural space. Small loculated right pleural effusion. Impression: Evaluation of right tunneled pleural catheter, loculated effusion. PLAN: The patient is to continue to aspirate catheter once per week as necessary. Workstation performed: [id number]     XR chest 2 views    Result Date: 8/14/2023  Narrative: CHEST INDICATION:   sob. COMPARISON: Chest CT 7/4/2023, CXR 8/2/2022. EXAM PERFORMED/VIEWS:  XR CHEST PA & LATERAL. DUAL ENERGY SUBTRACTION. FINDINGS: Right port at cavoatrial junction. Cardiomediastinal silhouette normal. Right pleural drainage catheter. Moderate loculated right pleural effusion and right base atelectasis. No pneumothorax. Right axillary clips. Upper abdomen normal.  Bones normal for age. Impression: Right pleural drainage catheter with no pneumothorax. Moderate loculated right effusion and right base atelectasis. Workstation performed: [id number]      EKG, Pathology, and Other Studies: I have personally reviewed pertinent reports. Counseling / Coordination of Care  Total floor / unit time spent today 135 minutes. Greater than 50% of total time was spent with the patient and / or family counseling and / or coordination of care.  A description of the counseling / coordination of care: Reviewed chart,  provided medical updates, determined goals of care, discussed palliative care and symptom management, discussed comfort care and hospice care, discussed code status, provided anticipatory guidance, determined competency and POA/HCA, determined social/family support, provided psychosocial support. Reviewed with JONY REAL, CM and Oncology. Portions of this document may have been created using dictation software and as such some "sound alike" terms may have been generated by the system. Do not hesitate to contact me with any questions or clarifications.

## 2023-08-24 NOTE — ASSESSMENT & PLAN NOTE
· Presented with progressively worsening shortness of breath  · Noted on CT scan to have significant enlargement of malignant multiloculated right pleural effusion with compressive atelectasis  · Also noted to have hepatic metastatic disease as well  · Patient with history of metastatic breast cancer on chemo  · Goals of care discussion was completed at bedside by SLIM, additional goals of care to be followed up by oncology and palliative care  · Plan for hospice  · Pleurx catheter evaluated by IR now draining status post tPA

## 2023-08-24 NOTE — CASE MANAGEMENT
Case Management Assessment & Discharge Planning Note    Patient name Nehemias Heredia  Location [address] [facility] Mandeep 427/-[id number] MRN [id number]  : 1965 Date 2023       Current Admission Date: 2023  Current Admission Diagnosis:Pleural effusion   Patient Active Problem List    Diagnosis Date Noted   • Abnormal LFTs 2023   • Port-A-Cath in place 2022   • Hyponatremia 2022   • Chemotherapy induced neutropenia ([address]) 2022   • Cancer related pain 2021   • Malignant neoplasm metastatic to bone ([address]) 2021   • Palliative care patient 2021   • Malignant pleural effusion 2021   • Primary malignant neoplasm of right breast with metastasis to other site [facility]) 2021   • Malignant neoplasm of breast in female, estrogen receptor positive ([address]) 2021   • Hypertension 2021   • Pleural effusion 2021      LOS (days): 1  Geometric Mean LOS (GMLOS) (days):   Days to GMLOS:     OBJECTIVE:    Risk of Unplanned Readmission Score: 29.39      Current admission status: Inpatient  Referral Reason: Other (Palliative Consult)    Preferred Pharmacy:   [facility]/pharmacy #[id number]- [place], [place] - [address] [place] [address]  Phone: [phone] Fax: [phone]    [address] [place] [address] [address]  Phone: [phone] Fax: [phone]    [facility], [address] [facility]  [address] [place] [address]  Phone: [phone] Fax: [phone]    [facility] #[id number] - [place] [address] [place] [address]  Phone: [phone] Fax: [phone]    [facility] Brayan Foster, [address] [facility]  [address] [place] [address]  Phone: [phone] Fax: [address] [place] [address] #[id number] Shekhar Mendes, [address] [place] [address]  Phone: [phone] Fax: [phone]    [facility]/pharmacy #[id number]- [place], [address] [address]  Phone: [phone] Fax: [phone]    Primary Care Provider: Joe Becerril MD    Primary Insurance: [facility]  Secondary Insurance:     ASSESSMENT:  [address], [address] Representative - Spouse   Primary Phone: [phone] (Mobile)  Home Phone: [phone]               Readmission Root Cause  30 Day Readmission: No    Patient Information  Admitted from[de-identified] Home  Mental Status: Alert  During Assessment patient was accompanied by: Not accompanied during assessment  Assessment information provided by[de-identified] Patient  Support Systems: Son (Son's girlfriend)  [place] of Residence: [address] [place] [address] do you live in?: [address] [facility] entry access options.  Select all that apply.: Stairs  Number of steps to enter home.: 3  Do the steps have railings?: Yes  Type of Current Residence: [place]  In the last 12 months, was there a time when you were not able to pay the mortgage or rent on time?: No  In the last 12 months, how many places have you lived?: 1  In the last 12 months, was there a time when you did not have a steady place to sleep or slept in a shelter (including now)?: No  Homeless/housing insecurity resource given?: N/A  Living Arrangements: Lives w/ Son  Is patient a ?: No    Patient Information Continued  Does patient have prescription coverage?: Yes  Within the past 12 months, you worried that your food would run out before you got the money to buy more.: Never true  Within the past 12 months, the food you bought just didn't last and you didn't have money to get more.: Never true  Food insecurity resource given?: N/A  Does patient receive dialysis treatments?: No  Does patient have a history of substance abuse?: No  Does patient have a history of Mental Health Diagnosis?: No    PHQ 2/9 Screening Reviewed PHQ 2/9 Depression Screening Score?: No    Means of Transportation  In the past 12 months, has lack of transportation kept you from medical appointments or from getting medications?: No  In the past 12 months, has lack of transportation kept you from meetings, work, or from getting things needed for daily living?: No  Was application for public transport provided?: N/A    DISCHARGE DETAILS:    Discharge planning discussed with[de-identified] Patient  Freedom of Choice: Yes  Comments - Freedom of Choice: Case Management and Palliative care met with patient at bedside. SLIM notified Patient of "weeks to live". Patient plans to discuss with her family and meet tomorrow. Patient's spouse passed away July 27, 2023. Patient has two sons, one has downs syndrome. Patient states her emergency Contact will be her son Mona Neil. She does not know his number. Plan to meet with Patient tomorrow, Patient will ask son to come afterwork tomorrow: after 3pm.  CM contacted family/caregiver?:  (Patient will update her family, Son, Mona Neil changed his phone number. Patient doesn't have handy.)  Were Treatment Team discharge recommendations reviewed with patient/caregiver?: Yes  Did patient/caregiver verbalize understanding of patient care needs?: Yes  Were patient/caregiver advised of the risks associated with not following Treatment Team discharge recommendations?: Yes    Contacts  Reason/Outcome: Continuity of Care, Discharge Planning    Patient met w/ Oncology this afternoon. Agrees to hospice consult. First choice is [facility]. [facility] referral sent. Unclear if [facility] hosp[facility] covers her area.

## 2023-08-24 NOTE — HOSPICE NOTE
RECEIVED HOSPICE REFERRAL. [facility] LIAISON WILL MEET WITH PT AT BEDSIDE TOMORROW AROUND [phone] TO DISCUSS HOSPICE SERVICES.  NADIRA BRENNER UPDATED VIA TT.

## 2023-08-24 NOTE — QUICK NOTE
TPA/dornase instilled in right pleurx catheter @ 1500. Patient instructed to log roll back and forth every 15 min x 4.  RN instructed on how to care/drain pleurx catheter. Can attempt to drain anytime after 1600. Please document output. If no output from catheter s/p tPA/dornase, please contact IR again tomorrow morning.

## 2023-08-24 NOTE — PLAN OF CARE
Problem: PAIN - ADULT  Goal: Verbalizes/displays adequate comfort level or baseline comfort level  Description: Interventions:  - Encourage patient to monitor pain and request assistance  - Assess pain using appropriate pain scale  - Administer analgesics based on type and severity of pain and evaluate response  - Implement non-pharmacological measures as appropriate and evaluate response  - Consider cultural and social influences on pain and pain management  - Notify physician/advanced practitioner if interventions unsuccessful or patient reports new pain  8/24/2023 1007 by Anahy Marr  Outcome: Progressing  8/24/2023 0634 by Anahy Marr  Outcome: Progressing     Problem: INFECTION - ADULT  Goal: Absence of fever/infection during neutropenic period  Description: INTERVENTIONS:  - Monitor WBC    8/24/2023 1007 by Anahy Marr  Outcome: Progressing  8/24/2023 0634 by Anahy Marr  Outcome: Progressing     Problem: SAFETY ADULT  Goal: Patient will remain free of falls  Description: INTERVENTIONS:  - Educate patient/family on patient safety including physical limitations  - Instruct patient to call for assistance with activity   - Consult OT/PT to assist with strengthening/mobility   - Keep Call bell within reach  - Keep bed low and locked with side rails adjusted as appropriate  - Keep care items and personal belongings within reach  - Initiate and maintain comfort rounds  - Make Fall Risk Sign visible to staff  - Offer Toileting every 2 Hours, in advance of need  - Initiate/Maintain bedalarm  - Obtain necessary fall risk management equipment: call bell within reach   - Apply yellow socks and bracelet for high fall risk patients  - Consider moving patient to room near nurses station  8/24/2023 1007 by Anahy Marr  Outcome: Progressing  8/24/2023 0634 by Anahy Marr  Outcome: Progressing

## 2023-08-24 NOTE — PLAN OF CARE
Problem: PHYSICAL THERAPY ADULT  Goal: Performs mobility at highest level of function for planned discharge setting. See evaluation for individualized goals. Description: Treatment/Interventions: Functional transfer training, LE strengthening/ROM, Therapeutic exercise, Endurance training, Elevations, Patient/family training, Equipment eval/education, Bed mobility, Gait training, Spoke to nursing, OT  Equipment Recommended:  (TBD)       See flowsheet documentation for full assessment, interventions and recommendations. 8/24/2023 1440 by Nanda Salvador PT  Note: Prognosis: Good  Problem List: Decreased strength, Decreased endurance, Impaired balance, Decreased mobility, Pain  Assessment: Pt is 56 y.o. female seen for PT evaluation on 8/24/2023 [facility]/ Pleural effusion. PT was consulted to assess pt's functional mobility and d/c needs. Order placed for PT eval and tx. PTA, pt resides with family in [facility] with 3 ONELIA, ambulates without AD. At time of eval, pt performing bed mobility SBA, CGA for transfers and level surface gait trial. Upon evaluation, pt presenting with impaired functional mobility d/t decreased strength, decreased endurance, impaired balance, decreased mobility, pain and activity intolerance. Pertinent PMHx and current co-morbidities affecting pt's physical performance at time of assessment include: pleural effusion, malignant neoplasm, CA related pain, hyponatremia, HTN, malignant pleural effusion, chemotherapy induced neutropenia. Personal factors affecting pt at time of eval include: stairs to enter home and inability to navigate community distances. The following objective measures performed on IE also reveal limitations: Barthel Index: 55/100, Modified [place]: 3 (moderate disability) and AM-PAC 6-Clicks: 89/83.  Pt's clinical presentation is currently unstable/unpredictable seen in pt's presentation of abnormal lab value(s), need for input for task focus and mobility technique and ongoing medical assessment. Overall, pt's rehab potential and prognosis to return to PLOF is good as impacted by objective findings, warranting pt to receive further skilled PT interventions to address identified impairments, activity limitation(s), and participation restriction(s). Pt to benefit from continued PT tx to address deficits as defined above and maximize level of functional independent mobility. From PT/mobility standpoint, recommendation at time of d/c would be home with home health rehabilitation pending progress in order to facilitate return to PLOF. Barriers to Discharge: Inaccessible home environment     PT Discharge Recommendation: Home with home health rehabilitation    See flowsheet documentation for full assessment. 8/24/2023 1440 by Wen Giles PT  Note: Prognosis: Good  Problem List: Decreased strength, Decreased endurance, Impaired balance, Decreased mobility, Pain  Assessment: Pt is 56 y.o. female seen for PT evaluation on 8/24/2023 [facility]/ Pleural effusion. PT was consulted to assess pt's functional mobility and d/c needs. Order placed for PT eval and tx. PTA, pt resides with family in [facility] with 3 ONELIA, ambulates without AD. At time of eval, pt performing bed mobility SBA, CGA for transfers and level surface gait trial. Upon evaluation, pt presenting with impaired functional mobility d/t decreased strength, decreased endurance, impaired balance, decreased mobility, pain and activity intolerance. Pertinent PMHx and current co-morbidities affecting pt's physical performance at time of assessment include: pleural effusion, malignant neoplasm, CA related pain, hyponatremia, HTN, malignant pleural effusion, chemotherapy induced neutropenia. Personal factors affecting pt at time of eval include: stairs to enter home and inability to navigate community distances.  The following objective measures performed on IE also reveal limitations: Barthel Index: 55/100, Modified [place]: 3 (moderate disability) and -PAC 6-Clicks: 11/99. Pt's clinical presentation is currently unstable/unpredictable seen in pt's presentation of abnormal lab value(s), need for input for task focus and mobility technique and ongoing medical assessment. Overall, pt's rehab potential and prognosis to return to PLOF is good as impacted by objective findings, warranting pt to receive further skilled PT interventions to address identified impairments, activity limitation(s), and participation restriction(s). Pt to benefit from continued PT tx to address deficits as defined above and maximize level of functional independent mobility. From PT/mobility standpoint, recommendation at time of d/c would be home with home health rehabilitation pending progress in order to facilitate return to PLOF. Barriers to Discharge: Inaccessible home environment     PT Discharge Recommendation: Home with home health rehabilitation    See flowsheet documentation for full assessment.

## 2023-08-25 ENCOUNTER — TELEPHONE (OUTPATIENT)
Age: 58
End: 2023-08-25

## 2023-08-25 PROCEDURE — 99232 SBSQ HOSP IP/OBS MODERATE 35: CPT | Performed by: INTERNAL MEDICINE

## 2023-08-25 PROCEDURE — 99233 SBSQ HOSP IP/OBS HIGH 50: CPT | Performed by: NURSE PRACTITIONER

## 2023-08-25 RX ORDER — AMOXICILLIN 250 MG
1 CAPSULE ORAL 2 TIMES DAILY
Status: DISCONTINUED | OUTPATIENT
Start: 2023-08-25 | End: 2023-08-28 | Stop reason: HOSPADM

## 2023-08-25 RX ADMIN — AMOXICILLIN 500 MG: 250 CAPSULE ORAL at 21:17

## 2023-08-25 RX ADMIN — DICYCLOMINE HYDROCHLORIDE 10 MG: 10 CAPSULE ORAL at 15:42

## 2023-08-25 RX ADMIN — HEPARIN SODIUM 5000 UNITS: 5000 INJECTION INTRAVENOUS; SUBCUTANEOUS at 05:48

## 2023-08-25 RX ADMIN — DOCUSATE SODIUM 100 MG: 100 CAPSULE, LIQUID FILLED ORAL at 08:18

## 2023-08-25 RX ADMIN — SENNOSIDES, DOCUSATE SODIUM 1 TABLET: 8.6; 5 TABLET ORAL at 21:17

## 2023-08-25 RX ADMIN — ACETAMINOPHEN 650 MG: 325 TABLET, FILM COATED ORAL at 08:18

## 2023-08-25 RX ADMIN — POLYETHYLENE GLYCOL 3350 17 G: 17 POWDER, FOR SOLUTION ORAL at 08:18

## 2023-08-25 RX ADMIN — LORATADINE 10 MG: 10 TABLET ORAL at 08:18

## 2023-08-25 RX ADMIN — HYDROMORPHONE HYDROCHLORIDE 4 MG: 2 TABLET ORAL at 21:25

## 2023-08-25 RX ADMIN — HYDROMORPHONE HYDROCHLORIDE 4 MG: 2 TABLET ORAL at 08:19

## 2023-08-25 RX ADMIN — DICYCLOMINE HYDROCHLORIDE 10 MG: 10 CAPSULE ORAL at 21:17

## 2023-08-25 RX ADMIN — DICYCLOMINE HYDROCHLORIDE 10 MG: 10 CAPSULE ORAL at 12:41

## 2023-08-25 RX ADMIN — MORPHINE SULFATE 1 MG: 2 INJECTION, SOLUTION INTRAMUSCULAR; INTRAVENOUS at 17:27

## 2023-08-25 RX ADMIN — OXYBUTYNIN CHLORIDE 5 MG: 5 TABLET, EXTENDED RELEASE ORAL at 08:18

## 2023-08-25 RX ADMIN — DULOXETINE HYDROCHLORIDE 30 MG: 30 CAPSULE, DELAYED RELEASE ORAL at 08:18

## 2023-08-25 RX ADMIN — SENNOSIDES, DOCUSATE SODIUM 1 TABLET: 8.6; 5 TABLET ORAL at 15:42

## 2023-08-25 RX ADMIN — AMOXICILLIN 500 MG: 250 CAPSULE ORAL at 05:48

## 2023-08-25 RX ADMIN — DICYCLOMINE HYDROCHLORIDE 10 MG: 10 CAPSULE ORAL at 08:18

## 2023-08-25 RX ADMIN — HYDROMORPHONE HYDROCHLORIDE 4 MG: 2 TABLET ORAL at 15:41

## 2023-08-25 RX ADMIN — AMOXICILLIN 500 MG: 250 CAPSULE ORAL at 12:42

## 2023-08-25 RX ADMIN — DIPHENHYDRAMINE HYDROCHLORIDE AND LIDOCAINE HYDROCHLORIDE AND ALUMINUM HYDROXIDE AND MAGNESIUM HYDRO 10 ML: KIT at 12:43

## 2023-08-25 NOTE — PROGRESS NOTES
Progress Note - Palliative & Supportive Care  Vanita Glasgow  56 y.o.  female  /MS [id number]   MRN: [id number]  Encounter: [id number]   Patient Active Problem List   Diagnosis   • Pleural effusion   • Hypertension   • Malignant neoplasm of breast in female, estrogen receptor positive ([address])   • Palliative care patient   • Malignant pleural effusion   • Primary malignant neoplasm of right breast with metastasis to other site [facility])   • Malignant neoplasm metastatic to bone [facility])   • Cancer related pain   • Chemotherapy induced neutropenia (HCC)   • Hyponatremia   • Port-A-Cath in place   • Abnormal LFTs       Assessment/Plan  · Goals of care counseling- patient is interested in hospice at home  · Malignant neoplasm of breast in female, estrogen receptor positive- no further treatment, focus is hospice  · Bony metastatic disease- no further treatment, focus is hospice  · Cancer related pain- continue hydromorphone 4 mg every 3 hours as needed  · Morphine 1 mg IV for breakthrough pain or respiratory distress. Comfort measures  · Mucositis- continue Magic Mouthwash every 4 hours as needed and monitor  · Palliative care patient- ongoing symptom management  · Psychosocial support- supportive listening provided. · Hospice is meeting with patient and son Tash Izaguirre this afternoon     Symptom management  Location: abdomen, right ribs  Characteristics: sharp, stabbing rib pain, felling of fullness in abdomen. Increased ascites  PRN Use of Pain Medications: hydromorphone 12 mg   24 hour Total OME for 48 mg    2. Goals:  Level 2 code status  • [facility] • Concerns introduced today include:  • Will continue discussions regarding [address] as patient's clinical presentation evolves. .     3.  Social support:  · Supportive listening provided  · Normalized experience of patient/family  · Provided anxiety containment  · Provided anticipatory guidance    4.   Follow up  • Palliative Care will continue to follow and goals of care discussions will be ongoing. • Please reach out via [employer] if questions or concerns arise. 5. Care Coordination  • Reviewed case with Dr. Leena REAL  • Reviewed case with Brett BRENNER        24 Hour History  Chart reviewed before visit. Patient was seen in her room, son Mirella Valdivia arrived during visit. She states abdomen is "more swollen" today, increased shortness of breath and weakness. Abdomen feels "full", right rib pain 2/10/ She is taking hydromorphone for pain, requesting IV pain medication for breakthrough pain. No nasuea or vomitig,  Magic Mouthwash is helping with mucositis, although still some difficulty swallowing at times. She discussed wanting to discharge home with hospice. Hospice will meet with patient and son this afternoon. Current pain location(s): abdomen and right rib  Pain Scale:   2/10        Review of Systems   Constitutional: Positive for fatigue. HENT: Positive for sore throat and trouble swallowing. Respiratory: Positive for cough and shortness of breath. Cardiovascular: Negative for chest pain. Gastrointestinal: Positive for abdominal distention, abdominal pain and constipation. Negative for diarrhea, nausea and vomiting. Neurological: Positive for weakness.          Medications    Current Facility-Administered Medications:   •  acetaminophen (TYLENOL) tablet 650 mg, 650 mg, Oral, Q6H PRN, Lane López MD, 650 mg at 23 0530  •  albuterol (PROVENTIL HFA,VENTOLIN HFA) inhaler 2 puff, 2 puff, Inhalation, Q4H PRN, Lane López MD, 2 puff at 23 0829  •  amoxicillin (AMOXIL) capsule 500 mg, 500 mg, Oral, Q8H [facility], Lane López MD, 500 mg at 23 0548  •  dicyclomine (BENTYL) capsule 10 mg, 10 mg, Oral, 4x Daily (AC & HS), Lane López MD, 10 mg at 23 2214  •  diphenhydrAMINE (BENADRYL) tablet 50 mg, 50 mg, Oral, HS PRN, Lane López MD, 50 mg at 23 2134  •  diphenhydramine, lidocaine, Al/Mg hydroxide, simethicone (Magic Mouthwash) oral solution 10 mL, 10 mL, Swish & Swallow, Q4H PRN, Hamp Blotter, CRNP  •  docusate sodium (COLACE) capsule 100 mg, 100 mg, Oral, BID, Lane López MD, 100 mg at 08/24/23 1705  •  DULoxetine (CYMBALTA) delayed release capsule 30 mg, 30 mg, Oral, Daily, Lane López MD, 30 mg at 08/24/23 0830  •  heparin (porcine) subcutaneous injection 5,000 Units, 5,000 Units, Subcutaneous, Q8H [address], Lane López MD, 5,000 Units at 08/25/23 0548  •  HYDROmorphone (DILAUDID) tablet 4 mg, 4 mg, Oral, Q3H PRN, Kyara Ramon MD, 4 mg at 08/24/23 2237  •  hydrOXYzine HCL (ATARAX) tablet 25 mg, 25 mg, Oral, Q6H PRN, Dinora Clifford PA-C, 25 mg at 08/24/23 1430  •  loratadine (CLARITIN) tablet 10 mg, 10 mg, Oral, Daily, Garry Otero, CRNP, 10 mg at 08/24/23 1152  •  nicotine (NICODERM CQ) 14 mg/24hr TD 24 hr patch 1 patch, 1 patch, Transdermal, Daily, Lane López MD  •  ondansetron (ZOFRAN) injection 4 mg, 4 mg, Intravenous, Q6H PRN, Lane López MD  •  oxybutynin (DITROPAN-XL) 24 hr tablet 5 mg, 5 mg, Oral, Daily, Lane López MD, 5 mg at 08/24/23 0830  •  phenol (CHLORASEPTIC) 1.4 % mucosal liquid 1 spray, 1 spray, Mouth/Throat, Q2H PRN, Hamp Blotter, CRNP  •  polyethylene glycol (MIRALAX) packet 17 g, 17 g, Oral, Daily, Lane López MD, 17 g at 08/24/23 0830    Objective  /67   Pulse (!) 120   Temp 99.5 °F (37.5 °C)   Resp 17   Ht 5' 5" (1.651 m)   Wt 57.5 kg (126 lb 12.2 oz)   LMP 05/06/2021 (Approximate)   SpO2 90%   BMI 21.09 kg/m²   Physical Exam  Constitutional:       General: She is not in acute distress. Appearance: She is ill-appearing. Cardiovascular:      Rate and Rhythm: Tachycardia present. Pulmonary:      Effort: Pulmonary effort is normal.   Skin:     General: Skin is warm and dry. Neurological:      Mental Status: She is alert and oriented to person, place, and time.    Psychiatric:         Mood and Affect: Mood normal.           Lab Results: comfort care  Imaging Studies: I have personally reviewed pertinent reports. CTA ED chest PE Study    Result Date: 8/23/2023  Narrative: CTA - CHEST WITH IV CONTRAST - PULMONARY ANGIOGRAM INDICATION:   shortness of breath, CA, elevated dimer. History of metastatic breast cancer. COMPARISON: Chest x-ray from earlier today and CT scan chest/abdomen/pelvis from 7/4/2023. TECHNIQUE: CTA examination of the chest was performed using angiographic technique according to a protocol specifically tailored to evaluate for pulmonary embolism. Multiplanar 2D reformatted images were created from the source data. In addition, coronal 3D MIP postprocessing was performed on the acquisition scanner. Radiation dose length product (DLP) for this visit:  184 mGy-cm . This examination, like all CT scans performed in the [facility], was performed utilizing techniques to minimize radiation dose exposure, including the use of iterative reconstruction and automated exposure control. IV Contrast:  100 mL of iohexol (OMNIPAQUE) FINDINGS: PULMONARY ARTERIAL TREE:  No pulmonary embolus is seen. LUNGS: Stable 5 mm left lower lobe nodule image 3/57 interval increased size of a multiloculated right pleural effusion with areas of compressive atelectasis in the right middle and lower lobes. No endotracheal or endobronchial lesion identified. PLEURA: Enlarging multiloculated right pleural effusion with pleural drainage catheter in place. HEART/GREAT VESSELS:  Unremarkable for patient's age. No thoracic aortic aneurysm. MEDIASTINUM AND KIA: Stable calcified bilateral hilar and mediastinal lymph nodes. CHEST WALL AND LOWER NECK:   Right chest port noted. VISUALIZED STRUCTURES IN THE UPPER ABDOMEN: Multiple liver lesions better depicted on previous examination consistent with metastatic disease. Upper abdominal ascites has developed.  OSSEOUS STRUCTURES: Relatively stable diffuse osteoblastic metastases. Impression: No pulm embolism. Significant enlargement of malignant multiloculated right pleural effusion with areas of compressive atelectasis throughout the right lung. Stable 5 mm left lower lobe lung nodule. Hepatic metastatic disease not well visualized on the current phase of contrast. Upper abdominal ascites has developed in the interval. Grossly stable widespread osteoblastic metastatic disease. Workstation performed: [id number]     VAS lower limb venous duplex study, complete bilateral    Result Date: 8/23/2023  Narrative:  [facility] REPORT CLINICAL: Indications: Patient presents with bilateral lower extremity edema x 2 days. Operative History: no cardiovascular surgeries Risk Factors The patient has history of HTN and smoking (current) 0.2 ppd. CONCLUSION: Impression: RIGHT LOWER LIMB: No evidence of acute or chronic deep vein thrombosis. No evidence of superficial thrombophlebitis noted. Doppler evaluation shows a normal response to augmentation maneuvers. . Popliteal, posterior tibial and anterior tibial arterial Doppler waveform's are triphasic. LEFT LOWER LIMB: No evidence of acute or chronic deep vein thrombosis. No evidence of superficial thrombophlebitis noted. Doppler evaluation shows a normal response to augmentation maneuvers. Popliteal, posterior tibial and anterior tibial arterial Doppler waveform's are triphasic. Technical findings were given to Te Hernandez on the floor. SIGNATURE: Electronically Signed by: Juany Chung MD, RPVI on 2023-08-23 04:14:54 PM    MRI abdomen w wo contrast and mrcp    Result Date: 8/23/2023  Narrative: MRI - ABDOMEN - WITH AND WITHOUT CONTRAST INDICATION: 56 years / Female  C79.51: Secondary malignant neoplasm of bone C78.7: Secondary malignant neoplasm of liver and intrahepatic bile duct.  COMPARISON: July 4, 2023 TECHNIQUE:  Multiplanar/multisequence MRI of the abdomen was performed before and after administration of contrast. IV Contrast:  5 mL of Gadobutrol injection (SINGLE-DOSE) FINDINGS: LOWER CHEST:   Moderate-sized right effusion seen with septation LIVER: Multiple hepatic lesions seen throughout the liver parenchyma most of these lesions are non diffusion restricting. The largest lesion seen in the segment 7/8 demonstrates increasing capsular retraction. On the postcontrast images these lesions demonstrate marginal enhancement. Nodular contour of the liver seen. The hepatic veins and portal veins are patent. BILE DUCTS: No dilatation of the intrahepatic ducts seen The common bile duct is not dilated Attenuation of the central aspect of the right and left hepatic duct proximal to the confluence noted without any intrahepatic bili ductal dilatation GALLBLADDER: There is mildly distended PANCREAS:  Unremarkable. ADRENAL GLANDS:  Unremarkable. SPLEEN:  Normal. KIDNEYS/PROXIMAL URETERS: No hydroureteronephrosis. No suspicious renal mass. Right renal cyst seen BOWEL:   Thickening of the hepatic flexure noted, image 32 series 6 PERITONEUM/RETROPERITONEUM: Perihepatic fluid seen. Ascites seen LYMPH NODES: No abdominal lymphadenopathy. VASCULAR STRUCTURES: Portal vein is patent SMA is patent Splenic vein is patent ABDOMINAL WALL:  Unremarkable. OSSEOUS STRUCTURES:  No suspicious osseous lesion. Patient is known to have diffuse bony metastatic     Impression: Multiple and innumerable necrotic hepatic lesions with increasing nodularity and surface retraction of the liver due to posttreatment changes and developing pseudocirrhosis No dilatation of the common bile duct to suggest any biliary obstruction Mild attenuation of the intrahepatic ducts due to mass effect from the multiple liver lesion Development of moderate amount of ascites.  Narrowing and thickening noted near the hepatic flexure of the colon, consider clinical correlation or evaluation with the CT/colonoscopy to evaluate for any focal lesion Bony metastatic disease as seen on the previous study The study was marked in [facility] for significant notification. Workstation performed: [id number]     XR chest 2 views    Result Date: 8/23/2023  Narrative: CHEST INDICATION:   SOB, pleural effusion hx, catheter placement verification. COMPARISON: 8/13/2023 EXAM PERFORMED/VIEWS:  XR CHEST PA & LATERAL 2 views FINDINGS: Right-sided Mediport at cavoatrial junction Right pleural drainage catheter which appears to terminate within the region of loculated right pleural effusion at mid lung level Cardiomediastinal silhouette appears unremarkable. No pneumothorax Osseous structures appear within normal limits for patient age. Impression: Right pleural drainage catheter which appears to terminate at the level of loculated effusion No evidence of pneumothorax Workstation performed: [id number]     Echo follow up/limited w/ contrast if indicated    Result Date: 8/21/2023  Narrative: •  Left Ventricle: Left ventricular cavity size is normal. Wall thickness is normal. The left ventricular ejection fraction is 60%. Systolic function is normal. Global longitudinal strain -23.2%. IR pleural effusion long-term catheter check/change/reposition    Result Date: 8/18/2023  Narrative: PROCEDURE: Evaluation of right tunneled pleural catheter. STAFF: DEBRA Mcgovern NUMBER OF IMAGES: Multiple COMPLICATIONS: None. MEDICATIONS: tPA 10 mg/dornase 5 mg in 30 mL NSS. INDICATION: Concern for malfunctioning pleural catheter. The patient has a history of metastatic breast cancer. The patient reports that she aspirates the catheter once a week. She reports approximately 3 to 500 mL of output with aspiration. Patient unable to aspirate catheter x1 week. PROCEDURE: Ultrasound images of the right pleural catheter and effusion were obtained. The catheter was then aspirated. Unable to aspirate any pleural fluid.  10 mL of sterile normal saline were injected and the catheter, return of fibrous, small clot aspirated into  the syringe. Unable to aspirate with Vacutainer. tPA/dornase was instilled and the catheter for 1 hour. The catheter was then aspirated for 300 mL of pleural fluid that contained some clot and fibrous tissue. FINDINGS: Ultrasound images showed catheter in good position in right pleural space. Small loculated right pleural effusion. Impression: Evaluation of right tunneled pleural catheter, loculated effusion. PLAN: The patient is to continue to aspirate catheter once per week as necessary. Workstation performed: [id number]     XR chest 2 views    Result Date: 8/14/2023  Narrative: CHEST INDICATION:   sob. COMPARISON: Chest CT 7/4/2023, CXR 8/2/2022. EXAM PERFORMED/VIEWS:  XR CHEST PA & LATERAL. DUAL ENERGY SUBTRACTION. FINDINGS: Right port at cavoatrial junction. Cardiomediastinal silhouette normal. Right pleural drainage catheter. Moderate loculated right pleural effusion and right base atelectasis. No pneumothorax. Right axillary clips. Upper abdomen normal.  Bones normal for age. Impression: Right pleural drainage catheter with no pneumothorax. Moderate loculated right effusion and right base atelectasis. Workstation performed: [id number]      EKG, Pathology, and Other Studies: I have personally reviewed pertinent reports. Counseling / Coordination of Care  Total floor / unit time spent today 50 minutes. Greater than 50% of total time was spent with the patient and / or family counseling and / or coordinating of care. A description of the counseling / coordination of care: Chart reviewed,  provided medical updates, determined goals of care, discussed palliative care and symptom management, discussed code status, discussed comfort care and hospice, provided anticipatory guidance, determined competency and POA/HCA, determined social/family support, provided psychosocial support.          Tiffanie Robertson, MSN, CRNP  Palliative & Supportive Care    Portions of this document may have been created using dictation software and as such some "sound alike" terms may have been generated by the system. Do not hesitate to contact me with any questions or clarifications.

## 2023-08-25 NOTE — MALNUTRITION/BMI
This medical record reflects one or more clinical indicators suggestive of malnutrition. Malnutrition Findings:   Adult Malnutrition type: Chronic illness  Adult Degree of Malnutrition: Other severe protein calorie malnutrition  Malnutrition Characteristics: Fat loss, Muscle loss, Inadequate energy      360 Statement: Related to chronic, catabolic illness as evidenced by moderate fat loss (orbitals), moderate muscle wasting (clavicles), and inadequate intake of < 50% of estimated energy requirement for > 1 month. Treated with diet and supplements. See Nutrition note dated 8/25/2023 for additional details. Completed nutrition assessment is viewable in the nutrition documentation.

## 2023-08-25 NOTE — HOSPICE NOTE
[facility] AND NADIRA BRENNER MET WITH PT AT BEDSIDE THIS AM TO DISCUSS HOSPICE SERVICES. PLEASE REFER TO EPIC NOTE PER NADIRA DINH CM 8.25.23 @ 1020. HOSPICE WILL CONTINUE TO FOLLOW AND COORDINATE WITH CM ON DC PLAN.

## 2023-08-25 NOTE — TELEPHONE ENCOUNTER
Left message for currently admitted pt to call office to schedule fu from referral list with dr Jackie Gann for malignant pleural effusion

## 2023-08-25 NOTE — CASE MANAGEMENT
Case Management Progress Note    Patient name Geri Adams  Location [address] [facility] [id number] MRN [id number]  : 1965 Date 2023       LOS (days): 2  Geometric Mean LOS (GMLOS) (days):   Days to [facility]:        OBJECTIVE:    Current admission status: Inpatient  Preferred Pharmacy:   [id number] [place] [place] -  [facility] [address]  Phone: [phone] Fax: [phone]    [address] [facility] [id number] [place] [address] [address] [facility]  [address] [facility] [address] [facility]  Phone: [phone] Fax: [phone]    [facility] [address] [facility], [address] [facility]  [address] [place] [address]  Phone: [phone] Fax: [phone]    [id number] - [facility] [address] [facility]  [address] [facility] [address] [address]  Phone: [phone] Fax: [phone]    [address] [facility], [address] 18 [facility] [address] [place] [address]  Phone: [phone] Fax: 127 [facility], 10 42 [facility] [address] [place] [address]  [address] [facility] [address]  Phone: [phone] Fax: [phone]    [id number] [place], [address] [address] [address]  Phone: [phone] Fax: [phone]    Primary Care Provider: Leia Corrales MD    Primary Insurance: [facility]  Secondary Insurance:     PROGRESS NOTE:  [facility] hospice liason and CM met w/ Patient at bedside to discuss discharge plans. Patient would like to return home on hospice services w/ [facility]. Patient resides w/ her two son's and brother. Per Patient, her brother filed for eviction. Patient is working with [employer] [phone]. Left message at [employer] to discuss option for compassionate hold on eviction. Discussed as an alternate option a nursing home. Plan: Family meeting today after 3pm w/ Son Kishore Christensen [phone].  Patient reports son is not available to talk during work hours of 7am-3pm. Patient's son Mirella Valdivia and his girlfriend, Jeremy Wisdom [phone] are taking care of disable son, Salazar Pineda. Update:  Family meeting took place today w/ Bobby Rogers from [facility], Patient and son. Plan for home w/ hospice care. Patient's son and his girlfriend will provide care. Per son, Patient will be able to enter the home. Notified [facility], requested starter hospice meds to be ordered Sunday for Monday delivery w/ [facility]. CM to follow.

## 2023-08-25 NOTE — PROGRESS NOTES
[address]  Progress Note  Name: Terryl Simmonds  MRN: [id number]  Unit/Bed#: -01 I Date of Admission: 8/23/2023   Date of Service: 8/25/2023 I Hospital Day: 2    Assessment/Plan   * Pleural effusion  Assessment & Plan  · Presented with progressively worsening shortness of breath  · Noted on CT scan to have significant enlargement of malignant multiloculated right pleural effusion with compressive atelectasis  · Also noted to have hepatic metastatic disease as well  · Patient with history of metastatic breast cancer on chemo  · Goals of care discussion was completed at bedside by SLIM, additional goals of care to be followed up by oncology and palliative care  · Plan for hospice  · Pleurx catheter evaluated by IR now draining status post tPA    Abnormal LFTs  Assessment & Plan  · In the setting of hepatic metastasis  · Remained stable  · Continue to monitor LFTs    Hyponatremia  Assessment & Plan  Suspect from volume depletion  Sodium noted to be 124  Sodium mildly improved  No further monitoring necessary as patient agreeable to hospice    Cancer related pain  Assessment & Plan  Continue oral Dilaudid as needed  Appreciate palliative care recommendations    Malignant neoplasm of breast in female, estrogen receptor positive ([address])  Assessment & Plan  Appreciate oncology evaluation  Awaiting hospice           VTE Pharmacologic Prophylaxis:   Pharmacologic: Heparin  Mechanical VTE Prophylaxis in Place: Yes    Patient Centered Rounds: I have performed bedside rounds with nursing staff today. Discussions with Specialists or Other Care Team Provider: cm, nursing    Education and Discussions with Family / Patient: pt    Time Spent for Care: 30 minutes. More than 50% of total time spent on counseling and coordination of care as described above.     Current Length of Stay: 2 day(s)    Current Patient Status: Inpatient   Certification Statement: The patient will continue to require additional inpatient hospital stay due to see below    Discharge Plan: awaiting hospice     Code Status: Level 2 - DNAR: but accepts endotracheal intubation      Subjective:   Denies cp, sob, cough, fevers    Objective:     Vitals:   Temp (24hrs), Av.3 °F (37.4 °C), Min:99.1 °F (37.3 °C), Max:99.5 °F (37.5 °C)    Temp:  [99.1 °F (37.3 °C)-99.5 °F (37.5 °C)] 99.1 °F (37.3 °C)  HR:  [108-120] 120  Resp:  [17-18] 17  BP: (104-109)/(61-70) 104/61  SpO2:  [90 %-91 %] 90 %  Body mass index is 21.09 kg/m². Input and Output Summary (last 24 hours): Intake/Output Summary (Last 24 hours) at 2023 0843  Last data filed at 2023 0301  Gross per 24 hour   Intake 480 ml   Output 375 ml   Net 105 ml       Physical Exam:     Physical Exam  Constitutional:       General: She is not in acute distress. Appearance: She is well-developed. She is not diaphoretic. HENT:      Head: Normocephalic and atraumatic. Nose: Nose normal.      Mouth/Throat:      Pharynx: No oropharyngeal exudate. Eyes:      General: No scleral icterus. Right eye: No discharge. Left eye: No discharge. Conjunctiva/sclera: Conjunctivae normal.   Neck:      Thyroid: No thyromegaly. Vascular: No JVD. Cardiovascular:      Rate and Rhythm: Normal rate and regular rhythm. Heart sounds: Normal heart sounds. No murmur heard. No friction rub. No gallop. Pulmonary:      Effort: Pulmonary effort is normal. No respiratory distress. Breath sounds: Normal breath sounds. No wheezing or rales. Chest:      Chest wall: No tenderness. Abdominal:      General: Bowel sounds are normal. There is no distension. Palpations: Abdomen is soft. Tenderness: There is no abdominal tenderness. There is no guarding or rebound. Musculoskeletal:         General: No tenderness or deformity. Normal range of motion. Cervical back: Normal range of motion and neck supple.    Skin:     General: Skin is warm and dry.      Findings: No erythema or rash. Neurological:      Mental Status: She is alert. Mental status is at baseline. Cranial Nerves: No cranial nerve deficit. Sensory: No sensory deficit. Motor: No abnormal muscle tone. Coordination: Coordination normal.       ( )    Additional Data:     Labs:    Results from last 7 days   Lab Units 08/24/23  0702   WBC Thousand/uL 6.43   HEMOGLOBIN g/dL 9.7*   HEMATOCRIT % 26.3*   PLATELETS Thousands/uL 385   NEUTROS PCT % 70   LYMPHS PCT % 3*   MONOS PCT % 25*   EOS PCT % 0     Results from last 7 days   Lab Units 08/24/23  0702   SODIUM mmol/L 126*   POTASSIUM mmol/L 4.5   CHLORIDE mmol/L 98   CO2 mmol/L 21   BUN mg/dL 23   CREATININE mg/dL 0.72   ANION GAP mmol/L 7   CALCIUM mg/dL 7.9*   ALBUMIN g/dL 2.7*   TOTAL BILIRUBIN mg/dL 3.66*   ALK PHOS U/L 1,827*   ALT U/L 41   AST U/L 197*   GLUCOSE RANDOM mg/dL 84     Results from last 7 days   Lab Units 08/23/23  1642   INR  2.26*                       * I Have Reviewed All Lab Data Listed Above. * Additional Pertinent Lab Tests Reviewed:  All Labs Within Last 24 Hours Reviewed    Imaging:    Imaging Reports Reviewed Today Include: na  Imaging Personally Reviewed by Myself Includes:  na    Recent Cultures (last 7 days):           Last 24 Hours Medication List:   Current Facility-Administered Medications   Medication Dose Route Frequency Provider Last Rate   • acetaminophen  650 mg Oral Q6H PRN Lane López MD     • albuterol  2 puff Inhalation Q4H PRN Lane López MD     • amoxicillin  500 mg Oral Q8H [facility] Lane López MD     • dicyclomine  10 mg Oral 4x Daily (AC & HS) Lane López MD     • diphenhydrAMINE  50 mg Oral HS PRN Lane López MD     • diphenhydramine, lidocaine, Al/Mg hydroxide, simethicone  10 mL Swish & Swallow Q4H PRN Shara Felty, CRNP     • docusate sodium  100 mg Oral BID Lane López MD     • DULoxetine  30 mg Oral Daily Lane López MD     • heparin (porcine)  5,000 Units Subcutaneous Q8H [address] López MD     • HYDROmorphone  4 mg Oral Q3H PRN Lane López MD     • hydrOXYzine HCL  25 mg Oral Q6H PRN Carlie Yan PA-C     • loratadine  10 mg Oral Daily Cosby Nesha, CRNP     • nicotine  1 patch Transdermal Daily Lane López MD     • ondansetron  4 mg Intravenous Q6H PRN Lane López MD     • oxybutynin  5 mg Oral Daily Lane López MD     • phenol  1 spray Mouth/Throat Q2H PRN Cosby [place], CRNP     • polyethylene glycol  17 g Oral Daily Nima Landry MD          Today, Patient Was Seen By: Nima Landry MD    ** Please Note: Dictation voice to text software may have been used in the creation of this document.  **

## 2023-08-25 NOTE — PLAN OF CARE
Problem: PAIN - ADULT  Goal: Verbalizes/displays adequate comfort level or baseline comfort level  Description: Interventions:  - Encourage patient to monitor pain and request assistance  - Assess pain using appropriate pain scale  - Administer analgesics based on type and severity of pain and evaluate response  - Implement non-pharmacological measures as appropriate and evaluate response  - Consider cultural and social influences on pain and pain management  - Notify physician/advanced practitioner if interventions unsuccessful or patient reports new pain  Outcome: Progressing     Problem: INFECTION - ADULT  Goal: Absence or prevention of progression during hospitalization  Description: INTERVENTIONS:  - Assess and monitor for signs and symptoms of infection  - Monitor lab/diagnostic results  - Monitor all insertion sites, i.e. indwelling lines, tubes, and drains  - Monitor endotracheal if appropriate and nasal secretions for changes in amount and color  - [place] appropriate cooling/warming therapies per order  - Administer medications as ordered  - Instruct and encourage patient and family to use good hand hygiene technique  - Identify and instruct in appropriate isolation precautions for identified infection/condition  Outcome: Progressing  Goal: Absence of fever/infection during neutropenic period  Description: INTERVENTIONS:  - Monitor WBC    Outcome: Progressing     Problem: SAFETY ADULT  Goal: Patient will remain free of falls  Description: INTERVENTIONS:  - Educate patient/family on patient safety including physical limitations  - Instruct patient to call for assistance with activity   - Consult OT/PT to assist with strengthening/mobility   - Keep Call bell within reach  - Keep bed low and locked with side rails adjusted as appropriate  - Keep care items and personal belongings within reach  - Initiate and maintain comfort rounds  - Make Fall Risk Sign visible to staff  - Offer Toileting every 2 Hours, in advance of need  - Initiate/Maintain bed alarm  - Obtain necessary fall risk management equipment  - Apply yellow socks and bracelet for high fall risk patients  - Consider moving patient to room near nurses station  Outcome: Progressing  Goal: Maintain or return to baseline ADL function  Description: INTERVENTIONS:  -  Assess patient's ability to carry out ADLs; assess patient's baseline for ADL function and identify physical deficits which impact ability to perform ADLs (bathing, care of mouth/teeth, toileting, grooming, dressing, etc.)  - Assess/evaluate cause of self-care deficits   - Assess range of motion  - Assess patient's mobility; develop plan if impaired  - Assess patient's need for assistive devices and provide as appropriate  - Encourage maximum independence but intervene and supervise when necessary  - Involve family in performance of ADLs  - Assess for home care needs following discharge   - Consider OT consult to assist with ADL evaluation and planning for discharge  - Provide patient education as appropriate  Outcome: Progressing  Goal: Maintains/Returns to pre admission functional level  Description: INTERVENTIONS:  - Perform BMAT or MOVE assessment daily.   - Set and communicate daily mobility goal to care team and patient/family/caregiver. - Collaborate with rehabilitation services on mobility goals if consulted  - Perform Range of Motion 2 times a day. - Reposition patient every 2 hours.   - Dangle patient 2 times a day  - Stand patient 2 times a day  - Ambulate patient 2 times a day  - Out of bed to chair 2 times a day   - Out of bed for meals 2 times a day  - Out of bed for toileting  - Record patient progress and toleration of activity level   Outcome: Progressing     Problem: DISCHARGE PLANNING  Goal: Discharge to home or other facility with appropriate resources  Description: INTERVENTIONS:  - Identify barriers to discharge w/patient and caregiver  - Arrange for needed discharge resources and transportation as appropriate  - Identify discharge learning needs (meds, wound care, etc.)  - Arrange for interpretive services to assist at discharge as needed  - Refer to Case Management Department for coordinating discharge planning if the patient needs post-hospital services based on physician/advanced practitioner order or complex needs related to functional status, cognitive ability, or social support system  Outcome: Progressing     Problem: Knowledge Deficit  Goal: Patient/family/caregiver demonstrates understanding of disease process, treatment plan, medications, and discharge instructions  Description: Complete learning assessment and assess knowledge base.   Interventions:  - Provide teaching at level of understanding  - Provide teaching via preferred learning methods  Outcome: Progressing

## 2023-08-25 NOTE — PLAN OF CARE
Problem: PAIN - ADULT  Goal: Verbalizes/displays adequate comfort level or baseline comfort level  Description: Interventions:  - Encourage patient to monitor pain and request assistance  - Assess pain using appropriate pain scale  - Administer analgesics based on type and severity of pain and evaluate response  - Implement non-pharmacological measures as appropriate and evaluate response  - Consider cultural and social influences on pain and pain management  - Notify physician/advanced practitioner if interventions unsuccessful or patient reports new pain  Outcome: Progressing     Problem: INFECTION - ADULT  Goal: Absence or prevention of progression during hospitalization  Description: INTERVENTIONS:  - Assess and monitor for signs and symptoms of infection  - Monitor lab/diagnostic results  - Monitor all insertion sites, i.e. indwelling lines, tubes, and drains  - Monitor endotracheal if appropriate and nasal secretions for changes in amount and color  - [place] appropriate cooling/warming therapies per order  - Administer medications as ordered  - Instruct and encourage patient and family to use good hand hygiene technique  - Identify and instruct in appropriate isolation precautions for identified infection/condition  Outcome: Progressing  Goal: Absence of fever/infection during neutropenic period  Description: INTERVENTIONS:  - Monitor WBC    Outcome: Progressing     Problem: SAFETY ADULT  Goal: Patient will remain free of falls  Description: INTERVENTIONS:  - Educate patient/family on patient safety including physical limitations  - Instruct patient to call for assistance with activity   - Consult OT/PT to assist with strengthening/mobility   - Keep Call bell within reach  - Keep bed low and locked with side rails adjusted as appropriate  - Keep care items and personal belongings within reach  - Initiate and maintain comfort rounds  - Make Fall Risk Sign visible to staff  - Offer Toileting every  Hours, in advance of need  - Initiate/Maintain alarm  - Obtain necessary fall risk management equipment:   - Apply yellow socks and bracelet for high fall risk patients  - Consider moving patient to room near nurses station  Outcome: Progressing  Goal: Maintain or return to baseline ADL function  Description: INTERVENTIONS:  -  Assess patient's ability to carry out ADLs; assess patient's baseline for ADL function and identify physical deficits which impact ability to perform ADLs (bathing, care of mouth/teeth, toileting, grooming, dressing, etc.)  - Assess/evaluate cause of self-care deficits   - Assess range of motion  - Assess patient's mobility; develop plan if impaired  - Assess patient's need for assistive devices and provide as appropriate  - Encourage maximum independence but intervene and supervise when necessary  - Involve family in performance of ADLs  - Assess for home care needs following discharge   - Consider OT consult to assist with ADL evaluation and planning for discharge  - Provide patient education as appropriate  Outcome: Progressing  Goal: Maintains/Returns to pre admission functional level  Description: INTERVENTIONS:  - Perform BMAT or MOVE assessment daily.   - Set and communicate daily mobility goal to care team and patient/family/caregiver. - Collaborate with rehabilitation services on mobility goals if consulted  - Perform Range of Motion  times a day. - Reposition patient every  hours.   - Dangle patient  times a day  - Stand patient  times a day  - Ambulate patient  times a day  - Out of bed to chair  times a day   - Out of bed for meals times a day  - Out of bed for toileting  - Record patient progress and toleration of activity level   Outcome: Progressing     Problem: DISCHARGE PLANNING  Goal: Discharge to home or other facility with appropriate resources  Description: INTERVENTIONS:  - Identify barriers to discharge w/patient and caregiver  - Arrange for needed discharge resources and transportation as appropriate  - Identify discharge learning needs (meds, wound care, etc.)  - Arrange for interpretive services to assist at discharge as needed  - Refer to Case Management Department for coordinating discharge planning if the patient needs post-hospital services based on physician/advanced practitioner order or complex needs related to functional status, cognitive ability, or social support system  Outcome: Progressing     Problem: Knowledge Deficit  Goal: Patient/family/caregiver demonstrates understanding of disease process, treatment plan, medications, and discharge instructions  Description: Complete learning assessment and assess knowledge base.   Interventions:  - Provide teaching at level of understanding  - Provide teaching via preferred learning methods  Outcome: Progressing

## 2023-08-26 PROCEDURE — 99232 SBSQ HOSP IP/OBS MODERATE 35: CPT | Performed by: INTERNAL MEDICINE

## 2023-08-26 RX ORDER — LIDOCAINE 50 MG/G
1 PATCH TOPICAL DAILY
Status: DISCONTINUED | OUTPATIENT
Start: 2023-08-26 | End: 2023-08-28 | Stop reason: HOSPADM

## 2023-08-26 RX ORDER — BENZONATATE 100 MG/1
100 CAPSULE ORAL 3 TIMES DAILY PRN
Status: DISCONTINUED | OUTPATIENT
Start: 2023-08-26 | End: 2023-08-28 | Stop reason: HOSPADM

## 2023-08-26 RX ORDER — LACTULOSE 20 G/30ML
20 SOLUTION ORAL 2 TIMES DAILY
Status: DISCONTINUED | OUTPATIENT
Start: 2023-08-26 | End: 2023-08-28 | Stop reason: HOSPADM

## 2023-08-26 RX ADMIN — DICYCLOMINE HYDROCHLORIDE 10 MG: 10 CAPSULE ORAL at 13:39

## 2023-08-26 RX ADMIN — BENZONATATE 100 MG: 100 CAPSULE ORAL at 21:30

## 2023-08-26 RX ADMIN — AMOXICILLIN 500 MG: 250 CAPSULE ORAL at 21:29

## 2023-08-26 RX ADMIN — DULOXETINE HYDROCHLORIDE 30 MG: 30 CAPSULE, DELAYED RELEASE ORAL at 08:26

## 2023-08-26 RX ADMIN — HYDROMORPHONE HYDROCHLORIDE 4 MG: 2 TABLET ORAL at 08:26

## 2023-08-26 RX ADMIN — LACTULOSE 20 G: 20 SOLUTION ORAL at 16:18

## 2023-08-26 RX ADMIN — HYDROMORPHONE HYDROCHLORIDE 4 MG: 2 TABLET ORAL at 21:29

## 2023-08-26 RX ADMIN — MORPHINE SULFATE 1 MG: 2 INJECTION, SOLUTION INTRAMUSCULAR; INTRAVENOUS at 09:30

## 2023-08-26 RX ADMIN — LORATADINE 10 MG: 10 TABLET ORAL at 08:26

## 2023-08-26 RX ADMIN — HYDROMORPHONE HYDROCHLORIDE 4 MG: 2 TABLET ORAL at 13:39

## 2023-08-26 RX ADMIN — AMOXICILLIN 500 MG: 250 CAPSULE ORAL at 04:52

## 2023-08-26 RX ADMIN — DICYCLOMINE HYDROCHLORIDE 10 MG: 10 CAPSULE ORAL at 08:26

## 2023-08-26 RX ADMIN — OXYBUTYNIN CHLORIDE 5 MG: 5 TABLET, EXTENDED RELEASE ORAL at 08:26

## 2023-08-26 RX ADMIN — POLYETHYLENE GLYCOL 3350 17 G: 17 POWDER, FOR SOLUTION ORAL at 08:26

## 2023-08-26 RX ADMIN — AMOXICILLIN 500 MG: 250 CAPSULE ORAL at 13:38

## 2023-08-26 RX ADMIN — MORPHINE SULFATE 1 MG: 2 INJECTION, SOLUTION INTRAMUSCULAR; INTRAVENOUS at 16:37

## 2023-08-26 RX ADMIN — DIPHENHYDRAMINE HYDROCHLORIDE 50 MG: 25 TABLET ORAL at 21:30

## 2023-08-26 RX ADMIN — LIDOCAINE 5% 1 PATCH: 700 PATCH TOPICAL at 13:40

## 2023-08-26 RX ADMIN — RIVAROXABAN 20 MG: 20 TABLET, FILM COATED ORAL at 08:26

## 2023-08-26 RX ADMIN — BENZONATATE 100 MG: 100 CAPSULE ORAL at 08:26

## 2023-08-26 RX ADMIN — DICYCLOMINE HYDROCHLORIDE 10 MG: 10 CAPSULE ORAL at 21:29

## 2023-08-26 RX ADMIN — SENNOSIDES, DOCUSATE SODIUM 1 TABLET: 8.6; 5 TABLET ORAL at 17:00

## 2023-08-26 RX ADMIN — DICYCLOMINE HYDROCHLORIDE 10 MG: 10 CAPSULE ORAL at 16:18

## 2023-08-26 RX ADMIN — SENNOSIDES, DOCUSATE SODIUM 1 TABLET: 8.6; 5 TABLET ORAL at 08:26

## 2023-08-26 NOTE — ASSESSMENT & PLAN NOTE
· Presented initially with progressively worsening shortness of breath  · CT scan revealed large malignant multilocular right pleural effusion with compression atelectasis  · Also noted to have hepatic metastatic disease.     · Patient with Pleurx catheter in place evaluated by IR status post tPA now draining adequately  · Patient with history of metastatic breast cancer on chemo  · Oncology and palliative care consulted after further discussion plan for hospice  · Follow-up further recommendations with case management  · Awaiting hospice placement

## 2023-08-26 NOTE — PLAN OF CARE
Problem: PAIN - ADULT  Goal: Verbalizes/displays adequate comfort level or baseline comfort level  Description: Interventions:  - Encourage patient to monitor pain and request assistance  - Assess pain using appropriate pain scale  - Administer analgesics based on type and severity of pain and evaluate response  - Implement non-pharmacological measures as appropriate and evaluate response  - Consider cultural and social influences on pain and pain management  - Notify physician/advanced practitioner if interventions unsuccessful or patient reports new pain  Outcome: Progressing     Problem: INFECTION - ADULT  Goal: Absence or prevention of progression during hospitalization  Description: INTERVENTIONS:  - Assess and monitor for signs and symptoms of infection  - Monitor lab/diagnostic results  - Monitor all insertion sites, i.e. indwelling lines, tubes, and drains  - Monitor endotracheal if appropriate and nasal secretions for changes in amount and color  - [place] appropriate cooling/warming therapies per order  - Administer medications as ordered  - Instruct and encourage patient and family to use good hand hygiene technique  - Identify and instruct in appropriate isolation precautions for identified infection/condition  Outcome: Progressing  Goal: Absence of fever/infection during neutropenic period  Description: INTERVENTIONS:  - Monitor WBC    Outcome: Progressing     Problem: SAFETY ADULT  Goal: Patient will remain free of falls  Description: INTERVENTIONS:  - Educate patient/family on patient safety including physical limitations  - Instruct patient to call for assistance with activity   - Consult OT/PT to assist with strengthening/mobility   - Keep Call bell within reach  - Keep bed low and locked with side rails adjusted as appropriate  - Keep care items and personal belongings within reach  - Initiate and maintain comfort rounds  - Make Fall Risk Sign visible to staff  - Offer Toileting every 2 Hours, in advance of need  - Initiate/Maintain bed alarm  - Obtain necessary fall risk management equipment  - Apply yellow socks and bracelet for high fall risk patients  - Consider moving patient to room near nurses station  Outcome: Progressing  Goal: Maintain or return to baseline ADL function  Description: INTERVENTIONS:  -  Assess patient's ability to carry out ADLs; assess patient's baseline for ADL function and identify physical deficits which impact ability to perform ADLs (bathing, care of mouth/teeth, toileting, grooming, dressing, etc.)  - Assess/evaluate cause of self-care deficits   - Assess range of motion  - Assess patient's mobility; develop plan if impaired  - Assess patient's need for assistive devices and provide as appropriate  - Encourage maximum independence but intervene and supervise when necessary  - Involve family in performance of ADLs  - Assess for home care needs following discharge   - Consider OT consult to assist with ADL evaluation and planning for discharge  - Provide patient education as appropriate  Outcome: Progressing  Goal: Maintains/Returns to pre admission functional level  Description: INTERVENTIONS:  - Perform BMAT or MOVE assessment daily.   - Set and communicate daily mobility goal to care team and patient/family/caregiver. - Collaborate with rehabilitation services on mobility goals if consulted  - Perform Range of Motion 2 times a day. - Reposition patient every 2 hours.   - Dangle patient 2 times a day  - Stand patient 2 times a day  - Ambulate patient 2 times a day  - Out of bed to chair 2 times a day   - Out of bed for meals 2 times a day  - Out of bed for toileting  - Record patient progress and toleration of activity level   Outcome: Progressing     Problem: DISCHARGE PLANNING  Goal: Discharge to home or other facility with appropriate resources  Description: INTERVENTIONS:  - Identify barriers to discharge w/patient and caregiver  - Arrange for needed discharge resources and transportation as appropriate  - Identify discharge learning needs (meds, wound care, etc.)  - Arrange for interpretive services to assist at discharge as needed  - Refer to Case Management Department for coordinating discharge planning if the patient needs post-hospital services based on physician/advanced practitioner order or complex needs related to functional status, cognitive ability, or social support system  Outcome: Progressing     Problem: Knowledge Deficit  Goal: Patient/family/caregiver demonstrates understanding of disease process, treatment plan, medications, and discharge instructions  Description: Complete learning assessment and assess knowledge base. Interventions:  - Provide teaching at level of understanding  - Provide teaching via preferred learning methods  Outcome: Progressing     Problem: Nutrition/Hydration-ADULT  Goal: Nutrient/Hydration intake appropriate for improving, restoring or maintaining nutritional needs  Description: Monitor and assess patient's nutrition/hydration status for malnutrition. Collaborate with interdisciplinary team and initiate plan and interventions as ordered. Monitor patient's weight and dietary intake as ordered or per policy. Utilize nutrition screening tool and intervene as necessary. Determine patient's food preferences and provide high-protein, high-caloric foods as appropriate.      INTERVENTIONS:  - Monitor oral intake, urinary output, labs, and treatment plans  - Assess nutrition and hydration status and recommend course of action  - Evaluate amount of meals eaten  - Assist patient with eating if necessary   - Allow adequate time for meals  - Recommend/ encourage appropriate diets, oral nutritional supplements, and vitamin/mineral supplements  - Order, calculate, and assess calorie counts as needed  - Recommend, monitor, and adjust tube feedings and TPN/PPN based on assessed needs  - Assess need for intravenous fluids  - Provide specific nutrition/hydration education as appropriate  - Include patient/family/caregiver in decisions related to nutrition  Outcome: Progressing

## 2023-08-26 NOTE — PROGRESS NOTES
[address]  Progress Note  Name: Annabelle Arechiga  MRN: [id number]  Unit/Bed#: -01 I Date of Admission: 8/23/2023   Date of Service: 8/26/2023 I Hospital Day: 3    Assessment/Plan   * Pleural effusion  Assessment & Plan  · Presented initially with progressively worsening shortness of breath  · CT scan revealed large malignant multilocular right pleural effusion with compression atelectasis  · Also noted to have hepatic metastatic disease. · Patient with Pleurx catheter in place evaluated by IR status post tPA now draining adequately  · Patient with history of metastatic breast cancer on chemo  · Oncology and palliative care consulted after further discussion plan for hospice  · Follow-up further recommendations with case management  · Awaiting hospice placement      Abnormal LFTs  Assessment & Plan  ·  in the setting of hepatic metastasis  · No further monitoring necessary given plan for hospice       Cancer related pain  Assessment & Plan   Continue oral Dilaudid for moderate/severe pain  Continue IV morphine for breakthrough pain  Appreciate palliative care recommendations    Malignant neoplasm of breast in female, estrogen receptor positive ([address])  Assessment & Plan  Appreciate oncology evaluation  Plan as outlined above  Awaiting hospice placement           VTE Pharmacologic Prophylaxis:   Pharmacologic: Pharmacologic VTE Prophylaxis contraindicated due to xarelto  Mechanical VTE Prophylaxis in Place: Yes    Patient Centered Rounds: I have performed bedside rounds with nursing staff today. Discussions with Specialists or Other Care Team Provider: cm, nursing    Education and Discussions with Family / Patient:  Updated Son via phone    Time Spent for Care: 30 minutes. More than 50% of total time spent on counseling and coordination of care as described above.     Current Length of Stay: 3 day(s)    Current Patient Status: Inpatient   Certification Statement: The patient will continue to require additional inpatient hospital stay due to see below    Discharge Plan: Awaiting home hospice set up     Code Status: Level 2 - DNAR: but accepts endotracheal intubation      Subjective:   Denies chest pain, cough, fevers, chills    Objective:     Vitals:   Temp (24hrs), Av.8 °F (36.6 °C), Min:97.7 °F (36.5 °C), Max:97.8 °F (36.6 °C)    Temp:  [97.7 °F (36.5 °C)-97.8 °F (36.6 °C)] 97.7 °F (36.5 °C)  HR:  [101-102] 102  Resp:  [15-18] 15  BP: (102-109)/(66-68) 102/68  SpO2:  [92 %] 92 %  Body mass index is 21.72 kg/m². Input and Output Summary (last 24 hours): Intake/Output Summary (Last 24 hours) at 2023 1238  Last data filed at 2023 1727  Gross per 24 hour   Intake 120 ml   Output --   Net 120 ml       Physical Exam:     Physical Exam  Constitutional:       General: She is not in acute distress. Appearance: She is well-developed. She is not diaphoretic. HENT:      Head: Normocephalic and atraumatic. Nose: Nose normal.      Mouth/Throat:      Pharynx: No oropharyngeal exudate. Eyes:      General: No scleral icterus. Right eye: No discharge. Left eye: No discharge. Conjunctiva/sclera: Conjunctivae normal.   Neck:      Thyroid: No thyromegaly. Vascular: No JVD. Cardiovascular:      Rate and Rhythm: Normal rate and regular rhythm. Heart sounds: Normal heart sounds. No murmur heard. No friction rub. No gallop. Pulmonary:      Effort: Pulmonary effort is normal. No respiratory distress. Breath sounds: Normal breath sounds. No wheezing or rales. Chest:      Chest wall: No tenderness. Abdominal:      General: Bowel sounds are normal. There is no distension. Palpations: Abdomen is soft. Tenderness: There is no abdominal tenderness. There is no guarding or rebound. Musculoskeletal:         General: No tenderness or deformity. Normal range of motion. Cervical back: Normal range of motion and neck supple. Skin:     General: Skin is warm and dry. Findings: No erythema or rash. Neurological:      Mental Status: She is alert. Mental status is at baseline. Cranial Nerves: No cranial nerve deficit. Sensory: No sensory deficit. Motor: No abnormal muscle tone. Coordination: Coordination normal.           Additional Data:     Labs:    Results from last 7 days   Lab Units 08/24/23  0702   WBC Thousand/uL 6.43   HEMOGLOBIN g/dL 9.7*   HEMATOCRIT % 26.3*   PLATELETS Thousands/uL 385   NEUTROS PCT % 70   LYMPHS PCT % 3*   MONOS PCT % 25*   EOS PCT % 0     Results from last 7 days   Lab Units 08/24/23  0702   SODIUM mmol/L 126*   POTASSIUM mmol/L 4.5   CHLORIDE mmol/L 98   CO2 mmol/L 21   BUN mg/dL 23   CREATININE mg/dL 0.72   ANION GAP mmol/L 7   CALCIUM mg/dL 7.9*   ALBUMIN g/dL 2.7*   TOTAL BILIRUBIN mg/dL 3.66*   ALK PHOS U/L 1,827*   ALT U/L 41   AST U/L 197*   GLUCOSE RANDOM mg/dL 84     Results from last 7 days   Lab Units 08/23/23  1642   INR  2.26*                       * I Have Reviewed All Lab Data Listed Above. * Additional Pertinent Lab Tests Reviewed:  All Labs Within Last 24 Hours Reviewed    Imaging:    Imaging Reports Reviewed Today Include: na  Imaging Personally Reviewed by Myself Includes:  na    Recent Cultures (last 7 days):           Last 24 Hours Medication List:   Current Facility-Administered Medications   Medication Dose Route Frequency Provider Last Rate   • acetaminophen  650 mg Oral Q6H PRN Lane López MD     • albuterol  2 puff Inhalation Q4H PRN Lane López MD     • amoxicillin  500 mg Oral Q8H Vesta Aviles MD     • benzonatate  100 mg Oral TID PRN Lane López MD     • dicyclomine  10 mg Oral 4x Daily (AC & HS) Lane López MD     • diphenhydrAMINE  50 mg Oral HS PRN Lane López MD     • diphenhydramine, lidocaine, Al/Mg hydroxide, simethicone  10 mL Swish & Swallow Q4H PRN DEBRA Gonzalez     • DULoxetine  30 mg Oral Daily Saran Mcdonald MD • HYDROmorphone  4 mg Oral Q3H PRN Lane López MD     • hydrOXYzine HCL  25 mg Oral Q6H PRN Debra Lee PA-C     • lidocaine  1 patch Topical Daily Lane López MD     • loratadine  10 mg Oral Daily Lupe Heads, CRNP     • morphine injection  1 mg Intravenous Q4H PRN Lane López MD     • nicotine  1 patch Transdermal Daily Lane López MD     • ondansetron  4 mg Intravenous Q6H PRN Lane López MD     • oxybutynin  5 mg Oral Daily Lane López MD     • phenol  1 spray Mouth/Throat Q2H PRN Lupe Heads, CRNP     • polyethylene glycol  17 g Oral Daily Lane López MD     • rivaroxaban  20 mg Oral Daily With Breakfast J Carlos Jung MD     • senna-docusate sodium  1 tablet Oral BID J Carlos Jung MD          Today, Patient Was Seen By: J Carlos Jung MD    ** Please Note: Dictation voice to text software may have been used in the creation of this document.  **

## 2023-08-26 NOTE — ASSESSMENT & PLAN NOTE
Continue oral Dilaudid for moderate/severe pain  Continue IV morphine for breakthrough pain  Appreciate palliative care recommendations

## 2023-08-27 PROCEDURE — 94760 N-INVAS EAR/PLS OXIMETRY 1: CPT

## 2023-08-27 PROCEDURE — 94664 DEMO&/EVAL PT USE INHALER: CPT

## 2023-08-27 PROCEDURE — 99232 SBSQ HOSP IP/OBS MODERATE 35: CPT | Performed by: INTERNAL MEDICINE

## 2023-08-27 RX ORDER — DULOXETIN HYDROCHLORIDE 30 MG/1
30 CAPSULE, DELAYED RELEASE ORAL EVERY EVENING
Status: DISCONTINUED | OUTPATIENT
Start: 2023-08-27 | End: 2023-08-28 | Stop reason: HOSPADM

## 2023-08-27 RX ADMIN — OXYBUTYNIN CHLORIDE 5 MG: 5 TABLET, EXTENDED RELEASE ORAL at 08:09

## 2023-08-27 RX ADMIN — DICYCLOMINE HYDROCHLORIDE 10 MG: 10 CAPSULE ORAL at 15:32

## 2023-08-27 RX ADMIN — HYDROMORPHONE HYDROCHLORIDE 4 MG: 2 TABLET ORAL at 08:09

## 2023-08-27 RX ADMIN — MORPHINE SULFATE 1 MG: 2 INJECTION, SOLUTION INTRAMUSCULAR; INTRAVENOUS at 13:22

## 2023-08-27 RX ADMIN — POLYETHYLENE GLYCOL 3350 17 G: 17 POWDER, FOR SOLUTION ORAL at 08:09

## 2023-08-27 RX ADMIN — AMOXICILLIN 500 MG: 250 CAPSULE ORAL at 11:10

## 2023-08-27 RX ADMIN — MORPHINE SULFATE 1 MG: 2 INJECTION, SOLUTION INTRAMUSCULAR; INTRAVENOUS at 09:26

## 2023-08-27 RX ADMIN — SENNOSIDES, DOCUSATE SODIUM 1 TABLET: 8.6; 5 TABLET ORAL at 08:09

## 2023-08-27 RX ADMIN — RIVAROXABAN 20 MG: 20 TABLET, FILM COATED ORAL at 08:09

## 2023-08-27 RX ADMIN — AMOXICILLIN 500 MG: 250 CAPSULE ORAL at 07:23

## 2023-08-27 RX ADMIN — LIDOCAINE 5% 1 PATCH: 700 PATCH TOPICAL at 08:09

## 2023-08-27 RX ADMIN — DICYCLOMINE HYDROCHLORIDE 10 MG: 10 CAPSULE ORAL at 21:32

## 2023-08-27 RX ADMIN — DICYCLOMINE HYDROCHLORIDE 10 MG: 10 CAPSULE ORAL at 07:23

## 2023-08-27 RX ADMIN — MORPHINE SULFATE 1 MG: 2 INJECTION, SOLUTION INTRAMUSCULAR; INTRAVENOUS at 17:51

## 2023-08-27 RX ADMIN — DULOXETINE HYDROCHLORIDE 30 MG: 30 CAPSULE, DELAYED RELEASE ORAL at 17:51

## 2023-08-27 RX ADMIN — SENNOSIDES, DOCUSATE SODIUM 1 TABLET: 8.6; 5 TABLET ORAL at 17:51

## 2023-08-27 RX ADMIN — DICYCLOMINE HYDROCHLORIDE 10 MG: 10 CAPSULE ORAL at 11:10

## 2023-08-27 RX ADMIN — HYDROMORPHONE HYDROCHLORIDE 4 MG: 2 TABLET ORAL at 15:32

## 2023-08-27 RX ADMIN — LORATADINE 10 MG: 10 TABLET ORAL at 08:09

## 2023-08-27 RX ADMIN — AMOXICILLIN 500 MG: 250 CAPSULE ORAL at 21:32

## 2023-08-27 RX ADMIN — LACTULOSE 20 G: 20 SOLUTION ORAL at 08:09

## 2023-08-27 RX ADMIN — LACTULOSE 20 G: 20 SOLUTION ORAL at 17:51

## 2023-08-27 RX ADMIN — HYDROMORPHONE HYDROCHLORIDE 4 MG: 2 TABLET ORAL at 11:10

## 2023-08-27 NOTE — CASE MANAGEMENT
Case Management Discharge Planning Note    Patient name Geri Adams  Location /-76 MRN [id number]  : 1965 Date 2023       Current Admission Date: 2023  Current Admission Diagnosis:Pleural effusion   Patient Active Problem List    Diagnosis Date Noted   • Abnormal LFTs 2023   • Port-A-Cath in place 2022   • Hyponatremia 2022   • Chemotherapy induced neutropenia ([address]) 2022   • Cancer related pain 2021   • Malignant neoplasm metastatic to bone ([address]) 2021   • Palliative care patient 2021   • Malignant pleural effusion 2021   • Primary malignant neoplasm of right breast with metastasis to other site [facility]) 2021   • Malignant neoplasm of breast in female, estrogen receptor positive ([address]) 2021   • Hypertension 2021   • Pleural effusion 2021      LOS (days): 4  Geometric Mean LOS (GMLOS) (days):   Days to GMLOS:     OBJECTIVE:  Risk of Unplanned Readmission Score: 23.27         Current admission status: Inpatient   Preferred Pharmacy:   [facility]/pharmacy #[id number]- [place], [place] -  [place] [id number]  Phone: [phone] Fax: [phone]    [address] [place] [address] [address] [place] [address]  [address] [place] [address] [address]  Phone: [phone] Fax: [phone]    [facility] [address] [facility], [address] [facility]  [address] [place] [address]  Phone: [phone] Fax: [phone]    [id number] [place] [place] - [address] [place] [place] [address]  Phone: [phone] Fax: [phone]    [address] [place] [address], [address] [facility]  [address] [facility] [address] [place] [address]  Phone: [phone] Fax: [address] [place], [place] - [address] [facility]  [address] [place] [address]  Phone: [phone] Fax: [phone]    [facility]/pharmacy #[id number] - [place], [address]  [address] [facility]  Phone: [phone] Fax: [phone]    Primary Care Provider: Cassia Guy MD    Primary Insurance: [facility]  Secondary Insurance:     DISCHARGE DETAILS:                                          Other Referral/Resources/Interventions Provided:  Interventions: Hospice  Referral Comments: Spoke to Jack Batista [facility]) who informs CM that DME has been delivered and she is requesting transport tomorrow for 12:00. This was arranged and Med nec and Out of hospital DNR placed in chart. SLIM and Nursing informed of time and Dr. Keith Laws will provide any needed rxs to relieve symptoms. CM left message on spouse's phone with this information.     Would you like to participate in our [id number] PickUpPal service program?  : No - Declined    Treatment Team Recommendation: Hospice  Discharge Destination Plan[de-identified] Hospice  Transport at Discharge : [facility]  Dispatcher Contacted: Yes  Number/Name of Dispatcher: Roundtrip  Transported by [employer] and Unit #): [id number]  ETA of Transport (Date): 08/28/23  ETA of Transport (Time): 1200

## 2023-08-27 NOTE — PROGRESS NOTES
[address]  Progress Note  Name: Michela Henry  MRN: [id number]  Unit/Bed#: -01 I Date of Admission: 8/23/2023   Date of Service: 8/27/2023 I Hospital Day: 4    Assessment/Plan   * Pleural effusion  Assessment & Plan  · Presented with worsening shortness of breath  · CT scan revealed large malignant multilocular right pleural effusion with compressive atelectasis and hepatic metastasis  · Patient has a Pleurx catheter in place now draining adequately status post tPA by IR  · After further goals of care discussed with oncology and palliative care plan for home hospice  · Discussed plan with son as well    Abnormal LFTs  Assessment & Plan  In the setting of hepatic metastasis  No further monitoring necessary as being discharged with home hospice      Hyponatremia  Assessment & Plan  Suspect from malignancy  Presented with sodium 124  No further monitoring necessary as plan for home hospice    Cancer related pain  Assessment & Plan  Continue with oral Dilaudid for moderate severe pain  Continue with morphine for breakthrough    Malignant neoplasm of breast in female, estrogen receptor positive ([address])  Assessment & Plan  Appreciate oncology evaluation  Plan as outlined above           VTE Pharmacologic Prophylaxis:   Pharmacologic: Rivaroxaban (Xarelto)  Mechanical VTE Prophylaxis in Place: Yes    Patient Centered Rounds: I have performed bedside rounds with nursing staff today. Discussions with Specialists or Other Care Team Provider: cm, nursing    Education and Discussions with Family / Patient:   Updated son via phone    Time Spent for Care: 30 minutes. More than 50% of total time spent on counseling and coordination of care as described above.     Current Length of Stay: 4 day(s)    Current Patient Status: Inpatient   Certification Statement: The patient will continue to require additional inpatient hospital stay due to see below    Discharge Plan: Medically clear awaiting home hospice set up     Code Status: Level 2 - DNAR: but accepts endotracheal intubation      Subjective:   denies chest pain, shortness of breath, cough, fevers. Does report mild abdominal pain    Objective:     Vitals:   Temp (24hrs), Av °F (36.7 °C), Min:97.7 °F (36.5 °C), Max:98.3 °F (36.8 °C)    Temp:  [97.7 °F (36.5 °C)-98.3 °F (36.8 °C)] 98 °F (36.7 °C)  HR:  [102-106] 106  Resp:  [14-19] 19  BP: (105-113)/(65-72) 113/72  SpO2:  [93 %-95 %] 93 %  Body mass index is 21.79 kg/m². Input and Output Summary (last 24 hours): Intake/Output Summary (Last 24 hours) at 2023 1207  Last data filed at 2023 1101  Gross per 24 hour   Intake 360 ml   Output 700 ml   Net -340 ml       Physical Exam:     Physical Exam  Constitutional:       General: She is not in acute distress. Appearance: She is well-developed. She is not diaphoretic. HENT:      Head: Normocephalic and atraumatic. Nose: Nose normal.      Mouth/Throat:      Pharynx: No oropharyngeal exudate. Eyes:      General: No scleral icterus. Right eye: No discharge. Left eye: No discharge. Conjunctiva/sclera: Conjunctivae normal.   Neck:      Thyroid: No thyromegaly. Vascular: No JVD. Cardiovascular:      Rate and Rhythm: Normal rate and regular rhythm. Heart sounds: Normal heart sounds. No murmur heard. No friction rub. No gallop. Pulmonary:      [place]: Pulmonary effort is normal. No respiratory distress. Breath sounds: Normal breath sounds. No wheezing or rales. Chest:      Chest wall: No tenderness. Abdominal:      General: Bowel sounds are normal. There is no distension. Palpations: Abdomen is soft. Tenderness: There is no abdominal tenderness. There is no guarding or rebound. Musculoskeletal:         General: No tenderness or deformity. Normal range of motion. Cervical back: Normal range of motion and neck supple. Skin:     General: Skin is warm and dry. Findings: No erythema or rash. Neurological:      Mental Status: She is alert. Mental status is at baseline. Cranial Nerves: No cranial nerve deficit. Sensory: No sensory deficit. Motor: No abnormal muscle tone. Coordination: Coordination normal.         Additional Data:     Labs:    Results from last 7 days   Lab Units 08/24/23  0702   WBC Thousand/uL 6.43   HEMOGLOBIN g/dL 9.7*   HEMATOCRIT % 26.3*   PLATELETS Thousands/uL 385   NEUTROS PCT % 70   LYMPHS PCT % 3*   MONOS PCT % 25*   EOS PCT % 0     Results from last 7 days   Lab Units 08/24/23  0702   SODIUM mmol/L 126*   POTASSIUM mmol/L 4.5   CHLORIDE mmol/L 98   CO2 mmol/L 21   BUN mg/dL 23   CREATININE mg/dL 0.72   ANION GAP mmol/L 7   CALCIUM mg/dL 7.9*   ALBUMIN g/dL 2.7*   TOTAL BILIRUBIN mg/dL 3.66*   ALK PHOS U/L 1,827*   ALT U/L 41   AST U/L 197*   GLUCOSE RANDOM mg/dL 84     Results from last 7 days   Lab Units 08/23/23  1642   INR  2.26*                       * I Have Reviewed All Lab Data Listed Above. * Additional Pertinent Lab Tests Reviewed:  All Labs Within Last 24 Hours Reviewed    Imaging:    Imaging Reports Reviewed Today Include: na  Imaging Personally Reviewed by Myself Includes:  na    Recent Cultures (last 7 days):           Last 24 Hours Medication List:   Current Facility-Administered Medications   Medication Dose Route Frequency Provider Last Rate   • acetaminophen  650 mg Oral Q6H PRN Lane López MD     • albuterol  2 puff Inhalation Q4H PRN Lane López MD     • amoxicillin  500 mg Oral Q8H [address] Lane López MD     • benzonatate  100 mg Oral TID PRN Lane López MD     • dicyclomine  10 mg Oral 4x Daily (AC & HS) Lane López MD     • diphenhydrAMINE  50 mg Oral HS PRN Lane López MD     • diphenhydramine, lidocaine, Al/Mg hydroxide, simethicone  10 mL Swish & Swallow Q4H PRN Luther Franks, CRNP     • DULoxetine  30 mg Oral QPM Ryann Webster PA-C     • HYDROmorphone  4 mg Oral Q3H PRN Lane Colin Willoughby MD     • hydrOXYzine HCL  25 mg Oral Q6H PRN Nan Blanca PA-C     • lactulose  20 g Oral BID Lane López MD     • lidocaine  1 patch Topical Daily Lane López MD     • loratadine  10 mg Oral Daily DEBRA Garcia     • morphine injection  1 mg Intravenous Q4H PRN Lane López MD     • nicotine  1 patch Transdermal Daily Lane López MD     • ondansetron  4 mg Intravenous Q6H PRN Lane López MD     • oxybutynin  5 mg Oral Daily Lane López MD     • phenol  1 spray Mouth/Throat Q2H PRN DEBRA Garcia     • polyethylene glycol  17 g Oral Daily Lane López MD     • rivaroxaban  20 mg Oral Daily With Michelle Ward MD     • senna-docusate sodium  1 tablet Oral BID Yi Ibarra MD          Today, Patient Was Seen By: Yi Ibarra MD    ** Please Note: Dictation voice to text software may have been used in the creation of this document.  **

## 2023-08-27 NOTE — ASSESSMENT & PLAN NOTE
· Presented with worsening shortness of breath  · CT scan revealed large malignant multilocular right pleural effusion with compressive atelectasis and hepatic metastasis  · Patient has a Pleurx catheter in place now draining adequately status post tPA by IR  · After further goals of care discussed with oncology and palliative care plan for home hospice  · Discussed plan with son as well Class III - visualization of the soft palate and the base of the uvula

## 2023-08-27 NOTE — ASSESSMENT & PLAN NOTE
Suspect from malignancy  Presented with sodium 124  No further monitoring necessary as plan for home hospice

## 2023-08-27 NOTE — ASSESSMENT & PLAN NOTE
In the setting of hepatic metastasis  No further monitoring necessary as being discharged with home hospice

## 2023-08-27 NOTE — RESPIRATORY THERAPY NOTE
RT Protocol Note  Meldon Pilling 56 y.o. female MRN: [id number]  Unit/Bed#: -01 Encounter: [id number]    Assessment    Principal Problem:    Pleural effusion  Active Problems:    Malignant neoplasm of breast in female, estrogen receptor positive ([address])    Cancer related pain    Hyponatremia    Abnormal LFTs      Home Pulmonary Medications:  Albuterol inhaler       Past Medical History:   Diagnosis Date   • Cancer ([address])    • Headache(784.0) 2021   • History of radiation exposure    • Malignant neoplasm of right female breast ([address]) 2012    right     Social History     Socioeconomic History   • Marital status: /Civil Union     Spouse name: None   • Number of children: None   • Years of education: None   • Highest education level: None   Occupational History   • None   Tobacco Use   • Smoking status: Some Days     Packs/day: 0.25     Years: 15.00     Total pack years: 3.75     Types: Cigarettes     Start date: 20     Last attempt to quit: 7/10/2021     Years since quittin.1   • Smokeless tobacco: Never   Vaping Use   • Vaping Use: Never used   Substance and Sexual Activity   • Alcohol use: Not Currently   • Drug use: Not Currently   • Sexual activity: Not Currently     Partners: Male     Birth control/protection: Male Sterilization   Other Topics Concern   • None   Social History Narrative   • None     Social Determinants of Health     Financial Resource Strain: Not on file   Food Insecurity: No Food Insecurity (2023)    Hunger Vital Sign    • Worried About Running Out of Food in the Last Year: Never true    • Ran Out of Food in the Last Year: Never true   Transportation Needs: No Transportation Needs (2023)    PRAPARE - Transportation    • Lack of Transportation (Medical): No    • Lack of Transportation (Non-Medical):  No   Physical Activity: Not on file   Stress: Not on file   Social Connections: Not on file   Intimate Partner Violence: Not on file   Housing Stability: Low Risk (8/24/2023)    Housing Stability Vital Sign    • Unable to Pay for Housing in the Last Year: No    • Number of Places Lived in the Last Year: 1    • Unstable Housing in the Last Year: No       Subjective         Objective    Physical Exam:        Vitals:  Blood pressure 113/72, pulse (!) 106, temperature 98 °F (36.7 °C), resp. rate 19, height 5' 5" (1.651 m), weight 59.4 kg (130 lb 15.3 oz), last menstrual period 05/06/2021, SpO2 93 %. Imaging and other studies: I have personally reviewed pertinent reports.             Plan    Respiratory Plan: Home Bronchodilator Patient pathway        Resp Comments: uses albuterol inhaler @ home prn

## 2023-08-27 NOTE — PLAN OF CARE
Problem: PAIN - ADULT  Goal: Verbalizes/displays adequate comfort level or baseline comfort level  Description: Interventions:  - Encourage patient to monitor pain and request assistance  - Assess pain using appropriate pain scale  - Administer analgesics based on type and severity of pain and evaluate response  - Implement non-pharmacological measures as appropriate and evaluate response  - Consider cultural and social influences on pain and pain management  - Notify physician/advanced practitioner if interventions unsuccessful or patient reports new pain  Outcome: Progressing     Problem: INFECTION - ADULT  Goal: Absence or prevention of progression during hospitalization  Description: INTERVENTIONS:  - Assess and monitor for signs and symptoms of infection  - Monitor lab/diagnostic results  - Monitor all insertion sites, i.e. indwelling lines, tubes, and drains  - Monitor endotracheal if appropriate and nasal secretions for changes in amount and color  - [place] appropriate cooling/warming therapies per order  - Administer medications as ordered  - Instruct and encourage patient and family to use good hand hygiene technique  - Identify and instruct in appropriate isolation precautions for identified infection/condition  Outcome: Progressing  Goal: Absence of fever/infection during neutropenic period  Description: INTERVENTIONS:  - Monitor WBC    Outcome: Progressing     Problem: SAFETY ADULT  Goal: Patient will remain free of falls  Description: INTERVENTIONS:  - Educate patient/family on patient safety including physical limitations  - Instruct patient to call for assistance with activity   - Consult OT/PT to assist with strengthening/mobility   - Keep Call bell within reach  - Keep bed low and locked with side rails adjusted as appropriate  - Keep care items and personal belongings within reach  - Initiate and maintain comfort rounds  - Make Fall Risk Sign visible to staff  - Offer Toileting every 2 Hours, in advance of need  - Initiate/Maintain bed alarm  - Obtain necessary fall risk management equipment: chair alarm  - Apply yellow socks and bracelet for high fall risk patients  - Consider moving patient to room near nurses station  Outcome: Progressing  Goal: Maintain or return to baseline ADL function  Description: INTERVENTIONS:  -  Assess patient's ability to carry out ADLs; assess patient's baseline for ADL function and identify physical deficits which impact ability to perform ADLs (bathing, care of mouth/teeth, toileting, grooming, dressing, etc.)  - Assess/evaluate cause of self-care deficits   - Assess range of motion  - Assess patient's mobility; develop plan if impaired  - Assess patient's need for assistive devices and provide as appropriate  - Encourage maximum independence but intervene and supervise when necessary  - Involve family in performance of ADLs  - Assess for home care needs following discharge   - Consider OT consult to assist with ADL evaluation and planning for discharge  - Provide patient education as appropriate  Outcome: Progressing  Goal: Maintains/Returns to pre admission functional level  Description: INTERVENTIONS:  - Perform BMAT or MOVE assessment daily.   - Set and communicate daily mobility goal to care team and patient/family/caregiver. - Collaborate with rehabilitation services on mobility goals if consulted  - Perform Range of Motion 3 times a day. - Reposition patient every 3 hours.   - Dangle patient 3 times a day  - Stand patient 3 times a day  - Ambulate patient 3 times a day  - Out of bed to chair 3 times a day   - Out of bed for meals 3 times a day  - Out of bed for toileting  - Record patient progress and toleration of activity level   Outcome: Progressing     Problem: DISCHARGE PLANNING  Goal: Discharge to home or other facility with appropriate resources  Description: INTERVENTIONS:  - Identify barriers to discharge w/patient and caregiver  - Arrange for needed discharge resources and transportation as appropriate  - Identify discharge learning needs (meds, wound care, etc.)  - Arrange for interpretive services to assist at discharge as needed  - Refer to Case Management Department for coordinating discharge planning if the patient needs post-hospital services based on physician/advanced practitioner order or complex needs related to functional status, cognitive ability, or social support system  Outcome: Progressing     Problem: Knowledge Deficit  Goal: Patient/family/caregiver demonstrates understanding of disease process, treatment plan, medications, and discharge instructions  Description: Complete learning assessment and assess knowledge base. Interventions:  - Provide teaching at level of understanding  - Provide teaching via preferred learning methods  Outcome: Progressing     Problem: Nutrition/Hydration-ADULT  Goal: Nutrient/Hydration intake appropriate for improving, restoring or maintaining nutritional needs  Description: Monitor and assess patient's nutrition/hydration status for malnutrition. Collaborate with interdisciplinary team and initiate plan and interventions as ordered. Monitor patient's weight and dietary intake as ordered or per policy. Utilize nutrition screening tool and intervene as necessary. Determine patient's food preferences and provide high-protein, high-caloric foods as appropriate.      INTERVENTIONS:  - Monitor oral intake, urinary output, labs, and treatment plans  - Assess nutrition and hydration status and recommend course of action  - Evaluate amount of meals eaten  - Assist patient with eating if necessary   - Allow adequate time for meals  - Recommend/ encourage appropriate diets, oral nutritional supplements, and vitamin/mineral supplements  - Order, calculate, and assess calorie counts as needed  - Recommend, monitor, and adjust tube feedings and TPN/PPN based on assessed needs  - Assess need for intravenous fluids  - Provide specific nutrition/hydration education as appropriate  - Include patient/family/caregiver in decisions related to nutrition  Outcome: Progressing     Problem: Prexisting or High Potential for Compromised Skin Integrity  Goal: Skin integrity is maintained or improved  Description: INTERVENTIONS:  - Identify patients at risk for skin breakdown  - Assess and monitor skin integrity  - Assess and monitor nutrition and hydration status  - Monitor labs   - Assess for incontinence   - Turn and reposition patient  - Assist with mobility/ambulation  - Relieve pressure over bony prominences  - Avoid friction and shearing  - Provide appropriate hygiene as needed including keeping skin clean and dry  - Evaluate need for skin moisturizer/barrier cream  - Collaborate with interdisciplinary team   - Patient/family teaching  - Consider wound care consult   Outcome: Progressing     Problem: MOBILITY - ADULT  Goal: Maintain or return to baseline ADL function  Description: INTERVENTIONS:  -  Assess patient's ability to carry out ADLs; assess patient's baseline for ADL function and identify physical deficits which impact ability to perform ADLs (bathing, care of mouth/teeth, toileting, grooming, dressing, etc.)  - Assess/evaluate cause of self-care deficits   - Assess range of motion  - Assess patient's mobility; develop plan if impaired  - Assess patient's need for assistive devices and provide as appropriate  - Encourage maximum independence but intervene and supervise when necessary  - Involve family in performance of ADLs  - Assess for home care needs following discharge   - Consider OT consult to assist with ADL evaluation and planning for discharge  - Provide patient education as appropriate  Outcome: Progressing  Goal: Maintains/Returns to pre admission functional level  Description: INTERVENTIONS:  - Perform BMAT or MOVE assessment daily.   - Set and communicate daily mobility goal to care team and patient/family/caregiver.    - Collaborate with rehabilitation services on mobility goals if consulted  - Perform Range of Motion 3 times a day. - Reposition patient every 3 hours.   - Dangle patient 3 times a day  - Stand patient 3 times a day  - Ambulate patient 3 times a day  - Out of bed to chair 3 times a day   - Out of bed for meals 3 times a day  - Out of bed for toileting  - Record patient progress and toleration of activity level   Outcome: Progressing     Problem: RESPIRATORY - ADULT  Goal: Achieves optimal ventilation and oxygenation  Description: INTERVENTIONS:  - Assess for changes in respiratory status  - Assess for changes in mentation and behavior  - Position to facilitate oxygenation and minimize respiratory effort  - Oxygen administered by appropriate delivery if ordered  - Initiate smoking cessation education as indicated  - Encourage broncho-pulmonary hygiene including cough, deep breathe, Incentive Spirometry  - Assess the need for suctioning and aspirate as needed  - Assess and instruct to report SOB or any respiratory difficulty  - Respiratory Therapy support as indicated  Outcome: Progressing     Problem: GASTROINTESTINAL - ADULT  Goal: Minimal or absence of nausea and/or vomiting  Description: INTERVENTIONS:  - Administer IV fluids if ordered to ensure adequate hydration  - Maintain NPO status until nausea and vomiting are resolved  - Nasogastric tube if ordered  - Administer ordered antiemetic medications as needed  - Provide nonpharmacologic comfort measures as appropriate  - Advance diet as tolerated, if ordered  - Consider nutrition services referral to assist patient with adequate nutrition and appropriate food choices  Outcome: Progressing  Goal: Maintains adequate nutritional intake  Description: INTERVENTIONS:  - Monitor percentage of each meal consumed  - Identify factors contributing to decreased intake, treat as appropriate  - Assist with meals as needed  - Monitor I&O, weight, and lab values if indicated  - Obtain nutrition services referral as needed  Outcome: Progressing  Goal: Oral mucous membranes remain intact  Description: INTERVENTIONS  - Assess oral mucosa and hygiene practices  - Implement preventative oral hygiene regimen  - Implement oral medicated treatments as ordered  - Initiate Nutrition services referral as needed  Outcome: Progressing     Problem: METABOLIC, FLUID AND ELECTROLYTES - ADULT  Goal: Electrolytes maintained within normal limits  Description: INTERVENTIONS:  - Monitor labs and assess patient for signs and symptoms of electrolyte imbalances  - Administer electrolyte replacement as ordered  - Monitor response to electrolyte replacements, including repeat lab results as appropriate  - Instruct patient on fluid and nutrition as appropriate  Outcome: Progressing  Goal: Fluid balance maintained  Description: INTERVENTIONS:  - Monitor labs   - Monitor I/O and WT  - Instruct patient on fluid and nutrition as appropriate  - Assess for signs & symptoms of volume excess or deficit  Outcome: Progressing  Goal: Glucose maintained within target range  Description: INTERVENTIONS:  - Monitor Blood Glucose as ordered  - Assess for signs and symptoms of hyperglycemia and hypoglycemia  - Administer ordered medications to maintain glucose within target range  - Assess nutritional intake and initiate nutrition service referral as needed  Outcome: Progressing     Problem: SKIN/TISSUE INTEGRITY - ADULT  Goal: Skin Integrity remains intact(Skin Breakdown Prevention)  Description: Assess:  -Perform Carlo assessment every   -Clean and moisturize skin every   -Inspect skin when repositioning, toileting, and assisting with ADLS  -Assess under medical devices such as  every   -Assess extremities for adequate circulation and sensation     Bed Management:  -Have minimal linens on bed & keep smooth, unwrinkled  -Change linens as needed when moist or perspiring  -Avoid sitting or lying in one position for more than  hours while in bed  -Keep HOB at degrees     Toileting:  -Offer bedside commode  -Assess for incontinence every   -Use incontinent care products after each incontinent episode such as     Activity:  -Mobilize patient  times a day  -Encourage activity and walks on unit  -Encourage or provide ROM exercises   -Turn and reposition patient every  Hours  -Use appropriate equipment to lift or move patient in bed  -Instruct/ Assist with weight shifting every  when out of bed in chair  -Consider limitation of chair time  hour intervals    Skin Care:  -Avoid use of baby powder, tape, friction and shearing, hot water or constrictive clothing  -Relieve pressure over bony prominences using   -Do not massage red bony areas    Next Steps:  -Teach patient strategies to minimize risks such as    -Consider consults to  interdisciplinary teams such as   Outcome: Progressing     Problem: HEMATOLOGIC - ADULT  Goal: Maintains hematologic stability  Description: INTERVENTIONS  - Assess for signs and symptoms of bleeding or hemorrhage  - Monitor labs  - Administer supportive blood products/factors as ordered and appropriate  Outcome: Progressing

## 2023-08-28 ENCOUNTER — HOSPITAL ENCOUNTER (OUTPATIENT)
Dept: INFUSION CENTER | Facility: CLINIC | Age: 58
Discharge: HOME | End: 2023-08-28

## 2023-08-28 ENCOUNTER — TELEPHONE (OUTPATIENT)
Dept: HEMATOLOGY ONCOLOGY | Facility: CLINIC | Age: 58
End: 2023-08-28

## 2023-08-28 ENCOUNTER — TELEPHONE (OUTPATIENT)
Dept: OTHER | Facility: OTHER | Age: 58
End: 2023-08-28

## 2023-08-28 ENCOUNTER — HOME CARE VISIT (OUTPATIENT)
Dept: HOME HOSPICE | Facility: HOSPICE | Age: 58
End: 2023-08-28
Payer: COMMERCIAL

## 2023-08-28 ENCOUNTER — TRANSITIONAL CARE MANAGEMENT (OUTPATIENT)
Dept: INTERNAL MEDICINE CLINIC | Facility: CLINIC | Age: 58
End: 2023-08-28

## 2023-08-28 ENCOUNTER — HOME CARE VISIT (OUTPATIENT)
Dept: HOME HEALTH SERVICES | Facility: HOME HEALTHCARE | Age: 58
End: 2023-08-28
Payer: COMMERCIAL

## 2023-08-28 VITALS
HEIGHT: 65 IN | RESPIRATION RATE: 16 BRPM | TEMPERATURE: 98.4 F | BODY MASS INDEX: 22.48 KG/M2 | WEIGHT: 134.92 LBS | DIASTOLIC BLOOD PRESSURE: 63 MMHG | HEART RATE: 105 BPM | OXYGEN SATURATION: 96 % | SYSTOLIC BLOOD PRESSURE: 98 MMHG

## 2023-08-28 PROCEDURE — 99232 SBSQ HOSP IP/OBS MODERATE 35: CPT | Performed by: STUDENT IN AN ORGANIZED HEALTH CARE EDUCATION/TRAINING PROGRAM

## 2023-08-28 PROCEDURE — T2042 HOSPICE ROUTINE HOME CARE: HCPCS

## 2023-08-28 PROCEDURE — 10330057 MEDICATION, GENERAL

## 2023-08-28 PROCEDURE — 94664 DEMO&/EVAL PT USE INHALER: CPT

## 2023-08-28 PROCEDURE — G0299 HHS/HOSPICE OF RN EA 15 MIN: HCPCS

## 2023-08-28 PROCEDURE — 99239 HOSP IP/OBS DSCHRG MGMT >30: CPT | Performed by: INTERNAL MEDICINE

## 2023-08-28 PROCEDURE — 94760 N-INVAS EAR/PLS OXIMETRY 1: CPT

## 2023-08-28 RX ORDER — AMOXICILLIN 500 MG/1
500 CAPSULE ORAL EVERY 8 HOURS SCHEDULED
Qty: 30 CAPSULE | Refills: 0 | Status: SHIPPED | OUTPATIENT
Start: 2023-08-28 | End: 2023-09-05 | Stop reason: CLARIF

## 2023-08-28 RX ADMIN — DICYCLOMINE HYDROCHLORIDE 10 MG: 10 CAPSULE ORAL at 08:11

## 2023-08-28 RX ADMIN — AMOXICILLIN 500 MG: 250 CAPSULE ORAL at 05:05

## 2023-08-28 RX ADMIN — LIDOCAINE 5% 1 PATCH: 700 PATCH TOPICAL at 09:29

## 2023-08-28 RX ADMIN — NICOTINE 1 PATCH: 14 PATCH, EXTENDED RELEASE TRANSDERMAL at 09:28

## 2023-08-28 RX ADMIN — HYDROMORPHONE HYDROCHLORIDE 4 MG: 2 TABLET ORAL at 03:52

## 2023-08-28 RX ADMIN — POLYETHYLENE GLYCOL 3350 17 G: 17 POWDER, FOR SOLUTION ORAL at 09:28

## 2023-08-28 RX ADMIN — DICYCLOMINE HYDROCHLORIDE 10 MG: 10 CAPSULE ORAL at 11:14

## 2023-08-28 RX ADMIN — SENNOSIDES, DOCUSATE SODIUM 1 TABLET: 8.6; 5 TABLET ORAL at 09:28

## 2023-08-28 RX ADMIN — OXYBUTYNIN CHLORIDE 5 MG: 5 TABLET, EXTENDED RELEASE ORAL at 09:28

## 2023-08-28 RX ADMIN — LORATADINE 10 MG: 10 TABLET ORAL at 09:28

## 2023-08-28 RX ADMIN — LACTULOSE 20 G: 20 SOLUTION ORAL at 09:28

## 2023-08-28 RX ADMIN — RIVAROXABAN 20 MG: 20 TABLET, FILM COATED ORAL at 08:11

## 2023-08-28 NOTE — PROGRESS NOTES
Progress note - Palliative and Supportive Care   [facility] 56 y.o. female [phone]    Patient Active Problem List   Diagnosis   • Pleural effusion   • Hypertension   • Malignant neoplasm of breast in female, estrogen receptor positive ([address])   • Palliative care patient   • Malignant pleural effusion   • Primary malignant neoplasm of right breast with metastasis to other site [facility])   • Malignant neoplasm metastatic to bone [facility])   • Cancer related pain   • Chemotherapy induced neutropenia (HCC)   • Hyponatremia   • Port-A-Cath in place   • Abnormal LFTs     Active issues specifically addressed today include:    - recurrent multiloculated R-sided pleural effusion; pleural catheter in place   - cancer-related pain   - stage IV breast cancer with hepatic/osseous metastatic disease   - palliative care encounter   - goals of care support    Plan:  #symptom management   - per primary team    No BM x 7-8 days   - dose methylnaltrexone 8 mg SQ   - weight based dosing, normal renal function    #goals of care   - discharge today with hospice supports to enroll with meds/DME at home   - patient with recent fill of PO hydromorphone, no opioid requirements at this time    #psychosocial support   - emotional support provided   -               - Spouse recently  [ week of August]   - two children              - Alevism [son, Down syndrome]              - Ankur [son]   - granddaughter recently born      We appreciate the opportunity to participate in this patient's care. We will continue to follow. Please do not hesitate to contact our on-call provider through our clinic answering service at [phone] should you have acute symptom control concerns. Code Status: DNAR - Level 2   Decisional apparatus:  Patient is competent on my exam today. If competence is lost, patient's substitute decision maker would default to adult child [Ankur] by [place] Act 169.    Advance Directive / Living Will / POLST:  Not on File, not previously completed    Interval history:   - NAEO    MEDICATIONS / ALLERGIES:     current meds:   Current Facility-Administered Medications   Medication Dose Route Frequency   • acetaminophen (TYLENOL) tablet 650 mg  650 mg Oral Q6H PRN   • albuterol (PROVENTIL HFA,VENTOLIN HFA) inhaler 2 puff  2 puff Inhalation Q4H PRN   • amoxicillin (AMOXIL) capsule 500 mg  500 mg Oral Q8H [address]   • benzonatate (TESSALON PERLES) capsule 100 mg  100 mg Oral TID PRN   • dicyclomine (BENTYL) capsule 10 mg  10 mg Oral 4x Daily (AC & HS)   • diphenhydrAMINE (BENADRYL) tablet 50 mg  50 mg Oral HS PRN   • diphenhydramine, lidocaine, Al/Mg hydroxide, simethicone (Magic Mouthwash) oral solution 10 mL  10 mL Swish & Swallow Q4H PRN   • DULoxetine (CYMBALTA) delayed release capsule 30 mg  30 mg Oral QPM   • HYDROmorphone (DILAUDID) tablet 4 mg  4 mg Oral Q3H PRN   • hydrOXYzine HCL (ATARAX) tablet 25 mg  25 mg Oral Q6H PRN   • lactulose oral solution 20 g  20 g Oral BID   • lidocaine (LIDODERM) 5 % patch 1 patch  1 patch Topical Daily   • loratadine (CLARITIN) tablet 10 mg  10 mg Oral Daily   • morphine injection 1 mg  1 mg Intravenous Q4H PRN   • nicotine (NICODERM CQ) 14 mg/24hr TD 24 hr patch 1 patch  1 patch Transdermal Daily   • ondansetron (ZOFRAN) injection 4 mg  4 mg Intravenous Q6H PRN   • oxybutynin (DITROPAN-XL) 24 hr tablet 5 mg  5 mg Oral Daily   • phenol (CHLORASEPTIC) 1.4 % mucosal liquid 1 spray  1 spray Mouth/Throat Q2H PRN   • polyethylene glycol (MIRALAX) packet 17 g  17 g Oral Daily   • rivaroxaban (XARELTO) tablet 20 mg  20 mg Oral Daily With Breakfast   • senna-docusate sodium (SENOKOT S) 8.6-50 mg per tablet 1 tablet  1 tablet Oral BID       Allergies   Allergen Reactions   • Codeine Anaphylaxis   • Sulfa Antibiotics Hives and Itching       OBJECTIVE:    Physical Exam  Physical Exam  Vitals and nursing note reviewed. Constitutional:       General: She is awake. Appearance: She is not diaphoretic. Comments: Chronically ill appearing in NAD  BMI 22.5  Non-toxic appearing   HENT:      Head: Normocephalic and atraumatic. Right Ear: External ear normal.      Left Ear: External ear normal.      Nose: No rhinorrhea. Eyes:      Comments: No gaze preference   Cardiovascular:      Rate and Rhythm: Tachycardia present. Pulmonary:      Effort: No tachypnea or respiratory distress. Comments: Completes full sentences without difficulty  Chest:      Comments: Pleural catheter in place  Musculoskeletal:      Cervical back: Normal range of motion. Neurological:      General: No focal deficit present. Mental Status: She is alert and oriented to person, place, and time. Psychiatric:         Attention and Perception: Attention normal.         Mood and Affect: Mood and affect normal.         Speech: Speech normal.         Cognition and Memory: Cognition and memory normal.         Lab Results: I have personally reviewed pertinent labs. , CBC: No results found for: "WBC", "HGB", "HCT", "MCV", "PLT", "ADJUSTEDWBC", "RBC", "MCH", "MCHC", "RDW", "MPV", "NRBC", CMP: No results found for: "SODIUM", "K", "CL", "CO2", "ANIONGAP", "BUN", "CREATININE", "GLUCOSE", "CALCIUM", "AST", "ALT", "ALKPHOS", "PROT", "BILITOT", "EGFR", PT/PTT:No results found for: "PT", "PTT"  Imaging Studies: Reviewed  EKG, Pathology, and Other Studies: Reviewed    Counseling / Coordination of Care    Total floor / unit time spent today 30 minutes. Greater than 50% of total time was spent with the patient and / or family counseling and / or coordination of care. A description of the counseling / coordination of care: provided medical updates, discussed palliative care, determined competency, determined goals of care, determined POA, determined social/family support, discussed plans of care, discussed symptom management, provided psychosocial support.  Coordinated plan of care with primary team.

## 2023-08-28 NOTE — TELEPHONE ENCOUNTER
Received message as palliative care on-call provider that patient is having worsening abdominal pain and distension. Per chart review, patient has just been admitted to [facility]. Called patient to clarify that she is now on hospice. Discussed that this provider can notify hospice and have them reach out to her. Patient is appreciative. Notified DEBRA Brown who is on call for hospice.

## 2023-08-28 NOTE — PLAN OF CARE
Problem: PAIN - ADULT  Goal: Verbalizes/displays adequate comfort level or baseline comfort level  Description: Interventions:  - Encourage patient to monitor pain and request assistance  - Assess pain using appropriate pain scale  - Administer analgesics based on type and severity of pain and evaluate response  - Implement non-pharmacological measures as appropriate and evaluate response  - Consider cultural and social influences on pain and pain management  - Notify physician/advanced practitioner if interventions unsuccessful or patient reports new pain  Outcome: Progressing     Problem: INFECTION - ADULT  Goal: Absence or prevention of progression during hospitalization  Description: INTERVENTIONS:  - Assess and monitor for signs and symptoms of infection  - Monitor lab/diagnostic results  - Monitor all insertion sites, i.e. indwelling lines, tubes, and drains  - Monitor endotracheal if appropriate and nasal secretions for changes in amount and color  - [place] appropriate cooling/warming therapies per order  - Administer medications as ordered  - Instruct and encourage patient and family to use good hand hygiene technique  - Identify and instruct in appropriate isolation precautions for identified infection/condition  Outcome: Progressing  Goal: Absence of fever/infection during neutropenic period  Description: INTERVENTIONS:  - Monitor WBC    Outcome: Progressing     Problem: SAFETY ADULT  Goal: Patient will remain free of falls  Description: INTERVENTIONS:  - Educate patient/family on patient safety including physical limitations  - Instruct patient to call for assistance with activity   - Consult OT/PT to assist with strengthening/mobility   - Keep Call bell within reach  - Keep bed low and locked with side rails adjusted as appropriate  - Keep care items and personal belongings within reach  - Initiate and maintain comfort rounds  - Make Fall Risk Sign visible to staff  - Offer Toileting every 2 Hours, in advance of need  - Initiate/Maintain bedalarm  - Obtain necessary fall risk management equipment:   - Apply yellow socks and bracelet for high fall risk patients  - Consider moving patient to room near nurses station  Outcome: Progressing  Goal: Maintain or return to baseline ADL function  Description: INTERVENTIONS:  -  Assess patient's ability to carry out ADLs; assess patient's baseline for ADL function and identify physical deficits which impact ability to perform ADLs (bathing, care of mouth/teeth, toileting, grooming, dressing, etc.)  - Assess/evaluate cause of self-care deficits   - Assess range of motion  - Assess patient's mobility; develop plan if impaired  - Assess patient's need for assistive devices and provide as appropriate  - Encourage maximum independence but intervene and supervise when necessary  - Involve family in performance of ADLs  - Assess for home care needs following discharge   - Consider OT consult to assist with ADL evaluation and planning for discharge  - Provide patient education as appropriate  Outcome: Progressing  Goal: Maintains/Returns to pre admission functional level  Description: INTERVENTIONS:  - Perform BMAT or MOVE assessment daily.   - Set and communicate daily mobility goal to care team and patient/family/caregiver. - Collaborate with rehabilitation services on mobility goals if consulted  - Perform Range of Motion 3 times a day. - Reposition patient every 2 hours.   - Dangle patient 3 times a day  - Stand patient 3 times a day  - Ambulate patient  times a day  - Out of bed to chair 3 times a day   - Out of bed for meals 3 times a day  - Out of bed for toileting  - Record patient progress and toleration of activity level   Outcome: Progressing     Problem: DISCHARGE PLANNING  Goal: Discharge to home or other facility with appropriate resources  Description: INTERVENTIONS:  - Identify barriers to discharge w/patient and caregiver  - Arrange for needed discharge resources and transportation as appropriate  - Identify discharge learning needs (meds, wound care, etc.)  - Arrange for interpretive services to assist at discharge as needed  - Refer to Case Management Department for coordinating discharge planning if the patient needs post-hospital services based on physician/advanced practitioner order or complex needs related to functional status, cognitive ability, or social support system  Outcome: Progressing     Problem: Knowledge Deficit  Goal: Patient/family/caregiver demonstrates understanding of disease process, treatment plan, medications, and discharge instructions  Description: Complete learning assessment and assess knowledge base. Interventions:  - Provide teaching at level of understanding  - Provide teaching via preferred learning methods  Outcome: Progressing     Problem: Nutrition/Hydration-ADULT  Goal: Nutrient/Hydration intake appropriate for improving, restoring or maintaining nutritional needs  Description: Monitor and assess patient's nutrition/hydration status for malnutrition. Collaborate with interdisciplinary team and initiate plan and interventions as ordered. Monitor patient's weight and dietary intake as ordered or per policy. Utilize nutrition screening tool and intervene as necessary. Determine patient's food preferences and provide high-protein, high-caloric foods as appropriate.      INTERVENTIONS:  - Monitor oral intake, urinary output, labs, and treatment plans  - Assess nutrition and hydration status and recommend course of action  - Evaluate amount of meals eaten  - Assist patient with eating if necessary   - Allow adequate time for meals  - Recommend/ encourage appropriate diets, oral nutritional supplements, and vitamin/mineral supplements  - Order, calculate, and assess calorie counts as needed  - Recommend, monitor, and adjust tube feedings and TPN/PPN based on assessed needs  - Assess need for intravenous fluids  - Provide specific nutrition/hydration education as appropriate  - Include patient/family/caregiver in decisions related to nutrition  Outcome: Progressing     Problem: Prexisting or High Potential for Compromised Skin Integrity  Goal: Skin integrity is maintained or improved  Description: INTERVENTIONS:  - Identify patients at risk for skin breakdown  - Assess and monitor skin integrity  - Assess and monitor nutrition and hydration status  - Monitor labs   - Assess for incontinence   - Turn and reposition patient  - Assist with mobility/ambulation  - Relieve pressure over bony prominences  - Avoid friction and shearing  - Provide appropriate hygiene as needed including keeping skin clean and dry  - Evaluate need for skin moisturizer/barrier cream  - Collaborate with interdisciplinary team   - Patient/family teaching  - Consider wound care consult   Outcome: Progressing     Problem: MOBILITY - ADULT  Goal: Maintain or return to baseline ADL function  Description: INTERVENTIONS:  -  Assess patient's ability to carry out ADLs; assess patient's baseline for ADL function and identify physical deficits which impact ability to perform ADLs (bathing, care of mouth/teeth, toileting, grooming, dressing, etc.)  - Assess/evaluate cause of self-care deficits   - Assess range of motion  - Assess patient's mobility; develop plan if impaired  - Assess patient's need for assistive devices and provide as appropriate  - Encourage maximum independence but intervene and supervise when necessary  - Involve family in performance of ADLs  - Assess for home care needs following discharge   - Consider OT consult to assist with ADL evaluation and planning for discharge  - Provide patient education as appropriate  Outcome: Progressing  Goal: Maintains/Returns to pre admission functional level  Description: INTERVENTIONS:  - Perform BMAT or MOVE assessment daily.   - Set and communicate daily mobility goal to care team and patient/family/caregiver.    - Collaborate with rehabilitation services on mobility goals if consulted  - Perform Range of Motion 3 times a day. - Reposition patient every 2 hours.   - Dangle patient 3 times a day  - Stand patient 3 times a day  - Ambulate patient 3 times a day  - Out of bed to chair 3 times a day   - Out of bed for meals 3 times a day  - Out of bed for toileting  - Record patient progress and toleration of activity level   Outcome: Progressing     Problem: RESPIRATORY - ADULT  Goal: Achieves optimal ventilation and oxygenation  Description: INTERVENTIONS:  - Assess for changes in respiratory status  - Assess for changes in mentation and behavior  - Position to facilitate oxygenation and minimize respiratory effort  - Oxygen administered by appropriate delivery if ordered  - Initiate smoking cessation education as indicated  - Encourage broncho-pulmonary hygiene including cough, deep breathe, Incentive Spirometry  - Assess the need for suctioning and aspirate as needed  - Assess and instruct to report SOB or any respiratory difficulty  - Respiratory Therapy support as indicated  Outcome: Progressing     Problem: GASTROINTESTINAL - ADULT  Goal: Minimal or absence of nausea and/or vomiting  Description: INTERVENTIONS:  - Administer IV fluids if ordered to ensure adequate hydration  - Maintain NPO status until nausea and vomiting are resolved  - Nasogastric tube if ordered  - Administer ordered antiemetic medications as needed  - Provide nonpharmacologic comfort measures as appropriate  - Advance diet as tolerated, if ordered  - Consider nutrition services referral to assist patient with adequate nutrition and appropriate food choices  Outcome: Progressing  Goal: Maintains adequate nutritional intake  Description: INTERVENTIONS:  - Monitor percentage of each meal consumed  - Identify factors contributing to decreased intake, treat as appropriate  - Assist with meals as needed  - Monitor I&O, weight, and lab values if indicated  - Obtain nutrition services referral as needed  Outcome: Progressing  Goal: Oral mucous membranes remain intact  Description: INTERVENTIONS  - Assess oral mucosa and hygiene practices  - Implement preventative oral hygiene regimen  - Implement oral medicated treatments as ordered  - Initiate Nutrition services referral as needed  Outcome: Progressing     Problem: METABOLIC, FLUID AND ELECTROLYTES - ADULT  Goal: Electrolytes maintained within normal limits  Description: INTERVENTIONS:  - Monitor labs and assess patient for signs and symptoms of electrolyte imbalances  - Administer electrolyte replacement as ordered  - Monitor response to electrolyte replacements, including repeat lab results as appropriate  - Instruct patient on fluid and nutrition as appropriate  Outcome: Progressing  Goal: Fluid balance maintained  Description: INTERVENTIONS:  - Monitor labs   - Monitor I/O and WT  - Instruct patient on fluid and nutrition as appropriate  - Assess for signs & symptoms of volume excess or deficit  Outcome: Progressing  Goal: Glucose maintained within target range  Description: INTERVENTIONS:  - Monitor Blood Glucose as ordered  - Assess for signs and symptoms of hyperglycemia and hypoglycemia  - Administer ordered medications to maintain glucose within target range  - Assess nutritional intake and initiate nutrition service referral as needed  Outcome: Progressing     Problem: SKIN/TISSUE INTEGRITY - ADULT  Goal: Skin Integrity remains intact(Skin Breakdown Prevention)  Description: Assess:  -Perform Carlo assessment every 2  -Clean and moisturize skin every 2  -Inspect skin when repositioning, toileting, and assisting with ADLS  -Assess under medical devices such as wedges every   -Assess extremities for adequate circulation and sensation     Bed Management:  -Have minimal linens on bed & keep smooth, unwrinkled  -Change linens as needed when moist or perspiring  -Avoid sitting or lying in one position for more than 2 hours while in bed  -Keep HOB at [address] [place]:  -Offer bedside commode  -Assess for incontinence every 2  -Use incontinent care products after each incontinent episode such as calazime    Activity:  -Mobilize patient 3 times a day  -Encourage activity and walks on unit  -Encourage or provide ROM exercises   -Turn and reposition patient every 2 Hours  -Use appropriate equipment to lift or move patient in bed  -Instruct/ Assist with weight shifting every 2 when out of bed in chair  -Consider limitation of chair time 2 hour intervals    Skin Care:  -Avoid use of baby powder, tape, friction and shearing, hot water or constrictive clothing  -Relieve pressure over bony prominences using wedges  -Do not massage red bony areas    Next Steps:  -Teach patient strategies to minimize risks such as    -Consider consults to  interdisciplinary teams such as wound  Outcome: Progressing     Problem: HEMATOLOGIC - ADULT  Goal: Maintains hematologic stability  Description: INTERVENTIONS  - Assess for signs and symptoms of bleeding or hemorrhage  - Monitor labs  - Administer supportive blood products/factors as ordered and appropriate  Outcome: Progressing

## 2023-08-28 NOTE — ASSESSMENT & PLAN NOTE
presented initially with sodium 124  Suspect secondary to malignancy  No further monitoring x-rays plan for home hospice

## 2023-08-28 NOTE — ASSESSMENT & PLAN NOTE
· Presented with worsening shortness of breath  · CT scan revealed large malignant multilocular right pleural effusion with compressive atelectasis and hepatic metastasis  · Patient currently with Pleurx catheter in place, was evaluated by IR and now draining adequately status post tPA  · After further goals of care discussed with oncology/palliative care plan for home hospice

## 2023-08-28 NOTE — DISCHARGE SUMMARY
[address]  Discharge- Shaka Townsendluis 1965, 56 y.o. female MRN: [id number]  Unit/Bed#: -01 Encounter: [id number]  Primary Care Provider: Dhara Lerner MD   Date and time admitted to hospital: 8/23/2023 11:30 AM    * Pleural effusion  Assessment & Plan  · Presented with worsening shortness of breath  · CT scan revealed large malignant multilocular right pleural effusion with compressive atelectasis and hepatic metastasis  · Patient currently with Pleurx catheter in place, was evaluated by IR and now draining adequately status post tPA  · After further goals of care discussed with oncology/palliative care plan for home hospice     Abnormal LFTs  Assessment & Plan  In the setting of hepatic metastasis  No further  monitoring necessary as waiting for home hospice      Hyponatremia  Assessment & Plan   presented initially with sodium 124  Suspect secondary to malignancy  No further monitoring x-rays plan for home hospice      Cancer related pain  Assessment & Plan  Continue oral Dilaudid for moderate and severe pain  Continue IV morphine for breakthrough pain    Malignant neoplasm of breast in female, estrogen receptor positive ([address])  Assessment & Plan  Appreciate oncology evaluation  Continue to monitor      Discharging Physician / Practitioner: Iggy Randall MD  PCP: Dhara Lerner MD  Admission Date:   Admission Orders (From admission, onward)     Ordered        08/23/23 1637  INPATIENT ADMISSION  Once                      Discharge Date: 08/28/23    Medical Problems     Resolved Problems  Date Reviewed: 8/27/2023   None         Consultations During Hospital Stay:  · Oncology, palliative care    Procedures Performed:   · none    Significant Findings / Test Results:   CTA ED chest PE Study    Result Date: 8/23/2023  Impression: No pulm embolism.  Significant enlargement of malignant multiloculated right pleural effusion with areas of compressive atelectasis throughout the right lung. Stable 5 mm left lower lobe lung nodule. Hepatic metastatic disease not well visualized on the current phase of contrast. Upper abdominal ascites has developed in the interval. Grossly stable widespread osteoblastic metastatic disease. Workstation performed: [id number]     MRI abdomen w wo contrast and mrcp    Result Date: 8/23/2023  Impression: Multiple and innumerable necrotic hepatic lesions with increasing nodularity and surface retraction of the liver due to posttreatment changes and developing pseudocirrhosis No dilatation of the common bile duct to suggest any biliary obstruction Mild attenuation of the intrahepatic ducts due to mass effect from the multiple liver lesion Development of moderate amount of ascites. Narrowing and thickening noted near the hepatic flexure of the colon, consider clinical correlation or evaluation with the CT/colonoscopy to evaluate for any focal lesion Bony metastatic disease as seen on the previous study The study was marked in EPIC for significant notification. Workstation performed: [id number]     XR chest 2 views    Result Date: 8/23/2023  · Impression: Right pleural drainage catheter which appears to terminate at the level of loculated effusion No evidence of pneumothorax Workstation performed: [id number]     Incidental Findings:   · none     Test Results Pending at Discharge (will require follow up):   · none     Outpatient Tests Requested:  · none    Complications:  none    Reason for Admission: Pleural effusion    Hospital Course:     Vanita Glasgow is a 56 y.o. female patient who originally presented to the hospital on 8/23/2023 with past medical history significant for breast cancer with hepatic metastasis initially presented with worsening shortness of breath from pleural effusion. Had IR evaluate Pleurx catheter with tPA with now adequate drainage.   After goals of care discussion ultimately discharged with home hospice      Please see above list of diagnoses and related plan for additional information. Condition at Discharge: stable     Discharge Day Visit / Exam:     Subjective: Denies chest pain, shortness of breath, cough, fevers, chills  Vitals: Blood Pressure: 98/63 (08/28/23 0649)  Pulse: 105 (08/28/23 0649)  Temperature: 98.4 °F (36.9 °C) (08/28/23 0649)  Temp Source: Oral (08/27/23 2251)  Respirations: 16 (08/28/23 0649)  Height: 5' 5" (165.1 cm) (08/23/23 1700)  Weight - Scale: 61.2 kg (134 lb 14.7 oz) (08/28/23 0600)  SpO2: 94 % (08/28/23 0649)  Exam:   Physical Exam  Constitutional:       General: She is not in acute distress. Appearance: She is well-developed. She is not diaphoretic. HENT:      Head: Normocephalic and atraumatic. Nose: Nose normal.      Mouth/Throat:      Pharynx: No oropharyngeal exudate. Eyes:      General: No scleral icterus. Right eye: No discharge. Left eye: No discharge. Conjunctiva/sclera: Conjunctivae normal.   Neck:      Thyroid: No thyromegaly. Vascular: No JVD. Cardiovascular:      Rate and Rhythm: Normal rate and regular rhythm. Heart sounds: Normal heart sounds. No murmur heard. No friction rub. No gallop. Pulmonary:      Effort: Pulmonary effort is normal. No respiratory distress. Breath sounds: Normal breath sounds. No wheezing or rales. Chest:      Chest wall: No tenderness. Abdominal:      General: Bowel sounds are normal. There is no distension. Palpations: Abdomen is soft. Tenderness: There is no abdominal tenderness. There is no guarding or rebound. Musculoskeletal:         General: No tenderness or deformity. Normal range of motion. Cervical back: Normal range of motion and neck supple. Skin:     General: Skin is warm and dry. Findings: No erythema or rash. Neurological:      Mental Status: She is alert. Mental status is at baseline. Cranial Nerves: No cranial nerve deficit. Sensory: No sensory deficit. Motor: No abnormal muscle tone. Coordination: Coordination normal.         Discussion with Family: pt    Discharge instructions/Information to patient and family:   See after visit summary for information provided to patient and family. Provisions for Follow-Up Care:  See after visit summary for information related to follow-up care and any pertinent home health orders. Disposition:     Home    For Discharges to [facility] SNF:   · Not Applicable to this Patient - Not Applicable to this Patient    Planned Readmission: none     Discharge Statement:  I spent 60 minutes discharging the patient. This time was spent on the day of discharge. I had direct contact with the patient on the day of discharge. Greater than 50% of the total time was spent examining patient, answering all patient questions, arranging and discussing plan of care with patient as well as directly providing post-discharge instructions. Additional time then spent on discharge activities. Discharge Medications:  See after visit summary for reconciled discharge medications provided to patient and family.       ** Please Note: This note has been constructed using a voice recognition system **

## 2023-08-28 NOTE — RESPIRATORY THERAPY NOTE
RT Protocol Note  Shaka Garay 56 y.o. female MRN: [id number]  Unit/Bed#: -01 Encounter: [id number]    Assessment    Principal Problem:    Pleural effusion  Active Problems:    Malignant neoplasm of breast in female, estrogen receptor positive ([address])    Cancer related pain    Hyponatremia    Abnormal LFTs      Home Pulmonary Medications:     23 0905   Respiratory Protocol   Protocol Initiated? No   Protocol Selection Respiratory   Language Barrier? No   Medical & Social History Reviewed? Yes   Diagnostic Studies Reviewed? Yes   Physical Assessment Performed? Yes   Respiratory Plan Discontinue Protocol   Respiratory Assessment   Resp Comments patient has not required albuterol MDI in over 48 hours, respirations even and non labored, will discontinue resp protocol   Additional Assessments   Pulse 105   SpO2 95 %            Past Medical History:   Diagnosis Date    Cancer ([address])     Headache(784.0) 2021    History of radiation exposure     Malignant neoplasm of right female breast ([address]) 2012    right     Social History     Socioeconomic History    Marital status:       Spouse name: None    Number of children: None    Years of education: None    Highest education level: None   Occupational History    None   Tobacco Use    Smoking status: Some Days     Packs/day: 0.25     Years: 15.00     Total pack years: 3.75     Types: Cigarettes     Start date: 20     Last attempt to quit: 7/10/2021     Years since quittin.1    Smokeless tobacco: Never   Vaping Use    Vaping Use: Never used   Substance and Sexual Activity    Alcohol use: Not Currently    Drug use: Not Currently    Sexual activity: Not Currently     Partners: Male     Birth control/protection: Male Sterilization   Other Topics Concern    None   Social History Narrative    None     Social Determinants of Health     Financial Resource Strain: Not on file   Food Insecurity: No Food Insecurity (2023)    Hunger Vital Sign     Worried About Running Out of Food in the Last Year: Never true     [address] in the Last Year: Never true   Transportation Needs: No Transportation Needs (8/24/2023)    PRAPARE - Transportation     Lack of Transportation (Medical): No     Lack of Transportation (Non-Medical): No   Physical Activity: Not on file   Stress: Not on file   Social Connections: Not on file   Intimate Partner Violence: Not on file   Housing Stability: Low Risk  (8/24/2023)    Housing Stability Vital Sign     Unable to Pay for Housing in the Last Year: No     Number of Places Lived in the Last Year: 1     Unstable Housing in the Last Year: No       Subjective    Subjective Data: drowsy    Objective    Physical Exam:   Assessment Type: Assess only  General Appearance: Drowsy  Respiratory Pattern: Shallow, Spontaneous  Chest Assessment: Chest expansion symmetrical  Bilateral Breath Sounds: Clear, Diminished  Cough: None  O2 Device: room air    Vitals:  Blood pressure 98/63, pulse 105, temperature 98.4 °F (36.9 °C), resp. rate 16, height 5' 5" (1.651 m), weight 61.2 kg (134 lb 14.7 oz), last menstrual period 05/06/2021, SpO2 95 %. Imaging and other studies: I have personally reviewed pertinent reports.       O2 Device: room air     Plan    Respiratory Plan: Discontinue Protocol        Resp Comments: patient has not required albuterol MDI in over 48 hours, respirations even and non labored, will discontinue resp protocol

## 2023-08-28 NOTE — TELEPHONE ENCOUNTER
Per Liang Shannon RN, Dr Kori Loyola nurse, as the patient will now be pursuing Hospice care, I contacted [address] and asked that her upcoming oncology infusion appointments be cancelled.

## 2023-08-28 NOTE — TELEPHONE ENCOUNTER
Pt stated was just released from [facility] today and stated her stomach is swollen and extended and needed to speak to on call

## 2023-08-28 NOTE — HOSPICE NOTE
Received referral. Met with pt and she asked if I can speak with her son @ 3:00pm when he gets out of work. 3:30PM. Met with pt and son. Discussed the benefits of hospice. Discussed options for care. Pt stated she wants to go home. Son, Kashif Coreas, stated that he would care for his mother. "I've taken care of my mom and I took care of my dad before he  a few weeks ago. I will take care of my mom. My uncle will let her live at his house and I am staying there with my girlfriend."   DME needs were addressed.

## 2023-08-29 ENCOUNTER — HOME CARE VISIT (OUTPATIENT)
Dept: HOME HEALTH SERVICES | Facility: HOME HEALTHCARE | Age: 58
End: 2023-08-29
Payer: COMMERCIAL

## 2023-08-29 ENCOUNTER — HOME CARE VISIT (OUTPATIENT)
Dept: HOME HOSPICE | Facility: HOSPICE | Age: 58
End: 2023-08-29
Payer: COMMERCIAL

## 2023-08-29 VITALS
TEMPERATURE: 96.5 F | SYSTOLIC BLOOD PRESSURE: 96 MMHG | RESPIRATION RATE: 16 BRPM | DIASTOLIC BLOOD PRESSURE: 56 MMHG | WEIGHT: 134 LBS | BODY MASS INDEX: 22.33 KG/M2 | HEIGHT: 65 IN | HEART RATE: 76 BPM

## 2023-08-29 PROCEDURE — T2042 HOSPICE ROUTINE HOME CARE: HCPCS

## 2023-08-29 PROCEDURE — G0300 HHS/HOSPICE OF LPN EA 15 MIN: HCPCS

## 2023-08-29 PROCEDURE — G0155 HHCP-SVS OF CSW,EA 15 MIN: HCPCS

## 2023-08-30 ENCOUNTER — HOSPITAL ENCOUNTER (OUTPATIENT)
Dept: INFUSION CENTER | Facility: CLINIC | Age: 58
End: 2023-08-30

## 2023-08-30 ENCOUNTER — HOME CARE VISIT (OUTPATIENT)
Dept: HOME HEALTH SERVICES | Facility: HOME HEALTHCARE | Age: 58
End: 2023-08-30
Payer: COMMERCIAL

## 2023-08-30 PROCEDURE — G0156 HHCP-SVS OF AIDE,EA 15 MIN: HCPCS

## 2023-08-30 PROCEDURE — T2042 HOSPICE ROUTINE HOME CARE: HCPCS

## 2023-08-31 ENCOUNTER — HOME CARE VISIT (OUTPATIENT)
Dept: HOME HEALTH SERVICES | Facility: HOME HEALTHCARE | Age: 58
End: 2023-08-31
Payer: COMMERCIAL

## 2023-08-31 ENCOUNTER — HOME CARE VISIT (OUTPATIENT)
Dept: HOME HOSPICE | Facility: HOSPICE | Age: 58
End: 2023-08-31
Payer: COMMERCIAL

## 2023-08-31 PROCEDURE — G0300 HHS/HOSPICE OF LPN EA 15 MIN: HCPCS

## 2023-08-31 PROCEDURE — T2042 HOSPICE ROUTINE HOME CARE: HCPCS

## 2023-09-01 PROCEDURE — T2042 HOSPICE ROUTINE HOME CARE: HCPCS

## 2023-09-02 ENCOUNTER — HOME CARE VISIT (OUTPATIENT)
Dept: HOME HOSPICE | Facility: HOSPICE | Age: 58
End: 2023-09-02
Payer: COMMERCIAL

## 2023-09-02 PROCEDURE — T2042 HOSPICE ROUTINE HOME CARE: HCPCS

## 2023-09-03 PROCEDURE — T2045 HOSPICE GENERAL CARE: HCPCS

## 2023-09-04 PROCEDURE — T2045 HOSPICE GENERAL CARE: HCPCS

## 2023-09-05 ENCOUNTER — HOME CARE VISIT (OUTPATIENT)
Dept: HOME HOSPICE | Facility: HOSPICE | Age: 58
End: 2023-09-05
Payer: COMMERCIAL

## 2023-09-06 ENCOUNTER — HOME CARE VISIT (OUTPATIENT)
Dept: HOME HOSPICE | Facility: HOSPICE | Age: 58
End: 2023-09-06
Payer: COMMERCIAL

## 2023-09-12 ENCOUNTER — TELEPHONE (OUTPATIENT)
Dept: INTERNAL MEDICINE CLINIC | Facility: CLINIC | Age: 58
End: 2023-09-12

## 2023-09-12 ENCOUNTER — HOME CARE VISIT (OUTPATIENT)
Dept: HOME HOSPICE | Facility: HOSPICE | Age: 58
End: 2023-09-12
Payer: COMMERCIAL

## 2023-09-12 NOTE — TELEPHONE ENCOUNTER
Stephane Ledesma just wanted to let you know that Allymeredithcollin Cornejo passed away on Sept 4th.

## 2023-09-12 NOTE — CASE COMMUNICATION
Elena Russo, Bereavement Final IDG [id number] HOME TEAM TO ASSIGN FOLLOW-UP (KELY)  : 1965  SOC: 23  IPU SOC: 9/3/23  DOD: 23  Diagnosis: Stage IV Breast Cancer  Primary:   Ale Macias was a 56year old woman at the [facility]. 3 sons. One incarcerated. Son Abel Bowie is 25. Son Christiano Kim is 24, living with Downs Syndrome. Ale Macias was living at her brother Rc's home. Ale Macias and her sons are facing eviction. Abel Bowie reportedly was not able  to be present due to conflict with Gareth Wagoner and legal issues surrounding eviction notice. Abel Bowie is just off of probation and was in a juvenile/residential program prior to turning 20. Gabriella's sister  and a niece April helped with care. Abel Bowie works full-time and his significant other Celestino Gan works fulltime with a . An APS report was written for Christiano Kim as his brother, Abel Bowie, is unable to care for him and his Carnella Maillard no long er wants Christiano Kim in the home. April, her significant other, and Jain did visit. TOD: Niece April was notified of Gabriella's passing and called other family members. Sister Bonnie Stacy and her  Rossi Meza did come to the [facility]. Bonnie Stacy was tearful and voiced gratitude for Gabriella's care.     Call Assignments: Home Team to Tegan Wong

## 2023-09-23 DIAGNOSIS — R39.81 FUNCTIONAL URINARY INCONTINENCE: ICD-10-CM

## 2023-12-29 NOTE — TELEPHONE ENCOUNTER
Message left for patient to call back to go over updated schedule  My TEAMS number was left for call back  Her/She

## 2024-08-31 NOTE — TELEPHONE ENCOUNTER
Spoke with patient and  about tube - explained that possibly no fluid to drain vs tube is out of place vs there is a loculation that tube is not in  They have an appt with IR tomorrow  You can access the FollowMyHealth Patient Portal offered by [facility] by registering at the following website: [email or web address]. By joining [employer]’s FollowMyHealth portal, you will also be able to view your health information using other applications (apps) compatible with our system. Verbalized Understanding

## 2025-07-11 NOTE — ASSESSMENT & PLAN NOTE
Attempted to call home number on file, but line was not connecting. Called mobile number on file, but phone went straight to voicemail. Left message to call back.      · Patient complaining of slight pain or sore throat with swallowing  · Continue nystatin swish and swallow given immunocompromised state  · Continue to monitor